# Patient Record
Sex: MALE | Race: WHITE | Employment: OTHER | URBAN - METROPOLITAN AREA
[De-identification: names, ages, dates, MRNs, and addresses within clinical notes are randomized per-mention and may not be internally consistent; named-entity substitution may affect disease eponyms.]

---

## 2017-04-13 ENCOUNTER — APPOINTMENT (EMERGENCY)
Dept: CT IMAGING | Facility: HOSPITAL | Age: 77
DRG: 071 | End: 2017-04-13
Payer: COMMERCIAL

## 2017-04-13 ENCOUNTER — HOSPITAL ENCOUNTER (INPATIENT)
Facility: HOSPITAL | Age: 77
LOS: 4 days | Discharge: NURSING, REHAB OR LONG-TERM CARE | DRG: 071 | End: 2017-04-17
Attending: INTERNAL MEDICINE | Admitting: INTERNAL MEDICINE
Payer: COMMERCIAL

## 2017-04-13 ENCOUNTER — APPOINTMENT (EMERGENCY)
Dept: RADIOLOGY | Facility: HOSPITAL | Age: 77
DRG: 071 | End: 2017-04-13
Payer: COMMERCIAL

## 2017-04-13 DIAGNOSIS — R44.3 HALLUCINATIONS: ICD-10-CM

## 2017-04-13 DIAGNOSIS — R41.82 ALTERED MENTAL STATE: Primary | ICD-10-CM

## 2017-04-13 DIAGNOSIS — S22.089A T12 VERTEBRAL FRACTURE (HCC): ICD-10-CM

## 2017-04-13 DIAGNOSIS — G93.41 ACUTE METABOLIC ENCEPHALOPATHY: ICD-10-CM

## 2017-04-13 DIAGNOSIS — F10.11 HISTORY OF ALCOHOL ABUSE: ICD-10-CM

## 2017-04-13 PROBLEM — M48.50XA VERTEBRAL COMPRESSION FRACTURE (HCC): Status: ACTIVE | Noted: 2017-04-13

## 2017-04-13 PROBLEM — I10 ESSENTIAL HYPERTENSION: Status: ACTIVE | Noted: 2017-04-13

## 2017-04-13 PROBLEM — I25.10 CORONARY ARTERY DISEASE INVOLVING NATIVE CORONARY ARTERY: Status: ACTIVE | Noted: 2017-04-13

## 2017-04-13 PROBLEM — R65.10 SIRS (SYSTEMIC INFLAMMATORY RESPONSE SYNDROME) (HCC): Status: ACTIVE | Noted: 2017-04-13

## 2017-04-13 PROBLEM — M62.82 RHABDOMYOLYSIS: Status: ACTIVE | Noted: 2017-04-13

## 2017-04-13 PROBLEM — F10.10 ALCOHOL ABUSE, CONTINUOUS: Status: ACTIVE | Noted: 2017-04-13

## 2017-04-13 PROBLEM — I16.0 HYPERTENSIVE URGENCY: Status: ACTIVE | Noted: 2017-04-13

## 2017-04-13 LAB
ALBUMIN SERPL BCP-MCNC: 3.9 G/DL (ref 3.5–5)
ALP SERPL-CCNC: 48 U/L (ref 46–116)
ALT SERPL W P-5'-P-CCNC: 32 U/L (ref 12–78)
AMMONIA PLAS-SCNC: 12 UMOL/L (ref 11–35)
AMPHETAMINES SERPL QL SCN: NEGATIVE
ANION GAP SERPL CALCULATED.3IONS-SCNC: 6 MMOL/L (ref 4–13)
APTT PPP: 24 SECONDS (ref 24–36)
AST SERPL W P-5'-P-CCNC: 48 U/L (ref 5–45)
BACTERIA UR QL AUTO: ABNORMAL /HPF
BARBITURATES UR QL: NEGATIVE
BASOPHILS # BLD AUTO: 0.02 THOUSANDS/ΜL (ref 0–0.1)
BASOPHILS NFR BLD AUTO: 0 % (ref 0–1)
BENZODIAZ UR QL: NEGATIVE
BILIRUB DIRECT SERPL-MCNC: 0.49 MG/DL (ref 0–0.2)
BILIRUB SERPL-MCNC: 2.1 MG/DL (ref 0.2–1)
BILIRUB UR QL STRIP: ABNORMAL
BUN SERPL-MCNC: 14 MG/DL (ref 5–25)
CALCIUM SERPL-MCNC: 9.7 MG/DL (ref 8.3–10.1)
CHLORIDE SERPL-SCNC: 103 MMOL/L (ref 100–108)
CK MB SERPL-MCNC: 1.7 % (ref 0–2.5)
CK MB SERPL-MCNC: 10.6 NG/ML (ref 0–5)
CK SERPL-CCNC: 628 U/L (ref 39–308)
CLARITY UR: CLEAR
CO2 SERPL-SCNC: 30 MMOL/L (ref 21–32)
COCAINE UR QL: NEGATIVE
COLOR UR: YELLOW
CREAT SERPL-MCNC: 0.8 MG/DL (ref 0.6–1.3)
EOSINOPHIL # BLD AUTO: 0 THOUSAND/ΜL (ref 0–0.61)
EOSINOPHIL NFR BLD AUTO: 0 % (ref 0–6)
ERYTHROCYTE [DISTWIDTH] IN BLOOD BY AUTOMATED COUNT: 13.1 % (ref 11.6–15.1)
ETHANOL SERPL-MCNC: <3 MG/DL (ref 0–3)
GFR SERPL CREATININE-BSD FRML MDRD: >60 ML/MIN/1.73SQ M
GLUCOSE SERPL-MCNC: 125 MG/DL (ref 65–140)
GLUCOSE UR STRIP-MCNC: NEGATIVE MG/DL
HCT VFR BLD AUTO: 49.8 % (ref 36.5–49.3)
HGB BLD-MCNC: 17.5 G/DL (ref 12–17)
HGB UR QL STRIP.AUTO: ABNORMAL
INR PPP: 1.06 (ref 0.86–1.16)
KETONES UR STRIP-MCNC: ABNORMAL MG/DL
LACTATE SERPL-SCNC: 1.8 MMOL/L (ref 0.5–2)
LEUKOCYTE ESTERASE UR QL STRIP: NEGATIVE
LYMPHOCYTES # BLD AUTO: 0.97 THOUSANDS/ΜL (ref 0.6–4.47)
LYMPHOCYTES NFR BLD AUTO: 5 % (ref 14–44)
MCH RBC QN AUTO: 30.5 PG (ref 26.8–34.3)
MCHC RBC AUTO-ENTMCNC: 35.1 G/DL (ref 31.4–37.4)
MCV RBC AUTO: 87 FL (ref 82–98)
METHADONE UR QL: NEGATIVE
MONOCYTES # BLD AUTO: 1.33 THOUSAND/ΜL (ref 0.17–1.22)
MONOCYTES NFR BLD AUTO: 7 % (ref 4–12)
NEUTROPHILS # BLD AUTO: 15.67 THOUSANDS/ΜL (ref 1.85–7.62)
NEUTS SEG NFR BLD AUTO: 88 % (ref 43–75)
NITRITE UR QL STRIP: NEGATIVE
NON-SQ EPI CELLS URNS QL MICRO: ABNORMAL /HPF
NT-PROBNP SERPL-MCNC: 2376 PG/ML
OPIATES UR QL SCN: NEGATIVE
PCP UR QL: NEGATIVE
PH UR STRIP.AUTO: 6 [PH] (ref 4.5–8)
PLATELET # BLD AUTO: 206 THOUSANDS/UL (ref 149–390)
PMV BLD AUTO: 8.6 FL (ref 8.9–12.7)
POTASSIUM SERPL-SCNC: 4.6 MMOL/L (ref 3.5–5.3)
PROT SERPL-MCNC: 8.1 G/DL (ref 6.4–8.2)
PROT UR STRIP-MCNC: ABNORMAL MG/DL
PROTHROMBIN TIME: 13.7 SECONDS (ref 12–14.3)
RBC # BLD AUTO: 5.74 MILLION/UL (ref 3.88–5.62)
RBC #/AREA URNS AUTO: ABNORMAL /HPF
SODIUM SERPL-SCNC: 139 MMOL/L (ref 136–145)
SP GR UR STRIP.AUTO: >=1.03 (ref 1–1.03)
THC UR QL: NEGATIVE
TROPONIN I SERPL-MCNC: 0.05 NG/ML
UROBILINOGEN UR QL STRIP.AUTO: 0.2 E.U./DL
WBC # BLD AUTO: 17.99 THOUSAND/UL (ref 4.31–10.16)
WBC #/AREA URNS AUTO: ABNORMAL /HPF

## 2017-04-13 PROCEDURE — 72131 CT LUMBAR SPINE W/O DYE: CPT

## 2017-04-13 PROCEDURE — 71010 HB CHEST X-RAY 1 VIEW FRONTAL (PORTABLE): CPT

## 2017-04-13 PROCEDURE — 72125 CT NECK SPINE W/O DYE: CPT

## 2017-04-13 PROCEDURE — 36415 COLL VENOUS BLD VENIPUNCTURE: CPT | Performed by: PHYSICIAN ASSISTANT

## 2017-04-13 PROCEDURE — 85025 COMPLETE CBC W/AUTO DIFF WBC: CPT | Performed by: PHYSICIAN ASSISTANT

## 2017-04-13 PROCEDURE — 96361 HYDRATE IV INFUSION ADD-ON: CPT

## 2017-04-13 PROCEDURE — 99285 EMERGENCY DEPT VISIT HI MDM: CPT

## 2017-04-13 PROCEDURE — G0480 DRUG TEST DEF 1-7 CLASSES: HCPCS | Performed by: PHYSICIAN ASSISTANT

## 2017-04-13 PROCEDURE — 80053 COMPREHEN METABOLIC PANEL: CPT | Performed by: PHYSICIAN ASSISTANT

## 2017-04-13 PROCEDURE — 81001 URINALYSIS AUTO W/SCOPE: CPT | Performed by: PHYSICIAN ASSISTANT

## 2017-04-13 PROCEDURE — 70450 CT HEAD/BRAIN W/O DYE: CPT

## 2017-04-13 PROCEDURE — 83880 ASSAY OF NATRIURETIC PEPTIDE: CPT | Performed by: PHYSICIAN ASSISTANT

## 2017-04-13 PROCEDURE — 85610 PROTHROMBIN TIME: CPT | Performed by: PHYSICIAN ASSISTANT

## 2017-04-13 PROCEDURE — 84484 ASSAY OF TROPONIN QUANT: CPT | Performed by: PHYSICIAN ASSISTANT

## 2017-04-13 PROCEDURE — A9270 NON-COVERED ITEM OR SERVICE: HCPCS | Performed by: INTERNAL MEDICINE

## 2017-04-13 PROCEDURE — 82248 BILIRUBIN DIRECT: CPT | Performed by: PHYSICIAN ASSISTANT

## 2017-04-13 PROCEDURE — 80307 DRUG TEST PRSMV CHEM ANLYZR: CPT | Performed by: PHYSICIAN ASSISTANT

## 2017-04-13 PROCEDURE — 80320 DRUG SCREEN QUANTALCOHOLS: CPT | Performed by: PHYSICIAN ASSISTANT

## 2017-04-13 PROCEDURE — 83605 ASSAY OF LACTIC ACID: CPT | Performed by: PHYSICIAN ASSISTANT

## 2017-04-13 PROCEDURE — 82553 CREATINE MB FRACTION: CPT | Performed by: PHYSICIAN ASSISTANT

## 2017-04-13 PROCEDURE — 96372 THER/PROPH/DIAG INJ SC/IM: CPT

## 2017-04-13 PROCEDURE — 82550 ASSAY OF CK (CPK): CPT | Performed by: PHYSICIAN ASSISTANT

## 2017-04-13 PROCEDURE — 82140 ASSAY OF AMMONIA: CPT | Performed by: INTERNAL MEDICINE

## 2017-04-13 PROCEDURE — 87040 BLOOD CULTURE FOR BACTERIA: CPT | Performed by: PHYSICIAN ASSISTANT

## 2017-04-13 PROCEDURE — 93005 ELECTROCARDIOGRAM TRACING: CPT | Performed by: PHYSICIAN ASSISTANT

## 2017-04-13 PROCEDURE — 85730 THROMBOPLASTIN TIME PARTIAL: CPT | Performed by: PHYSICIAN ASSISTANT

## 2017-04-13 PROCEDURE — 96360 HYDRATION IV INFUSION INIT: CPT

## 2017-04-13 PROCEDURE — 87086 URINE CULTURE/COLONY COUNT: CPT | Performed by: PHYSICIAN ASSISTANT

## 2017-04-13 RX ORDER — THIAMINE MONONITRATE (VIT B1) 100 MG
100 TABLET ORAL DAILY
Status: DISCONTINUED | OUTPATIENT
Start: 2017-04-14 | End: 2017-04-17 | Stop reason: HOSPADM

## 2017-04-13 RX ORDER — CARVEDILOL 12.5 MG/1
25 TABLET ORAL 2 TIMES DAILY WITH MEALS
Status: DISCONTINUED | OUTPATIENT
Start: 2017-04-13 | End: 2017-04-17 | Stop reason: HOSPADM

## 2017-04-13 RX ORDER — ACETAMINOPHEN 325 MG/1
650 TABLET ORAL EVERY 6 HOURS PRN
Status: DISCONTINUED | OUTPATIENT
Start: 2017-04-13 | End: 2017-04-17 | Stop reason: HOSPADM

## 2017-04-13 RX ORDER — CALCITONIN SALMON 200 [IU]/.09ML
1 SPRAY, METERED NASAL DAILY
Status: DISCONTINUED | OUTPATIENT
Start: 2017-04-14 | End: 2017-04-17 | Stop reason: HOSPADM

## 2017-04-13 RX ORDER — LISINOPRIL 10 MG/1
10 TABLET ORAL DAILY
Status: DISCONTINUED | OUTPATIENT
Start: 2017-04-14 | End: 2017-04-17

## 2017-04-13 RX ORDER — ONDANSETRON 2 MG/ML
4 INJECTION INTRAMUSCULAR; INTRAVENOUS EVERY 6 HOURS PRN
Status: DISCONTINUED | OUTPATIENT
Start: 2017-04-13 | End: 2017-04-17 | Stop reason: HOSPADM

## 2017-04-13 RX ORDER — CARVEDILOL 12.5 MG/1
TABLET ORAL
COMMUNITY
Start: 2016-07-19 | End: 2017-04-17 | Stop reason: HOSPADM

## 2017-04-13 RX ORDER — TRAMADOL HYDROCHLORIDE 50 MG/1
50 TABLET ORAL EVERY 6 HOURS PRN
Status: DISCONTINUED | OUTPATIENT
Start: 2017-04-13 | End: 2017-04-17 | Stop reason: HOSPADM

## 2017-04-13 RX ORDER — SIMVASTATIN 40 MG
TABLET ORAL
COMMUNITY
Start: 2011-04-23 | End: 2017-04-17 | Stop reason: HOSPADM

## 2017-04-13 RX ORDER — LISINOPRIL 10 MG/1
TABLET ORAL
COMMUNITY
Start: 2016-09-20 | End: 2017-04-17 | Stop reason: HOSPADM

## 2017-04-13 RX ORDER — THIAMINE HYDROCHLORIDE 100 MG/ML
100 INJECTION, SOLUTION INTRAMUSCULAR; INTRAVENOUS DAILY
Status: DISCONTINUED | OUTPATIENT
Start: 2017-04-13 | End: 2017-04-13

## 2017-04-13 RX ORDER — ASPIRIN 81 MG/1
81 TABLET, CHEWABLE ORAL ONCE
Status: COMPLETED | OUTPATIENT
Start: 2017-04-13 | End: 2017-04-13

## 2017-04-13 RX ORDER — DEXTROSE AND SODIUM CHLORIDE 5; .9 G/100ML; G/100ML
75 INJECTION, SOLUTION INTRAVENOUS CONTINUOUS
Status: DISCONTINUED | OUTPATIENT
Start: 2017-04-13 | End: 2017-04-15

## 2017-04-13 RX ORDER — LORAZEPAM 2 MG/ML
1 INJECTION INTRAMUSCULAR EVERY 4 HOURS PRN
Status: DISCONTINUED | OUTPATIENT
Start: 2017-04-13 | End: 2017-04-17 | Stop reason: HOSPADM

## 2017-04-13 RX ORDER — CHOLECALCIFEROL (VITAMIN D3) 25 MCG
1 CAPSULE ORAL 2 TIMES DAILY
COMMUNITY
Start: 2015-10-02 | End: 2021-11-12 | Stop reason: ALTCHOICE

## 2017-04-13 RX ADMIN — SODIUM CHLORIDE 1000 ML: 0.9 INJECTION, SOLUTION INTRAVENOUS at 13:38

## 2017-04-13 RX ADMIN — ASPIRIN 81 MG CHEWABLE TABLET 81 MG: 81 TABLET CHEWABLE at 17:57

## 2017-04-13 RX ADMIN — DEXTROSE AND SODIUM CHLORIDE 125 ML/HR: 5; .9 INJECTION, SOLUTION INTRAVENOUS at 17:44

## 2017-04-13 RX ADMIN — THIAMINE HYDROCHLORIDE 100 MG: 100 INJECTION, SOLUTION INTRAMUSCULAR; INTRAVENOUS at 13:43

## 2017-04-13 RX ADMIN — CARVEDILOL 25 MG: 12.5 TABLET, FILM COATED ORAL at 17:57

## 2017-04-14 ENCOUNTER — APPOINTMENT (INPATIENT)
Dept: OCCUPATIONAL THERAPY | Facility: HOSPITAL | Age: 77
DRG: 071 | End: 2017-04-14
Payer: COMMERCIAL

## 2017-04-14 ENCOUNTER — APPOINTMENT (INPATIENT)
Dept: PHYSICAL THERAPY | Facility: HOSPITAL | Age: 77
DRG: 071 | End: 2017-04-14
Payer: COMMERCIAL

## 2017-04-14 LAB
ALBUMIN SERPL BCP-MCNC: 3.1 G/DL (ref 3.5–5)
ALP SERPL-CCNC: 41 U/L (ref 46–116)
ALT SERPL W P-5'-P-CCNC: 26 U/L (ref 12–78)
ANION GAP SERPL CALCULATED.3IONS-SCNC: 6 MMOL/L (ref 4–13)
AST SERPL W P-5'-P-CCNC: 29 U/L (ref 5–45)
ATRIAL RATE: 92 BPM
BACTERIA UR CULT: NORMAL
BASOPHILS # BLD AUTO: 0.02 THOUSANDS/ΜL (ref 0–0.1)
BASOPHILS NFR BLD AUTO: 0 % (ref 0–1)
BILIRUB SERPL-MCNC: 1.2 MG/DL (ref 0.2–1)
BUN SERPL-MCNC: 10 MG/DL (ref 5–25)
CALCIUM SERPL-MCNC: 8.5 MG/DL (ref 8.3–10.1)
CHLORIDE SERPL-SCNC: 108 MMOL/L (ref 100–108)
CK MB SERPL-MCNC: 1.4 % (ref 0–2.5)
CK MB SERPL-MCNC: 4.2 NG/ML (ref 0–5)
CK SERPL-CCNC: 290 U/L (ref 39–308)
CO2 SERPL-SCNC: 31 MMOL/L (ref 21–32)
CREAT SERPL-MCNC: 0.74 MG/DL (ref 0.6–1.3)
EOSINOPHIL # BLD AUTO: 0.02 THOUSAND/ΜL (ref 0–0.61)
EOSINOPHIL NFR BLD AUTO: 0 % (ref 0–6)
ERYTHROCYTE [DISTWIDTH] IN BLOOD BY AUTOMATED COUNT: 13.3 % (ref 11.6–15.1)
GFR SERPL CREATININE-BSD FRML MDRD: >60 ML/MIN/1.73SQ M
GLUCOSE SERPL-MCNC: 155 MG/DL (ref 65–140)
HCT VFR BLD AUTO: 44.8 % (ref 36.5–49.3)
HGB BLD-MCNC: 15 G/DL (ref 12–17)
LYMPHOCYTES # BLD AUTO: 1.06 THOUSANDS/ΜL (ref 0.6–4.47)
LYMPHOCYTES NFR BLD AUTO: 9 % (ref 14–44)
MAGNESIUM SERPL-MCNC: 2.2 MG/DL (ref 1.6–2.6)
MCH RBC QN AUTO: 29.9 PG (ref 26.8–34.3)
MCHC RBC AUTO-ENTMCNC: 33.5 G/DL (ref 31.4–37.4)
MCV RBC AUTO: 89 FL (ref 82–98)
MONOCYTES # BLD AUTO: 1.13 THOUSAND/ΜL (ref 0.17–1.22)
MONOCYTES NFR BLD AUTO: 10 % (ref 4–12)
NEUTROPHILS # BLD AUTO: 9.71 THOUSANDS/ΜL (ref 1.85–7.62)
NEUTS SEG NFR BLD AUTO: 81 % (ref 43–75)
P AXIS: 41 DEGREES
PLATELET # BLD AUTO: 167 THOUSANDS/UL (ref 149–390)
PMV BLD AUTO: 8.8 FL (ref 8.9–12.7)
POTASSIUM SERPL-SCNC: 3.8 MMOL/L (ref 3.5–5.3)
PR INTERVAL: 146 MS
PROT SERPL-MCNC: 6.6 G/DL (ref 6.4–8.2)
QRS AXIS: 58 DEGREES
QRSD INTERVAL: 86 MS
QT INTERVAL: 390 MS
QTC INTERVAL: 482 MS
RBC # BLD AUTO: 5.02 MILLION/UL (ref 3.88–5.62)
SODIUM SERPL-SCNC: 145 MMOL/L (ref 136–145)
T WAVE AXIS: 44 DEGREES
TROPONIN I SERPL-MCNC: 0.04 NG/ML
VENTRICULAR RATE: 92 BPM
WBC # BLD AUTO: 11.94 THOUSAND/UL (ref 4.31–10.16)

## 2017-04-14 PROCEDURE — A9270 NON-COVERED ITEM OR SERVICE: HCPCS | Performed by: INTERNAL MEDICINE

## 2017-04-14 PROCEDURE — G8979 MOBILITY GOAL STATUS: HCPCS

## 2017-04-14 PROCEDURE — G8988 SELF CARE GOAL STATUS: HCPCS

## 2017-04-14 PROCEDURE — A9270 NON-COVERED ITEM OR SERVICE: HCPCS | Performed by: NURSE PRACTITIONER

## 2017-04-14 PROCEDURE — 84484 ASSAY OF TROPONIN QUANT: CPT | Performed by: INTERNAL MEDICINE

## 2017-04-14 PROCEDURE — 97163 PT EVAL HIGH COMPLEX 45 MIN: CPT

## 2017-04-14 PROCEDURE — G8987 SELF CARE CURRENT STATUS: HCPCS

## 2017-04-14 PROCEDURE — 97166 OT EVAL MOD COMPLEX 45 MIN: CPT

## 2017-04-14 PROCEDURE — 82553 CREATINE MB FRACTION: CPT | Performed by: INTERNAL MEDICINE

## 2017-04-14 PROCEDURE — 85025 COMPLETE CBC W/AUTO DIFF WBC: CPT | Performed by: INTERNAL MEDICINE

## 2017-04-14 PROCEDURE — 83735 ASSAY OF MAGNESIUM: CPT | Performed by: NURSE PRACTITIONER

## 2017-04-14 PROCEDURE — 82550 ASSAY OF CK (CPK): CPT | Performed by: INTERNAL MEDICINE

## 2017-04-14 PROCEDURE — 80053 COMPREHEN METABOLIC PANEL: CPT | Performed by: INTERNAL MEDICINE

## 2017-04-14 PROCEDURE — G8978 MOBILITY CURRENT STATUS: HCPCS

## 2017-04-14 RX ORDER — BENZONATATE 100 MG/1
100 CAPSULE ORAL 3 TIMES DAILY PRN
Status: DISCONTINUED | OUTPATIENT
Start: 2017-04-14 | End: 2017-04-17 | Stop reason: HOSPADM

## 2017-04-14 RX ORDER — FOLIC ACID 1 MG/1
1 TABLET ORAL DAILY
Status: DISCONTINUED | OUTPATIENT
Start: 2017-04-14 | End: 2017-04-17 | Stop reason: HOSPADM

## 2017-04-14 RX ADMIN — CARVEDILOL 25 MG: 12.5 TABLET, FILM COATED ORAL at 17:10

## 2017-04-14 RX ADMIN — ENOXAPARIN SODIUM 40 MG: 40 INJECTION SUBCUTANEOUS at 09:44

## 2017-04-14 RX ADMIN — HYDROMORPHONE HYDROCHLORIDE 1 MG: 1 INJECTION, SOLUTION INTRAMUSCULAR; INTRAVENOUS; SUBCUTANEOUS at 09:44

## 2017-04-14 RX ADMIN — HYDROMORPHONE HYDROCHLORIDE 1 MG: 1 INJECTION, SOLUTION INTRAMUSCULAR; INTRAVENOUS; SUBCUTANEOUS at 02:56

## 2017-04-14 RX ADMIN — BENZONATATE 100 MG: 100 CAPSULE ORAL at 23:09

## 2017-04-14 RX ADMIN — CARVEDILOL 25 MG: 12.5 TABLET, FILM COATED ORAL at 09:43

## 2017-04-14 RX ADMIN — FOLIC ACID 1 MG: 1 TABLET ORAL at 15:00

## 2017-04-14 RX ADMIN — Medication 100 MG: at 09:43

## 2017-04-14 RX ADMIN — CALCITONIN SALMON 1 SPRAY: 200 SPRAY, METERED NASAL at 09:45

## 2017-04-14 RX ADMIN — LISINOPRIL 10 MG: 10 TABLET ORAL at 09:43

## 2017-04-14 RX ADMIN — DEXTROSE AND SODIUM CHLORIDE 125 ML/HR: 5; .9 INJECTION, SOLUTION INTRAVENOUS at 02:53

## 2017-04-15 LAB
FOLATE SERPL-MCNC: 15.3 NG/ML (ref 3.1–17.5)
TSH SERPL DL<=0.05 MIU/L-ACNC: 1.22 UIU/ML (ref 0.36–3.74)
VIT B12 SERPL-MCNC: 479 PG/ML (ref 100–900)

## 2017-04-15 PROCEDURE — A9270 NON-COVERED ITEM OR SERVICE: HCPCS | Performed by: NURSE PRACTITIONER

## 2017-04-15 PROCEDURE — 82746 ASSAY OF FOLIC ACID SERUM: CPT | Performed by: NURSE PRACTITIONER

## 2017-04-15 PROCEDURE — 97116 GAIT TRAINING THERAPY: CPT

## 2017-04-15 PROCEDURE — A9270 NON-COVERED ITEM OR SERVICE: HCPCS | Performed by: INTERNAL MEDICINE

## 2017-04-15 PROCEDURE — 94760 N-INVAS EAR/PLS OXIMETRY 1: CPT

## 2017-04-15 PROCEDURE — 84443 ASSAY THYROID STIM HORMONE: CPT | Performed by: NURSE PRACTITIONER

## 2017-04-15 PROCEDURE — 82607 VITAMIN B-12: CPT | Performed by: NURSE PRACTITIONER

## 2017-04-15 RX ORDER — AMLODIPINE BESYLATE 5 MG/1
10 TABLET ORAL DAILY
Status: DISCONTINUED | OUTPATIENT
Start: 2017-04-15 | End: 2017-04-17 | Stop reason: HOSPADM

## 2017-04-15 RX ADMIN — LISINOPRIL 10 MG: 10 TABLET ORAL at 09:19

## 2017-04-15 RX ADMIN — TRAMADOL HYDROCHLORIDE 50 MG: 50 TABLET, FILM COATED ORAL at 06:11

## 2017-04-15 RX ADMIN — CARVEDILOL 25 MG: 12.5 TABLET, FILM COATED ORAL at 16:36

## 2017-04-15 RX ADMIN — ENOXAPARIN SODIUM 40 MG: 40 INJECTION SUBCUTANEOUS at 09:19

## 2017-04-15 RX ADMIN — CARVEDILOL 25 MG: 12.5 TABLET, FILM COATED ORAL at 09:18

## 2017-04-15 RX ADMIN — LORAZEPAM 1 MG: 2 INJECTION, SOLUTION INTRAMUSCULAR; INTRAVENOUS at 13:30

## 2017-04-15 RX ADMIN — LORAZEPAM 1 MG: 2 INJECTION, SOLUTION INTRAMUSCULAR; INTRAVENOUS at 21:42

## 2017-04-15 RX ADMIN — CALCITONIN SALMON 1 SPRAY: 200 SPRAY, METERED NASAL at 09:19

## 2017-04-15 RX ADMIN — BENZONATATE 100 MG: 100 CAPSULE ORAL at 21:42

## 2017-04-15 RX ADMIN — DEXTROSE AND SODIUM CHLORIDE 75 ML/HR: 5; .9 INJECTION, SOLUTION INTRAVENOUS at 04:36

## 2017-04-15 RX ADMIN — FOLIC ACID 1 MG: 1 TABLET ORAL at 09:19

## 2017-04-15 RX ADMIN — Medication 100 MG: at 09:20

## 2017-04-15 RX ADMIN — AMLODIPINE BESYLATE 10 MG: 5 TABLET ORAL at 16:36

## 2017-04-16 LAB
ALBUMIN SERPL BCP-MCNC: 2.8 G/DL (ref 3.5–5)
ALP SERPL-CCNC: 40 U/L (ref 46–116)
ALT SERPL W P-5'-P-CCNC: 24 U/L (ref 12–78)
ANION GAP SERPL CALCULATED.3IONS-SCNC: 8 MMOL/L (ref 4–13)
AST SERPL W P-5'-P-CCNC: 22 U/L (ref 5–45)
BILIRUB SERPL-MCNC: 1.4 MG/DL (ref 0.2–1)
BUN SERPL-MCNC: 6 MG/DL (ref 5–25)
CALCIUM SERPL-MCNC: 8.4 MG/DL (ref 8.3–10.1)
CHLORIDE SERPL-SCNC: 105 MMOL/L (ref 100–108)
CO2 SERPL-SCNC: 28 MMOL/L (ref 21–32)
CREAT SERPL-MCNC: 0.58 MG/DL (ref 0.6–1.3)
GFR SERPL CREATININE-BSD FRML MDRD: >60 ML/MIN/1.73SQ M
GLUCOSE SERPL-MCNC: 102 MG/DL (ref 65–140)
POTASSIUM SERPL-SCNC: 3.5 MMOL/L (ref 3.5–5.3)
PROT SERPL-MCNC: 6 G/DL (ref 6.4–8.2)
SODIUM SERPL-SCNC: 141 MMOL/L (ref 136–145)

## 2017-04-16 PROCEDURE — 80053 COMPREHEN METABOLIC PANEL: CPT | Performed by: INTERNAL MEDICINE

## 2017-04-16 PROCEDURE — A9270 NON-COVERED ITEM OR SERVICE: HCPCS | Performed by: INTERNAL MEDICINE

## 2017-04-16 PROCEDURE — A9270 NON-COVERED ITEM OR SERVICE: HCPCS | Performed by: NURSE PRACTITIONER

## 2017-04-16 PROCEDURE — 94760 N-INVAS EAR/PLS OXIMETRY 1: CPT

## 2017-04-16 RX ORDER — CHLORDIAZEPOXIDE HYDROCHLORIDE 5 MG/1
10 CAPSULE, GELATIN COATED ORAL EVERY 8 HOURS SCHEDULED
Status: DISCONTINUED | OUTPATIENT
Start: 2017-04-16 | End: 2017-04-17 | Stop reason: HOSPADM

## 2017-04-16 RX ADMIN — CARVEDILOL 25 MG: 12.5 TABLET, FILM COATED ORAL at 08:48

## 2017-04-16 RX ADMIN — FOLIC ACID 1 MG: 1 TABLET ORAL at 08:48

## 2017-04-16 RX ADMIN — Medication 100 MG: at 08:51

## 2017-04-16 RX ADMIN — LISINOPRIL 10 MG: 10 TABLET ORAL at 08:48

## 2017-04-16 RX ADMIN — CALCITONIN SALMON 1 SPRAY: 200 SPRAY, METERED NASAL at 09:07

## 2017-04-16 RX ADMIN — AMLODIPINE BESYLATE 10 MG: 5 TABLET ORAL at 08:48

## 2017-04-16 RX ADMIN — LORAZEPAM 1 MG: 2 INJECTION, SOLUTION INTRAMUSCULAR; INTRAVENOUS at 08:50

## 2017-04-16 RX ADMIN — CARVEDILOL 25 MG: 12.5 TABLET, FILM COATED ORAL at 17:28

## 2017-04-16 RX ADMIN — CHLORDIAZEPOXIDE HYDROCHLORIDE 10 MG: 5 CAPSULE ORAL at 15:42

## 2017-04-16 RX ADMIN — ENOXAPARIN SODIUM 40 MG: 40 INJECTION SUBCUTANEOUS at 08:48

## 2017-04-16 RX ADMIN — CHLORDIAZEPOXIDE HYDROCHLORIDE 10 MG: 5 CAPSULE ORAL at 22:12

## 2017-04-17 ENCOUNTER — APPOINTMENT (INPATIENT)
Dept: OCCUPATIONAL THERAPY | Facility: HOSPITAL | Age: 77
DRG: 071 | End: 2017-04-17
Payer: COMMERCIAL

## 2017-04-17 ENCOUNTER — APPOINTMENT (INPATIENT)
Dept: PHYSICAL THERAPY | Facility: HOSPITAL | Age: 77
DRG: 071 | End: 2017-04-17
Payer: COMMERCIAL

## 2017-04-17 VITALS
DIASTOLIC BLOOD PRESSURE: 64 MMHG | SYSTOLIC BLOOD PRESSURE: 131 MMHG | BODY MASS INDEX: 25.19 KG/M2 | RESPIRATION RATE: 18 BRPM | HEART RATE: 66 BPM | OXYGEN SATURATION: 94 % | WEIGHT: 166.23 LBS | TEMPERATURE: 97.5 F | HEIGHT: 68 IN

## 2017-04-17 PROCEDURE — A9270 NON-COVERED ITEM OR SERVICE: HCPCS | Performed by: INTERNAL MEDICINE

## 2017-04-17 PROCEDURE — 97532 HB COGNITIVE SKILLS DEVELOPMENT: CPT

## 2017-04-17 PROCEDURE — 93005 ELECTROCARDIOGRAM TRACING: CPT

## 2017-04-17 PROCEDURE — 97116 GAIT TRAINING THERAPY: CPT

## 2017-04-17 PROCEDURE — 97535 SELF CARE MNGMENT TRAINING: CPT

## 2017-04-17 PROCEDURE — 97530 THERAPEUTIC ACTIVITIES: CPT

## 2017-04-17 PROCEDURE — 94760 N-INVAS EAR/PLS OXIMETRY 1: CPT

## 2017-04-17 PROCEDURE — A9270 NON-COVERED ITEM OR SERVICE: HCPCS | Performed by: NURSE PRACTITIONER

## 2017-04-17 RX ORDER — TRAMADOL HYDROCHLORIDE 50 MG/1
50 TABLET ORAL EVERY 6 HOURS PRN
Qty: 30 TABLET | Refills: 0 | Status: SHIPPED | OUTPATIENT
Start: 2017-04-17 | End: 2017-04-27

## 2017-04-17 RX ORDER — LISINOPRIL 10 MG/1
20 TABLET ORAL DAILY
Qty: 60 TABLET | Refills: 0 | Status: SHIPPED | OUTPATIENT
Start: 2017-04-18 | End: 2018-02-27 | Stop reason: HOSPADM

## 2017-04-17 RX ORDER — FOLIC ACID 1 MG/1
1 TABLET ORAL DAILY
Qty: 30 TABLET | Refills: 0 | Status: ON HOLD | OUTPATIENT
Start: 2017-04-17 | End: 2018-02-27 | Stop reason: ALTCHOICE

## 2017-04-17 RX ORDER — CARVEDILOL 25 MG/1
25 TABLET ORAL 2 TIMES DAILY WITH MEALS
Qty: 60 TABLET | Refills: 0 | Status: ON HOLD | OUTPATIENT
Start: 2017-04-17 | End: 2018-02-27 | Stop reason: SDUPTHER

## 2017-04-17 RX ORDER — LANOLIN ALCOHOL/MO/W.PET/CERES
100 CREAM (GRAM) TOPICAL DAILY
Qty: 30 TABLET | Refills: 0 | Status: ON HOLD | OUTPATIENT
Start: 2017-04-17 | End: 2018-02-27 | Stop reason: ALTCHOICE

## 2017-04-17 RX ORDER — AMLODIPINE BESYLATE 10 MG/1
10 TABLET ORAL DAILY
Qty: 30 TABLET | Refills: 0 | Status: SHIPPED | OUTPATIENT
Start: 2017-04-17 | End: 2018-02-26 | Stop reason: SDUPTHER

## 2017-04-17 RX ORDER — LISINOPRIL 10 MG/1
10 TABLET ORAL DAILY
Status: DISCONTINUED | OUTPATIENT
Start: 2017-04-18 | End: 2017-04-17 | Stop reason: HOSPADM

## 2017-04-17 RX ORDER — BENZONATATE 100 MG/1
100 CAPSULE ORAL 3 TIMES DAILY PRN
Qty: 20 CAPSULE | Refills: 0 | Status: SHIPPED | OUTPATIENT
Start: 2017-04-17 | End: 2017-05-17

## 2017-04-17 RX ORDER — CHLORDIAZEPOXIDE HYDROCHLORIDE 10 MG/1
10 CAPSULE, GELATIN COATED ORAL EVERY 8 HOURS SCHEDULED
Qty: 12 CAPSULE | Refills: 0 | Status: ON HOLD | OUTPATIENT
Start: 2017-04-17 | End: 2018-02-27 | Stop reason: ALTCHOICE

## 2017-04-17 RX ORDER — CALCITONIN SALMON 200 [IU]/.09ML
1 SPRAY, METERED NASAL DAILY
Qty: 30 ACT | Refills: 0 | Status: ON HOLD | OUTPATIENT
Start: 2017-04-17 | End: 2018-02-27 | Stop reason: ALTCHOICE

## 2017-04-17 RX ORDER — LORAZEPAM 1 MG/1
1 TABLET ORAL EVERY 6 HOURS PRN
Qty: 30 TABLET | Refills: 0 | Status: ON HOLD | OUTPATIENT
Start: 2017-04-17 | End: 2018-02-27 | Stop reason: ALTCHOICE

## 2017-04-17 RX ADMIN — ENOXAPARIN SODIUM 40 MG: 40 INJECTION SUBCUTANEOUS at 08:19

## 2017-04-17 RX ADMIN — CARVEDILOL 25 MG: 12.5 TABLET, FILM COATED ORAL at 08:19

## 2017-04-17 RX ADMIN — CALCITONIN SALMON 1 SPRAY: 200 SPRAY, METERED NASAL at 08:25

## 2017-04-17 RX ADMIN — CHLORDIAZEPOXIDE HYDROCHLORIDE 10 MG: 5 CAPSULE ORAL at 05:24

## 2017-04-17 RX ADMIN — FOLIC ACID 1 MG: 1 TABLET ORAL at 08:19

## 2017-04-17 RX ADMIN — Medication 100 MG: at 08:20

## 2017-04-17 RX ADMIN — AMLODIPINE BESYLATE 10 MG: 5 TABLET ORAL at 08:19

## 2017-04-17 RX ADMIN — CHLORDIAZEPOXIDE HYDROCHLORIDE 10 MG: 5 CAPSULE ORAL at 14:46

## 2017-04-17 RX ADMIN — LISINOPRIL 10 MG: 10 TABLET ORAL at 08:19

## 2017-04-17 RX ADMIN — CARVEDILOL 25 MG: 12.5 TABLET, FILM COATED ORAL at 17:13

## 2017-04-18 LAB
ATRIAL RATE: 97 BPM
BACTERIA BLD CULT: NORMAL
BACTERIA BLD CULT: NORMAL
P AXIS: -10 DEGREES
PR INTERVAL: 164 MS
QRS AXIS: -32 DEGREES
QRSD INTERVAL: 146 MS
QT INTERVAL: 380 MS
QTC INTERVAL: 482 MS
T WAVE AXIS: 51 DEGREES
VENTRICULAR RATE: 97 BPM

## 2017-04-24 ENCOUNTER — GENERIC CONVERSION - ENCOUNTER (OUTPATIENT)
Dept: OTHER | Facility: OTHER | Age: 77
End: 2017-04-24

## 2017-05-02 ENCOUNTER — ALLSCRIPTS OFFICE VISIT (OUTPATIENT)
Dept: OTHER | Facility: OTHER | Age: 77
End: 2017-05-02

## 2017-05-17 ENCOUNTER — ALLSCRIPTS OFFICE VISIT (OUTPATIENT)
Dept: OTHER | Facility: OTHER | Age: 77
End: 2017-05-17

## 2017-05-19 ENCOUNTER — ALLSCRIPTS OFFICE VISIT (OUTPATIENT)
Dept: OTHER | Facility: OTHER | Age: 77
End: 2017-05-19

## 2017-06-30 LAB
25(OH)D3 SERPL-MCNC: 58.9 NG/ML (ref 30–100)
BASOPHILS # BLD AUTO: 0 %
BASOPHILS # BLD AUTO: 0 X10E3/UL (ref 0–0.2)
DEPRECATED RDW RBC AUTO: 14 % (ref 12.3–15.4)
EOSINOPHIL # BLD AUTO: 0.2 X10E3/UL (ref 0–0.4)
EOSINOPHIL # BLD AUTO: 4 %
HCT VFR BLD AUTO: 43.4 % (ref 37.5–51)
HGB BLD-MCNC: 14.8 G/DL (ref 12.6–17.7)
IMM.GRANULOCYTES (CD4/8) (HISTORICAL): 0 %
IMM.GRANULOCYTES (CD4/8) (HISTORICAL): 0 X10E3/UL (ref 0–0.1)
LYMPHOCYTES # BLD AUTO: 2.1 X10E3/UL (ref 0.7–3.1)
LYMPHOCYTES # BLD AUTO: 33 %
MCH RBC QN AUTO: 30.1 PG (ref 26.6–33)
MCHC RBC AUTO-ENTMCNC: 34.1 G/DL (ref 31.5–35.7)
MCV RBC AUTO: 88 FL (ref 79–97)
MONOCYTES # BLD AUTO: 0.5 X10E3/UL (ref 0.1–0.9)
MONOCYTES (HISTORICAL): 9 %
NEUTROPHILS # BLD AUTO: 3.5 X10E3/UL (ref 1.4–7)
NEUTROPHILS # BLD AUTO: 54 %
PLATELET # BLD AUTO: 204 X10E3/UL (ref 150–379)
RBC (HISTORICAL): 4.92 X10E6/UL (ref 4.14–5.8)
WBC # BLD AUTO: 6.4 X10E3/UL (ref 3.4–10.8)

## 2017-07-01 ENCOUNTER — GENERIC CONVERSION - ENCOUNTER (OUTPATIENT)
Dept: OTHER | Facility: OTHER | Age: 77
End: 2017-07-01

## 2017-07-01 LAB
A/G RATIO (HISTORICAL): 1.5 (ref 1.2–2.2)
ALBUMIN SERPL BCP-MCNC: 4.1 G/DL (ref 3.5–4.8)
ALP SERPL-CCNC: 60 IU/L (ref 39–117)
ALT SERPL W P-5'-P-CCNC: 17 IU/L (ref 0–44)
AST SERPL W P-5'-P-CCNC: 21 IU/L (ref 0–40)
BILIRUB SERPL-MCNC: 0.8 MG/DL (ref 0–1.2)
BUN SERPL-MCNC: 8 MG/DL (ref 8–27)
BUN/CREA RATIO (HISTORICAL): 11 (ref 10–24)
CALCIUM SERPL-MCNC: 9.1 MG/DL (ref 8.6–10.2)
CHLORIDE SERPL-SCNC: 96 MMOL/L (ref 96–106)
CHOLEST SERPL-MCNC: 160 MG/DL (ref 100–199)
CO2 SERPL-SCNC: 25 MMOL/L (ref 18–29)
CREAT SERPL-MCNC: 0.75 MG/DL (ref 0.76–1.27)
EGFR AFRICAN AMERICAN (HISTORICAL): 104 ML/MIN/1.73
EGFR-AMERICAN CALC (HISTORICAL): 90 ML/MIN/1.73
GLUCOSE SERPL-MCNC: 86 MG/DL (ref 65–99)
HDLC SERPL-MCNC: 83 MG/DL
LDLC SERPL CALC-MCNC: 62 MG/DL (ref 0–99)
POTASSIUM SERPL-SCNC: 4.7 MMOL/L (ref 3.5–5.2)
SODIUM SERPL-SCNC: 137 MMOL/L (ref 134–144)
TOT. GLOBULIN, SERUM (HISTORICAL): 2.7 G/DL (ref 1.5–4.5)
TOTAL PROTEIN (HISTORICAL): 6.8 G/DL (ref 6–8.5)
TRIGL SERPL-MCNC: 74 MG/DL (ref 0–149)
TSH SERPL DL<=0.05 MIU/L-ACNC: 1.13 UIU/ML (ref 0.45–4.5)

## 2017-07-03 ENCOUNTER — GENERIC CONVERSION - ENCOUNTER (OUTPATIENT)
Dept: OTHER | Facility: OTHER | Age: 77
End: 2017-07-03

## 2017-07-11 ENCOUNTER — ALLSCRIPTS OFFICE VISIT (OUTPATIENT)
Dept: OTHER | Facility: OTHER | Age: 77
End: 2017-07-11

## 2017-07-11 DIAGNOSIS — M48.54XA COLLAPSED VERTEBRA, NOT ELSEWHERE CLASSIFIED, THORACIC REGION, INITIAL ENCOUNTER FOR FRACTURE (HCC): ICD-10-CM

## 2017-08-02 ENCOUNTER — HOSPITAL ENCOUNTER (OUTPATIENT)
Dept: BONE DENSITY | Facility: IMAGING CENTER | Age: 77
Discharge: HOME | End: 2017-08-02
Payer: COMMERCIAL

## 2017-08-02 DIAGNOSIS — M48.54XA COLLAPSED VERTEBRA, NOT ELSEWHERE CLASSIFIED, THORACIC REGION, INITIAL ENCOUNTER FOR FRACTURE (HCC): ICD-10-CM

## 2017-08-02 PROCEDURE — 77080 DXA BONE DENSITY AXIAL: CPT

## 2017-08-11 ENCOUNTER — GENERIC CONVERSION - ENCOUNTER (OUTPATIENT)
Dept: OTHER | Facility: OTHER | Age: 77
End: 2017-08-11

## 2017-10-18 ENCOUNTER — ALLSCRIPTS OFFICE VISIT (OUTPATIENT)
Dept: OTHER | Facility: OTHER | Age: 77
End: 2017-10-18
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
## 2018-01-10 NOTE — RESULT NOTES
Verified Results  (1) CBC/PLT/DIFF 75NPD6042 11:06AM [facility]     Test Name Result Flag Reference   WBC 6 4 x10E3/uL  3 4-10 8   RBC 4 92 x10E6/uL  4 14-5 80   Hemoglobin 14 8 g/dL  12 6-17 7   Hematocrit 43 4 %  37 5-51 0   MCV 88 fL  79-97   MCH 30 1 pg  26 6-33 0   MCHC 34 1 g/dL  31 5-35 7   RDW 14 0 %  12 3-15 4   Platelets 983 C33X3/IY  150-379   Neutrophils 54 %     Lymphs 33 %     Monocytes 9 %     Eos 4 %     Basos 0 %     Neutrophils (Absolute) 3 5 x10E3/uL  1 4-7 0   Lymphs (Absolute) 2 1 x10E3/uL  0 7-3 1   Monocytes(Absolute) 0 5 x10E3/uL  0 1-0 9   Eos (Absolute) 0 2 x10E3/uL  0 0-0 4   Baso (Absolute) 0 0 x10E3/uL  0 0-0 2   Immature Granulocytes 0 %     Immature Grans (Abs) 0 0 x10E3/uL  0 0-0 1     (1) COMPREHENSIVE METABOLIC PANEL 82YHX2400 55:52CS [facility]     Test Name Result Flag Reference   Glucose, Serum 86 mg/dL  65-99   BUN 8 mg/dL  8-27   Creatinine, Serum 0 75 mg/dL L 0 76-1 27   BUN/Creatinine Ratio 11  10-24   Sodium, Serum 137 mmol/L  134-144   eGFR If NonAfricn Am 90 mL/min/1 73  >59   eGFR If Africn Am 104 mL/min/1 73  >59     (1) LIPID PANEL FASTING W DIRECT LDL REFLEX 80JYY6185 11:06AM [facility]     Test Name Result Flag Reference   Cholesterol, Total 160 mg/dL  100-199   Triglycerides 74 mg/dL  0-149   HDL Cholesterol 83 mg/dL  >39   LDL Cholesterol Calc 62 mg/dL  0-99     (1) VITAMIN D 25-HYDROXY 30Jun2017 11:06AM [facility]     Test Name Result Flag Reference   Vitamin D, 25-Hydroxy 58 9 ng/mL  30 0-100 0   Vitamin D deficiency has been defined by the [address] practice guideline as a  level of serum 25-OH vitamin D less than 20 ng/mL (1,2)  The Endocrine Society went on to further define vitamin D  insufficiency as a level between 21 and 29 ng/mL (2)  1  IOM ([place] of [employer])  2010  Dietary reference     intakes for calcium and D  [facility]: The     [employer]    2  Leona MOJICA, Nalini DOMINGO, Cassandra BOURGEOIS, et al      Evaluation, treatment, and prevention of vitamin D     deficiency: an Endocrine Society clinical practice     guideline  JCEM  2011 Jul; 96(7):1911-46

## 2018-01-12 VITALS
HEIGHT: 68 IN | DIASTOLIC BLOOD PRESSURE: 80 MMHG | BODY MASS INDEX: 24.34 KG/M2 | SYSTOLIC BLOOD PRESSURE: 120 MMHG | TEMPERATURE: 97.7 F | WEIGHT: 160.6 LBS | HEART RATE: 56 BPM

## 2018-01-12 VITALS
SYSTOLIC BLOOD PRESSURE: 130 MMHG | WEIGHT: 159 LBS | HEART RATE: 55 BPM | HEIGHT: 68 IN | BODY MASS INDEX: 24.1 KG/M2 | DIASTOLIC BLOOD PRESSURE: 80 MMHG
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
## 2018-01-12 NOTE — RESULT NOTES
Message   Labs are all good/stable  I will print orders to complete 1 week BEFORE his next appt in April (orders can be mailed to patient)       Verified Results  (1) CBC/PLT/DIFF 12Oct2016 12:28PM M[facility]     Test Name Result Flag Reference   WBC 7 3 x10E3/uL  3 4-10 8   RBC 5 21 x10E6/uL  4 14-5 80   Hemoglobin 15 5 g/dL  12 6-17 7   Hematocrit 47 1 %  37 5-51 0   MCV 90 fL  79-97   MCH 29 8 pg  26 6-33 0   MCHC 32 9 g/dL  31 5-35 7   RDW 13 2 %  12 3-15 4   Platelets 593 B55W3/HZ  150-379   Neutrophils 65 %     Lymphs 24 %     Monocytes 7 %     Eos 3 %     Basos 1 %     Neutrophils (Absolute) 4 7 x10E3/uL  1 4-7 0   Lymphs (Absolute) 1 8 x10E3/uL  0 7-3 1   Monocytes(Absolute) 0 5 x10E3/uL  0 1-0 9   Eos (Absolute) 0 3 x10E3/uL  0 0-0 4   Baso (Absolute) 0 0 x10E3/uL  0 0-0 2   Immature Granulocytes 0 %     Immature Grans (Abs) 0 0 x10E3/uL  0 0-0 1     (1) COMPREHENSIVE METABOLIC PANEL 82FXV4211 40:22IU [facility]     Test Name Result Flag Reference   Glucose, Serum 82 mg/dL  65-99   BUN 11 mg/dL  8-27   Creatinine, Serum 0 80 mg/dL  0 76-1 27   eGFR If NonAfricn Am 87 mL/min/1 73  >59   eGFR If Africn Am 101 mL/min/1 73  >59   BUN/Creatinine Ratio 14  10-22   Sodium, Serum 139 mmol/L  134-144   **Effective October 17, 2016 the reference interval**                   for Sodium, Serum will be changing to:                                                             136 - 144   Potassium, Serum 4 6 mmol/L  3 5-5 2   **Effective October 17, 2016 the reference interval**                   for Potassium, Serum will be changing to:                                          0 -  7 days        3 7 - 5 2                                          8 - 30 days        3 7 - 6 4                                          1 -  6 months      3 8 - 6 0                                   7 months -  1 year        3 8 - 5 3                                              >1 year        3 5 - 5 2   Chloride, Serum 96 mmol/L L    **Effective October 17, 2016 the reference interval**                   for Chloride, Serum will be changing to:                                                              97 - 106   Carbon Dioxide, Total 24 mmol/L  18-29   Calcium, Serum 9 3 mg/dL  8 6-10 2   Protein, Total, Serum 6 9 g/dL  6 0-8 5   Albumin, Serum 4 3 g/dL  3 5-4 8   Globulin, Total 2 6 g/dL  1 5-4 5   A/G Ratio 1 7  1 1-2 5   Bilirubin, Total 0 8 mg/dL  0 0-1 2   Alkaline Phosphatase, S 47 IU/L     AST (SGOT) 20 IU/L  0-40   ALT (SGPT) 12 IU/L  0-44     (1) LIPID PANEL FASTING W DIRECT LDL REFLEX 12Oct2016 12:28PM [facility]     Test Name Result Flag Reference   Cholesterol, Total 188 mg/dL  100-199   Triglycerides 87 mg/dL  0-149   HDL Cholesterol 84 mg/dL  >39   According to ATP-III Guidelines, HDL-C >59 mg/dL is considered a  negative risk factor for CHD  LDL Cholesterol Calc 87 mg/dL  0-99     (1) TSH 12Oct2016 12:28PM [facility]     Test Name Result Flag Reference   TSH 0 995 uIU/mL  0 450-4 500     (1) VITAMIN D 25-HYDROXY 12Oct2016 12:28PM [facility]     Test Name Result Flag Reference   Vitamin D, 25-Hydroxy 40 9 ng/mL  30 0-100 0   Vitamin D deficiency has been defined by the [address] practice guideline as a  level of serum 25-OH vitamin D less than 20 ng/mL (1,2)  The Endocrine Society went on to further define vitamin D  insufficiency as a level between 21 and 29 ng/mL (2)  1  IOM ([place] of [employer])  2010  Dietary reference     intakes for calcium and D  [address] [facility]: The     [employer]  2  Leona MF, Nalini NC, Cassandra BOURGEOIS, et al      Evaluation, treatment, and prevention of vitamin D     deficiency: an Endocrine Society clinical practice     guideline  JCEM  2011 Jul; 96(7):1911-30  Pt aware    Plan  Hyperlipidemia, Hypertension    · (1) CBC/PLT/DIFF; Status:Active;  Requested for:14Apr2017;    · (1) COMPREHENSIVE METABOLIC PANEL; Status:Active; Requested for:14Apr2017;    · (1) LIPID PANEL FASTING W DIRECT LDL REFLEX; Status:Active; Requested  for:14Apr2017;    · (1) TSH; Status:Active; Requested for:14Apr2017;   Vitamin D deficiency    · (1) VITAMIN D 25-HYDROXY; Status:Active;  Requested for:14Apr2017;
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
## 2018-01-13 VITALS
DIASTOLIC BLOOD PRESSURE: 68 MMHG | WEIGHT: 156.25 LBS | HEIGHT: 68 IN | HEART RATE: 53 BPM | SYSTOLIC BLOOD PRESSURE: 142 MMHG | BODY MASS INDEX: 23.68 KG/M2
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
## 2018-01-13 NOTE — RESULT NOTES
Message   Arthritis in shoulder has gotten worse since 2008 so suggest he f/u with  orthopedics if arm pain continues  Verified Results  * XR SHOULDER 2+ VIEW LEFT 08Apr2016 01:32PRICCI [facility] Mock Order Number: [id number]     Test Name Result Flag Reference   XR SHOULDER 2+ VW LEFT (Report)     LEFT SHOULDER     INDICATION: Pain for 4 weeks  No history of trauma  COMPARISON: Shoulder radiographs March 21, 2008     VIEWS: 3; 3 images     FINDINGS:     There is no acute fracture or dislocation  Mild narrowing of the glenohumeral articulation  Mild flattening of the glenoid  Hypertrophic changes at the acromioclavicular joint  No lytic or blastic lesions are seen  Soft tissues are unremarkable  IMPRESSION:   Slight progressive degenerative changes of the shoulder and acromioclavicular joint  No acute osseous abnormality  Workstation performed: [id number]     Signed by:   Joaquim Ortega DO   4/8/16     * XR HUMERUS LEFT 08Apr2016 01:32PM [facility] Mock Order Number: [id number]     Test Name Result Flag Reference   XR HUMERUS LEFT (Report)     LEFT HUMERUS     INDICATION: Chronic pain     COMPARISON: None     VIEWS: 2; 2 images     FINDINGS:     There is no acute fracture or dislocation  Mild degenerative changes of the shoulder joint proper and acromioclavicular joint  No lytic or blastic lesions are seen  Soft tissues are unremarkable  IMPRESSION:   Mild degenerative changes of the shoulder and acromioclavicular joint  No acute osseous abnormality  Workstation performed: [id number]     Signed by:   Joaquim Ortega DO   4/8/16       Plan  Osteoarthritis of left shoulder, unspecified osteoarthritis type, Upper arm joint pain, left    · *1 - [facility] Physician Referral  Consult  Status: Active  Requested  for: 08Apr2016  () Care Summary provided  : Yes    Discussion/Summary   Pt is aware1       1 Amended By: Ashley Mahmood;  Apr 08 2016 4:42 PM EST    Signatures   Electronically signed by :  Stephanie Martin, ; Apr 8 2016  4:42PM EST                       (Author)
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
## 2018-01-14 VITALS
DIASTOLIC BLOOD PRESSURE: 80 MMHG | HEIGHT: 68 IN | TEMPERATURE: 98.2 F | BODY MASS INDEX: 23.7 KG/M2 | WEIGHT: 156.4 LBS | HEART RATE: 56 BPM | SYSTOLIC BLOOD PRESSURE: 146 MMHG

## 2018-01-14 NOTE — RESULT NOTES
Message   Please call pt and let him know all his blood work was normal  I sent a prescription for ointment to put on rash on his legs  His shoulder xray was normal     If no improvement with use of ointment or he develops new areas of rash he should call  Verified Results  (1) CBC/PLT/DIFF 34Wyx9672 12:34PM Daron Mccall     Test Name Result Flag Reference   WBC 10 6 x10E3/uL  3 4-10 8   RBC 5 24 x10E6/uL  4 14-5 80   Hemoglobin 16 0 g/dL  12 6-17 7   Hematocrit 47 1 %  37 5-51 0   MCV 90 fL  79-97   MCH 30 5 pg  26 6-33 0   MCHC 34 0 g/dL  31 5-35 7   RDW 13 2 %  12 3-15 4   Platelets 842 K95N1/IM  150-379   Neutrophils 73 %     Lymphs 17 %     Monocytes 9 %     Eos 1 %     Basos 0 %     Neutrophils (Absolute) 7 8 x10E3/uL H 1 4-7 0   Lymphs (Absolute) 1 8 x10E3/uL  0 7-3 1   Monocytes(Absolute) 0 9 x10E3/uL  0 1-0 9   Eos (Absolute) 0 1 x10E3/uL  0 0-0 4   Baso (Absolute) 0 0 x10E3/uL  0 0-0 2   Immature Granulocytes 0 %     Immature Grans (Abs) 0 0 x10E3/uL  0 0-0 1     (LC) CMP14+eGFR 15Tpc1157 12:34PM Nimco Hidalgo     Test Name Result Flag Reference   Glucose, Serum 82 mg/dL  65-99   BUN 7 mg/dL L 8-27   Creatinine, Serum 0 73 mg/dL L 0 76-1 27   eGFR If NonAfricn Am 91 mL/min/1 73  >59   eGFR If Africn Am 105 mL/min/1 73  >59   BUN/Creatinine Ratio 10  10-22   Sodium, Serum 133 mmol/L L 134-144   Potassium, Serum 4 0 mmol/L  3 5-5 2   Chloride, Serum 92 mmol/L L    Carbon Dioxide, Total 24 mmol/L  18-29   Calcium, Serum 8 9 mg/dL  8 6-10 2   Protein, Total, Serum 6 7 g/dL  6 0-8 5   Albumin, Serum 4 4 g/dL  3 5-4 8   Globulin, Total 2 3 g/dL  1 5-4 5   A/G Ratio 1 9  1 1-2 5   Bilirubin, Total 1 5 mg/dL H 0 0-1 2   Alkaline Phosphatase, S 44 IU/L     AST (SGOT) 40 IU/L  0-40   ALT (SGPT) 20 IU/L  0-44     (1) APTT 14Vdb1671 12:34PM Nimco Hidalgo     Test Name Result Flag Reference   aPTT 27 sec  24-33   This test has not been validated for monitoring unfractionated heparin  therapy  aPTT-based therapeutic ranges for unfractionated heparin  therapy have not been established  For general guidelines on  Heparin monitoring, refer to the [employer]  (1) PT WITH INR 50Nbo3720 12:34PM Eilleen Ormond     Test Name Result Flag Reference   INR 1 0  0 8-1 2   Reference interval is for non-anticoagulated patients  Suggested INR therapeutic range for Vitamin K                 antagonist therapy:                    Standard Dose (moderate intensity                                   therapeutic range):       2 0 - 3 0                    Higher intensity therapeutic range       2 5 - 3 5   Prothrombin Time 10 2 sec  9 1-12 0     (1) VARICELLA ZOSTER IGM 78Eci3493 12:34PM Gwen Nimco     Test Name Result Flag Reference   Varicella-Zoster Ab, IgM <0 91 index  0 00-0 90   Negative          <0 91                                                Borderline  0 91 - 1 09                                                Positive          >1 09     (1) VARICELLA ZOSTER IMMUNITY(IGG) 11Cwy9655 12:34PM Gwen Nimco     Test Name Result Flag Reference   Varicella Zoster IgG 2701 index  Immune >165   Negative          <135                                                Equivocal    135 - 165                                                Positive          >165                 A positive result generally indicates exposure to the                 pathogen or administration of specific immunoglobulins,                 but it is not indication of active infection or stage                 of disease  [facility]) Sedimentation Rate-Westergren 41Ahg0416 12:34PM Eilleen Ormond     Test Name Result Flag Reference   Sedimentation Rate-Westergren 4 mm/hr  0-30     * XR SHOULDER 2+ VIEW RIGHT 31Aug2016 11:40AM Shelly Apple Order Number: [id number]     Test Name Result Flag Reference   XR SHOULDER 2+ VW RIGHT (Report)     RIGHT SHOULDER     INDICATION: Right shoulder pain       COMPARISON: None     VIEWS: 3; 3 images     FINDINGS:     There is no acute fracture or dislocation  No degenerative changes  No lytic or blastic lesions are seen  Soft tissues are unremarkable  IMPRESSION:     No acute osseous abnormality  Workstation performed: [id number]     Signed by:    Jean Carlos Morillo MD   9/1/16       Plan  Rash    · Mupirocin 2 % External Ointment; APPLY THIN FILM  TO AFFECTED AREA 3  TIMES DAILY FOR 7 TO 10 DAYS

## 2018-01-14 NOTE — RESULT NOTES
Verified Results  (1) COMPREHENSIVE METABOLIC PANEL 51OTT0398 85:82XP Ivan Maldonado     Test Name Result Flag Reference   Glucose, Serum 86 mg/dL  65-99   BUN 8 mg/dL  8-27   Creatinine, Serum 0 75 mg/dL L 0 76-1 27   BUN/Creatinine Ratio 11  10-24   Sodium, Serum 137 mmol/L  134-144   Potassium, Serum 4 7 mmol/L  3 5-5 2   Chloride, Serum 96 mmol/L     Carbon Dioxide, Total 25 mmol/L  18-29   Calcium, Serum 9 1 mg/dL  8 6-10 2   Protein, Total, Serum 6 8 g/dL  6 0-8 5   Albumin, Serum 4 1 g/dL  3 5-4 8   Globulin, Total 2 7 g/dL  1 5-4 5   A/G Ratio 1 5  1 2-2 2   Bilirubin, Total 0 8 mg/dL  0 0-1 2   Alkaline Phosphatase, S 60 IU/L     AST (SGOT) 21 IU/L  0-40   ALT (SGPT) 17 IU/L  0-44   eGFR If NonAfricn Am 90 mL/min/1 73  >59   eGFR If Africn Am 104 mL/min/1 73  >59     (1) TSH WITH FT4 REFLEX 30Jun2017 11:06AM Ivan Maldonado     Test Name Result Flag Reference   TSH 1 130 uIU/mL  0 450-4 500
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
## 2018-01-17 NOTE — MISCELLANEOUS
Message   Recorded as Task   Date: 04/24/2017 04:24 PM, Created By: Michael Salmeron   Task Name: Medical Complaint Callback   Assigned To: Ansley Watson   Regarding Patient: Jatinder Rodriguez, Status: Active   Comment:    Geena Claire - 24 Apr 2017 4:24 PM     TASK CREATED  Caller: Elodia FERRERA), Care Coordinator; Medical Complaint; [phone] (Mobile Phone)  Called to let us know she admitted Félix Noonan to [facility] today  States he had been found unconscoius on the floor by a neighbor  Said he apparently fell out of bed d/t excessive alcohol consumption  He has a fx of his spine at T 11-12  Elisa Mates that he is having pain from that  He was also dischg on Coreg 25mg  He has 12 5mg in the house & is taking that  His BP today was 115/60 w/HR of 76  Pt admits to having 2 cocktails & a 6 pack of beer every night & says he is not going to stop drinking  Ирина Zapata would like to know if he can have Aleve for his back pain & can he remain on the Coreg 12 5mg? Sabino Rand - 24 Apr 2017 4:48 PM     TASK REPLIED TO: Previously Assigned To [address]  Yes he can take Aleve as needed for back pain, but should be used sparingly and do not take with alcohol as it could potentially cause GI bleeding  He can use the carvedilol 12 5mg twice daily as was taken before hospital if BPs remain below 140/90  How often will she be checking his BPs? Geena Claire - 24 Apr 2017 5:26 PM     TASK REPLIED TO: Previously Assigned To [address] [facility] aware  She will see him 3  times this week, 2 times next week & once the week after that  Active Problems    1  Arthritis (716 90) (M19 90)   2  ASCVD (arteriosclerotic cardiovascular disease) (429 2,440 9) (I25 10)   3  Benign colon polyp (211 3) (K63 5)   4  Colonoscopy (Fiberoptic) Screening   5  Depression screening (V79 0) (Z13 89)   6  Diverticulosis (562 10) (K57 90)   7  DJD (degenerative joint disease) (715 90) (M19 90)   8   Encounter for special screening examination for nervous system disorder (V80 09)   (Z13 858)   9  Flu vaccine need (V04 81) (Z23)   10  GERD (gastroesophageal reflux disease) (530 81) (K21 9)   11  Hyperlipidemia (272 4) (E78 5)   12  Hypertension (401 9) (I10)   13  Need for vaccination with 13-polyvalent pneumococcal conjugate vaccine (V03 82) (Z23)   14  Other closed fracture of twelfth thoracic vertebra with routine healing, subsequent    encounter (V54 17) (S22 088D)   15  Peripheral arterial disease (443 9) (I73 9)   16  PVD (peripheral vascular disease) (443 9) (I73 9)   17  Right shoulder pain (719 41) (M25 511)   18  Special screening examination for neoplasm of prostate (V76 44) (Z12 5)   19  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)   20  Vitamin D deficiency (268 9) (E55 9)    Current Meds   1  Aspirin 81 MG TABS; TAKE 1 TABLET DAILY; Therapy: 90DGN5974 to Recorded   2  Carvedilol 12 5 MG Oral Tablet; take 1 tablet by mouth twice a day with food; Therapy: 42CLW7265 to (Evaluate:83Qkv2384)  Requested for: 12ZJG8597; Last   Rx:30Jan2017 Ordered   3  Lisinopril 10 MG Oral Tablet; take one tablet once daily; Therapy: 37Rjt5136 to (Evaluate:32Kfi5298)  Requested for: 84Htk1387; Last   Rx:36Hve2859 Ordered   4  Simvastatin 40 MG Oral Tablet; take 1 tablet by mouth once daily; Therapy: 70RGC6747 to (Evaluate:92Zxk4099)  Requested for: 48FWX7889; Last   Rx:43Erg1430 Ordered   5  Vitamin D3 2000 UNIT Oral Tablet; Therapy: 47EOR5104 to Recorded    Allergies    1  No Known Drug Allergies  Denied    2  HydroCHLOROthiazide TABS   3  Lescol CAPS    Signatures   Electronically signed by : ELE Carrillo;  Apr 24 2017  7:02PM EST                       (Author)

## 2018-01-19 ENCOUNTER — ALLSCRIPTS OFFICE VISIT (OUTPATIENT)
Dept: OTHER | Facility: OTHER | Age: 78
End: 2018-01-19
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
## 2018-01-22 VITALS
RESPIRATION RATE: 12 BRPM | DIASTOLIC BLOOD PRESSURE: 82 MMHG | SYSTOLIC BLOOD PRESSURE: 130 MMHG | BODY MASS INDEX: 23.73 KG/M2 | HEART RATE: 60 BPM | HEIGHT: 68 IN | WEIGHT: 156.6 LBS | TEMPERATURE: 97.5 F

## 2018-01-25 DIAGNOSIS — I10 ESSENTIAL HYPERTENSION: Primary | ICD-10-CM

## 2018-01-25 RX ORDER — CARVEDILOL 12.5 MG/1
TABLET ORAL
Qty: 60 TABLET | Refills: 5 | Status: SHIPPED | OUTPATIENT
Start: 2018-01-25 | End: 2018-02-06 | Stop reason: SDUPTHER

## 2018-01-29 LAB
25(OH)D3+25(OH)D2 SERPL-MCNC: 70.7 NG/ML (ref 30–100)
ALBUMIN SERPL-MCNC: 4.7 G/DL (ref 3.5–4.8)
ALBUMIN/GLOB SERPL: 1.7 {RATIO} (ref 1.2–2.2)
ALP SERPL-CCNC: 51 IU/L (ref 39–117)
ALT SERPL-CCNC: 12 IU/L (ref 0–44)
AST SERPL-CCNC: 19 IU/L (ref 0–40)
BASOPHILS # BLD AUTO: 0 X10E3/UL (ref 0–0.2)
BASOPHILS NFR BLD AUTO: 1 %
BILIRUB SERPL-MCNC: 1.3 MG/DL (ref 0–1.2)
BUN SERPL-MCNC: 12 MG/DL (ref 8–27)
BUN/CREAT SERPL: 13 (ref 10–24)
CALCIUM SERPL-MCNC: 10 MG/DL (ref 8.6–10.2)
CHLORIDE SERPL-SCNC: 94 MMOL/L (ref 96–106)
CHOLEST SERPL-MCNC: 188 MG/DL (ref 100–199)
CO2 SERPL-SCNC: 27 MMOL/L (ref 18–29)
CREAT SERPL-MCNC: 0.89 MG/DL (ref 0.76–1.27)
EOSINOPHIL # BLD AUTO: 0.2 X10E3/UL (ref 0–0.4)
EOSINOPHIL NFR BLD AUTO: 3 %
ERYTHROCYTE [DISTWIDTH] IN BLOOD BY AUTOMATED COUNT: 13.1 % (ref 12.3–15.4)
GLOBULIN SER-MCNC: 2.8 G/DL (ref 1.5–4.5)
GLUCOSE SERPL-MCNC: 84 MG/DL (ref 65–99)
HCT VFR BLD AUTO: 49.3 % (ref 37.5–51)
HDLC SERPL-MCNC: 81 MG/DL
HGB BLD-MCNC: 16.7 G/DL (ref 13–17.7)
IMM GRANULOCYTES # BLD: 0 X10E3/UL (ref 0–0.1)
IMM GRANULOCYTES NFR BLD: 0 %
LDLC SERPL CALC-MCNC: 83 MG/DL (ref 0–99)
LYMPHOCYTES # BLD AUTO: 1.8 X10E3/UL (ref 0.7–3.1)
LYMPHOCYTES NFR BLD AUTO: 28 %
MCH RBC QN AUTO: 30.4 PG (ref 26.6–33)
MCHC RBC AUTO-ENTMCNC: 33.9 G/DL (ref 31.5–35.7)
MCV RBC AUTO: 90 FL (ref 79–97)
MONOCYTES # BLD AUTO: 0.5 X10E3/UL (ref 0.1–0.9)
MONOCYTES NFR BLD AUTO: 8 %
NEUTROPHILS # BLD AUTO: 3.9 X10E3/UL (ref 1.4–7)
NEUTROPHILS NFR BLD AUTO: 60 %
PLATELET # BLD AUTO: 193 X10E3/UL (ref 150–379)
POTASSIUM SERPL-SCNC: 4.6 MMOL/L (ref 3.5–5.2)
PROT SERPL-MCNC: 7.5 G/DL (ref 6–8.5)
RBC # BLD AUTO: 5.5 X10E6/UL (ref 4.14–5.8)
SL AMB EGFR AFRICAN AMERICAN: 96 ML/MIN/1.73
SL AMB EGFR NON AFRICAN AMERICAN: 83 ML/MIN/1.73
SODIUM SERPL-SCNC: 137 MMOL/L (ref 134–144)
T4 SERPL-MCNC: 5.2 UG/DL (ref 4.5–12)
TRIGL SERPL-MCNC: 122 MG/DL (ref 0–149)
TSH SERPL DL<=0.005 MIU/L-ACNC: 1.29 UIU/ML (ref 0.45–4.5)
WBC # BLD AUTO: 6.4 X10E3/UL (ref 3.4–10.8)

## 2018-02-02 ENCOUNTER — TELEPHONE (OUTPATIENT)
Dept: FAMILY MEDICINE CLINIC | Facility: HOSPITAL | Age: 78
End: 2018-02-02

## 2018-02-02 DIAGNOSIS — I10 ESSENTIAL HYPERTENSION: Primary | ICD-10-CM

## 2018-02-02 DIAGNOSIS — Z87.81 HISTORY OF VERTEBRAL FRACTURE: ICD-10-CM

## 2018-02-02 DIAGNOSIS — E78.5 HYPERLIPIDEMIA, UNSPECIFIED HYPERLIPIDEMIA TYPE: ICD-10-CM

## 2018-02-02 RX ORDER — SIMVASTATIN 40 MG
40 TABLET ORAL DAILY
Qty: 90 TABLET | Refills: 1 | Status: SHIPPED | OUTPATIENT
Start: 2018-02-02 | End: 2018-02-07 | Stop reason: SDUPTHER

## 2018-02-02 RX ORDER — LISINOPRIL 10 MG/1
10 TABLET ORAL DAILY
Qty: 90 TABLET | Refills: 1 | Status: SHIPPED | OUTPATIENT
Start: 2018-02-02 | End: 2018-02-07 | Stop reason: SDUPTHER

## 2018-02-02 RX ORDER — CALCITONIN SALMON 200 [IU]/.09ML
SPRAY, METERED NASAL
Qty: 3.5 ML | Refills: 5 | Status: ON HOLD | OUTPATIENT
Start: 2018-02-02 | End: 2018-02-27 | Stop reason: ALTCHOICE

## 2018-02-02 RX ORDER — ACETAMINOPHEN 160 MG
TABLET,DISINTEGRATING ORAL
COMMUNITY
Start: 2015-10-02 | End: 2018-02-26 | Stop reason: SDUPTHER

## 2018-02-02 RX ORDER — IBANDRONATE SODIUM 150 MG/1
1 TABLET, FILM COATED ORAL
COMMUNITY
Start: 2017-08-11 | End: 2018-02-26 | Stop reason: ALTCHOICE

## 2018-02-02 RX ORDER — AMLODIPINE BESYLATE 10 MG/1
1 TABLET ORAL DAILY
COMMUNITY
End: 2018-02-02 | Stop reason: SDUPTHER

## 2018-02-02 RX ORDER — SIMVASTATIN 40 MG
1 TABLET ORAL DAILY
COMMUNITY
Start: 2011-04-23 | End: 2018-02-02 | Stop reason: SDUPTHER

## 2018-02-02 RX ORDER — AMLODIPINE BESYLATE 10 MG/1
10 TABLET ORAL DAILY
Qty: 90 TABLET | Refills: 1 | Status: ON HOLD | OUTPATIENT
Start: 2018-02-02 | End: 2018-02-27 | Stop reason: ALTCHOICE

## 2018-02-02 RX ORDER — CALCITONIN SALMON 200 [IU]/.09ML
1 SPRAY, METERED NASAL DAILY
COMMUNITY
Start: 2017-05-02 | End: 2018-02-02 | Stop reason: SDUPTHER

## 2018-02-02 RX ORDER — FOLIC ACID 1 MG/1
TABLET ORAL
COMMUNITY
End: 2018-02-26 | Stop reason: ALTCHOICE

## 2018-02-02 RX ORDER — CARVEDILOL 12.5 MG/1
1 TABLET ORAL
COMMUNITY
Start: 2011-05-10 | End: 2018-03-22 | Stop reason: SDUPTHER

## 2018-02-02 RX ORDER — LISINOPRIL 10 MG/1
1 TABLET ORAL DAILY
COMMUNITY
Start: 2014-04-18 | End: 2018-02-02 | Stop reason: SDUPTHER

## 2018-02-02 RX ORDER — CALCITONIN SALMON 200 [IU]/.09ML
1 SPRAY, METERED NASAL DAILY
Qty: 3.5 ML | Refills: 0 | Status: SHIPPED | OUTPATIENT
Start: 2018-02-02 | End: 2018-02-02 | Stop reason: SDUPTHER

## 2018-02-02 RX ORDER — LANOLIN ALCOHOL/MO/W.PET/CERES
CREAM (GRAM) TOPICAL
COMMUNITY
End: 2018-02-26 | Stop reason: ALTCHOICE

## 2018-02-02 NOTE — TELEPHONE ENCOUNTER
[facility] called to inform us that her calcitonin is usually prescribed as "one stay into alternating nostril daily"  Asking if this can be fixed and resent to the pharmacy

## 2018-02-06 DIAGNOSIS — I10 ESSENTIAL HYPERTENSION: ICD-10-CM

## 2018-02-06 RX ORDER — CARVEDILOL 12.5 MG/1
12.5 TABLET ORAL 2 TIMES DAILY WITH MEALS
Qty: 60 TABLET | Refills: 0 | Status: SHIPPED | OUTPATIENT
Start: 2018-02-06 | End: 2018-02-24 | Stop reason: SDUPTHER

## 2018-02-07 DIAGNOSIS — E78.5 HYPERLIPIDEMIA, UNSPECIFIED HYPERLIPIDEMIA TYPE: ICD-10-CM

## 2018-02-07 DIAGNOSIS — I10 ESSENTIAL HYPERTENSION: ICD-10-CM

## 2018-02-07 RX ORDER — LISINOPRIL 10 MG/1
10 TABLET ORAL DAILY
Qty: 90 TABLET | Refills: 0 | Status: SHIPPED | OUTPATIENT
Start: 2018-02-07 | End: 2018-05-16 | Stop reason: SDUPTHER

## 2018-02-07 RX ORDER — SIMVASTATIN 40 MG
40 TABLET ORAL DAILY
Qty: 90 TABLET | Refills: 0 | Status: SHIPPED | OUTPATIENT
Start: 2018-02-07 | End: 2018-05-16 | Stop reason: SDUPTHER

## 2018-02-08 ENCOUNTER — TELEPHONE (OUTPATIENT)
Dept: FAMILY MEDICINE CLINIC | Facility: HOSPITAL | Age: 78
End: 2018-02-08

## 2018-02-15 ENCOUNTER — TRANSCRIBE ORDERS (OUTPATIENT)
Dept: ADMINISTRATIVE | Facility: HOSPITAL | Age: 78
End: 2018-02-15

## 2018-02-15 ENCOUNTER — HOSPITAL ENCOUNTER (OUTPATIENT)
Dept: RADIOLOGY | Facility: HOSPITAL | Age: 78
Discharge: HOME | End: 2018-02-15
Attending: SURGERY
Payer: COMMERCIAL

## 2018-02-15 ENCOUNTER — OFFICE VISIT (OUTPATIENT)
Dept: SURGERY | Facility: HOSPITAL | Age: 78
End: 2018-02-15
Payer: COMMERCIAL

## 2018-02-15 ENCOUNTER — HOSPITAL ENCOUNTER (OUTPATIENT)
Dept: NON INVASIVE DIAGNOSTICS | Facility: HOSPITAL | Age: 78
Discharge: HOME | End: 2018-02-15
Attending: SURGERY
Payer: COMMERCIAL

## 2018-02-15 ENCOUNTER — APPOINTMENT (OUTPATIENT)
Dept: LAB | Facility: HOSPITAL | Age: 78
End: 2018-02-15
Attending: SURGERY
Payer: COMMERCIAL

## 2018-02-15 VITALS
WEIGHT: 153.4 LBS | HEART RATE: 72 BPM | SYSTOLIC BLOOD PRESSURE: 130 MMHG | RESPIRATION RATE: 16 BRPM | HEIGHT: 68 IN | BODY MASS INDEX: 23.25 KG/M2 | DIASTOLIC BLOOD PRESSURE: 66 MMHG | TEMPERATURE: 98.2 F

## 2018-02-15 DIAGNOSIS — K40.90 INGUINAL HERNIA WITHOUT OBSTRUCTION OR GANGRENE, RECURRENCE NOT SPECIFIED, UNSPECIFIED LATERALITY: ICD-10-CM

## 2018-02-15 DIAGNOSIS — L21.9 SEBORRHEA: ICD-10-CM

## 2018-02-15 DIAGNOSIS — I78.1 TELANGIECTASIA: ICD-10-CM

## 2018-02-15 DIAGNOSIS — L72.3 SEBACEOUS CYST: ICD-10-CM

## 2018-02-15 DIAGNOSIS — K40.90 LEFT INGUINAL HERNIA: ICD-10-CM

## 2018-02-15 DIAGNOSIS — D18.01 HEMANGIOMA OF SKIN: ICD-10-CM

## 2018-02-15 DIAGNOSIS — K40.90 INGUINAL HERNIA WITHOUT OBSTRUCTION OR GANGRENE, RECURRENCE NOT SPECIFIED, UNSPECIFIED LATERALITY: Primary | ICD-10-CM

## 2018-02-15 DIAGNOSIS — D22.9 MULTIPLE PIGMENTED NEVI: ICD-10-CM

## 2018-02-15 DIAGNOSIS — K40.90 UNILATERAL INGUINAL HERNIA WITHOUT OBSTRUCTION OR GANGRENE, RECURRENCE NOT SPECIFIED: Primary | ICD-10-CM

## 2018-02-15 LAB
ALBUMIN SERPL BCP-MCNC: 3.7 G/DL (ref 3.5–5)
ALP SERPL-CCNC: 72 U/L (ref 46–116)
ALT SERPL W P-5'-P-CCNC: 20 U/L (ref 12–78)
ANION GAP SERPL CALCULATED.3IONS-SCNC: 9 MMOL/L (ref 4–13)
AST SERPL W P-5'-P-CCNC: 17 U/L (ref 5–45)
ATRIAL RATE: 89 BPM
BASOPHILS # BLD AUTO: 0.03 THOUSANDS/ΜL (ref 0–0.1)
BASOPHILS NFR BLD AUTO: 0 % (ref 0–1)
BILIRUB SERPL-MCNC: 0.8 MG/DL (ref 0.2–1)
BILIRUB UR QL STRIP: NEGATIVE
BUN SERPL-MCNC: 12 MG/DL (ref 5–25)
CALCIUM SERPL-MCNC: 8.8 MG/DL (ref 8.3–10.1)
CHLORIDE SERPL-SCNC: 98 MMOL/L (ref 100–108)
CLARITY UR: CLEAR
CO2 SERPL-SCNC: 28 MMOL/L (ref 21–32)
COLOR UR: YELLOW
CREAT SERPL-MCNC: 0.8 MG/DL (ref 0.6–1.3)
EOSINOPHIL # BLD AUTO: 0.24 THOUSAND/ΜL (ref 0–0.61)
EOSINOPHIL NFR BLD AUTO: 3 % (ref 0–6)
ERYTHROCYTE [DISTWIDTH] IN BLOOD BY AUTOMATED COUNT: 12.7 % (ref 11.6–15.1)
EST. AVERAGE GLUCOSE BLD GHB EST-MCNC: 105 MG/DL
GFR SERPL CREATININE-BSD FRML MDRD: 87 ML/MIN/1.73SQ M
GLUCOSE SERPL-MCNC: 100 MG/DL (ref 65–140)
GLUCOSE UR STRIP-MCNC: NEGATIVE MG/DL
HBA1C MFR BLD: 5.3 % (ref 4.2–6.3)
HCT VFR BLD AUTO: 46.2 % (ref 36.5–49.3)
HGB BLD-MCNC: 16.1 G/DL (ref 12–17)
HGB UR QL STRIP.AUTO: NEGATIVE
KETONES UR STRIP-MCNC: NEGATIVE MG/DL
LEUKOCYTE ESTERASE UR QL STRIP: NEGATIVE
LYMPHOCYTES # BLD AUTO: 1.65 THOUSANDS/ΜL (ref 0.6–4.47)
LYMPHOCYTES NFR BLD AUTO: 20 % (ref 14–44)
MCH RBC QN AUTO: 30.5 PG (ref 26.8–34.3)
MCHC RBC AUTO-ENTMCNC: 34.8 G/DL (ref 31.4–37.4)
MCV RBC AUTO: 88 FL (ref 82–98)
MONOCYTES # BLD AUTO: 0.75 THOUSAND/ΜL (ref 0.17–1.22)
MONOCYTES NFR BLD AUTO: 9 % (ref 4–12)
NEUTROPHILS # BLD AUTO: 5.61 THOUSANDS/ΜL (ref 1.85–7.62)
NEUTS SEG NFR BLD AUTO: 68 % (ref 43–75)
NITRITE UR QL STRIP: NEGATIVE
P AXIS: 65 DEGREES
PH UR STRIP.AUTO: 5.5 [PH] (ref 4.5–8)
PLATELET # BLD AUTO: 259 THOUSANDS/UL (ref 149–390)
PMV BLD AUTO: 8.2 FL (ref 8.9–12.7)
POTASSIUM SERPL-SCNC: 3.7 MMOL/L (ref 3.5–5.3)
PR INTERVAL: 150 MS
PROT SERPL-MCNC: 7.8 G/DL (ref 6.4–8.2)
PROT UR STRIP-MCNC: NEGATIVE MG/DL
QRS AXIS: 64 DEGREES
QRSD INTERVAL: 94 MS
QT INTERVAL: 354 MS
QTC INTERVAL: 430 MS
RBC # BLD AUTO: 5.28 MILLION/UL (ref 3.88–5.62)
SODIUM SERPL-SCNC: 135 MMOL/L (ref 136–145)
SP GR UR STRIP.AUTO: 1.02 (ref 1–1.03)
T WAVE AXIS: 84 DEGREES
UROBILINOGEN UR QL STRIP.AUTO: 0.2 E.U./DL
VENTRICULAR RATE: 89 BPM
WBC # BLD AUTO: 8.28 THOUSAND/UL (ref 4.31–10.16)

## 2018-02-15 PROCEDURE — 85025 COMPLETE CBC W/AUTO DIFF WBC: CPT

## 2018-02-15 PROCEDURE — 83036 HEMOGLOBIN GLYCOSYLATED A1C: CPT

## 2018-02-15 PROCEDURE — 81003 URINALYSIS AUTO W/O SCOPE: CPT | Performed by: SURGERY

## 2018-02-15 PROCEDURE — 93010 ELECTROCARDIOGRAM REPORT: CPT | Performed by: INTERNAL MEDICINE

## 2018-02-15 PROCEDURE — 80053 COMPREHEN METABOLIC PANEL: CPT

## 2018-02-15 PROCEDURE — 93005 ELECTROCARDIOGRAM TRACING: CPT

## 2018-02-15 PROCEDURE — 36415 COLL VENOUS BLD VENIPUNCTURE: CPT

## 2018-02-15 PROCEDURE — 99204 OFFICE O/P NEW MOD 45 MIN: CPT | Performed by: SURGERY

## 2018-02-15 PROCEDURE — 71046 X-RAY EXAM CHEST 2 VIEWS: CPT

## 2018-02-15 RX ORDER — SODIUM CHLORIDE, SODIUM LACTATE, POTASSIUM CHLORIDE, CALCIUM CHLORIDE 600; 310; 30; 20 MG/100ML; MG/100ML; MG/100ML; MG/100ML
125 INJECTION, SOLUTION INTRAVENOUS CONTINUOUS
Status: CANCELLED | OUTPATIENT
Start: 2018-02-26

## 2018-02-15 NOTE — PROGRESS NOTES
Assessment/Plan: Casey Torres is a 69year old male who presents today for a recurrent inguinal hernia  Physical exam revealed a scar of the right groin from a previous hernia surgery  Does not appear to have had surgery on the left before  He has an irreducible large inguinal hernia  Discussed risks, benefits, and alternatives to left inguinal hernia repair with mesh  Explained the surgery, as well as pre- and post-operative protocol and restrictions  No heavy lifting greater than 15 pounds for weeks 1-2 and no strenuous activity procedure Will need clearance from cardiologist  He will schedule surgery for his earliest convenience  He knows to call if any questions or concerns arise  Skin- Encouraged him to have seborrhea, scatered hemangioma, telangiectases of the chest, seborrhea, pigmented nevi, small sebaceous cysts, 1 of upper mid back and 1 of lower mid back monitored by his PCP or a dermatologist       No problem-specific Assessment & Plan notes found for this encounter  There are no diagnoses linked to this encounter  Subjective:      Patient ID: Casey Torres is a 69 y o  male  Casey Torres is a 69year old male who presents today for a recurrent inguinal hernia  He has had it for 2 months now  He denies pain  He reports he had a left inguinal hernia repaired in the past and that now he has a recurrent left inguinal hernia  The following portions of the patient's history were reviewed and updated as appropriate: allergies, current medications, past family history, past medical history, past social history, past surgical history and problem list     Review of Systems   Constitutional: Negative  HENT: Negative  Eyes: Negative  Respiratory: Negative  Cardiovascular: Negative  Gastrointestinal: Negative  Endocrine: Negative  Genitourinary: Negative  Musculoskeletal: Negative  Skin: Negative  Allergic/Immunologic: Negative  Neurological: Negative  Hematological: Negative  Psychiatric/Behavioral: Negative  All other systems reviewed and are negative  Objective:    Vitals:    02/15/18 1325   BP: 130/66   Pulse: 72   Resp: 16   Temp: 98 2 °F (36 8 °C)        Physical Exam   Constitutional: He is oriented to person, place, and time  He appears well-developed and well-nourished  No distress  Confused  HENT:   Head: Normocephalic and atraumatic  Right Ear: External ear normal    Left Ear: External ear normal    Nose: Nose normal    Mouth/Throat: Oropharynx is clear and moist  No oropharyngeal exudate  Eyes: Conjunctivae and EOM are normal  Right eye exhibits no discharge  Left eye exhibits no discharge  No scleral icterus  Neck: Normal range of motion  Neck supple  No JVD present  No tracheal deviation present  No thyromegaly present  Cardiovascular: Normal rate, regular rhythm, normal heart sounds and intact distal pulses  Exam reveals no gallop and no friction rub  No murmur heard  Pulmonary/Chest: Effort normal and breath sounds normal  No stridor  No respiratory distress  He has no wheezes  He has no rales  He exhibits no tenderness  Abdominal: Soft  Bowel sounds are normal  He exhibits no distension and no mass  There is no tenderness  There is no rebound and no guarding  irreducible large left inguinal hernia  Physical exam revealed a scar of the right groin from a previous hernia surgery  Does not appear to have had surgery on the left before  Musculoskeletal: Normal range of motion  He exhibits no edema, tenderness or deformity  Lymphadenopathy:     He has no cervical adenopathy  Neurological: He is alert and oriented to person, place, and time  No cranial nerve deficit  Coordination normal    Skin: Skin is dry  No rash noted  He is not diaphoretic  No erythema  No pallor     seborrhea, scatered hemangioma, telangiectases of the chest, seborrhea, pigmented nevi, small sebaceous cysts, 1 of upper mid back and 1 of lower mid back    Psychiatric: He has a normal mood and affect  His behavior is normal  Thought content normal    Nursing note and vitals reviewed  By signing my name below, Chanda Mazariegos, attest that this documentation has been prepared under the direction and in the presence of Dr Sanjay Segal MD  Electronically Signed: Carol Galeana, Medical Scribe, 2/15/18  14:02  I, Dr Sanjay Segal, personally performed the services described in this documentation  All medical record entries made by the scribe were at my direction and in my presence  I have reviewed the chart and agree that the record reflects my personal performance and is accurate and complete  Sanjay Segal MD  2/15/18  14:02

## 2018-02-16 ENCOUNTER — TELEPHONE (OUTPATIENT)
Dept: CARDIOLOGY CLINIC | Facility: CLINIC | Age: 78
End: 2018-02-16

## 2018-02-16 NOTE — TELEPHONE ENCOUNTER
Received a fax from [facility], Will complete on 2/20/18 when Dr Dl Rios is in the office

## 2018-02-20 ENCOUNTER — DOCUMENTATION (OUTPATIENT)
Dept: CARDIOLOGY CLINIC | Facility: CLINIC | Age: 78
End: 2018-02-20

## 2018-02-21 NOTE — PROGRESS NOTES
Cardiology Pre Operative Clearance      PRE OPERATIVE CARDIAC RISK ASSESSMENT    02/21/18    Jared Waters  1940  [id number]    Date of Surgery: xx    Type of Surgery: xx    Surgeon:xx    {SLA AMB CARDIAC CONTRAINDICATIONS:[id number]}    Physician Comment:xx    Anticoagulation: ***    Physician Comment:xx

## 2018-02-24 DIAGNOSIS — I10 ESSENTIAL HYPERTENSION: ICD-10-CM

## 2018-02-25 RX ORDER — CARVEDILOL 12.5 MG/1
TABLET ORAL
Qty: 60 TABLET | Refills: 0 | Status: ON HOLD | OUTPATIENT
Start: 2018-02-25 | End: 2018-02-27 | Stop reason: SDUPTHER

## 2018-02-26 NOTE — PRE-PROCEDURE INSTRUCTIONS
Pre-Surgery Instructions:   Medication Instructions    aspirin 81 MG tablet Patient was instructed by Physician and understands   carvedilol (COREG) 12 5 mg tablet Instructed patient per Anesthesia Guidelines   Cholecalciferol (VITAMIN D-3) 1000 units CAPS Patient was instructed by Physician and understands   lisinopril (ZESTRIL) 10 mg tablet Instructed patient per Anesthesia Guidelines   simvastatin (ZOCOR) 40 mg tablet Instructed patient per Anesthesia Guidelines  Before your operation, you play an important role in decreasing your risk for infection by washing with special antiseptic soap  This is an effective way to reduce bacteria on the skin which may help to prevent infections at the surgical site  Please read the following directions in advance  1  In the week before your operation purchase a 4 ounce bottle of antiseptic soap containing chlorhexidine gluconate 4%  Some brand names include: Aplicare, Endure, and Hibiclens  The cost is usually less than $5 00  · For your convenience, the [address] [place] [address] carries the soap  · It may also be available at your doctor's office or pre-admission testing center, and at most retail pharmacies  · If you are allergic or sensitive to soaps containing chlorhexidine gluconate (CHG), please let your doctor know so another antiseptic soap can be suggested  · CHG antiseptic soap is for external use only  2      The day before your operation follow these directions carefully to get ready  · Place clean lines (sheets) on your bed; you should sleep on clean sheets after your evening shower  · Get clean towels and washcloths ready - you need enough for 2 showers  · Set aside clean underwear, pajamas, and clothing to wear after the shower  Reminders:  · DO NOT use any other soap or body rinse on your skin during or after the antiseptic showers    · DO NOT use lotion , powder, deodorant, or perfume/aftershave of any kind on your skin after your antiseptic shower  · DO NOT shave any body parts in the 24 hours/the day before your operation  · DO NOT get the antiseptic soap in your eyes, ears, nose, mouth, or vaginal area  3      You will need to shower the night before AND the morning of your Surgery  Shower 1:  · The evening before your operation, take the fist shower  · First, shampoo your hair with regular shampoo and rinse it completely before you use the anitseptic soap  After washing and rinsing your hair, rinse your body  · Next, use a clean wash cloth to apply the antiseptic soap and wash your body from the neck down to your toes using 1/2 bottle of the antiseptic soap  You will use the other 1/2 bottle for the second shower  · Clean the area where your incision will be; later this area well for about 2 minutes  · If you ar having head or neck surgery, wash areas with the antiseptic soap  · Rinse yourself completely with running water  · Use a clean towel to dry off  · Wear clean underwear and clothing/pajamas  Shower 2:  · The Morning of your operation, take the second shower following the same steps as Shower 1 using the second 1/2 of the bottle of antiseptic soap  · Use clean cloths and towels to was and dry yourself off  · Wear clean underwear and clothing

## 2018-02-27 ENCOUNTER — ANESTHESIA EVENT (OUTPATIENT)
Dept: PERIOP | Facility: HOSPITAL | Age: 78
End: 2018-02-27
Payer: COMMERCIAL

## 2018-02-27 ENCOUNTER — ANESTHESIA (OUTPATIENT)
Dept: PERIOP | Facility: HOSPITAL | Age: 78
End: 2018-02-27
Payer: COMMERCIAL

## 2018-02-27 ENCOUNTER — HOSPITAL ENCOUNTER (OUTPATIENT)
Facility: HOSPITAL | Age: 78
Setting detail: OUTPATIENT SURGERY
Discharge: HOME | End: 2018-02-27
Attending: SURGERY | Admitting: SURGERY
Payer: COMMERCIAL

## 2018-02-27 VITALS
BODY MASS INDEX: 25.83 KG/M2 | RESPIRATION RATE: 20 BRPM | HEART RATE: 56 BPM | WEIGHT: 155 LBS | DIASTOLIC BLOOD PRESSURE: 65 MMHG | OXYGEN SATURATION: 95 % | HEIGHT: 65 IN | TEMPERATURE: 98.1 F | SYSTOLIC BLOOD PRESSURE: 142 MMHG

## 2018-02-27 DIAGNOSIS — K40.90 NON-RECURRENT UNILATERAL INGUINAL HERNIA WITHOUT OBSTRUCTION OR GANGRENE: ICD-10-CM

## 2018-02-27 PROCEDURE — 49505 PRP I/HERN INIT REDUC >5 YR: CPT | Performed by: PHYSICIAN ASSISTANT

## 2018-02-27 PROCEDURE — 49505 PRP I/HERN INIT REDUC >5 YR: CPT | Performed by: SURGERY

## 2018-02-27 PROCEDURE — C1781 MESH (IMPLANTABLE): HCPCS | Performed by: SURGERY

## 2018-02-27 DEVICE — BARD MESH PERFIX PLUG, EXTRA LARGE
Type: IMPLANTABLE DEVICE | Site: INGUINAL | Status: FUNCTIONAL
Brand: BARD MESH PERFIX PLUG

## 2018-02-27 RX ORDER — SODIUM CHLORIDE, SODIUM LACTATE, POTASSIUM CHLORIDE, CALCIUM CHLORIDE 600; 310; 30; 20 MG/100ML; MG/100ML; MG/100ML; MG/100ML
125 INJECTION, SOLUTION INTRAVENOUS CONTINUOUS
Status: DISCONTINUED | OUTPATIENT
Start: 2018-02-27 | End: 2018-02-27 | Stop reason: HOSPADM

## 2018-02-27 RX ORDER — GLYCOPYRROLATE 0.2 MG/ML
INJECTION INTRAMUSCULAR; INTRAVENOUS AS NEEDED
Status: DISCONTINUED | OUTPATIENT
Start: 2018-02-27 | End: 2018-02-27 | Stop reason: SURG

## 2018-02-27 RX ORDER — ONDANSETRON 2 MG/ML
4 INJECTION INTRAMUSCULAR; INTRAVENOUS ONCE AS NEEDED
Status: DISCONTINUED | OUTPATIENT
Start: 2018-02-27 | End: 2018-02-27 | Stop reason: HOSPADM

## 2018-02-27 RX ORDER — MIDAZOLAM HYDROCHLORIDE 1 MG/ML
INJECTION INTRAMUSCULAR; INTRAVENOUS AS NEEDED
Status: DISCONTINUED | OUTPATIENT
Start: 2018-02-27 | End: 2018-02-27 | Stop reason: SURG

## 2018-02-27 RX ORDER — HYDROCODONE BITARTRATE AND ACETAMINOPHEN 5; 325 MG/1; MG/1
1-2 TABLET ORAL EVERY 4 HOURS PRN
Qty: 30 TABLET | Refills: 0 | Status: SHIPPED | OUTPATIENT
Start: 2018-02-27 | End: 2018-03-09

## 2018-02-27 RX ORDER — DEXAMETHASONE SODIUM PHOSPHATE 4 MG/ML
4 INJECTION, SOLUTION INTRA-ARTICULAR; INTRALESIONAL; INTRAMUSCULAR; INTRAVENOUS; SOFT TISSUE ONCE AS NEEDED
Status: DISCONTINUED | OUTPATIENT
Start: 2018-02-27 | End: 2018-02-27 | Stop reason: HOSPADM

## 2018-02-27 RX ORDER — FENTANYL CITRATE/PF 50 MCG/ML
25 SYRINGE (ML) INJECTION
Status: DISCONTINUED | OUTPATIENT
Start: 2018-02-27 | End: 2018-02-27 | Stop reason: HOSPADM

## 2018-02-27 RX ORDER — EPHEDRINE SULFATE 50 MG/ML
INJECTION, SOLUTION INTRAVENOUS AS NEEDED
Status: DISCONTINUED | OUTPATIENT
Start: 2018-02-27 | End: 2018-02-27 | Stop reason: SURG

## 2018-02-27 RX ORDER — HYDROCODONE BITARTRATE AND ACETAMINOPHEN 5; 325 MG/1; MG/1
2 TABLET ORAL EVERY 4 HOURS PRN
Status: DISCONTINUED | OUTPATIENT
Start: 2018-02-27 | End: 2018-02-27 | Stop reason: HOSPADM

## 2018-02-27 RX ORDER — FENTANYL CITRATE 50 UG/ML
INJECTION, SOLUTION INTRAMUSCULAR; INTRAVENOUS AS NEEDED
Status: DISCONTINUED | OUTPATIENT
Start: 2018-02-27 | End: 2018-02-27 | Stop reason: SURG

## 2018-02-27 RX ORDER — KETOROLAC TROMETHAMINE 30 MG/ML
INJECTION, SOLUTION INTRAMUSCULAR; INTRAVENOUS AS NEEDED
Status: DISCONTINUED | OUTPATIENT
Start: 2018-02-27 | End: 2018-02-27 | Stop reason: SURG

## 2018-02-27 RX ORDER — PROPOFOL 10 MG/ML
INJECTION, EMULSION INTRAVENOUS AS NEEDED
Status: DISCONTINUED | OUTPATIENT
Start: 2018-02-27 | End: 2018-02-27 | Stop reason: SURG

## 2018-02-27 RX ORDER — ONDANSETRON 2 MG/ML
4 INJECTION INTRAMUSCULAR; INTRAVENOUS EVERY 6 HOURS PRN
Status: DISCONTINUED | OUTPATIENT
Start: 2018-02-27 | End: 2018-02-27 | Stop reason: HOSPADM

## 2018-02-27 RX ORDER — ONDANSETRON 2 MG/ML
INJECTION INTRAMUSCULAR; INTRAVENOUS AS NEEDED
Status: DISCONTINUED | OUTPATIENT
Start: 2018-02-27 | End: 2018-02-27 | Stop reason: SURG

## 2018-02-27 RX ORDER — ACETAMINOPHEN 325 MG/1
650 TABLET ORAL EVERY 6 HOURS PRN
Status: DISCONTINUED | OUTPATIENT
Start: 2018-02-27 | End: 2018-02-27 | Stop reason: HOSPADM

## 2018-02-27 RX ADMIN — MIDAZOLAM HYDROCHLORIDE 2 MG: 1 INJECTION, SOLUTION INTRAMUSCULAR; INTRAVENOUS at 09:35

## 2018-02-27 RX ADMIN — GLYCOPYRROLATE 0.2 MG: 0.2 INJECTION, SOLUTION INTRAMUSCULAR; INTRAVENOUS at 09:35

## 2018-02-27 RX ADMIN — FENTANYL CITRATE 50 MCG: 50 INJECTION, SOLUTION INTRAMUSCULAR; INTRAVENOUS at 09:35

## 2018-02-27 RX ADMIN — ONDANSETRON 4 MG: 2 INJECTION INTRAMUSCULAR; INTRAVENOUS at 10:20

## 2018-02-27 RX ADMIN — FENTANYL CITRATE 25 MCG: 50 INJECTION, SOLUTION INTRAMUSCULAR; INTRAVENOUS at 10:15

## 2018-02-27 RX ADMIN — KETOROLAC TROMETHAMINE 15 MG: 30 INJECTION, SOLUTION INTRAMUSCULAR at 10:21

## 2018-02-27 RX ADMIN — EPHEDRINE SULFATE 10 MG: 50 INJECTION, SOLUTION INTRAMUSCULAR; INTRAVENOUS; SUBCUTANEOUS at 09:50

## 2018-02-27 RX ADMIN — HYDROCODONE BITARTRATE AND ACETAMINOPHEN 2 TABLET: 5; 325 TABLET ORAL at 12:14

## 2018-02-27 RX ADMIN — SODIUM CHLORIDE, SODIUM LACTATE, POTASSIUM CHLORIDE, AND CALCIUM CHLORIDE 125 ML/HR: .6; .31; .03; .02 INJECTION, SOLUTION INTRAVENOUS at 09:20

## 2018-02-27 RX ADMIN — CEFAZOLIN SODIUM 2000 MG: 2 SOLUTION INTRAVENOUS at 09:38

## 2018-02-27 RX ADMIN — FENTANYL CITRATE 25 MCG: 50 INJECTION, SOLUTION INTRAMUSCULAR; INTRAVENOUS at 09:55

## 2018-02-27 RX ADMIN — SODIUM CHLORIDE, SODIUM LACTATE, POTASSIUM CHLORIDE, AND CALCIUM CHLORIDE: .6; .31; .03; .02 INJECTION, SOLUTION INTRAVENOUS at 10:30

## 2018-02-27 RX ADMIN — PROPOFOL 150 MG: 10 INJECTION, EMULSION INTRAVENOUS at 09:40

## 2018-02-27 NOTE — DISCHARGE INSTRUCTIONS
[facility] Instructions      Dr Sebastian VERAS     1  General: Maira Asa will feel pulling sensations around the wound or funny aches and pains around the incisions  This is normal  Even minor surgery is a change in your body and this is your bodys way of reaction to it  If you have had abdominal surgery, it may help to support the incision with a small pillow or blanket for comfort when moving or coughing  2  Wound care:      Glue - Leave glue alone, it will fall off on its own, no need for an additional dressings    3  Water: You may shower over the wound, unless there are drain tubes left in place  Do not bathe or use a pool or hot tub until cleared by the physician  You may shower right over the staples, glue or Steri-Strips and rinse wound with soapy water but do not scrub incision pat dry when you are done  4  Activity: You may go up and down stairs, walk as much as you are comfortable, but walk at least 3 times each day  If you have had abdominal surgery, do not lift anything heavier than 15 pounds for at least 2 weeks, unless cleared by the doctor  5  Diet: You may resume a regular diet  If you had a same-day surgery or overnight stay surgery, you may wish to eat lightly for a few days: soups, crackers, and sandwiches  You may resume a regular diet when ready  6  Medications: Resume all of your previous medications, unless told otherwise by the doctor  Avoid aspirin or ibuprofen (Advil, Motrin, etc ) products for 2-3 days after the date of surgery  You may, at that time, began to take them again  Tylenol is always fine, unless you are taking any narcotic pain medication containing Tylenol (such as Percocet, Darvocet, Vicodin, or anything containing acetaminophen)  Do not take Tylenol if you're taking these medications  You do not need to take the narcotic pain medications unless you are having significant pain and discomfort      7  Driving: He will need someone to drive you home on the day of surgery  Do not drive or make any important decisions while on narcotic pain medication or 24 hours and after anesthesia or sedation for surgery  Generally, you may drive when your off all narcotic pain medications  8  Upset Stomach: You may take Maalox, Tums, or similar items for an upset stomach  If your narcotic pain medication causes an upset stomach, do not take it on an empty stomach  Try taking it with at least some crackers or toast      9  Constipation: Patients often experienced constipation after surgery  You may take over-the-counter medication for this, such as Metamucil, Senokot, Dulcolax, milk of magnesia, etc  You may take a suppository unless you have had anorectal surgery such as a procedure on your hemorrhoids  If you experience significant nausea or vomiting after abdominal surgery, call the office before trying any of these medications  10  Call the office: If you are experiencing any of the following, fevers above 101 5°, significant nausea or vomiting, if the wound develops drainage and/or is excessive redness around the wound, or if you have significant diarrhea or other worsening symptoms  11  Pain: You may be given a prescription for pain  This will be given to the hospital, the day of surgery  12  Sexual Activity: You may resume sexual activity when you feel ready and comfortable and your incision is sealed and healed without apparent infection risk  13   Follow up with Dr Keila Viveros in 2 weeks      [facility]  Phone: [phone]

## 2018-02-27 NOTE — ANESTHESIA POSTPROCEDURE EVALUATION
Post-Op Assessment Note      CV Status:  Stable    Mental Status:  Alert    Hydration Status:  Stable    PONV Controlled:  None    Airway Patency:  Patent    Post Op Vitals Reviewed: Yes          Staff: CRNA           BP     Temp      Pulse     Resp      SpO2

## 2018-02-27 NOTE — DISCHARGE SUMMARY
Discharge Summary - Wisam Dias 69 y o  male MRN: [id number]    Unit/Bed#: OR [facility] Encounter: [id number]      Pre-Op Diagnosis Codes:     * Unilateral inguinal hernia without obstruction or gangrene, recurrence not specified [K40 90]  Post-Op Diagnosis Codes:     * Unilateral inguinal hernia without obstruction or gangrene, recurrence not specified [K40 90]      Procedures Performed:  Open repair of left inguinal hernia with mesh        See H and P for full details of admission and op note  Patient recovered well and was discharged home  Patient was seen and examined prior to discharge  Provisions for Follow-Up Care:  See after visit summary for information related to follow-up care and any pertinent home health orders  Disposition: Home, in stable condition  Planned Readmission: No    Discharge Medications:  See after visit summary for reconciled discharge medications provided to patient and family  Post op instructions reviewed with patient and/or family  Rx given for discharge  Pt  discharge in improved and good condition      Signature:   Andre Delvalle PA-C  Date: 2/27/2018 Time: 11:04 AM

## 2018-02-27 NOTE — H&P (VIEW-ONLY)
Assessment/Plan: Codey Gregory is a 69year old male who presents today for a recurrent inguinal hernia  Physical exam revealed a scar of the right groin from a previous hernia surgery  Does not appear to have had surgery on the left before  He has an irreducible large inguinal hernia  Discussed risks, benefits, and alternatives to left inguinal hernia repair with mesh  Explained the surgery, as well as pre- and post-operative protocol and restrictions  No heavy lifting greater than 15 pounds for weeks 1-2 and no strenuous activity procedure Will need clearance from cardiologist  He will schedule surgery for his earliest convenience  He knows to call if any questions or concerns arise  Skin- Encouraged him to have seborrhea, scatered hemangioma, telangiectases of the chest, seborrhea, pigmented nevi, small sebaceous cysts, 1 of upper mid back and 1 of lower mid back monitored by his PCP or a dermatologist       No problem-specific Assessment & Plan notes found for this encounter  There are no diagnoses linked to this encounter  Subjective:      Patient ID: Codey Gregory is a 69 y o  male  Codey Gregory is a 69year old male who presents today for a recurrent inguinal hernia  He has had it for 2 months now  He denies pain  He reports he had a left inguinal hernia repaired in the past and that now he has a recurrent left inguinal hernia  The following portions of the patient's history were reviewed and updated as appropriate: allergies, current medications, past family history, past medical history, past social history, past surgical history and problem list     Review of Systems   Constitutional: Negative  HENT: Negative  Eyes: Negative  Respiratory: Negative  Cardiovascular: Negative  Gastrointestinal: Negative  Endocrine: Negative  Genitourinary: Negative  Musculoskeletal: Negative  Skin: Negative  Allergic/Immunologic: Negative  Neurological: Negative  Hematological: Negative  Psychiatric/Behavioral: Negative  All other systems reviewed and are negative  Objective:    Vitals:    02/15/18 1325   BP: 130/66   Pulse: 72   Resp: 16   Temp: 98 2 °F (36 8 °C)        Physical Exam   Constitutional: He is oriented to person, place, and time  He appears well-developed and well-nourished  No distress  Confused  HENT:   Head: Normocephalic and atraumatic  Right Ear: External ear normal    Left Ear: External ear normal    Nose: Nose normal    Mouth/Throat: Oropharynx is clear and moist  No oropharyngeal exudate  Eyes: Conjunctivae and EOM are normal  Right eye exhibits no discharge  Left eye exhibits no discharge  No scleral icterus  Neck: Normal range of motion  Neck supple  No JVD present  No tracheal deviation present  No thyromegaly present  Cardiovascular: Normal rate, regular rhythm, normal heart sounds and intact distal pulses  Exam reveals no gallop and no friction rub  No murmur heard  Pulmonary/Chest: Effort normal and breath sounds normal  No stridor  No respiratory distress  He has no wheezes  He has no rales  He exhibits no tenderness  Abdominal: Soft  Bowel sounds are normal  He exhibits no distension and no mass  There is no tenderness  There is no rebound and no guarding  irreducible large left inguinal hernia  Physical exam revealed a scar of the right groin from a previous hernia surgery  Does not appear to have had surgery on the left before  Musculoskeletal: Normal range of motion  He exhibits no edema, tenderness or deformity  Lymphadenopathy:     He has no cervical adenopathy  Neurological: He is alert and oriented to person, place, and time  No cranial nerve deficit  Coordination normal    Skin: Skin is dry  No rash noted  He is not diaphoretic  No erythema  No pallor     seborrhea, scatered hemangioma, telangiectases of the chest, seborrhea, pigmented nevi, small sebaceous cysts, 1 of upper mid back and 1 of lower mid back    Psychiatric: He has a normal mood and affect  His behavior is normal  Thought content normal    Nursing note and vitals reviewed  By signing my name below, Erlinda Chou, attest that this documentation has been prepared under the direction and in the presence of Dr Jose Smith MD  Electronically Signed: eJrmanmariola Montesinos, Medical Scribe, 2/15/18  14:02  I, Dr Jose Smith, personally performed the services described in this documentation  All medical record entries made by the scribe were at my direction and in my presence  I have reviewed the chart and agree that the record reflects my personal performance and is accurate and complete  Jose Smith MD  2/15/18  14:02

## 2018-02-27 NOTE — OP NOTE
Inguinal Hernia, Open, Procedure Note    Name: Avis Hart   : 1940  MRN: [id number]  Date: 2018    Indications: The patient presented with a history of a left, not reducible hernia  Pre-operative Diagnosis: left not reducible inguinal hernia    Post-operative Diagnosis: left not reducible directinguinal hernia    Procedure: Open repair of left inguinal hernia with mesh    Surgeon: Blanche Patel MD    Assistants: [facility] KIMBER Delvalle, no qualified resident available  PA was medically necessary for the surgical safety of the case, including suturing, retraction, hemostasis  Anesthesia: General LMA anesthesia    Procedure Details   The patient was seen again in the Holding Room  The risks, benefits, complications, treatment options, and expected outcomes were discussed with the patient  The possibilities of reaction to medication, pulmonary aspiration, perforation of viscus, bleeding, postoperative short or long term nerve entrapment causing pain,recurrent infection, the need for additional procedures, and development of a complication requiring transfusion or further operation were discussed with the patient and/or family  There was concurrence with the proposed plan, and informed consent was obtained  The site of surgery was properly noted/marked  The patient was taken to the Operating Room, identified as Avis Hart and the procedure verified as hernia repair  A Time Out was held and the above information confirmed  The patient was prepped and draped in a sterile fashion  A timeout was again performed  Local anesthesia was used in the incision as well as performing an ilioinguinal nerve block  An inguinal incision was made  Dissection carried out to Lm's fascia  Dissection carried out to the external oblique  The external oblique fascia was opened sharply  Medial and lateral flaps were created and retracted  Care was taken to preserve the sensory nerves    The cord structures were brought out of the wound and secured using a Penrose drain  The cord was carefully inspected for the evidence of a hernia sac  If there was a hernia sac, this was dissected off the cord structures, opened, its contents reduced, and suture ligated at its base using a 0 Vicryl pursestring suture under direct vision  The lipoma was teased off the cord structures and also suture ligated at its base using a 0 Vicryl suture, this was excised  The floor of the canal was examined for any defect  If there was a defect in the floor of the canal this was scored and reduced  An appropriate size plug was placed into the defect of the floor and/or ring  This was secured using interrupted 2-0 Prolene sutures  An onlay patch was trimmed to fit the size of the canal   This was secured to the pubic tubercle, shelving edge and conjoint tendon using interrupted 2-0 Prolene sutures  The edges of the mesh were tucked around the cord and tacked down slightly  The cord structures were returned to their anatomic location  The wound was irrigated  The external oblique was closed using a running 2-0 PDS suture, taking care to preserve the sensory nerves if possible  The wound was closed in multiple layers using 3-0 Vicryl sutures and the skin closed using a 4-0 Monocryl subcuticular stitch  The wound was dressed  The patient was anatomically correct at the end of the procedure  The patient tolerated the procedure in good condition  Instrument, sponge, and needle counts were correct prior to closure and at the conclusion of the case  This text is generated with voice recognition software  There may be translation, syntax,  or grammatical errors  If you have any questions, please contact the dictating provider  Findings: left reducible direct inguinal hernia    Estimated Blood Loss: Minimal           Specimens: All specimens were sent for pathology     * No orders in the log * Complications: None; patient tolerated the procedure well             Disposition: PACU            Condition: stable    Signature:   Sandip Rousseau MD  Date: 2/27/2018 Time: 12:08 PM

## 2018-02-27 NOTE — ANESTHESIA PREPROCEDURE EVALUATION
Review of Systems/Medical History  Patient summary reviewed  Chart reviewed      Cardiovascular   Pulmonary       GI/Hepatic            Endo/Other     GYN       Hematology   Musculoskeletal       Neurology   Psychology           Physical Exam    Airway    Mallampati score: II         Dental       Cardiovascular  Rhythm: regular, Rate: normal,     Pulmonary      Other Findings        Anesthesia Plan  ASA Score- 2     Anesthesia Type- general with ASA Monitors  Additional Monitors:   Airway Plan: LMA  Plan Factors-    Induction- intravenous  Postoperative Plan- Plan for postoperative opioid use  Informed Consent- Anesthetic plan and risks discussed with patient

## 2018-02-27 NOTE — INTERIM OP NOTE
REPAIR HERNIA INGUINAL  Postoperative Note  PATIENT NAME: Eusebio Jin  : 1940  MRN: [id number]  [facility] OR ROOM 02    Surgery Date: 2018    Preop Diagnosis:  Unilateral inguinal hernia without obstruction or gangrene, recurrence not specified [K40 90]    Post-Op Diagnosis Codes:     * Unilateral inguinal hernia without obstruction or gangrene, recurrence not specified [K40 90]    Procedure(s) (LRB):  REPAIR HERNIA INGUINAL (Left) open    Surgeon(s) and Role:     * Alyse Tello MD - Primary  Cheryl Delvalle PA-C    Specimens:  * No specimens in log *    Estimated Blood Loss:   Minimal    Anesthesia Type:   General LMA    Findings:   As above     Complications:   None    Hernia Surgery Operative Note    Name: Eusebio Jin    Gender: male    Age: 69 y o  Race:     BMI: Body mass index is 25 79 kg/m²      DIAGNOSIS: LIH    Diabetes Mellitis: No    Coronary Heart Disease: No    Cancer: No    Steroid Use: No    Tobacco use: No   Last used: former smoker, not sure when quit   Type: cigarettes   Frequency (per day): 1 ppd   Duration (years): patient unsure    Alcohol use: Yes Heavy    Location of Hernia: Left indirect inguinal hernia  Length:3 0cm  Width:2 0 cm  Primary: Yes  Recurrent: No   Number of recurrences:    Access: Open    Component Separation: No    Mesh:   Yes -  Type: Synthetic   BARD Mesh Perfix Plug x-large    Operative Time: 39 min             SIGNATURE: David Delvalle PA-C   DATE: 2018   TIME: 10:53 AM

## 2018-03-12 ENCOUNTER — TELEPHONE (OUTPATIENT)
Dept: ADMINISTRATIVE | Facility: HOSPITAL | Age: 78
End: 2018-03-12

## 2018-03-16 ENCOUNTER — OFFICE VISIT (OUTPATIENT)
Dept: SURGERY | Facility: HOSPITAL | Age: 78
End: 2018-03-16

## 2018-03-16 VITALS — WEIGHT: 150.4 LBS | HEIGHT: 65 IN | BODY MASS INDEX: 25.06 KG/M2 | TEMPERATURE: 97.9 F

## 2018-03-16 DIAGNOSIS — Z98.890 POST-OPERATIVE STATE: Primary | ICD-10-CM

## 2018-03-16 PROBLEM — Z87.19 S/P INGUINAL HERNIA REPAIR: Status: ACTIVE | Noted: 2018-03-16

## 2018-03-16 PROCEDURE — 99024 POSTOP FOLLOW-UP VISIT: CPT | Performed by: PHYSICIAN ASSISTANT

## 2018-03-16 NOTE — PATIENT INSTRUCTIONS
Patient is doing well postoperatively  He should continue to follow postoperative instructions as provided  He should continue to follow a 25 lb lifting restriction for the next 2 weeks  Otherwise he should return to normal activities  He does not require further surgical follow-up  He should call the office with any questions or concerns

## 2018-03-16 NOTE — PROGRESS NOTES
Assessment/Plan:   Fei Aburto is a 69 y o male who comes in today for postoperative check after open left inguinal hernia repair done on 2/27/2018 [facility] Dr [facility]  Patient is feeling well and recovering as expected  He can return to normal activities with lifting restriction of 25 lb for the next 2 weeks  Other Postoperative instructions reviewed  All questions answered  He does not require further for follow-up unless symptoms develop  He will contact us with any questions or concerns  HPI:  Fei Aburto is a 69 y o male who comes in today for postoperative check after recent left inguinal hernia repair with mesh done on 02/27/2018 by Dr Arzella Fabry at  [facility]  Currently doing well without problems  He complains of an occasional discomfort at left inguinal area  He has not needed any pain medication for the past week  He is tolerating a full diet and moving his bowels  His appetite is good  He is tolerating activities with a lifting restriction of 15 lbs  His incision site has remained intact without signs of infection  ROS:  General ROS: negative for - chills, fatigue, fever or night sweats, weight loss  Respiratory ROS: no cough, shortness of breath, or wheezing  Cardiovascular ROS: no chest pain or dyspnea on exertion  Genito-Urinary ROS: no dysuria, trouble voiding, or hematuria  Musculoskeletal ROS: negative for - gait disturbance, joint pain or muscle pain  Neurological ROS: no TIA or stroke symptoms    Allergies:  Patient has no known allergies      Current Outpatient Prescriptions:     carvedilol (COREG) 12 5 mg tablet, Take 1 tablet by mouth, Disp: , Rfl:     Cholecalciferol (VITAMIN D-3) 1000 units CAPS, Take 1 capsule by mouth 2 (two) times a day  , Disp: , Rfl:     lisinopril (ZESTRIL) 10 mg tablet, Take 1 tablet (10 mg total) by mouth daily (Patient taking differently: Take 10 mg by mouth daily at bedtime  ), Disp: 90 tablet, Rfl: 0    simvastatin (ZOCOR) 40 mg tablet, Take 1 tablet (40 mg total) by mouth daily, Disp: 90 tablet, Rfl: 0  Past Medical History:   Diagnosis Date    ASCVD (arteriosclerotic cardiovascular disease)     Cardiac disease     Cerebrovascular disease     DJD (degenerative joint disease)     GERD (gastroesophageal reflux disease)     Hyperlipidemia     Hypertension     Inguinal hernia, left 02/27/2018    KIM JOHANSEN MD    PAD (peripheral artery disease) (HCC)     Pro[facility] ([facility] [address] [facility]     TIA (transient ischemic attack) 2008    Slurred speech    Vitamin D deficiency      Past Surgical History:   Procedure Laterality Date    CORONARY ARTERY BYPASS GRAFT  1994    5 vessel    AR REPAIR ING HERNIA,5+Y/O,REDUCIBL Left 2/27/2018    Procedure: REPAIR HERNIA INGUINAL;  Surgeon: Na Oliva MD;  Location: [facility] OR;  Service: General    SKIN GRAFT  48 years ago     Family History   Problem Relation Age of Onset    Heart disease Mother     Heart disease Father       reports that he has quit smoking  His smoking use included Cigarettes  He has never used smokeless tobacco  He reports that he drinks about 33 6 oz of alcohol per week   He reports that he does not use drugs  Physical Exam   PHYSICAL EXAM  General: normal, cooperative, no distress, appears stated age  [de-identified]:  EOM intact, mucous membranes moist  Neck:  Supple, full range of motion, trachea midline  Lungs:  Clear to auscultation, no wheezes no respiratory distress  Heart:  Regular rate and rhythm, S1-S2 normal  Abdominal: soft, nondistended, nontender or active BS  Incision: clean, dry, and intact and healing well  Ext:  No cyanosis, no edema   No tenderness or swelling in calves or thighs    Liset Delvalle PA-C

## 2018-03-22 DIAGNOSIS — I10 ESSENTIAL HYPERTENSION: Primary | ICD-10-CM

## 2018-03-22 RX ORDER — CARVEDILOL 12.5 MG/1
12.5 TABLET ORAL 2 TIMES DAILY WITH MEALS
Qty: 60 TABLET | Refills: 5 | Status: SHIPPED | OUTPATIENT
Start: 2018-03-22 | End: 2018-04-23 | Stop reason: SDUPTHER

## 2018-03-29 DIAGNOSIS — I73.9 INTERMITTENT CLAUDICATION (HCC): Primary | ICD-10-CM

## 2018-04-19 ENCOUNTER — TELEPHONE (OUTPATIENT)
Dept: FAMILY MEDICINE CLINIC | Facility: HOSPITAL | Age: 78
End: 2018-04-19

## 2018-04-19 NOTE — TELEPHONE ENCOUNTER
PATIENT LEFT MESSAGE ON OUR [employer]IC[employer] - HE HAS QUESTIONS ON ONE OF HIS MEDICATIONS - WAS NOT SPECIFIC TO WHICH ONE - PLEASE CALL BACK

## 2018-04-23 DIAGNOSIS — I10 ESSENTIAL HYPERTENSION: ICD-10-CM

## 2018-04-23 RX ORDER — CARVEDILOL 12.5 MG/1
12.5 TABLET ORAL 2 TIMES DAILY WITH MEALS
Qty: 60 TABLET | Refills: 5 | Status: SHIPPED | OUTPATIENT
Start: 2018-04-23 | End: 2018-05-16 | Stop reason: SDUPTHER

## 2018-04-23 RX ORDER — CARVEDILOL 12.5 MG/1
12.5 TABLET ORAL 2 TIMES DAILY WITH MEALS
Qty: 180 TABLET | Refills: 0 | OUTPATIENT
Start: 2018-04-23

## 2018-04-25 ENCOUNTER — TELEPHONE (OUTPATIENT)
Dept: FAMILY MEDICINE CLINIC | Facility: HOSPITAL | Age: 78
End: 2018-04-25

## 2018-04-27 DIAGNOSIS — I73.9 PERIPHERAL VASCULAR DISEASE, UNSPECIFIED (HCC): Primary | ICD-10-CM

## 2018-05-16 DIAGNOSIS — I10 ESSENTIAL HYPERTENSION: ICD-10-CM

## 2018-05-16 DIAGNOSIS — E78.5 HYPERLIPIDEMIA, UNSPECIFIED HYPERLIPIDEMIA TYPE: ICD-10-CM

## 2018-05-16 RX ORDER — LISINOPRIL 10 MG/1
TABLET ORAL
Qty: 90 TABLET | Refills: 0 | Status: SHIPPED | OUTPATIENT
Start: 2018-05-16 | End: 2018-09-19 | Stop reason: SDUPTHER

## 2018-05-16 RX ORDER — SIMVASTATIN 40 MG
TABLET ORAL
Qty: 90 TABLET | Refills: 0 | Status: SHIPPED | OUTPATIENT
Start: 2018-05-16 | End: 2018-09-19 | Stop reason: SDUPTHER

## 2018-05-16 RX ORDER — CARVEDILOL 12.5 MG/1
TABLET ORAL
Qty: 180 TABLET | Refills: 0 | Status: SHIPPED | OUTPATIENT
Start: 2018-05-16 | End: 2018-09-19 | Stop reason: SDUPTHER

## 2018-07-09 ENCOUNTER — OFFICE VISIT (OUTPATIENT)
Dept: VASCULAR SURGERY | Facility: CLINIC | Age: 78
End: 2018-07-09
Payer: COMMERCIAL

## 2018-07-09 VITALS
HEART RATE: 62 BPM | TEMPERATURE: 97.2 F | SYSTOLIC BLOOD PRESSURE: 136 MMHG | DIASTOLIC BLOOD PRESSURE: 68 MMHG | HEIGHT: 68 IN | WEIGHT: 154 LBS | BODY MASS INDEX: 23.34 KG/M2

## 2018-07-09 DIAGNOSIS — I73.9 PERIPHERAL ARTERIAL DISEASE (HCC): Primary | ICD-10-CM

## 2018-07-09 PROCEDURE — 99213 OFFICE O/P EST LOW 20 MIN: CPT | Performed by: NURSE PRACTITIONER
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
## 2018-07-09 NOTE — PATIENT INSTRUCTIONS
Peripheral Artery Disease   WHAT YOU NEED TO KNOW:   What is peripheral artery disease? Peripheral artery disease (PAD) is narrow, weak, or blocked arteries  It may affect any arteries outside of your heart and brain  PAD is usually the result of a buildup of fat and cholesterol, also called plaque, along your artery walls  Inflammation, a blood clot, or abnormal cell growth could also block your arteries  PAD prevents normal blood flow to your legs and arms  You are at risk of an amputation if poor blood flow keeps wounds from healing or causes gangrene (tissue death)  Without treatment, PAD can also cause a heart attack or stroke  What increases my risk for PAD? · Smoking cigarettes     · Diabetes     · High blood pressure     · High cholesterol     · Age older than 40 years    · Heart disease or a family history of heart disease  What are the signs and symptoms of PAD? Mild PAD usually does not cause symptoms  As the disease worsens over time, you may have the following:  · Pain or cramps in your leg or hip while you walk     · A numb, weak, or heavy feeling in your legs     · Dry, scaly, red, or pale skin on your legs     · Thick or brittle nails, or hair loss on your arms and legs     · Foot sores that will not heal     · Burning or aching in your feet and toes while resting (this may be worse when you lie down)  How is PAD diagnosed? · Angiography  is a test that shows pictures of the arteries in your arms and legs  You will be given contrast liquid to help the arteries show up better on the pictures  The pictures will be taken with an MRI or CT scan  Tell the healthcare provider if you have ever had an allergic reaction to contrast liquid  Do not enter the MRI room with anything metal  Metal can cause serious injury  Tell a healthcare provider if you have any metal in or on your body      · Doppler ankle brachial index (ISMAEL)  is a test that compares blood pressure in your ankles to blood pressure in your arms  This tells your healthcare provider how well blood is flowing through the arteries in your legs  How is PAD treated? Treatment can help reduce your risk of a heart attack, stroke, or amputation  You may need more than one of the following:  · Medicines  may be given  Your healthcare provider may give you any of the following:     ¨ Antiplatelet medicine,  such as aspirin, helps prevent blood clots and reduces the risk of a heart attack or stroke  ¨ Statin medicine  helps lower your cholesterol and prevents your PAD from getting worse  · A supervised exercise program  helps you stay active in normal daily activities and may prevent disability  Healthcare providers will help you safely walk or do strength training exercises 3 times a week for 30 to 60 minutes  You will do this for several months, then transition to walking on your own  · Angioplasty  is a procedure to open your artery so blood can flow through normally  A thin tube called a catheter is used to insert a small balloon into your artery  The balloon is inflated to open your blocked artery, and then removed  A tube called a stent may be placed in your artery to hold it open  · Bypass surgery  is used to make a new connection to your artery with a vein from another part of your body, or an artificial graft  The vein or graft is attached to your artery above and below your blockage  This allows blood to flow around the blocked portion of your artery  How can I manage PAD? · Do not smoke  Nicotine and other chemicals in cigarettes and cigars can worsen PAD  They can also increase your risk for a heart attack or stroke  Ask your healthcare provider for information if you currently smoke and need help to quit  E-cigarettes or smokeless tobacco still contain nicotine  Talk to your healthcare provider before you use these products  · Manage other health conditions  Take your medicines as directed   Follow your healthcare provider's instructions if you have high blood pressure or high cholesterol  Perform foot care and check your blood sugar levels as directed if you have diabetes  · Eat heart healthy foods  Eat whole grains, fruits, and vegetables every day  Limit salt and high-fat foods  Ask your healthcare provider for more information on a heart healthy diet  Ask if you need to lose weight  Your healthcare provider can help you create a healthy weight-loss plan  Call 911 for the following:   · You have any of the following signs of a heart attack:      ¨ Squeezing, pressure, or pain in your chest that lasts longer than 5 minutes or returns    ¨ Discomfort or pain in your back, neck, jaw, stomach, or arm     ¨ Trouble breathing    ¨ Nausea or vomiting    ¨ Lightheadedness or a sudden cold sweat, especially with chest pain or trouble breathing    · You have any of the following signs of a stroke:      ¨ Numbness or drooping on one side of your face     ¨ Weakness in an arm or leg    ¨ Confusion or difficulty speaking    ¨ Dizziness, a severe headache, or vision loss  When should I seek immediate care? · You have sores or wounds that will not heal      · You notice black or discolored skin on your arm or leg  · Your skin is cool to the touch  When should I contact my healthcare provider? · You have leg pain when you walk 1/8 mile (200 meters) or less, even with treatment  · Your legs are red, dry, or pale, even with treatment  · You have questions or concerns about your condition or care  CARE AGREEMENT:   You have the right to help plan your care  Learn about your health condition and how it may be treated  Discuss treatment options with your caregivers to decide what care you want to receive  You always have the right to refuse treatment  The above information is an  only  It is not intended as medical advice for individual conditions or treatments   Talk to your doctor, nurse or pharmacist before following any medical regimen to see if it is safe and effective for you  © 2017 [address] [address] [employer] Information is for End User's use only and may not be sold, redistributed or otherwise used for commercial purposes  All illustrations and images included in CareNotes® are the copyrighted property of [employer]  or [employer]

## 2018-07-10 PROBLEM — F10.20 ALCOHOL DEPENDENCY (HCC): Status: ACTIVE | Noted: 2017-05-02

## 2018-07-10 PROBLEM — M48.54XA COMPRESSION FRACTURE OF THORACIC SPINE, NON-TRAUMATIC (HCC): Status: ACTIVE | Noted: 2017-05-02

## 2018-07-10 RX ORDER — ASPIRIN 81 MG/1
81 TABLET ORAL DAILY
Qty: 30 TABLET | Refills: 0 | Status: ON HOLD | OUTPATIENT
Start: 2018-07-10 | End: 2019-09-20 | Stop reason: SDUPTHER

## 2018-07-13 ENCOUNTER — OFFICE VISIT (OUTPATIENT)
Dept: FAMILY MEDICINE CLINIC | Facility: HOSPITAL | Age: 78
End: 2018-07-13
Payer: COMMERCIAL

## 2018-07-13 VITALS
HEIGHT: 68 IN | BODY MASS INDEX: 23.28 KG/M2 | WEIGHT: 153.6 LBS | TEMPERATURE: 97.2 F | DIASTOLIC BLOOD PRESSURE: 82 MMHG | HEART RATE: 48 BPM | SYSTOLIC BLOOD PRESSURE: 130 MMHG

## 2018-07-13 DIAGNOSIS — I25.10 CORONARY ARTERY DISEASE INVOLVING NATIVE CORONARY ARTERY OF NATIVE HEART WITHOUT ANGINA PECTORIS: ICD-10-CM

## 2018-07-13 DIAGNOSIS — I10 ESSENTIAL HYPERTENSION: Primary | ICD-10-CM

## 2018-07-13 DIAGNOSIS — E78.5 HYPERLIPIDEMIA, UNSPECIFIED HYPERLIPIDEMIA TYPE: ICD-10-CM

## 2018-07-13 DIAGNOSIS — E55.9 VITAMIN D DEFICIENCY: ICD-10-CM

## 2018-07-13 DIAGNOSIS — I73.9 PERIPHERAL ARTERIAL DISEASE (HCC): ICD-10-CM

## 2018-07-13 PROBLEM — M62.82 RHABDOMYOLYSIS: Status: RESOLVED | Noted: 2017-04-13 | Resolved: 2018-07-13

## 2018-07-13 PROBLEM — G62.9 PERIPHERAL NEUROPATHY: Status: ACTIVE | Noted: 2017-05-19

## 2018-07-13 PROBLEM — M81.0 OSTEOPOROSIS: Status: ACTIVE | Noted: 2017-08-11

## 2018-07-13 PROBLEM — K40.90 LEFT INGUINAL HERNIA: Status: ACTIVE | Noted: 2018-01-19

## 2018-07-13 PROBLEM — G93.41 ACUTE METABOLIC ENCEPHALOPATHY: Status: RESOLVED | Noted: 2017-04-13 | Resolved: 2018-07-13

## 2018-07-13 PROCEDURE — 3075F SYST BP GE 130 - 139MM HG: CPT | Performed by: NURSE PRACTITIONER

## 2018-07-13 PROCEDURE — 1101F PT FALLS ASSESS-DOCD LE1/YR: CPT | Performed by: NURSE PRACTITIONER

## 2018-07-13 PROCEDURE — 3079F DIAST BP 80-89 MM HG: CPT | Performed by: NURSE PRACTITIONER

## 2018-07-13 PROCEDURE — 1036F TOBACCO NON-USER: CPT | Performed by: NURSE PRACTITIONER

## 2018-07-13 PROCEDURE — 99214 OFFICE O/P EST MOD 30 MIN: CPT | Performed by: NURSE PRACTITIONER

## 2018-07-13 PROCEDURE — 3725F SCREEN DEPRESSION PERFORMED: CPT | Performed by: NURSE PRACTITIONER

## 2018-07-13 NOTE — PROGRESS NOTES
Assessment/Plan:    Essential hypertension  Stable BP control  Continue same lisinopril and return in 6 months for next BP check  Coronary artery disease involving native coronary artery  Pt clinically stable/asymptomatic  F/U with cardiology due in October  Peripheral arterial disease ([address] )  Recent f/u with vascular  Scheduled for arterial doppler this month  Hyperlipidemia  On statin  Order given to update fastings in Sept      Vitamin D deficiency  Order given to update D level w/next fastings  Diagnoses and all orders for this visit:    Essential hypertension  -     CBC and differential; Future  -     Comprehensive metabolic panel; Future    Hyperlipidemia, unspecified hyperlipidemia type  -     Lipid panel; Future  -     TSH, 3rd generation; Future  -     T4, free; Future    Vitamin D deficiency  -     Vitamin D 25 hydroxy; Future    Coronary artery disease involving native coronary artery of native heart without angina pectoris    Peripheral arterial disease (HCC)          Subjective:      Patient ID: Sharan Vasquez is a 69 y o  male here for routine follow up  States "I'm still here"  Feels tired and lazy  Not getting work done that he wants to  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 and then rests  Takes a lot of naps  Saw vascular specialist this month  Was told he can't get tests done because [facility] won't pay for but appt was scheduled for 7/26  The following portions of the patient's history were reviewed and updated as appropriate: allergies, current medications, past medical history, past social history and problem list     Review of Systems   Constitutional: Positive for fever  Negative for unexpected weight change  Respiratory: Negative for cough and shortness of breath  Cardiovascular: Negative for chest pain, palpitations and leg swelling           Objective:      /82 (Patient Position: Sitting, Cuff Size: Standard)   Pulse (!) 48   Temp (!) 97 2 °F (36 2 °C) (Tympanic)   Ht 5' 7 5" (1 715 m)   Wt 69 7 kg (153 lb 9 6 oz)   BMI 23 70 kg/m²        Physical Exam   Constitutional: He is oriented to person, place, and time  He appears well-developed and well-nourished  No distress  Eyes: Conjunctivae are normal  No scleral icterus  Neck: No thyromegaly present  Cardiovascular: Normal rate and regular rhythm  No murmur heard  - carotid bruits    Pulmonary/Chest: Effort normal and breath sounds normal  No respiratory distress  Lymphadenopathy:     He has no cervical adenopathy  Neurological: He is alert and oriented to person, place, and time  Skin: Skin is warm and dry  Psychiatric: He has a normal mood and affect  Vitals reviewed

## 2018-07-26 ENCOUNTER — HOSPITAL ENCOUNTER (OUTPATIENT)
Dept: NON INVASIVE DIAGNOSTICS | Facility: CLINIC | Age: 78
Discharge: HOME | End: 2018-07-26
Payer: COMMERCIAL

## 2018-07-26 DIAGNOSIS — I73.9 PERIPHERAL VASCULAR DISEASE, UNSPECIFIED (HCC): ICD-10-CM

## 2018-07-26 PROCEDURE — 93922 UPR/L XTREMITY ART 2 LEVELS: CPT | Performed by: SURGERY

## 2018-07-26 PROCEDURE — 93925 LOWER EXTREMITY STUDY: CPT

## 2018-07-26 PROCEDURE — 93925 LOWER EXTREMITY STUDY: CPT | Performed by: SURGERY

## 2018-07-26 PROCEDURE — 93923 UPR/LXTR ART STDY 3+ LVLS: CPT

## 2018-07-30 ENCOUNTER — TELEPHONE (OUTPATIENT)
Dept: VASCULAR SURGERY | Facility: CLINIC | Age: 78
End: 2018-07-30

## 2018-07-30 NOTE — TELEPHONE ENCOUNTER
----- Message from Nilesh Mcdowell MA sent at 7/30/2018  5:55 PM EDT -----      ----- Message -----  From: Nilesh Mcdowell MA  Sent: 7/16/2018   3:34 PM  To: Nilesh Mcdowell MA, #    S/w pt  He is already on asa 81mg  ----- Message -----  From: Nilesh Mcdowell MA  Sent: 7/16/2018   3:10 PM  To: Nilesh Mcdowell MA, #    Called pt and lmom to cb  ----- Message -----  From: Nilesh Mcdowell MA  Sent: 7/13/2018   4:03 PM  To: Lidia Metzger, #    Called pt and lmom to call us back  ----- Message -----  From: ELE Pulido  Sent: 7/10/2018   3:14 PM  To: Nilesh Mcdowell MA    Non urgent - Can you please call this patient and see if he is taking ASA 81mg and if not he should start  I saw him in the office yesterday     Thanks

## 2018-09-12 DIAGNOSIS — E55.9 VITAMIN D DEFICIENCY: Primary | ICD-10-CM

## 2018-09-12 DIAGNOSIS — E78.5 HYPERLIPIDEMIA, UNSPECIFIED HYPERLIPIDEMIA TYPE: ICD-10-CM

## 2018-09-12 DIAGNOSIS — I10 ESSENTIAL HYPERTENSION: ICD-10-CM

## 2018-09-15 LAB
25(OH)D3+25(OH)D2 SERPL-MCNC: 69.8 NG/ML (ref 30–100)
ALBUMIN SERPL-MCNC: 4.5 G/DL (ref 3.5–4.8)
ALBUMIN/GLOB SERPL: 1.6 {RATIO} (ref 1.2–2.2)
ALP SERPL-CCNC: 51 IU/L (ref 39–117)
ALT SERPL-CCNC: 16 IU/L (ref 0–44)
AST SERPL-CCNC: 17 IU/L (ref 0–40)
BASOPHILS # BLD AUTO: 0 X10E3/UL (ref 0–0.2)
BASOPHILS NFR BLD AUTO: 0 %
BILIRUB SERPL-MCNC: 0.7 MG/DL (ref 0–1.2)
BUN SERPL-MCNC: 12 MG/DL (ref 8–27)
BUN/CREAT SERPL: 14 (ref 10–24)
CALCIUM SERPL-MCNC: 9.6 MG/DL (ref 8.6–10.2)
CHLORIDE SERPL-SCNC: 98 MMOL/L (ref 96–106)
CHOLEST SERPL-MCNC: 144 MG/DL (ref 100–199)
CHOLEST/HDLC SERPL: 2.3 RATIO (ref 0–5)
CO2 SERPL-SCNC: 28 MMOL/L (ref 20–29)
CREAT SERPL-MCNC: 0.86 MG/DL (ref 0.76–1.27)
EOSINOPHIL # BLD AUTO: 0.2 X10E3/UL (ref 0–0.4)
EOSINOPHIL NFR BLD AUTO: 4 %
ERYTHROCYTE [DISTWIDTH] IN BLOOD BY AUTOMATED COUNT: 13.4 % (ref 12.3–15.4)
GLOBULIN SER-MCNC: 2.8 G/DL (ref 1.5–4.5)
GLUCOSE SERPL-MCNC: 98 MG/DL (ref 65–99)
HCT VFR BLD AUTO: 44.4 % (ref 37.5–51)
HDLC SERPL-MCNC: 64 MG/DL
HGB BLD-MCNC: 15 G/DL (ref 13–17.7)
IMM GRANULOCYTES # BLD: 0 X10E3/UL (ref 0–0.1)
IMM GRANULOCYTES NFR BLD: 0 %
LDLC SERPL CALC-MCNC: 66 MG/DL (ref 0–99)
LYMPHOCYTES # BLD AUTO: 1.7 X10E3/UL (ref 0.7–3.1)
LYMPHOCYTES NFR BLD AUTO: 29 %
MCH RBC QN AUTO: 29.9 PG (ref 26.6–33)
MCHC RBC AUTO-ENTMCNC: 33.8 G/DL (ref 31.5–35.7)
MCV RBC AUTO: 89 FL (ref 79–97)
MONOCYTES # BLD AUTO: 0.4 X10E3/UL (ref 0.1–0.9)
MONOCYTES NFR BLD AUTO: 7 %
NEUTROPHILS # BLD AUTO: 3.7 X10E3/UL (ref 1.4–7)
NEUTROPHILS NFR BLD AUTO: 60 %
PLATELET # BLD AUTO: 227 X10E3/UL (ref 150–379)
POTASSIUM SERPL-SCNC: 4.7 MMOL/L (ref 3.5–5.2)
PROT SERPL-MCNC: 7.3 G/DL (ref 6–8.5)
RBC # BLD AUTO: 5.01 X10E6/UL (ref 4.14–5.8)
SL AMB EGFR AFRICAN AMERICAN: 97 ML/MIN/1.73
SL AMB EGFR NON AFRICAN AMERICAN: 84 ML/MIN/1.73
SL AMB VLDL CHOLESTEROL CALC: 14 MG/DL (ref 5–40)
SODIUM SERPL-SCNC: 139 MMOL/L (ref 134–144)
T4 FREE SERPL-MCNC: 1.23 NG/DL (ref 0.82–1.77)
TRIGL SERPL-MCNC: 69 MG/DL (ref 0–149)
TSH SERPL DL<=0.005 MIU/L-ACNC: 1.24 UIU/ML (ref 0.45–4.5)
WBC # BLD AUTO: 6 X10E3/UL (ref 3.4–10.8)

## 2018-09-19 DIAGNOSIS — E78.5 HYPERLIPIDEMIA, UNSPECIFIED HYPERLIPIDEMIA TYPE: ICD-10-CM

## 2018-09-19 DIAGNOSIS — I10 ESSENTIAL HYPERTENSION: ICD-10-CM

## 2018-09-19 RX ORDER — LISINOPRIL 10 MG/1
TABLET ORAL
Qty: 90 TABLET | Refills: 0 | Status: SHIPPED | OUTPATIENT
Start: 2018-09-19 | End: 2018-10-01 | Stop reason: SDUPTHER

## 2018-09-19 RX ORDER — CARVEDILOL 12.5 MG/1
TABLET ORAL
Qty: 180 TABLET | Refills: 0 | Status: SHIPPED | OUTPATIENT
Start: 2018-09-19 | End: 2018-10-01 | Stop reason: SDUPTHER

## 2018-09-19 RX ORDER — SIMVASTATIN 40 MG
TABLET ORAL
Qty: 90 TABLET | Refills: 0 | Status: SHIPPED | OUTPATIENT
Start: 2018-09-19 | End: 2018-10-01 | Stop reason: SDUPTHER

## 2018-10-01 DIAGNOSIS — E78.5 HYPERLIPIDEMIA, UNSPECIFIED HYPERLIPIDEMIA TYPE: ICD-10-CM

## 2018-10-01 DIAGNOSIS — I10 ESSENTIAL HYPERTENSION: ICD-10-CM

## 2018-10-01 RX ORDER — LISINOPRIL 10 MG/1
TABLET ORAL
Qty: 90 TABLET | Refills: 0 | Status: SHIPPED | OUTPATIENT
Start: 2018-10-01 | End: 2018-12-24 | Stop reason: SDUPTHER

## 2018-10-01 RX ORDER — SIMVASTATIN 40 MG
TABLET ORAL
Qty: 90 TABLET | Refills: 0 | Status: SHIPPED | OUTPATIENT
Start: 2018-10-01 | End: 2018-12-24 | Stop reason: SDUPTHER

## 2018-10-01 RX ORDER — CARVEDILOL 12.5 MG/1
TABLET ORAL
Qty: 180 TABLET | Refills: 0 | Status: SHIPPED | OUTPATIENT
Start: 2018-10-01 | End: 2018-12-24 | Stop reason: SDUPTHER

## 2018-10-24 ENCOUNTER — OFFICE VISIT (OUTPATIENT)
Dept: CARDIOLOGY CLINIC | Facility: CLINIC | Age: 78
End: 2018-10-24
Payer: COMMERCIAL

## 2018-10-24 VITALS
HEART RATE: 60 BPM | BODY MASS INDEX: 23.25 KG/M2 | DIASTOLIC BLOOD PRESSURE: 86 MMHG | WEIGHT: 153.4 LBS | SYSTOLIC BLOOD PRESSURE: 178 MMHG | HEIGHT: 68 IN

## 2018-10-24 DIAGNOSIS — I25.810 CORONARY ARTERY DISEASE INVOLVING AUTOLOGOUS VEIN CORONARY BYPASS GRAFT WITHOUT ANGINA PECTORIS: Primary | ICD-10-CM

## 2018-10-24 DIAGNOSIS — E78.5 HYPERLIPIDEMIA, UNSPECIFIED HYPERLIPIDEMIA TYPE: ICD-10-CM

## 2018-10-24 DIAGNOSIS — I10 ESSENTIAL HYPERTENSION: ICD-10-CM

## 2018-10-24 PROCEDURE — 99214 OFFICE O/P EST MOD 30 MIN: CPT | Performed by: INTERNAL MEDICINE

## 2018-10-24 PROCEDURE — 4040F PNEUMOC VAC/ADMIN/RCVD: CPT | Performed by: INTERNAL MEDICINE
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
## 2018-12-24 DIAGNOSIS — I10 ESSENTIAL HYPERTENSION: ICD-10-CM

## 2018-12-24 DIAGNOSIS — E78.5 HYPERLIPIDEMIA, UNSPECIFIED HYPERLIPIDEMIA TYPE: ICD-10-CM

## 2018-12-25 RX ORDER — SIMVASTATIN 40 MG
TABLET ORAL
Qty: 90 TABLET | Refills: 0 | Status: SHIPPED | OUTPATIENT
Start: 2018-12-25 | End: 2019-01-25 | Stop reason: SDUPTHER

## 2018-12-25 RX ORDER — LISINOPRIL 10 MG/1
TABLET ORAL
Qty: 90 TABLET | Refills: 0 | Status: SHIPPED | OUTPATIENT
Start: 2018-12-25 | End: 2018-12-26 | Stop reason: SDUPTHER

## 2018-12-25 RX ORDER — CARVEDILOL 12.5 MG/1
TABLET ORAL
Qty: 180 TABLET | Refills: 0 | Status: SHIPPED | OUTPATIENT
Start: 2018-12-25 | End: 2019-01-25 | Stop reason: SDUPTHER

## 2018-12-26 DIAGNOSIS — I10 ESSENTIAL HYPERTENSION: ICD-10-CM

## 2018-12-26 RX ORDER — LISINOPRIL 10 MG/1
10 TABLET ORAL
Qty: 90 TABLET | Refills: 0 | Status: SHIPPED | OUTPATIENT
Start: 2018-12-26 | End: 2019-01-25 | Stop reason: SDUPTHER

## 2019-01-15 ENCOUNTER — OFFICE VISIT (OUTPATIENT)
Dept: FAMILY MEDICINE CLINIC | Facility: HOSPITAL | Age: 79
End: 2019-01-15
Payer: COMMERCIAL

## 2019-01-15 VITALS
SYSTOLIC BLOOD PRESSURE: 146 MMHG | HEIGHT: 68 IN | TEMPERATURE: 98 F | OXYGEN SATURATION: 97 % | DIASTOLIC BLOOD PRESSURE: 96 MMHG | BODY MASS INDEX: 23.64 KG/M2 | HEART RATE: 55 BPM | WEIGHT: 156 LBS

## 2019-01-15 DIAGNOSIS — E55.9 VITAMIN D DEFICIENCY: ICD-10-CM

## 2019-01-15 DIAGNOSIS — Z00.00 MEDICARE ANNUAL WELLNESS VISIT, SUBSEQUENT: ICD-10-CM

## 2019-01-15 DIAGNOSIS — E78.5 HYPERLIPIDEMIA, UNSPECIFIED HYPERLIPIDEMIA TYPE: ICD-10-CM

## 2019-01-15 DIAGNOSIS — I10 ESSENTIAL HYPERTENSION: ICD-10-CM

## 2019-01-15 DIAGNOSIS — K21.9 GASTROESOPHAGEAL REFLUX DISEASE, ESOPHAGITIS PRESENCE NOT SPECIFIED: Primary | ICD-10-CM

## 2019-01-15 PROCEDURE — 1170F FXNL STATUS ASSESSED: CPT | Performed by: NURSE PRACTITIONER

## 2019-01-15 PROCEDURE — 1036F TOBACCO NON-USER: CPT | Performed by: NURSE PRACTITIONER

## 2019-01-15 PROCEDURE — 1160F RVW MEDS BY RX/DR IN RCRD: CPT | Performed by: NURSE PRACTITIONER

## 2019-01-15 PROCEDURE — 1125F AMNT PAIN NOTED PAIN PRSNT: CPT | Performed by: NURSE PRACTITIONER

## 2019-01-15 PROCEDURE — 99214 OFFICE O/P EST MOD 30 MIN: CPT | Performed by: NURSE PRACTITIONER

## 2019-01-15 RX ORDER — RANITIDINE 300 MG/1
300 CAPSULE ORAL EVERY EVENING
Qty: 90 CAPSULE | Refills: 1 | Status: SHIPPED | OUTPATIENT
Start: 2019-01-15 | End: 2019-01-25 | Stop reason: SDUPTHER

## 2019-01-15 NOTE — ASSESSMENT & PLAN NOTE
Rx issued to start ranitidine nightly  If no benefit over next few weeks, will need evaluation w/GI
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
## 2019-01-15 NOTE — PROGRESS NOTES
Assessment/Plan:    GERD (gastroesophageal reflux disease)  Rx issued to start ranitidine nightly  If no benefit over next few weeks, will need evaluation w/GI  ASCVD (arteriosclerotic cardiovascular disease)  Management per cardiology  Hyperlipidemia  On daily simvastatin  Order given to update FLP before next appt in 6 months  Vitamin D deficiency  9/2018 normal D level  Repeat in 6 months w/next fastings  Diagnoses and all orders for this visit:    Gastroesophageal reflux disease, esophagitis presence not specified  -     ranitidine (ZANTAC) 300 MG capsule; Take 1 capsule (300 mg total) by mouth every evening    Hyperlipidemia, unspecified hyperlipidemia type  -     Comprehensive metabolic panel; Future  -     TSH, 3rd generation with Free T4 reflex; Future  -     Lipid panel; Future  -     Comprehensive metabolic panel  -     TSH, 3rd generation with Free T4 reflex  -     Lipid panel    Vitamin D deficiency  -     Vitamin D 25 hydroxy; Future  -     Vitamin D 25 hydroxy    Essential hypertension  -     CBC and differential; Future  -     CBC and differential    Medicare annual wellness visit, subsequent  Comments:  AWV updated today, next due in 1 year          Subjective:      Patient ID: Sigifredo Barraza is a 69 y o  male here for follow up and AWV  States he has been well except has had a sour stomach at night for 2-3 weeks  Wakes him up and has to roll over to belch or stand up to pass gas  Takes pepto bismol then can go back to sleep  Doesn't believe it's anything he is eating  Denies concerns/questions otherwise  The following portions of the patient's history were reviewed and updated as appropriate: allergies, current medications, past family history, past medical history, past social history, past surgical history and problem list     Review of Systems   Constitutional: Negative for activity change, appetite change and unexpected weight change     HENT: Negative for sore throat and trouble swallowing  Respiratory: Negative for cough and shortness of breath  Cardiovascular: Negative for chest pain and palpitations  Gastrointestinal: Positive for abdominal pain  Negative for blood in stool, constipation, diarrhea, nausea and vomiting  Objective:      /96 (Patient Position: Sitting, Cuff Size: Standard)   Pulse 55   Temp 98 °F (36 7 °C) (Tympanic)   Ht 5' 7 5" (1 715 m)   Wt 70 8 kg (156 lb)   SpO2 97%   BMI 24 07 kg/m²          Physical Exam   Constitutional: He is oriented to person, place, and time  He appears well-developed and well-nourished  No distress  Eyes: Conjunctivae are normal  No scleral icterus  Neck: Carotid bruit is not present  No thyromegaly present  Cardiovascular: Normal rate and regular rhythm  Pulmonary/Chest: Effort normal and breath sounds normal  No respiratory distress  Lymphadenopathy:     He has no cervical adenopathy  Neurological: He is alert and oriented to person, place, and time  Skin: Skin is warm and dry  Psychiatric: He has a normal mood and affect  His behavior is normal  Judgment and thought content normal    Vitals reviewed

## 2019-01-25 DIAGNOSIS — K21.9 GASTROESOPHAGEAL REFLUX DISEASE, ESOPHAGITIS PRESENCE NOT SPECIFIED: ICD-10-CM

## 2019-01-25 DIAGNOSIS — E78.5 HYPERLIPIDEMIA, UNSPECIFIED HYPERLIPIDEMIA TYPE: ICD-10-CM

## 2019-01-25 DIAGNOSIS — I10 ESSENTIAL HYPERTENSION: ICD-10-CM

## 2019-01-25 RX ORDER — SIMVASTATIN 40 MG
40 TABLET ORAL DAILY
Qty: 90 TABLET | Refills: 3 | Status: SHIPPED | OUTPATIENT
Start: 2019-01-25 | End: 2019-09-20 | Stop reason: HOSPADM

## 2019-01-25 RX ORDER — RANITIDINE 300 MG/1
300 CAPSULE ORAL EVERY EVENING
Qty: 90 CAPSULE | Refills: 0 | Status: CANCELLED | OUTPATIENT
Start: 2019-01-25

## 2019-01-25 RX ORDER — CARVEDILOL 12.5 MG/1
12.5 TABLET ORAL 2 TIMES DAILY
Qty: 180 TABLET | Refills: 3 | Status: SHIPPED | OUTPATIENT
Start: 2019-01-25 | End: 2019-11-16 | Stop reason: SDUPTHER

## 2019-01-25 RX ORDER — LISINOPRIL 10 MG/1
10 TABLET ORAL
Qty: 90 TABLET | Refills: 3 | Status: SHIPPED | OUTPATIENT
Start: 2019-01-25 | End: 2019-10-07 | Stop reason: DRUGHIGH

## 2019-01-25 RX ORDER — RANITIDINE 300 MG/1
300 CAPSULE ORAL EVERY EVENING
Qty: 90 CAPSULE | Refills: 3 | Status: SHIPPED | OUTPATIENT
Start: 2019-01-25 | End: 2019-01-28 | Stop reason: SDUPTHER

## 2019-01-25 NOTE — TELEPHONE ENCOUNTER
From: Gracy Woodruff  Sent: 1/25/2019 10:16 AM EST  Subject: Medication Renewal Request    Aldo STUART   Jaydonhaile would like a refill of the following medications:     simvastatin (ZOCOR) 40 mg tablet Sharda Pineda MD]     carvedilol (COREG) 12 5 mg tablet Sharda Pineda MD]     lisinopril (ZESTRIL) 10 mg tablet Sharda Pineda MD]    [facility]: [id number]        Medication renewals requested in this message routed separately:     ranitidine (ZANTAC) 300 MG capsule ELE Schafer]

## 2019-01-28 DIAGNOSIS — K21.9 GASTROESOPHAGEAL REFLUX DISEASE, ESOPHAGITIS PRESENCE NOT SPECIFIED: ICD-10-CM

## 2019-01-28 RX ORDER — RANITIDINE 300 MG/1
300 CAPSULE ORAL EVERY EVENING
Qty: 90 CAPSULE | Refills: 3 | Status: SHIPPED | OUTPATIENT
Start: 2019-01-28 | End: 2020-02-10

## 2019-01-28 NOTE — TELEPHONE ENCOUNTER
From: Benjamin Krishna  Sent: 1/28/2019 12:05 PM EST  Subject: Medication Renewal Request    Aldo Robles would like a refill of the following medications:     ranitidine (ZANTAC) 300 MG capsule ELE Glaser]    [id number]

## 2019-04-29 ENCOUNTER — TELEPHONE (OUTPATIENT)
Dept: FAMILY MEDICINE CLINIC | Facility: HOSPITAL | Age: 79
End: 2019-04-29

## 2019-04-29 DIAGNOSIS — E78.5 HYPERLIPIDEMIA, UNSPECIFIED HYPERLIPIDEMIA TYPE: ICD-10-CM

## 2019-04-29 DIAGNOSIS — I10 ESSENTIAL HYPERTENSION: ICD-10-CM

## 2019-04-29 DIAGNOSIS — K21.9 GASTROESOPHAGEAL REFLUX DISEASE, ESOPHAGITIS PRESENCE NOT SPECIFIED: ICD-10-CM

## 2019-04-29 RX ORDER — CARVEDILOL 12.5 MG/1
12.5 TABLET ORAL 2 TIMES DAILY
Qty: 180 TABLET | Refills: 3 | Status: CANCELLED | OUTPATIENT
Start: 2019-04-29

## 2019-04-29 RX ORDER — SIMVASTATIN 40 MG
40 TABLET ORAL DAILY
Qty: 90 TABLET | Refills: 3 | Status: CANCELLED | OUTPATIENT
Start: 2019-04-29

## 2019-04-29 RX ORDER — LISINOPRIL 10 MG/1
10 TABLET ORAL
Qty: 90 TABLET | Refills: 3 | Status: CANCELLED | OUTPATIENT
Start: 2019-04-29

## 2019-04-29 RX ORDER — RANITIDINE 300 MG/1
300 CAPSULE ORAL EVERY EVENING
Qty: 90 CAPSULE | Refills: 3 | Status: CANCELLED | OUTPATIENT
Start: 2019-04-29

## 2019-06-15 LAB
25(OH)D3+25(OH)D2 SERPL-MCNC: 56.3 NG/ML (ref 30–100)
ALBUMIN SERPL-MCNC: 4.7 G/DL (ref 3.5–4.8)
ALBUMIN/GLOB SERPL: 1.7 {RATIO} (ref 1.2–2.2)
ALP SERPL-CCNC: 55 IU/L (ref 39–117)
ALT SERPL-CCNC: 11 IU/L (ref 0–44)
AST SERPL-CCNC: 19 IU/L (ref 0–40)
BASOPHILS # BLD AUTO: 0 X10E3/UL (ref 0–0.2)
BASOPHILS NFR BLD AUTO: 0 %
BILIRUB SERPL-MCNC: 1.1 MG/DL (ref 0–1.2)
BUN SERPL-MCNC: 13 MG/DL (ref 8–27)
BUN/CREAT SERPL: 14 (ref 10–24)
CALCIUM SERPL-MCNC: 9.9 MG/DL (ref 8.6–10.2)
CHLORIDE SERPL-SCNC: 98 MMOL/L (ref 96–106)
CHOLEST SERPL-MCNC: 173 MG/DL (ref 100–199)
CHOLEST/HDLC SERPL: 2.4 RATIO (ref 0–5)
CO2 SERPL-SCNC: 27 MMOL/L (ref 20–29)
CREAT SERPL-MCNC: 0.92 MG/DL (ref 0.76–1.27)
EOSINOPHIL # BLD AUTO: 0.3 X10E3/UL (ref 0–0.4)
EOSINOPHIL NFR BLD AUTO: 3 %
ERYTHROCYTE [DISTWIDTH] IN BLOOD BY AUTOMATED COUNT: 13.5 % (ref 12.3–15.4)
GLOBULIN SER-MCNC: 2.8 G/DL (ref 1.5–4.5)
GLUCOSE SERPL-MCNC: 99 MG/DL (ref 65–99)
HCT VFR BLD AUTO: 48.2 % (ref 37.5–51)
HDLC SERPL-MCNC: 72 MG/DL
HGB BLD-MCNC: 16.2 G/DL (ref 13–17.7)
IMM GRANULOCYTES # BLD: 0 X10E3/UL (ref 0–0.1)
IMM GRANULOCYTES NFR BLD: 0 %
LDLC SERPL CALC-MCNC: 84 MG/DL (ref 0–99)
LYMPHOCYTES # BLD AUTO: 1.7 X10E3/UL (ref 0.7–3.1)
LYMPHOCYTES NFR BLD AUTO: 21 %
MCH RBC QN AUTO: 30.1 PG (ref 26.6–33)
MCHC RBC AUTO-ENTMCNC: 33.6 G/DL (ref 31.5–35.7)
MCV RBC AUTO: 90 FL (ref 79–97)
MONOCYTES # BLD AUTO: 0.5 X10E3/UL (ref 0.1–0.9)
MONOCYTES NFR BLD AUTO: 6 %
NEUTROPHILS # BLD AUTO: 5.4 X10E3/UL (ref 1.4–7)
NEUTROPHILS NFR BLD AUTO: 70 %
PLATELET # BLD AUTO: 208 X10E3/UL (ref 150–450)
POTASSIUM SERPL-SCNC: 5.2 MMOL/L (ref 3.5–5.2)
PROT SERPL-MCNC: 7.5 G/DL (ref 6–8.5)
RBC # BLD AUTO: 5.38 X10E6/UL (ref 4.14–5.8)
SL AMB EGFR AFRICAN AMERICAN: 92 ML/MIN/1.73
SL AMB EGFR NON AFRICAN AMERICAN: 80 ML/MIN/1.73
SL AMB VLDL CHOLESTEROL CALC: 17 MG/DL (ref 5–40)
SODIUM SERPL-SCNC: 138 MMOL/L (ref 134–144)
TRIGL SERPL-MCNC: 84 MG/DL (ref 0–149)
TSH SERPL DL<=0.005 MIU/L-ACNC: 1.31 UIU/ML (ref 0.45–4.5)
WBC # BLD AUTO: 7.8 X10E3/UL (ref 3.4–10.8)

## 2019-07-16 ENCOUNTER — HOSPITAL ENCOUNTER (OUTPATIENT)
Dept: CT IMAGING | Facility: HOSPITAL | Age: 79
Discharge: HOME | End: 2019-07-16
Payer: COMMERCIAL

## 2019-07-16 ENCOUNTER — OFFICE VISIT (OUTPATIENT)
Dept: FAMILY MEDICINE CLINIC | Facility: HOSPITAL | Age: 79
End: 2019-07-16
Payer: COMMERCIAL

## 2019-07-16 VITALS
HEART RATE: 58 BPM | WEIGHT: 161.2 LBS | DIASTOLIC BLOOD PRESSURE: 90 MMHG | OXYGEN SATURATION: 97 % | BODY MASS INDEX: 25.3 KG/M2 | HEIGHT: 67 IN | SYSTOLIC BLOOD PRESSURE: 138 MMHG | TEMPERATURE: 97.9 F

## 2019-07-16 DIAGNOSIS — I10 ESSENTIAL HYPERTENSION: Primary | ICD-10-CM

## 2019-07-16 DIAGNOSIS — R10.33 PERIUMBILICAL ABDOMINAL PAIN: ICD-10-CM

## 2019-07-16 DIAGNOSIS — E55.9 VITAMIN D DEFICIENCY: ICD-10-CM

## 2019-07-16 DIAGNOSIS — D22.9 MULTIPLE NEVI: ICD-10-CM

## 2019-07-16 DIAGNOSIS — E78.5 HYPERLIPIDEMIA, UNSPECIFIED HYPERLIPIDEMIA TYPE: ICD-10-CM

## 2019-07-16 PROBLEM — R65.10 SIRS (SYSTEMIC INFLAMMATORY RESPONSE SYNDROME) (HCC): Status: RESOLVED | Noted: 2017-04-13 | Resolved: 2019-07-16

## 2019-07-16 PROBLEM — I16.0 HYPERTENSIVE URGENCY: Status: RESOLVED | Noted: 2017-04-13 | Resolved: 2019-07-16

## 2019-07-16 PROCEDURE — 74177 CT ABD & PELVIS W/CONTRAST: CPT

## 2019-07-16 PROCEDURE — 1036F TOBACCO NON-USER: CPT | Performed by: NURSE PRACTITIONER

## 2019-07-16 PROCEDURE — 99214 OFFICE O/P EST MOD 30 MIN: CPT | Performed by: NURSE PRACTITIONER

## 2019-07-16 PROCEDURE — 1160F RVW MEDS BY RX/DR IN RCRD: CPT | Performed by: NURSE PRACTITIONER

## 2019-07-16 RX ADMIN — IOHEXOL 100 ML: 350 INJECTION, SOLUTION INTRAVENOUS at 12:54

## 2019-07-16 RX ADMIN — IOHEXOL 50 ML: 240 INJECTION, SOLUTION INTRATHECAL; INTRAVASCULAR; INTRAVENOUS; ORAL at 12:54

## 2019-07-16 NOTE — PROGRESS NOTES
Assessment/Plan:    Essential hypertension  Borderline BP today  Will continue same meds and return in 6 months for next BP check  Consider dose titration at that time if BP not improved  Hyperlipidemia  FLP at goal on daily simvastatin  Continue same dose and orders given to recheck FLP in 6 months before next OV  Vitamin D deficiency  D level normal at 56  Continue same 2000iu daily and recheck next in 1 year  Diagnoses and all orders for this visit:    Essential hypertension    Hyperlipidemia, unspecified hyperlipidemia type    Vitamin D deficiency    Periumbilical abdominal pain  Comments:  r/o colon/intestinal cause w/CT scan abd/pelvis (will arrange today while here given transportation difficulties)  Orders:  -     CT abdomen pelvis w contrast; Future    Multiple nevi  Comments:  of back; consult entered for further evaluation w/dermatology  Orders:  -     Ambulatory referral to Dermatology; Future      Overdue to update colonoscopy but has difficulty arranging due to transportation issues  Will likely need consult to GI pending CT results  Subjective:      Patient ID: Randell Rangel is a 67 y o  male here for routine OV  Has been having a "tummy ache" for past month  No change in bowels ("1 good BM a day", last was yesterday) or blood in stool noted  Last colonoscopy 2 years ago and not completed due to poor prep  States it needs to be rescheduled but has difficulty doing so due to transportation issues  Has 2 itchy moles on back for past 2 years  Has not seen dermatology  The following portions of the patient's history were reviewed and updated as appropriate: allergies, current medications, past medical history, past social history, past surgical history and problem list     Review of Systems   Constitutional: Positive for fatigue ("poops out easier")  Negative for activity change, appetite change and unexpected weight change     Respiratory: Negative for cough and shortness of breath  Cardiovascular: Negative for chest pain and palpitations  Gastrointestinal: Positive for abdominal pain ("tummy ache" for past month around belly button to under rib cage)  Negative for blood in stool, constipation, diarrhea, nausea and vomiting  Objective:      /90 (Patient Position: Sitting, Cuff Size: Standard)   Pulse 58   Temp 97 9 °F (36 6 °C) (Tympanic)   Ht 5' 6 5" (1 689 m)   Wt 73 1 kg (161 lb 3 2 oz)   SpO2 97%   BMI 25 63 kg/m²          Physical Exam   Constitutional: He is oriented to person, place, and time  He appears well-developed and well-nourished  No distress  HENT:   Head: Normocephalic and atraumatic  Eyes: Conjunctivae are normal  No scleral icterus  Neck: No JVD present  Carotid bruit is not present  Cardiovascular: Normal rate and regular rhythm  Pulmonary/Chest: Effort normal and breath sounds normal  No respiratory distress  Abdominal: Bowel sounds are normal  He exhibits distension  He exhibits no fluid wave and no mass  There is hepatomegaly (possible)  There is tenderness in the periumbilical area  There is rigidity (firm)  There is no rebound and no guarding  A hernia is present  Hernia confirmed positive in the ventral area  Neurological: He is alert and oriented to person, place, and time  Skin: Skin is warm and dry  Dark nevus left lower back  Flesh colored smooth nevus left lower back   Psychiatric: He has a normal mood and affect  His behavior is normal  Judgment and thought content normal    Vitals reviewed

## 2019-07-16 NOTE — ASSESSMENT & PLAN NOTE
Borderline BP today  Will continue same meds and return in 6 months for next BP check  Consider dose titration at that time if BP not improved

## 2019-07-16 NOTE — ASSESSMENT & PLAN NOTE
FLP at goal on daily simvastatin  Continue same dose and orders given to recheck FLP in 6 months before next OV

## 2019-07-17 ENCOUNTER — TELEPHONE (OUTPATIENT)
Dept: FAMILY MEDICINE CLINIC | Facility: HOSPITAL | Age: 79
End: 2019-07-17

## 2019-07-17 ENCOUNTER — TELEPHONE (OUTPATIENT)
Dept: GASTROENTEROLOGY | Facility: CLINIC | Age: 79
End: 2019-07-17

## 2019-07-17 NOTE — TELEPHONE ENCOUNTER
Pt called and left a message stating "its Arvid Massimo, call me back " Called pt back and he states is aware we are waiting for a call from Dr Isabella Sheldon office regarding transportation  He states needs a colonoscopy but also needs transportation  Believes someone is working on this

## 2019-07-17 NOTE — TELEPHONE ENCOUNTER
Pt lives alone  He needs to see GI, needs a colonoscopy  He needs an office visit first because of abdominal pain before he gets the colonoscopy  He needs to pay [employer] transport for the services and pay [facility] for the office visit  And then for the colonoscopy due to state regulated rules he cant use [employer] transport or any taxi service for the procedure he must have someone with him before during and after the proc  He doesn't have anyone  There are people he can hire but that's also out of pocket  Pt is semi irate with them because he wont be able to afford all of this and says hes just going to die if he has colon cancer  Jeannette herman is trying to work/reason with him but he hasnt scheduled anything  They wanted to make us aware of this

## 2019-07-23 DIAGNOSIS — Z91.89 LACK OF ACCESS TO TRANSPORTATION: Primary | ICD-10-CM

## 2019-07-24 ENCOUNTER — PATIENT OUTREACH (OUTPATIENT)
Dept: FAMILY MEDICINE CLINIC | Facility: HOSPITAL | Age: 79
End: 2019-07-24

## 2019-07-24 DIAGNOSIS — Z71.89 COMPLEX CARE COORDINATION: Primary | ICD-10-CM

## 2019-07-24 NOTE — PROGRESS NOTES
Outpatient Care Management Note:    Voice mail message left for [facility] introducing myself and requesting he call me back to see if I can assist him with his transportation needs  Care manager left my contact information on the message  Voice mail message left, with my contact information, requesting a call back

## 2019-07-26 ENCOUNTER — PATIENT OUTREACH (OUTPATIENT)
Dept: FAMILY MEDICINE CLINIC | Facility: HOSPITAL | Age: 79
End: 2019-07-26

## 2019-07-26 NOTE — PROGRESS NOTES
Outpatient Care Management Note:    Jalen Finley returned care manager's call  We discussed that he needs a colonoscopy completed, and he has no one to go with him  He states that he has children that live in [place], but they do not talk to him, and he does not want CM to contact them  He has one friend locally, but he has cancer and can no longer drive  He has a sister that lives in [place]  He is a   Jalen Finley uses [employer] for his transport needs  They will not transport him due to anesthesia with the procedure  Jalen Finley states that he had a colonoscopy completed a couple years ago  He was not fully cleaned out, so the coloscopy was not fully completed  He does think he had a polyp removed at that time  Jalen Finley states that [employer] transport did take him last time  He does not understand why they won't do it now  CM will do some research and either myself or our , "[employer] will call him back  CM called [facility] transport  They will take Jalen Finley for his colonoscopy if he has an escort  Jalen Finley would not have to pay for the escort as a one time courtesy  Due to where Jalen Finley lives, he would need to be dropped off around 10 am and last trip home would be 2pm      CM called Jalen Finley  He states he does not have anyone that could be an escort  His friend lives at [facility] and could not get to his house to be picked up

## 2019-07-30 ENCOUNTER — PATIENT OUTREACH (OUTPATIENT)
Dept: FAMILY MEDICINE CLINIC | Facility: HOSPITAL | Age: 79
End: 2019-07-30

## 2019-07-30 NOTE — PROGRESS NOTES
Outpatient Care Management Note:    Care manager spoke with Laly Parker and updated her on my concerns related to transportation to and from a colonoscopy  Pina Prado has no one that can escort him to and from his procedure, and he states he can not afford to pay for a caregiver  CM is questioning if these procedures are ever done as an inpatient  Lisa Denny recommended I call Rubi MURRAY to discuss  CM called and spoke with Corina at [facility]  I explained above and questioned if his colonoscopy could be done as an inpatient or as an observation status within the hospital  Corina transferred me to Phoebe Eldridge, surgery scheduler  [facility] and keep Pina Prado overnight due to lack of transportation  It may be possible for him to have his colonoscopy done at [address] [place] [address]  It costs $150  He would do the prep at home  Take [facility] transport into the facility the day of procedure, stay overnight, and then take [facility] transport home the next day  Phoebe Eldridge will make a phone call and see if this would be a possibility  She took Cms contact information and will call me back

## 2019-07-31 ENCOUNTER — PATIENT OUTREACH (OUTPATIENT)
Dept: FAMILY MEDICINE CLINIC | Facility: HOSPITAL | Age: 79
End: 2019-07-31

## 2019-07-31 NOTE — PROGRESS NOTES
Outpatient Care Management Note:    [facility], called CM back  She states that Landy Harper can have his procedure completed at [address] [place] [address] if he is willing to pay $150  Care manager called Landy Harper and explained above  He feels he may be able to pay $150 to stay overnight  He is concerned because he has to pay [facility] transport $13 75 for each trip he takes  CM asked him if he would be willing to have the procedure done if he did not have to have the initial doctor visit  He would consider that option  CM stated that I can not guarantee that this is an option, but I am willing to try  CM called Gifty at [facility]  She will call [facility] and discuss the case  I informed her that [facility] transport would prefer a drop off time of 10 am for his colonoscopy  Danna Pfeiffer will speak with the GI doctor and see if they can avoid having Landy Harper come in for a pre colonoscopy visit  We also discussed that she would need to verify with Carlson's insurance that the colonoscopy cost would be fully covered being done at [address] [place] [address]  Danna Pfeiffer will call me back

## 2019-08-01 ENCOUNTER — PATIENT OUTREACH (OUTPATIENT)
Dept: FAMILY MEDICINE CLINIC | Facility: HOSPITAL | Age: 79
End: 2019-08-01

## 2019-08-01 NOTE — PROGRESS NOTES
Outpatient Care Management Note:    Call received from Reyes Klein at [facility]  They have been working to see if they can get a plan together so Kaleigh Escalera can have his colonoscopy  After multiple discussions, Kaleigh Escalera told Reyes Klein that he has a niece that lives in [facility] who may be willing to pick him up  They also discussed his need to get his prep  Kaleigh Escalera agreed to come into the office for pre colonoscopy instructions and office staff will give him his prep   CM will follow up with Kaleigh Escalera next week and see if his niece will assist

## 2019-08-06 ENCOUNTER — PATIENT OUTREACH (OUTPATIENT)
Dept: FAMILY MEDICINE CLINIC | Facility: HOSPITAL | Age: 79
End: 2019-08-06

## 2019-08-06 NOTE — PROGRESS NOTES
Outpatient Care Management Note:    Gabriel Thurston called care manager back  He states that his niece has agreed to assist him with his colonoscopy transportation  He needs his appointment to be scheduled after 8/22/19  He will go to the office via [employer] transport to get his "prep" and paperwork completed and then his niece will transport to procedure  CM will contact Jeannette MURRAY and have them call Gabriel Thurston directly to schedule  Gabriel Thurston declined completing initial comprehensive assessment stating he just wanted to address the colonoscopy-no other concerns

## 2019-08-06 NOTE — PROGRESS NOTES
Outpatient Care Management Note:    Voice mail message left for [facility], with my contact information, requesting a call back with an update

## 2019-08-13 ENCOUNTER — PATIENT OUTREACH (OUTPATIENT)
Dept: FAMILY MEDICINE CLINIC | Facility: HOSPITAL | Age: 79
End: 2019-08-13

## 2019-08-13 NOTE — PROGRESS NOTES
Outpatient Care Management Note:    Jose Guadalupe Zamora is scheduled for his colonoscopy on 8/28/19 at [facility] with Dr Terence Fuentes at 10:30 am  ([id number] [facility] [address] [facility])    CM will close him to ongoing care management service, since Jose Guadalupe Zamora declined assessment  He denied any other needs

## 2019-08-21 ENCOUNTER — CLINICAL SUPPORT (OUTPATIENT)
Dept: GASTROENTEROLOGY | Facility: CLINIC | Age: 79
End: 2019-08-21

## 2019-08-21 VITALS — WEIGHT: 158 LBS | BODY MASS INDEX: 24.8 KG/M2 | HEIGHT: 67 IN

## 2019-08-21 DIAGNOSIS — Z86.010 HISTORY OF COLON POLYPS: Primary | ICD-10-CM

## 2019-08-21 NOTE — PROGRESS NOTES
Pt seen for colon prep dx poor prep  Meds reviewed with pt  Instructions given for extended 2day Suprep  Screening form signed  Suprep and miralax samples given to pt   Colon bmec 8/28/19 GS

## 2019-09-18 ENCOUNTER — APPOINTMENT (EMERGENCY)
Dept: RADIOLOGY | Facility: HOSPITAL | Age: 79
End: 2019-09-18
Payer: COMMERCIAL

## 2019-09-18 ENCOUNTER — HOSPITAL ENCOUNTER (OUTPATIENT)
Facility: HOSPITAL | Age: 79
Setting detail: OBSERVATION
Discharge: HOME | End: 2019-09-20
Attending: EMERGENCY MEDICINE | Admitting: INTERNAL MEDICINE
Payer: COMMERCIAL

## 2019-09-18 ENCOUNTER — APPOINTMENT (EMERGENCY)
Dept: CT IMAGING | Facility: HOSPITAL | Age: 79
End: 2019-09-18
Payer: COMMERCIAL

## 2019-09-18 DIAGNOSIS — K21.9 GASTROESOPHAGEAL REFLUX DISEASE WITHOUT ESOPHAGITIS: ICD-10-CM

## 2019-09-18 DIAGNOSIS — R41.0 TRANSIENT CONFUSION: ICD-10-CM

## 2019-09-18 DIAGNOSIS — I73.9 PERIPHERAL ARTERIAL DISEASE (HCC): ICD-10-CM

## 2019-09-18 DIAGNOSIS — G45.9 TIA (TRANSIENT ISCHEMIC ATTACK): Primary | ICD-10-CM

## 2019-09-18 PROBLEM — R42 DIZZY: Status: ACTIVE | Noted: 2019-09-18

## 2019-09-18 LAB
ALBUMIN SERPL BCP-MCNC: 3.8 G/DL (ref 3.5–5)
ALP SERPL-CCNC: 48 U/L (ref 46–116)
ALT SERPL W P-5'-P-CCNC: 19 U/L (ref 12–78)
ANION GAP SERPL CALCULATED.3IONS-SCNC: 7 MMOL/L (ref 4–13)
APTT PPP: 28 SECONDS (ref 23–37)
AST SERPL W P-5'-P-CCNC: 19 U/L (ref 5–45)
BILIRUB SERPL-MCNC: 0.8 MG/DL (ref 0.2–1)
BUN SERPL-MCNC: 13 MG/DL (ref 5–25)
CALCIUM SERPL-MCNC: 8.9 MG/DL (ref 8.3–10.1)
CHLORIDE SERPL-SCNC: 101 MMOL/L (ref 100–108)
CLARITY, POC: CLEAR
CO2 SERPL-SCNC: 29 MMOL/L (ref 21–32)
COLOR, POC: YELLOW
CREAT SERPL-MCNC: 0.82 MG/DL (ref 0.6–1.3)
ERYTHROCYTE [DISTWIDTH] IN BLOOD BY AUTOMATED COUNT: 12.7 % (ref 11.6–15.1)
EST. AVERAGE GLUCOSE BLD GHB EST-MCNC: 105 MG/DL
ETHANOL SERPL-MCNC: 64 MG/DL (ref 0–3)
EXT BILIRUBIN, UA: NEGATIVE
EXT BLOOD URINE: ABNORMAL
EXT GLUCOSE, UA: NEGATIVE
EXT KETONES: NEGATIVE
EXT NITRITE, UA: NEGATIVE
EXT PH, UA: 6
EXT PROTEIN, UA: NEGATIVE
EXT SPECIFIC GRAVITY, UA: 1
EXT UROBILINOGEN: 0.2
GFR SERPL CREATININE-BSD FRML MDRD: 85 ML/MIN/1.73SQ M
GLUCOSE SERPL-MCNC: 85 MG/DL (ref 65–140)
GLUCOSE SERPL-MCNC: 86 MG/DL (ref 65–140)
HBA1C MFR BLD: 5.3 % (ref 4.2–6.3)
HCT VFR BLD AUTO: 46.4 % (ref 36.5–49.3)
HGB BLD-MCNC: 15.3 G/DL (ref 12–17)
INR PPP: 1.03 (ref 0.84–1.19)
MCH RBC QN AUTO: 29.6 PG (ref 26.8–34.3)
MCHC RBC AUTO-ENTMCNC: 33 G/DL (ref 31.4–37.4)
MCV RBC AUTO: 90 FL (ref 82–98)
PLATELET # BLD AUTO: 206 THOUSANDS/UL (ref 149–390)
PMV BLD AUTO: 9 FL (ref 8.9–12.7)
POTASSIUM SERPL-SCNC: 4.1 MMOL/L (ref 3.5–5.3)
PROT SERPL-MCNC: 7.1 G/DL (ref 6.4–8.2)
PROTHROMBIN TIME: 13.2 SECONDS (ref 11.6–14.5)
RBC # BLD AUTO: 5.17 MILLION/UL (ref 3.88–5.62)
SODIUM SERPL-SCNC: 137 MMOL/L (ref 136–145)
TROPONIN I SERPL-MCNC: <0.02 NG/ML
TSH SERPL DL<=0.05 MIU/L-ACNC: 1.02 UIU/ML (ref 0.36–3.74)
WBC # BLD AUTO: 10.68 THOUSAND/UL (ref 4.31–10.16)
WBC # BLD EST: NEGATIVE 10*3/UL

## 2019-09-18 PROCEDURE — 99285 EMERGENCY DEPT VISIT HI MDM: CPT

## 2019-09-18 PROCEDURE — 85610 PROTHROMBIN TIME: CPT | Performed by: EMERGENCY MEDICINE

## 2019-09-18 PROCEDURE — 99220 PR INITIAL OBSERVATION CARE/DAY 70 MINUTES: CPT | Performed by: INTERNAL MEDICINE

## 2019-09-18 PROCEDURE — 80053 COMPREHEN METABOLIC PANEL: CPT | Performed by: EMERGENCY MEDICINE

## 2019-09-18 PROCEDURE — 94760 N-INVAS EAR/PLS OXIMETRY 1: CPT

## 2019-09-18 PROCEDURE — 85730 THROMBOPLASTIN TIME PARTIAL: CPT | Performed by: EMERGENCY MEDICINE

## 2019-09-18 PROCEDURE — 96360 HYDRATION IV INFUSION INIT: CPT

## 2019-09-18 PROCEDURE — 70450 CT HEAD/BRAIN W/O DYE: CPT

## 2019-09-18 PROCEDURE — 82948 REAGENT STRIP/BLOOD GLUCOSE: CPT

## 2019-09-18 PROCEDURE — 99285 EMERGENCY DEPT VISIT HI MDM: CPT | Performed by: EMERGENCY MEDICINE

## 2019-09-18 PROCEDURE — 80320 DRUG SCREEN QUANTALCOHOLS: CPT | Performed by: EMERGENCY MEDICINE

## 2019-09-18 PROCEDURE — 83036 HEMOGLOBIN GLYCOSYLATED A1C: CPT | Performed by: INTERNAL MEDICINE

## 2019-09-18 PROCEDURE — 81002 URINALYSIS NONAUTO W/O SCOPE: CPT | Performed by: EMERGENCY MEDICINE

## 2019-09-18 PROCEDURE — 84484 ASSAY OF TROPONIN QUANT: CPT | Performed by: INTERNAL MEDICINE

## 2019-09-18 PROCEDURE — 36415 COLL VENOUS BLD VENIPUNCTURE: CPT | Performed by: EMERGENCY MEDICINE

## 2019-09-18 PROCEDURE — G0480 DRUG TEST DEF 1-7 CLASSES: HCPCS | Performed by: EMERGENCY MEDICINE

## 2019-09-18 PROCEDURE — 93005 ELECTROCARDIOGRAM TRACING: CPT

## 2019-09-18 PROCEDURE — 85027 COMPLETE CBC AUTOMATED: CPT | Performed by: EMERGENCY MEDICINE

## 2019-09-18 PROCEDURE — 84443 ASSAY THYROID STIM HORMONE: CPT | Performed by: INTERNAL MEDICINE

## 2019-09-18 PROCEDURE — 71045 X-RAY EXAM CHEST 1 VIEW: CPT

## 2019-09-18 RX ORDER — LISINOPRIL 10 MG/1
10 TABLET ORAL
Status: DISCONTINUED | OUTPATIENT
Start: 2019-09-18 | End: 2019-09-20

## 2019-09-18 RX ORDER — CARVEDILOL 12.5 MG/1
12.5 TABLET ORAL 2 TIMES DAILY
Status: DISCONTINUED | OUTPATIENT
Start: 2019-09-18 | End: 2019-09-20 | Stop reason: HOSPADM

## 2019-09-18 RX ORDER — FAMOTIDINE 20 MG/1
20 TABLET, FILM COATED ORAL 2 TIMES DAILY
Status: DISCONTINUED | OUTPATIENT
Start: 2019-09-18 | End: 2019-09-20 | Stop reason: HOSPADM

## 2019-09-18 RX ORDER — SODIUM CHLORIDE 9 MG/ML
75 INJECTION, SOLUTION INTRAVENOUS CONTINUOUS
Status: DISCONTINUED | OUTPATIENT
Start: 2019-09-18 | End: 2019-09-20 | Stop reason: HOSPADM

## 2019-09-18 RX ORDER — THIAMINE MONONITRATE (VIT B1) 100 MG
100 TABLET ORAL DAILY
Status: DISCONTINUED | OUTPATIENT
Start: 2019-09-19 | End: 2019-09-20 | Stop reason: HOSPADM

## 2019-09-18 RX ORDER — ASPIRIN 81 MG/1
81 TABLET ORAL DAILY
Status: DISCONTINUED | OUTPATIENT
Start: 2019-09-19 | End: 2019-09-20 | Stop reason: HOSPADM

## 2019-09-18 RX ORDER — ACETAMINOPHEN 325 MG/1
650 TABLET ORAL EVERY 6 HOURS PRN
Status: DISCONTINUED | OUTPATIENT
Start: 2019-09-18 | End: 2019-09-20 | Stop reason: HOSPADM

## 2019-09-18 RX ORDER — ATORVASTATIN CALCIUM 40 MG/1
40 TABLET, FILM COATED ORAL
Status: DISCONTINUED | OUTPATIENT
Start: 2019-09-18 | End: 2019-09-20 | Stop reason: HOSPADM

## 2019-09-18 RX ORDER — CHLORDIAZEPOXIDE HYDROCHLORIDE 5 MG/1
10 CAPSULE, GELATIN COATED ORAL EVERY 8 HOURS SCHEDULED
Status: DISCONTINUED | OUTPATIENT
Start: 2019-09-18 | End: 2019-09-19

## 2019-09-18 RX ORDER — FOLIC ACID 1 MG/1
1 TABLET ORAL DAILY
Status: DISCONTINUED | OUTPATIENT
Start: 2019-09-19 | End: 2019-09-20 | Stop reason: HOSPADM

## 2019-09-18 RX ORDER — HYDRALAZINE HYDROCHLORIDE 20 MG/ML
10 INJECTION INTRAMUSCULAR; INTRAVENOUS EVERY 6 HOURS PRN
Status: DISCONTINUED | OUTPATIENT
Start: 2019-09-18 | End: 2019-09-20 | Stop reason: HOSPADM

## 2019-09-18 RX ORDER — MULTIVITAMIN/IRON/FOLIC ACID 18MG-0.4MG
1 TABLET ORAL DAILY
Status: DISCONTINUED | OUTPATIENT
Start: 2019-09-19 | End: 2019-09-20 | Stop reason: HOSPADM

## 2019-09-18 RX ORDER — ONDANSETRON 2 MG/ML
4 INJECTION INTRAMUSCULAR; INTRAVENOUS EVERY 6 HOURS PRN
Status: DISCONTINUED | OUTPATIENT
Start: 2019-09-18 | End: 2019-09-20 | Stop reason: HOSPADM

## 2019-09-18 RX ADMIN — SODIUM CHLORIDE 75 ML/HR: 0.9 INJECTION, SOLUTION INTRAVENOUS at 18:37

## 2019-09-18 RX ADMIN — ATORVASTATIN CALCIUM 40 MG: 40 TABLET, FILM COATED ORAL at 18:37

## 2019-09-18 RX ADMIN — SODIUM CHLORIDE 1000 ML: 0.9 INJECTION, SOLUTION INTRAVENOUS at 14:56

## 2019-09-18 RX ADMIN — CHLORDIAZEPOXIDE HYDROCHLORIDE 10 MG: 5 CAPSULE ORAL at 21:50

## 2019-09-18 RX ADMIN — FAMOTIDINE 20 MG: 20 TABLET ORAL at 18:37

## 2019-09-18 RX ADMIN — CARVEDILOL 12.5 MG: 12.5 TABLET, FILM COATED ORAL at 18:37

## 2019-09-18 RX ADMIN — LISINOPRIL 10 MG: 10 TABLET ORAL at 21:51
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
## 2019-09-18 NOTE — PLAN OF CARE
Problem: SAFETY ADULT  Goal: Patient will remain free of falls  Description  INTERVENTIONS:  - Assess patient frequently for physical needs  -  Identify cognitive and physical deficits and behaviors that affect risk of falls    -  [place] fall precautions as indicated by assessment   - Educate patient/family on patient safety including physical limitations  - Instruct patient to call for assistance with activity based on assessment  - Modify environment to reduce risk of injury  - Consider OT/PT consult to assist with strengthening/mobility  Outcome: Progressing  Goal: Maintain or return to baseline ADL function  Description  INTERVENTIONS:  -  Assess patient's ability to carry out ADLs; assess patient's baseline for ADL function and identify physical deficits which impact ability to perform ADLs (bathing, care of mouth/teeth, toileting, grooming, dressing, etc )  - Assess/evaluate cause of self-care deficits   - Assess range of motion  - Assess patient's mobility; develop plan if impaired  - Assess patient's need for assistive devices and provide as appropriate  - Encourage maximum independence but intervene and supervise when necessary  - Involve family in performance of ADLs  - Assess for home care needs following discharge   - Consider OT consult to assist with ADL evaluation and planning for discharge  - Provide patient education as appropriate  Outcome: Progressing  Goal: Maintain or return mobility status to optimal level  Description  INTERVENTIONS:  - Assess patient's baseline mobility status (ambulation, transfers, stairs, etc )    - Identify cognitive and physical deficits and behaviors that affect mobility  - Identify mobility aids required to assist with transfers and/or ambulation (gait belt, sit-to-stand, lift, walker, cane, etc )  - [place] fall precautions as indicated by assessment  - Record patient progress and toleration of activity level on Mobility SBAR; progress patient to next Phase/Stage  - Instruct patient to call for assistance with activity based on assessment  - Consider rehabilitation consult to assist with strengthening/weightbearing, etc   Outcome: Progressing
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
## 2019-09-19 ENCOUNTER — APPOINTMENT (OUTPATIENT)
Dept: CT IMAGING | Facility: HOSPITAL | Age: 79
End: 2019-09-19
Payer: COMMERCIAL

## 2019-09-19 ENCOUNTER — APPOINTMENT (OUTPATIENT)
Dept: MRI IMAGING | Facility: HOSPITAL | Age: 79
End: 2019-09-19
Payer: COMMERCIAL

## 2019-09-19 ENCOUNTER — APPOINTMENT (OUTPATIENT)
Dept: NON INVASIVE DIAGNOSTICS | Facility: HOSPITAL | Age: 79
End: 2019-09-19
Payer: COMMERCIAL

## 2019-09-19 LAB
ALBUMIN SERPL BCP-MCNC: 3 G/DL (ref 3.5–5)
ALP SERPL-CCNC: 45 U/L (ref 46–116)
ALT SERPL W P-5'-P-CCNC: 17 U/L (ref 12–78)
ANION GAP SERPL CALCULATED.3IONS-SCNC: 6 MMOL/L (ref 4–13)
AST SERPL W P-5'-P-CCNC: 15 U/L (ref 5–45)
ATRIAL RATE: 66 BPM
BASOPHILS # BLD AUTO: 0.04 THOUSANDS/ΜL (ref 0–0.1)
BASOPHILS NFR BLD AUTO: 1 % (ref 0–1)
BILIRUB SERPL-MCNC: 0.6 MG/DL (ref 0.2–1)
BUN SERPL-MCNC: 11 MG/DL (ref 5–25)
CALCIUM SERPL-MCNC: 8.3 MG/DL (ref 8.3–10.1)
CHLORIDE SERPL-SCNC: 109 MMOL/L (ref 100–108)
CHOLEST SERPL-MCNC: 127 MG/DL (ref 50–200)
CO2 SERPL-SCNC: 28 MMOL/L (ref 21–32)
CREAT SERPL-MCNC: 0.78 MG/DL (ref 0.6–1.3)
EOSINOPHIL # BLD AUTO: 0.26 THOUSAND/ΜL (ref 0–0.61)
EOSINOPHIL NFR BLD AUTO: 4 % (ref 0–6)
ERYTHROCYTE [DISTWIDTH] IN BLOOD BY AUTOMATED COUNT: 12.7 % (ref 11.6–15.1)
GFR SERPL CREATININE-BSD FRML MDRD: 86 ML/MIN/1.73SQ M
GLUCOSE SERPL-MCNC: 81 MG/DL (ref 65–140)
HCT VFR BLD AUTO: 40.9 % (ref 36.5–49.3)
HDLC SERPL-MCNC: 58 MG/DL (ref 40–60)
HGB BLD-MCNC: 13.6 G/DL (ref 12–17)
IMM GRANULOCYTES # BLD AUTO: 0.01 THOUSAND/UL (ref 0–0.2)
IMM GRANULOCYTES NFR BLD AUTO: 0 % (ref 0–2)
LDLC SERPL CALC-MCNC: 61 MG/DL (ref 0–100)
LYMPHOCYTES # BLD AUTO: 1.73 THOUSANDS/ΜL (ref 0.6–4.47)
LYMPHOCYTES NFR BLD AUTO: 29 % (ref 14–44)
MAGNESIUM SERPL-MCNC: 2 MG/DL (ref 1.6–2.6)
MCH RBC QN AUTO: 29.7 PG (ref 26.8–34.3)
MCHC RBC AUTO-ENTMCNC: 33.3 G/DL (ref 31.4–37.4)
MCV RBC AUTO: 89 FL (ref 82–98)
MONOCYTES # BLD AUTO: 0.61 THOUSAND/ΜL (ref 0.17–1.22)
MONOCYTES NFR BLD AUTO: 10 % (ref 4–12)
NEUTROPHILS # BLD AUTO: 3.4 THOUSANDS/ΜL (ref 1.85–7.62)
NEUTS SEG NFR BLD AUTO: 56 % (ref 43–75)
NONHDLC SERPL-MCNC: 69 MG/DL
NRBC BLD AUTO-RTO: 0 /100 WBCS
P AXIS: 52 DEGREES
PHOSPHATE SERPL-MCNC: 3 MG/DL (ref 2.3–4.1)
PLATELET # BLD AUTO: 176 THOUSANDS/UL (ref 149–390)
PMV BLD AUTO: 9.2 FL (ref 8.9–12.7)
POTASSIUM SERPL-SCNC: 4 MMOL/L (ref 3.5–5.3)
PR INTERVAL: 162 MS
PROT SERPL-MCNC: 5.9 G/DL (ref 6.4–8.2)
QRS AXIS: 67 DEGREES
QRSD INTERVAL: 84 MS
QT INTERVAL: 390 MS
QTC INTERVAL: 408 MS
RBC # BLD AUTO: 4.58 MILLION/UL (ref 3.88–5.62)
SODIUM SERPL-SCNC: 143 MMOL/L (ref 136–145)
T WAVE AXIS: 55 DEGREES
TRIGL SERPL-MCNC: 41 MG/DL
VENTRICULAR RATE: 66 BPM
WBC # BLD AUTO: 6.05 THOUSAND/UL (ref 4.31–10.16)

## 2019-09-19 PROCEDURE — G8987 SELF CARE CURRENT STATUS: HCPCS

## 2019-09-19 PROCEDURE — 70551 MRI BRAIN STEM W/O DYE: CPT

## 2019-09-19 PROCEDURE — 94762 N-INVAS EAR/PLS OXIMTRY CONT: CPT

## 2019-09-19 PROCEDURE — 70496 CT ANGIOGRAPHY HEAD: CPT

## 2019-09-19 PROCEDURE — 70498 CT ANGIOGRAPHY NECK: CPT

## 2019-09-19 PROCEDURE — 93306 TTE W/DOPPLER COMPLETE: CPT | Performed by: INTERNAL MEDICINE

## 2019-09-19 PROCEDURE — 93010 ELECTROCARDIOGRAM REPORT: CPT | Performed by: INTERNAL MEDICINE

## 2019-09-19 PROCEDURE — 97161 PT EVAL LOW COMPLEX 20 MIN: CPT

## 2019-09-19 PROCEDURE — G8988 SELF CARE GOAL STATUS: HCPCS

## 2019-09-19 PROCEDURE — 84100 ASSAY OF PHOSPHORUS: CPT | Performed by: INTERNAL MEDICINE

## 2019-09-19 PROCEDURE — G8980 MOBILITY D/C STATUS: HCPCS

## 2019-09-19 PROCEDURE — 80053 COMPREHEN METABOLIC PANEL: CPT | Performed by: INTERNAL MEDICINE

## 2019-09-19 PROCEDURE — G8979 MOBILITY GOAL STATUS: HCPCS

## 2019-09-19 PROCEDURE — 93306 TTE W/DOPPLER COMPLETE: CPT

## 2019-09-19 PROCEDURE — G8978 MOBILITY CURRENT STATUS: HCPCS

## 2019-09-19 PROCEDURE — 83735 ASSAY OF MAGNESIUM: CPT | Performed by: INTERNAL MEDICINE

## 2019-09-19 PROCEDURE — 80061 LIPID PANEL: CPT | Performed by: INTERNAL MEDICINE

## 2019-09-19 PROCEDURE — 97165 OT EVAL LOW COMPLEX 30 MIN: CPT

## 2019-09-19 PROCEDURE — G8989 SELF CARE D/C STATUS: HCPCS

## 2019-09-19 PROCEDURE — 99225 PR SBSQ OBSERVATION CARE/DAY 25 MINUTES: CPT | Performed by: HOSPITALIST

## 2019-09-19 PROCEDURE — 85025 COMPLETE CBC W/AUTO DIFF WBC: CPT | Performed by: INTERNAL MEDICINE

## 2019-09-19 PROCEDURE — 99215 OFFICE O/P EST HI 40 MIN: CPT | Performed by: PSYCHIATRY & NEUROLOGY

## 2019-09-19 RX ORDER — PANTOPRAZOLE SODIUM 40 MG/1
40 TABLET, DELAYED RELEASE ORAL
Status: DISCONTINUED | OUTPATIENT
Start: 2019-09-19 | End: 2019-09-20 | Stop reason: HOSPADM

## 2019-09-19 RX ORDER — CLOPIDOGREL BISULFATE 75 MG/1
75 TABLET ORAL DAILY
Status: DISCONTINUED | OUTPATIENT
Start: 2019-09-19 | End: 2019-09-20 | Stop reason: HOSPADM

## 2019-09-19 RX ORDER — CHLORDIAZEPOXIDE HYDROCHLORIDE 5 MG/1
10 CAPSULE, GELATIN COATED ORAL EVERY 12 HOURS SCHEDULED
Status: DISCONTINUED | OUTPATIENT
Start: 2019-09-19 | End: 2019-09-20 | Stop reason: HOSPADM

## 2019-09-19 RX ADMIN — SODIUM CHLORIDE 75 ML/HR: 0.9 INJECTION, SOLUTION INTRAVENOUS at 23:08

## 2019-09-19 RX ADMIN — FAMOTIDINE 20 MG: 20 TABLET ORAL at 08:03

## 2019-09-19 RX ADMIN — Medication 1 TABLET: at 08:03

## 2019-09-19 RX ADMIN — CHLORDIAZEPOXIDE HYDROCHLORIDE 10 MG: 5 CAPSULE ORAL at 05:47

## 2019-09-19 RX ADMIN — ATORVASTATIN CALCIUM 40 MG: 40 TABLET, FILM COATED ORAL at 17:17

## 2019-09-19 RX ADMIN — ENOXAPARIN SODIUM 40 MG: 40 INJECTION, SOLUTION INTRAVENOUS; SUBCUTANEOUS at 08:03

## 2019-09-19 RX ADMIN — CLOPIDOGREL BISULFATE 75 MG: 75 TABLET ORAL at 12:51

## 2019-09-19 RX ADMIN — ASPIRIN 81 MG: 81 TABLET, COATED ORAL at 08:03

## 2019-09-19 RX ADMIN — THIAMINE HCL TAB 100 MG 100 MG: 100 TAB at 08:03

## 2019-09-19 RX ADMIN — PANTOPRAZOLE SODIUM 40 MG: 40 TABLET, DELAYED RELEASE ORAL at 08:41

## 2019-09-19 RX ADMIN — FAMOTIDINE 20 MG: 20 TABLET ORAL at 17:17

## 2019-09-19 RX ADMIN — LISINOPRIL 10 MG: 10 TABLET ORAL at 23:08

## 2019-09-19 RX ADMIN — CHLORDIAZEPOXIDE HYDROCHLORIDE 10 MG: 5 CAPSULE ORAL at 23:08

## 2019-09-19 RX ADMIN — FOLIC ACID 1 MG: 1 TABLET ORAL at 08:03

## 2019-09-19 RX ADMIN — CARVEDILOL 12.5 MG: 12.5 TABLET, FILM COATED ORAL at 08:03

## 2019-09-19 RX ADMIN — IOHEXOL 100 ML: 350 INJECTION, SOLUTION INTRAVENOUS at 10:57

## 2019-09-19 RX ADMIN — SODIUM CHLORIDE 75 ML/HR: 0.9 INJECTION, SOLUTION INTRAVENOUS at 07:59
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
## 2019-09-19 NOTE — PLAN OF CARE
Problem: SAFETY ADULT  Goal: Patient will remain free of falls  Description  INTERVENTIONS:  - Assess patient frequently for physical needs  -  Identify cognitive and physical deficits and behaviors that affect risk of falls    -  [place] fall precautions as indicated by assessment   - Educate patient/family on patient safety including physical limitations  - Instruct patient to call for assistance with activity based on assessment  - Modify environment to reduce risk of injury  - Consider OT/PT consult to assist with strengthening/mobility  Outcome: Progressing  Goal: Maintain or return to baseline ADL function  Description  INTERVENTIONS:  -  Assess patient's ability to carry out ADLs; assess patient's baseline for ADL function and identify physical deficits which impact ability to perform ADLs (bathing, care of mouth/teeth, toileting, grooming, dressing, etc )  - Assess/evaluate cause of self-care deficits   - Assess range of motion  - Assess patient's mobility; develop plan if impaired  - Assess patient's need for assistive devices and provide as appropriate  - Encourage maximum independence but intervene and supervise when necessary  - Involve family in performance of ADLs  - Assess for home care needs following discharge   - Consider OT consult to assist with ADL evaluation and planning for discharge  - Provide patient education as appropriate  Outcome: Progressing  Goal: Maintain or return mobility status to optimal level  Description  INTERVENTIONS:  - Assess patient's baseline mobility status (ambulation, transfers, stairs, etc )    - Identify cognitive and physical deficits and behaviors that affect mobility  - Identify mobility aids required to assist with transfers and/or ambulation (gait belt, sit-to-stand, lift, walker, cane, etc )  - [place] fall precautions as indicated by assessment  - Record patient progress and toleration of activity level on Mobility SBAR; progress patient to next Phase/Stage  - Instruct patient to call for assistance with activity based on assessment  - Consider rehabilitation consult to assist with strengthening/weightbearing, etc   Outcome: Progressing     Problem: DISCHARGE PLANNING  Goal: Discharge to home or other facility with appropriate resources  Description  INTERVENTIONS:  - Identify barriers to discharge w/patient and caregiver  - Arrange for needed discharge resources and transportation as appropriate  - Identify discharge learning needs (meds, wound care, etc )  - Arrange for interpretive services to assist at discharge as needed  - Refer to Case Management Department for coordinating discharge planning if the patient needs post-hospital services based on physician/advanced practitioner order or complex needs related to functional status, cognitive ability, or social support system  Outcome: Progressing     Problem: Knowledge Deficit  Goal: Patient/family/caregiver demonstrates understanding of disease process, treatment plan, medications, and discharge instructions  Description  Complete learning assessment and assess knowledge base  Interventions:  - Provide teaching at level of understanding  - Provide teaching via preferred learning methods  Outcome: Progressing     Problem: Neurological Deficit  Goal: Neurological status is stable or improving  Description  Interventions:  - Monitor and assess patient's level of consciousness, motor function, sensory function, and level of assistance needed for ADLs  - Monitor and report changes from baseline  Collaborate with interdisciplinary team to initiate plan and implement interventions as ordered  - Provide and maintain a safe environment  - Consider seizure precautions  - Consider fall precautions  - Consider aspiration precautions  - Consider bleeding precautions  Outcome: Progressing     Problem:  Activity Intolerance/Impaired Mobility  Goal: Mobility/activity is maintained at optimum level for patient  Description  Interventions:  - Assess and monitor patient  barriers to mobility and need for assistive/adaptive devices  - Assess patient's emotional response to limitations  - Collaborate with interdisciplinary team and initiate plans and interventions as ordered  - Encourage independent activity per ability   - Maintain proper body alignment  - Perform active/passive rom as tolerated/ordered  - Plan activities to conserve energy   - Turn patient as appropriate  Outcome: Progressing     Problem: Potential for Aspiration  Goal: Non-ventilated patient's risk of aspiration is minimized  Description  Assess and monitor vital signs, respiratory status, and labs (WBC)  Monitor for signs of aspiration (tachypnea, cough, rales, wheezing, cyanosis, fever)  - Assess and monitor patient's ability to swallow  - Place patient up in chair to eat if possible  - HOB up at 90 degrees to eat if unable to get patient up into chair   - Supervise patient during oral intake  - Instruct patient/ family to take small bites  - Instruct patient/ family to take small single sips when taking liquids  - Follow patient-specific strategies generated by speech pathologist   Outcome: Progressing  Goal: Ventilated patient's risk of aspiration is minimized  Description  Assess and monitor vital signs, respiratory status, airway cuff pressure, and labs (WBC)  Monitor for signs of aspiration (tachypnea, cough, rales, wheezing, cyanosis, fever)  - Elevate head of bed 30 degrees if patient has tube feeding   - Monitor tube feeding  Outcome: Progressing     Problem: Nutrition  Goal: Nutrition/Hydration status is improving  Description  Monitor and assess patient's nutrition/hydration status for malnutrition (ex- brittle hair, bruises, dry skin, pale skin and conjunctiva, muscle wasting, smooth red tongue, and disorientation)  Collaborate with interdisciplinary team and initiate plan and interventions as ordered    Monitor patient's weight and dietary intake as ordered or per policy  Utilize nutrition screening tool and intervene per policy  Determine patient's food preferences and provide high-protein, high-caloric foods as appropriate  - Assist patient with eating   - Allow adequate time for meals   - Encourage patient to take dietary supplement as ordered  - Collaborate with clinical nutritionist   - Include patient/family/caregiver in decisions related to nutrition    Outcome: Progressing

## 2019-09-19 NOTE — ASSESSMENT & PLAN NOTE
-[facility] protocol  -thiamine/folate  -decrease Librium to 10 mg p o  B i d

## 2019-09-19 NOTE — PROGRESS NOTES
Progress Note - Ciera Bauer 1940, 67 y o  male MRN: [id number]    Unit/Bed#: [address] [place] [address] Encounter: [id number]    Primary Care Provider: ELE Champagne   Date and time admitted to hospital: 2019  2:04 PM        Hyperlipidemia  Assessment & Plan  -cont statin    GERD (gastroesophageal reflux disease)  Assessment & Plan  -start Protonix    Alcohol dependency ([facility] )  Assessment & Plan  -CIWA protocol  -thiamine/folate  -decrease Librium to 10 mg p o  B i d     Essential hypertension  Assessment & Plan  -cont BB/ACEi    * TIA (transient ischemic attack)  Assessment & Plan  -symptoms resolve  -check MRI of the brain  -consult Neurology  -continue aspirin/statin      VTE Pharmacologic Prophylaxis:   Pharmacologic: Enoxaparin (Lovenox)  Mechanical VTE Prophylaxis in Place: Yes    Patient Centered Rounds: I have performed bedside rounds with nursing staff today  Discussions with Specialists or Other Care Team Provider: neuro    Education and Discussions with Family / Patient: Pt    Time Spent for Care: 20 minutes  More than 50% of total time spent on counseling and coordination of care as described above  Current Length of Stay: 0 day(s)    Current Patient Status: Observation   Certification Statement: The patient will continue to require additional inpatient hospital stay due to TIA    Discharge Plan: later today or tomorrow    Code Status: Level 1 - Full Code      Subjective:   Pt denies focal weakness or dysarthria  Objective:     Vitals:   Temp (24hrs), Av 6 °F (36 4 °C), Min:96 3 °F (35 7 °C), Max:98 °F (36 7 °C)    Temp:  [96 3 °F (35 7 °C)-98 °F (36 7 °C)] 97 9 °F (36 6 °C)  HR:  [61-92] 67  Resp:  [16-20] 18  BP: (113-191)/(58-93) 185/91  SpO2:  [93 %-98 %] 97 %  Body mass index is 26 01 kg/m²  Input and Output Summary (last 24 hours):        Intake/Output Summary (Last 24 hours) at 2019 0834  Last data filed at 2019 0732  Gross per 24 hour   Intake    Output 800 ml   Net -800 ml       Physical Exam:     Physical Exam   Constitutional: He is oriented to person, place, and time  He appears well-developed and well-nourished  HENT:   Head: Normocephalic and atraumatic  Eyes: Pupils are equal, round, and reactive to light  EOM are normal    Neck: Normal range of motion  Neck supple  Cardiovascular: Normal rate, regular rhythm and normal heart sounds  Pulmonary/Chest: Effort normal and breath sounds normal  No respiratory distress  Abdominal: Soft  He exhibits no distension  Neurological: He is alert and oriented to person, place, and time  Skin: Skin is warm and dry  Psychiatric: He has a normal mood and affect  Additional Data:     Labs:    Results from last 7 days   Lab Units 09/19/19  0500   WBC Thousand/uL 6 05   HEMOGLOBIN g/dL 13 6   HEMATOCRIT % 40 9   PLATELETS Thousands/uL 176   NEUTROS PCT % 56   LYMPHS PCT % 29   MONOS PCT % 10   EOS PCT % 4     Results from last 7 days   Lab Units 09/19/19  0500   SODIUM mmol/L 143   POTASSIUM mmol/L 4 0   CHLORIDE mmol/L 109*   CO2 mmol/L 28   BUN mg/dL 11   CREATININE mg/dL 0 78   ANION GAP mmol/L 6   CALCIUM mg/dL 8 3   ALBUMIN g/dL 3 0*   TOTAL BILIRUBIN mg/dL 0 60   ALK PHOS U/L 45*   ALT U/L 17   AST U/L 15   GLUCOSE RANDOM mg/dL 81     Results from last 7 days   Lab Units 09/18/19  1451   INR  1 03     Results from last 7 days   Lab Units 09/18/19  1457   POC GLUCOSE mg/dl 86     Results from last 7 days   Lab Units 09/18/19  1451   HEMOGLOBIN A1C % 5 3               * I Have Reviewed All Lab Data Listed Above  * Additional Pertinent Lab Tests Reviewed:  All [facility] Admission Reviewed      Recent Cultures (last 7 days):           Last 24 Hours Medication List:     Current Facility-Administered Medications:  acetaminophen 650 mg Oral Q6H PRN Sonia Tillman MD    aspirin 81 mg Oral Daily Sonia Tillman MD    atorvastatin 40 mg Oral Daily With Osiel Chandra MD carvedilol 12 5 mg Oral BID Chad Macdonald MD    chlordiazePOXIDE 10 mg Oral Q12H [address] Yaquelin Fong MD    enoxaparin 40 mg Subcutaneous Daily Chad Macdonald MD    famotidine 20 mg Oral BID Chad Macdonald MD    folic acid 1 mg Oral Daily Chad Macdonald MD    hydrALAZINE 10 mg Intravenous Q6H PRN Chad Macdonald MD    lisinopril 10 mg Oral HS Chad Macdonald MD    multivitamin-minerals 1 tablet Oral Daily Chad Macdonald MD    ondansetron 4 mg Intravenous Q6H PRN Chad Macdonald MD    pantoprazole 40 mg Oral Early Morning Yaquelin Fong MD    sodium chloride 75 mL/hr Intravenous Continuous Chad Macdonald MD Last Rate: 75 mL/hr (09/19/19 0759)   thiamine 100 mg Oral Daily Chad Macdonald MD         Today, Patient Was Seen By: Yaquelin Fong MD    ** Please Note: Dictation voice to text software may have been used in the creation of this document   **
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
## 2019-09-19 NOTE — SOCIAL WORK
LOS: 0   PATIENT IS NOT A MEDICARE BUNDLE OR A 30 DAY READMISISON  ADMITTED AS OBSERVATION  Met with patient  Discussed role of care management  Patient lives alone in a double wide mobile home  No steps  He is independent adl's and ambulation, uses BCT, provides his own meals  Admits to drinking 3 beers daily, sometimes more  He denies admission for MH or D&A in past   Past services - [facility]  DME - denies  Patient states that his niece Claude Cavanaugh lives locally  His children, son and daughter and their families are in [place]  SisterVanessa lives in [place]  When asked about help at home he states there is no one  States his neighbor helps if needed and he speaks with his sister daily on the phone  Pharmacy of choice - [facility]  He has a prescription plan and is able to afford his medication  He does not have POA/AD and is not interested in information  He plans on returning home at discharge and does not anticipate any discharge needs  He is not interested in any information on alcohol rehab  CM reviewed discharge planning process including identifying help at home and patient preference for discharge planning  Encouraged him to follow up with doctor appointments at discharge  Will follow

## 2019-09-19 NOTE — ASSESSMENT & PLAN NOTE
-symptoms resolved  -MRI of the brain: No diffusion restriction to suggest acute intracranial ischemia  Chronic microangiopathic changes   Chronic lacunar infarction in left posterior periventricular white matter   Small chronic lacunar infarction in right medial superior cerebellar hemisphere  Abnormal flow void of left distal vertebral artery above the level of the foramen magnum   Correlation with the clinical scenario will determine the need for more accurate characterization of vertebral arteries with CT angiography  -CTA head/neck: Long segment of  near occlusive or occlusive stenosis involving the intradural segment left vertebral artery, consistent with MRI   Approximately 50% short segmental stenosis at the origin of the internal carotid arteries bilaterally  -appreciate Neurology  -continue aspirin/statin  -MRI C-spine--Grade 1 retrolisthesis,  Progressive mild to moderate central canal stenosis, moderate to severe bilateral foraminal stenosis C3-4   Grade 1 retrolisthesis,  Progressive Mild to moderate central canal stenosis, severe bilateral foraminal stenosis  C4-5   Persistent mild central canal stenosis, severe right foraminal stenosis C5-6  -Neurology follow-up noted  Neurology recommended to discharge on dual antiplatelet therapy for 3 weeks and then switched to Plavix alone  Patient had refused vascular surgery consult   -patient is cleared by Neurology for discharge with outpatient follow-up with them  -patient is also recommended to follow up with vascular surgery and Neurosurgery as outpatient  He states that he is not interested at this time but will think about it    He verbalized the understanding of risks
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
## 2019-09-20 ENCOUNTER — TRANSITIONAL CARE MANAGEMENT (OUTPATIENT)
Dept: FAMILY MEDICINE CLINIC | Facility: HOSPITAL | Age: 79
End: 2019-09-20

## 2019-09-20 ENCOUNTER — APPOINTMENT (OUTPATIENT)
Dept: MRI IMAGING | Facility: HOSPITAL | Age: 79
End: 2019-09-20
Payer: COMMERCIAL

## 2019-09-20 VITALS
OXYGEN SATURATION: 96 % | HEART RATE: 50 BPM | WEIGHT: 160.94 LBS | BODY MASS INDEX: 24.39 KG/M2 | HEIGHT: 68 IN | DIASTOLIC BLOOD PRESSURE: 79 MMHG | TEMPERATURE: 97.6 F | SYSTOLIC BLOOD PRESSURE: 178 MMHG | RESPIRATION RATE: 18 BRPM

## 2019-09-20 PROCEDURE — 99225 PR SBSQ OBSERVATION CARE/DAY 25 MINUTES: CPT | Performed by: PSYCHIATRY & NEUROLOGY

## 2019-09-20 PROCEDURE — 72141 MRI NECK SPINE W/O DYE: CPT

## 2019-09-20 PROCEDURE — 99217 PR OBSERVATION CARE DISCHARGE MANAGEMENT: CPT | Performed by: INTERNAL MEDICINE

## 2019-09-20 RX ORDER — LANOLIN ALCOHOL/MO/W.PET/CERES
100 CREAM (GRAM) TOPICAL DAILY
Qty: 30 TABLET | Refills: 0 | Status: SHIPPED | OUTPATIENT
Start: 2019-09-21 | End: 2019-10-21 | Stop reason: SDUPTHER

## 2019-09-20 RX ORDER — LISINOPRIL 10 MG/1
10 TABLET ORAL ONCE
Status: COMPLETED | OUTPATIENT
Start: 2019-09-20 | End: 2019-09-20

## 2019-09-20 RX ORDER — CLOPIDOGREL BISULFATE 75 MG/1
75 TABLET ORAL DAILY
Qty: 30 TABLET | Refills: 0 | Status: SHIPPED | OUTPATIENT
Start: 2019-09-21 | End: 2019-10-21 | Stop reason: SDUPTHER

## 2019-09-20 RX ORDER — ASPIRIN 81 MG/1
81 TABLET ORAL DAILY
Qty: 21 TABLET | Refills: 0 | Status: SHIPPED | OUTPATIENT
Start: 2019-09-20 | End: 2019-11-15 | Stop reason: ALTCHOICE

## 2019-09-20 RX ORDER — ATORVASTATIN CALCIUM 40 MG/1
40 TABLET, FILM COATED ORAL
Qty: 30 TABLET | Refills: 0 | Status: SHIPPED | OUTPATIENT
Start: 2019-09-20 | End: 2019-10-21 | Stop reason: SDUPTHER

## 2019-09-20 RX ORDER — LISINOPRIL 20 MG/1
20 TABLET ORAL
Status: DISCONTINUED | OUTPATIENT
Start: 2019-09-20 | End: 2019-09-20 | Stop reason: HOSPADM

## 2019-09-20 RX ORDER — PANTOPRAZOLE SODIUM 40 MG/1
40 TABLET, DELAYED RELEASE ORAL
Qty: 30 TABLET | Refills: 0 | Status: SHIPPED | OUTPATIENT
Start: 2019-09-21 | End: 2019-10-21 | Stop reason: SDUPTHER

## 2019-09-20 RX ADMIN — PANTOPRAZOLE SODIUM 40 MG: 40 TABLET, DELAYED RELEASE ORAL at 05:25

## 2019-09-20 RX ADMIN — Medication 1 TABLET: at 08:00

## 2019-09-20 RX ADMIN — CLOPIDOGREL BISULFATE 75 MG: 75 TABLET ORAL at 08:00

## 2019-09-20 RX ADMIN — FOLIC ACID 1 MG: 1 TABLET ORAL at 08:00

## 2019-09-20 RX ADMIN — ENOXAPARIN SODIUM 40 MG: 40 INJECTION, SOLUTION INTRAVENOUS; SUBCUTANEOUS at 08:00

## 2019-09-20 RX ADMIN — CARVEDILOL 12.5 MG: 12.5 TABLET, FILM COATED ORAL at 08:00

## 2019-09-20 RX ADMIN — CHLORDIAZEPOXIDE HYDROCHLORIDE 10 MG: 5 CAPSULE ORAL at 08:00

## 2019-09-20 RX ADMIN — THIAMINE HCL TAB 100 MG 100 MG: 100 TAB at 08:00

## 2019-09-20 RX ADMIN — LISINOPRIL 10 MG: 10 TABLET ORAL at 08:12

## 2019-09-20 RX ADMIN — FAMOTIDINE 20 MG: 20 TABLET ORAL at 08:00

## 2019-09-20 RX ADMIN — ASPIRIN 81 MG: 81 TABLET, COATED ORAL at 08:00

## 2019-09-20 NOTE — PLAN OF CARE
Problem: SAFETY ADULT  Goal: Patient will remain free of falls  Description  INTERVENTIONS:  - Assess patient frequently for physical needs  -  Identify cognitive and physical deficits and behaviors that affect risk of falls    -  [place] fall precautions as indicated by assessment   - Educate patient/family on patient safety including physical limitations  - Instruct patient to call for assistance with activity based on assessment  - Modify environment to reduce risk of injury  - Consider OT/PT consult to assist with strengthening/mobility  Outcome: Progressing  Goal: Maintain or return to baseline ADL function  Description  INTERVENTIONS:  -  Assess patient's ability to carry out ADLs; assess patient's baseline for ADL function and identify physical deficits which impact ability to perform ADLs (bathing, care of mouth/teeth, toileting, grooming, dressing, etc )  - Assess/evaluate cause of self-care deficits   - Assess range of motion  - Assess patient's mobility; develop plan if impaired  - Assess patient's need for assistive devices and provide as appropriate  - Encourage maximum independence but intervene and supervise when necessary  - Involve family in performance of ADLs  - Assess for home care needs following discharge   - Consider OT consult to assist with ADL evaluation and planning for discharge  - Provide patient education as appropriate  Outcome: Progressing  Goal: Maintain or return mobility status to optimal level  Description  INTERVENTIONS:  - Assess patient's baseline mobility status (ambulation, transfers, stairs, etc )    - Identify cognitive and physical deficits and behaviors that affect mobility  - Identify mobility aids required to assist with transfers and/or ambulation (gait belt, sit-to-stand, lift, walker, cane, etc )  - [place] fall precautions as indicated by assessment  - Record patient progress and toleration of activity level on Mobility SBAR; progress patient to next Phase/Stage  - Instruct patient to call for assistance with activity based on assessment  - Consider rehabilitation consult to assist with strengthening/weightbearing, etc   Outcome: Progressing     Problem: DISCHARGE PLANNING  Goal: Discharge to home or other facility with appropriate resources  Description  INTERVENTIONS:  - Identify barriers to discharge w/patient and caregiver  - Arrange for needed discharge resources and transportation as appropriate  - Identify discharge learning needs (meds, wound care, etc )  - Arrange for interpretive services to assist at discharge as needed  - Refer to Case Management Department for coordinating discharge planning if the patient needs post-hospital services based on physician/advanced practitioner order or complex needs related to functional status, cognitive ability, or social support system  Outcome: Progressing     Problem: Knowledge Deficit  Goal: Patient/family/caregiver demonstrates understanding of disease process, treatment plan, medications, and discharge instructions  Description  Complete learning assessment and assess knowledge base  Interventions:  - Provide teaching at level of understanding  - Provide teaching via preferred learning methods  Outcome: Progressing     Problem: Neurological Deficit  Goal: Neurological status is stable or improving  Description  Interventions:  - Monitor and assess patient's level of consciousness, motor function, sensory function, and level of assistance needed for ADLs  - Monitor and report changes from baseline  Collaborate with interdisciplinary team to initiate plan and implement interventions as ordered  - Provide and maintain a safe environment  - Consider seizure precautions  - Consider fall precautions  - Consider aspiration precautions  - Consider bleeding precautions  Outcome: Progressing     Problem:  Activity Intolerance/Impaired Mobility  Goal: Mobility/activity is maintained at optimum level for patient  Description  Interventions:  - Assess and monitor patient  barriers to mobility and need for assistive/adaptive devices  - Assess patient's emotional response to limitations  - Collaborate with interdisciplinary team and initiate plans and interventions as ordered  - Encourage independent activity per ability   - Maintain proper body alignment  - Perform active/passive rom as tolerated/ordered  - Plan activities to conserve energy   - Turn patient as appropriate  Outcome: Progressing     Problem: Potential for Aspiration  Goal: Non-ventilated patient's risk of aspiration is minimized  Description  Assess and monitor vital signs, respiratory status, and labs (WBC)  Monitor for signs of aspiration (tachypnea, cough, rales, wheezing, cyanosis, fever)  - Assess and monitor patient's ability to swallow  - Place patient up in chair to eat if possible  - HOB up at 90 degrees to eat if unable to get patient up into chair   - Supervise patient during oral intake  - Instruct patient/ family to take small bites  - Instruct patient/ family to take small single sips when taking liquids  - Follow patient-specific strategies generated by speech pathologist   Outcome: Progressing  Goal: Ventilated patient's risk of aspiration is minimized  Description  Assess and monitor vital signs, respiratory status, airway cuff pressure, and labs (WBC)  Monitor for signs of aspiration (tachypnea, cough, rales, wheezing, cyanosis, fever)  - Elevate head of bed 30 degrees if patient has tube feeding   - Monitor tube feeding  Outcome: Progressing     Problem: Nutrition  Goal: Nutrition/Hydration status is improving  Description  Monitor and assess patient's nutrition/hydration status for malnutrition (ex- brittle hair, bruises, dry skin, pale skin and conjunctiva, muscle wasting, smooth red tongue, and disorientation)  Collaborate with interdisciplinary team and initiate plan and interventions as ordered    Monitor patient's weight and dietary intake as ordered or per policy  Utilize nutrition screening tool and intervene per policy  Determine patient's food preferences and provide high-protein, high-caloric foods as appropriate  - Assist patient with eating   - Allow adequate time for meals   - Encourage patient to take dietary supplement as ordered  - Collaborate with clinical nutritionist   - Include patient/family/caregiver in decisions related to nutrition    Outcome: Progressing

## 2019-09-20 NOTE — UTILIZATION REVIEW
Continued Stay Review    Date: 9/20/2019                          Current Patient Class: OBSERVATION  Current Level of Care: med surg    HPI:79 y o  male initially admitted on 9/18/2019 to ED from home via EMS found to be confused at the grocery store  He went to lunch and had a couple of beers   Had difficulty w/ speech /slurred  Went to bystanders and told them he wasn't feeling well and amb  Was called  Symptoms improved in ED     Assessment/Plan: CIWA scores=0 last 24 hours; CTA head & neck pending/ 9/18-9/20 GCS=15   Neurology documents discussion w patient- recommends compliant to meducation & limit alcohol use    Pertinent Labs/Diagnostic Results:   Results from last 7 days   Lab Units 09/19/19  0500 09/18/19  1451   WBC Thousand/uL 6 05 10 68*   HEMOGLOBIN g/dL 13 6 15 3   HEMATOCRIT % 40 9 46 4   PLATELETS Thousands/uL 176 206   NEUTROS ABS Thousands/µL 3 40  --          Results from last 7 days   Lab Units 09/19/19  0500 09/18/19  1451   SODIUM mmol/L 143 137   POTASSIUM mmol/L 4 0 4 1   CHLORIDE mmol/L 109* 101   CO2 mmol/L 28 29   ANION GAP mmol/L 6 7   BUN mg/dL 11 13   CREATININE mg/dL 0 78 0 82   EGFR ml/min/1 73sq m 86 85   CALCIUM mg/dL 8 3 8 9   MAGNESIUM mg/dL 2 0  --    PHOSPHORUS mg/dL 3 0  --      Results from last 7 days   Lab Units 09/19/19  0500 09/18/19  1451   AST U/L 15 19   ALT U/L 17 19   ALK PHOS U/L 45* 48   TOTAL PROTEIN g/dL 5 9* 7 1   ALBUMIN g/dL 3 0* 3 8   TOTAL BILIRUBIN mg/dL 0 60 0 80     Results from last 7 days   Lab Units 09/18/19  1457   POC GLUCOSE mg/dl 86     Results from last 7 days   Lab Units 09/19/19  0500 09/18/19  1451   GLUCOSE RANDOM mg/dL 81 85         Results from last 7 days   Lab Units 09/18/19  1451   HEMOGLOBIN A1C % 5 3   EAG mg/dl 105       Results from last 7 days   Lab Units 09/18/19  1859   TROPONIN I ng/mL <0 02         Results from last 7 days   Lab Units 09/18/19  1451   PROTIME seconds 13 2   INR  1 03   PTT seconds 28     Results from last 7 days Lab Units 09/18/19  1451   TSH 3RD GENERATON uIU/mL 1 019       Results from last 7 days   Lab Units 09/18/19  1504   CLARITY UA  clear   COLOR UA  yellow   SPEC GRAV US  1 005   PH UA  6   GLUCOSE UA  negative   KETONES UA  negative   BLOOD UA  trace non-hemo   PROTEIN UA  negative   NITRITE UA  negative   BILIRUBIN, UA  negative   UROBILINOGEN UA  0 2   LEUKOCYTES UA  negative             Results from last 7 days   Lab Units 09/18/19  1451   ETHANOL LVL mg/dL 64*       Vital Signs:   0800  97 6 °F (36 4 °C)  50Abnormal   18  178/79Abnormal   96 %  None (Room air)  Sitting         Medications:   Scheduled Meds:   Current Facility-Administered Medications:  acetaminophen 650 mg Oral Q6H PRN   aspirin 81 mg Oral Daily   atorvastatin 40 mg Oral Daily With Dinner   carvedilol 12 5 mg Oral BID   chlordiazePOXIDE 10 mg Oral Q12H [address]   clopidogrel 75 mg Oral Daily   enoxaparin 40 mg Subcutaneous Daily   famotidine 20 mg Oral BID   folic acid 1 mg Oral Daily   hydrALAZINE 10 mg Intravenous Q6H PRN   lisinopril 20 mg Oral HS   multivitamin-minerals 1 tablet Oral Daily   ondansetron 4 mg Intravenous Q6H PRN   pantoprazole 40 mg Oral Early Morning   sodium chloride 75 mL/hr Intravenous Continuous   thiamine 100 mg Oral Daily       Discharge Plan: TBD  Network Utilization Review Department  Phone: [phone]; Fax [phone]  [email or web address]  ATTENTION: Please call with any questions or concerns to [phone]  and carefully listen to the prompts so that you are directed to the right person  Send all requests for admission clinical reviews, approved or denied determinations and any other requests to fax [phone]   All voicemails are confidential
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
## 2019-09-20 NOTE — DISCHARGE SUMMARY
Discharge- Luis Sherri 1940, 67 y o  male MRN: [id number]    Unit/Bed#: [address] [place] [address] [id number] Encounter: [id number]    Primary Care Provider: ELE Butts   Date and time admitted to hospital: 9/18/2019  2:04 PM        Hyperlipidemia  Assessment & Plan  -cont statin    GERD (gastroesophageal reflux disease)  Assessment & Plan  -continue Protonix    Alcohol dependency (HCC)  Assessment & Plan  -CIWA protocol  -thiamine/folate  -decrease Librium to 10 mg p o  B i d     Essential hypertension  Assessment & Plan  -cont BB/ACEi    * TIA (transient ischemic attack)  Assessment & Plan  -symptoms resolved  -MRI of the brain: No diffusion restriction to suggest acute intracranial ischemia  Chronic microangiopathic changes   Chronic lacunar infarction in left posterior periventricular white matter   Small chronic lacunar infarction in right medial superior cerebellar hemisphere  Abnormal flow void of left distal vertebral artery above the level of the foramen magnum   Correlation with the clinical scenario will determine the need for more accurate characterization of vertebral arteries with CT angiography  -CTA head/neck: Long segment of  near occlusive or occlusive stenosis involving the intradural segment left vertebral artery, consistent with MRI   Approximately 50% short segmental stenosis at the origin of the internal carotid arteries bilaterally  -appreciate Neurology  -continue aspirin/statin  -MRI C-spine--Grade 1 retrolisthesis,  Progressive mild to moderate central canal stenosis, moderate to severe bilateral foraminal stenosis C3-4   Grade 1 retrolisthesis,  Progressive Mild to moderate central canal stenosis, severe bilateral foraminal stenosis  C4-5   Persistent mild central canal stenosis, severe right foraminal stenosis C5-6  -Neurology follow-up noted  Neurology recommended to discharge on dual antiplatelet therapy for 3 weeks and then switched to Plavix alone    Patient had refused vascular surgery consult   -patient is cleared by Neurology for discharge with outpatient follow-up with them  -patient is also recommended to follow up with vascular surgery and Neurosurgery as outpatient  He states that he is not interested at this time but will think about it  He verbalized the understanding of risks      Hospital Course:     Rodrigo Degroot is a 67 y o  male patient who originally presented to the hospital on   Admission Orders (From admission, onward)     Ordered        09/18/19 1624  Place in Observation  Once                  due to transient confusion and slurred speech  Patient was diagnosed with TIA  Please refer to above regarding MRI brain, MRI C-spine and CT head and neck results  Patient was recommended vascular surgery consult but he refused  Patient will be discharged on aspirin and Plavix for 3 weeks and then to transition to at Plavix alone  Patient is cleared by Neurology for discharge  Discussed with patient regarding importance of following up with vascular surgery and neurosurgeon and he states that he is not interested at this time but will think about it  Patient was seen by  and refused any home care services  Follow-up with PCP in 1 week  Follow-up with Neurology, vascular surgery and Neurosurgery as outpatient  Return to ER with any worsening dizziness, confusion, slurred speech or any other alarming symptoms  Please see above list of diagnoses and related plan for additional information  Condition at Discharge:  good      Discharge instructions/Information to patient and family:   See after visit summary for information provided to patient and family  Provisions for Follow-Up Care:  See after visit summary for information related to follow-up care and any pertinent home health orders  Disposition:     Home       Discharge Statement:  I spent 40 minutes discharging the patient  This time was spent on the day of discharge   I had direct contact with the patient on the day of discharge  Greater than 50% of the total time was spent examining patient, answering all patient questions, arranging and discussing plan of care with patient as well as directly providing post-discharge instructions  Additional time then spent on discharge activities  Discharge Medications:  See after visit summary for reconciled discharge medications provided to patient and family        ** Please Note: This note has been constructed using a voice recognition system **

## 2019-09-20 NOTE — SOCIAL WORK
Patient medically cleared for discharge  Attempted to call his niece Sirisha Delgado for transport home  Her voice mail is full - unable to leave a message  Arranged [employer] ride for 1500   Patient signed waiver

## 2019-09-20 NOTE — DISCHARGE INSTRUCTIONS
Follow-up with PCP in 1 week  Follow-up with Neurology, vascular surgery and Neurosurgery as outpatient  Return to ER with any worsening dizziness, confusion, slurred speech or any other alarming symptoms

## 2019-09-23 ENCOUNTER — TELEPHONE (OUTPATIENT)
Dept: NEUROLOGY | Facility: CLINIC | Age: 79
End: 2019-09-23

## 2019-09-23 NOTE — TELEPHONE ENCOUNTER
----- Message from Colonel Elio PA-C sent at 9/19/2019 12:53 PM EDT -----  Regarding: HFU  Diagnosis/Reason for follow-up: TIA  Subspecialty for follow-up: Vascular  Existing neurologist: N/a  Recommended timing for HFU: 4-6 weeks  Tests/Labs/Imaging ordered: N/a  AP/Attending: AP  Additional notes: DAPT x 3 weeks and then plavix monotherapy    Thanks!

## 2019-09-24 ENCOUNTER — TELEPHONE (OUTPATIENT)
Dept: NEUROLOGY | Facility: CLINIC | Age: 79
End: 2019-09-24

## 2019-09-24 NOTE — TELEPHONE ENCOUNTER
Patient returned my call  Since discharge, he has not experienced any new or worsening stroke/TIA symptoms  Denies any residual symptoms following hospitalization  States he has returned to baseline  Patient ambulates independently, preforms his own ADLs, also manages his own medications, appointments, and affairs  Patient following up with his PCP 10/1  he has a stroke/TIA hospital follow up appointment scheduled 11/15 with [facility]  I reviewed medications with him  Patient states he has not received his medications from [facility]  Report not having having lipitor, plavix, thiamine, or protonix  States he does have his home medications and is taking all as prescribed with no missed doses  I reviewed stroke symptoms and risk factor management with him  he verbalizes understanding of information  Patient does not monitor BP at home  he is a non smoker  He does not follow a specific diet and "eats whatever he wants"  States he understands his risks  Patient questioning when he is getting his medications  States he "has not heard from them"  States not to leave detailed voicemail on answering machine as it is not working that well  [facility] in [facility] with no answer  Left message for call back  [facility], wanted to make you aware of patient not talking medications  Will also send to covering provider Areli  Will update when successfully contact pharmacy

## 2019-09-24 NOTE — TELEPHONE ENCOUNTER
Received voicemail to call pharmacy back at [phone] and select option 0  Called, spoke with pharmacist Samantha Scott states medications do not automatically send out to patient's when ordered  Patient would have had to call them in to be delivered home  States that if they had called him and he did not respond  They would not have known to send them  Verified medication changes per AVS  Samantha Scott states they do have the medication changes and will send them to his home tomorrow  Called patient to update him  He did not answer  Left message on voicemail box for call back

## 2019-09-24 NOTE — TELEPHONE ENCOUNTER
Reviewed patient's chart, looks like prescriptions were sent to home [facility] on 09/20 from hospital physician    Let me know if there is an issue once home start contacted

## 2019-09-24 NOTE — TELEPHONE ENCOUNTER
The purpose of this phone call is to assess patient's general wellbeing or for any assistance needed with follow-up care  Called patient with no answer  Left message on voicemail box for call back

## 2019-09-25 NOTE — TELEPHONE ENCOUNTER
Called patient, updated him  He verbalizes understanding  Reviewed medications with him per his request  Again verbalizes understanding  He does not have any further questions or concerns at this time

## 2019-10-07 ENCOUNTER — OFFICE VISIT (OUTPATIENT)
Dept: FAMILY MEDICINE CLINIC | Facility: HOSPITAL | Age: 79
End: 2019-10-07
Payer: COMMERCIAL

## 2019-10-07 VITALS
OXYGEN SATURATION: 97 % | WEIGHT: 162 LBS | TEMPERATURE: 97.4 F | DIASTOLIC BLOOD PRESSURE: 10 MMHG | HEIGHT: 67 IN | SYSTOLIC BLOOD PRESSURE: 170 MMHG | HEART RATE: 56 BPM | BODY MASS INDEX: 25.43 KG/M2

## 2019-10-07 DIAGNOSIS — E78.5 HYPERLIPIDEMIA, UNSPECIFIED HYPERLIPIDEMIA TYPE: ICD-10-CM

## 2019-10-07 DIAGNOSIS — G45.9 TIA (TRANSIENT ISCHEMIC ATTACK): Primary | ICD-10-CM

## 2019-10-07 DIAGNOSIS — M48.02 FORAMINAL STENOSIS OF CERVICAL REGION: ICD-10-CM

## 2019-10-07 DIAGNOSIS — Z23 ENCOUNTER FOR IMMUNIZATION: ICD-10-CM

## 2019-10-07 DIAGNOSIS — I10 ESSENTIAL HYPERTENSION: ICD-10-CM

## 2019-10-07 PROBLEM — K57.90 DIVERTICULOSIS OF INTESTINE: Status: ACTIVE | Noted: 2019-10-07

## 2019-10-07 PROCEDURE — 99215 OFFICE O/P EST HI 40 MIN: CPT | Performed by: NURSE PRACTITIONER

## 2019-10-07 PROCEDURE — G0008 ADMIN INFLUENZA VIRUS VAC: HCPCS | Performed by: NURSE PRACTITIONER

## 2019-10-07 PROCEDURE — 90662 IIV NO PRSV INCREASED AG IM: CPT | Performed by: NURSE PRACTITIONER

## 2019-10-07 RX ORDER — LISINOPRIL AND HYDROCHLOROTHIAZIDE 25; 20 MG/1; MG/1
1 TABLET ORAL DAILY
Qty: 90 TABLET | Refills: 1 | Status: SHIPPED | OUTPATIENT
Start: 2019-10-07 | End: 2020-01-21

## 2019-10-07 NOTE — ASSESSMENT & PLAN NOTE
BP elevated in office and upon hospital discharge  Recommend change in lisinopril to lisinopril/HCTZ 20/25mg daily  Until new rx arrives in mail, increase lisinopril to 20mg daily  Return in January as scheduled for routine OV

## 2019-10-07 NOTE — ASSESSMENT & PLAN NOTE
All hospital records reviewed in detail and discussed w/patient  He is currently stable/asymptomatic returned to his baseline  Advised he f/u w/neurology as scheduled in November  He is agreeable to seeing vascular surgery in [facility] - we arranged appt for 10/16 and patient aware  Advised he dc aspirin on 10/10 after 21 day course as advised by hospital   Continue daily plavix as rx'd

## 2019-10-07 NOTE — PROGRESS NOTES
Assessment/Plan:    Hyperlipidemia  On daily statin  Orders given to update FLP before next appt in January  TIA (transient ischemic attack)  All hospital records reviewed in detail and discussed w/patient  He is currently stable/asymptomatic returned to his baseline  Advised he f/u w/neurology as scheduled in November  He is agreeable to seeing vascular surgery in [facility] - we arranged appt for 10/16 and patient aware  Advised he dc aspirin on 10/10 after 21 day course as advised by hospital   Continue daily plavix as rx'd  Foraminal stenosis of cervical region  Per c-spine MRI  Pt agreeable to see neurosurgery as advised by internist   We called to arrange appt but office will notify patient to schedule after records reviewed by surgeon  Pt aware  Essential hypertension  BP elevated in office and upon hospital discharge  Recommend change in lisinopril to lisinopril/HCTZ 20/25mg daily  Until new rx arrives in mail, increase lisinopril to 20mg daily  Return in January as scheduled for routine OV  Diagnoses and all orders for this visit:    TIA (transient ischemic attack)    Essential hypertension  -     Comprehensive metabolic panel; Future  -     CBC and differential; Future  -     lisinopril-hydrochlorothiazide (PRINZIDE,ZESTORETIC) 20-25 MG per tablet; Take 1 tablet by mouth daily  -     Comprehensive metabolic panel  -     CBC and differential    Encounter for immunization  -     influenza vaccine, [id number], high-dose, PF 0 5 mL (FLUZONE HIGH-DOSE)    Hyperlipidemia, unspecified hyperlipidemia type  -     Comprehensive metabolic panel; Future  -     Lipid panel; Future  -     Comprehensive metabolic panel  -     Lipid panel    Foraminal stenosis of cervical region      Flu shot given today  Subjective:      Patient ID: Jeronimo Christensen is a 67 y o  male  Here for hospital follow up  Was admitted to hospital w/new onset confusion   He is unsure what was diagnosed other than high blood pressure  Had hit his head a few months prior  He made hospital aware of this  Is feeling good since being home  Not checking BP at home  Energy could be better  TCM Call (since 9/6/2019)     Hospital care reviewed  Records reviewed    Patient was hospitialized at  [facility]    Date of Admission  09/18/19    Date of discharge  09/20/19    Diagnosis  Dizzy--lost    Disposition  Home    Current Symptoms  None      TCM Call (since 9/6/2019)     Post hospital issues  None    Scheduled for follow up? Yes    Did you obtain your prescribed medications  Yes    Do you need help managing your prescriptions or medications  No    Is transportation to your appointment needed  No    I have advised the patient to call PCP with any new or worsening symptoms  Cindy Griggs MA          The following portions of the patient's history were reviewed and updated as appropriate: allergies, current medications, past medical history, past social history, past surgical history and problem list     Review of Systems   Constitutional: Positive for fatigue  Negative for activity change and appetite change  Respiratory: Negative for cough and shortness of breath  Cardiovascular: Negative for chest pain and palpitations  Neurological: Negative for dizziness, facial asymmetry, speech difficulty, weakness and light-headedness  Objective:      BP (!) 170/10   Pulse 56   Temp (!) 97 4 °F (36 3 °C)   Ht 5' 7" (1 702 m)   Wt 73 5 kg (162 lb)   SpO2 97%   BMI 25 37 kg/m²          Physical Exam   Constitutional: He is oriented to person, place, and time  He appears well-developed and well-nourished  HENT:   Head: Normocephalic  Eyes: Pupils are equal, round, and reactive to light  EOM are normal  No scleral icterus  Neck: Normal carotid pulses and no JVD present  Carotid bruit is not present  Cardiovascular: Regular rhythm, normal heart sounds and intact distal pulses  Bradycardia present  Exam reveals no gallop and no friction rub  No murmur heard  Pulses:       Radial pulses are 2+ on the right side, and 2+ on the left side  Pulmonary/Chest: Effort normal and breath sounds normal  No stridor  No respiratory distress  He has no wheezes  He has no rales  He exhibits no tenderness  Neurological: He is alert and oriented to person, place, and time  No cranial nerve deficit  Skin: Skin is warm and dry  Psychiatric: He has a normal mood and affect  His behavior is normal  Judgment and thought content normal    Vitals reviewed

## 2019-10-07 NOTE — ASSESSMENT & PLAN NOTE
Per c-spine MRI  Pt agreeable to see neurosurgery as advised by internist   We called to arrange appt but office will notify patient to schedule after records reviewed by surgeon  Pt aware

## 2019-10-16 ENCOUNTER — OFFICE VISIT (OUTPATIENT)
Dept: VASCULAR SURGERY | Facility: CLINIC | Age: 79
End: 2019-10-16
Payer: COMMERCIAL

## 2019-10-16 VITALS
HEART RATE: 58 BPM | BODY MASS INDEX: 25.27 KG/M2 | RESPIRATION RATE: 18 BRPM | DIASTOLIC BLOOD PRESSURE: 74 MMHG | SYSTOLIC BLOOD PRESSURE: 140 MMHG | TEMPERATURE: 98.4 F | WEIGHT: 161 LBS | HEIGHT: 67 IN

## 2019-10-16 DIAGNOSIS — I65.23 CAROTID STENOSIS, ASYMPTOMATIC, BILATERAL: Primary | ICD-10-CM

## 2019-10-16 PROCEDURE — 99213 OFFICE O/P EST LOW 20 MIN: CPT | Performed by: SURGERY

## 2019-10-16 NOTE — PATIENT INSTRUCTIONS
Approximately 1 month status post altered consciousness while he was out shopping was brought to the hospital for workup and evaluation  Upon further questioning it does not appear that he has had a carotid artery TIA as there was no focal upper or lower extremity weakness no amaurosis fugax no problems with speech or swallowing  He did have some loss of awareness but did not lose consciousness  Plan:  Evaluation of his CTA at that time showed less than 50% stenosis bilaterally with no thrombotic plaque    I will follow up with a carotid ultrasound in 6 months

## 2019-10-16 NOTE — PROGRESS NOTES
Assessment/Plan:    Carotid stenosis, asymptomatic, bilateral  Approximately 1 month status post altered consciousness while he was out shopping was brought to the hospital for workup and evaluation  Upon further questioning it does not appear that he has had a carotid artery TIA as there was no focal upper or lower extremity weakness no amaurosis fugax no problems with speech or swallowing  He did have some loss of awareness but did not lose consciousness  Plan:  Evaluation of his CTA at that time showed less than 50% stenosis bilaterally with no thrombotic plaque  I will follow up with a carotid ultrasound in 6 months       Diagnoses and all orders for this visit:    Carotid stenosis, asymptomatic, bilateral  -     VAS carotid complete study; Future        Subjective:      Patient ID: Jonna Ortega is a 67 y o  male  HPI history of altered consciousness about a month ago workup with no evidence of significant carotid stenosis bilaterally  No upper lower extremity weakness no amaurosis fugax no problems with speech or swallowing    The following portions of the patient's history were reviewed and updated as appropriate: allergies, current medications, past family history, past medical history, past social history, past surgical history and problem list     Review of Systems  loss of consciousness, no GI  symptoms no constitutional symptoms no skin rash all other systems negative    Objective:      /74 (BP Location: Right arm, Patient Position: Sitting, Cuff Size: Adult)   Pulse 58   Temp 98 4 °F (36 9 °C) (Tympanic)   Resp 18   Ht 5' 7" (1 702 m)   Wt 73 kg (161 lb)   BMI 25 22 kg/m²          Physical Exam          Oriented x3 no evidence of clinical depression  Eyes:  Sclera non-icteric    Skin: normal without evidence of inflammation    Neck is supple carotid pulses equal bilaterally no bruits heard    Chest lungs clear, heart regular rhythm      Abdomen soft nontender no evidence of pulsatile masses  Pulses are palpable bilateral Femoral     Neurological exam intact cranial nerves 2-12 grossly intact no gross motor sensory deficits detected  Imaging viewed and reviewed with Patient    I have reviewed and made appropriate changes to the review of systems input by the medical assistant  Vitals:    10/16/19 1020   BP: 140/74   BP Location: Right arm   Patient Position: Sitting   Cuff Size: Adult   Pulse: 58   Resp: 18   Temp: 98 4 °F (36 9 °C)   TempSrc: Tympanic   Weight: 73 kg (161 lb)   Height: 5' 7" (1 702 m)       Patient Active Problem List   Diagnosis    Alcohol abuse, continuous    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    Essential hypertension    Vertebral compression fracture (HCC)    S/P inguinal hernia repair    Alcohol dependency ([facility] [address] )    ASCVD (arteriosclerotic cardiovascular disease)    Compression fracture of thoracic spine, non-traumatic (HCC)    DJD (degenerative joint disease)    GERD (gastroesophageal reflux disease)    Hyperlipidemia    Peripheral arterial disease (HCC)    Benign colon polyp    Left inguinal hernia    Osteoporosis    Peripheral neuropathy    Vitamin D deficiency    TIA (transient ischemic attack)    Dizzy    Diverticulosis of intestine    Foraminal stenosis of cervical region    Carotid stenosis, asymptomatic, bilateral       Past Surgical History:   Procedure Laterality Date    COLONOSCOPY      Complete;  Last Assessed: 1/23/2015    COLONOSCOPY      Resolved: Approx [id number]    CORONARY ARTERY BYPASS GRAFT  1994    5 vessel with LIMA to the LAD, VG to the diagonal, marginal and sequential to PDA and PLV    HERNIA REPAIR      UT REPAIR ING HERNIA,5+Y/O,REDUCIBL Left 2/27/2018    Procedure: REPAIR HERNIA INGUINAL;  Surgeon: Sarah Espinoza MD;  Location:  MAIN OR;  Service: General    PROSTATE SURGERY      SKIN GRAFT  50 years ago       Family History   Problem Relation Age of Onset    Heart disease Mother         Premature Coronary    Heart disease Father         Premature Coronary       Social History     Socioeconomic History    Marital status:      Spouse name: Not on file    Number of children: Not on file    Years of education: Not on file    Highest education level: Not on file   Occupational History    Occupation: Retired   Social Needs    Financial resource strain: Not on file    Food insecurity:     Worry: Not on file     Inability: Not on file   Secure-NOK needs:     Medical: Not on file     Non-medical: Not on file   Tobacco Use    Smoking status: Former Smoker     Types: Cigarettes    Smokeless tobacco: Never Used   Substance and Sexual Activity    Alcohol use:  Yes     Alcohol/week: 3 0 standard drinks     Types: 3 Cans of beer per week     Frequency: 4 or more times a week     Drinks per session: 3 or 4     Comment: 2 pints in 2 weeks    Drug use: No    Sexual activity: Not on file   Lifestyle    Physical activity:     Days per week: Not on file     Minutes per session: Not on file    Stress: Not on file   Relationships    Social connections:     Talks on phone: Not on file     Gets together: Not on file     Attends Mosque service: Not on file     Active member of club or organization: Not on file     Attends meetings of clubs or organizations: Not on file     Relationship status: Not on file    Intimate partner violence:     Fear of current or ex partner: Not on file     Emotionally abused: Not on file     Physically abused: Not on file     Forced sexual activity: Not on file   Other Topics Concern    Not on file   Social History Narrative    Not on file       No Known Allergies      Current Outpatient Medications:     atorvastatin (LIPITOR) 40 mg tablet, Take 1 tablet (40 mg total) by mouth daily with dinner, Disp: 30 tablet, Rfl: 0    carvedilol (COREG) 12 5 mg tablet, Take 1 tablet (12 5 mg total) by mouth 2 (two) times a day, Disp: 180 tablet, Rfl: 3    Cholecalciferol (VITAMIN D-3) 1000 units CAPS, Take 1 capsule by mouth 2 (two) times a day  , Disp: , Rfl:     clopidogrel (PLAVIX) 75 mg tablet, Take 1 tablet (75 mg total) by mouth daily, Disp: 30 tablet, Rfl: 0    lisinopril-hydrochlorothiazide (PRINZIDE,ZESTORETIC) 20-25 MG per tablet, Take 1 tablet by mouth daily, Disp: 90 tablet, Rfl: 1    pantoprazole (PROTONIX) 40 mg tablet, Take 1 tablet (40 mg total) by mouth daily in the early morning, Disp: 30 tablet, Rfl: 0    ranitidine (ZANTAC) 300 MG capsule, Take 1 capsule (300 mg total) by mouth every evening, Disp: 90 capsule, Rfl: 3    thiamine 100 MG tablet, Take 1 tablet (100 mg total) by mouth daily, Disp: 30 tablet, Rfl: 0    aspirin (ECOTRIN LOW STRENGTH) 81 mg EC tablet, Take 1 tablet (81 mg total) by mouth daily for 21 days, Disp: 21 tablet, Rfl: 0    Na Sulfate-K Sulfate-Mg Sulf (SUPREP BOWEL PREP KIT) 17 5-3 13-1 6 GM/177ML SOLN, Use as directed (Patient not taking: Reported on 10/7/2019), Disp: 2 Bottle, Rfl: 0

## 2019-10-16 NOTE — LETTER
October 16, 2019     [address]  [address] [facility]  [address] [address]    Patient: Wilfrid Leija   YOB: 1940   Date of Visit: 10/16/2019       Dear Dr Vadim Wolfe: Thank you for referring Greer Trevino to me for evaluation  Below are my notes for this consultation  If you have questions, please do not hesitate to call me  I look forward to following your patient along with you           Sincerely,        Birgit Dalton MD        CC: No Recipients

## 2019-10-21 DIAGNOSIS — K21.9 GASTROESOPHAGEAL REFLUX DISEASE WITHOUT ESOPHAGITIS: ICD-10-CM

## 2019-10-21 DIAGNOSIS — G45.9 TIA (TRANSIENT ISCHEMIC ATTACK): ICD-10-CM

## 2019-10-21 RX ORDER — CLOPIDOGREL BISULFATE 75 MG/1
75 TABLET ORAL DAILY
Qty: 90 TABLET | Refills: 1 | Status: SHIPPED | OUTPATIENT
Start: 2019-10-21 | End: 2020-02-10

## 2019-10-21 RX ORDER — PANTOPRAZOLE SODIUM 40 MG/1
40 TABLET, DELAYED RELEASE ORAL
Qty: 90 TABLET | Refills: 1 | Status: SHIPPED | OUTPATIENT
Start: 2019-10-21 | End: 2020-02-10

## 2019-10-21 RX ORDER — THIAMINE MONONITRATE (VIT B1) 100 MG
TABLET ORAL
Qty: 90 TABLET | Refills: 1 | Status: SHIPPED | OUTPATIENT
Start: 2019-10-21 | End: 2020-02-10

## 2019-10-21 RX ORDER — ATORVASTATIN CALCIUM 40 MG/1
40 TABLET, FILM COATED ORAL
Qty: 90 TABLET | Refills: 1 | Status: SHIPPED | OUTPATIENT
Start: 2019-10-21 | End: 2020-02-10

## 2019-10-24 ENCOUNTER — TELEPHONE (OUTPATIENT)
Dept: CARDIOLOGY CLINIC | Facility: CLINIC | Age: 79
End: 2019-10-24

## 2019-10-24 NOTE — TELEPHONE ENCOUNTER
Returned call to patient re: changing his appt on 10/30    He  said he can only come in on T, [place] or Fridays - I let him know we can schedule an app[place] [facility] [place] those days -

## 2019-10-24 NOTE — TELEPHONE ENCOUNTER
I called and Dr Doug Wright office is closed for day  Please call back in morning and try to reschedule pt for Tuesday, Thursday or Friday in [facility]  The bus doesn't run on wed so he has to reschedule that for the other days-T, Th or Friday  #[phone]

## 2019-10-24 NOTE — TELEPHONE ENCOUNTER
Pt phoned our office, would like this msg relayed to Haven Epley and to ask what should he do now? He uses [employer] and they will not go to [place]    PCB

## 2019-10-25 NOTE — TELEPHONE ENCOUNTER
I called the office and he can see one of the other cardiologist on T, Th or Friday  He is going to call and try to schedule with one of them

## 2019-10-30 ENCOUNTER — OFFICE VISIT (OUTPATIENT)
Dept: CARDIOLOGY CLINIC | Facility: CLINIC | Age: 79
End: 2019-10-30
Payer: COMMERCIAL

## 2019-10-30 VITALS
HEIGHT: 68 IN | SYSTOLIC BLOOD PRESSURE: 142 MMHG | BODY MASS INDEX: 24.1 KG/M2 | WEIGHT: 159 LBS | HEART RATE: 60 BPM | DIASTOLIC BLOOD PRESSURE: 80 MMHG

## 2019-10-30 DIAGNOSIS — E78.5 HYPERLIPIDEMIA, UNSPECIFIED HYPERLIPIDEMIA TYPE: ICD-10-CM

## 2019-10-30 DIAGNOSIS — I25.810 CORONARY ARTERY DISEASE INVOLVING AUTOLOGOUS VEIN CORONARY BYPASS GRAFT WITHOUT ANGINA PECTORIS: Primary | ICD-10-CM

## 2019-10-30 DIAGNOSIS — I10 ESSENTIAL HYPERTENSION: ICD-10-CM

## 2019-10-30 PROCEDURE — 99214 OFFICE O/P EST MOD 30 MIN: CPT | Performed by: INTERNAL MEDICINE
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
## 2019-11-15 ENCOUNTER — OFFICE VISIT (OUTPATIENT)
Dept: NEUROLOGY | Facility: CLINIC | Age: 79
End: 2019-11-15
Payer: COMMERCIAL

## 2019-11-15 VITALS
HEIGHT: 68 IN | SYSTOLIC BLOOD PRESSURE: 122 MMHG | HEART RATE: 64 BPM | WEIGHT: 156.8 LBS | BODY MASS INDEX: 23.76 KG/M2 | DIASTOLIC BLOOD PRESSURE: 72 MMHG

## 2019-11-15 DIAGNOSIS — M48.02 FORAMINAL STENOSIS OF CERVICAL REGION: ICD-10-CM

## 2019-11-15 DIAGNOSIS — Z86.73 HISTORY OF CVA (CEREBROVASCULAR ACCIDENT): Primary | ICD-10-CM

## 2019-11-15 DIAGNOSIS — I65.02 STENOSIS OF LEFT VERTEBRAL ARTERY: ICD-10-CM

## 2019-11-15 DIAGNOSIS — I65.23 CAROTID STENOSIS, ASYMPTOMATIC, BILATERAL: ICD-10-CM

## 2019-11-15 PROCEDURE — 99214 OFFICE O/P EST MOD 30 MIN: CPT | Performed by: PHYSICIAN ASSISTANT
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
## 2019-11-16 DIAGNOSIS — I10 ESSENTIAL HYPERTENSION: ICD-10-CM

## 2019-11-18 RX ORDER — CARVEDILOL 12.5 MG/1
12.5 TABLET ORAL 2 TIMES DAILY
Qty: 180 TABLET | Refills: 1 | Status: SHIPPED | OUTPATIENT
Start: 2019-11-18 | End: 2020-08-07

## 2019-11-19 ENCOUNTER — TELEPHONE (OUTPATIENT)
Dept: NEUROLOGY | Facility: CLINIC | Age: 79
End: 2019-11-19

## 2019-11-19 DIAGNOSIS — I65.02 STENOSIS OF LEFT VERTEBRAL ARTERY: Primary | ICD-10-CM

## 2019-11-19 NOTE — ASSESSMENT & PLAN NOTE
67year-old with alcohol dependence and multiple vascular risk factors who presented with a transient episode of confusion and slurred speech  Ethanol level of 64  MRI brain negative for acute infarction, suspect most likely TIA  There was evidence of chronic lacunar infarcts on the MRI  CTA head and neck with long segment of  near occlusive or occlusive stenosis involving the intradural segment left vertebral artery, as well as approximately 50% short segmental stenosis at the origin of the internal carotid arteries bilaterally  He was placed on DAPT with ASA and Plavix x 3 weeks, then changed to Plavix as monotherapy (previously on ASA)  He has not had any new symptoms to indicate recurrent TIA/CVA  He feels he is at his baseline currently  He stopped ASA and only on Plavix for AP therapy  Also taking Lipitor      Plan:  -continue Plavix 75mg daily and Lipitor 40mg daily for secondary stroke prevention  -continue to follow with PCP for management of cardiovascular risk factors including BP, lipids, blood sugar  -follow up in vascular neurology attending in 4 months  -signs and symptoms of stroke reviewed with patient today in detail

## 2019-11-19 NOTE — ASSESSMENT & PLAN NOTE
MRI he was completed in the hospital which demonstrated multilevel foraminal stenosis  He denies any neck pain or radicular symptoms  For now, he declined referral to pain management or ortho/ neurosurgery for this issue  Fall precautions discussed

## 2019-11-19 NOTE — ASSESSMENT & PLAN NOTE
Patient advised to see vascular neurosurgery  Unfortunately, patient says that he can only attend visits and [place], does not have transportation to [place]  The vascular neurosurgeons do not come to the [place] office  This will be an issue moving forward with our practice as well, as our vascular neurologist do not come to this office  I will of my  see if any transportation can be arranged [place] in order to see the appropriate specialists

## 2019-11-19 NOTE — TELEPHONE ENCOUNTER
I saw this patient Friday [facility] for a stroke hospital follow up  He needs to see vascular neurosurgery (Dr Codie Palomo or Dr Dom Zazueta), but they do not travel to [facility]  Patient states he would not have any transportation to [place]  This will also be an issue in the future for our practice, as he should see Dr Joellen Connelly or Dr Laura Fernando for stroke follow up, and neither of them travel to [facility]  Any way you could look into transportation options for him? Thanks!

## 2019-11-26 NOTE — TELEPHONE ENCOUNTER
MSW phoned [address] [place] [address] at [phone] to confirm the days/times that they travel to the [facility]  MSW spoke with Amarjit Botello who stated that they can transport to [facility] on Tuesdays, Thursdays, and Fridays but that appt times need to be scheduled no earlier than 10AM and that patients need to be ready to return by [place] did confirm that patient is registered and does utilize their service within the county  MSW attempted to reach patient to review above  No answer at [phone]  MSW left message requesting callback  Awaiting same  Occupational Therapy Daily Treatment     Visit Count: 11  Plan of Care Dates: Initial: 11/2/2017 Through: 3/22/2018    Insurance Information: THERAPY BENEFITS:  PAYOR: Juan ALEJANDRA  VISIT LIMIT: based on Medical necessity  AUTHORIZATION NEEDED: yes   [facility]  NOTES: sent request for more visits 12/7/17 K. C. Next Referring Provider Visit: 01-16-18  Referred by: Hal Castro NP  Medical Diagnosis (from order):    Treatment Diagnosis: Shoulder Symptoms with Pain and Impaired Range of Motion  Insurance: 1. [place]UAN/[place]  2. Date of onset/injury: March 2016,  Date of Surgery: 09-20-17; Surgery performed: Right arthroscopic rotator cuff repair with 2 anchors, 1 mediolateral.  Arthroscopic subacromial decompression.; Physician Guidelines: yes    Diagnosis Precautions: AAROM until 11-15-17 Right Upper Extremity, Based on evaluation, occupational or physical therapists may be utilized, unless otherwise indicated here. Â   Chart reviewed: Relevant co-morbidities, allergies, tests and medications: DM2     SUBJECTIVE   Patient reports she has soreness in the middle of her shoulder blade. She reports she thinks it's her mattress. Current Pain: 0/10. Â   Functional Change:  11/21/2017 patient able to wash her hair with her right arm    OBJECTIVE       Range of Motion (degrees)   Norm Right  Right Right    Shoulder                    Date  11/28/2017   PROM 12/5/2017   PROM 12/7/17  AROM    Flexion 170-180 140  95    Abduction 170-180 165      Internal Rotation   65      External  Rotation 80-90 60 65 40 70   standard testing positions unless otherwise noted; Key: ranges are reported in active range of motion unless noted as AA=active assistive or P=passive range of motion, * denotes pain   Comments: Only those motions that were assessed are noted.     Outcome Measures:   Disabilities of the Arm, Shoulder and Hand (DASH): 50% (scored 0-100; a higher score indicates greater disability)     Treatment   Manual Therapy:   Right glenohumeral joint mobilization: inferior, posterior, anterior glides, grades 1-3 to decrease tightness. Patient prone for anterior glide. Therapeutic Exercise:   UBE forward 5 minutes with bilateral upper extremities, while discussing home exercise program.  PROM right shoulder flexion, scaption, external rotation x 6 reps each, holding 20 seconds. Education on strengthening for serratus anterior muscle, patient in supine with right upper extremity in shoulder flexion to 90Â°, full elbow extension. Patient to protract the shoulder slightly and slowly return to starting position. Patient completed 10 reps with good follow through and no compensation. Patient to complete 10-30 reps, 1-3 sets of 10-30, 1-2x/day for home exercise program.  May progress to a 1 pound weight as tolerated. Patient instructed to avoid compensation with upper trapezius to prevent further injury. Handout provided. Updated home exercise program with patient, see below    Current Home Program (not performed this date except as noted above): 11/2/2017 Closed chain shoulder flexion and scaption  11/2/2017 Codman's (as needed)  11/7/2017 closed chain external rotation (as needed)  11/9/2017 pulleys for shoulder flexion and scaption (as needed)  11/14/2017 shoulder isometrics: shoulder flexion,  abduction (as needed)  11/16/2017 shoulder isometrics: internal rotation, external rotation (as needed)  11/21/2017  passive external rotation with pulleys  11/30/2017 , side lying external rotation, supine shoulder flexion with weight  12/5/2017 side lying scaption  12/7/17 Wall slides with lift offs at the top 1 inch off wall, wall push up plus  12/14/2017 serratus anterior    ASSESSMENT   Patient demonstrates another increase in PROM for external rotation, currently at 70 degrees. Patient demonstrates good follow through with serratus anterior strengthening in supine.   Patient reports she has advanced to a 1# weight with some of her previous exercises. Pain after treatment: 0/10  Result of above outlined education: Verbalizes understanding and Demonstrates understanding    Goals:  Patient will demonstrate independence with home exercise program throughout the duration of therapy. -met  Patient will report 2/10 pain during activity to allow increased independence with activities of daily living by 12-15-17.-met  Patient will demonstrate right active external rotation to 60Â° to increase independence with grooming by 12-30-17. Patient will demonstrate right active internal rotation to T12 of spine to increase independence with donning jacket by 12-30-17. Patient will demonstrate right active shoulder flexion and scaption to 90Â° to increase independence with activities of daily living by 12-30-17. Strength goals to be developed when strength testing is appropriate. PLAN   Frequency/Duration: 2 times per week for 10 weeks with tapering as the patient progresses.     MMT NEXT SESSION    THERAPY DAILY BILLING   Primary Insurance:  [employer]  Secondary Insurance:     Evaluation Procedures:  No evaluation codes were used on this date of service    Timed Procedures:  Manual Therapy, 15 minutes  Therapeutic Exercise, 25 minutes     Untimed Procedures:  No untimed codes were used on this date of service    Total Treatment Time: 40 minutes

## 2019-11-27 NOTE — TELEPHONE ENCOUNTER
MSW attempted to reach patient to review above  No answer at [phone]  MSW left message requesting callback  Awaiting same

## 2019-12-02 NOTE — TELEPHONE ENCOUNTER
MSW attempted to reach patient again at [phone] to explain that [facility] does offer out of [facility] transport to [facility] on limited days/hours  No answer  MSW left message requesting callback  Awaiting same  Since there have been 3 unsuccessful attempts to reach patient, MSW will mail "Unable to Reach" letter

## 2019-12-03 NOTE — TELEPHONE ENCOUNTER
"Unable to Reach" letter placed in the mail this date  MSW will be available to patient should he call back

## 2019-12-03 NOTE — TELEPHONE ENCOUNTER
Received call from Pollie Meigs at Neurosurgery  Patient would not schedule appointment with her because he had two appointments scheduled with us in March already  Called patient and advised that Dr Laura Fernando is requesting patient to be seen by Neurosurgery  Advised that neurosurgery would be calling him back to schedule  Patient states that he doesn't have money to be going to all these appointments, but "do what you have to do"

## 2019-12-03 NOTE — TELEPHONE ENCOUNTER
Pt called back and states that he missed a call from our office  Attempted to call Steph BROWN but not avail  Advised pt of the [facility] does offer out of [facility] transport to [facility] on limited days/hours  Pt is requesting to speak w/ Steph  He can be reached at [phone]   ok to leave detailed message

## 2019-12-06 ENCOUNTER — TELEPHONE (OUTPATIENT)
Dept: FAMILY MEDICINE CLINIC | Facility: HOSPITAL | Age: 79
End: 2019-12-06

## 2019-12-06 NOTE — TELEPHONE ENCOUNTER
Spoke w/ pt  Received wrong med in the mail  Advised he needs to contact mail order to straighten out  Pt understood  Will contact us or have mail order contact us if any problems

## 2019-12-20 ENCOUNTER — OFFICE VISIT (OUTPATIENT)
Dept: NEUROSURGERY | Facility: CLINIC | Age: 79
End: 2019-12-20
Payer: COMMERCIAL

## 2019-12-20 VITALS
TEMPERATURE: 98.9 F | DIASTOLIC BLOOD PRESSURE: 80 MMHG | HEIGHT: 68 IN | HEART RATE: 50 BPM | WEIGHT: 161 LBS | BODY MASS INDEX: 24.4 KG/M2 | SYSTOLIC BLOOD PRESSURE: 128 MMHG

## 2019-12-20 DIAGNOSIS — I65.02 STENOSIS OF LEFT VERTEBRAL ARTERY: ICD-10-CM

## 2019-12-20 PROCEDURE — 99204 OFFICE O/P NEW MOD 45 MIN: CPT | Performed by: RADIOLOGY

## 2019-12-20 NOTE — ASSESSMENT & PLAN NOTE
Complete occlusion of left vertebral artery, discovered during stroke workup in September 2019  Patient initially presented with dizziness, confusion, and slurred speech while grocery shopping on 9/18/19  Placed on DAPT with ASA/Plavix for 3 weeks, now on Plavix 75mg monotherapy  Follows with neurology    Imaging:  CTA head and neck w/wo, 9/19/19: imaging was personally reviewed with attending and shows complete occlusion of left vertebral artery, mild stenosis of right vertebral artery, and partial occlusion of bilateral ICA at origin    Plan:  Continue plavix and lipitor  Continue to follow with neurology  No neuroendovascular intervention is recommended at this time  Return if dizziness persists, particularly orthostatic hypotension, for consideration of right stent placement
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
## 2020-01-03 LAB
ALBUMIN SERPL-MCNC: 4.5 G/DL (ref 3.5–4.8)
ALBUMIN/GLOB SERPL: 1.7 {RATIO} (ref 1.2–2.2)
ALP SERPL-CCNC: 54 IU/L (ref 39–117)
ALT SERPL-CCNC: 12 IU/L (ref 0–44)
AST SERPL-CCNC: 18 IU/L (ref 0–40)
BASOPHILS # BLD AUTO: 0 X10E3/UL (ref 0–0.2)
BASOPHILS NFR BLD AUTO: 0 %
BILIRUB SERPL-MCNC: 1.2 MG/DL (ref 0–1.2)
BUN SERPL-MCNC: 16 MG/DL (ref 8–27)
BUN/CREAT SERPL: 18 (ref 10–24)
CALCIUM SERPL-MCNC: 9.8 MG/DL (ref 8.6–10.2)
CHLORIDE SERPL-SCNC: 94 MMOL/L (ref 96–106)
CHOLEST SERPL-MCNC: 162 MG/DL (ref 100–199)
CHOLEST/HDLC SERPL: 2.4 RATIO (ref 0–5)
CO2 SERPL-SCNC: 26 MMOL/L (ref 20–29)
CREAT SERPL-MCNC: 0.9 MG/DL (ref 0.76–1.27)
EOSINOPHIL # BLD AUTO: 0.3 X10E3/UL (ref 0–0.4)
EOSINOPHIL NFR BLD AUTO: 4 %
ERYTHROCYTE [DISTWIDTH] IN BLOOD BY AUTOMATED COUNT: 14.2 % (ref 12.3–15.4)
GLOBULIN SER-MCNC: 2.6 G/DL (ref 1.5–4.5)
GLUCOSE SERPL-MCNC: 102 MG/DL (ref 65–99)
HCT VFR BLD AUTO: 44.6 % (ref 37.5–51)
HDLC SERPL-MCNC: 67 MG/DL
HGB BLD-MCNC: 15.2 G/DL (ref 13–17.7)
IMM GRANULOCYTES # BLD: 0 X10E3/UL (ref 0–0.1)
IMM GRANULOCYTES NFR BLD: 0 %
LDLC SERPL CALC-MCNC: 78 MG/DL (ref 0–99)
LYMPHOCYTES # BLD AUTO: 1.6 X10E3/UL (ref 0.7–3.1)
LYMPHOCYTES NFR BLD AUTO: 24 %
MCH RBC QN AUTO: 29.8 PG (ref 26.6–33)
MCHC RBC AUTO-ENTMCNC: 34.1 G/DL (ref 31.5–35.7)
MCV RBC AUTO: 88 FL (ref 79–97)
MONOCYTES # BLD AUTO: 0.4 X10E3/UL (ref 0.1–0.9)
MONOCYTES NFR BLD AUTO: 6 %
NEUTROPHILS # BLD AUTO: 4.4 X10E3/UL (ref 1.4–7)
NEUTROPHILS NFR BLD AUTO: 66 %
PLATELET # BLD AUTO: 199 X10E3/UL (ref 150–450)
POTASSIUM SERPL-SCNC: 4.6 MMOL/L (ref 3.5–5.2)
PROT SERPL-MCNC: 7.1 G/DL (ref 6–8.5)
RBC # BLD AUTO: 5.1 X10E6/UL (ref 4.14–5.8)
SL AMB EGFR AFRICAN AMERICAN: 94 ML/MIN/1.73
SL AMB EGFR NON AFRICAN AMERICAN: 82 ML/MIN/1.73
SL AMB VLDL CHOLESTEROL CALC: 17 MG/DL (ref 5–40)
SODIUM SERPL-SCNC: 135 MMOL/L (ref 134–144)
TRIGL SERPL-MCNC: 83 MG/DL (ref 0–149)
WBC # BLD AUTO: 6.7 X10E3/UL (ref 3.4–10.8)

## 2020-01-17 DIAGNOSIS — E78.5 HYPERLIPIDEMIA, UNSPECIFIED HYPERLIPIDEMIA TYPE: ICD-10-CM

## 2020-01-17 NOTE — TELEPHONE ENCOUNTER
Atorvastatin is now on his med list  Please confirm with pharmacy which statin he is currently taking

## 2020-01-18 NOTE — TELEPHONE ENCOUNTER
Patient no longer takes simvastatin  He does take atorvastatin and does not need a refill of this right now  No other needs at this time

## 2020-01-20 RX ORDER — SIMVASTATIN 40 MG
40 TABLET ORAL DAILY
Qty: 90 TABLET | Refills: 0 | OUTPATIENT
Start: 2020-01-20

## 2020-01-21 ENCOUNTER — OFFICE VISIT (OUTPATIENT)
Dept: FAMILY MEDICINE CLINIC | Facility: HOSPITAL | Age: 80
End: 2020-01-21
Payer: COMMERCIAL

## 2020-01-21 VITALS
WEIGHT: 159.4 LBS | SYSTOLIC BLOOD PRESSURE: 142 MMHG | OXYGEN SATURATION: 96 % | BODY MASS INDEX: 25.02 KG/M2 | TEMPERATURE: 98.8 F | HEIGHT: 67 IN | DIASTOLIC BLOOD PRESSURE: 80 MMHG | HEART RATE: 66 BPM

## 2020-01-21 DIAGNOSIS — G45.9 TIA (TRANSIENT ISCHEMIC ATTACK): ICD-10-CM

## 2020-01-21 DIAGNOSIS — F32.A DEPRESSION, UNSPECIFIED DEPRESSION TYPE: ICD-10-CM

## 2020-01-21 DIAGNOSIS — M25.561 ARTHRALGIA OF BOTH KNEES: ICD-10-CM

## 2020-01-21 DIAGNOSIS — I10 ESSENTIAL HYPERTENSION: Primary | ICD-10-CM

## 2020-01-21 DIAGNOSIS — M25.562 ARTHRALGIA OF BOTH KNEES: ICD-10-CM

## 2020-01-21 DIAGNOSIS — Z00.00 MEDICARE ANNUAL WELLNESS VISIT, SUBSEQUENT: ICD-10-CM

## 2020-01-21 PROCEDURE — 99214 OFFICE O/P EST MOD 30 MIN: CPT | Performed by: NURSE PRACTITIONER

## 2020-01-21 PROCEDURE — 1170F FXNL STATUS ASSESSED: CPT | Performed by: NURSE PRACTITIONER

## 2020-01-21 PROCEDURE — 1160F RVW MEDS BY RX/DR IN RCRD: CPT | Performed by: NURSE PRACTITIONER

## 2020-01-21 PROCEDURE — 1036F TOBACCO NON-USER: CPT | Performed by: NURSE PRACTITIONER

## 2020-01-21 PROCEDURE — 1125F AMNT PAIN NOTED PAIN PRSNT: CPT | Performed by: NURSE PRACTITIONER

## 2020-01-21 PROCEDURE — 3725F SCREEN DEPRESSION PERFORMED: CPT | Performed by: NURSE PRACTITIONER

## 2020-01-21 RX ORDER — LISINOPRIL 20 MG/1
20 TABLET ORAL DAILY
COMMUNITY
End: 2020-01-21

## 2020-01-21 RX ORDER — ESCITALOPRAM OXALATE 10 MG/1
10 TABLET ORAL DAILY
Qty: 90 TABLET | Refills: 0 | Status: SHIPPED | OUTPATIENT
Start: 2020-01-21 | End: 2020-02-10

## 2020-01-21 RX ORDER — LISINOPRIL AND HYDROCHLOROTHIAZIDE 25; 20 MG/1; MG/1
1 TABLET ORAL DAILY
COMMUNITY
End: 2020-02-10

## 2020-01-21 NOTE — PATIENT INSTRUCTIONS
Hypertension   AMBULATORY CARE:   Hypertension  is high blood pressure (BP)  Your BP is the force of your blood moving against the walls of your arteries  Normal BP is less than 120/80  Prehypertension is between 120/80 and 139/89  Hypertension is 140/90 or higher  Hypertension causes your BP to get so high that your heart has to work much harder than normal  This can damage your heart  You can control hypertension with a healthy lifestyle or medicines  A controlled blood pressure helps protect your organs, such as your heart, lungs, brain, and kidneys  Common symptoms include the following:   · Headache     · Blurred vision     · Chest pain     · Dizziness or weakness     · Trouble breathing    · Nosebleeds  Call 911 for any of the following:   · You have discomfort in your chest that feels like squeezing, pressure, fullness, or pain  · You become confused or have difficulty speaking  · You suddenly feel lightheaded or have trouble breathing  · You have pain or discomfort in your back, neck, jaw, stomach, or arm  Seek care immediately if:   · You have a severe headache or vision loss  · You have weakness in an arm or leg  Contact your healthcare provider if:   · You feel faint, dizzy, confused, or drowsy  · You have been taking your BP medicine and your BP is still higher than your healthcare provider says it should be  · You have questions or concerns about your condition or care  Treatment for hypertension  may include medicine to lower your BP and lower your cholesterol level  A low cholesterol level helps prevent heart disease and makes it easier to control your blood pressure  You may also need to make lifestyle changes  Take your medicine exactly as directed  Manage hypertension:  Talk with your healthcare provider about these and other ways to manage hypertension:  · Check your BP at home  Sit and rest for 5 minutes before you take your BP   Extend your arm and support it on a flat surface  Your arm should be at the same level as your heart  Follow the directions that came with your BP monitor  If possible, take at least 2 BP readings each time  Take your BP at least twice a day at the same times each day, such as morning and evening  Keep a record of your BP readings and bring it to your follow-up visits  Ask your healthcare provider what your BP should be  · Limit sodium (salt) as directed  Too much sodium can affect your fluid balance  Check labels to find low-sodium or no-salt-added foods  Some low-sodium foods use potassium salts for flavor  Too much potassium can also cause health problems  Your healthcare provider will tell you how much sodium and potassium are safe for you to have in a day  He or she may recommend that you limit sodium to 2,300 mg a day  · Follow the meal plan recommended by your healthcare provider  A dietitian or your provider can give you more information on low-sodium plans or the DASH (Dietary Approaches to Stop Hypertension) eating plan  The DASH plan is low in sodium, unhealthy fats, and total fat  It is high in potassium, calcium, and fiber  · Exercise to maintain a healthy weight  Exercise at least 30 minutes per day, on most days of the week  This will help decrease your blood pressure  Ask your healthcare provider about the best exercise plan for you  · Decrease stress  This may help lower your BP  Learn ways to relax, such as deep breathing or listening to music  · Limit alcohol  Women should limit alcohol to 1 drink a day  Men should limit alcohol to 2 drinks a day  A drink of alcohol is 12 ounces of beer, 5 ounces of wine, or 1½ ounces of liquor  · Do not smoke  Nicotine and other chemicals in cigarettes and cigars can increase your BP and also cause lung damage  Ask your healthcare provider for information if you currently smoke and need help to quit  E-cigarettes or smokeless tobacco still contain nicotine  Talk to your healthcare provider before you use these products  · Manage any other health conditions you have  Health conditions such as diabetes can increase your risk for hypertension  Follow your healthcare provider's instructions and take all your medicines as directed  Follow up with your healthcare provider as directed: You will need to return to have your BP checked and to have other lab tests done  Write down your questions so you remember to ask them during your visits  © 2017 [employer] Information is for End User's use only and may not be sold, redistributed or otherwise used for commercial purposes  All illustrations and images included in CareNotes® are the copyrighted property of [employer]  or Liu Olson  The above information is an  only  It is not intended as medical advice for individual conditions or treatments  Talk to your doctor, nurse or pharmacist before following any medical regimen to see if it is safe and effective for you  Depression   AMBULATORY CARE:   Depression  is a medical condition that causes feelings of sadness or hopelessness that do not go away  Depression may cause you to lose interest in things you used to enjoy  These feelings may interfere with your daily life  Common symptoms include the following:   · Appetite changes, or weight gain or loss    · Trouble going to sleep or staying asleep, or sleeping too much    · Fatigue or lack of energy    · Feeling restless, irritable, or withdrawn    · Feeling worthless, hopeless, discouraged, or guilty    · Trouble concentrating, remembering things, doing daily tasks, or making decisions    · Thoughts about hurting or killing yourself  Call 911 for any of the following:   · You think about harming yourself or someone else  Contact your healthcare provider if:   · Your symptoms do not improve  · You cannot make it to your next appointment       · You have new symptoms  · You have questions or concerns about your condition or care  Treatment for depression  may include medicine to improve or balance your mood  Therapy may also be used to treat your depression  A therapist will help you learn to cope with your thoughts and feelings  Therapy can be done alone or in a group  It may also be done with family members or a significant other  Self-care:   · Get regular physical activity  Try to exercise for 30 minutes, 3 to 5 days a week  Work with your healthcare provider to develop an exercise plan that you enjoy  Physical activity may improve your symptoms  · Get enough sleep  Create a routine to help you relax before bed  You can listen to music, read, or do yoga  Try to go to bed and wake up at the same time every day  Sleep is important for emotional health  · Eat a variety of healthy foods from all of the food groups  A healthy meal plan is low in fat, salt, and added sugar  Ask your healthcare provider for more information about a meal plan that is right for you  · Do not drink alcohol or use drugs  Alcohol and drugs can make your symptoms worse  Follow up with your healthcare provider as directed: Your healthcare provider will monitor your progress at follow-up visits  He or she will also monitor your medicine if you take antidepressants  Your healthcare provider will ask if the medicine is helping  Tell him or her about any side effects or problems you may have with your medicine  The type or amount of medicine may need to be changed  Write down your questions so you remember to ask them during your visits  © 2017 [employer] [employer] Information is for End User's use only and may not be sold, redistributed or otherwise used for commercial purposes  All illustrations and images included in CareNotes® are the copyrighted property of [employer]  or Liu Olson  The above information is an  only   It is not intended as medical advice for individual conditions or treatments  Talk to your doctor, nurse or pharmacist before following any medical regimen to see if it is safe and effective for you
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
## 2020-02-10 DIAGNOSIS — I10 ESSENTIAL HYPERTENSION: Primary | ICD-10-CM

## 2020-02-10 DIAGNOSIS — F32.A DEPRESSION, UNSPECIFIED DEPRESSION TYPE: ICD-10-CM

## 2020-02-10 DIAGNOSIS — K21.9 GASTROESOPHAGEAL REFLUX DISEASE, ESOPHAGITIS PRESENCE NOT SPECIFIED: ICD-10-CM

## 2020-02-10 DIAGNOSIS — K21.9 GASTROESOPHAGEAL REFLUX DISEASE WITHOUT ESOPHAGITIS: ICD-10-CM

## 2020-02-10 DIAGNOSIS — G45.9 TIA (TRANSIENT ISCHEMIC ATTACK): ICD-10-CM

## 2020-02-10 RX ORDER — CLOPIDOGREL BISULFATE 75 MG/1
TABLET ORAL
Qty: 90 TABLET | Refills: 0 | Status: SHIPPED | OUTPATIENT
Start: 2020-02-10 | End: 2020-08-07

## 2020-02-10 RX ORDER — ATORVASTATIN CALCIUM 40 MG/1
TABLET, FILM COATED ORAL
Qty: 90 TABLET | Refills: 0 | Status: SHIPPED | OUTPATIENT
Start: 2020-02-10 | End: 2020-05-11

## 2020-02-10 RX ORDER — ESCITALOPRAM OXALATE 10 MG/1
TABLET ORAL
Qty: 90 TABLET | Refills: 0 | Status: SHIPPED | OUTPATIENT
Start: 2020-02-10 | End: 2020-05-11 | Stop reason: SDUPTHER

## 2020-02-10 RX ORDER — RANITIDINE 300 MG/1
CAPSULE ORAL
Qty: 90 CAPSULE | Refills: 0 | Status: SHIPPED | OUTPATIENT
Start: 2020-02-10 | End: 2020-05-19 | Stop reason: HOSPADM

## 2020-02-10 RX ORDER — LISINOPRIL AND HYDROCHLOROTHIAZIDE 25; 20 MG/1; MG/1
TABLET ORAL
Qty: 90 TABLET | Refills: 0 | Status: SHIPPED | OUTPATIENT
Start: 2020-02-10 | End: 2020-05-11 | Stop reason: SDUPTHER

## 2020-02-10 RX ORDER — PANTOPRAZOLE SODIUM 40 MG/1
TABLET, DELAYED RELEASE ORAL
Qty: 90 TABLET | Refills: 0 | Status: SHIPPED | OUTPATIENT
Start: 2020-02-10 | End: 2020-08-07

## 2020-02-10 RX ORDER — THIAMINE MONONITRATE (VIT B1) 100 MG
TABLET ORAL
Qty: 90 TABLET | Refills: 0 | Status: SHIPPED | OUTPATIENT
Start: 2020-02-10 | End: 2020-08-07

## 2020-02-19 ENCOUNTER — TELEPHONE (OUTPATIENT)
Dept: NEUROLOGY | Facility: CLINIC | Age: 80
End: 2020-02-19

## 2020-02-19 NOTE — TELEPHONE ENCOUNTER
LMOM to reschedule apt with [place] Rg 3/20/20 as [place] Rg will be out of the office that day    If patient calls back please assist in R/S

## 2020-03-03 ENCOUNTER — OFFICE VISIT (OUTPATIENT)
Dept: NEUROLOGY | Facility: CLINIC | Age: 80
End: 2020-03-03
Payer: COMMERCIAL

## 2020-03-03 VITALS
HEIGHT: 67 IN | BODY MASS INDEX: 24.64 KG/M2 | SYSTOLIC BLOOD PRESSURE: 110 MMHG | DIASTOLIC BLOOD PRESSURE: 60 MMHG | HEART RATE: 50 BPM | WEIGHT: 157 LBS

## 2020-03-03 DIAGNOSIS — I10 ESSENTIAL HYPERTENSION: ICD-10-CM

## 2020-03-03 DIAGNOSIS — E78.5 HYPERLIPIDEMIA, UNSPECIFIED HYPERLIPIDEMIA TYPE: ICD-10-CM

## 2020-03-03 DIAGNOSIS — G45.9 TIA (TRANSIENT ISCHEMIC ATTACK): Primary | ICD-10-CM

## 2020-03-03 DIAGNOSIS — I65.02 STENOSIS OF LEFT VERTEBRAL ARTERY: ICD-10-CM

## 2020-03-03 PROCEDURE — 3074F SYST BP LT 130 MM HG: CPT | Performed by: PSYCHIATRY & NEUROLOGY

## 2020-03-03 PROCEDURE — 3078F DIAST BP <80 MM HG: CPT | Performed by: PSYCHIATRY & NEUROLOGY

## 2020-03-03 PROCEDURE — 99214 OFFICE O/P EST MOD 30 MIN: CPT | Performed by: PSYCHIATRY & NEUROLOGY

## 2020-03-03 PROCEDURE — 4040F PNEUMOC VAC/ADMIN/RCVD: CPT | Performed by: PSYCHIATRY & NEUROLOGY

## 2020-03-03 PROCEDURE — 3008F BODY MASS INDEX DOCD: CPT | Performed by: PSYCHIATRY & NEUROLOGY

## 2020-03-03 PROCEDURE — 1160F RVW MEDS BY RX/DR IN RCRD: CPT | Performed by: PSYCHIATRY & NEUROLOGY

## 2020-03-03 PROCEDURE — 1036F TOBACCO NON-USER: CPT | Performed by: PSYCHIATRY & NEUROLOGY
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
## 2020-03-18 ENCOUNTER — TELEPHONE (OUTPATIENT)
Dept: NEUROLOGY | Facility: CLINIC | Age: 80
End: 2020-03-18

## 2020-03-26 ENCOUNTER — TELEPHONE (OUTPATIENT)
Dept: NEUROLOGY | Facility: CLINIC | Age: 80
End: 2020-03-26

## 2020-03-26 NOTE — TELEPHONE ENCOUNTER
Spoke to pt to offer virtual video or telephone visit, pt states he will call in to reschedule when he is ready

## 2020-05-11 DIAGNOSIS — F32.A DEPRESSION, UNSPECIFIED DEPRESSION TYPE: ICD-10-CM

## 2020-05-11 DIAGNOSIS — G45.9 TIA (TRANSIENT ISCHEMIC ATTACK): ICD-10-CM

## 2020-05-11 DIAGNOSIS — K21.9 GASTROESOPHAGEAL REFLUX DISEASE WITHOUT ESOPHAGITIS: ICD-10-CM

## 2020-05-11 DIAGNOSIS — I10 ESSENTIAL HYPERTENSION: ICD-10-CM

## 2020-05-11 RX ORDER — ESCITALOPRAM OXALATE 10 MG/1
10 TABLET ORAL DAILY
Qty: 90 TABLET | Refills: 1 | Status: SHIPPED | OUTPATIENT
Start: 2020-05-11 | End: 2020-09-29

## 2020-05-11 RX ORDER — ESCITALOPRAM OXALATE 10 MG/1
TABLET ORAL
Qty: 90 TABLET | Refills: 0 | OUTPATIENT
Start: 2020-05-11

## 2020-05-11 RX ORDER — LISINOPRIL AND HYDROCHLOROTHIAZIDE 25; 20 MG/1; MG/1
TABLET ORAL
Qty: 90 TABLET | Refills: 0 | OUTPATIENT
Start: 2020-05-11

## 2020-05-11 RX ORDER — THIAMINE MONONITRATE (VIT B1) 100 MG
TABLET ORAL
Qty: 90 TABLET | Refills: 0 | OUTPATIENT
Start: 2020-05-11

## 2020-05-11 RX ORDER — CARVEDILOL 12.5 MG/1
TABLET ORAL
Qty: 180 TABLET | Refills: 0 | OUTPATIENT
Start: 2020-05-11

## 2020-05-11 RX ORDER — CLOPIDOGREL BISULFATE 75 MG/1
TABLET ORAL
Qty: 90 TABLET | Refills: 0 | OUTPATIENT
Start: 2020-05-11

## 2020-05-11 RX ORDER — LISINOPRIL AND HYDROCHLOROTHIAZIDE 25; 20 MG/1; MG/1
1 TABLET ORAL DAILY
Qty: 90 TABLET | Refills: 1 | Status: SHIPPED | OUTPATIENT
Start: 2020-05-11 | End: 2020-06-02

## 2020-05-11 RX ORDER — ATORVASTATIN CALCIUM 40 MG/1
TABLET, FILM COATED ORAL
Qty: 30 TABLET | Refills: 0 | Status: SHIPPED | OUTPATIENT
Start: 2020-05-11 | End: 2020-08-07

## 2020-05-11 RX ORDER — PANTOPRAZOLE SODIUM 40 MG/1
TABLET, DELAYED RELEASE ORAL
Qty: 90 TABLET | Refills: 0 | OUTPATIENT
Start: 2020-05-11

## 2020-05-12 ENCOUNTER — OFFICE VISIT (OUTPATIENT)
Dept: FAMILY MEDICINE CLINIC | Facility: HOSPITAL | Age: 80
End: 2020-05-12
Payer: COMMERCIAL

## 2020-05-12 VITALS
OXYGEN SATURATION: 100 % | BODY MASS INDEX: 24.64 KG/M2 | WEIGHT: 157 LBS | SYSTOLIC BLOOD PRESSURE: 152 MMHG | HEART RATE: 49 BPM | DIASTOLIC BLOOD PRESSURE: 100 MMHG | HEIGHT: 67 IN | TEMPERATURE: 97.2 F

## 2020-05-12 DIAGNOSIS — K21.9 GASTROESOPHAGEAL REFLUX DISEASE WITHOUT ESOPHAGITIS: ICD-10-CM

## 2020-05-12 DIAGNOSIS — G45.9 TIA (TRANSIENT ISCHEMIC ATTACK): ICD-10-CM

## 2020-05-12 DIAGNOSIS — F32.A DEPRESSION, UNSPECIFIED DEPRESSION TYPE: ICD-10-CM

## 2020-05-12 DIAGNOSIS — F10.29 ALCOHOL DEPENDENCE WITH UNSPECIFIED ALCOHOL-INDUCED DISORDER (HCC): ICD-10-CM

## 2020-05-12 DIAGNOSIS — I10 ESSENTIAL HYPERTENSION: Primary | ICD-10-CM

## 2020-05-12 DIAGNOSIS — E55.9 VITAMIN D DEFICIENCY: ICD-10-CM

## 2020-05-12 DIAGNOSIS — E78.5 HYPERLIPIDEMIA, UNSPECIFIED HYPERLIPIDEMIA TYPE: ICD-10-CM

## 2020-05-12 DIAGNOSIS — H53.8 BLURRY VISION, RIGHT EYE: ICD-10-CM

## 2020-05-12 PROCEDURE — 99214 OFFICE O/P EST MOD 30 MIN: CPT | Performed by: NURSE PRACTITIONER

## 2020-05-12 PROCEDURE — 1036F TOBACCO NON-USER: CPT | Performed by: NURSE PRACTITIONER

## 2020-05-12 PROCEDURE — 3008F BODY MASS INDEX DOCD: CPT | Performed by: NURSE PRACTITIONER

## 2020-05-12 PROCEDURE — 4040F PNEUMOC VAC/ADMIN/RCVD: CPT | Performed by: NURSE PRACTITIONER

## 2020-05-12 PROCEDURE — 3077F SYST BP >= 140 MM HG: CPT | Performed by: NURSE PRACTITIONER

## 2020-05-12 PROCEDURE — 3080F DIAST BP >= 90 MM HG: CPT | Performed by: NURSE PRACTITIONER

## 2020-05-12 PROCEDURE — 1160F RVW MEDS BY RX/DR IN RCRD: CPT | Performed by: NURSE PRACTITIONER

## 2020-05-12 RX ORDER — AMLODIPINE BESYLATE 2.5 MG/1
TABLET ORAL
Qty: 90 TABLET | Refills: 1 | Status: SHIPPED | OUTPATIENT
Start: 2020-05-12 | End: 2020-08-10

## 2020-05-15 ENCOUNTER — HOSPITAL ENCOUNTER (INPATIENT)
Facility: HOSPITAL | Age: 80
LOS: 4 days | Discharge: HOME HEALTH | DRG: 178 | End: 2020-05-19
Attending: EMERGENCY MEDICINE | Admitting: HOSPITALIST
Payer: COMMERCIAL

## 2020-05-15 ENCOUNTER — APPOINTMENT (INPATIENT)
Dept: MRI IMAGING | Facility: HOSPITAL | Age: 80
DRG: 178 | End: 2020-05-15
Payer: COMMERCIAL

## 2020-05-15 ENCOUNTER — APPOINTMENT (EMERGENCY)
Dept: CT IMAGING | Facility: HOSPITAL | Age: 80
DRG: 178 | End: 2020-05-15
Payer: COMMERCIAL

## 2020-05-15 ENCOUNTER — APPOINTMENT (INPATIENT)
Dept: NEUROLOGY | Facility: HOSPITAL | Age: 80
DRG: 178 | End: 2020-05-15
Attending: EMERGENCY MEDICINE
Payer: COMMERCIAL

## 2020-05-15 ENCOUNTER — TELEPHONE (OUTPATIENT)
Dept: FAMILY MEDICINE CLINIC | Facility: HOSPITAL | Age: 80
End: 2020-05-15

## 2020-05-15 DIAGNOSIS — G93.40 ACUTE ENCEPHALOPATHY: ICD-10-CM

## 2020-05-15 DIAGNOSIS — K21.9 GASTROESOPHAGEAL REFLUX DISEASE, ESOPHAGITIS PRESENCE NOT SPECIFIED: Primary | ICD-10-CM

## 2020-05-15 DIAGNOSIS — J18.9 MULTIFOCAL PNEUMONIA: ICD-10-CM

## 2020-05-15 DIAGNOSIS — F10.10 ALCOHOL ABUSE: ICD-10-CM

## 2020-05-15 DIAGNOSIS — E87.1 HYPONATREMIA: ICD-10-CM

## 2020-05-15 PROBLEM — J69.0 ASPIRATION PNEUMONIA (HCC): Status: ACTIVE | Noted: 2020-05-15

## 2020-05-15 LAB
ALBUMIN SERPL BCP-MCNC: 3.4 G/DL (ref 3.5–5)
ALP SERPL-CCNC: 46 U/L (ref 46–116)
ALT SERPL W P-5'-P-CCNC: 20 U/L (ref 12–78)
AMMONIA PLAS-SCNC: 15 UMOL/L (ref 11–35)
ANION GAP SERPL CALCULATED.3IONS-SCNC: 7 MMOL/L (ref 4–13)
APAP SERPL-MCNC: <2 UG/ML (ref 10–20)
APTT PPP: 24 SECONDS (ref 23–37)
AST SERPL W P-5'-P-CCNC: 34 U/L (ref 5–45)
BILIRUB SERPL-MCNC: 1.7 MG/DL (ref 0.2–1)
BUN SERPL-MCNC: 17 MG/DL (ref 5–25)
BUN SERPL-MCNC: 8 MG/DL (ref 5–25)
BUN SERPL-MCNC: 9 MG/DL (ref 5–25)
CALCIUM SERPL-MCNC: 8.3 MG/DL (ref 8.3–10.1)
CALCIUM SERPL-MCNC: 8.5 MG/DL (ref 8.3–10.1)
CALCIUM SERPL-MCNC: 9.1 MG/DL (ref 8.3–10.1)
CHLORIDE SERPL-SCNC: 88 MMOL/L (ref 100–108)
CHLORIDE SERPL-SCNC: 92 MMOL/L (ref 100–108)
CHLORIDE SERPL-SCNC: 92 MMOL/L (ref 100–108)
CO2 SERPL-SCNC: 28 MMOL/L (ref 21–32)
CO2 SERPL-SCNC: 28 MMOL/L (ref 21–32)
CO2 SERPL-SCNC: 30 MMOL/L (ref 21–32)
CREAT SERPL-MCNC: 0.64 MG/DL (ref 0.6–1.3)
CREAT SERPL-MCNC: 0.66 MG/DL (ref 0.6–1.3)
CREAT SERPL-MCNC: 0.87 MG/DL (ref 0.6–1.3)
ERYTHROCYTE [DISTWIDTH] IN BLOOD BY AUTOMATED COUNT: 12.3 % (ref 11.6–15.1)
ETHANOL SERPL-MCNC: <3 MG/DL (ref 0–3)
GFR SERPL CREATININE-BSD FRML MDRD: 83 ML/MIN/1.73SQ M
GFR SERPL CREATININE-BSD FRML MDRD: 93 ML/MIN/1.73SQ M
GFR SERPL CREATININE-BSD FRML MDRD: 94 ML/MIN/1.73SQ M
GLUCOSE SERPL-MCNC: 107 MG/DL (ref 65–140)
GLUCOSE SERPL-MCNC: 110 MG/DL (ref 65–140)
GLUCOSE SERPL-MCNC: 156 MG/DL (ref 65–140)
HCT VFR BLD AUTO: 46.4 % (ref 36.5–49.3)
HGB BLD-MCNC: 16.3 G/DL (ref 12–17)
INR PPP: 1.07 (ref 0.84–1.19)
LACTATE SERPL-SCNC: 1.5 MMOL/L (ref 0.5–2)
LACTATE SERPL-SCNC: 2.1 MMOL/L (ref 0.5–2)
MAGNESIUM SERPL-MCNC: 2.1 MG/DL (ref 1.6–2.6)
MCH RBC QN AUTO: 29.6 PG (ref 26.8–34.3)
MCHC RBC AUTO-ENTMCNC: 35.1 G/DL (ref 31.4–37.4)
MCV RBC AUTO: 84 FL (ref 82–98)
PLATELET # BLD AUTO: 186 THOUSANDS/UL (ref 149–390)
PLATELET # BLD AUTO: 194 THOUSANDS/UL (ref 149–390)
PMV BLD AUTO: 8.3 FL (ref 8.9–12.7)
PMV BLD AUTO: 8.7 FL (ref 8.9–12.7)
POTASSIUM SERPL-SCNC: 3.7 MMOL/L (ref 3.5–5.3)
POTASSIUM SERPL-SCNC: 3.7 MMOL/L (ref 3.5–5.3)
POTASSIUM SERPL-SCNC: 4.5 MMOL/L (ref 3.5–5.3)
PROT SERPL-MCNC: 6.5 G/DL (ref 6.4–8.2)
PROTHROMBIN TIME: 13.6 SECONDS (ref 11.6–14.5)
RBC # BLD AUTO: 5.51 MILLION/UL (ref 3.88–5.62)
SALICYLATES SERPL-MCNC: <3 MG/DL (ref 3–20)
SARS-COV-2 RNA RESP QL NAA+PROBE: NEGATIVE
SODIUM SERPL-SCNC: 125 MMOL/L (ref 136–145)
SODIUM SERPL-SCNC: 127 MMOL/L (ref 136–145)
SODIUM SERPL-SCNC: 127 MMOL/L (ref 136–145)
TROPONIN I SERPL-MCNC: 0.05 NG/ML
TROPONIN I SERPL-MCNC: 0.07 NG/ML
WBC # BLD AUTO: 16.48 THOUSAND/UL (ref 4.31–10.16)

## 2020-05-15 PROCEDURE — 80329 ANALGESICS NON-OPIOID 1 OR 2: CPT | Performed by: EMERGENCY MEDICINE

## 2020-05-15 PROCEDURE — 83735 ASSAY OF MAGNESIUM: CPT | Performed by: PHYSICIAN ASSISTANT

## 2020-05-15 PROCEDURE — 80320 DRUG SCREEN QUANTALCOHOLS: CPT | Performed by: EMERGENCY MEDICINE

## 2020-05-15 PROCEDURE — 87040 BLOOD CULTURE FOR BACTERIA: CPT | Performed by: EMERGENCY MEDICINE

## 2020-05-15 PROCEDURE — 80053 COMPREHEN METABOLIC PANEL: CPT | Performed by: PHYSICIAN ASSISTANT

## 2020-05-15 PROCEDURE — 99285 EMERGENCY DEPT VISIT HI MDM: CPT

## 2020-05-15 PROCEDURE — 99285 EMERGENCY DEPT VISIT HI MDM: CPT | Performed by: EMERGENCY MEDICINE

## 2020-05-15 PROCEDURE — 36415 COLL VENOUS BLD VENIPUNCTURE: CPT | Performed by: EMERGENCY MEDICINE

## 2020-05-15 PROCEDURE — 84484 ASSAY OF TROPONIN QUANT: CPT | Performed by: PHYSICIAN ASSISTANT

## 2020-05-15 PROCEDURE — 95816 EEG AWAKE AND DROWSY: CPT | Performed by: PSYCHIATRY & NEUROLOGY

## 2020-05-15 PROCEDURE — 82140 ASSAY OF AMMONIA: CPT | Performed by: PHYSICIAN ASSISTANT

## 2020-05-15 PROCEDURE — 96375 TX/PRO/DX INJ NEW DRUG ADDON: CPT

## 2020-05-15 PROCEDURE — 95816 EEG AWAKE AND DROWSY: CPT

## 2020-05-15 PROCEDURE — 70498 CT ANGIOGRAPHY NECK: CPT

## 2020-05-15 PROCEDURE — 96374 THER/PROPH/DIAG INJ IV PUSH: CPT

## 2020-05-15 PROCEDURE — 85049 AUTOMATED PLATELET COUNT: CPT | Performed by: HOSPITALIST

## 2020-05-15 PROCEDURE — 93005 ELECTROCARDIOGRAM TRACING: CPT

## 2020-05-15 PROCEDURE — 80048 BASIC METABOLIC PNL TOTAL CA: CPT | Performed by: HOSPITALIST

## 2020-05-15 PROCEDURE — G0480 DRUG TEST DEF 1-7 CLASSES: HCPCS | Performed by: EMERGENCY MEDICINE

## 2020-05-15 PROCEDURE — 94664 DEMO&/EVAL PT USE INHALER: CPT

## 2020-05-15 PROCEDURE — 83605 ASSAY OF LACTIC ACID: CPT | Performed by: EMERGENCY MEDICINE

## 2020-05-15 PROCEDURE — 84145 PROCALCITONIN (PCT): CPT | Performed by: HOSPITALIST

## 2020-05-15 PROCEDURE — 87635 SARS-COV-2 COVID-19 AMP PRB: CPT | Performed by: EMERGENCY MEDICINE

## 2020-05-15 PROCEDURE — 70496 CT ANGIOGRAPHY HEAD: CPT

## 2020-05-15 PROCEDURE — 84484 ASSAY OF TROPONIN QUANT: CPT | Performed by: EMERGENCY MEDICINE

## 2020-05-15 PROCEDURE — 70551 MRI BRAIN STEM W/O DYE: CPT

## 2020-05-15 PROCEDURE — 85027 COMPLETE CBC AUTOMATED: CPT | Performed by: EMERGENCY MEDICINE

## 2020-05-15 PROCEDURE — 80048 BASIC METABOLIC PNL TOTAL CA: CPT | Performed by: EMERGENCY MEDICINE

## 2020-05-15 PROCEDURE — 85610 PROTHROMBIN TIME: CPT | Performed by: EMERGENCY MEDICINE

## 2020-05-15 PROCEDURE — 99223 1ST HOSP IP/OBS HIGH 75: CPT | Performed by: HOSPITALIST

## 2020-05-15 PROCEDURE — 85730 THROMBOPLASTIN TIME PARTIAL: CPT | Performed by: EMERGENCY MEDICINE

## 2020-05-15 RX ORDER — POTASSIUM CHLORIDE 14.9 MG/ML
20 INJECTION INTRAVENOUS ONCE
Status: DISCONTINUED | OUTPATIENT
Start: 2020-05-16 | End: 2020-05-15

## 2020-05-15 RX ORDER — POTASSIUM CHLORIDE 20MEQ/15ML
20 LIQUID (ML) ORAL ONCE
Status: COMPLETED | OUTPATIENT
Start: 2020-05-15 | End: 2020-05-15

## 2020-05-15 RX ORDER — CLOPIDOGREL BISULFATE 75 MG/1
75 TABLET ORAL DAILY
Status: DISCONTINUED | OUTPATIENT
Start: 2020-05-15 | End: 2020-05-19 | Stop reason: HOSPADM

## 2020-05-15 RX ORDER — THIAMINE MONONITRATE (VIT B1) 100 MG
100 TABLET ORAL DAILY
Status: DISCONTINUED | OUTPATIENT
Start: 2020-05-15 | End: 2020-05-15 | Stop reason: SDUPTHER

## 2020-05-15 RX ORDER — ATORVASTATIN CALCIUM 40 MG/1
40 TABLET, FILM COATED ORAL
Status: DISCONTINUED | OUTPATIENT
Start: 2020-05-15 | End: 2020-05-19 | Stop reason: HOSPADM

## 2020-05-15 RX ORDER — LISINOPRIL 20 MG/1
40 TABLET ORAL DAILY
Status: DISCONTINUED | OUTPATIENT
Start: 2020-05-15 | End: 2020-05-19 | Stop reason: HOSPADM

## 2020-05-15 RX ORDER — FAMOTIDINE 40 MG/1
40 TABLET, FILM COATED ORAL DAILY
Qty: 90 TABLET | Refills: 1 | Status: SHIPPED | OUTPATIENT
Start: 2020-05-15 | End: 2020-08-10

## 2020-05-15 RX ORDER — MELATONIN
1000 DAILY
Status: DISCONTINUED | OUTPATIENT
Start: 2020-05-15 | End: 2020-05-19 | Stop reason: HOSPADM

## 2020-05-15 RX ORDER — AMLODIPINE BESYLATE 2.5 MG/1
2.5 TABLET ORAL DAILY
Status: DISCONTINUED | OUTPATIENT
Start: 2020-05-15 | End: 2020-05-16

## 2020-05-15 RX ORDER — ESCITALOPRAM OXALATE 10 MG/1
10 TABLET ORAL DAILY
Status: DISCONTINUED | OUTPATIENT
Start: 2020-05-15 | End: 2020-05-19 | Stop reason: HOSPADM

## 2020-05-15 RX ORDER — HYDRALAZINE HYDROCHLORIDE 20 MG/ML
10 INJECTION INTRAMUSCULAR; INTRAVENOUS EVERY 6 HOURS PRN
Status: DISCONTINUED | OUTPATIENT
Start: 2020-05-15 | End: 2020-05-19 | Stop reason: HOSPADM

## 2020-05-15 RX ORDER — SODIUM CHLORIDE, SODIUM GLUCONATE, SODIUM ACETATE, POTASSIUM CHLORIDE, MAGNESIUM CHLORIDE, SODIUM PHOSPHATE, DIBASIC, AND POTASSIUM PHOSPHATE .53; .5; .37; .037; .03; .012; .00082 G/100ML; G/100ML; G/100ML; G/100ML; G/100ML; G/100ML; G/100ML
125 INJECTION, SOLUTION INTRAVENOUS CONTINUOUS
Status: DISCONTINUED | OUTPATIENT
Start: 2020-05-15 | End: 2020-05-16

## 2020-05-15 RX ORDER — CEFTRIAXONE 1 G/50ML
1000 INJECTION, SOLUTION INTRAVENOUS ONCE
Status: COMPLETED | OUTPATIENT
Start: 2020-05-15 | End: 2020-05-15

## 2020-05-15 RX ORDER — CEFTRIAXONE 1 G/50ML
1000 INJECTION, SOLUTION INTRAVENOUS EVERY 24 HOURS
Status: DISCONTINUED | OUTPATIENT
Start: 2020-05-16 | End: 2020-05-17

## 2020-05-15 RX ORDER — POTASSIUM CHLORIDE 14.9 MG/ML
20 INJECTION INTRAVENOUS ONCE
Status: COMPLETED | OUTPATIENT
Start: 2020-05-15 | End: 2020-05-16

## 2020-05-15 RX ORDER — CARVEDILOL 12.5 MG/1
12.5 TABLET ORAL 2 TIMES DAILY
Status: DISCONTINUED | OUTPATIENT
Start: 2020-05-15 | End: 2020-05-16

## 2020-05-15 RX ORDER — PANTOPRAZOLE SODIUM 40 MG/1
40 TABLET, DELAYED RELEASE ORAL
Status: DISCONTINUED | OUTPATIENT
Start: 2020-05-16 | End: 2020-05-19 | Stop reason: HOSPADM

## 2020-05-15 RX ORDER — FAMOTIDINE 20 MG/1
40 TABLET, FILM COATED ORAL
Status: DISCONTINUED | OUTPATIENT
Start: 2020-05-15 | End: 2020-05-19 | Stop reason: HOSPADM

## 2020-05-15 RX ORDER — LORAZEPAM 2 MG/ML
1 INJECTION INTRAMUSCULAR EVERY 4 HOURS PRN
Status: DISCONTINUED | OUTPATIENT
Start: 2020-05-15 | End: 2020-05-19 | Stop reason: HOSPADM

## 2020-05-15 RX ADMIN — AMLODIPINE BESYLATE 2.5 MG: 2.5 TABLET ORAL at 18:25

## 2020-05-15 RX ADMIN — SODIUM CHLORIDE 1000 ML: 0.9 INJECTION, SOLUTION INTRAVENOUS at 08:10

## 2020-05-15 RX ADMIN — POTASSIUM CHLORIDE 20 MEQ: 14.9 INJECTION, SOLUTION INTRAVENOUS at 22:18

## 2020-05-15 RX ADMIN — ENOXAPARIN SODIUM 40 MG: 40 INJECTION, SOLUTION INTRAVENOUS; SUBCUTANEOUS at 18:25

## 2020-05-15 RX ADMIN — LORAZEPAM 1 MG: 2 INJECTION INTRAMUSCULAR; INTRAVENOUS at 09:49

## 2020-05-15 RX ADMIN — CARVEDILOL 12.5 MG: 12.5 TABLET, FILM COATED ORAL at 18:26

## 2020-05-15 RX ADMIN — LISINOPRIL 40 MG: 20 TABLET ORAL at 18:26

## 2020-05-15 RX ADMIN — IOHEXOL 100 ML: 350 INJECTION, SOLUTION INTRAVENOUS at 07:38

## 2020-05-15 RX ADMIN — FAMOTIDINE 40 MG: 20 TABLET ORAL at 21:45

## 2020-05-15 RX ADMIN — MELATONIN 1000 UNITS: at 18:26

## 2020-05-15 RX ADMIN — METRONIDAZOLE 500 MG: 500 INJECTION, SOLUTION INTRAVENOUS at 11:25

## 2020-05-15 RX ADMIN — FOLIC ACID 1 MG: 5 INJECTION, SOLUTION INTRAMUSCULAR; INTRAVENOUS; SUBCUTANEOUS at 08:22

## 2020-05-15 RX ADMIN — CEFTRIAXONE 1000 MG: 1 INJECTION, SOLUTION INTRAVENOUS at 08:10

## 2020-05-15 RX ADMIN — CLOPIDOGREL BISULFATE 75 MG: 75 TABLET ORAL at 18:26

## 2020-05-15 RX ADMIN — METRONIDAZOLE 500 MG: 500 INJECTION, SOLUTION INTRAVENOUS at 19:49

## 2020-05-15 RX ADMIN — AZITHROMYCIN 500 MG: 500 INJECTION, POWDER, LYOPHILIZED, FOR SOLUTION INTRAVENOUS at 10:17

## 2020-05-15 RX ADMIN — THIAMINE HYDROCHLORIDE 100 MG: 100 INJECTION, SOLUTION INTRAMUSCULAR; INTRAVENOUS at 09:38

## 2020-05-15 RX ADMIN — ATORVASTATIN CALCIUM 40 MG: 40 TABLET, FILM COATED ORAL at 18:26

## 2020-05-15 RX ADMIN — ESCITALOPRAM OXALATE 10 MG: 10 TABLET ORAL at 18:26

## 2020-05-15 RX ADMIN — SODIUM CHLORIDE, SODIUM GLUCONATE, SODIUM ACETATE, POTASSIUM CHLORIDE, MAGNESIUM CHLORIDE, SODIUM PHOSPHATE, DIBASIC, AND POTASSIUM PHOSPHATE 125 ML/HR: .53; .5; .37; .037; .03; .012; .00082 INJECTION, SOLUTION INTRAVENOUS at 09:36

## 2020-05-15 RX ADMIN — POTASSIUM CHLORIDE 20 MEQ: 20 SOLUTION ORAL at 23:03

## 2020-05-16 LAB
ANION GAP SERPL CALCULATED.3IONS-SCNC: 2 MMOL/L (ref 4–13)
ANION GAP SERPL CALCULATED.3IONS-SCNC: 4 MMOL/L (ref 4–13)
ANION GAP SERPL CALCULATED.3IONS-SCNC: 7 MMOL/L (ref 4–13)
ATRIAL RATE: 69 BPM
BASOPHILS # BLD AUTO: 0.04 THOUSANDS/ΜL (ref 0–0.1)
BASOPHILS NFR BLD AUTO: 0 % (ref 0–1)
BUN SERPL-MCNC: 6 MG/DL (ref 5–25)
BUN SERPL-MCNC: 7 MG/DL (ref 5–25)
BUN SERPL-MCNC: 9 MG/DL (ref 5–25)
CALCIUM SERPL-MCNC: 7.9 MG/DL (ref 8.3–10.1)
CALCIUM SERPL-MCNC: 8.2 MG/DL (ref 8.3–10.1)
CALCIUM SERPL-MCNC: 8.2 MG/DL (ref 8.3–10.1)
CHLORIDE SERPL-SCNC: 96 MMOL/L (ref 100–108)
CHLORIDE SERPL-SCNC: 97 MMOL/L (ref 100–108)
CHLORIDE SERPL-SCNC: 97 MMOL/L (ref 100–108)
CO2 SERPL-SCNC: 27 MMOL/L (ref 21–32)
CO2 SERPL-SCNC: 28 MMOL/L (ref 21–32)
CO2 SERPL-SCNC: 30 MMOL/L (ref 21–32)
CREAT SERPL-MCNC: 0.71 MG/DL (ref 0.6–1.3)
CREAT SERPL-MCNC: 0.72 MG/DL (ref 0.6–1.3)
CREAT SERPL-MCNC: 0.73 MG/DL (ref 0.6–1.3)
EOSINOPHIL # BLD AUTO: 0.01 THOUSAND/ΜL (ref 0–0.61)
EOSINOPHIL NFR BLD AUTO: 0 % (ref 0–6)
ERYTHROCYTE [DISTWIDTH] IN BLOOD BY AUTOMATED COUNT: 12.6 % (ref 11.6–15.1)
GFR SERPL CREATININE-BSD FRML MDRD: 89 ML/MIN/1.73SQ M
GFR SERPL CREATININE-BSD FRML MDRD: 89 ML/MIN/1.73SQ M
GFR SERPL CREATININE-BSD FRML MDRD: 90 ML/MIN/1.73SQ M
GLUCOSE SERPL-MCNC: 104 MG/DL (ref 65–140)
GLUCOSE SERPL-MCNC: 87 MG/DL (ref 65–140)
GLUCOSE SERPL-MCNC: 97 MG/DL (ref 65–140)
HCT VFR BLD AUTO: 37.8 % (ref 36.5–49.3)
HGB BLD-MCNC: 13.1 G/DL (ref 12–17)
IMM GRANULOCYTES # BLD AUTO: 0.05 THOUSAND/UL (ref 0–0.2)
IMM GRANULOCYTES NFR BLD AUTO: 1 % (ref 0–2)
LYMPHOCYTES # BLD AUTO: 1.18 THOUSANDS/ΜL (ref 0.6–4.47)
LYMPHOCYTES NFR BLD AUTO: 11 % (ref 14–44)
MCH RBC QN AUTO: 29.6 PG (ref 26.8–34.3)
MCHC RBC AUTO-ENTMCNC: 34.7 G/DL (ref 31.4–37.4)
MCV RBC AUTO: 85 FL (ref 82–98)
MONOCYTES # BLD AUTO: 0.93 THOUSAND/ΜL (ref 0.17–1.22)
MONOCYTES NFR BLD AUTO: 9 % (ref 4–12)
NEUTROPHILS # BLD AUTO: 8.1 THOUSANDS/ΜL (ref 1.85–7.62)
NEUTS SEG NFR BLD AUTO: 79 % (ref 43–75)
NRBC BLD AUTO-RTO: 0 /100 WBCS
OSMOLALITY UR: 404 MMOL/KG
P AXIS: 55 DEGREES
PLATELET # BLD AUTO: 160 THOUSANDS/UL (ref 149–390)
PMV BLD AUTO: 8.7 FL (ref 8.9–12.7)
POTASSIUM SERPL-SCNC: 3.4 MMOL/L (ref 3.5–5.3)
POTASSIUM SERPL-SCNC: 3.8 MMOL/L (ref 3.5–5.3)
POTASSIUM SERPL-SCNC: 4 MMOL/L (ref 3.5–5.3)
PR INTERVAL: 174 MS
PROCALCITONIN SERPL-MCNC: <0.05 NG/ML
PROCALCITONIN SERPL-MCNC: <0.05 NG/ML
QRS AXIS: 64 DEGREES
QRSD INTERVAL: 100 MS
QT INTERVAL: 418 MS
QTC INTERVAL: 447 MS
RBC # BLD AUTO: 4.43 MILLION/UL (ref 3.88–5.62)
SODIUM 24H UR-SCNC: 20 MOL/L
SODIUM SERPL-SCNC: 128 MMOL/L (ref 136–145)
SODIUM SERPL-SCNC: 129 MMOL/L (ref 136–145)
SODIUM SERPL-SCNC: 131 MMOL/L (ref 136–145)
T WAVE AXIS: 51 DEGREES
VENTRICULAR RATE: 69 BPM
WBC # BLD AUTO: 10.31 THOUSAND/UL (ref 4.31–10.16)

## 2020-05-16 PROCEDURE — 84300 ASSAY OF URINE SODIUM: CPT | Performed by: NURSE PRACTITIONER

## 2020-05-16 PROCEDURE — 84145 PROCALCITONIN (PCT): CPT | Performed by: HOSPITALIST

## 2020-05-16 PROCEDURE — 99232 SBSQ HOSP IP/OBS MODERATE 35: CPT | Performed by: INTERNAL MEDICINE

## 2020-05-16 PROCEDURE — 83935 ASSAY OF URINE OSMOLALITY: CPT | Performed by: NURSE PRACTITIONER

## 2020-05-16 PROCEDURE — 80048 BASIC METABOLIC PNL TOTAL CA: CPT | Performed by: HOSPITALIST

## 2020-05-16 PROCEDURE — 93010 ELECTROCARDIOGRAM REPORT: CPT | Performed by: INTERNAL MEDICINE

## 2020-05-16 PROCEDURE — 92610 EVALUATE SWALLOWING FUNCTION: CPT

## 2020-05-16 PROCEDURE — 99222 1ST HOSP IP/OBS MODERATE 55: CPT | Performed by: INTERNAL MEDICINE

## 2020-05-16 PROCEDURE — 85025 COMPLETE CBC W/AUTO DIFF WBC: CPT | Performed by: HOSPITALIST

## 2020-05-16 PROCEDURE — 99222 1ST HOSP IP/OBS MODERATE 55: CPT | Performed by: PSYCHIATRY & NEUROLOGY

## 2020-05-16 RX ORDER — SODIUM CHLORIDE 9 MG/ML
70 INJECTION, SOLUTION INTRAVENOUS CONTINUOUS
Status: DISCONTINUED | OUTPATIENT
Start: 2020-05-16 | End: 2020-05-17

## 2020-05-16 RX ORDER — AMLODIPINE BESYLATE 2.5 MG/1
2.5 TABLET ORAL DAILY
Status: DISCONTINUED | OUTPATIENT
Start: 2020-05-17 | End: 2020-05-19 | Stop reason: HOSPADM

## 2020-05-16 RX ORDER — CARVEDILOL 12.5 MG/1
12.5 TABLET ORAL 2 TIMES DAILY
Status: DISCONTINUED | OUTPATIENT
Start: 2020-05-16 | End: 2020-05-19 | Stop reason: HOSPADM

## 2020-05-16 RX ADMIN — CEFTRIAXONE 1000 MG: 1 INJECTION, SOLUTION INTRAVENOUS at 09:05

## 2020-05-16 RX ADMIN — AZITHROMYCIN 500 MG: 500 INJECTION, POWDER, LYOPHILIZED, FOR SOLUTION INTRAVENOUS at 09:05

## 2020-05-16 RX ADMIN — AMLODIPINE BESYLATE 2.5 MG: 2.5 TABLET ORAL at 09:04

## 2020-05-16 RX ADMIN — ESCITALOPRAM OXALATE 10 MG: 10 TABLET ORAL at 09:04

## 2020-05-16 RX ADMIN — METRONIDAZOLE 500 MG: 500 INJECTION, SOLUTION INTRAVENOUS at 00:29

## 2020-05-16 RX ADMIN — SODIUM CHLORIDE 70 ML/HR: 0.9 INJECTION, SOLUTION INTRAVENOUS at 15:50

## 2020-05-16 RX ADMIN — SODIUM CHLORIDE, SODIUM GLUCONATE, SODIUM ACETATE, POTASSIUM CHLORIDE, MAGNESIUM CHLORIDE, SODIUM PHOSPHATE, DIBASIC, AND POTASSIUM PHOSPHATE 125 ML/HR: .53; .5; .37; .037; .03; .012; .00082 INJECTION, SOLUTION INTRAVENOUS at 00:40

## 2020-05-16 RX ADMIN — METRONIDAZOLE 500 MG: 500 INJECTION, SOLUTION INTRAVENOUS at 16:29

## 2020-05-16 RX ADMIN — METRONIDAZOLE 500 MG: 500 INJECTION, SOLUTION INTRAVENOUS at 09:05

## 2020-05-16 RX ADMIN — CLOPIDOGREL BISULFATE 75 MG: 75 TABLET ORAL at 09:04

## 2020-05-16 RX ADMIN — FOLIC ACID: 5 INJECTION, SOLUTION INTRAMUSCULAR; INTRAVENOUS; SUBCUTANEOUS at 12:24

## 2020-05-16 RX ADMIN — PANTOPRAZOLE SODIUM 40 MG: 40 TABLET, DELAYED RELEASE ORAL at 05:55

## 2020-05-16 RX ADMIN — CARVEDILOL 12.5 MG: 12.5 TABLET, FILM COATED ORAL at 09:04

## 2020-05-16 RX ADMIN — MELATONIN 1000 UNITS: at 09:04

## 2020-05-16 RX ADMIN — ATORVASTATIN CALCIUM 40 MG: 40 TABLET, FILM COATED ORAL at 16:29

## 2020-05-16 RX ADMIN — CARVEDILOL 12.5 MG: 12.5 TABLET, FILM COATED ORAL at 17:37

## 2020-05-16 RX ADMIN — FAMOTIDINE 40 MG: 20 TABLET ORAL at 22:45

## 2020-05-16 RX ADMIN — LISINOPRIL 40 MG: 20 TABLET ORAL at 09:04

## 2020-05-17 LAB
ANION GAP SERPL CALCULATED.3IONS-SCNC: 6 MMOL/L (ref 4–13)
BASOPHILS # BLD AUTO: 0.03 THOUSANDS/ΜL (ref 0–0.1)
BASOPHILS NFR BLD AUTO: 0 % (ref 0–1)
BUN SERPL-MCNC: 8 MG/DL (ref 5–25)
CALCIUM SERPL-MCNC: 7.6 MG/DL (ref 8.3–10.1)
CHLORIDE SERPL-SCNC: 101 MMOL/L (ref 100–108)
CO2 SERPL-SCNC: 27 MMOL/L (ref 21–32)
CREAT SERPL-MCNC: 0.72 MG/DL (ref 0.6–1.3)
EOSINOPHIL # BLD AUTO: 0.15 THOUSAND/ΜL (ref 0–0.61)
EOSINOPHIL NFR BLD AUTO: 2 % (ref 0–6)
ERYTHROCYTE [DISTWIDTH] IN BLOOD BY AUTOMATED COUNT: 12.8 % (ref 11.6–15.1)
GFR SERPL CREATININE-BSD FRML MDRD: 89 ML/MIN/1.73SQ M
GLUCOSE SERPL-MCNC: 98 MG/DL (ref 65–140)
HCT VFR BLD AUTO: 37.7 % (ref 36.5–49.3)
HGB BLD-MCNC: 12.9 G/DL (ref 12–17)
IMM GRANULOCYTES # BLD AUTO: 0.03 THOUSAND/UL (ref 0–0.2)
IMM GRANULOCYTES NFR BLD AUTO: 0 % (ref 0–2)
LYMPHOCYTES # BLD AUTO: 1.28 THOUSANDS/ΜL (ref 0.6–4.47)
LYMPHOCYTES NFR BLD AUTO: 16 % (ref 14–44)
MAGNESIUM SERPL-MCNC: 2 MG/DL (ref 1.6–2.6)
MCH RBC QN AUTO: 29.7 PG (ref 26.8–34.3)
MCHC RBC AUTO-ENTMCNC: 34.2 G/DL (ref 31.4–37.4)
MCV RBC AUTO: 87 FL (ref 82–98)
MONOCYTES # BLD AUTO: 0.94 THOUSAND/ΜL (ref 0.17–1.22)
MONOCYTES NFR BLD AUTO: 12 % (ref 4–12)
NEUTROPHILS # BLD AUTO: 5.65 THOUSANDS/ΜL (ref 1.85–7.62)
NEUTS SEG NFR BLD AUTO: 70 % (ref 43–75)
NRBC BLD AUTO-RTO: 0 /100 WBCS
PLATELET # BLD AUTO: 163 THOUSANDS/UL (ref 149–390)
PMV BLD AUTO: 8.7 FL (ref 8.9–12.7)
POTASSIUM SERPL-SCNC: 3.3 MMOL/L (ref 3.5–5.3)
RBC # BLD AUTO: 4.35 MILLION/UL (ref 3.88–5.62)
SODIUM SERPL-SCNC: 134 MMOL/L (ref 136–145)
WBC # BLD AUTO: 8.08 THOUSAND/UL (ref 4.31–10.16)

## 2020-05-17 PROCEDURE — 83735 ASSAY OF MAGNESIUM: CPT | Performed by: INTERNAL MEDICINE

## 2020-05-17 PROCEDURE — 99232 SBSQ HOSP IP/OBS MODERATE 35: CPT | Performed by: FAMILY MEDICINE

## 2020-05-17 PROCEDURE — 80048 BASIC METABOLIC PNL TOTAL CA: CPT | Performed by: INTERNAL MEDICINE

## 2020-05-17 PROCEDURE — 99232 SBSQ HOSP IP/OBS MODERATE 35: CPT | Performed by: INTERNAL MEDICINE

## 2020-05-17 PROCEDURE — 85025 COMPLETE CBC W/AUTO DIFF WBC: CPT | Performed by: INTERNAL MEDICINE

## 2020-05-17 RX ORDER — POTASSIUM CHLORIDE 20 MEQ/1
40 TABLET, EXTENDED RELEASE ORAL ONCE
Status: COMPLETED | OUTPATIENT
Start: 2020-05-17 | End: 2020-05-17

## 2020-05-17 RX ADMIN — METRONIDAZOLE 500 MG: 500 INJECTION, SOLUTION INTRAVENOUS at 09:05

## 2020-05-17 RX ADMIN — LISINOPRIL 40 MG: 20 TABLET ORAL at 09:07

## 2020-05-17 RX ADMIN — SODIUM CHLORIDE 70 ML/HR: 0.9 INJECTION, SOLUTION INTRAVENOUS at 06:16

## 2020-05-17 RX ADMIN — FOLIC ACID: 5 INJECTION, SOLUTION INTRAMUSCULAR; INTRAVENOUS; SUBCUTANEOUS at 09:07

## 2020-05-17 RX ADMIN — CARVEDILOL 12.5 MG: 12.5 TABLET, FILM COATED ORAL at 17:17

## 2020-05-17 RX ADMIN — POTASSIUM CHLORIDE 40 MEQ: 1500 TABLET, EXTENDED RELEASE ORAL at 13:56

## 2020-05-17 RX ADMIN — AZITHROMYCIN 500 MG: 500 INJECTION, POWDER, LYOPHILIZED, FOR SOLUTION INTRAVENOUS at 09:07

## 2020-05-17 RX ADMIN — ESCITALOPRAM OXALATE 10 MG: 10 TABLET ORAL at 09:07

## 2020-05-17 RX ADMIN — PANTOPRAZOLE SODIUM 40 MG: 40 TABLET, DELAYED RELEASE ORAL at 05:42

## 2020-05-17 RX ADMIN — ATORVASTATIN CALCIUM 40 MG: 40 TABLET, FILM COATED ORAL at 17:17

## 2020-05-17 RX ADMIN — MELATONIN 1000 UNITS: at 09:08

## 2020-05-17 RX ADMIN — CEFTRIAXONE 1000 MG: 1 INJECTION, SOLUTION INTRAVENOUS at 09:06

## 2020-05-17 RX ADMIN — AMLODIPINE BESYLATE 2.5 MG: 2.5 TABLET ORAL at 09:07

## 2020-05-17 RX ADMIN — CARVEDILOL 12.5 MG: 12.5 TABLET, FILM COATED ORAL at 09:07

## 2020-05-17 RX ADMIN — ENOXAPARIN SODIUM 40 MG: 40 INJECTION, SOLUTION INTRAVENOUS; SUBCUTANEOUS at 09:07

## 2020-05-17 RX ADMIN — POTASSIUM CHLORIDE 40 MEQ: 1500 TABLET, EXTENDED RELEASE ORAL at 06:30

## 2020-05-17 RX ADMIN — CLOPIDOGREL BISULFATE 75 MG: 75 TABLET ORAL at 09:08

## 2020-05-17 RX ADMIN — METRONIDAZOLE 500 MG: 500 INJECTION, SOLUTION INTRAVENOUS at 00:25

## 2020-05-17 RX ADMIN — FAMOTIDINE 40 MG: 20 TABLET ORAL at 21:55

## 2020-05-18 LAB
ANION GAP SERPL CALCULATED.3IONS-SCNC: 6 MMOL/L (ref 4–13)
BASOPHILS # BLD AUTO: 0.05 THOUSANDS/ΜL (ref 0–0.1)
BASOPHILS NFR BLD AUTO: 0 % (ref 0–1)
BUN SERPL-MCNC: 6 MG/DL (ref 5–25)
CALCIUM SERPL-MCNC: 8 MG/DL (ref 8.3–10.1)
CHLORIDE SERPL-SCNC: 101 MMOL/L (ref 100–108)
CO2 SERPL-SCNC: 27 MMOL/L (ref 21–32)
CREAT SERPL-MCNC: 0.72 MG/DL (ref 0.6–1.3)
EOSINOPHIL # BLD AUTO: 0.26 THOUSAND/ΜL (ref 0–0.61)
EOSINOPHIL NFR BLD AUTO: 2 % (ref 0–6)
ERYTHROCYTE [DISTWIDTH] IN BLOOD BY AUTOMATED COUNT: 12.9 % (ref 11.6–15.1)
GFR SERPL CREATININE-BSD FRML MDRD: 89 ML/MIN/1.73SQ M
GLUCOSE SERPL-MCNC: 94 MG/DL (ref 65–140)
HCT VFR BLD AUTO: 40.3 % (ref 36.5–49.3)
HGB BLD-MCNC: 13.9 G/DL (ref 12–17)
IMM GRANULOCYTES # BLD AUTO: 0.04 THOUSAND/UL (ref 0–0.2)
IMM GRANULOCYTES NFR BLD AUTO: 0 % (ref 0–2)
LYMPHOCYTES # BLD AUTO: 1.02 THOUSANDS/ΜL (ref 0.6–4.47)
LYMPHOCYTES NFR BLD AUTO: 9 % (ref 14–44)
MAGNESIUM SERPL-MCNC: 1.9 MG/DL (ref 1.6–2.6)
MCH RBC QN AUTO: 30.2 PG (ref 26.8–34.3)
MCHC RBC AUTO-ENTMCNC: 34.5 G/DL (ref 31.4–37.4)
MCV RBC AUTO: 87 FL (ref 82–98)
MONOCYTES # BLD AUTO: 1.06 THOUSAND/ΜL (ref 0.17–1.22)
MONOCYTES NFR BLD AUTO: 9 % (ref 4–12)
NEUTROPHILS # BLD AUTO: 9.13 THOUSANDS/ΜL (ref 1.85–7.62)
NEUTS SEG NFR BLD AUTO: 80 % (ref 43–75)
NRBC BLD AUTO-RTO: 0 /100 WBCS
PLATELET # BLD AUTO: 166 THOUSANDS/UL (ref 149–390)
PMV BLD AUTO: 8.6 FL (ref 8.9–12.7)
POTASSIUM SERPL-SCNC: 4.1 MMOL/L (ref 3.5–5.3)
PROCALCITONIN SERPL-MCNC: <0.05 NG/ML
RBC # BLD AUTO: 4.61 MILLION/UL (ref 3.88–5.62)
SODIUM SERPL-SCNC: 134 MMOL/L (ref 136–145)
WBC # BLD AUTO: 11.56 THOUSAND/UL (ref 4.31–10.16)

## 2020-05-18 PROCEDURE — 99232 SBSQ HOSP IP/OBS MODERATE 35: CPT | Performed by: INTERNAL MEDICINE

## 2020-05-18 PROCEDURE — 85025 COMPLETE CBC W/AUTO DIFF WBC: CPT | Performed by: FAMILY MEDICINE

## 2020-05-18 PROCEDURE — 97163 PT EVAL HIGH COMPLEX 45 MIN: CPT

## 2020-05-18 PROCEDURE — 83735 ASSAY OF MAGNESIUM: CPT | Performed by: FAMILY MEDICINE

## 2020-05-18 PROCEDURE — 80048 BASIC METABOLIC PNL TOTAL CA: CPT | Performed by: NURSE PRACTITIONER

## 2020-05-18 PROCEDURE — 84145 PROCALCITONIN (PCT): CPT | Performed by: FAMILY MEDICINE

## 2020-05-18 PROCEDURE — 97166 OT EVAL MOD COMPLEX 45 MIN: CPT

## 2020-05-18 RX ADMIN — PANTOPRAZOLE SODIUM 40 MG: 40 TABLET, DELAYED RELEASE ORAL at 05:13

## 2020-05-18 RX ADMIN — CARVEDILOL 12.5 MG: 12.5 TABLET, FILM COATED ORAL at 17:53

## 2020-05-18 RX ADMIN — HYDRALAZINE HYDROCHLORIDE 10 MG: 20 INJECTION INTRAMUSCULAR; INTRAVENOUS at 15:25

## 2020-05-18 RX ADMIN — AMLODIPINE BESYLATE 2.5 MG: 2.5 TABLET ORAL at 09:12

## 2020-05-18 RX ADMIN — FOLIC ACID: 5 INJECTION, SOLUTION INTRAMUSCULAR; INTRAVENOUS; SUBCUTANEOUS at 09:36

## 2020-05-18 RX ADMIN — ENOXAPARIN SODIUM 40 MG: 40 INJECTION, SOLUTION INTRAVENOUS; SUBCUTANEOUS at 09:19

## 2020-05-18 RX ADMIN — ESCITALOPRAM OXALATE 10 MG: 10 TABLET ORAL at 09:13

## 2020-05-18 RX ADMIN — MELATONIN 1000 UNITS: at 09:13

## 2020-05-18 RX ADMIN — CARVEDILOL 12.5 MG: 12.5 TABLET, FILM COATED ORAL at 09:13

## 2020-05-18 RX ADMIN — CLOPIDOGREL BISULFATE 75 MG: 75 TABLET ORAL at 09:13

## 2020-05-18 RX ADMIN — FAMOTIDINE 40 MG: 20 TABLET ORAL at 22:35

## 2020-05-18 RX ADMIN — ATORVASTATIN CALCIUM 40 MG: 40 TABLET, FILM COATED ORAL at 16:11

## 2020-05-18 RX ADMIN — LISINOPRIL 40 MG: 20 TABLET ORAL at 09:14

## 2020-05-19 ENCOUNTER — TRANSITIONAL CARE MANAGEMENT (OUTPATIENT)
Dept: FAMILY MEDICINE CLINIC | Facility: HOSPITAL | Age: 80
End: 2020-05-19

## 2020-05-19 VITALS
BODY MASS INDEX: 25.16 KG/M2 | SYSTOLIC BLOOD PRESSURE: 152 MMHG | WEIGHT: 160.27 LBS | RESPIRATION RATE: 17 BRPM | DIASTOLIC BLOOD PRESSURE: 74 MMHG | TEMPERATURE: 97.6 F | HEIGHT: 67 IN | HEART RATE: 63 BPM | OXYGEN SATURATION: 97 %

## 2020-05-19 LAB
ANION GAP SERPL CALCULATED.3IONS-SCNC: 4 MMOL/L (ref 4–13)
BUN SERPL-MCNC: 6 MG/DL (ref 5–25)
CALCIUM SERPL-MCNC: 8.4 MG/DL (ref 8.3–10.1)
CHLORIDE SERPL-SCNC: 101 MMOL/L (ref 100–108)
CO2 SERPL-SCNC: 30 MMOL/L (ref 21–32)
CREAT SERPL-MCNC: 0.68 MG/DL (ref 0.6–1.3)
ERYTHROCYTE [DISTWIDTH] IN BLOOD BY AUTOMATED COUNT: 13 % (ref 11.6–15.1)
GFR SERPL CREATININE-BSD FRML MDRD: 91 ML/MIN/1.73SQ M
GLUCOSE SERPL-MCNC: 100 MG/DL (ref 65–140)
HCT VFR BLD AUTO: 40.6 % (ref 36.5–49.3)
HGB BLD-MCNC: 13.9 G/DL (ref 12–17)
MCH RBC QN AUTO: 30 PG (ref 26.8–34.3)
MCHC RBC AUTO-ENTMCNC: 34.2 G/DL (ref 31.4–37.4)
MCV RBC AUTO: 88 FL (ref 82–98)
PLATELET # BLD AUTO: 168 THOUSANDS/UL (ref 149–390)
PMV BLD AUTO: 8.6 FL (ref 8.9–12.7)
POTASSIUM SERPL-SCNC: 4 MMOL/L (ref 3.5–5.3)
RBC # BLD AUTO: 4.64 MILLION/UL (ref 3.88–5.62)
SODIUM SERPL-SCNC: 135 MMOL/L (ref 136–145)
WBC # BLD AUTO: 9.39 THOUSAND/UL (ref 4.31–10.16)

## 2020-05-19 PROCEDURE — 85027 COMPLETE CBC AUTOMATED: CPT | Performed by: INTERNAL MEDICINE

## 2020-05-19 PROCEDURE — 80048 BASIC METABOLIC PNL TOTAL CA: CPT | Performed by: INTERNAL MEDICINE

## 2020-05-19 PROCEDURE — 99239 HOSP IP/OBS DSCHRG MGMT >30: CPT | Performed by: INTERNAL MEDICINE

## 2020-05-19 RX ADMIN — CLOPIDOGREL BISULFATE 75 MG: 75 TABLET ORAL at 08:50

## 2020-05-19 RX ADMIN — ENOXAPARIN SODIUM 40 MG: 40 INJECTION, SOLUTION INTRAVENOUS; SUBCUTANEOUS at 09:05

## 2020-05-19 RX ADMIN — AMLODIPINE BESYLATE 2.5 MG: 2.5 TABLET ORAL at 08:50

## 2020-05-19 RX ADMIN — FOLIC ACID: 5 INJECTION, SOLUTION INTRAMUSCULAR; INTRAVENOUS; SUBCUTANEOUS at 09:02

## 2020-05-19 RX ADMIN — ESCITALOPRAM OXALATE 10 MG: 10 TABLET ORAL at 08:50

## 2020-05-19 RX ADMIN — LISINOPRIL 40 MG: 20 TABLET ORAL at 08:51

## 2020-05-19 RX ADMIN — MELATONIN 1000 UNITS: at 08:51

## 2020-05-19 RX ADMIN — CARVEDILOL 12.5 MG: 12.5 TABLET, FILM COATED ORAL at 08:51

## 2020-05-19 RX ADMIN — PANTOPRAZOLE SODIUM 40 MG: 40 TABLET, DELAYED RELEASE ORAL at 06:01

## 2020-05-20 ENCOUNTER — TELEPHONE (OUTPATIENT)
Dept: NEUROLOGY | Facility: CLINIC | Age: 80
End: 2020-05-20

## 2020-05-20 ENCOUNTER — TELEPHONE (OUTPATIENT)
Dept: NEPHROLOGY | Facility: CLINIC | Age: 80
End: 2020-05-20

## 2020-05-20 LAB
BACTERIA BLD CULT: NORMAL
BACTERIA BLD CULT: NORMAL

## 2020-05-21 ENCOUNTER — TELEPHONE (OUTPATIENT)
Dept: NEPHROLOGY | Facility: CLINIC | Age: 80
End: 2020-05-21

## 2020-06-01 ENCOUNTER — TELEPHONE (OUTPATIENT)
Dept: NEPHROLOGY | Facility: CLINIC | Age: 80
End: 2020-06-01

## 2020-06-02 ENCOUNTER — TELEMEDICINE (OUTPATIENT)
Dept: NEPHROLOGY | Facility: HOSPITAL | Age: 80
End: 2020-06-02
Payer: COMMERCIAL

## 2020-06-02 DIAGNOSIS — I10 ESSENTIAL HYPERTENSION: ICD-10-CM

## 2020-06-02 DIAGNOSIS — E87.1 HYPONATREMIA: Primary | ICD-10-CM

## 2020-06-02 DIAGNOSIS — E55.9 VITAMIN D DEFICIENCY: ICD-10-CM

## 2020-06-02 DIAGNOSIS — E78.5 HYPERLIPIDEMIA, UNSPECIFIED HYPERLIPIDEMIA TYPE: ICD-10-CM

## 2020-06-02 DIAGNOSIS — K21.9 GASTROESOPHAGEAL REFLUX DISEASE WITHOUT ESOPHAGITIS: ICD-10-CM

## 2020-06-02 DIAGNOSIS — M81.0 OSTEOPOROSIS: ICD-10-CM

## 2020-06-02 DIAGNOSIS — G45.9 TIA (TRANSIENT ISCHEMIC ATTACK): ICD-10-CM

## 2020-06-02 DIAGNOSIS — F10.10 ALCOHOL ABUSE, CONTINUOUS: ICD-10-CM

## 2020-06-02 PROCEDURE — 1160F RVW MEDS BY RX/DR IN RCRD: CPT | Performed by: NURSE PRACTITIONER

## 2020-06-02 PROCEDURE — 99214 OFFICE O/P EST MOD 30 MIN: CPT | Performed by: NURSE PRACTITIONER

## 2020-06-02 RX ORDER — LISINOPRIL 20 MG/1
20 TABLET ORAL DAILY
Qty: 30 TABLET | Refills: 3 | Status: SHIPPED | OUTPATIENT
Start: 2020-06-02 | End: 2020-06-09

## 2020-06-02 RX ORDER — LISINOPRIL 20 MG/1
20 TABLET ORAL DAILY
COMMUNITY
End: 2020-06-09

## 2020-06-09 ENCOUNTER — OFFICE VISIT (OUTPATIENT)
Dept: FAMILY MEDICINE CLINIC | Facility: HOSPITAL | Age: 80
End: 2020-06-09
Payer: COMMERCIAL

## 2020-06-09 VITALS
WEIGHT: 151.8 LBS | BODY MASS INDEX: 24.4 KG/M2 | SYSTOLIC BLOOD PRESSURE: 122 MMHG | TEMPERATURE: 97.3 F | OXYGEN SATURATION: 99 % | HEIGHT: 66 IN | HEART RATE: 56 BPM | DIASTOLIC BLOOD PRESSURE: 80 MMHG

## 2020-06-09 DIAGNOSIS — I10 ESSENTIAL HYPERTENSION: ICD-10-CM

## 2020-06-09 DIAGNOSIS — E87.1 HYPONATREMIA: ICD-10-CM

## 2020-06-09 DIAGNOSIS — E78.5 HYPERLIPIDEMIA, UNSPECIFIED HYPERLIPIDEMIA TYPE: ICD-10-CM

## 2020-06-09 DIAGNOSIS — Z09 HOSPITAL DISCHARGE FOLLOW-UP: Primary | ICD-10-CM

## 2020-06-09 DIAGNOSIS — F10.29 ALCOHOL DEPENDENCE WITH UNSPECIFIED ALCOHOL-INDUCED DISORDER (HCC): ICD-10-CM

## 2020-06-09 DIAGNOSIS — Z00.00 MEDICARE ANNUAL WELLNESS VISIT, SUBSEQUENT: ICD-10-CM

## 2020-06-09 DIAGNOSIS — G93.40 ACUTE ENCEPHALOPATHY: ICD-10-CM

## 2020-06-09 DIAGNOSIS — G45.9 TIA (TRANSIENT ISCHEMIC ATTACK): ICD-10-CM

## 2020-06-09 PROCEDURE — 3079F DIAST BP 80-89 MM HG: CPT | Performed by: NURSE PRACTITIONER

## 2020-06-09 PROCEDURE — 1160F RVW MEDS BY RX/DR IN RCRD: CPT | Performed by: NURSE PRACTITIONER

## 2020-06-09 PROCEDURE — 3074F SYST BP LT 130 MM HG: CPT | Performed by: NURSE PRACTITIONER

## 2020-06-09 PROCEDURE — 1111F DSCHRG MED/CURRENT MED MERGE: CPT | Performed by: NURSE PRACTITIONER

## 2020-06-09 PROCEDURE — 99214 OFFICE O/P EST MOD 30 MIN: CPT | Performed by: NURSE PRACTITIONER

## 2020-06-09 PROCEDURE — G0439 PPPS, SUBSEQ VISIT: HCPCS | Performed by: NURSE PRACTITIONER

## 2020-06-09 PROCEDURE — 4040F PNEUMOC VAC/ADMIN/RCVD: CPT | Performed by: NURSE PRACTITIONER

## 2020-06-09 PROCEDURE — 1036F TOBACCO NON-USER: CPT | Performed by: NURSE PRACTITIONER

## 2020-06-09 PROCEDURE — 1125F AMNT PAIN NOTED PAIN PRSNT: CPT | Performed by: NURSE PRACTITIONER

## 2020-06-09 PROCEDURE — 1170F FXNL STATUS ASSESSED: CPT | Performed by: NURSE PRACTITIONER

## 2020-06-09 PROCEDURE — 3008F BODY MASS INDEX DOCD: CPT | Performed by: NURSE PRACTITIONER

## 2020-06-09 RX ORDER — LISINOPRIL 20 MG/1
20 TABLET ORAL DAILY
Start: 2020-06-09 | End: 2020-07-06 | Stop reason: SDUPTHER

## 2020-06-09 RX ORDER — LISINOPRIL AND HYDROCHLOROTHIAZIDE 25; 20 MG/1; MG/1
1 TABLET ORAL DAILY
COMMUNITY
End: 2020-06-09 | Stop reason: ALTCHOICE

## 2020-07-06 DIAGNOSIS — I10 ESSENTIAL HYPERTENSION: ICD-10-CM

## 2020-07-06 RX ORDER — LISINOPRIL 20 MG/1
20 TABLET ORAL DAILY
Refills: 0
Start: 2020-07-06 | End: 2021-02-23 | Stop reason: ALTCHOICE

## 2020-07-15 ENCOUNTER — HOSPITAL ENCOUNTER (OUTPATIENT)
Dept: NON INVASIVE DIAGNOSTICS | Age: 80
Discharge: HOME | End: 2020-07-15
Payer: COMMERCIAL

## 2020-07-15 DIAGNOSIS — I65.23 CAROTID STENOSIS, ASYMPTOMATIC, BILATERAL: ICD-10-CM

## 2020-07-15 PROCEDURE — 93880 EXTRACRANIAL BILAT STUDY: CPT | Performed by: SURGERY

## 2020-07-15 PROCEDURE — 93880 EXTRACRANIAL BILAT STUDY: CPT

## 2020-08-07 DIAGNOSIS — I10 ESSENTIAL HYPERTENSION: ICD-10-CM

## 2020-08-07 DIAGNOSIS — K21.9 GASTROESOPHAGEAL REFLUX DISEASE WITHOUT ESOPHAGITIS: ICD-10-CM

## 2020-08-07 DIAGNOSIS — G45.9 TIA (TRANSIENT ISCHEMIC ATTACK): ICD-10-CM

## 2020-08-07 RX ORDER — ATORVASTATIN CALCIUM 40 MG/1
TABLET, FILM COATED ORAL
Qty: 90 TABLET | Refills: 0 | Status: SHIPPED | OUTPATIENT
Start: 2020-08-07 | End: 2020-08-10

## 2020-08-07 RX ORDER — PANTOPRAZOLE SODIUM 40 MG/1
TABLET, DELAYED RELEASE ORAL
Qty: 90 TABLET | Refills: 0 | Status: SHIPPED | OUTPATIENT
Start: 2020-08-07 | End: 2020-08-10

## 2020-08-07 RX ORDER — CLOPIDOGREL BISULFATE 75 MG/1
TABLET ORAL
Qty: 90 TABLET | Refills: 0 | Status: SHIPPED | OUTPATIENT
Start: 2020-08-07 | End: 2020-08-10

## 2020-08-07 RX ORDER — THIAMINE MONONITRATE (VIT B1) 100 MG
TABLET ORAL
Qty: 90 TABLET | Refills: 0 | Status: SHIPPED | OUTPATIENT
Start: 2020-08-07 | End: 2020-08-10

## 2020-08-07 RX ORDER — CARVEDILOL 12.5 MG/1
TABLET ORAL
Qty: 180 TABLET | Refills: 0 | Status: SHIPPED | OUTPATIENT
Start: 2020-08-07 | End: 2020-08-10

## 2020-08-10 DIAGNOSIS — K21.9 GASTROESOPHAGEAL REFLUX DISEASE, ESOPHAGITIS PRESENCE NOT SPECIFIED: ICD-10-CM

## 2020-08-10 DIAGNOSIS — I10 ESSENTIAL HYPERTENSION: ICD-10-CM

## 2020-08-10 DIAGNOSIS — G45.9 TIA (TRANSIENT ISCHEMIC ATTACK): ICD-10-CM

## 2020-08-10 DIAGNOSIS — K21.9 GASTROESOPHAGEAL REFLUX DISEASE WITHOUT ESOPHAGITIS: ICD-10-CM

## 2020-08-10 RX ORDER — FAMOTIDINE 40 MG/1
TABLET, FILM COATED ORAL
Qty: 90 TABLET | Refills: 0 | Status: SHIPPED | OUTPATIENT
Start: 2020-08-10 | End: 2020-08-25

## 2020-08-10 RX ORDER — AMLODIPINE BESYLATE 2.5 MG/1
TABLET ORAL
Qty: 90 TABLET | Refills: 0 | Status: SHIPPED | OUTPATIENT
Start: 2020-08-10 | End: 2021-03-09

## 2020-08-10 RX ORDER — CLOPIDOGREL BISULFATE 75 MG/1
TABLET ORAL
Qty: 90 TABLET | Refills: 0 | Status: SHIPPED | OUTPATIENT
Start: 2020-08-10 | End: 2021-03-09

## 2020-08-10 RX ORDER — THIAMINE MONONITRATE (VIT B1) 100 MG
TABLET ORAL
Qty: 90 TABLET | Refills: 0 | Status: SHIPPED | OUTPATIENT
Start: 2020-08-10 | End: 2021-11-18 | Stop reason: SDUPTHER

## 2020-08-10 RX ORDER — PANTOPRAZOLE SODIUM 40 MG/1
TABLET, DELAYED RELEASE ORAL
Qty: 90 TABLET | Refills: 0 | Status: SHIPPED | OUTPATIENT
Start: 2020-08-10 | End: 2021-03-09

## 2020-08-10 RX ORDER — CARVEDILOL 12.5 MG/1
TABLET ORAL
Qty: 180 TABLET | Refills: 0 | Status: SHIPPED | OUTPATIENT
Start: 2020-08-10 | End: 2021-03-09

## 2020-08-10 RX ORDER — LISINOPRIL AND HYDROCHLOROTHIAZIDE 25; 20 MG/1; MG/1
TABLET ORAL
Qty: 90 TABLET | Refills: 0 | Status: SHIPPED | OUTPATIENT
Start: 2020-08-10 | End: 2020-08-25

## 2020-08-10 RX ORDER — ATORVASTATIN CALCIUM 40 MG/1
TABLET, FILM COATED ORAL
Qty: 90 TABLET | Refills: 0 | Status: SHIPPED | OUTPATIENT
Start: 2020-08-10 | End: 2020-09-29

## 2020-08-11 ENCOUNTER — TELEPHONE (OUTPATIENT)
Dept: NEPHROLOGY | Facility: CLINIC | Age: 80
End: 2020-08-11

## 2020-08-14 LAB
25(OH)D3+25(OH)D2 SERPL-MCNC: 32.7 NG/ML (ref 30–100)
ALBUMIN SERPL-MCNC: 4.3 G/DL (ref 3.7–4.7)
ALBUMIN/GLOB SERPL: 1.9 {RATIO} (ref 1.2–2.2)
ALP SERPL-CCNC: 67 IU/L (ref 39–117)
ALT SERPL-CCNC: 23 IU/L (ref 0–44)
AST SERPL-CCNC: 23 IU/L (ref 0–40)
BASOPHILS # BLD AUTO: 0 X10E3/UL (ref 0–0.2)
BASOPHILS NFR BLD AUTO: 1 %
BILIRUB SERPL-MCNC: 1 MG/DL (ref 0–1.2)
BUN SERPL-MCNC: 12 MG/DL (ref 8–27)
BUN/CREAT SERPL: 16 (ref 10–24)
CALCIUM SERPL-MCNC: 9.2 MG/DL (ref 8.6–10.2)
CHLORIDE SERPL-SCNC: 98 MMOL/L (ref 96–106)
CHOLEST SERPL-MCNC: 142 MG/DL (ref 100–199)
CHOLEST/HDLC SERPL: 1.8 RATIO (ref 0–5)
CO2 SERPL-SCNC: 27 MMOL/L (ref 20–29)
CREAT SERPL-MCNC: 0.76 MG/DL (ref 0.76–1.27)
EOSINOPHIL # BLD AUTO: 0.4 X10E3/UL (ref 0–0.4)
EOSINOPHIL NFR BLD AUTO: 6 %
ERYTHROCYTE [DISTWIDTH] IN BLOOD BY AUTOMATED COUNT: 12.9 % (ref 11.6–15.4)
GLOBULIN SER-MCNC: 2.3 G/DL (ref 1.5–4.5)
GLUCOSE SERPL-MCNC: 90 MG/DL (ref 65–99)
HCT VFR BLD AUTO: 43.1 % (ref 37.5–51)
HDLC SERPL-MCNC: 78 MG/DL
HGB BLD-MCNC: 14.3 G/DL (ref 13–17.7)
IMM GRANULOCYTES # BLD: 0 X10E3/UL (ref 0–0.1)
IMM GRANULOCYTES NFR BLD: 0 %
LDLC SERPL CALC-MCNC: 52 MG/DL (ref 0–99)
LYMPHOCYTES # BLD AUTO: 1.5 X10E3/UL (ref 0.7–3.1)
LYMPHOCYTES NFR BLD AUTO: 25 %
MCH RBC QN AUTO: 29 PG (ref 26.6–33)
MCHC RBC AUTO-ENTMCNC: 33.2 G/DL (ref 31.5–35.7)
MCV RBC AUTO: 87 FL (ref 79–97)
MONOCYTES # BLD AUTO: 0.6 X10E3/UL (ref 0.1–0.9)
MONOCYTES NFR BLD AUTO: 10 %
NEUTROPHILS # BLD AUTO: 3.3 X10E3/UL (ref 1.4–7)
NEUTROPHILS NFR BLD AUTO: 58 %
PLATELET # BLD AUTO: 194 X10E3/UL (ref 150–450)
POTASSIUM SERPL-SCNC: 4.3 MMOL/L (ref 3.5–5.2)
PROT SERPL-MCNC: 6.6 G/DL (ref 6–8.5)
RBC # BLD AUTO: 4.93 X10E6/UL (ref 4.14–5.8)
SL AMB EGFR AFRICAN AMERICAN: 100 ML/MIN/1.73
SL AMB EGFR NON AFRICAN AMERICAN: 87 ML/MIN/1.73
SL AMB VLDL CHOLESTEROL CALC: 12 MG/DL (ref 5–40)
SODIUM SERPL-SCNC: 138 MMOL/L (ref 134–144)
TRIGL SERPL-MCNC: 58 MG/DL (ref 0–149)
TSH SERPL DL<=0.005 MIU/L-ACNC: 1.11 UIU/ML (ref 0.45–4.5)
WBC # BLD AUTO: 5.7 X10E3/UL (ref 3.4–10.8)

## 2020-08-25 ENCOUNTER — OFFICE VISIT (OUTPATIENT)
Dept: FAMILY MEDICINE CLINIC | Facility: HOSPITAL | Age: 80
End: 2020-08-25
Payer: COMMERCIAL

## 2020-08-25 VITALS
HEIGHT: 66 IN | BODY MASS INDEX: 25.52 KG/M2 | HEART RATE: 52 BPM | TEMPERATURE: 98.4 F | DIASTOLIC BLOOD PRESSURE: 76 MMHG | WEIGHT: 158.8 LBS | SYSTOLIC BLOOD PRESSURE: 112 MMHG | OXYGEN SATURATION: 99 %

## 2020-08-25 DIAGNOSIS — Z23 ENCOUNTER FOR IMMUNIZATION: ICD-10-CM

## 2020-08-25 DIAGNOSIS — E78.5 HYPERLIPIDEMIA, UNSPECIFIED HYPERLIPIDEMIA TYPE: ICD-10-CM

## 2020-08-25 DIAGNOSIS — I10 ESSENTIAL HYPERTENSION: Primary | ICD-10-CM

## 2020-08-25 PROBLEM — I16.0 HYPERTENSIVE URGENCY: Status: RESOLVED | Noted: 2017-04-13 | Resolved: 2020-08-25

## 2020-08-25 PROCEDURE — 90662 IIV NO PRSV INCREASED AG IM: CPT

## 2020-08-25 PROCEDURE — 4040F PNEUMOC VAC/ADMIN/RCVD: CPT | Performed by: NURSE PRACTITIONER

## 2020-08-25 PROCEDURE — 1160F RVW MEDS BY RX/DR IN RCRD: CPT | Performed by: NURSE PRACTITIONER

## 2020-08-25 PROCEDURE — 3008F BODY MASS INDEX DOCD: CPT | Performed by: NURSE PRACTITIONER

## 2020-08-25 PROCEDURE — 3074F SYST BP LT 130 MM HG: CPT | Performed by: NURSE PRACTITIONER

## 2020-08-25 PROCEDURE — 3078F DIAST BP <80 MM HG: CPT | Performed by: NURSE PRACTITIONER

## 2020-08-25 PROCEDURE — G0008 ADMIN INFLUENZA VIRUS VAC: HCPCS

## 2020-08-25 PROCEDURE — 99214 OFFICE O/P EST MOD 30 MIN: CPT | Performed by: NURSE PRACTITIONER

## 2020-08-25 PROCEDURE — 1036F TOBACCO NON-USER: CPT | Performed by: NURSE PRACTITIONER

## 2020-08-25 NOTE — PROGRESS NOTES
Assessment/Plan:    Essential hypertension  BP stable on meds as rx'd  Continue same doses and return in 6 months for next BP check  Hyperlipidemia  FLP at goal on daily statin  Continue same dose and recheck in 6 months as ordered before next OV  Diagnoses and all orders for this visit:    Essential hypertension    Hyperlipidemia, unspecified hyperlipidemia type  -     Comprehensive metabolic panel; Future  -     Lipid panel; Future  -     Comprehensive metabolic panel  -     Lipid panel    Encounter for immunization  -     influenza vaccine, high-dose, PF 0 7 mL (FLUZONE HIGH-DOSE)      Flu shot given  Subjective:      Patient ID: Lisbet Barclay is a 79 y o  male  States he has been OK  Denies concerns or questions  Hasn't seen other specialists since last appt  Compliant w/meds as listed on med list        The following portions of the patient's history were reviewed and updated as appropriate: allergies, current medications, past medical history, past social history, past surgical history and problem list     Review of Systems   Constitutional: Negative  Respiratory: Negative  Cardiovascular: Negative  Gastrointestinal: Negative  Musculoskeletal: Positive for neck pain and neck stiffness  Neurological: Positive for headaches (at neck)  Objective:      /76 (Patient Position: Sitting, Cuff Size: Standard)   Pulse (!) 52   Temp 98 4 °F (36 9 °C) (Tympanic)   Ht 5' 6" (1 676 m)   Wt 72 kg (158 lb 12 8 oz)   SpO2 99%   BMI 25 63 kg/m²          Physical Exam  Vitals signs reviewed  Constitutional:       General: He is not in acute distress  Appearance: Normal appearance  HENT:      Head: Normocephalic and atraumatic  Eyes:      General: No scleral icterus  Neck:      Thyroid: No thyromegaly  Vascular: No carotid bruit  Cardiovascular:      Rate and Rhythm: Normal rate and regular rhythm  Heart sounds: Murmur (2/6 JOI) present  Pulmonary:      Effort: Pulmonary effort is normal  No respiratory distress  Breath sounds: Normal breath sounds  Musculoskeletal:      Right lower leg: No edema  Left lower leg: No edema  Lymphadenopathy:      Cervical: No cervical adenopathy  Skin:     General: Skin is warm and dry  Neurological:      General: No focal deficit present  Mental Status: He is alert and oriented to person, place, and time  Cranial Nerves: No cranial nerve deficit  Psychiatric:         Mood and Affect: Mood normal          Behavior: Behavior normal          Thought Content:  Thought content normal          Judgment: Judgment normal

## 2020-08-31 ENCOUNTER — TELEPHONE (OUTPATIENT)
Dept: NEPHROLOGY | Facility: CLINIC | Age: 80
End: 2020-08-31

## 2020-09-04 ENCOUNTER — HOSPITAL ENCOUNTER (INPATIENT)
Facility: HOSPITAL | Age: 80
LOS: 6 days | Discharge: NURSING, REHAB OR LONG-TERM CARE | DRG: 132 | End: 2020-09-10
Attending: SURGERY | Admitting: SURGERY
Payer: COMMERCIAL

## 2020-09-04 ENCOUNTER — APPOINTMENT (INPATIENT)
Dept: RADIOLOGY | Facility: HOSPITAL | Age: 80
DRG: 132 | End: 2020-09-04
Payer: COMMERCIAL

## 2020-09-04 ENCOUNTER — APPOINTMENT (EMERGENCY)
Dept: RADIOLOGY | Facility: HOSPITAL | Age: 80
DRG: 132 | End: 2020-09-04
Payer: COMMERCIAL

## 2020-09-04 ENCOUNTER — ANESTHESIA (INPATIENT)
Dept: PERIOP | Facility: HOSPITAL | Age: 80
DRG: 132 | End: 2020-09-04
Payer: COMMERCIAL

## 2020-09-04 ENCOUNTER — ANESTHESIA EVENT (INPATIENT)
Dept: PERIOP | Facility: HOSPITAL | Age: 80
DRG: 132 | End: 2020-09-04
Payer: COMMERCIAL

## 2020-09-04 DIAGNOSIS — S02.92XA CLOSED EXTENSIVE FACIAL FRACTURES (HCC): ICD-10-CM

## 2020-09-04 DIAGNOSIS — S02.92XB OPEN EXTENSIVE FACIAL FRACTURES, INITIAL ENCOUNTER (HCC): Primary | ICD-10-CM

## 2020-09-04 LAB
ABO GROUP BLD: NORMAL
ABO GROUP BLD: NORMAL
ANION GAP SERPL CALCULATED.3IONS-SCNC: 6 MMOL/L (ref 4–13)
APTT PPP: 26 SECONDS (ref 23–37)
BASE EXCESS BLDA CALC-SCNC: 0 MMOL/L (ref -2–3)
BASOPHILS # BLD AUTO: 0.04 THOUSANDS/ΜL (ref 0–0.1)
BASOPHILS NFR BLD AUTO: 1 % (ref 0–1)
BLD GP AB SCN SERPL QL: NEGATIVE
BUN SERPL-MCNC: 12 MG/DL (ref 5–25)
CA-I BLD-SCNC: 1.17 MMOL/L (ref 1.12–1.32)
CALCIUM SERPL-MCNC: 8.5 MG/DL (ref 8.3–10.1)
CHLORIDE SERPL-SCNC: 104 MMOL/L (ref 100–108)
CO2 SERPL-SCNC: 26 MMOL/L (ref 21–32)
CREAT SERPL-MCNC: 0.75 MG/DL (ref 0.6–1.3)
EOSINOPHIL # BLD AUTO: 0.27 THOUSAND/ΜL (ref 0–0.61)
EOSINOPHIL NFR BLD AUTO: 4 % (ref 0–6)
ERYTHROCYTE [DISTWIDTH] IN BLOOD BY AUTOMATED COUNT: 13.2 % (ref 11.6–15.1)
GFR SERPL CREATININE-BSD FRML MDRD: 87 ML/MIN/1.73SQ M
GLUCOSE SERPL-MCNC: 101 MG/DL (ref 65–140)
GLUCOSE SERPL-MCNC: 101 MG/DL (ref 65–140)
HCO3 BLDA-SCNC: 25.4 MMOL/L (ref 24–30)
HCT VFR BLD AUTO: 40.7 % (ref 36.5–49.3)
HCT VFR BLD AUTO: 41.4 % (ref 36.5–49.3)
HCT VFR BLD CALC: 41 % (ref 36.5–49.3)
HGB BLD-MCNC: 13.4 G/DL (ref 12–17)
HGB BLD-MCNC: 13.7 G/DL (ref 12–17)
HGB BLDA-MCNC: 13.9 G/DL (ref 12–17)
IMM GRANULOCYTES # BLD AUTO: 0.02 THOUSAND/UL (ref 0–0.2)
IMM GRANULOCYTES NFR BLD AUTO: 0 % (ref 0–2)
INR PPP: 1.08 (ref 0.84–1.19)
LYMPHOCYTES # BLD AUTO: 1.58 THOUSANDS/ΜL (ref 0.6–4.47)
LYMPHOCYTES NFR BLD AUTO: 25 % (ref 14–44)
MCH RBC QN AUTO: 29.8 PG (ref 26.8–34.3)
MCHC RBC AUTO-ENTMCNC: 33.1 G/DL (ref 31.4–37.4)
MCV RBC AUTO: 90 FL (ref 82–98)
MONOCYTES # BLD AUTO: 0.74 THOUSAND/ΜL (ref 0.17–1.22)
MONOCYTES NFR BLD AUTO: 12 % (ref 4–12)
NEUTROPHILS # BLD AUTO: 3.77 THOUSANDS/ΜL (ref 1.85–7.62)
NEUTS SEG NFR BLD AUTO: 58 % (ref 43–75)
NRBC BLD AUTO-RTO: 0 /100 WBCS
PCO2 BLD: 27 MMOL/L (ref 21–32)
PCO2 BLD: 44.8 MM HG (ref 42–50)
PH BLD: 7.36 [PH] (ref 7.3–7.4)
PLATELET # BLD AUTO: 195 THOUSANDS/UL (ref 149–390)
PMV BLD AUTO: 9.1 FL (ref 8.9–12.7)
PO2 BLD: 38 MM HG (ref 35–45)
POTASSIUM BLD-SCNC: 4.1 MMOL/L (ref 3.5–5.3)
POTASSIUM SERPL-SCNC: 3.9 MMOL/L (ref 3.5–5.3)
PROTHROMBIN TIME: 14 SECONDS (ref 11.6–14.5)
RBC # BLD AUTO: 4.6 MILLION/UL (ref 3.88–5.62)
RH BLD: POSITIVE
RH BLD: POSITIVE
SAO2 % BLD FROM PO2: 69 % (ref 60–85)
SODIUM BLD-SCNC: 137 MMOL/L (ref 136–145)
SODIUM SERPL-SCNC: 136 MMOL/L (ref 136–145)
SPECIMEN EXPIRATION DATE: NORMAL
SPECIMEN SOURCE: NORMAL
WBC # BLD AUTO: 6.42 THOUSAND/UL (ref 4.31–10.16)

## 2020-09-04 PROCEDURE — 82803 BLOOD GASES ANY COMBINATION: CPT

## 2020-09-04 PROCEDURE — 99222 1ST HOSP IP/OBS MODERATE 55: CPT | Performed by: SURGERY

## 2020-09-04 PROCEDURE — 0CDWXZ1 EXTRACTION OF UPPER TOOTH, MULTIPLE, EXTERNAL APPROACH: ICD-10-PCS | Performed by: ORAL & MAXILLOFACIAL SURGERY

## 2020-09-04 PROCEDURE — C1713 ANCHOR/SCREW BN/BN,TIS/BN: HCPCS | Performed by: ORAL & MAXILLOFACIAL SURGERY

## 2020-09-04 PROCEDURE — 70486 CT MAXILLOFACIAL W/O DYE: CPT

## 2020-09-04 PROCEDURE — 85018 HEMOGLOBIN: CPT | Performed by: SURGERY

## 2020-09-04 PROCEDURE — 86850 RBC ANTIBODY SCREEN: CPT | Performed by: SURGERY

## 2020-09-04 PROCEDURE — 0NSB04Z REPOSITION NASAL BONE WITH INTERNAL FIXATION DEVICE, OPEN APPROACH: ICD-10-PCS | Performed by: ORAL & MAXILLOFACIAL SURGERY

## 2020-09-04 PROCEDURE — 70450 CT HEAD/BRAIN W/O DYE: CPT

## 2020-09-04 PROCEDURE — 08QQXZZ REPAIR RIGHT LOWER EYELID, EXTERNAL APPROACH: ICD-10-PCS | Performed by: ORAL & MAXILLOFACIAL SURGERY

## 2020-09-04 PROCEDURE — 90715 TDAP VACCINE 7 YRS/> IM: CPT | Performed by: SURGERY

## 2020-09-04 PROCEDURE — 86901 BLOOD TYPING SEROLOGIC RH(D): CPT | Performed by: SURGERY

## 2020-09-04 PROCEDURE — 82947 ASSAY GLUCOSE BLOOD QUANT: CPT

## 2020-09-04 PROCEDURE — 36415 COLL VENOUS BLD VENIPUNCTURE: CPT | Performed by: EMERGENCY MEDICINE

## 2020-09-04 PROCEDURE — 82330 ASSAY OF CALCIUM: CPT

## 2020-09-04 PROCEDURE — 86900 BLOOD TYPING SEROLOGIC ABO: CPT | Performed by: SURGERY

## 2020-09-04 PROCEDURE — 80048 BASIC METABOLIC PNL TOTAL CA: CPT | Performed by: SURGERY

## 2020-09-04 PROCEDURE — 0CQ1XZZ REPAIR LOWER LIP, EXTERNAL APPROACH: ICD-10-PCS | Performed by: ORAL & MAXILLOFACIAL SURGERY

## 2020-09-04 PROCEDURE — 90471 IMMUNIZATION ADMIN: CPT

## 2020-09-04 PROCEDURE — NC001 PR NO CHARGE: Performed by: EMERGENCY MEDICINE

## 2020-09-04 PROCEDURE — 84295 ASSAY OF SERUM SODIUM: CPT

## 2020-09-04 PROCEDURE — 85610 PROTHROMBIN TIME: CPT | Performed by: SURGERY

## 2020-09-04 PROCEDURE — 71260 CT THORAX DX C+: CPT

## 2020-09-04 PROCEDURE — 0NSR04Z REPOSITION MAXILLA WITH INTERNAL FIXATION DEVICE, OPEN APPROACH: ICD-10-PCS | Performed by: ORAL & MAXILLOFACIAL SURGERY

## 2020-09-04 PROCEDURE — 0CDXXZ1 EXTRACTION OF LOWER TOOTH, MULTIPLE, EXTERNAL APPROACH: ICD-10-PCS | Performed by: ORAL & MAXILLOFACIAL SURGERY

## 2020-09-04 PROCEDURE — 99285 EMERGENCY DEPT VISIT HI MDM: CPT

## 2020-09-04 PROCEDURE — 84132 ASSAY OF SERUM POTASSIUM: CPT

## 2020-09-04 PROCEDURE — 85025 COMPLETE CBC W/AUTO DIFF WBC: CPT | Performed by: SURGERY

## 2020-09-04 PROCEDURE — 85730 THROMBOPLASTIN TIME PARTIAL: CPT | Performed by: SURGERY

## 2020-09-04 PROCEDURE — 74177 CT ABD & PELVIS W/CONTRAST: CPT

## 2020-09-04 PROCEDURE — 09QKXZZ REPAIR NASAL MUCOSA AND SOFT TISSUE, EXTERNAL APPROACH: ICD-10-PCS | Performed by: ORAL & MAXILLOFACIAL SURGERY

## 2020-09-04 PROCEDURE — 85014 HEMATOCRIT: CPT | Performed by: SURGERY

## 2020-09-04 PROCEDURE — 72125 CT NECK SPINE W/O DYE: CPT

## 2020-09-04 PROCEDURE — 85014 HEMATOCRIT: CPT

## 2020-09-04 DEVICE — TI MATRIXMIDFACE DOUBLE Y-PL 6 HOLES/0.5MM THICK
Type: IMPLANTABLE DEVICE | Status: FUNCTIONAL
Brand: MATRIXMIDFACE

## 2020-09-04 DEVICE — TI MATRIXMIDFACE SCREW SELF-DRILLING 5MM
Type: IMPLANTABLE DEVICE | Status: FUNCTIONAL
Brand: MATRIXMIDFACE

## 2020-09-04 DEVICE — TI MATRIXMIDFACE OBLIQUE L-PL 2X3 HOLES-RIGHT/0.5MM THICK
Type: IMPLANTABLE DEVICE | Status: FUNCTIONAL
Brand: MATRIXMIDFACE

## 2020-09-04 DEVICE — TI MATRIXMIDFACE SCREW SELF-DRILLING 6MM
Type: IMPLANTABLE DEVICE | Status: FUNCTIONAL
Brand: MATRIXMIDFACE

## 2020-09-04 DEVICE — TI MATRIXMIDFACE OBLIQUE L-PL 2X3 HOLES-LEFT/0.5MM THICK
Type: IMPLANTABLE DEVICE | Status: FUNCTIONAL
Brand: MATRIXMIDFACE

## 2020-09-04 RX ORDER — CHLORHEXIDINE GLUCONATE 0.12 MG/ML
15 RINSE ORAL EVERY 12 HOURS SCHEDULED
Status: DISCONTINUED | OUTPATIENT
Start: 2020-09-04 | End: 2020-09-05

## 2020-09-04 RX ORDER — BALANCED SALT SOLUTION 6.4; .75; .48; .3; 3.9; 1.7 MG/ML; MG/ML; MG/ML; MG/ML; MG/ML; MG/ML
SOLUTION OPHTHALMIC AS NEEDED
Status: DISCONTINUED | OUTPATIENT
Start: 2020-09-04 | End: 2020-09-05 | Stop reason: HOSPADM

## 2020-09-04 RX ORDER — KETAMINE HYDROCHLORIDE 50 MG/ML
INJECTION, SOLUTION, CONCENTRATE INTRAMUSCULAR; INTRAVENOUS AS NEEDED
Status: DISCONTINUED | OUTPATIENT
Start: 2020-09-04 | End: 2020-09-05

## 2020-09-04 RX ORDER — MIDAZOLAM HYDROCHLORIDE 2 MG/2ML
INJECTION, SOLUTION INTRAMUSCULAR; INTRAVENOUS AS NEEDED
Status: DISCONTINUED | OUTPATIENT
Start: 2020-09-04 | End: 2020-09-05

## 2020-09-04 RX ORDER — PROPOFOL 10 MG/ML
INJECTION, EMULSION INTRAVENOUS AS NEEDED
Status: DISCONTINUED | OUTPATIENT
Start: 2020-09-04 | End: 2020-09-05

## 2020-09-04 RX ORDER — LIDOCAINE HYDROCHLORIDE AND EPINEPHRINE 10; 10 MG/ML; UG/ML
INJECTION, SOLUTION INFILTRATION; PERINEURAL AS NEEDED
Status: DISCONTINUED | OUTPATIENT
Start: 2020-09-04 | End: 2020-09-05 | Stop reason: HOSPADM

## 2020-09-04 RX ORDER — FENTANYL CITRATE 50 UG/ML
INJECTION, SOLUTION INTRAMUSCULAR; INTRAVENOUS AS NEEDED
Status: DISCONTINUED | OUTPATIENT
Start: 2020-09-04 | End: 2020-09-05

## 2020-09-04 RX ORDER — EPHEDRINE SULFATE 50 MG/ML
INJECTION INTRAVENOUS AS NEEDED
Status: DISCONTINUED | OUTPATIENT
Start: 2020-09-04 | End: 2020-09-05

## 2020-09-04 RX ORDER — CEFAZOLIN SODIUM 1 G/3ML
INJECTION, POWDER, FOR SOLUTION INTRAMUSCULAR; INTRAVENOUS AS NEEDED
Status: DISCONTINUED | OUTPATIENT
Start: 2020-09-04 | End: 2020-09-05

## 2020-09-04 RX ORDER — DEXAMETHASONE SODIUM PHOSPHATE 10 MG/ML
INJECTION, SOLUTION INTRAMUSCULAR; INTRAVENOUS AS NEEDED
Status: DISCONTINUED | OUTPATIENT
Start: 2020-09-04 | End: 2020-09-05

## 2020-09-04 RX ORDER — SODIUM CHLORIDE 9 MG/ML
INJECTION, SOLUTION INTRAVENOUS CONTINUOUS PRN
Status: DISCONTINUED | OUTPATIENT
Start: 2020-09-04 | End: 2020-09-05

## 2020-09-04 RX ORDER — ROCURONIUM BROMIDE 10 MG/ML
INJECTION, SOLUTION INTRAVENOUS AS NEEDED
Status: DISCONTINUED | OUTPATIENT
Start: 2020-09-04 | End: 2020-09-05

## 2020-09-04 RX ORDER — SODIUM CHLORIDE, SODIUM LACTATE, POTASSIUM CHLORIDE, CALCIUM CHLORIDE 600; 310; 30; 20 MG/100ML; MG/100ML; MG/100ML; MG/100ML
100 INJECTION, SOLUTION INTRAVENOUS CONTINUOUS
Status: DISCONTINUED | OUTPATIENT
Start: 2020-09-04 | End: 2020-09-05

## 2020-09-04 RX ADMIN — PHENYLEPHRINE HYDROCHLORIDE 20 MCG/MIN: 10 INJECTION INTRAVENOUS at 22:08

## 2020-09-04 RX ADMIN — MIDAZOLAM 0.5 MG: 1 INJECTION INTRAMUSCULAR; INTRAVENOUS at 21:29

## 2020-09-04 RX ADMIN — ROCURONIUM BROMIDE 50 MG: 10 INJECTION, SOLUTION INTRAVENOUS at 21:36

## 2020-09-04 RX ADMIN — CEFAZOLIN 1000 MG: 1 INJECTION, POWDER, FOR SOLUTION INTRAVENOUS at 21:50

## 2020-09-04 RX ADMIN — TETANUS TOXOID, REDUCED DIPHTHERIA TOXOID AND ACELLULAR PERTUSSIS VACCINE, ADSORBED 0.5 ML: 5; 2.5; 8; 8; 2.5 SUSPENSION INTRAMUSCULAR at 20:22

## 2020-09-04 RX ADMIN — MIDAZOLAM 0.5 MG: 1 INJECTION INTRAMUSCULAR; INTRAVENOUS at 21:33

## 2020-09-04 RX ADMIN — KETAMINE HYDROCHLORIDE 30 MG: 50 INJECTION, SOLUTION INTRAMUSCULAR; INTRAVENOUS at 21:29

## 2020-09-04 RX ADMIN — PROPOFOL 100 MG: 10 INJECTION, EMULSION INTRAVENOUS at 21:36

## 2020-09-04 RX ADMIN — IOHEXOL 100 ML: 350 INJECTION, SOLUTION INTRAVENOUS at 18:20

## 2020-09-04 RX ADMIN — PHENYLEPHRINE HYDROCHLORIDE 100 MCG: 10 INJECTION INTRAVENOUS at 21:47

## 2020-09-04 RX ADMIN — DEXAMETHASONE SODIUM PHOSPHATE 10 MG: 10 INJECTION, SOLUTION INTRAMUSCULAR; INTRAVENOUS at 21:54

## 2020-09-04 RX ADMIN — EPHEDRINE SULFATE 10 MG: 50 INJECTION, SOLUTION INTRAVENOUS at 22:15

## 2020-09-04 RX ADMIN — PHENYLEPHRINE HYDROCHLORIDE 100 MCG: 10 INJECTION INTRAVENOUS at 22:12

## 2020-09-04 RX ADMIN — DEXMEDETOMIDINE 0.7 MCG/KG/HR: 100 INJECTION, SOLUTION, CONCENTRATE INTRAVENOUS at 21:29

## 2020-09-04 RX ADMIN — SODIUM CHLORIDE, SODIUM LACTATE, POTASSIUM CHLORIDE, AND CALCIUM CHLORIDE 100 ML/HR: .6; .31; .03; .02 INJECTION, SOLUTION INTRAVENOUS at 19:59

## 2020-09-04 RX ADMIN — FENTANYL CITRATE 100 MCG: 50 INJECTION, SOLUTION INTRAMUSCULAR; INTRAVENOUS at 21:36

## 2020-09-04 RX ADMIN — PHENYLEPHRINE HYDROCHLORIDE 200 MCG: 10 INJECTION INTRAVENOUS at 21:50

## 2020-09-04 RX ADMIN — EPHEDRINE SULFATE 5 MG: 50 INJECTION, SOLUTION INTRAVENOUS at 21:45

## 2020-09-04 RX ADMIN — SODIUM CHLORIDE: 0.9 INJECTION, SOLUTION INTRAVENOUS at 21:48

## 2020-09-04 NOTE — H&P
H&P Exam - Trauma   Joo Floyd 79 y o  male MRN: [id number]  Unit/Bed#: ED 11 Encounter: [id number]    Assessment/Plan   Trauma Alert: Level B  Model of Arrival: Ambulance  Trauma Team: [facility]  Consultants: Oral Maxillofacial: Satish Michel  Time Called 6:30pm    Trauma Active Problems: Multiple Facial Fractures, Right Eyelid avulsion, Right nare avulsion    Trauma Plan: Oral Maxillofacial Surgery Consult    Chief Complaint: Facial Fractures    History of Present Illness   HPI:  Joo Floyd is a 79 y o  male who presents with a fall from standing onto his  Patient reports that he lost his balance and fell forward  Does not report recent illness or LOC during the episode but is having trouble explaining the incident  Mechanism: Fall from standing    Review of Systems   Constitutional: Negative  HENT: Positive for facial swelling, nosebleeds and sinus pain  Eyes: Positive for pain  Respiratory: Negative  Cardiovascular: Negative  Gastrointestinal: Negative  Endocrine: Negative  Genitourinary: Negative  Musculoskeletal: Positive for arthralgias  Pain in bilateral shoulders   Skin: Negative  Neurological: Negative  Psychiatric/Behavioral: Negative  12-point, complete review of systems was reviewed and negative except as stated above  Historical Information   History is unobtainable from the patient due to lack of knowledge of conditions  Efforts to obtain history included the following sources: family member, girlfriend was called but did not answer  Patient reports he had a previous heart surgery and takes aspirin but could not elaborate about his other medications  No past medical history on file  No past surgical history on file    Social History   Social History     Substance and Sexual Activity   Alcohol Use Not on file     Social History     Substance and Sexual Activity   Drug Use Not on file     Social History     Tobacco Use Smoking Status Not on file     No existing history information found  No existing history information found  There is no immunization history on file for this patient  Last Tetanus: Unknown  Family History: Non-contributory  Unable to obtain/limited by lack of patient knowledge of family history  Meds/Allergies   current meds:   No current facility-administered medications for this encounter  Allergies not on file      PHYSICAL EXAM    Objective   Vitals:   First set: Temperature: 98 1 °F (36 7 °C) (09/04/20 1750)  Pulse: 76 (09/04/20 1750)  Respirations: 18 (09/04/20 1750)  Blood Pressure: (!) 197/101 (09/04/20 1750)    Primary Survey:   (A) Airway: Airway intact but multiple displaced teeth  (B) Breathing: Bilateral equal breath sounds  (C) Circulation: Pulses:   normal  (D) Disabliity:  GCS Total: 15  (E) Expose:  Completed    Secondary Survey: (Click on Physical Exam tab above)  Physical Exam  Constitutional:       Appearance: Normal appearance  HENT:      Head: Normocephalic and atraumatic  Right Ear: Tympanic membrane normal       Left Ear: Tympanic membrane normal       Nose:      Comments: Avulsion of right nare, clotted blood filling both nares       Mouth/Throat:      Comments: Multiple displaced teeth, Blood in mouth    Neck:      Musculoskeletal: Normal range of motion  Cardiovascular:      Rate and Rhythm: Normal rate and regular rhythm  Pulses: Normal pulses  Pulmonary:      Effort: Pulmonary effort is normal    Abdominal:      General: Abdomen is flat  There is no distension  Palpations: Abdomen is soft  Tenderness: There is no abdominal tenderness  Musculoskeletal: Normal range of motion  Skin:     General: Skin is warm and dry  Neurological:      General: No focal deficit present  Mental Status: He is alert and oriented to person, place, and time     Psychiatric:         Mood and Affect: Mood normal          Behavior: Behavior normal  Invasive Devices     Peripheral Intravenous Line            Peripheral IV 09/04/20 Right Antecubital less than 1 day    Peripheral IV 09/04/20 Right Wrist less than 1 day                Lab Results:   BMP/CMP:   Lab Results   Component Value Date    SODIUM 136 09/04/2020    K 3 9 09/04/2020     09/04/2020    CO2 26 09/04/2020    CO2 27 09/04/2020    BUN 12 09/04/2020    CREATININE 0 75 09/04/2020    GLUCOSE 101 09/04/2020    CALCIUM 8 5 09/04/2020    EGFR 87 09/04/2020   , CBC:   Lab Results   Component Value Date    HGB 13 9 09/04/2020    HCT 41 09/04/2020   , Coagulation:   Lab Results   Component Value Date    INR 1 08 09/04/2020    and Lipase: No results found for: LIPASE  Imaging/EKG Studies: CT Scan Face: Multiple facial fractures  Other Studies: None      Code Status: No Order  Advance Directive and Living Will:      Power of :    POLST:

## 2020-09-04 NOTE — ED PROVIDER NOTES
Emergency Department Airway Evaluation and Management Form    History  Obtained from:  EMS and patient      Chief Complaint:  Trauma Alert    HPI: Pt is a 79 y o  male presents s/p fall  Per EMS, the patient tripped at the end of his driveway, was on the ground for about 15 minutes when then found by neighbors  Had some slight decreased mental status, able tell and he was on blood thinners but did not know what they were  Patient had times where he seemed to be less alert and less responsive  No seizure activity  I have reviewed and agree with the history as documented  Allergies  Allergies: see nurses notes    Physical Exam    Vitals:    09/04/20 1755   BP: (!) 196/97   Pulse: 72   Resp: 18   Temp:    SpO2: 94%     Supplemental Oxygen:  None    GCS:  14    Neuro: Alert and oriented person, place, time  Psych: not combative, not anxious, cooperative for exam  Neck: In collar, No JVD, No midline tenderness  Respiratory:  Clear  Mouth:  Fresh blood around the mouth,  Pharynx:  Evidence of several avulsed teeth, no active hemorrhage or bleeding    No visible teeth in the pharynx        ED Medications given  See nursing notes    Intubation    No intubation required    Final Diagnosis:  Acute fall, chronic anticoagulation, acute dental trauma    ED Provider  Electronically Signed by       Mann Ellison DO  09/04/20 [id number]

## 2020-09-04 NOTE — CONSULTS
Acceptance Note - Surgical Critical Care   Ofelia Porras 79 y o  male MRN: [id number]  Unit/Bed#: ED 11 Encounter: [id number]      -------------------------------------------------------------------------------------------------------------  Chief Complaint: facial fractures    History of Present Illness     Ofelia Porras is a 79 y o  male with PMHx of CAD s/p CABG, who presents as a level B trauma alert following a fall from standing  Endorses head strike but denies LOC  States he lost his balance and fell forward, striking his face  Workup in ED demonstrated numerous facial fractures  He subsequently went to OR with [facility] for repair of his facial fractures  He is admitted to the SICU for close airway monitoring  History obtained from chart review and the patient   -------------------------------------------------------------------------------------------------------------  Assessment and Plan:    Neuro:    Diagnosis: h/o TIA  o Plan:   - Asymptomatic   - Resume aspirin/plavix once cleared by OMFS  - Outpatient neurology follow up as scheduled  · Delirium PPx  ?  Plan: regulate sleep-wake cycle, daily CAM-ICU, delirium precautions      CV:    Diagnosis: h/o CAD s/p CABG  o Plan:   - On aspirin/plavix at home  - Holding antiplatelet agents until cleared by OMFS   Diagnosis: h/o HTN  o Plan:   - Hypertensive upon admission (197/101), currently normotensive  - Resume home antihypertensives when no longer NPO (see below)  - Home antihypertensive medications: lisinopril 20 mg daily, amlodipine 2 5 mg daily, coreg 2 5 mg daily   Diagnosis: h/o HLD  o Plan:   - Resume statin when no longer NPO  - Home medication: lipitor 40 mg daily   Diagnosis: h/o B/L carotid artery stenosis, L vertebral artery stenosis  o Plan:   - Asymptomatic  - Resume statin when no longer NPO      Pulm:   No active issues   o Plan:   - Maintain SpO2 >92%      GI:    Diagnosis: GERD  o Plan:   - Protonix 40 mg IV daily  - Can transition to PO once no longer NPO   Stress Ulcer PPx: protonix 40 mg IV daily      :    No active issues   o Plan:   - Monitor renal function and UOP      F/E/N:    F: LR @ 100 mL/hr   E: monitor electrolytes and replete PRN   N: NPO      Heme/Onc:    No active issues    DVT PPx: SQH, SCDs      Endo:    No active issues       ID:    No active issues   o Plan:   - Monitor fever curve and WBC count  - TDaP updated in trauma bay      MSK/Skin:    Diagnosis: multiple traumatic facial fractures including LeFort II fracture  o Plan:   - OMFS following  - Ophthalmology following  - Now s/p ORIF of facial fractures, tooth extraction, laceration repair by OMFS  - Unasyn 3g IV Q6hr per OMFS recs, then transition to Augmentin 875-125 mg PO BID when able  - Decadron 8 mg IV Q8hr x3 doses per OMFS recommendations  - Sinus precautions: no nose blowing, no heavy lifting, avoid pressure to the area, HOB elevated, decongestants as needed  - Close airway monitoring  - Continuous pulse oximetry   Diagnosis: eyelid laceration  o Plan:   - Ophthalmology following  - Ofloxacin OD QID for 7 days per ophthalmology recommendations      Psych:    Diagnosis: EtOH abuse  o Plan:   - [facility] protocol  - IV thiamine, folic acid  - Monitor for signs/symptoms of withdrawal  - Will need EtOH counseling when appropriate   Diagnosis: depression  o Plan:   - On home lexapro 10 mg daily  - Resume home SSRI once no longer NPO      Disposition: Transfer to Critical Care   Code Status: Level 1 - Full Code  --------------------------------------------------------------------------------------------------------------  Review of Systems  A 12-point, complete review of systems was reviewed and negative except as stated above     Physical Exam  Gen:  NAD  HENT: S/p ORIF, incisions C/D/I, B/L periorbital welling and ecchymosis   Pupils non-reactive (s/p dilation in ED by ophthalmology)   R-sided scar from previous facial surgery, R nare partially avulsed  CV:  RRR  Lung:  nl effort on 3L O2 via NC, no resp distress  Abd:  soft, NT/ND  : deferred  Ext:  no edema  Skin:  no rashes  Neuro: A&Ox3, M/S grossly intact  Lines: PIV x2  --------------------------------------------------------------------------------------------------------------  Vitals:   Vitals:    09/04/20 1825 09/04/20 1840 09/04/20 1855 09/04/20 1925   BP: (!) 203/100 (!) 186/91 (!) 177/85 152/78   Pulse: 71 55 56 (!) 53   Resp: 16 18 18 18   Temp:       TempSrc:       SpO2: 96% 97% 97% 92%   Weight:         Temp  Min: 98 1 °F (36 7 °C)  Max: 98 1 °F (36 7 °C)  IBW: -88 kg     There is no height or weight on file to calculate BMI  Laboratory and Diagnostics:  Results from last 7 days   Lab Units 09/04/20 1757   I STAT HEMOGLOBIN g/dl 13 9   HEMATOCRIT, ISTAT % 41     Results from last 7 days   Lab Units 09/04/20 1801 09/04/20 1757   SODIUM mmol/L 136  --    POTASSIUM mmol/L 3 9  --    CHLORIDE mmol/L 104  --    CO2 mmol/L 26  --    CO2, I-STAT mmol/L  --  27   ANION GAP mmol/L 6  --    BUN mg/dL 12  --    CREATININE mg/dL 0 75  --    CALCIUM mg/dL 8 5  --    GLUCOSE RANDOM mg/dL 101  --           Results from last 7 days   Lab Units 09/04/20  1801   INR  1 08   PTT seconds 26              ABG:    VBG:          Micro:        EKG: I have personally reviewed pertinent reports  Imaging: I have personally reviewed pertinent reports  and I have personally reviewed pertinent films in PACS    Historical Information   No past medical history on file  No past surgical history on file  Social History   Social History     Substance and Sexual Activity   Alcohol Use Not on file     Social History     Substance and Sexual Activity   Drug Use Not on file     Social History     Tobacco Use   Smoking Status Not on file       Family History:   No family history on file    I have reviewed this patient's family history and commented on sigificant items within the HPI      Medications:  Current Facility-Administered Medications   Medication Dose Route Frequency    lactated ringers infusion  100 mL/hr Intravenous Continuous     Home medications:  None     Allergies: Allergies not on file  ------------------------------------------------------------------------------------------------------------  Advance Directive and Living Will:      Power of :    POLST:    ------------------------------------------------------------------------------------------------------------  Anticipated Length of Stay is > 2 Eduardtaz Martines MD        Portions of the record may have been created with voice recognition software  Occasional wrong word or "sound a like" substitutions may have occurred due to the inherent limitations of voice recognition software    Read the chart carefully and recognize, using context, where substitutions have occurred

## 2020-09-05 ENCOUNTER — APPOINTMENT (INPATIENT)
Dept: RADIOLOGY | Facility: HOSPITAL | Age: 80
DRG: 132 | End: 2020-09-05
Payer: COMMERCIAL

## 2020-09-05 VITALS — HEART RATE: 63 BPM

## 2020-09-05 PROBLEM — S02.92XA CLOSED EXTENSIVE FACIAL FRACTURES (HCC): Status: ACTIVE | Noted: 2020-09-05

## 2020-09-05 LAB
ANION GAP SERPL CALCULATED.3IONS-SCNC: 7 MMOL/L (ref 4–13)
BASE EX.OXY STD BLDV CALC-SCNC: 63.5 % (ref 60–80)
BASE EXCESS BLDV CALC-SCNC: -3.1 MMOL/L
BUN SERPL-MCNC: 16 MG/DL (ref 5–25)
CALCIUM SERPL-MCNC: 8.2 MG/DL (ref 8.3–10.1)
CHLORIDE SERPL-SCNC: 104 MMOL/L (ref 100–108)
CO2 SERPL-SCNC: 26 MMOL/L (ref 21–32)
CREAT SERPL-MCNC: 0.71 MG/DL (ref 0.6–1.3)
ERYTHROCYTE [DISTWIDTH] IN BLOOD BY AUTOMATED COUNT: 13.2 % (ref 11.6–15.1)
GFR SERPL CREATININE-BSD FRML MDRD: 89 ML/MIN/1.73SQ M
GLUCOSE SERPL-MCNC: 164 MG/DL (ref 65–140)
HCO3 BLDV-SCNC: 23.3 MMOL/L (ref 24–30)
HCT VFR BLD AUTO: 38 % (ref 36.5–49.3)
HGB BLD-MCNC: 12.7 G/DL (ref 12–17)
MAGNESIUM SERPL-MCNC: 2 MG/DL (ref 1.6–2.6)
MCH RBC QN AUTO: 29.8 PG (ref 26.8–34.3)
MCHC RBC AUTO-ENTMCNC: 33.4 G/DL (ref 31.4–37.4)
MCV RBC AUTO: 89 FL (ref 82–98)
O2 CT BLDV-SCNC: 11.9 ML/DL
PCO2 BLDV: 46.6 MM HG (ref 42–50)
PH BLDV: 7.32 [PH] (ref 7.3–7.4)
PHOSPHATE SERPL-MCNC: 3.4 MG/DL (ref 2.3–4.1)
PLATELET # BLD AUTO: 154 THOUSANDS/UL (ref 149–390)
PMV BLD AUTO: 8.9 FL (ref 8.9–12.7)
PO2 BLDV: 37.1 MM HG (ref 35–45)
POTASSIUM SERPL-SCNC: 4.3 MMOL/L (ref 3.5–5.3)
RBC # BLD AUTO: 4.26 MILLION/UL (ref 3.88–5.62)
SODIUM SERPL-SCNC: 137 MMOL/L (ref 136–145)
WBC # BLD AUTO: 15.87 THOUSAND/UL (ref 4.31–10.16)

## 2020-09-05 PROCEDURE — 70450 CT HEAD/BRAIN W/O DYE: CPT

## 2020-09-05 PROCEDURE — 80048 BASIC METABOLIC PNL TOTAL CA: CPT | Performed by: ORAL & MAXILLOFACIAL SURGERY

## 2020-09-05 PROCEDURE — 99232 SBSQ HOSP IP/OBS MODERATE 35: CPT | Performed by: SURGERY

## 2020-09-05 PROCEDURE — 84100 ASSAY OF PHOSPHORUS: CPT | Performed by: SURGERY

## 2020-09-05 PROCEDURE — 82805 BLOOD GASES W/O2 SATURATION: CPT | Performed by: EMERGENCY MEDICINE

## 2020-09-05 PROCEDURE — G1004 CDSM NDSC: HCPCS

## 2020-09-05 PROCEDURE — 83735 ASSAY OF MAGNESIUM: CPT | Performed by: SURGERY

## 2020-09-05 PROCEDURE — 85027 COMPLETE CBC AUTOMATED: CPT | Performed by: ORAL & MAXILLOFACIAL SURGERY

## 2020-09-05 PROCEDURE — 99232 SBSQ HOSP IP/OBS MODERATE 35: CPT | Performed by: EMERGENCY MEDICINE

## 2020-09-05 PROCEDURE — 70498 CT ANGIOGRAPHY NECK: CPT

## 2020-09-05 PROCEDURE — 73030 X-RAY EXAM OF SHOULDER: CPT

## 2020-09-05 RX ORDER — PANTOPRAZOLE SODIUM 40 MG/1
40 TABLET, DELAYED RELEASE ORAL
Status: DISCONTINUED | OUTPATIENT
Start: 2020-09-05 | End: 2020-09-10 | Stop reason: HOSPADM

## 2020-09-05 RX ORDER — CLOPIDOGREL BISULFATE 75 MG/1
75 TABLET ORAL DAILY
Status: DISCONTINUED | OUTPATIENT
Start: 2020-09-06 | End: 2020-09-10 | Stop reason: HOSPADM

## 2020-09-05 RX ORDER — MELATONIN
1000 DAILY
Status: DISCONTINUED | OUTPATIENT
Start: 2020-09-05 | End: 2020-09-10 | Stop reason: HOSPADM

## 2020-09-05 RX ORDER — LORAZEPAM 2 MG/ML
2 INJECTION INTRAMUSCULAR ONCE
Status: COMPLETED | OUTPATIENT
Start: 2020-09-05 | End: 2020-09-05

## 2020-09-05 RX ORDER — HYDRALAZINE HYDROCHLORIDE 20 MG/ML
5 INJECTION INTRAMUSCULAR; INTRAVENOUS ONCE
Status: DISCONTINUED | OUTPATIENT
Start: 2020-09-05 | End: 2020-09-05 | Stop reason: HOSPADM

## 2020-09-05 RX ORDER — AMOXICILLIN AND CLAVULANATE POTASSIUM 875; 125 MG/1; MG/1
1 TABLET, FILM COATED ORAL EVERY 12 HOURS SCHEDULED
Status: DISCONTINUED | OUTPATIENT
Start: 2020-09-05 | End: 2020-09-05

## 2020-09-05 RX ORDER — FENTANYL CITRATE 50 UG/ML
50 INJECTION, SOLUTION INTRAMUSCULAR; INTRAVENOUS EVERY 2 HOUR PRN
Status: DISCONTINUED | OUTPATIENT
Start: 2020-09-05 | End: 2020-09-05

## 2020-09-05 RX ORDER — HEPARIN SODIUM 5000 [USP'U]/ML
5000 INJECTION, SOLUTION INTRAVENOUS; SUBCUTANEOUS EVERY 8 HOURS SCHEDULED
Status: DISCONTINUED | OUTPATIENT
Start: 2020-09-05 | End: 2020-09-10 | Stop reason: HOSPADM

## 2020-09-05 RX ORDER — AMLODIPINE BESYLATE 2.5 MG/1
2.5 TABLET ORAL DAILY
Status: DISCONTINUED | OUTPATIENT
Start: 2020-09-05 | End: 2020-09-10 | Stop reason: HOSPADM

## 2020-09-05 RX ORDER — THIAMINE MONONITRATE (VIT B1) 100 MG
100 TABLET ORAL DAILY
Status: DISCONTINUED | OUTPATIENT
Start: 2020-09-05 | End: 2020-09-10 | Stop reason: HOSPADM

## 2020-09-05 RX ORDER — ASPIRIN 81 MG/1
81 TABLET ORAL DAILY
Status: DISCONTINUED | OUTPATIENT
Start: 2020-09-06 | End: 2020-09-10 | Stop reason: HOSPADM

## 2020-09-05 RX ORDER — ATORVASTATIN CALCIUM 40 MG/1
40 TABLET, FILM COATED ORAL
Status: DISCONTINUED | OUTPATIENT
Start: 2020-09-05 | End: 2020-09-10 | Stop reason: HOSPADM

## 2020-09-05 RX ORDER — DEXAMETHASONE SODIUM PHOSPHATE 4 MG/ML
8 INJECTION, SOLUTION INTRA-ARTICULAR; INTRALESIONAL; INTRAMUSCULAR; INTRAVENOUS; SOFT TISSUE EVERY 8 HOURS SCHEDULED
Status: DISCONTINUED | OUTPATIENT
Start: 2020-09-05 | End: 2020-09-05

## 2020-09-05 RX ORDER — MAGNESIUM HYDROXIDE 1200 MG/15ML
LIQUID ORAL AS NEEDED
Status: DISCONTINUED | OUTPATIENT
Start: 2020-09-05 | End: 2020-09-05 | Stop reason: HOSPADM

## 2020-09-05 RX ORDER — ESCITALOPRAM OXALATE 20 MG/1
20 TABLET ORAL DAILY
Status: DISCONTINUED | OUTPATIENT
Start: 2020-09-05 | End: 2020-09-10 | Stop reason: HOSPADM

## 2020-09-05 RX ORDER — ONDANSETRON 2 MG/ML
4 INJECTION INTRAMUSCULAR; INTRAVENOUS ONCE AS NEEDED
Status: DISCONTINUED | OUTPATIENT
Start: 2020-09-05 | End: 2020-09-05 | Stop reason: HOSPADM

## 2020-09-05 RX ORDER — HYDROMORPHONE HCL/PF 1 MG/ML
0.2 SYRINGE (ML) INJECTION
Status: DISCONTINUED | OUTPATIENT
Start: 2020-09-05 | End: 2020-09-05 | Stop reason: HOSPADM

## 2020-09-05 RX ORDER — DEXTROSE AND SODIUM CHLORIDE 5; .9 G/100ML; G/100ML
75 INJECTION, SOLUTION INTRAVENOUS CONTINUOUS
Status: DISCONTINUED | OUTPATIENT
Start: 2020-09-05 | End: 2020-09-06

## 2020-09-05 RX ORDER — CEFAZOLIN SODIUM 1 G/50ML
1000 SOLUTION INTRAVENOUS EVERY 8 HOURS
Status: DISCONTINUED | OUTPATIENT
Start: 2020-09-05 | End: 2020-09-10 | Stop reason: HOSPADM

## 2020-09-05 RX ORDER — FOLIC ACID 1 MG/1
1 TABLET ORAL DAILY
Status: DISCONTINUED | OUTPATIENT
Start: 2020-09-05 | End: 2020-09-10 | Stop reason: HOSPADM

## 2020-09-05 RX ORDER — PANTOPRAZOLE SODIUM 40 MG/1
40 INJECTION, POWDER, FOR SOLUTION INTRAVENOUS
Status: DISCONTINUED | OUTPATIENT
Start: 2020-09-05 | End: 2020-09-05

## 2020-09-05 RX ORDER — ACETAMINOPHEN 325 MG/1
650 TABLET ORAL EVERY 8 HOURS PRN
Status: DISCONTINUED | OUTPATIENT
Start: 2020-09-05 | End: 2020-09-08

## 2020-09-05 RX ORDER — ONDANSETRON 2 MG/ML
4 INJECTION INTRAMUSCULAR; INTRAVENOUS EVERY 4 HOURS PRN
Status: DISCONTINUED | OUTPATIENT
Start: 2020-09-05 | End: 2020-09-10 | Stop reason: HOSPADM

## 2020-09-05 RX ORDER — ONDANSETRON 2 MG/ML
INJECTION INTRAMUSCULAR; INTRAVENOUS AS NEEDED
Status: DISCONTINUED | OUTPATIENT
Start: 2020-09-05 | End: 2020-09-05

## 2020-09-05 RX ORDER — OFLOXACIN 3 MG/ML
1 SOLUTION/ DROPS OPHTHALMIC 4 TIMES DAILY
Status: DISCONTINUED | OUTPATIENT
Start: 2020-09-05 | End: 2020-09-10 | Stop reason: HOSPADM

## 2020-09-05 RX ORDER — OXYCODONE HYDROCHLORIDE 5 MG/1
5 TABLET ORAL EVERY 4 HOURS PRN
Status: DISCONTINUED | OUTPATIENT
Start: 2020-09-05 | End: 2020-09-10 | Stop reason: HOSPADM

## 2020-09-05 RX ORDER — BACITRACIN 500 [USP'U]/G
OINTMENT OPHTHALMIC 3 TIMES DAILY
Status: DISCONTINUED | OUTPATIENT
Start: 2020-09-05 | End: 2020-09-10 | Stop reason: HOSPADM

## 2020-09-05 RX ORDER — OXYCODONE HYDROCHLORIDE 5 MG/1
5 TABLET ORAL EVERY 4 HOURS PRN
Status: DISCONTINUED | OUTPATIENT
Start: 2020-09-05 | End: 2020-09-05

## 2020-09-05 RX ORDER — CHLORHEXIDINE GLUCONATE 0.12 MG/ML
30 RINSE ORAL
Status: DISCONTINUED | OUTPATIENT
Start: 2020-09-05 | End: 2020-09-10 | Stop reason: HOSPADM

## 2020-09-05 RX ORDER — CARVEDILOL 12.5 MG/1
12.5 TABLET ORAL 2 TIMES DAILY WITH MEALS
Status: DISCONTINUED | OUTPATIENT
Start: 2020-09-05 | End: 2020-09-10 | Stop reason: HOSPADM

## 2020-09-05 RX ORDER — GINSENG 100 MG
CAPSULE ORAL AS NEEDED
Status: DISCONTINUED | OUTPATIENT
Start: 2020-09-05 | End: 2020-09-05 | Stop reason: HOSPADM

## 2020-09-05 RX ORDER — DEXAMETHASONE SODIUM PHOSPHATE 4 MG/ML
8 INJECTION, SOLUTION INTRA-ARTICULAR; INTRALESIONAL; INTRAMUSCULAR; INTRAVENOUS; SOFT TISSUE EVERY 8 HOURS
Status: COMPLETED | OUTPATIENT
Start: 2020-09-05 | End: 2020-09-05

## 2020-09-05 RX ORDER — FENTANYL CITRATE/PF 50 MCG/ML
25 SYRINGE (ML) INJECTION
Status: DISCONTINUED | OUTPATIENT
Start: 2020-09-05 | End: 2020-09-05 | Stop reason: HOSPADM

## 2020-09-05 RX ORDER — CEFAZOLIN SODIUM 1 G/50ML
1000 SOLUTION INTRAVENOUS EVERY 8 HOURS
Status: DISCONTINUED | OUTPATIENT
Start: 2020-09-05 | End: 2020-09-05 | Stop reason: SDUPTHER

## 2020-09-05 RX ORDER — FENTANYL CITRATE 50 UG/ML
25 INJECTION, SOLUTION INTRAMUSCULAR; INTRAVENOUS EVERY 2 HOUR PRN
Status: DISCONTINUED | OUTPATIENT
Start: 2020-09-05 | End: 2020-09-05

## 2020-09-05 RX ADMIN — DEXAMETHASONE SODIUM PHOSPHATE 8 MG: 4 INJECTION, SOLUTION INTRAMUSCULAR; INTRAVENOUS at 20:46

## 2020-09-05 RX ADMIN — OFLOXACIN 1 DROP: 3 SOLUTION/ DROPS OPHTHALMIC at 10:03

## 2020-09-05 RX ADMIN — HEPARIN SODIUM 5000 UNITS: 5000 INJECTION INTRAVENOUS; SUBCUTANEOUS at 20:53

## 2020-09-05 RX ADMIN — THIAMINE HCL TAB 100 MG 100 MG: 100 TAB at 10:02

## 2020-09-05 RX ADMIN — OFLOXACIN 1 DROP: 3 SOLUTION/ DROPS OPHTHALMIC at 20:53

## 2020-09-05 RX ADMIN — FOLIC ACID 1 MG: 1 TABLET ORAL at 10:02

## 2020-09-05 RX ADMIN — DEXAMETHASONE SODIUM PHOSPHATE 8 MG: 4 INJECTION, SOLUTION INTRAMUSCULAR; INTRAVENOUS at 12:57

## 2020-09-05 RX ADMIN — ATORVASTATIN CALCIUM 40 MG: 40 TABLET, FILM COATED ORAL at 17:23

## 2020-09-05 RX ADMIN — OFLOXACIN 1 DROP: 3 SOLUTION/ DROPS OPHTHALMIC at 17:25

## 2020-09-05 RX ADMIN — ONDANSETRON 4 MG: 2 INJECTION INTRAMUSCULAR; INTRAVENOUS at 00:40

## 2020-09-05 RX ADMIN — DEXTROSE AND SODIUM CHLORIDE 75 ML/HR: 5; .9 INJECTION, SOLUTION INTRAVENOUS at 22:05

## 2020-09-05 RX ADMIN — ESCITALOPRAM OXALATE 20 MG: 20 TABLET, FILM COATED ORAL at 10:30

## 2020-09-05 RX ADMIN — IOHEXOL 85 ML: 350 INJECTION, SOLUTION INTRAVENOUS at 12:02

## 2020-09-05 RX ADMIN — CHLORHEXIDINE GLUCONATE 0.12% ORAL RINSE 30 ML: 1.2 LIQUID ORAL at 17:24

## 2020-09-05 RX ADMIN — AMPICILLIN SODIUM AND SULBACTAM SODIUM 3 G: 2; 1 INJECTION, POWDER, FOR SOLUTION INTRAMUSCULAR; INTRAVENOUS at 03:28

## 2020-09-05 RX ADMIN — BACITRACIN: 500 OINTMENT OPHTHALMIC at 20:53

## 2020-09-05 RX ADMIN — PANTOPRAZOLE SODIUM 40 MG: 40 TABLET, DELAYED RELEASE ORAL at 10:03

## 2020-09-05 RX ADMIN — BACITRACIN: 500 OINTMENT OPHTHALMIC at 18:22

## 2020-09-05 RX ADMIN — LORAZEPAM 2 MG: 2 INJECTION INTRAMUSCULAR; INTRAVENOUS at 22:06

## 2020-09-05 RX ADMIN — CEFAZOLIN SODIUM 1000 MG: 1 SOLUTION INTRAVENOUS at 20:45

## 2020-09-05 RX ADMIN — OFLOXACIN 1 DROP: 3 SOLUTION/ DROPS OPHTHALMIC at 03:28

## 2020-09-05 RX ADMIN — CHLORHEXIDINE GLUCONATE 15 ML: 1.2 RINSE ORAL at 10:01

## 2020-09-05 RX ADMIN — HEPARIN SODIUM 5000 UNITS: 5000 INJECTION INTRAVENOUS; SUBCUTANEOUS at 13:03

## 2020-09-05 RX ADMIN — LORAZEPAM 2 MG: 2 INJECTION INTRAMUSCULAR; INTRAVENOUS at 23:15

## 2020-09-05 RX ADMIN — SODIUM CHLORIDE, SODIUM LACTATE, POTASSIUM CHLORIDE, AND CALCIUM CHLORIDE 100 ML/HR: .6; .31; .03; .02 INJECTION, SOLUTION INTRAVENOUS at 01:55

## 2020-09-05 RX ADMIN — CARVEDILOL 12.5 MG: 12.5 TABLET, FILM COATED ORAL at 10:02

## 2020-09-05 RX ADMIN — AMLODIPINE BESYLATE 2.5 MG: 2.5 TABLET ORAL at 10:30

## 2020-09-05 RX ADMIN — AMOXICILLIN AND CLAVULANATE POTASSIUM 1 TABLET: 875; 125 TABLET, FILM COATED ORAL at 10:30

## 2020-09-05 RX ADMIN — CARVEDILOL 12.5 MG: 12.5 TABLET, FILM COATED ORAL at 17:23

## 2020-09-05 RX ADMIN — SUGAMMADEX 200 MG: 100 INJECTION, SOLUTION INTRAVENOUS at 01:09

## 2020-09-05 RX ADMIN — Medication 1000 UNITS: at 10:02

## 2020-09-05 RX ADMIN — DEXAMETHASONE SODIUM PHOSPHATE 8 MG: 4 INJECTION, SOLUTION INTRAMUSCULAR; INTRAVENOUS at 04:36

## 2020-09-05 RX ADMIN — OFLOXACIN 1 DROP: 3 SOLUTION/ DROPS OPHTHALMIC at 12:57

## 2020-09-05 RX ADMIN — HEPARIN SODIUM 5000 UNITS: 5000 INJECTION INTRAVENOUS; SUBCUTANEOUS at 06:00

## 2020-09-05 NOTE — PLAN OF CARE
Problem: Prexisting or High Potential for Compromised Skin Integrity  Goal: Skin integrity is maintained or improved  Description: INTERVENTIONS:  - Identify patients at risk for skin breakdown  - Assess and monitor skin integrity  - Assess and monitor nutrition and hydration status  - Monitor labs   - Assess for incontinence   - Turn and reposition patient  - Assist with mobility/ambulation  - Relieve pressure over bony prominences  - Avoid friction and shearing  - Provide appropriate hygiene as needed including keeping skin clean and dry  - Evaluate need for skin moisturizer/barrier cream  - Collaborate with interdisciplinary team   - Patient/family teaching  - Consider wound care consult   Outcome: Not Progressing     Problem: Potential for Falls  Goal: Patient will remain free of falls  Description: INTERVENTIONS:  - Assess patient frequently for physical needs  -  Identify cognitive and physical deficits and behaviors that affect risk of falls    -  [place] fall precautions as indicated by assessment   - Educate patient/family on patient safety including physical limitations  - Instruct patient to call for assistance with activity based on assessment  - Modify environment to reduce risk of injury  - Consider OT/PT consult to assist with strengthening/mobility  Outcome: Not Progressing     Problem: PAIN - ADULT  Goal: Verbalizes/displays adequate comfort level or baseline comfort level  Description: Interventions:  - Encourage patient to monitor pain and request assistance  - Assess pain using appropriate pain scale  - Administer analgesics based on type and severity of pain and evaluate response  - Implement non-pharmacological measures as appropriate and evaluate response  - Consider cultural and social influences on pain and pain management  - Notify physician/advanced practitioner if interventions unsuccessful or patient reports new pain  Outcome: Not Progressing     Problem: INFECTION - ADULT  Goal: Absence or prevention of progression during hospitalization  Description: INTERVENTIONS:  - Assess and monitor for signs and symptoms of infection  - Monitor lab/diagnostic results  - Monitor all insertion sites, i e  indwelling lines, tubes, and drains  - Monitor endotracheal if appropriate and nasal secretions for changes in amount and color  - [place] appropriate cooling/warming therapies per order  - Administer medications as ordered  - Instruct and encourage patient and family to use good hand hygiene technique  - Identify and instruct in appropriate isolation precautions for identified infection/condition  Outcome: Not Progressing  Goal: Absence of fever/infection during neutropenic period  Description: INTERVENTIONS:  - Monitor WBC    Outcome: Not Progressing     Problem: SAFETY ADULT  Goal: Patient will remain free of falls  Description: INTERVENTIONS:  - Assess patient frequently for physical needs  -  Identify cognitive and physical deficits and behaviors that affect risk of falls    -  [place] fall precautions as indicated by assessment   - Educate patient/family on patient safety including physical limitations  - Instruct patient to call for assistance with activity based on assessment  - Modify environment to reduce risk of injury  - Consider OT/PT consult to assist with strengthening/mobility  Outcome: Not Progressing  Goal: Maintain or return to baseline ADL function  Description: INTERVENTIONS:  -  Assess patient's ability to carry out ADLs; assess patient's baseline for ADL function and identify physical deficits which impact ability to perform ADLs (bathing, care of mouth/teeth, toileting, grooming, dressing, etc )  - Assess/evaluate cause of self-care deficits   - Assess range of motion  - Assess patient's mobility; develop plan if impaired  - Assess patient's need for assistive devices and provide as appropriate  - Encourage maximum independence but intervene and supervise when necessary  - Involve family in performance of ADLs  - Assess for home care needs following discharge   - Consider OT consult to assist with ADL evaluation and planning for discharge  - Provide patient education as appropriate  Outcome: Not Progressing  Goal: Maintain or return mobility status to optimal level  Description: INTERVENTIONS:  - Assess patient's baseline mobility status (ambulation, transfers, stairs, etc )    - Identify cognitive and physical deficits and behaviors that affect mobility  - Identify mobility aids required to assist with transfers and/or ambulation (gait belt, sit-to-stand, lift, walker, cane, etc )  - [place] fall precautions as indicated by assessment  - Record patient progress and toleration of activity level on Mobility SBAR; progress patient to next Phase/Stage  - Instruct patient to call for assistance with activity based on assessment  - Consider rehabilitation consult to assist with strengthening/weightbearing, etc   Outcome: Not Progressing     Problem: DISCHARGE PLANNING  Goal: Discharge to home or other facility with appropriate resources  Description: INTERVENTIONS:  - Identify barriers to discharge w/patient and caregiver  - Arrange for needed discharge resources and transportation as appropriate  - Identify discharge learning needs (meds, wound care, etc )  - Arrange for interpretive services to assist at discharge as needed  - Refer to Case Management Department for coordinating discharge planning if the patient needs post-hospital services based on physician/advanced practitioner order or complex needs related to functional status, cognitive ability, or social support system  Outcome: Not Progressing     Problem: Knowledge Deficit  Goal: Patient/family/caregiver demonstrates understanding of disease process, treatment plan, medications, and discharge instructions  Description: Complete learning assessment and assess knowledge base    Interventions:  - Provide teaching at level of understanding  - Provide teaching via preferred learning methods  Outcome: Not Progressing     Problem: Nutrition/Hydration-ADULT  Goal: Nutrient/Hydration intake appropriate for improving, restoring or maintaining nutritional needs  Description: Monitor and assess patient's nutrition/hydration status for malnutrition  Collaborate with interdisciplinary team and initiate plan and interventions as ordered  Monitor patient's weight and dietary intake as ordered or per policy  Utilize nutrition screening tool and intervene as necessary  Determine patient's food preferences and provide high-protein, high-caloric foods as appropriate       INTERVENTIONS:  - Monitor oral intake, urinary output, labs, and treatment plans  - Assess nutrition and hydration status and recommend course of action  - Evaluate amount of meals eaten  - Assist patient with eating if necessary   - Allow adequate time for meals  - Recommend/ encourage appropriate diets, oral nutritional supplements, and vitamin/mineral supplements  - Order, calculate, and assess calorie counts as needed  - Recommend, monitor, and adjust tube feedings and TPN/PPN based on assessed needs  - Assess need for intravenous fluids  - Provide specific nutrition/hydration education as appropriate  - Include patient/family/caregiver in decisions related to nutrition  Outcome: Not Progressing

## 2020-09-05 NOTE — NURSING NOTE
Patient transferred to bed 822 this afternoon and walks with front wheel walker assistance from hallway to bed in room  Pt transferred with belongings including clothes, shoes and life alert  Pt contact Zabrina Dailey updated as to pt transfer

## 2020-09-05 NOTE — ANESTHESIA PREPROCEDURE EVALUATION
Procedure:  OPEN REDUCTION W/ INTERNAL FIXATION (ORIF) MAXILLARY FRACTURES LEFORTE (Bilateral Mouth)    Relevant Problems   ANESTHESIA (within normal limits)      CARDIO (within normal limits)      ENDO (within normal limits)      GI/HEPATIC (within normal limits)      /RENAL (within normal limits)      GYN (within normal limits)      HEMATOLOGY (within normal limits)      MUSCULOSKELETAL  Multiple facial fractures; rib fractures noted Xray       NEURO/PSYCH (within normal limits)      PULMONARY (within normal limits)   Patient reports history of MI s/p CABG   Takes ASA/ Plavix- last taken yesterday      Results for Soledad Ryan (MRN [id number]) as of 9/4/2020 20:26   Ref   Range 9/4/2020 17:57   Glucose, i-STAT Latest Ref Range: 65 - 140 mg/dl 101   ph, [place] ISTAT Latest Ref Range: 7 300 - 7 400  7 362   pCO2, [place] i-STAT Latest Ref Range: 42 0 - 50 0 mm HG 44 8   pO2, [place] i-STAT Latest Ref Range: 35 0 - 45 0 mm HG 38 0   HCO3, [place] i-STAT Latest Ref Range: 24 0 - 30 0 mmol/L 25 4   Base Exc Latest Ref Range: -2 - 3 mmol/L 0   O2 Sat, Istat Latest Ref Range: 60 - 85 % 69   SODIUM, I-STAT Latest Ref Range: 136 - 145 mmol/l 137   POTASSIUM,I-STAT Latest Ref Range: 3 5 - 5 3 mmol/L 4 1   CO2, i-STAT Latest Ref Range: 21 - 32 mmol/L 27   CALCIUM, IONIZED ISTAT Latest Ref Range: 1 12 - 1 32 mmol/L 1 17   Hemoglobin, Istat Latest Ref Range: 12 0 - 17 0 g/dl 13 9   SPECIMEN TYPE Unknown VENOUS     9/4/20 CT facial bones   Extensive facial fractures in a pattern consistent with LeFort type II fractures      There is a right paramedian mandibular fracture with dislocation of the incisor into the anterior soft tissues      Extensive facial soft tissue injury with multiple radiopaque foreign bodies most prominent in the right preseptal soft tissues anterior to the globe      Extensive sinus opacification with hemorrhage opacifying the frontal sinuses, ethmoid air cells and maxillary sinuses      Multiple cavities within the maxillary and mandibular dentition  Physical Exam    Airway    Mallampati score: II  TM Distance: >3 FB  Neck ROM: full     Dental       Cardiovascular      Pulmonary      Other Findings  Multiple lacerations/ abrasions on face  Significant soft tissues swelling, orbital ecchymoses; Displaced mandible with multiple missing/ chipped dislodged teeth       Anesthesia Plan  ASA Score- 3 Emergent    Anesthesia Type- general with ASA Monitors  Additional Monitors:   Airway Plan: ETT  Comment: Plan for GETA with maintenance of spontaneous ventilation on induction  Videolaryngoscopy with fiberoptic scope available as backup  Plan Factors-    Chart reviewed  Patient summary reviewed  Induction- intravenous  Postoperative Plan- Plan for postoperative opioid use  Planned trial extubation    Informed Consent- Anesthetic plan and risks discussed with patient  I personally reviewed this patient with the CRNA  Discussed and agreed on the Anesthesia Plan with the CRNA  Zora Gorman

## 2020-09-05 NOTE — PLAN OF CARE
Standard skin and safety protocols initiated and effective, Labs/ VS/ I&O's/ comfort and airway frequently monitored per orders/ protocols  Patient given ample time to as questions r/t his care  Oriented/ updated to his room and all care to be delivered to him

## 2020-09-05 NOTE — PROGRESS NOTES
Progress Note Edel Clifford 1940, 79 y o  male MRN: [id number]    Unit/Bed#: [address] 822-01 Encounter: [id number]    Primary Care Provider: ELE Dias   Date and time admitted to hospital: 9/4/2020  5:45 PM        * Closed extensive facial fractures ([facility] [address] )  Assessment & Plan  - s/p ORIF Lef[place] II and mandible with multiple facial laceration repairs and tooth extractions POD1  - follow-up CTA neck to rule out vascular injury   - 3 doses IV decadron and Augmentin course  - sinus precautions including non-chew diet x 3 weeks   - analgesia: tolerating low dose oxycodone with minimal requirements  Monitor and titrate analgesia regimen as needed   - delirium precautions - CIWA protocol for ETOH use, no ativan required to date  - repeat head CT stable (obtained for confusion today) - cont delirium precautions, reorient as able   - PT/OT evals  - optho consult completed:  Ofloxacin q i d  OD for 7 days and outpatient follow-up       Code Status: Level 1 - Full Code  POA:    POLST:      Reason for ICU admission:   Airway watch with multiple facial fractures, mandible fractures     Active problems:   Principal Problem:    Closed extensive facial fractures ([facility] [address] )  Resolved Problems:    * No resolved hospital problems  *      Consultants:   Rachel NAJERA  PT/OT     History of Present Illness:   Per Dr Ira Polanco on admission "  Toro Modi is a 79 y o  male who presents with a fall from standing onto his  Patient reports that he lost his balance and fell forward  Does not report recent illness or LOC during the episode but is having trouble explaining the incident "    Summary of clinical course:   S/p ORIF and facial laceration repairs POD 1  H&P suspicious for confusion present on admission but repeat head CT completed today and unremarkable  Continue CIWA protocol and delirium precautions  Consider geriatrics consultation  Advance diet to pureed non-chew diet and monitor tolerance   Analgesia - titrate as needed  Follow-up PT/OT evals  Mobilization Plan:   PT/OT evals    Nutrition Plan:   Pureed non-chew diet     Invasive Devices Review  Invasive Devices     Peripheral Intravenous Line            Peripheral IV 09/04/20 Left Antecubital less than 1 day    Peripheral IV 09/05/20 Distal;Left;Ventral (anterior) Forearm less than 1 day                  VTE Pharmacologic Prophylaxis: SQ heparin   VTE Mechanical Prophylaxis: sequential compression device      Home medications that are not reordered and reason why:   Lisinopril - monitor renal and BP's       Spoke with Kayla MONCADA  regarding transfer  Please call [phone] with any questions or concerns  Portions of the record may have been created with voice recognition software  Occasional wrong word or "sound a like" substitutions may have occurred due to the inherent limitations of voice recognition software  Read the chart carefully and recognize, using context, where substitutions have occurred

## 2020-09-05 NOTE — PROGRESS NOTES
Patient now s/p ORIF of bilateral maxillary LeFort fractures via intra-oral approach, extraction of multiple teeth, closure of complex left eyelid laceration and nose laceration in the OR  Chromic-gut sutures used for intra-oral incisions, laceration and extraction sites  Right eyelid closed with fast-gut sutures and Wade suture placed on right lower eyelid and tapped to forehead  Nose laceration closed with fast-gut sutures      Recommendations  - unasyn q6h IV, transition to Augmentin when possible  - analgesia per primary team  - decadron 8mg IV q8h x 3 doses  - sinus precautions (no nose blowing, sneeze with mouth open)  - puree, non-chew diet only for at least 3 weeks  - HOB elevated  - appreciate ophtho recs  - OMFS to follow

## 2020-09-05 NOTE — CONSULTS
Oral and Maxillofacial Surgery Consult    Pt seen 09/04/20 9:02 PM    Assessment  79 y o  male who presents to ED s/p facial trauma sustaining multiple facial fractures including mildly displaced b/l LeFort 2 fractures and avulsed teeth  On exam, patient presents with a mobile maxilla, laceration of left eyelid and complains of blurry/double vision  Plan:  Add on for OR today for ORIF of facial fractures, extraction of necessary teeth and laceration repair under GA  - Analgesia as per primary team  - unasyn 3g IV q6h then transition to augmentin 875-125mg BID PO when able  - Decadron 8mg IV q8h x3 doses  - NPO/IVF for OR  - DVT ppx: SCD, OOB; please hold pharmacologic AC for the OR, can start post-op  - HOB 30 degrees  - Yankaur suction at bedside  - Ice to face  - Sinus precautions: no nose blowing, no heavy lifting, avoid pressure to the area, head of bed elevated, decongestants as needed     D/w OMFS attg on call Dr Fabian Michel          Inpatient consult to Oral and Maxillofacial Surgery     Performed by  Jerome Palumbo     Authorized by Karthik Gonzalez MD               HPI: 79 y o  male who presents to [address] [place] [address] s/p mechanical fall today  Patient states he was in his driveway, tripped and fell hitting his face on the ground  He believes he was on the ground for ~15 minutes before his neighbors found him and called EMS  He complains of blurry vision, double vision, facial swelling and pain  Also complains of mild difficulty swallowing  Denies SOB, CP, fevers or chills  PMH:   No past medical history on file  Allergies:   Not on File    Meds:     Current Facility-Administered Medications:     [MAR Hold] chlorhexidine (PERIDEX) 0 12 % oral rinse 15 mL, 15 mL, Swish & Spit, Q12H [address], Pranav Flores MD    lactated ringers infusion, 100 mL/hr, Intravenous, Continuous, Karthik Gonzalez MD, Last Rate: 100 mL/hr at 09/04/20 1959, 100 mL/hr at 09/04/20 1959    PSH:   No past surgical history on file  No family history on file  Review of Systems   Constitutional: Negative for chills and fever  HENT: Positive for dental problem, facial swelling and trouble swallowing  Eyes: Positive for visual disturbance (blurry vision)  Respiratory: Negative for shortness of breath  Cardiovascular: Negative for chest pain  All other systems reviewed and are negative  Temp:  [98 1 °F (36 7 °C)] 98 1 °F (36 7 °C)  HR:  [53-76] 65  Resp:  [16-18] 18  BP: (152-203)/() 162/82  SpO2:  [92 %-97 %] 94 %       Intake/Output Summary (Last 24 hours) at 9/4/2020 2102  Last data filed at 9/4/2020 2030  Gross per 24 hour   Intake 0 ml   Output    Net 0 ml        Physical Exam:  Gen: AAOx3  NAD  CVS: RRR  Normal S1 S2  Resp: CTA B/L, unlabored on RA  Neuro: bilateral CN V2 hypoesthesia, bilateral V3 grossly intact,  bilateral CN VII grossly intact  HEENT:   Head: moderate bilateral facial swelling/ecchymosis, bony step-off palpated at nasofrontal suture, extensive facial abrasions, avulsive laceration of left eyelid involving lower eyelid margin, right cheek and nose scars/skin avulsion noted from different remote trauma   Eye: EOM intact on the right, mild restriction of superior gaze on left eye only  PERRL  Bilateral subconjunctival hemorrhage  Bilateral periorbital ecchymosis/edema  No exophthalmos, enophthalmos, chemosis in right eye  Nose: no nasal dorsum deviation  no septal hematoma  Intraoral: SITA ~30mm  Maxilla is mobile to palpation, Dentition grossly carious  Occlusion unstable  no segmental mobility of mandible  Gingival laceration in area of teeth #27  Ecchymosis in vestibule around #27 with mild bleeding, FOM soft, non-elevated, non-tender       Lab Results:   CBC:   Lab Results   Component Value Date    HGB 13 4 09/04/2020    HCT 40 7 09/04/2020   , CMP:   Lab Results   Component Value Date    SODIUM 136 09/04/2020    K 3 9 09/04/2020     09/04/2020    CO2 26 09/04/2020    CO2 27 09/04/2020    BUN 12 09/04/2020    CREATININE 0 75 09/04/2020    GLUCOSE 101 09/04/2020    CALCIUM 8 5 09/04/2020    EGFR 87 09/04/2020   , Coagulation:   Lab Results   Component Value Date    INR 1 08 09/04/2020     Imaging: I have personally reviewed pertinent films in PACS Multiple facial fractures noted bilaterally including b/l LF2 fractures, avulsed tooth #27 in lower lip, b/l nondisplaced nasal bone fractures  EKG, Pathology, and Other Studies: I have personally reviewed pertinent reports

## 2020-09-05 NOTE — OP NOTE
OPERATIVE REPORT  PATIENT NAME: Shaista Baez    :  1940  MRN: [id number]  Pt Location:  OR ROOM 07    SURGERY DATE: 2020    Surgeon(s) and Role:     * Irvin Michel, SHAHID - Primary       Madhav Lemus, DMD- Assisting    Preop Diagnosis:  Open extensive facial [facility], [facility] ([facility] ) [S02 92XB]    Post-Op Diagnosis Codes:     * Open extensive facial fractures, initial encounter ([facility] ) [S02 92XB]    Procedure(s) (LRB):  OPEN REDUCTION W/ INTERNAL FIXATION (ORIF) MAXILLARY FRACTURES LEFORTE, closure nasal  laceration and right lower eyelid laceration (Bilateral)  EXTRACTION TEETH MULTIPLE 25, 26, 31,27, 10, 13, 14, 9 (N/A)    Specimen(s):  * No specimens in log *    Estimated Blood Loss:   Minimal    Drains:  * No LDAs found *    Anesthesia Type:   General    Operative Indications:  Open extensive facial fractures, initial encounter ([facility] ) [S02 92XB]  Facial lacerations      Operative Findings:  Mobility of maxilla with Lefort 2 fracture  Non restorable teeth 9, 10, 13, 14, 25, 26, 27, 31  Complex right lower eyelid laceration with avulsed tissue: 4cm  Nasal laceration: 1cm   Lower lip mucosa laceration: 2cm    Complications:   None    Procedure and Technique:  The patient was greeted at the bedside in the holding area  The surgical consent was reviewed and all questions answered  The patient was brought in the operating room and placed on the operating room table, in supine position   A time out was performed  Anesthesia placed appropriate monitors and intubated the patient endotracheally via mouth, without difficulty  The patient was draped and prepped in the usual fashion for oral surgery procedure  A moist throat pack was placed in the oropharynx  1% lidocaine 1:100,000 epinephrine was injected locally and for nerve block to cover the planned surgical area  The oral cavity was inspected  The maxilla was grossly mobile  Teeth #9, 10, 13, 14, 25, 26, 27, 31 were found to be fractured, carious and non restorable, requiring extraction  A 2cm lower lip laceration as also noted  Teeth #9, 10, 13, 14, 25, 26, 27, 31 were removed in the following fashion: a sulcular surgical incision was made with blade, a full thickness mucoperiosteal flap was reflected  A dental elevator was used to luxate the teeth, this required some bone removal  The teeth in question were extracted atraumatically with forceps  Copious normal saline irrigation of the flap and sockets was performed  The surgical sites were closed with 3-0 chromic gut sutures  Positive hemostasis was achieved  The lower lip laceration was irrigated copiously and closed with 3-0 chromic gut  Next, attention was directed to the maxilla  A bilateral vestibular incision was made with Bovie electrocautery, at the depth of the buccal vestibule, towards the dentate segment, from molar region to incisal region, bilaterally  The incision was carried down to periosteum and bone  Buccal flaps were elevated in subperiosteal fashion exposing the LeFort fracture on both sides  The maxilla was reduced and fixated with 4 Synthes plates, 2 on each side:  1 at the zygomatic buttress and 1 at the piriform buttress  Good stability was achieved  The surgical wounds were irrigated copiously and closed with 3-0 chromic in a running locking fashion  Next, attention was directed to the face the right lower eyelid laceration was debrided and cleansed with balanced normal solution  A corneal shield with ophthalmic ointment was placed to protect the globe  The laceration was found to be complex involving avulsed tissue and through and through laceration of the tarsus  On the medial aspect the laceration damaged the lacrimal canaliculus  The proximal and of the lacrimal chronic was was found but the distal and was not found  The avulsed tissue was on the medial aspect    The flap of remaining eyelid was advanced for a few mm from the right side to obtain primary closure to the medial side  The lower eyelid was reconstructed in layers:  Fast-absorbing 5 0 gut for conjunctiva, 4 0 Monocryl to reconstruct the tarsus  The medial tarsus was found and attached to the stump of the medial canthal ligament  At the lateral aspect the lacerated tarsus in the flap was attached to the tarsus segment that has left at the lateral lower eyelid  Then, the buccal auris muscle and skin were approximated with 5 0 gut  I removed the corneal shield and I placed a Frost suture of 4-0 nylon through the lower tarsus and secured it to the forehead with Steri-Strips to prevent the traction of the lower eyelid  At this point the surgery was considered completed  The throat pack was removed and the oral cavity was suctioned free  Sterile drapes were removed and the face cleansed with normal saline and dried  Patient was emerged from anesthesia, extubated without issues and transported to the post anesthesia care unit       I was present for the entire procedure    Patient Disposition:  PACU     SIGNATURE: Irvin Michel DMD  DATE: September 5, 2020  TIME: 1:56 AM

## 2020-09-05 NOTE — ANESTHESIA POSTPROCEDURE EVALUATION
Post-Op Assessment Note    CV Status:  Stable  Pain Score: 0    Pain management: adequate     Mental Status:  Alert   PONV Controlled:  None   Airway Patency:  Patent      Post Op Vitals Reviewed: Yes      Staff: Anesthesiologist, CRNA         No complications documented      BP   156/79   Temp   98 1   Pulse  64   Resp   14   SpO2   99

## 2020-09-05 NOTE — ASSESSMENT & PLAN NOTE
- s/p ORIF Leforte II and mandible with multiple facial laceration repairs and tooth extractions POD1  - follow-up CTA neck to rule out vascular injury   - 3 doses IV decadron and Augmentin course  - sinus precautions including non-chew diet x 3 weeks   - analgesia: tolerating low dose oxycodone with minimal requirements   Monitor and titrate analgesia regimen as needed   - delirium precautions - CIWA protocol for ETOH use, no ativan required to date  - repeat head CT stable (obtained for confusion today) - cont delirium precautions, reorient as able   - PT/OT evals  - optho consult completed:  Ofloxacin q i d  OD for 7 days and outpatient follow-up

## 2020-09-05 NOTE — CONSULTS
This 52-year-old gentleman is status post fall with trauma to the right orbital area and right lower lid  He has a history of prior right facial trauma with reconstruction of the right cheek area  He complains of pain around the right eye  CT shows multiple orbital and facial fractures however the globe appears to be intact  Visual acuity is count fingers both eyes  Pupils are sluggish/miotic both eyes with no afferent defect noted  Extraocular movements seem to be restricted in up upgaze both eyes and downgaze OD but the exam is limited  There is a full-thickness avulsion type lid laceration involving the temporal lower lid of the right eye extending down to the orbital rim  A large amount of gravel was removed from the area and the inferior fornix  The patient experienced discomfort during this procedure  This was discussed with ocular plastics  They will debride and remove foreign bodies as needed once the patient is under general anesthesia  The corneas are clear both eyes  There is ecchymosis and edema of both eyes  The anterior chambers are formed  The intra-ocular pressures are 12 and 18  The eyes were dilated with Mydriacyl and Griffin-Synephrine at 9:15 p m       Moderately dense nuclear sclerotic cataracts are noted in both eyes  The right eye has a bright red reflex but there is no fundus detail  The left vitreous cavity is clear  The optic nerve is pink and flat with physiologic cupping  The macula vessels and periphery are unremarkable  Impression:  Lid laceration right lower lid, right orbital fractures  Globe intact  Cleared for ocular plastics exploration and repair as needed  Plan:  Ofloxacin q i d  OD for 7 days  I will see the patient again here in the hospital or in my office after discharge

## 2020-09-05 NOTE — PROGRESS NOTES
OMS Progress Note  80 y/o male POD#1 s/p repair of right lower eyelid avulsion injury, ORIF of B/L Lefort fracture, removal of multiple teeth  Ophtalmology consult and recommendation is appreciated  He is stable, ambulated this am, is transferred to regular floor  His vision is grossly intact, ROM of the eyes as before the surgery, no diplopia  His repaired right lower eyelid has slight retraction, Wade suture was loose and I re-tightened it gently  I performed eye case  The soft tissue repair seems to survive, no sign if necrosis  He has some epiphora, as expected  No extropion or intropion noted  His oral cavity is hemostatic  A: good recovery s/p surgery    Plan:  Continue IV abx wile in hopsital  Pain management   HOB 30 degrees  Follow Ophthalmology recommendations   Bacitracin occular ointment TID  Continue Frost suture to R eyelid  Daily eye care by OMS  Puree diet  OMS will F/U

## 2020-09-06 PROBLEM — F10.10 ALCOHOL ABUSE: Status: ACTIVE | Noted: 2020-09-06

## 2020-09-06 LAB
ANION GAP SERPL CALCULATED.3IONS-SCNC: 2 MMOL/L (ref 4–13)
BUN SERPL-MCNC: 12 MG/DL (ref 5–25)
CALCIUM SERPL-MCNC: 8.2 MG/DL (ref 8.3–10.1)
CHLORIDE SERPL-SCNC: 107 MMOL/L (ref 100–108)
CO2 SERPL-SCNC: 31 MMOL/L (ref 21–32)
CREAT SERPL-MCNC: 0.66 MG/DL (ref 0.6–1.3)
ERYTHROCYTE [DISTWIDTH] IN BLOOD BY AUTOMATED COUNT: 13.3 % (ref 11.6–15.1)
GFR SERPL CREATININE-BSD FRML MDRD: 92 ML/MIN/1.73SQ M
GLUCOSE SERPL-MCNC: 153 MG/DL (ref 65–140)
HCT VFR BLD AUTO: 31.2 % (ref 36.5–49.3)
HCT VFR BLD AUTO: 32.3 % (ref 36.5–49.3)
HGB BLD-MCNC: 10.4 G/DL (ref 12–17)
HGB BLD-MCNC: 10.6 G/DL (ref 12–17)
MAGNESIUM SERPL-MCNC: 2.4 MG/DL (ref 1.6–2.6)
MCH RBC QN AUTO: 29.6 PG (ref 26.8–34.3)
MCHC RBC AUTO-ENTMCNC: 32.8 G/DL (ref 31.4–37.4)
MCV RBC AUTO: 90 FL (ref 82–98)
PLATELET # BLD AUTO: 144 THOUSANDS/UL (ref 149–390)
PMV BLD AUTO: 9.1 FL (ref 8.9–12.7)
POTASSIUM SERPL-SCNC: 3.9 MMOL/L (ref 3.5–5.3)
RBC # BLD AUTO: 3.58 MILLION/UL (ref 3.88–5.62)
SODIUM SERPL-SCNC: 140 MMOL/L (ref 136–145)
WBC # BLD AUTO: 17.56 THOUSAND/UL (ref 4.31–10.16)

## 2020-09-06 PROCEDURE — 97167 OT EVAL HIGH COMPLEX 60 MIN: CPT

## 2020-09-06 PROCEDURE — 80048 BASIC METABOLIC PNL TOTAL CA: CPT | Performed by: NURSE PRACTITIONER

## 2020-09-06 PROCEDURE — 99232 SBSQ HOSP IP/OBS MODERATE 35: CPT | Performed by: SURGERY

## 2020-09-06 PROCEDURE — 85014 HEMATOCRIT: CPT | Performed by: PHYSICIAN ASSISTANT

## 2020-09-06 PROCEDURE — 85018 HEMOGLOBIN: CPT | Performed by: PHYSICIAN ASSISTANT

## 2020-09-06 PROCEDURE — 85027 COMPLETE CBC AUTOMATED: CPT | Performed by: NURSE PRACTITIONER

## 2020-09-06 PROCEDURE — 83735 ASSAY OF MAGNESIUM: CPT | Performed by: NURSE PRACTITIONER

## 2020-09-06 PROCEDURE — 97163 PT EVAL HIGH COMPLEX 45 MIN: CPT

## 2020-09-06 RX ORDER — SODIUM CHLORIDE, SODIUM GLUCONATE, SODIUM ACETATE, POTASSIUM CHLORIDE, MAGNESIUM CHLORIDE, SODIUM PHOSPHATE, DIBASIC, AND POTASSIUM PHOSPHATE .53; .5; .37; .037; .03; .012; .00082 G/100ML; G/100ML; G/100ML; G/100ML; G/100ML; G/100ML; G/100ML
75 INJECTION, SOLUTION INTRAVENOUS CONTINUOUS
Status: DISCONTINUED | OUTPATIENT
Start: 2020-09-06 | End: 2020-09-09

## 2020-09-06 RX ADMIN — FOLIC ACID 1 MG: 1 TABLET ORAL at 09:11

## 2020-09-06 RX ADMIN — HEPARIN SODIUM 5000 UNITS: 5000 INJECTION INTRAVENOUS; SUBCUTANEOUS at 13:10

## 2020-09-06 RX ADMIN — BACITRACIN: 500 OINTMENT OPHTHALMIC at 17:22

## 2020-09-06 RX ADMIN — BACITRACIN 1 APPLICATION: 500 OINTMENT OPHTHALMIC at 21:28

## 2020-09-06 RX ADMIN — SODIUM CHLORIDE, SODIUM GLUCONATE, SODIUM ACETATE, POTASSIUM CHLORIDE AND MAGNESIUM CHLORIDE 75 ML/HR: 526; 502; 368; 37; 30 INJECTION, SOLUTION INTRAVENOUS at 16:08

## 2020-09-06 RX ADMIN — ATORVASTATIN CALCIUM 40 MG: 40 TABLET, FILM COATED ORAL at 17:22

## 2020-09-06 RX ADMIN — HEPARIN SODIUM 5000 UNITS: 5000 INJECTION INTRAVENOUS; SUBCUTANEOUS at 05:14

## 2020-09-06 RX ADMIN — OFLOXACIN 1 DROP: 3 SOLUTION/ DROPS OPHTHALMIC at 13:09

## 2020-09-06 RX ADMIN — CEFAZOLIN SODIUM 1000 MG: 1 SOLUTION INTRAVENOUS at 21:25

## 2020-09-06 RX ADMIN — CEFAZOLIN SODIUM 1000 MG: 1 SOLUTION INTRAVENOUS at 05:06

## 2020-09-06 RX ADMIN — ESCITALOPRAM OXALATE 20 MG: 20 TABLET, FILM COATED ORAL at 09:11

## 2020-09-06 RX ADMIN — CARVEDILOL 12.5 MG: 12.5 TABLET, FILM COATED ORAL at 09:11

## 2020-09-06 RX ADMIN — CEFAZOLIN SODIUM 1000 MG: 1 SOLUTION INTRAVENOUS at 13:06

## 2020-09-06 RX ADMIN — OFLOXACIN 1 DROP: 3 SOLUTION/ DROPS OPHTHALMIC at 09:17

## 2020-09-06 RX ADMIN — CARVEDILOL 12.5 MG: 12.5 TABLET, FILM COATED ORAL at 17:22

## 2020-09-06 RX ADMIN — ASPIRIN 81 MG: 81 TABLET, COATED ORAL at 09:11

## 2020-09-06 RX ADMIN — OFLOXACIN 1 DROP: 3 SOLUTION/ DROPS OPHTHALMIC at 21:28

## 2020-09-06 RX ADMIN — THIAMINE HCL TAB 100 MG 100 MG: 100 TAB at 09:11

## 2020-09-06 RX ADMIN — HEPARIN SODIUM 5000 UNITS: 5000 INJECTION INTRAVENOUS; SUBCUTANEOUS at 21:25

## 2020-09-06 RX ADMIN — SODIUM CHLORIDE, SODIUM GLUCONATE, SODIUM ACETATE, POTASSIUM CHLORIDE AND MAGNESIUM CHLORIDE 75 ML/HR: 526; 502; 368; 37; 30 INJECTION, SOLUTION INTRAVENOUS at 01:17

## 2020-09-06 RX ADMIN — AMLODIPINE BESYLATE 2.5 MG: 2.5 TABLET ORAL at 09:11

## 2020-09-06 RX ADMIN — CLOPIDOGREL BISULFATE 75 MG: 75 TABLET ORAL at 09:11

## 2020-09-06 RX ADMIN — Medication 1000 UNITS: at 09:11

## 2020-09-06 RX ADMIN — BACITRACIN: 500 OINTMENT OPHTHALMIC at 09:18

## 2020-09-06 RX ADMIN — OFLOXACIN 1 DROP: 3 SOLUTION/ DROPS OPHTHALMIC at 17:22

## 2020-09-06 NOTE — UTILIZATION REVIEW
Initial Clinical Review    Admission: Date/Time/Statement:   Admission Orders (From admission, onward)     Ordered        09/04/20 1938  Inpatient Admission  Once                   Orders Placed This Encounter   Procedures    Inpatient Admission     Standing Status:   Standing     Number of Occurrences:   1     Order Specific Question:   Admitting Physician     Answer:   Stevie David     Order Specific Question:   Level of Care     Answer:   Critical Care [15]     Order Specific Question:   Estimated length of stay     Answer:   More than 2 Midnights     Order Specific Question:   Certification     Answer:   I certify that inpatient services are medically necessary for this patient for a duration of greater than two midnights  See H&P and MD Progress Notes for additional information about the patient's course of treatment  ED Arrival Information     Expected Arrival Acuity Means of Arrival Escorted By Service Admission Type    - 9/4/2020 17:45 Immediate Ambulance [address] Complaint    Fall        Chief Complaint   Patient presents with    Trauma     Pt  fell in driveway onto face + LOC, + thinners     Assessment/Plan:  78 y/o male presents to ED s/p fall from standing  Pt reports he lost his balance and fell forward  Denies LOC  On exam GCS 15, avulsion of R nare, clotted blood filling both nares  Multiple displaced teeth, blood in mouth  Imaging revealed multiple facial fractures  Admit inpatient to M/S unit with multiple facial fxs, R eyelid avulsion, R nare avulsion  Consult OMFS & ophthalmology  OMFS consult 9/4 -- multiple facial fractures including mildly displaced b/l LeFort 2 fractures and avulsed teeth  On exam, patient presents with a mobile maxilla, laceration of left eyelid and complains of blurry/double vision    Plan:  Add on for OR today for ORIF of facial fractures, extraction of necessary teeth and laceration repair under GA   Analgesia as needed  Unasyn 3g IV q6h then transition to augmentin 875-125mg BID PO when able  Decadron 8mg IV q8h x3 doses  NPO/IVF for OR  DVT ppx: SCD, OOB; hold pharmacologic AC for the OR, can start post-op  HOB 30 degrees  Yankaur suction at bedside  Ice to face  Sinus precautions: no nose blowing, no heavy lifting, avoid pressure to the area, head of bed elevated, decongestants as needed  Ophthalmology consult 9/4 -- Lid laceration right lower lid, right orbital fractures  Globe intact  Cleared for ocular plastics exploration and repair as needed  Ofloxacin qid OD x 7 days  OPERATIVE REPORT  SURGERY DATE: 9/4/2020  Procedure(s) (LRB):  OPEN REDUCTION W/ INTERNAL FIXATION (ORIF) MAXILLARY FRACTURES LEFORTE, closure nasal  laceration and right lower eyelid laceration (Bilateral)  EXTRACTION TEETH MULTIPLE 25, 26, 31,27, 10, 13, 14, 9 (N/A)  Anesthesia Type:   General   Operative Findings:  Mobility of maxilla with Lefort 2 fracture  Non restorable teeth 9, 10, 13, 14, 25, 26, 27, 31  Complex right lower eyelid laceration with avulsed tissue: 4cm  Nasal laceration: 1cm   Lower lip mucosa laceration: 2cm    Admitted to SICU post op for close airway monitoring  Dysphagia diet, thin liquid       ED Triage Vitals   Temperature Pulse Respirations Blood Pressure SpO2   09/04/20 1750 09/04/20 1750 09/04/20 1750 09/04/20 1750 09/04/20 1750   98 1 °F (36 7 °C) 76 18 (!) 197/101 92 %      Temp Source Heart Rate Source Patient Position - Orthostatic VS BP Location FiO2 (%)   09/04/20 1750 09/04/20 1750 09/04/20 1750 09/05/20 0220 --   Rectal Monitor Lying Left arm       Pain Score       09/04/20 1840       7          Wt Readings from Last 1 Encounters:   09/05/20 74 kg (163 lb 2 3 oz)     Additional Vital Signs:   Date/Time   Temp   Pulse   Resp   BP   MAP (mmHg)   SpO2   Calculated FIO2 (%) - Nasal Cannula   O2 Flow Rate (L/min)   Nasal Cannula O2 Flow Rate (L/min)   O2 Device    09/05/20 2300      89                          09/05/20 21:55:46   98 9 °F (37 2 °C)   83   24Abnormal     142/71   95   98 %                09/05/20 1600                              None (Room air)    09/05/20 15:57:05   97 6 °F (36 4 °C)   73   18   140/63   89   95 %                09/05/20 12:20:12   97 1 °F (36 2 °C)Abnormal     83   20   160/77   105   96 %            None (Room air)    09/05/20 1200      83      160/77                      09/05/20 0416   98 °F (36 7 °C)   64   18   119/63   87   98 %            None (Room air)    09/05/20 0220   97 8 °F (36 6 °C)   60   12         97 %   32      3 L/min   Nasal cannula    Comment rows:    OBSERV: admit to ICU from OR at 09/05/20 0220    09/05/20 0200   98 1 °F (36 7 °C)   68   16   149/85      98 %      3 L/min      Nasal cannula    09/04/20 1825      71   16   203/100Abnormal        96 %                09/04/20 1810      72   16   194/95Abnormal        95 %                09/04/20 1755      72   18   196/97Abnormal        94 %                09/04/20 17:50:08   98 1 °F (36 7 °C)   76   18   197/101Abnormal        92 %                      Pertinent Labs/Diagnostic Test Results:   CT facial bones 9/4 -- Extensive facial fractures in a pattern consistent with LeFort type II fractures  There is a right paramedian mandibular fracture with dislocation of the incisor into the anterior soft tissues  Extensive facial soft tissue injury with multiple radiopaque foreign bodies most prominent in the right preseptal soft tissues anterior to the globe  Extensive sinus opacification with hemorrhage opacifying the frontal sinuses, ethmoid air cells and maxillary sinuses  Multiple cavities within the maxillary and mandibular dentition     CT c/a/p 9/4 -- No acute traumatic injury of the chest, abdomen or pelvis       CT c-spine 9/4 -- No acute fracture or malalignment    CT head 9/4 --- Mild microangiopathic change within the brain parenchyma with no acute intracranial abnormality      Multiple facial fractures as described above   See separate CT facial bones   There is resulting hemorrhage opacifying the frontal sinuses, ethmoid air cells and maxillary sinuses  Multiple small loose bodies are seen within the preseptal soft tissues on the right  CT head 9/5 -- No acute intracranial abnormality     Partial maxillary sinus fracture fixation involving the anterior walls with microplates   The remaining facial fractures are unchanged   Persistent hemorrhage layering within the maxillary sinuses  Significant improvement in the facial contusions and soft tissue swelling with removal of the previously seen foreign bodies         Results from last 7 days   Lab Units 09/05/20 0430 09/04/20 2001 09/04/20  1801   WBC Thousand/uL 15 87*  --  6 42   HEMOGLOBIN g/dL 12 7 13 4 13 7   HEMATOCRIT % 38 0 40 7 41 4   PLATELETS Thousands/uL 154  --  195   NEUTROS ABS Thousands/µL  --   --  3 77     Results from last 7 days   Lab Units 09/05/20 0430 09/04/20 1801 09/04/20  1757   SODIUM mmol/L 137 136  --    POTASSIUM mmol/L 4 3 3 9  --    CHLORIDE mmol/L 104 104  --    CO2 mmol/L 26 26  --    CO2, I-STAT mmol/L  --   --  27   ANION GAP mmol/L 7 6  --    BUN mg/dL 16 12  --    CREATININE mg/dL 0 71 0 75  --    EGFR ml/min/1 73sq m 89 87  --    CALCIUM mg/dL 8 2* 8 5  --    CALCIUM, IONIZED, ISTAT mmol/L  --   --  1 17   MAGNESIUM mg/dL 2 0  --   --    PHOSPHORUS mg/dL 3 4  --   --      Results from last 7 days   Lab Units 09/05/20 0430 09/04/20  1801   GLUCOSE RANDOM mg/dL 164* 101     Results from last 7 days   Lab Units 09/05/20  1131   PH MAICOL  7 316   PCO2 MAICOL mm Hg 46 6   PO2 MAICOL mm Hg 37 1   HCO3 MAICOL mmol/L 23 3*   BASE EXC MAICOL mmol/L -3 1   O2 CONTENT MAICOL ml/dL 11 9   O2 HGB, VENOUS % 63 5     Results from last 7 days   Lab Units 09/04/20  1757   PH, MAICOL I-STAT  7 362   PCO2, MAICOL ISTAT mm HG 44 8   PO2, MAICOL ISTAT mm HG 38 0   HCO3, MAICOL ISTAT mmol/L 25 4   I STAT BASE EXC mmol/L 0   I STAT O2 SAT % 69     Results from last 7 days   Lab Units 09/04/20  1801   PROTIME seconds 14 0   INR  1 08   PTT seconds 26     ED Treatment:   Medication Administration from 09/04/2020 1736 to 09/04/2020 2041       Date/Time Order Dose Route Action     09/04/2020 1959 lactated ringers infusion 100 mL/hr Intravenous New Bag     09/04/2020 2022 tetanus-diphtheria-acellular pertussis (BOOSTRIX) IM injection 0 5 mL 0 5 mL Intramuscular Given     History reviewed  No pertinent past medical history  Present on Admission:  **None**      Admitting Diagnosis: [facility] [S02 92XB]  Unspecified multiple injuries, initial encounter [T07  XXXA]  Age/Sex: 79 y o  male  Admission Orders:  Scheduled Medications:  amLODIPine, 2 5 mg, Oral, Daily  aspirin, 81 mg, Oral, Daily  atorvastatin, 40 mg, Oral, Daily With Dinner  bacitracin, , Right Eye, TID  carvedilol, 12 5 mg, Oral, BID With Meals  cefazolin, 1,000 mg, Intravenous, Q8H  chlorhexidine, 30 mL, Swish & Spit, TID after meals  cholecalciferol, 1,000 Units, Oral, Daily  clopidogrel, 75 mg, Oral, Daily  escitalopram, 20 mg, Oral, Daily  folic acid, 1 mg, Oral, Daily  heparin (porcine), 5,000 Units, Subcutaneous, Q8H MAXI  ofloxacin, 1 drop, Right Eye, 4x Daily  pantoprazole, 40 mg, Oral, Early Morning  thiamine, 100 mg, Oral, Daily    Continuous IV Infusions:  multi-electrolyte, 75 mL/hr, Intravenous, Continuous      PRN Meds:  acetaminophen, 650 mg, Oral, Q8H PRN  ondansetron, 4 mg, Intravenous, Q4H PRN  oxyCODONE, 5 mg, Oral, Q4H PRN        IP CONSULT TO ORAL AND MAXILLOFACIAL SURGERY  IP CONSULT TO CASE MANAGEMENT  IP CONSULT TO CASE MANAGEMENT  IP CONSULT TO OPHTHALMOLOGY    Network Utilization Review Department  [email or web address]  ATTENTION: Please call with any questions or concerns to [phone] and carefully listen to the prompts so that you are directed to the right person  All voicemails are confidential   Merly Grand all requests for admission clinical reviews, approved or denied determinations and any other requests to dedicated fax number below belonging to the campus where the patient is receiving treatment   List of dedicated fax numbers for the Facilities:  [address] DENIALS (Administrative/Medical Necessity) [phone]   1000  [facility] (Maternity/NICU/Pediatrics) [phone]   Nida Leavitt [phone]   Monique Barnes [phone]   Campos Form [phone]   Ingrid Starks [phone]   [facility] [place] [facility] [place] [address] [phone]   [facility]  [phone]   [address] [facility]  [address] [facility] [address] [facility] [phone]

## 2020-09-06 NOTE — PROGRESS NOTES
80M now POD2 s/p ORIF of bilateral maxillary [place] fractures via intra-oral approach, extraction of multiple teeth, closure of complex left eyelid laceration and nose laceration  He is stable this morning but confused  Pt on CIWA protocol, required Ativan last night per nursing staff  Has ambulated and tolerates PO intake  Patient denies any vision changes or diplopia  No signs of necrosis of right lower eyelid, no signs of ectropion  Oral incision hemostatic  Right eyelid cleaned today at bedside, ointment applied to right eyelid and Frost suture tightened         Recommendations  - continue abx  - continue CIWA protocol  - analgesia per primary team  - sinus precautions (no nose blowing, sneeze with mouth open)  - puree, non-chew, diet only for at least 3 weeks  - HOB elevated  - appreciate ophtho recs  - Bacitracin occular ointment TID  - daily eye care by OMFS  - OMFS to follow

## 2020-09-06 NOTE — PROGRESS NOTES
Pt with increased confusion as night has progressed, attempting to get OOB multiple times, difficult to redirect and reorient and combative at times  Trauma resident Dr Andre Briscoe made aware of situation  Soft waist restraint applied for pt safety

## 2020-09-06 NOTE — OCCUPATIONAL THERAPY NOTE
Occupational Therapy Evaluation     Patient Name: Jean Marie Vinson  XZXUA'T Date: 9/6/2020  Problem List  Principal Problem:    Closed extensive facial fractures [facility])  Active Problems:    Alcohol abuse    Past Medical History  History reviewed  No pertinent past medical history  Past Surgical History  History reviewed  No pertinent surgical history  09/06/20 1357   Note Type   Note type Eval only   Restrictions/Precautions   Other Precautions Cognitive; Bed Alarm; Restraints;Multiple lines; Fall Risk  (sinus precautions)   Pain Assessment   Pain Assessment Tool 0-10   Pain Score No Pain   Home Living   Type of Home House   Additional Comments Pt is a limited historian and further clarification of home setup needed  Prior Function   Level of [place] Independent with ADLs and functional mobility   Lives With Significant other   ADL Assistance Independent   IADLs Independent   Falls in the last 6 months 1 to 4  (at least one, leading to this admission)   Vocational Retired   Lifestyle   Autonomy Pt reports being I with ADLS, IADLS and mobility without device PTA  Reciprocal Relationships Pt reports that he lives with his significant other, unclear what level of support she is able to provide  Service to Others Retired   [address] to report, will continue to assess   ADL   Where Assessed Edge of bed   Eating Assistance 5  Supervision/Setup   Grooming Assistance 5  Supervision/Setup   UB Bathing Assistance 3  Moderate Assistance   LB Bathing Assistance 2  Maximal [address] 3  Moderate Assistance   LB Dressing Assistance 2  Maximal [address]  2  Maximal Assistance   Bed Mobility   Supine to Sit 3  Moderate assistance   Additional items Assist x 1; Increased time required;Verbal cues;LE management   Sit to Supine 3  Moderate assistance   Additional items Assist x 1; Increased time required;Verbal cues;LE management   Additional Comments After OT session pt in bed with alarm and posey on and all needs within reach  Transfers   Sit to Stand 3  Moderate assistance   Additional items Assist x 2; Increased time required;Verbal cues   Stand to Sit 3  Moderate assistance   Additional items Assist x 2; Increased time required;Verbal cues   Functional Mobility   Functional Mobility 3  Moderate assistance   Additional Comments Pt demonstrated short mobility within room  Additional items Rolling walker   Balance   Static Sitting Fair -   Dynamic Sitting Poor +   Static Standing Poor +   Dynamic Standing Poor   Ambulatory Poor   Activity Tolerance   Activity Tolerance Patient limited by fatigue   Medical Staff Made Aware PT Valentino Dub   Nurse Made Aware RN confirmed okay to see pt   RUE Assessment   RUE Assessment WFL   LUE Assessment   LUE Assessment WFL   Vision - Complex Assessment   Additional Comments Pt reports no blurry or double vision  Able to track stimulus inconsistently throughout all fields  Cognition   Overall Cognitive Status Impaired   Arousal/Participation Lethargic;Arousable; Cooperative   Attention Attends with cues to redirect   Orientation Level Oriented to person;Disoriented to time;Disoriented to situation;Oriented to place   Memory Decreased recall of precautions;Decreased recall of recent events   Following Commands Follows one step commands with increased time or repetition   Comments Pt presents lethargic and requires increased time for processing throughout  Overall, pt is pleasant and cooperative  Assessment   Limitation Decreased ADL status; Decreased cognition;Decreased endurance;Decreased self-care trans;Decreased high-level ADLs   Prognosis Fair   Assessment Pt is a 79 y o  male admitted to B on 9/4/2020 w/ closed extensive facial fractures s/p "OPEN REDUCTION W/ INTERNAL FIXATION (ORIF) MAXILLARY FRACTURES LEFORTE, closure nasal  laceration and right lower eyelid laceration (Bilateral), EXTRACTION TEETH MULTIPLE" on 2020  Comorbidities include a h/o alcohol abuse  Pt with active OT orders and up and OOB as tolerated orders  Pt is a limited historian and further clarification of home setup needed  Pt reports being I w/  ADLS and IADLS, & required no use of DME PTA  Currently pt is Mod A x2 for functional transfers, mod A x1 for functional mobility, mod A for UB ADLS and max A for LB ADSL  Pt is limited at this time 2*: pain, endurance, activity tolerance, functional mobility, forward functional reach, functional standing tolerance, decreased I w/ ADLS/IADLS and cognitive impairments  The following Occupational Performance Areas to address include: grooming, bathing/shower, toilet hygiene, dressing, functional mobility and clothing management  Based on the aforementioned OT evaluation, functional performance deficits, and assessments, pt has been identified as a high complexity evaluation  From OT standpoint, anticipate d/c STR  Pt to continue to benefit from acute immediate OT services to address the following goals 3-5x/week to  w/in 7-10 days: Jalen Goodson Goals   Patient Goals To return home    LTG Time Frame 7-10   Long Term Goal #1 See goals below   Plan   Treatment Interventions ADL retraining;Functional transfer training;UE strengthening/ROM; Endurance training;Cognitive reorientation;Patient/family training;Equipment evaluation/education; Compensatory technique education;Continued evaluation; Energy conservation; Activityengagement   Goal Expiration Date 20   OT Frequency 3-5x/wk   Recommendation   OT Discharge Recommendation Post-Acute Rehabilitation Services   OT - OK to Discharge Yes  (When medically appropriate )   Modified Dunn Scale   Modified Dunn Scale 4     GOALS    1) Pt will increase activity tolerance to G for 30 min txment sessions    2) Pt will complete UB/LB dressing/self care w/ mod I using adaptive device and DME as needed    3) Pt will complete bathing w/ Mod I w/ use of AE and DME as needed    4) Pt will complete toileting w/ mod I w/ G hygiene/thoroughness using DME as needed    5) Pt will improve functional transfers to Mod I on/off all surfaces using DME as needed w/ G balance/safety     6) Pt will improve functional mobility during ADL/IADL/leisure tasks to Mod I using DME as needed w/ G balance/safety     7) Pt will participate in simulated IADL management task with DME as needed to increase independence to  w/ G safety and endurance    8) Pt will demonstrate G carryover of pt/caregiver education and training as appropriate  9) Pt will demonstrate 100% carryover of energy conservation techniques t/o functional I/ADL/leisure tasks w/o cues s/p skilled education    10) Pt will independently identify and utilize 2-3 coping strategies to increase positive affect and promote overall well-being      11) Pt will engage in ongoing cognitive assessment w/ G participation to assist w/ safe d/c planning/recommendations    KEVEN Curry, OTR/L

## 2020-09-06 NOTE — PLAN OF CARE
Problem: Prexisting or High Potential for Compromised Skin Integrity  Goal: Skin integrity is maintained or improved  Description: INTERVENTIONS:  - Identify patients at risk for skin breakdown  - Assess and monitor skin integrity  - Assess and monitor nutrition and hydration status  - Monitor labs   - Assess for incontinence   - Turn and reposition patient  - Assist with mobility/ambulation  - Relieve pressure over bony prominences  - Avoid friction and shearing  - Provide appropriate hygiene as needed including keeping skin clean and dry  - Evaluate need for skin moisturizer/barrier cream  - Collaborate with interdisciplinary team   - Patient/family teaching  - Consider wound care consult   Outcome: Not Progressing     Problem: Potential for Falls  Goal: Patient will remain free of falls  Description: INTERVENTIONS:  - Assess patient frequently for physical needs  -  Identify cognitive and physical deficits and behaviors that affect risk of falls    -  [place] fall precautions as indicated by assessment   - Educate patient/family on patient safety including physical limitations  - Instruct patient to call for assistance with activity based on assessment  - Modify environment to reduce risk of injury  - Consider OT/PT consult to assist with strengthening/mobility  Outcome: Not Progressing     Problem: PAIN - ADULT  Goal: Verbalizes/displays adequate comfort level or baseline comfort level  Description: Interventions:  - Encourage patient to monitor pain and request assistance  - Assess pain using appropriate pain scale  - Administer analgesics based on type and severity of pain and evaluate response  - Implement non-pharmacological measures as appropriate and evaluate response  - Consider cultural and social influences on pain and pain management  - Notify physician/advanced practitioner if interventions unsuccessful or patient reports new pain  Outcome: Not Progressing     Problem: INFECTION - ADULT  Goal: Absence or prevention of progression during hospitalization  Description: INTERVENTIONS:  - Assess and monitor for signs and symptoms of infection  - Monitor lab/diagnostic results  - Monitor all insertion sites, i e  indwelling lines, tubes, and drains  - Monitor endotracheal if appropriate and nasal secretions for changes in amount and color  - [place] appropriate cooling/warming therapies per order  - Administer medications as ordered  - Instruct and encourage patient and family to use good hand hygiene technique  - Identify and instruct in appropriate isolation precautions for identified infection/condition  Outcome: Not Progressing  Goal: Absence of fever/infection during neutropenic period  Description: INTERVENTIONS:  - Monitor WBC    Outcome: Not Progressing     Problem: SAFETY ADULT  Goal: Patient will remain free of falls  Description: INTERVENTIONS:  - Assess patient frequently for physical needs  -  Identify cognitive and physical deficits and behaviors that affect risk of falls    -  [place] fall precautions as indicated by assessment   - Educate patient/family on patient safety including physical limitations  - Instruct patient to call for assistance with activity based on assessment  - Modify environment to reduce risk of injury  - Consider OT/PT consult to assist with strengthening/mobility  Outcome: Not Progressing  Goal: Maintain or return to baseline ADL function  Description: INTERVENTIONS:  -  Assess patient's ability to carry out ADLs; assess patient's baseline for ADL function and identify physical deficits which impact ability to perform ADLs (bathing, care of mouth/teeth, toileting, grooming, dressing, etc )  - Assess/evaluate cause of self-care deficits   - Assess range of motion  - Assess patient's mobility; develop plan if impaired  - Assess patient's need for assistive devices and provide as appropriate  - Encourage maximum independence but intervene and supervise when necessary  - Involve family in performance of ADLs  - Assess for home care needs following discharge   - Consider OT consult to assist with ADL evaluation and planning for discharge  - Provide patient education as appropriate  Outcome: Not Progressing  Goal: Maintain or return mobility status to optimal level  Description: INTERVENTIONS:  - Assess patient's baseline mobility status (ambulation, transfers, stairs, etc )    - Identify cognitive and physical deficits and behaviors that affect mobility  - Identify mobility aids required to assist with transfers and/or ambulation (gait belt, sit-to-stand, lift, walker, cane, etc )  - [place] fall precautions as indicated by assessment  - Record patient progress and toleration of activity level on Mobility SBAR; progress patient to next Phase/Stage  - Instruct patient to call for assistance with activity based on assessment  - Consider rehabilitation consult to assist with strengthening/weightbearing, etc   Outcome: Not Progressing     Problem: DISCHARGE PLANNING  Goal: Discharge to home or other facility with appropriate resources  Description: INTERVENTIONS:  - Identify barriers to discharge w/patient and caregiver  - Arrange for needed discharge resources and transportation as appropriate  - Identify discharge learning needs (meds, wound care, etc )  - Arrange for interpretive services to assist at discharge as needed  - Refer to Case Management Department for coordinating discharge planning if the patient needs post-hospital services based on physician/advanced practitioner order or complex needs related to functional status, cognitive ability, or social support system  Outcome: Not Progressing     Problem: Knowledge Deficit  Goal: Patient/family/caregiver demonstrates understanding of disease process, treatment plan, medications, and discharge instructions  Description: Complete learning assessment and assess knowledge base    Interventions:  - Provide teaching at level of understanding  - Provide teaching via preferred learning methods  Outcome: Not Progressing     Problem: Nutrition/Hydration-ADULT  Goal: Nutrient/Hydration intake appropriate for improving, restoring or maintaining nutritional needs  Description: Monitor and assess patient's nutrition/hydration status for malnutrition  Collaborate with interdisciplinary team and initiate plan and interventions as ordered  Monitor patient's weight and dietary intake as ordered or per policy  Utilize nutrition screening tool and intervene as necessary  Determine patient's food preferences and provide high-protein, high-caloric foods as appropriate       INTERVENTIONS:  - Monitor oral intake, urinary output, labs, and treatment plans  - Assess nutrition and hydration status and recommend course of action  - Evaluate amount of meals eaten  - Assist patient with eating if necessary   - Allow adequate time for meals  - Recommend/ encourage appropriate diets, oral nutritional supplements, and vitamin/mineral supplements  - Order, calculate, and assess calorie counts as needed  - Recommend, monitor, and adjust tube feedings and TPN/PPN based on assessed needs  - Assess need for intravenous fluids  - Provide specific nutrition/hydration education as appropriate  - Include patient/family/caregiver in decisions related to nutrition  9/6/2020 0256 by Ramo Adrian, RN  Outcome: Not Progressing  9/5/2020 1634 by Ramo Adrian, RN  Outcome: Not Progressing Tamiko Stern)

## 2020-09-06 NOTE — ASSESSMENT & PLAN NOTE
- WA protocol  - continue reorientation  - has not required any dosages of Ativan exam  - consider Serax if worsens

## 2020-09-06 NOTE — PHYSICAL THERAPY NOTE
PHYSICAL THERAPY Evaluation NOTE      Patient Name: Miguel Angel Bradley  QJOGP'[id number] Date: 9/6/2020     AGE:   79 y o   Mrn:   [id number]  ADMIT DX:  [facility]S02 [id number]]  Unspecified multiple injuries, initial encounter [T07  XXXA]    History reviewed  No pertinent past medical history  Length Of Stay: 2  PHYSICAL THERAPY EVALUATION :    09/06/20 1358   Note Type   Note type Eval/Treat   Pain Assessment   Pain Assessment Tool 0-10   Pain Score No Pain   Home Living   Type of Home House   Additional Comments Pt is a limited historian and further clarification of home setup needed  Prior Function   Level of [place] Independent with ADLs and functional mobility   Lives With Significant other   ADL Assistance Independent   IADLs Independent   Falls in the last 6 months 1 to 4  (at least one, leading to this admission)   Vocational Retired   Restrictions/Precautions   Other Precautions Cognitive; Bed Alarm; Restraints;Multiple lines; Fall Risk  (sinus precautions)   General   Additional Pertinent History Pt  is a 80 yo F M who presents s/p fall   Poor historian however reports he was going to get mail and tripped, hitting his face   Family/Caregiver Present No   Cognition   Overall Cognitive Status Impaired   Attention Attends with cues to redirect   Orientation Level Oriented to person;Disoriented to time;Disoriented to situation;Oriented to place   Memory Decreased recall of precautions;Decreased recall of recent events   Following Commands Follows one step commands with increased time or repetition   Comments Pt  was identified with full name and birthdate via ID bracelet   RLE Assessment   RLE Assessment   (grossly 4-/5)   LLE Assessment   LLE Assessment   (grossly 4-/5)   Coordination   Movements are Fluid and Coordinated 0   Coordination and Movement Description gait ataxia and retropulsion in standing   Sensation WFL   Light Touch   RLE Light Touch Grossly intact   LLE Light Touch Grossly intact   Bed Mobility   Supine to Sit 3  Moderate assistance   Additional items Assist x 1; Increased time required;Verbal cues;LE management   Sit to Supine 3  Moderate assistance   Additional items Assist x 1; Increased time required;Verbal cues;LE management   Additional Comments Pt  returned supine in bed with alarm engaged and posey belt on    Transfers   Sit to Stand 3  Moderate assistance   Additional items Assist x 2; Increased time required;Verbal cues   Stand to Sit 3  Moderate assistance   Additional items Assist x 2; Increased time required;Verbal cues   Ambulation/Elevation   Gait pattern Improper Weight shift;Narrow LUC; Forward Flexion;Decreased foot clearance;Shuffling; Inconsistent elizabeth; Redundant gait at times; Short stride; Ataxia   Gait Assistance 3  Moderate assist   Additional items Assist x 1;Verbal cues  (for sequencing and AD managment)   Assistive Device Rolling walker   Distance 10'x1 advance/retreat from EOB   Balance   Static Sitting Fair -   Dynamic Sitting Poor +   Static Standing Poor   Dynamic Standing Poor   Ambulatory Poor   Endurance Deficit   Endurance Deficit Yes   Endurance Deficit Description postural degradation in gait with fatigue noted   Activity Tolerance   Activity Tolerance Patient limited by fatigue;Patient limited by pain   Medical Staff Made Aware Spoke to GHASSAN Casanova   Nurse Made Aware Spoke to RN   Assessment   Prognosis Good   Problem List Decreased strength;Decreased range of motion;Decreased endurance; Impaired balance;Decreased mobility; Decreased coordination;Decreased safety awareness;Pain   Assessment Pt  is a 80 yo F M who presents s/p fall  Poor historian however reports he was going to get mail and tripped, hitting his face  order placed for PT eval and tx, w/ activity order of up as tolerated   pt presents w/ comorbidities of closed extensive facial Fxs, ETOH abuse, fall, CAD, CABG and personal factors of living in 2 story house, unable to perform dynamic tasks in community, limited home support, positive fall history, impulsivity, unable to perform physical activity, inability to perform IADLs, inability to perform ADLs and inability to live alone  pt presents w/ pain, weakness, decreased ROM, decreased endurance, impaired cognition, impaired balance, gait deviations, impaired coordination, decreased safety awareness, orthopedic restrictions, fall risk and impaired skin integrity  these impairments are evident in findings from physical examination (weakness, decreased ROM and impaired coordination), mobility assessment (need for ModAx2 assist w/ all phases of mobility when usually mobilizing independently, tolerance to only 10 feet of ambulation and need for cueing for mobility technique), and Barthel Index: 20/100  pt needed input for task focus and mobility technique  pt is at risk for falls due to physical and safety awareness deficits  pt's clinical presentation is unstable/unpredictable (evident in need for assist w/ all phases of mobility when usually mobilizing independently, tolerance to only 10 feet of ambulation, need for input for mobility technique, need for input for task focus and mobility technique and need for input for mobility technique/safety)  pt needs inpatient PT tx to improve mobility deficits  discharge recommendation is for inpatient rehab to reduce fall risk and maximize level of functional independence  Goals   Patient Goals to get home   STG Expiration Date 09/16/20   Short Term Goal #1 pt will:  Increase bilateral LE strength 1/2 grade to facilitate independent mobility, Perform all bed mobility tasks w/ supervision to decrease fall risk factors, Perform all transfers w/ supervision to improve independence, Ambulate 200 ft  with roller walker w/ supervision w/o LOB, Navigate 12 stairs w/ supervision with unilateral handrail to facilitate return to previous living environment, Increase all balance 1/2 grade to decrease risk for falls and Improve Barthel Index score to 45 or greater to facilitate independence   PT Treatment Day 0   Plan   Treatment/Interventions Functional transfer training;LE strengthening/ROM; Therapeutic exercise; Endurance training;Patient/family training;Equipment eval/education; Bed mobility;Gait training;Elevations; Spoke to nursing;Spoke to case management; Family   PT Frequency   (3-6x/wk)   Recommendation   PT Discharge Recommendation Post-Acute Rehabilitation Services   Equipment Recommended Walker   PT - OK to Discharge Yes   Additional Comments to rehab when medically appropriate   Barthel Index   Feeding 5   Bathing 0   Grooming Score 0   Dressing Score 5   Bladder Score 0   Bowels Score 0   Toilet Use Score 5   Transfers (Bed/Chair) Score 5   Mobility (Level Surface) Score 0   Stairs Score 0   Barthel Index Score 20     Skilled PT recommended while in hospital and upon DC to progress pt toward treatment goals       Cindy Mackay, PT, DPT 9/6/2020

## 2020-09-06 NOTE — PROGRESS NOTES
Pt continues very agitated, attempting to get oob, pulled out IV  Questionable withdraw behavior? Resident Dr Quiana Padron made aware  CIWA score done and 9  Also mentioned to her that pt was not taking much PO today and possibly the need for IVF's  New orders given

## 2020-09-06 NOTE — PLAN OF CARE
Problem: Prexisting or High Potential for Compromised Skin Integrity  Goal: Skin integrity is maintained or improved  Description: INTERVENTIONS:  - Identify patients at risk for skin breakdown  - Assess and monitor skin integrity  - Assess and monitor nutrition and hydration status  - Monitor labs   - Assess for incontinence   - Turn and reposition patient  - Assist with mobility/ambulation  - Relieve pressure over bony prominences  - Avoid friction and shearing  - Provide appropriate hygiene as needed including keeping skin clean and dry  - Evaluate need for skin moisturizer/barrier cream  - Collaborate with interdisciplinary team   - Patient/family teaching  - Consider wound care consult   Outcome: Not Progressing     Problem: Potential for Falls  Goal: Patient will remain free of falls  Description: INTERVENTIONS:  - Assess patient frequently for physical needs  -  Identify cognitive and physical deficits and behaviors that affect risk of falls    -  [place] fall precautions as indicated by assessment   - Educate patient/family on patient safety including physical limitations  - Instruct patient to call for assistance with activity based on assessment  - Modify environment to reduce risk of injury  - Consider OT/PT consult to assist with strengthening/mobility  Outcome: Not Progressing     Problem: PAIN - ADULT  Goal: Verbalizes/displays adequate comfort level or baseline comfort level  Description: Interventions:  - Encourage patient to monitor pain and request assistance  - Assess pain using appropriate pain scale  - Administer analgesics based on type and severity of pain and evaluate response  - Implement non-pharmacological measures as appropriate and evaluate response  - Consider cultural and social influences on pain and pain management  - Notify physician/advanced practitioner if interventions unsuccessful or patient reports new pain  Outcome: Not Progressing     Problem: INFECTION - ADULT  Goal: Absence or prevention of progression during hospitalization  Description: INTERVENTIONS:  - Assess and monitor for signs and symptoms of infection  - Monitor lab/diagnostic results  - Monitor all insertion sites, i e  indwelling lines, tubes, and drains  - Monitor endotracheal if appropriate and nasal secretions for changes in amount and color  - [place] appropriate cooling/warming therapies per order  - Administer medications as ordered  - Instruct and encourage patient and family to use good hand hygiene technique  - Identify and instruct in appropriate isolation precautions for identified infection/condition  Outcome: Not Progressing  Goal: Absence of fever/infection during neutropenic period  Description: INTERVENTIONS:  - Monitor WBC    Outcome: Not Progressing     Problem: SAFETY ADULT  Goal: Patient will remain free of falls  Description: INTERVENTIONS:  - Assess patient frequently for physical needs  -  Identify cognitive and physical deficits and behaviors that affect risk of falls    -  [place] fall precautions as indicated by assessment   - Educate patient/family on patient safety including physical limitations  - Instruct patient to call for assistance with activity based on assessment  - Modify environment to reduce risk of injury  - Consider OT/PT consult to assist with strengthening/mobility  Outcome: Not Progressing  Goal: Maintain or return to baseline ADL function  Description: INTERVENTIONS:  -  Assess patient's ability to carry out ADLs; assess patient's baseline for ADL function and identify physical deficits which impact ability to perform ADLs (bathing, care of mouth/teeth, toileting, grooming, dressing, etc )  - Assess/evaluate cause of self-care deficits   - Assess range of motion  - Assess patient's mobility; develop plan if impaired  - Assess patient's need for assistive devices and provide as appropriate  - Encourage maximum independence but intervene and supervise when necessary  - Involve family in performance of ADLs  - Assess for home care needs following discharge   - Consider OT consult to assist with ADL evaluation and planning for discharge  - Provide patient education as appropriate  Outcome: Not Progressing  Goal: Maintain or return mobility status to optimal level  Description: INTERVENTIONS:  - Assess patient's baseline mobility status (ambulation, transfers, stairs, etc )    - Identify cognitive and physical deficits and behaviors that affect mobility  - Identify mobility aids required to assist with transfers and/or ambulation (gait belt, sit-to-stand, lift, walker, cane, etc )  - [place] fall precautions as indicated by assessment  - Record patient progress and toleration of activity level on Mobility SBAR; progress patient to next Phase/Stage  - Instruct patient to call for assistance with activity based on assessment  - Consider rehabilitation consult to assist with strengthening/weightbearing, etc   Outcome: Not Progressing     Problem: DISCHARGE PLANNING  Goal: Discharge to home or other facility with appropriate resources  Description: INTERVENTIONS:  - Identify barriers to discharge w/patient and caregiver  - Arrange for needed discharge resources and transportation as appropriate  - Identify discharge learning needs (meds, wound care, etc )  - Arrange for interpretive services to assist at discharge as needed  - Refer to Case Management Department for coordinating discharge planning if the patient needs post-hospital services based on physician/advanced practitioner order or complex needs related to functional status, cognitive ability, or social support system  Outcome: Not Progressing     Problem: Knowledge Deficit  Goal: Patient/family/caregiver demonstrates understanding of disease process, treatment plan, medications, and discharge instructions  Description: Complete learning assessment and assess knowledge base    Interventions:  - Provide teaching at level of understanding  - Provide teaching via preferred learning methods  Outcome: Not Progressing     Problem: Nutrition/Hydration-ADULT  Goal: Nutrient/Hydration intake appropriate for improving, restoring or maintaining nutritional needs  Description: Monitor and assess patient's nutrition/hydration status for malnutrition  Collaborate with interdisciplinary team and initiate plan and interventions as ordered  Monitor patient's weight and dietary intake as ordered or per policy  Utilize nutrition screening tool and intervene as necessary  Determine patient's food preferences and provide high-protein, high-caloric foods as appropriate       INTERVENTIONS:  - Monitor oral intake, urinary output, labs, and treatment plans  - Assess nutrition and hydration status and recommend course of action  - Evaluate amount of meals eaten  - Assist patient with eating if necessary   - Allow adequate time for meals  - Recommend/ encourage appropriate diets, oral nutritional supplements, and vitamin/mineral supplements  - Order, calculate, and assess calorie counts as needed  - Recommend, monitor, and adjust tube feedings and TPN/PPN based on assessed needs  - Assess need for intravenous fluids  - Provide specific nutrition/hydration education as appropriate  - Include patient/family/caregiver in decisions related to nutrition  Outcome: Not Progressing

## 2020-09-06 NOTE — PROGRESS NOTES
Progress Note Michel Pond 1940, 79 y o  male MRN: [id number]    Unit/Bed#: [facility] 815-01 Encounter: [id number]    Primary Care Provider: ELE Centeno   Date and time admitted to hospital: 9/4/2020  5:45 PM        Alcohol abuse  Assessment & Plan  - [place] protocol  - continue reorientation  - has not required any dosages of Ativan exam  - consider Serax if worsens    * Closed extensive facial fractures (HCC)  Assessment & Plan  - s/p ORIF Leforte II and mandible with multiple facial laceration repairs and tooth extractions POD1  - follow-up CTA neck to rule out vascular injury   - Continue IV antibiotics while in hospital (Ancef) and transition to Augmentin upon discharge total 7 days  - 3 doses IV decadron  - sinus precautions including non-chew diet x 3 weeks   - analgesia: tolerating low dose oxycodone with minimal requirements  Monitor and titrate analgesia regimen as needed   - delirium precautions - [facility] protocol for ETOH use, no ativan required to date  - repeat head CT stable (obtained for confusion today) - cont delirium precautions, reorient as able   - PT/OT evals  - optho consult completed:  Ofloxacin q i d  OD for 7 days and outpatient follow-up     DVT Prophylaxis: SCDs and SQH  PT and OT: eval and treat    Home medications reviewed prior to his discharge on to the ICU  Disposition:  Continue close observation  Continue CIWA protocol  Patient has extensive facial surgery  Currently in a [place]  Will likely need rehab  PT and OT consultation  SUBJECTIVE:  Chief Complaint: "No complaints of pain "    Subjective:  I am tired today        OBJECTIVE:     Meds/Allergies     Current Facility-Administered Medications:     acetaminophen (TYLENOL) tablet 650 mg, 650 mg, Oral, Q8H PRN, ELE Saavedra    amLODIPine (NORVASC) tablet 2 5 mg, 2 5 mg, Oral, Daily, ELE Saavedra, 2 5 mg at 09/06/20 0911    aspirin (ECOTRIN LOW STRENGTH) EC tablet 81 mg, 81 mg, Oral, Daily, ELE Zavala, 81 mg at 09/06/20 0911    atorvastatin (LIPITOR) tablet 40 mg, 40 mg, Oral, Daily With Dinner, ELE Saavedra, 40 mg at 09/05/20 1723    bacitracin ophthalmic ointment, , Right Eye, TID, Irvin Radulescu, DMD    carvedilol (COREG) tablet 12 5 mg, 12 5 mg, Oral, BID With Meals, ELE Zavala, 12 5 mg at 09/06/20 0911    ceFAZolin (ANCEF) IVPB (premix) 1,000 mg 50 mL, 1,000 mg, Intravenous, Q8H, Irvin Michel DMD, Stopped at 09/06/20 0536    chlorhexidine (PERIDEX) 0 12 % oral rinse 30 mL, 30 mL, Swish & Spit, TID after meals, Irvinsal Michel, DMD, 30 mL at 09/05/20 1724    cholecalciferol (VITAMIN D3) tablet 1,000 Units, 1,000 Units, Oral, Daily, ELE Saavedra, 1,000 Units at 09/06/20 0911    clopidogrel (PLAVIX) tablet 75 mg, 75 mg, Oral, Daily, ELE Saavedra, 75 mg at 09/06/20 0911    escitalopram (LEXAPRO) tablet 20 mg, 20 mg, Oral, Daily, ELE Saavedra, 20 mg at 68/60/04 5307    folic acid (FOLVITE) tablet 1 mg, 1 mg, Oral, Daily, ELE Saavedra, 1 mg at 09/06/20 0911    heparin (porcine) subcutaneous injection 5,000 Units, 5,000 Units, Subcutaneous, Q8H [address], ELE Saavedra, 5,000 Units at 09/06/20 0514    multi-electrolyte (PLASMALYTE-A/ISOLYTE-S PH 7 4) IV solution, 75 mL/hr, Intravenous, Continuous, Gayle Ledesma DO, Last Rate: 75 mL/hr at 09/06/20 0536, 75 mL/hr at 09/06/20 0536    ofloxacin (OCUFLOX) 0 3 % ophthalmic solution 1 drop, 1 drop, Right Eye, 4x Daily, ELE Saavedra, 1 drop at 09/06/20 0917    ondansetron (ZOFRAN) injection 4 mg, 4 mg, Intravenous, Q4H PRN, ELE Saavedra    oxyCODONE (ROXICODONE) IR tablet 5 mg, 5 mg, Oral, Q4H PRN, ELE Saavedra    pantoprazole (PROTONIX) EC tablet 40 mg, 40 mg, Oral, Early Morning, ELE Saavedra, 40 mg at 09/05/20 1003    thiamine (VITAMIN B1) tablet 100 mg, 100 mg, Oral, Daily, ELE Saavedra, 100 mg at 09/06/20 4616     Vitals:   Vitals: 09/06/20 0718   BP: 132/68   Pulse: 78   Resp: 20   Temp: 98 2 °F (36 8 °C)   SpO2: 98%       Intake/Output:  I/O       09/04 0701 - 09/05 0700 09/05 0701 - 09/06 0700 09/06 0701 - 09/07 0700    P  O  0 480     I V  (mL/kg) 1106 7 (15) 940 8 (12 7)     IV Piggyback 100 50     Total Intake(mL/kg) 1206 7 (16 3) 1470 8 (19 9)     Urine (mL/kg/hr) 975 1462 (0 8) 475 (1 3)    Stool  0     Total Output 975 1462 475    Net +231 7 +8 8 -475           Unmeasured Urine Occurrence 1 x 2 x     Unmeasured Stool Occurrence  1 x            Nutrition/GI Proph/Bowel Reg: Continue current diet    Physical Exam:   GENERAL APPEARANCE: NAD  NEURO: GCS 14/15  HEENT:  Extensive facial trauma that is stable at this time; EOMs are intact with pupils that are equal round reactive  CV: RRR  LUNGS: CTA b/l  GI: Non-tender, non-distended  : No nolasco  MSK: Moving all extremities  SKIN: warm, dry, intact    Invasive Devices     Peripheral Intravenous Line            Peripheral IV 09/04/20 Left Antecubital 1 day                 Lab Results:   Results: I have personally reviewed pertinent reports   , BMP/CMP:   Lab Results   Component Value Date    SODIUM 140 09/06/2020    K 3 9 09/06/2020     09/06/2020    CO2 31 09/06/2020    BUN 12 09/06/2020    CREATININE 0 66 09/06/2020    CALCIUM 8 2 (L) 09/06/2020    EGFR 92 09/06/2020    and CBC:   Lab Results   Component Value Date    WBC 17 56 (H) 09/06/2020    HGB 10 6 (L) 09/06/2020    HCT 32 3 (L) 09/06/2020    MCV 90 09/06/2020     (L) 09/06/2020    MCH 29 6 09/06/2020    MCHC 32 8 09/06/2020    RDW 13 3 09/06/2020    MPV 9 1 09/06/2020     Imaging/EKG Studies: Results: I have personally reviewed pertinent reports      Other Studies: no other studies  VTE Prophylaxis: SCDs and SQH

## 2020-09-06 NOTE — PLAN OF CARE
Problem: OCCUPATIONAL THERAPY ADULT  Goal: Performs self-care activities at highest level of function for planned discharge setting  See evaluation for individualized goals  Description: Treatment Interventions: ADL retraining, Functional transfer training, UE strengthening/ROM, Endurance training, Cognitive reorientation, Patient/family training, Equipment evaluation/education, Compensatory technique education, Continued evaluation, Energy conservation, Activityengagement          See flowsheet documentation for full assessment, interventions and recommendations  Note: Limitation: Decreased ADL status, Decreased cognition, Decreased endurance, Decreased self-care trans, Decreased high-level ADLs  Prognosis: Fair  Assessment: Pt is a 79 y o  male admitted to [facility] [place] on 9/4/2020 w/ closed extensive facial fractures s/p "OPEN REDUCTION W/ INTERNAL FIXATION (ORIF) MAXILLARY FRACTURES LEFORTE, closure nasal  laceration and right lower eyelid laceration (Bilateral), EXTRACTION TEETH MULTIPLE" on 9/4/2020  Comorbidities include a h/o alcohol abuse  Pt with active OT orders and up and OOB as tolerated orders  Pt is a limited historian and further clarification of home setup needed  Pt reports being I w/  ADLS and IADLS, & required no use of DME PTA  Currently pt is Mod A x2 for functional transfers, mod A x1 for functional mobility, mod A for UB ADLS and max A for LB ADSL  Pt is limited at this time 2*: pain, endurance, activity tolerance, functional mobility, forward functional reach, functional standing tolerance, decreased I w/ ADLS/IADLS and cognitive impairments  The following Occupational Performance Areas to address include: grooming, bathing/shower, toilet hygiene, dressing, functional mobility and clothing management  Based on the aforementioned OT evaluation, functional performance deficits, and assessments, pt has been identified as a high complexity evaluation  From OT standpoint, anticipate d/c STR   Pt to continue to benefit from acute immediate OT services to address the following goals 3-5x/week to  w/in 7-10 days:        OT Discharge Recommendation: Post-Acute Rehabilitation Services  OT - OK to Discharge: Yes(When medically appropriate )

## 2020-09-06 NOTE — PLAN OF CARE
Problem: PHYSICAL THERAPY ADULT  Goal: Performs mobility at highest level of function for planned discharge setting  See evaluation for individualized goals  Description: Treatment/Interventions: Functional transfer training, LE strengthening/ROM, Therapeutic exercise, Endurance training, Patient/family training, Equipment eval/education, Bed mobility, Gait training, Elevations, Spoke to nursing, Spoke to case management, Family  Equipment Recommended: Myah Mortensen       See flowsheet documentation for full assessment, interventions and recommendations  Outcome: Progressing  Note: Prognosis: Good  Problem List: Decreased strength, Decreased range of motion, Decreased endurance, Impaired balance, Decreased mobility, Decreased coordination, Decreased safety awareness, Pain  Assessment: Pt  is a 78 yo F M who presents s/p fall  Poor historian however reports he was going to get mail and tripped, hitting his face  order placed for PT eval and tx, w/ activity order of up as tolerated  pt presents w/ comorbidities of closed extensive facial Fxs, ETOH abuse, fall, CAD, CABG and personal factors of living in 2 story house, unable to perform dynamic tasks in community, limited home support, positive fall history, impulsivity, unable to perform physical activity, inability to perform IADLs, inability to perform ADLs and inability to live alone  pt presents w/ pain, weakness, decreased ROM, decreased endurance, impaired cognition, impaired balance, gait deviations, impaired coordination, decreased safety awareness, orthopedic restrictions, fall risk and impaired skin integrity  these impairments are evident in findings from physical examination (weakness, decreased ROM and impaired coordination), mobility assessment (need for ModAx2 assist w/ all phases of mobility when usually mobilizing independently, tolerance to only 10 feet of ambulation and need for cueing for mobility technique), and Barthel Index: 20/100   pt needed input for task focus and mobility technique  pt is at risk for falls due to physical and safety awareness deficits  pt's clinical presentation is unstable/unpredictable (evident in need for assist w/ all phases of mobility when usually mobilizing independently, tolerance to only 10 feet of ambulation, need for input for mobility technique, need for input for task focus and mobility technique and need for input for mobility technique/safety)  pt needs inpatient PT tx to improve mobility deficits  discharge recommendation is for inpatient rehab to reduce fall risk and maximize level of functional independence  PT Discharge Recommendation: Post-Acute Rehabilitation Services     PT - OK to Discharge: Yes    See flowsheet documentation for full assessment

## 2020-09-06 NOTE — ASSESSMENT & PLAN NOTE
- s/p ORIF Leforte II and mandible with multiple facial laceration repairs and tooth extractions POD1  - follow-up CTA neck to rule out vascular injury   - Continue IV antibiotics while in hospital (Ancef) and transition to Augmentin upon discharge total 7 days  - 3 doses IV decadron  - sinus precautions including non-chew diet x 3 weeks   - analgesia: tolerating low dose oxycodone with minimal requirements   Monitor and titrate analgesia regimen as needed   - delirium precautions - CIWA protocol for ETOH use, no ativan required to date  - repeat head CT stable (obtained for confusion today) - cont delirium precautions, reorient as able   - PT/OT evals  - optho consult completed:  Ofloxacin q i d  OD for 7 days and outpatient follow-up

## 2020-09-07 LAB
ANION GAP SERPL CALCULATED.3IONS-SCNC: 3 MMOL/L (ref 4–13)
BASOPHILS # BLD AUTO: 0.01 THOUSANDS/ΜL (ref 0–0.1)
BASOPHILS NFR BLD AUTO: 0 % (ref 0–1)
BUN SERPL-MCNC: 13 MG/DL (ref 5–25)
CALCIUM SERPL-MCNC: 8.1 MG/DL (ref 8.3–10.1)
CHLORIDE SERPL-SCNC: 108 MMOL/L (ref 100–108)
CO2 SERPL-SCNC: 31 MMOL/L (ref 21–32)
CREAT SERPL-MCNC: 0.59 MG/DL (ref 0.6–1.3)
EOSINOPHIL # BLD AUTO: 0 THOUSAND/ΜL (ref 0–0.61)
EOSINOPHIL NFR BLD AUTO: 0 % (ref 0–6)
ERYTHROCYTE [DISTWIDTH] IN BLOOD BY AUTOMATED COUNT: 13.5 % (ref 11.6–15.1)
GFR SERPL CREATININE-BSD FRML MDRD: 96 ML/MIN/1.73SQ M
GLUCOSE SERPL-MCNC: 100 MG/DL (ref 65–140)
HCT VFR BLD AUTO: 31.1 % (ref 36.5–49.3)
HGB BLD-MCNC: 10.3 G/DL (ref 12–17)
IMM GRANULOCYTES # BLD AUTO: 0.08 THOUSAND/UL (ref 0–0.2)
IMM GRANULOCYTES NFR BLD AUTO: 1 % (ref 0–2)
LYMPHOCYTES # BLD AUTO: 1.53 THOUSANDS/ΜL (ref 0.6–4.47)
LYMPHOCYTES NFR BLD AUTO: 11 % (ref 14–44)
MCH RBC QN AUTO: 29.9 PG (ref 26.8–34.3)
MCHC RBC AUTO-ENTMCNC: 33.1 G/DL (ref 31.4–37.4)
MCV RBC AUTO: 90 FL (ref 82–98)
MONOCYTES # BLD AUTO: 1.06 THOUSAND/ΜL (ref 0.17–1.22)
MONOCYTES NFR BLD AUTO: 8 % (ref 4–12)
NEUTROPHILS # BLD AUTO: 10.85 THOUSANDS/ΜL (ref 1.85–7.62)
NEUTS SEG NFR BLD AUTO: 80 % (ref 43–75)
NRBC BLD AUTO-RTO: 0 /100 WBCS
PLATELET # BLD AUTO: 156 THOUSANDS/UL (ref 149–390)
PMV BLD AUTO: 9.2 FL (ref 8.9–12.7)
POTASSIUM SERPL-SCNC: 3.8 MMOL/L (ref 3.5–5.3)
RBC # BLD AUTO: 3.44 MILLION/UL (ref 3.88–5.62)
SODIUM SERPL-SCNC: 142 MMOL/L (ref 136–145)
WBC # BLD AUTO: 13.53 THOUSAND/UL (ref 4.31–10.16)

## 2020-09-07 PROCEDURE — 80048 BASIC METABOLIC PNL TOTAL CA: CPT | Performed by: PHYSICIAN ASSISTANT

## 2020-09-07 PROCEDURE — 85025 COMPLETE CBC W/AUTO DIFF WBC: CPT | Performed by: PHYSICIAN ASSISTANT

## 2020-09-07 PROCEDURE — 99231 SBSQ HOSP IP/OBS SF/LOW 25: CPT | Performed by: SURGERY

## 2020-09-07 RX ADMIN — HEPARIN SODIUM 5000 UNITS: 5000 INJECTION INTRAVENOUS; SUBCUTANEOUS at 05:07

## 2020-09-07 RX ADMIN — CEFAZOLIN SODIUM 1000 MG: 1 SOLUTION INTRAVENOUS at 15:09

## 2020-09-07 RX ADMIN — BACITRACIN: 500 OINTMENT OPHTHALMIC at 18:20

## 2020-09-07 RX ADMIN — BACITRACIN: 500 OINTMENT OPHTHALMIC at 09:55

## 2020-09-07 RX ADMIN — PANTOPRAZOLE SODIUM 40 MG: 40 TABLET, DELAYED RELEASE ORAL at 05:07

## 2020-09-07 RX ADMIN — OXYCODONE HYDROCHLORIDE 5 MG: 5 TABLET ORAL at 00:15

## 2020-09-07 RX ADMIN — OFLOXACIN 1 DROP: 3 SOLUTION/ DROPS OPHTHALMIC at 22:54

## 2020-09-07 RX ADMIN — CLOPIDOGREL BISULFATE 75 MG: 75 TABLET ORAL at 09:56

## 2020-09-07 RX ADMIN — HEPARIN SODIUM 5000 UNITS: 5000 INJECTION INTRAVENOUS; SUBCUTANEOUS at 15:10

## 2020-09-07 RX ADMIN — CARVEDILOL 12.5 MG: 12.5 TABLET, FILM COATED ORAL at 09:57

## 2020-09-07 RX ADMIN — HEPARIN SODIUM 5000 UNITS: 5000 INJECTION INTRAVENOUS; SUBCUTANEOUS at 22:55

## 2020-09-07 RX ADMIN — AMLODIPINE BESYLATE 2.5 MG: 2.5 TABLET ORAL at 09:56

## 2020-09-07 RX ADMIN — BACITRACIN: 500 OINTMENT OPHTHALMIC at 22:54

## 2020-09-07 RX ADMIN — THIAMINE HCL TAB 100 MG 100 MG: 100 TAB at 09:57

## 2020-09-07 RX ADMIN — ASPIRIN 81 MG: 81 TABLET, COATED ORAL at 09:56

## 2020-09-07 RX ADMIN — SODIUM CHLORIDE, SODIUM GLUCONATE, SODIUM ACETATE, POTASSIUM CHLORIDE AND MAGNESIUM CHLORIDE 75 ML/HR: 526; 502; 368; 37; 30 INJECTION, SOLUTION INTRAVENOUS at 10:00

## 2020-09-07 RX ADMIN — Medication 1000 UNITS: at 09:56

## 2020-09-07 RX ADMIN — CHLORHEXIDINE GLUCONATE 0.12% ORAL RINSE 30 ML: 1.2 LIQUID ORAL at 18:21

## 2020-09-07 RX ADMIN — ATORVASTATIN CALCIUM 40 MG: 40 TABLET, FILM COATED ORAL at 18:21

## 2020-09-07 RX ADMIN — CHLORHEXIDINE GLUCONATE 0.12% ORAL RINSE 30 ML: 1.2 LIQUID ORAL at 09:00

## 2020-09-07 RX ADMIN — CHLORHEXIDINE GLUCONATE 0.12% ORAL RINSE 30 ML: 1.2 LIQUID ORAL at 13:10

## 2020-09-07 RX ADMIN — OFLOXACIN 1 DROP: 3 SOLUTION/ DROPS OPHTHALMIC at 15:11

## 2020-09-07 RX ADMIN — ESCITALOPRAM OXALATE 20 MG: 20 TABLET, FILM COATED ORAL at 09:56

## 2020-09-07 RX ADMIN — OFLOXACIN 1 DROP: 3 SOLUTION/ DROPS OPHTHALMIC at 09:55

## 2020-09-07 RX ADMIN — OFLOXACIN 1 DROP: 3 SOLUTION/ DROPS OPHTHALMIC at 18:21

## 2020-09-07 RX ADMIN — SODIUM CHLORIDE, SODIUM GLUCONATE, SODIUM ACETATE, POTASSIUM CHLORIDE AND MAGNESIUM CHLORIDE 75 ML/HR: 526; 502; 368; 37; 30 INJECTION, SOLUTION INTRAVENOUS at 22:53

## 2020-09-07 RX ADMIN — CARVEDILOL 12.5 MG: 12.5 TABLET, FILM COATED ORAL at 18:21

## 2020-09-07 RX ADMIN — FOLIC ACID 1 MG: 1 TABLET ORAL at 09:00

## 2020-09-07 RX ADMIN — CEFAZOLIN SODIUM 1000 MG: 1 SOLUTION INTRAVENOUS at 22:54

## 2020-09-07 RX ADMIN — CEFAZOLIN SODIUM 1000 MG: 1 SOLUTION INTRAVENOUS at 05:07

## 2020-09-07 NOTE — PROGRESS NOTES
Progress Note Deya Alba 1940, 79 y o  male MRN: [id number]    Unit/Bed#: [facility] 815-01 Encounter: [id number]    Primary Care Provider: ELE Huerta   Date and time admitted to hospital: 9/4/2020  5:45 PM        Alcohol abuse  Assessment & Plan  - CIWA protocol  - continue reorientation  - has not required any dosages of Ativan exam  - consider Serax if worsens    * Closed extensive facial fractures (HCC)  Assessment & Plan  - s/p ORIF Leforte II and mandible with multiple facial laceration repairs and tooth extractions POD1  - follow-up CTA neck to rule out vascular injury   - Continue IV antibiotics while in hospital (Ancef) and transition to Augmentin upon discharge total 7 days  - 3 doses IV decadron  - sinus precautions including non-chew diet x 3 weeks   - analgesia: tolerating low dose oxycodone with minimal requirements   Monitor and titrate analgesia regimen as needed   - delirium precautions - CIWA protocol for ETOH use, no ativan required to date  - repeat head CT stable (obtained for confusion) - cont delirium precautions, reorient as able   - PT/OT evals  - optho consult completed:  Ofloxacin q i d  OD for 7 days and outpatient follow-up           Disposition: continue inpatient status      SUBJECTIVE:  Chief Complaint: none    Subjective: none      OBJECTIVE:     Meds/Allergies     Current Facility-Administered Medications:     acetaminophen (TYLENOL) tablet 650 mg, 650 mg, Oral, Q8H PRN, ELE Saavedra    amLODIPine (NORVASC) tablet 2 5 mg, 2 5 mg, Oral, Daily, ELE Saavedra, 2 5 mg at 09/07/20 0956    aspirin (ECOTRIN LOW STRENGTH) EC tablet 81 mg, 81 mg, Oral, Daily, ELE Saavedra, 81 mg at 09/07/20 0956    atorvastatin (LIPITOR) tablet 40 mg, 40 mg, Oral, Daily With Dinner, ELE Saavedra, 40 mg at 09/06/20 1722    bacitracin ophthalmic ointment, , Right Eye, TID, Irvin Radulescu, DMD    carvedilol (COREG) tablet 12 5 mg, 12 5 mg, Oral, BID With Meals, [facility], CRNP, 12 5 mg at 09/07/20 0957    ceFAZolin (ANCEF) IVPB (premix) 1,000 mg 50 mL, 1,000 mg, Intravenous, Q8H, Irvin Michel DMD, Last Rate: 100 mL/hr at 09/07/20 1509, 1,000 mg at 09/07/20 1509    chlorhexidine (PERIDEX) 0 12 % oral rinse 30 mL, 30 mL, Swish & Spit, TID after meals, Irvin Michel DMD, 30 mL at 09/07/20 1310    cholecalciferol (VITAMIN D3) tablet 1,000 Units, 1,000 Units, Oral, Daily, Delmy LemusELE, 1,000 Units at 09/07/20 0956    clopidogrel (PLAVIX) tablet 75 mg, 75 mg, Oral, Daily, ELE Saavedra, 75 mg at 09/07/20 0956    escitalopram (LEXAPRO) tablet 20 mg, 20 mg, Oral, Daily, ELE Saavedra, 20 mg at 24/31/31 8889    folic acid (FOLVITE) tablet 1 mg, 1 mg, Oral, Daily, ELE Saavedra, 1 mg at 09/07/20 0900    heparin (porcine) subcutaneous injection 5,000 Units, 5,000 Units, Subcutaneous, Q8H [facility], ELE Saavedra, 5,000 Units at 09/07/20 1510    multi-electrolyte (PLASMALYTE-A/ISOLYTE-S PH 7 4) IV solution, 75 mL/hr, Intravenous, Continuous, Gayle Ledesma, DO, Last Rate: 75 mL/hr at 09/07/20 1000, 75 mL/hr at 09/07/20 1000    ofloxacin (OCUFLOX) 0 3 % ophthalmic solution 1 drop, 1 drop, Right Eye, 4x Daily, ELE Saavedra, 1 drop at 09/07/20 1511    ondansetron (ZOFRAN) injection 4 mg, 4 mg, Intravenous, Q4H PRN, ELE Saavedra    oxyCODONE (ROXICODONE) IR tablet 5 mg, 5 mg, Oral, Q4H PRN, ELE Saavedra, 5 mg at 09/07/20 0015    pantoprazole (PROTONIX) EC tablet 40 mg, 40 mg, Oral, Early Morning, ELE Saavedra, 40 mg at 09/07/20 0507    thiamine (VITAMIN B1) tablet 100 mg, 100 mg, Oral, Daily, ELE Carpenter, 100 mg at 09/07/20 0957     Vitals:   Vitals:    09/07/20 1457   BP: 120/65   Pulse:    Resp: 18   Temp: 98 6 °F (37 °C)   SpO2:        Intake/Output:  I/O       09/05 0701 - 09/06 0700 09/06 0701 - 09/07 0700 09/07 0701 - 09/08 0700    P  O  480 120 0    I V  (mL/kg) 940 8 (12 7) IV Piggyback 50      Total Intake(mL/kg) 1470 8 (19 9) 120 (1 6) 0 (0)    Urine (mL/kg/hr) 1462 (0 8) 1275 (0 7) 300 (0 4)    Stool 0 0     Total Output 1462 1275 300    Net +8 8 -1155 -300           Unmeasured Urine Occurrence 2 x 2 x 1 x    Unmeasured Stool Occurrence 1 x 0 x            Nutrition/GI Proph/Bowel Reg: continue current diet    Physical Exam:   GENERAL APPEARANCE: NAD  NEURO: GCS 14/15  HEENT:  Extensive facial trauma that is stable at this time; EOMs are intact with pupils that are equal round reactive  CV: RRR  LUNGS: CTA b/l  GI: Non-tender, non-distended  : No nolasco  MSK: Moving all extremities  SKIN: warm, dry, intact    Invasive Devices     Peripheral Intravenous Line            Peripheral IV 09/04/20 Left Antecubital 2 days                 Lab Results: Results: I have personally reviewed pertinent reports  Imaging/EKG Studies: Results: I have personally reviewed pertinent reports      Other Studies: none  VTE Prophylaxis: Sequential compression device (Venodyne)  and Heparin

## 2020-09-07 NOTE — ASSESSMENT & PLAN NOTE
- s/p ORIF Leforte II and mandible with multiple facial laceration repairs and tooth extractions POD1  - follow-up CTA neck to rule out vascular injury   - Continue IV antibiotics while in hospital (Ancef) and transition to Augmentin upon discharge total 7 days  - 3 doses IV decadron  - sinus precautions including non-chew diet x 3 weeks   - analgesia: tolerating low dose oxycodone with minimal requirements   Monitor and titrate analgesia regimen as needed   - delirium precautions - CIWA protocol for ETOH use, no ativan required to date  - repeat head CT stable (obtained for confusion) - cont delirium precautions, reorient as able   - PT/OT evals  - optho consult completed:  Ofloxacin q i d  OD for 7 days and outpatient follow-up

## 2020-09-08 PROCEDURE — 99232 SBSQ HOSP IP/OBS MODERATE 35: CPT | Performed by: SURGERY

## 2020-09-08 PROCEDURE — 97530 THERAPEUTIC ACTIVITIES: CPT

## 2020-09-08 RX ORDER — ACETAMINOPHEN 325 MG/1
975 TABLET ORAL EVERY 8 HOURS SCHEDULED
Status: DISCONTINUED | OUTPATIENT
Start: 2020-09-08 | End: 2020-09-10 | Stop reason: HOSPADM

## 2020-09-08 RX ADMIN — CHLORHEXIDINE GLUCONATE 0.12% ORAL RINSE 30 ML: 1.2 LIQUID ORAL at 11:57

## 2020-09-08 RX ADMIN — CEFAZOLIN SODIUM 1000 MG: 1 SOLUTION INTRAVENOUS at 04:08

## 2020-09-08 RX ADMIN — FOLIC ACID 1 MG: 1 TABLET ORAL at 08:42

## 2020-09-08 RX ADMIN — OFLOXACIN 1 DROP: 3 SOLUTION/ DROPS OPHTHALMIC at 17:39

## 2020-09-08 RX ADMIN — SODIUM CHLORIDE, SODIUM GLUCONATE, SODIUM ACETATE, POTASSIUM CHLORIDE AND MAGNESIUM CHLORIDE 75 ML/HR: 526; 502; 368; 37; 30 INJECTION, SOLUTION INTRAVENOUS at 10:40

## 2020-09-08 RX ADMIN — ATORVASTATIN CALCIUM 40 MG: 40 TABLET, FILM COATED ORAL at 16:21

## 2020-09-08 RX ADMIN — BACITRACIN: 500 OINTMENT OPHTHALMIC at 16:21

## 2020-09-08 RX ADMIN — OFLOXACIN 1 DROP: 3 SOLUTION/ DROPS OPHTHALMIC at 08:42

## 2020-09-08 RX ADMIN — OFLOXACIN 1 DROP: 3 SOLUTION/ DROPS OPHTHALMIC at 11:57

## 2020-09-08 RX ADMIN — BACITRACIN: 500 OINTMENT OPHTHALMIC at 08:42

## 2020-09-08 RX ADMIN — THIAMINE HCL TAB 100 MG 100 MG: 100 TAB at 08:42

## 2020-09-08 RX ADMIN — ACETAMINOPHEN 975 MG: 325 TABLET, FILM COATED ORAL at 14:22

## 2020-09-08 RX ADMIN — PANTOPRAZOLE SODIUM 40 MG: 40 TABLET, DELAYED RELEASE ORAL at 05:54

## 2020-09-08 RX ADMIN — HEPARIN SODIUM 5000 UNITS: 5000 INJECTION INTRAVENOUS; SUBCUTANEOUS at 14:22

## 2020-09-08 RX ADMIN — CEFAZOLIN SODIUM 1000 MG: 1 SOLUTION INTRAVENOUS at 12:06

## 2020-09-08 RX ADMIN — HEPARIN SODIUM 5000 UNITS: 5000 INJECTION INTRAVENOUS; SUBCUTANEOUS at 21:08

## 2020-09-08 RX ADMIN — CHLORHEXIDINE GLUCONATE 0.12% ORAL RINSE 30 ML: 1.2 LIQUID ORAL at 17:39

## 2020-09-08 RX ADMIN — ACETAMINOPHEN 975 MG: 325 TABLET, FILM COATED ORAL at 08:45

## 2020-09-08 RX ADMIN — CEFAZOLIN SODIUM 1000 MG: 1 SOLUTION INTRAVENOUS at 21:08

## 2020-09-08 RX ADMIN — Medication 1000 UNITS: at 08:42

## 2020-09-08 RX ADMIN — CLOPIDOGREL BISULFATE 75 MG: 75 TABLET ORAL at 08:42

## 2020-09-08 RX ADMIN — CARVEDILOL 12.5 MG: 12.5 TABLET, FILM COATED ORAL at 16:21

## 2020-09-08 RX ADMIN — CHLORHEXIDINE GLUCONATE 0.12% ORAL RINSE 30 ML: 1.2 LIQUID ORAL at 08:42

## 2020-09-08 RX ADMIN — ESCITALOPRAM OXALATE 20 MG: 20 TABLET, FILM COATED ORAL at 08:42

## 2020-09-08 RX ADMIN — OFLOXACIN 1 DROP: 3 SOLUTION/ DROPS OPHTHALMIC at 21:08

## 2020-09-08 RX ADMIN — AMLODIPINE BESYLATE 2.5 MG: 2.5 TABLET ORAL at 08:42

## 2020-09-08 RX ADMIN — BACITRACIN: 500 OINTMENT OPHTHALMIC at 21:07

## 2020-09-08 RX ADMIN — HEPARIN SODIUM 5000 UNITS: 5000 INJECTION INTRAVENOUS; SUBCUTANEOUS at 05:54

## 2020-09-08 RX ADMIN — ASPIRIN 81 MG: 81 TABLET, COATED ORAL at 08:42

## 2020-09-08 RX ADMIN — CARVEDILOL 12.5 MG: 12.5 TABLET, FILM COATED ORAL at 08:42

## 2020-09-08 RX ADMIN — ACETAMINOPHEN 975 MG: 325 TABLET, FILM COATED ORAL at 21:07

## 2020-09-08 NOTE — UTILIZATION REVIEW
Initial Clinical Review    Admission: Date/Time/Statement:   Admission Orders (From admission, onward)     Ordered        09/04/20 1938  Inpatient Admission  Once                   Orders Placed This Encounter   Procedures    Inpatient Admission     Standing Status:   Standing     Number of Occurrences:   1     Order Specific Question:   Admitting Physician     Answer:   Author Lewis     Order Specific Question:   Level of Care     Answer:   Critical Care [15]     Order Specific Question:   Estimated length of stay     Answer:   More than 2 Midnights     Order Specific Question:   Certification     Answer:   I certify that inpatient services are medically necessary for this patient for a duration of greater than two midnights  See H&P and MD Progress Notes for additional information about the patient's course of treatment  ED Arrival Information     Expected Arrival Acuity Means of Arrival Escorted By Service Admission Type    - 9/4/2020 17:45 Immediate Ambulance [address] Complaint    Fall        Chief Complaint   Patient presents with    Trauma     Pt  fell in driveway onto face + LOC, + thinners     Assessment/Plan:  78 y/o male presents to ED s/p fall from standing  Pt reports he lost his balance and fell forward  Denies LOC  On exam GCS 15, avulsion of R nare, clotted blood filling both nares  Multiple displaced teeth, blood in mouth  Imaging revealed multiple facial fractures  Admit inpatient to M/S unit with multiple facial fxs, R eyelid avulsion, R nare avulsion  Consult OMFS & ophthalmology  OMFS consult 9/4 -- multiple facial fractures including mildly displaced b/l LeFort 2 fractures and avulsed teeth  On exam, patient presents with a mobile maxilla, laceration of left eyelid and complains of blurry/double vision    Plan:  Add on for OR today for ORIF of facial fractures, extraction of necessary teeth and laceration repair under GA   Analgesia as needed  Unasyn 3g IV q6h then transition to augmentin 875-125mg BID PO when able  Decadron 8mg IV q8h x3 doses  NPO/IVF for OR  DVT ppx: SCD, OOB; hold pharmacologic AC for the OR, can start post-op  HOB 30 degrees  Yankaur suction at bedside  Ice to face  Sinus precautions: no nose blowing, no heavy lifting, avoid pressure to the area, head of bed elevated, decongestants as needed  Ophthalmology consult 9/4 -- Lid laceration right lower lid, right orbital fractures  Globe intact  Cleared for ocular plastics exploration and repair as needed  Ofloxacin qid OD x 7 days  OPERATIVE REPORT  SURGERY DATE: 9/4/2020  Procedure(s) (LRB):  OPEN REDUCTION W/ INTERNAL FIXATION (ORIF) MAXILLARY FRACTURES LEFORTE, closure nasal  laceration and right lower eyelid laceration (Bilateral)  EXTRACTION TEETH MULTIPLE 25, 26, 31,27, 10, 13, 14, 9 (N/A)  Anesthesia Type:   General   Operative Findings:  Mobility of maxilla with Lefort 2 fracture  Non restorable teeth 9, 10, 13, 14, 25, 26, 27, 31  Complex right lower eyelid laceration with avulsed tissue: 4cm  Nasal laceration: 1cm   Lower lip mucosa laceration: 2cm    Admitted to SICU post op for close airway monitoring  Dysphagia diet, thin liquid       ED Triage Vitals   Temperature Pulse Respirations Blood Pressure SpO2   09/04/20 1750 09/04/20 1750 09/04/20 1750 09/04/20 1750 09/04/20 1750   98 1 °F (36 7 °C) 76 18 (!) 197/101 92 %      Temp Source Heart Rate Source Patient Position - Orthostatic VS BP Location FiO2 (%)   09/04/20 1750 09/04/20 1750 09/04/20 1750 09/05/20 0220 --   Rectal Monitor Lying Left arm       Pain Score       09/04/20 1840       7          Wt Readings from Last 1 Encounters:   09/08/20 69 6 kg (153 lb 6 4 oz)     Additional Vital Signs:   Date/Time   Temp   Pulse   Resp   BP   MAP (mmHg)   SpO2   Calculated FIO2 (%) - Nasal Cannula   O2 Flow Rate (L/min)   Nasal Cannula O2 Flow Rate (L/min)   O2 Device    09/05/20 2300      89                          09/05/20 21:55:46   98 9 °F (37 2 °C)   83   24Abnormal     142/71   95   98 %                09/05/20 1600                              None (Room air)    09/05/20 15:57:05   97 6 °F (36 4 °C)   73   18   140/63   89   95 %                09/05/20 12:20:12   97 1 °F (36 2 °C)Abnormal     83   20   160/77   105   96 %            None (Room air)    09/05/20 1200      83      160/77                      09/05/20 0416   98 °F (36 7 °C)   64   18   119/63   87   98 %            None (Room air)    09/05/20 0220   97 8 °F (36 6 °C)   60   12         97 %   32      3 L/min   Nasal cannula    Comment rows:    OBSERV: admit to ICU from OR at 09/05/20 0220    09/05/20 0200   98 1 °F (36 7 °C)   68   16   149/85      98 %      3 L/min      Nasal cannula    09/04/20 1825      71   16   203/100Abnormal        96 %                09/04/20 1810      72   16   194/95Abnormal        95 %                09/04/20 1755      72   18   196/97Abnormal        94 %                09/04/20 17:50:08   98 1 °F (36 7 °C)   76   18   197/101Abnormal        92 %                      Pertinent Labs/Diagnostic Test Results:   CT facial bones 9/4 -- Extensive facial fractures in a pattern consistent with LeFort type II fractures  There is a right paramedian mandibular fracture with dislocation of the incisor into the anterior soft tissues  Extensive facial soft tissue injury with multiple radiopaque foreign bodies most prominent in the right preseptal soft tissues anterior to the globe  Extensive sinus opacification with hemorrhage opacifying the frontal sinuses, ethmoid air cells and maxillary sinuses  Multiple cavities within the maxillary and mandibular dentition     CT c/a/p 9/4 -- No acute traumatic injury of the chest, abdomen or pelvis       CT c-spine 9/4 -- No acute fracture or malalignment    CT head 9/4 --- Mild microangiopathic change within the brain parenchyma with no acute intracranial abnormality      Multiple facial fractures as described above   See separate CT facial bones   There is resulting hemorrhage opacifying the frontal sinuses, ethmoid air cells and maxillary sinuses  Multiple small loose bodies are seen within the preseptal soft tissues on the right  CT head 9/5 -- No acute intracranial abnormality     Partial maxillary sinus fracture fixation involving the anterior walls with microplates   The remaining facial fractures are unchanged   Persistent hemorrhage layering within the maxillary sinuses  Significant improvement in the facial contusions and soft tissue swelling with removal of the previously seen foreign bodies         Results from last 7 days   Lab Units 09/05/20 0430 09/04/20 2001 09/04/20  1801   WBC Thousand/uL 15 87*  --  6 42   HEMOGLOBIN g/dL 12 7 13 4 13 7   HEMATOCRIT % 38 0 40 7 41 4   PLATELETS Thousands/uL 154  --  195   NEUTROS ABS Thousands/µL  --   --  3 77     Results from last 7 days   Lab Units 09/05/20 0430 09/04/20 1801 09/04/20  1757   SODIUM mmol/L 137 136  --    POTASSIUM mmol/L 4 3 3 9  --    CHLORIDE mmol/L 104 104  --    CO2 mmol/L 26 26  --    CO2, I-STAT mmol/L  --   --  27   ANION GAP mmol/L 7 6  --    BUN mg/dL 16 12  --    CREATININE mg/dL 0 71 0 75  --    EGFR ml/min/1 73sq m 89 87  --    CALCIUM mg/dL 8 2* 8 5  --    CALCIUM, IONIZED, ISTAT mmol/L  --   --  1 17   MAGNESIUM mg/dL 2 0  --   --    PHOSPHORUS mg/dL 3 4  --   --      Results from last 7 days   Lab Units 09/05/20 0430 09/04/20  1801   GLUCOSE RANDOM mg/dL 164* 101     Results from last 7 days   Lab Units 09/05/20  1131   PH MAICOL  7 316   PCO2 MAICOL mm Hg 46 6   PO2 MAICOL mm Hg 37 1   HCO3 MAICOL mmol/L 23 3*   BASE EXC MAICOL mmol/L -3 1   O2 CONTENT MAICOL ml/dL 11 9   O2 HGB, VENOUS % 63 5     Results from last 7 days   Lab Units 09/04/20  1757   PH, MAICOL I-STAT  7 362   PCO2, MAICOL ISTAT mm HG 44 8   PO2, MAICOL ISTAT mm HG 38 0   HCO3, MAICOL ISTAT mmol/L 25 4   I STAT BASE EXC mmol/L 0   I STAT O2 SAT % 69     Results from last 7 days   Lab Units 09/04/20  1801   PROTIME seconds 14 0   INR  1 08   PTT seconds 26     ED Treatment:   Medication Administration from 09/04/2020 1736 to 09/04/2020 2041       Date/Time Order Dose Route Action     09/04/2020 1959 lactated ringers infusion 100 mL/hr Intravenous New Bag     09/04/2020 2022 tetanus-diphtheria-acellular pertussis (BOOSTRIX) IM injection 0 5 mL 0 5 mL Intramuscular Given     History reviewed  No pertinent past medical history  Present on Admission:  **None**      Admitting Diagnosis: [facility] [S02 [id number]]  Unspecified multiple injuries, initial encounter [T07  XXXA]  Age/Sex: 79 y o  male  Admission Orders:  Scheduled Medications:  amLODIPine, 2 5 mg, Oral, Daily  aspirin, 81 mg, Oral, Daily  atorvastatin, 40 mg, Oral, Daily With Dinner  bacitracin, , Right Eye, TID  carvedilol, 12 5 mg, Oral, BID With Meals  cefazolin, 1,000 mg, Intravenous, Q8H  chlorhexidine, 30 mL, Swish & Spit, TID after meals  cholecalciferol, 1,000 Units, Oral, Daily  clopidogrel, 75 mg, Oral, Daily  escitalopram, 20 mg, Oral, Daily  folic acid, 1 mg, Oral, Daily  heparin (porcine), 5,000 Units, Subcutaneous, Q8H MAXI  ofloxacin, 1 drop, Right Eye, 4x Daily  pantoprazole, 40 mg, Oral, Early Morning  thiamine, 100 mg, Oral, Daily    Continuous IV Infusions:  multi-electrolyte, 75 mL/hr, Intravenous, Continuous      PRN Meds:  ondansetron, 4 mg, Intravenous, Q4H PRN  oxyCODONE, 5 mg, Oral, Q4H PRN        IP CONSULT TO ORAL AND MAXILLOFACIAL SURGERY  IP CONSULT TO CASE MANAGEMENT  IP CONSULT TO CASE MANAGEMENT  IP CONSULT TO OPHTHALMOLOGY    Network Utilization Review Department  [email or web address]  ATTENTION: Please call with any questions or concerns to [phone] and carefully listen to the prompts so that you are directed to the right person   All voicemails are confidential   Maddy Campos all requests for admission clinical reviews, approved or denied determinations and any other requests to dedicated fax number below belonging to the campus where the patient is receiving treatment   List of dedicated fax numbers for the Facilities:  [address] DENIALS (Administrative/Medical Necessity) [phone]   [address]  (Maternity/NICU/Pediatrics) [phone]   Chepe Leonard [phone]   Elsi Ji [phone]   Jose Rafael Li [phone]   [facility] [phone]   [address] [place] [address] [phone]   [facility]  [phone]   [address] [facility]  [address] [facility] [address] [facility] [phone]

## 2020-09-08 NOTE — PROGRESS NOTES
My eye hurts    Patient is status post repair of right lower lid laceration  Wound is intact  Visual acuity is count fingers at 3 ft right eye, 2200 left eye  Pupils are miotic both eyes with no afferent defect  Dull red reflex noted right eye  The cornea and conjunctiva are clear in the right eye  Impression:  Status post repair lid laceration right lower lid, doing well  Plan:  The patient should be seen as an outpatient sometime in the next few weeks  Continue ophthalmic medications as written

## 2020-09-08 NOTE — PLAN OF CARE
Problem: OCCUPATIONAL THERAPY ADULT  Goal: Performs self-care activities at highest level of function for planned discharge setting  See evaluation for individualized goals  Description: Treatment Interventions: ADL retraining, Functional transfer training, UE strengthening/ROM, Endurance training, Cognitive reorientation, Patient/family training, Equipment evaluation/education, Compensatory technique education, Continued evaluation, Energy conservation, Activityengagement          See flowsheet documentation for full assessment, interventions and recommendations  Note: Limitation: Decreased ADL status, Decreased cognition, Decreased endurance, Decreased self-care trans, Decreased high-level ADLs  Prognosis: Fair  Assessment: Patient participated in Skilled OT session this date with interventions consisting of self care tasks, functional transfesr, functional mobility, sinus precaution re-education/review -see assistance levels above   Patient agreeable to OT treatment session, upon arrival patient was found supine in bed  In comparison to previous session, patient with improvements in functional mobility/transfers   Patient requiring verbal cues for correct technique, frequent rest periods and ocassional safety reminders  Patient continues to be functioning below baseline level, occupational performance remains limited secondary to factors listed above and increased risk for falls and injury  From OT standpoint, recommendation at time of d/c would be Short Term Rehab     Patient to benefit from continued Occupational Therapy treatment while in the hospital to address deficits as defined above and maximize level of functional independence with ADLs and functional mobility     OT Discharge Recommendation: Post-Acute Rehabilitation Services  OT - OK to Discharge: (yes to sTR)

## 2020-09-08 NOTE — PROGRESS NOTES
80M now POD4 s/p ORIF of bilateral maxillary LeFort fractures via intra-oral approach, extraction of multiple teeth, closure of right eyelid laceration and nose laceration  Patient has ambulated and tolerates PO intake  Appears more oriented today, CIWA protocol d/ronn  Patient reports some blurry vision, seen by ophthalmology today  Right eyelid and nose wounds are healing well  Oral incision hemostatic  Right eyelid cleaned and ointment applied at bedside  Steri-strips changed and Frost suture tightened         Recommendations  - continue abx  - analgesia per primary team  - sinus precautions (no nose blowing, sneeze with mouth open)  - puree, non-chew, diet only for at least 3 weeks  - HOB elevated  - appreciate ophtho recs  - Bacitracin occular ointment TID  - daily eye care by OMFS  - OMFS to follow    Seen with and d/w OMFS attending Dr Esperanza Reinoso

## 2020-09-08 NOTE — PLAN OF CARE
Problem: SAFETY,RESTRAINT: NV/NON-SELF DESTRUCTIVE BEHAVIOR  Goal: Remains free of harm/injury (restraint for non violent/non self-detsructive behavior)  Description: INTERVENTIONS:  - Instruct patient/family regarding restraint use   - Assess and monitor physiologic and psychological status   - Provide interventions and comfort measures to meet assessed patient needs   - Identify and implement measures to help patient regain control  - Assess readiness for release of restraint   Outcome: Completed  Goal: Returns to optimal restraint-free functioning  Description: INTERVENTIONS:  - Assess the patient's behavior and symptoms that indicate continued need for restraint  - Identify and implement measures to help patient regain control  - Assess readiness for release of restraint   Outcome: Completed

## 2020-09-08 NOTE — ASSESSMENT & PLAN NOTE
- s/p ORIF Leforte II and mandible with multiple facial laceration repairs and tooth extractions POD1  - follow-up CTA neck to rule out vascular injury   - Continue IV antibiotics while in hospital (Ancef) and transition to Augmentin upon discharge total 7 days  - 3 doses IV decadron  - sinus precautions including non-chew diet x 3 weeks   - analgesia: tolerating low dose oxycodone with minimal requirements   Monitor and titrate analgesia regimen as needed   - delirium precautions - CIWA protocol for ETOH use, no ativan required to date, 72 hours out an no tremors, will D/C CIWA protocol for now  - repeat head CT stable (obtained for confusion) - cont delirium precautions, reorient as able   - PT/OT evals  - optho consult completed:  Ofloxacin q i d  OD for 7 days and outpatient follow-up   Vision intact this morning, no complaints of pain only nasal stuffiness

## 2020-09-08 NOTE — OCCUPATIONAL THERAPY NOTE
OccupationalTherapy Progress Note     Patient Name: Joo Floyd  [id number]'[id number] Date: 9/8/2020  Problem List  Principal Problem:    Closed extensive facial fractures [facility])  Active Problems:    Alcohol abuse          09/08/20 1450   Restrictions/Precautions   Weight Bearing Precautions Per Order No   Other Precautions Cognitive; Chair Alarm; Bed Alarm; Fall Risk;Telemetry  (+sinus precautions)   Lifestyle   Autonomy Pt reports being I with ADLS, IADLS and mobility without device PTA  Reciprocal Relationships Pt reports that he lives with his significant other, unclear what level of support she is able to provide (signifant other walks with RW and does not drive per pt report)   Service to Others Retired   [address] to report, will continue to assess   Pain Assessment   Pain Assessment Tool Pain Assessment not indicated - pt denies pain   Pain Score No Pain   ADL   LB Dressing Assistance 4  Minimal Assistance   LB Dressing Deficit Don/doff R sock; Don/doff L sock   LB Dressing Comments able to cross left LE and thread over toes, pull up over heel, right sock required assistance to thread over toes, able to pull up over heels to complete task     Bed Mobility   Supine to Sit 5  Supervision   Additional items Assist x 1, with HOB elevated   Additional Comments pt left in chair with all needs met and alarm activated, RN aware   Transfers   Sit to Stand 4  Minimal assistance   Additional items Assist x 1-verbal cues for safe hand placement/new learning with RW   Stand to Sit 4  Minimal assistance   Additional items Assist x 1   Stand pivot 4  Minimal assistance   Additional items Assist x 1;Verbal cues  (+RW)   Functional Mobility   Functional Mobility 4  Minimal assistance   Additional Comments min a x 1 with RW short distance functional mobility to hallway>chair, no SOB/MART, vitals SP02 96% post activity   Additional items Rolling walker   Cognition   Overall Cognitive Status Impaired Arousal/Participation Alert; Cooperative   Attention Attends with cues to redirect   Orientation Level Oriented to person;Oriented to place;Oriented to time  (grossly to date stating "august, self corrected with increased time)   Memory Decreased recall of precautions;Decreased recall of recent events;Decreased short term memory   Following Commands Follows one step commands with increased time or repetition   Comments Pt alert and oriented x 4 , cooperative with therapy, able to state 1/6 sinus precautions, re-educated and reviewed, pt able to recall 1/6 at end of session (approx~15 minutes) impaired recall/STM  Activity Tolerance   Activity Tolerance Patient limited by fatigue   Medical Staff Made Aware RN cleared pt for therapy   Assessment   Assessment Patient participated in Skilled OT session this date with interventions consisting of self care tasks, functional transfesr, functional mobility, sinus precaution re-education/review -see assistance levels above   Patient agreeable to OT treatment session, upon arrival patient was found supine in bed  In comparison to previous session, patient with improvements in functional mobility/transfers   Patient requiring verbal cues for correct technique, frequent rest periods and ocassional safety reminders  Patient continues to be functioning below baseline level, occupational performance remains limited secondary to factors listed above and increased risk for falls and injury  From OT standpoint, recommendation at time of d/c would be Short Term Rehab  Patient to benefit from continued Occupational Therapy treatment while in the hospital to address deficits as defined above and maximize level of functional independence with ADLs and functional mobility   Plan   Treatment Interventions ADL retraining;Functional transfer training; Endurance training;Cognitive reorientation;Patient/family training;Equipment evaluation/education; Compensatory technique education; Energy conservation; Activityengagement   Goal Expiration Date 09/16/20   OT Treatment Day 1   OT Frequency 3-5x/wk   Recommendation   OT Discharge Recommendation Post-Acute Rehabilitation Services   OT - OK to Discharge   (yes to sTR)     Nicolle Pichardo MOT, OTR/L

## 2020-09-08 NOTE — PROGRESS NOTES
Progress Note Mortimer Hanlon 1940, 79 y o  male MRN: [id number]    Unit/Bed#: [facility] 815-01 Encounter: [id number]    Primary Care Provider: ELE Nieves   Date and time admitted to hospital: 9/4/2020  5:45 PM        Alcohol abuse  Assessment & Plan  - CIWA protocol  - continue reorientation  - has not required any dosages of Ativan exam  - consider Serax if worsens   - no trremors or sign of withdrawal will D/C Ciwa protocol for now    * Closed extensive facial fractures (HCC)  Assessment & Plan  - s/p ORIF Leforte II and mandible with multiple facial laceration repairs and tooth extractions POD1  - follow-up CTA neck to rule out vascular injury   - Continue IV antibiotics while in hospital (Ancef) and transition to Augmentin upon discharge total 7 days  - 3 doses IV decadron  - sinus precautions including non-chew diet x 3 weeks   - analgesia: tolerating low dose oxycodone with minimal requirements   Monitor and titrate analgesia regimen as needed   - delirium precautions - CIWA protocol for ETOH use, no ativan required to date, 72 hours out an no tremors, will D/C CIWA protocol for now  - repeat head CT stable (obtained for confusion) - cont delirium precautions, reorient as able   - PT/OT evals  - optho consult completed:  Ofloxacin q i d  OD for 7 days and outpatient follow-up   Vision intact this morning, no complaints of pain only nasal stuffiness          Disposition: home      SUBJECTIVE:  Chief Complaint: nasal stuffiness    Subjective: I am doing okay, my nose is stuffy      OBJECTIVE:     Meds/Allergies     Current Facility-Administered Medications:     acetaminophen (TYLENOL) tablet 975 mg, 975 mg, Oral, Q8H Patricia GERMAN CRNP    amLODIPine (NORVASC) tablet 2 5 mg, 2 5 mg, Oral, Daily, ELE Saavedra, 2 5 mg at 09/07/20 0956    aspirin (ECOTRIN LOW STRENGTH) EC tablet 81 mg, 81 mg, Oral, Daily, ELE Saavedra, 81 mg at 09/07/20 0956    atorvastatin (LIPITOR) tablet 40 mg, 40 mg, Oral, Daily With Dinner, ELE Saavedra, 40 mg at 09/07/20 1821    bacitracin ophthalmic ointment, , Right Eye, TID, Irvinsal Michel, DMD    carvedilol (COREG) tablet 12 5 mg, 12 5 mg, Oral, BID With Meals, Bob Crowley, ALISHANP, 12 5 mg at 09/07/20 1821    ceFAZolin (ANCEF) IVPB (premix) 1,000 mg 50 mL, 1,000 mg, Intravenous, Q8H, Irvinsal Michel DMD, Last Rate: 100 mL/hr at 09/08/20 0408, 1,000 mg at 09/08/20 0408    chlorhexidine (PERIDEX) 0 12 % oral rinse 30 mL, 30 mL, Swish & Spit, TID after meals, Irvinsal Michel DMD, 30 mL at 09/07/20 1821    cholecalciferol (VITAMIN D3) tablet 1,000 Units, 1,000 Units, Oral, Daily, ELE Saavedra, 1,000 Units at 09/07/20 0956    clopidogrel (PLAVIX) tablet 75 mg, 75 mg, Oral, Daily, ELE Saavedra, 75 mg at 09/07/20 0956    escitalopram (LEXAPRO) tablet 20 mg, 20 mg, Oral, Daily, ELE Saavedra, 20 mg at 66/93/89 9395    folic acid (FOLVITE) tablet 1 mg, 1 mg, Oral, Daily, ELE Saavedra, 1 mg at 09/07/20 0900    heparin (porcine) subcutaneous injection 5,000 Units, 5,000 Units, Subcutaneous, Q8H [address], ELE Saavedra, 5,000 Units at 09/08/20 0554    multi-electrolyte (PLASMALYTE-A/ISOLYTE-S PH 7 4) IV solution, 75 mL/hr, Intravenous, Continuous, Gayle Ledesma DO, Last Rate: 75 mL/hr at 09/07/20 2253, 75 mL/hr at 09/07/20 2253    ofloxacin (OCUFLOX) 0 3 % ophthalmic solution 1 drop, 1 drop, Right Eye, 4x Daily, ELE Saavedra, 1 drop at 09/07/20 6684    ondansetron (ZOFRAN) injection 4 mg, 4 mg, Intravenous, Q4H PRN, ELE Saavedra    oxyCODONE (ROXICODONE) IR tablet 5 mg, 5 mg, Oral, Q4H PRN, ELE Saavedra, 5 mg at 09/07/20 0015    pantoprazole (PROTONIX) EC tablet 40 mg, 40 mg, Oral, Early Morning, ELE Saavedra, 40 mg at 09/08/20 0554    thiamine (VITAMIN B1) tablet 100 mg, 100 mg, Oral, Daily, ELE Angeles, 100 mg at 09/07/20 0957     Vitals:   Vitals:    09/08/20 0896 BP: 161/70   Pulse: 59   Resp: 18   Temp: 98 3 °F (36 8 °C)   SpO2: 94%       Intake/Output:  I/O       09/06 0701 - 09/07 0700 09/07 0701 - 09/08 0700 09/08 0701 - 09/09 0700    P  O  120 0     I V  (mL/kg)  1000 (14 4)     IV Piggyback       Total Intake(mL/kg) 120 (1 6) 1000 (14 4)     Urine (mL/kg/hr) 1275 (0 7) 750 (0 4) 599 (8 4)    Stool 0      Total Output 1275 750 599    Net -1155 +250 -599           Unmeasured Urine Occurrence 2 x 3 x     Unmeasured Stool Occurrence 0 x             Nutrition/GI Proph/Bowel Reg: soft non-chew diet    Physical Exam:   GENERAL APPEARANCE: plesant  NEURO: GCs - 15, intact, no tremors  HEENT: EOm's intact  CV: RRR< no complaint of chest pain  LUNGS: CTA bilaterally, no shortness of breath  GI: tolerating a diet  : voiding, incontient this morning  MSK: moving all four extremities  SKIN: warm and dry, bruising over face    Invasive Devices     Peripheral Intravenous Line            Peripheral IV 09/04/20 Left Antecubital 3 days                 Lab Results: none  Imaging/EKG Studies: none  Other Studies: none  VTE Prophylaxis: Sequential compression device (Venodyne)  and Heparin

## 2020-09-08 NOTE — ASSESSMENT & PLAN NOTE
- [facility] protocol  - continue reorientation  - has not required any dosages of Ativan exam  - consider Serax if worsens   - no trremors or sign of withdrawal will D/C [facility] protocol for now

## 2020-09-09 LAB — SARS-COV-2 RNA RESP QL NAA+PROBE: NEGATIVE

## 2020-09-09 PROCEDURE — 97116 GAIT TRAINING THERAPY: CPT

## 2020-09-09 PROCEDURE — U0003 INFECTIOUS AGENT DETECTION BY NUCLEIC ACID (DNA OR RNA); SEVERE ACUTE RESPIRATORY SYNDROME CORONAVIRUS 2 (SARS-COV-2) (CORONAVIRUS DISEASE [COVID-19]), AMPLIFIED PROBE TECHNIQUE, MAKING USE OF HIGH THROUGHPUT TECHNOLOGIES AS DESCRIBED BY CMS-2020-01-R: HCPCS | Performed by: NURSE PRACTITIONER

## 2020-09-09 PROCEDURE — 99232 SBSQ HOSP IP/OBS MODERATE 35: CPT | Performed by: SURGERY

## 2020-09-09 RX ADMIN — SODIUM CHLORIDE, SODIUM GLUCONATE, SODIUM ACETATE, POTASSIUM CHLORIDE AND MAGNESIUM CHLORIDE 75 ML/HR: 526; 502; 368; 37; 30 INJECTION, SOLUTION INTRAVENOUS at 01:53

## 2020-09-09 RX ADMIN — ATORVASTATIN CALCIUM 40 MG: 40 TABLET, FILM COATED ORAL at 15:37

## 2020-09-09 RX ADMIN — ACETAMINOPHEN 975 MG: 325 TABLET, FILM COATED ORAL at 21:02

## 2020-09-09 RX ADMIN — BACITRACIN: 500 OINTMENT OPHTHALMIC at 08:31

## 2020-09-09 RX ADMIN — HEPARIN SODIUM 5000 UNITS: 5000 INJECTION INTRAVENOUS; SUBCUTANEOUS at 15:37

## 2020-09-09 RX ADMIN — CEFAZOLIN SODIUM 1000 MG: 1 SOLUTION INTRAVENOUS at 21:00

## 2020-09-09 RX ADMIN — OFLOXACIN 1 DROP: 3 SOLUTION/ DROPS OPHTHALMIC at 08:31

## 2020-09-09 RX ADMIN — BACITRACIN: 500 OINTMENT OPHTHALMIC at 15:37

## 2020-09-09 RX ADMIN — CHLORHEXIDINE GLUCONATE 0.12% ORAL RINSE 30 ML: 1.2 LIQUID ORAL at 08:31

## 2020-09-09 RX ADMIN — Medication 1000 UNITS: at 08:31

## 2020-09-09 RX ADMIN — ACETAMINOPHEN 975 MG: 325 TABLET, FILM COATED ORAL at 05:20

## 2020-09-09 RX ADMIN — PANTOPRAZOLE SODIUM 40 MG: 40 TABLET, DELAYED RELEASE ORAL at 05:20

## 2020-09-09 RX ADMIN — CEFAZOLIN SODIUM 1000 MG: 1 SOLUTION INTRAVENOUS at 05:20

## 2020-09-09 RX ADMIN — ASPIRIN 81 MG: 81 TABLET, COATED ORAL at 08:31

## 2020-09-09 RX ADMIN — CHLORHEXIDINE GLUCONATE 0.12% ORAL RINSE 30 ML: 1.2 LIQUID ORAL at 11:44

## 2020-09-09 RX ADMIN — FOLIC ACID 1 MG: 1 TABLET ORAL at 08:31

## 2020-09-09 RX ADMIN — OFLOXACIN 1 DROP: 3 SOLUTION/ DROPS OPHTHALMIC at 21:02

## 2020-09-09 RX ADMIN — CHLORHEXIDINE GLUCONATE 0.12% ORAL RINSE 30 ML: 1.2 LIQUID ORAL at 17:20

## 2020-09-09 RX ADMIN — ESCITALOPRAM OXALATE 20 MG: 20 TABLET, FILM COATED ORAL at 08:31

## 2020-09-09 RX ADMIN — HEPARIN SODIUM 5000 UNITS: 5000 INJECTION INTRAVENOUS; SUBCUTANEOUS at 05:20

## 2020-09-09 RX ADMIN — OFLOXACIN 1 DROP: 3 SOLUTION/ DROPS OPHTHALMIC at 11:44

## 2020-09-09 RX ADMIN — OFLOXACIN 1 DROP: 3 SOLUTION/ DROPS OPHTHALMIC at 17:20

## 2020-09-09 RX ADMIN — CARVEDILOL 12.5 MG: 12.5 TABLET, FILM COATED ORAL at 08:30

## 2020-09-09 RX ADMIN — CARVEDILOL 12.5 MG: 12.5 TABLET, FILM COATED ORAL at 15:37

## 2020-09-09 RX ADMIN — AMLODIPINE BESYLATE 2.5 MG: 2.5 TABLET ORAL at 08:31

## 2020-09-09 RX ADMIN — CLOPIDOGREL BISULFATE 75 MG: 75 TABLET ORAL at 08:31

## 2020-09-09 RX ADMIN — BACITRACIN 1 APPLICATION: 500 OINTMENT OPHTHALMIC at 21:00

## 2020-09-09 RX ADMIN — CEFAZOLIN SODIUM 1000 MG: 1 SOLUTION INTRAVENOUS at 12:03

## 2020-09-09 RX ADMIN — ACETAMINOPHEN 975 MG: 325 TABLET, FILM COATED ORAL at 15:37

## 2020-09-09 RX ADMIN — HEPARIN SODIUM 5000 UNITS: 5000 INJECTION INTRAVENOUS; SUBCUTANEOUS at 21:02

## 2020-09-09 RX ADMIN — THIAMINE HCL TAB 100 MG 100 MG: 100 TAB at 08:31

## 2020-09-09 NOTE — PLAN OF CARE
Problem: Prexisting or High Potential for Compromised Skin Integrity  Goal: Skin integrity is maintained or improved  Description: INTERVENTIONS:  - Identify patients at risk for skin breakdown  - Assess and monitor skin integrity  - Assess and monitor nutrition and hydration status  - Monitor labs   - Assess for incontinence   - Turn and reposition patient  - Assist with mobility/ambulation  - Relieve pressure over bony prominences  - Avoid friction and shearing  - Provide appropriate hygiene as needed including keeping skin clean and dry  - Evaluate need for skin moisturizer/barrier cream  - Collaborate with interdisciplinary team   - Patient/family teaching  - Consider wound care consult   Outcome: Progressing     Problem: Potential for Falls  Goal: Patient will remain free of falls  Description: INTERVENTIONS:  - Assess patient frequently for physical needs  -  Identify cognitive and physical deficits and behaviors that affect risk of falls    -  [place] fall precautions as indicated by assessment   - Educate patient/family on patient safety including physical limitations  - Instruct patient to call for assistance with activity based on assessment  - Modify environment to reduce risk of injury  - Consider OT/PT consult to assist with strengthening/mobility  Outcome: Progressing     Problem: PAIN - ADULT  Goal: Verbalizes/displays adequate comfort level or baseline comfort level  Description: Interventions:  - Encourage patient to monitor pain and request assistance  - Assess pain using appropriate pain scale  - Administer analgesics based on type and severity of pain and evaluate response  - Implement non-pharmacological measures as appropriate and evaluate response  - Consider cultural and social influences on pain and pain management  - Notify physician/advanced practitioner if interventions unsuccessful or patient reports new pain  Outcome: Progressing     Problem: INFECTION - ADULT  Goal: Absence or prevention of progression during hospitalization  Description: INTERVENTIONS:  - Assess and monitor for signs and symptoms of infection  - Monitor lab/diagnostic results  - Monitor all insertion sites, i e  indwelling lines, tubes, and drains  - Monitor endotracheal if appropriate and nasal secretions for changes in amount and color  - [place] appropriate cooling/warming therapies per order  - Administer medications as ordered  - Instruct and encourage patient and family to use good hand hygiene technique  - Identify and instruct in appropriate isolation precautions for identified infection/condition  Outcome: Progressing  Goal: Absence of fever/infection during neutropenic period  Description: INTERVENTIONS:  - Monitor WBC    Outcome: Progressing     Problem: SAFETY ADULT  Goal: Patient will remain free of falls  Description: INTERVENTIONS:  - Assess patient frequently for physical needs  -  Identify cognitive and physical deficits and behaviors that affect risk of falls    -  [place] fall precautions as indicated by assessment   - Educate patient/family on patient safety including physical limitations  - Instruct patient to call for assistance with activity based on assessment  - Modify environment to reduce risk of injury  - Consider OT/PT consult to assist with strengthening/mobility  Outcome: Progressing  Goal: Maintain or return to baseline ADL function  Description: INTERVENTIONS:  -  Assess patient's ability to carry out ADLs; assess patient's baseline for ADL function and identify physical deficits which impact ability to perform ADLs (bathing, care of mouth/teeth, toileting, grooming, dressing, etc )  - Assess/evaluate cause of self-care deficits   - Assess range of motion  - Assess patient's mobility; develop plan if impaired  - Assess patient's need for assistive devices and provide as appropriate  - Encourage maximum independence but intervene and supervise when necessary  - Involve family in performance of ADLs  - Assess for home care needs following discharge   - Consider OT consult to assist with ADL evaluation and planning for discharge  - Provide patient education as appropriate  Outcome: Progressing  Goal: Maintain or return mobility status to optimal level  Description: INTERVENTIONS:  - Assess patient's baseline mobility status (ambulation, transfers, stairs, etc )    - Identify cognitive and physical deficits and behaviors that affect mobility  - Identify mobility aids required to assist with transfers and/or ambulation (gait belt, sit-to-stand, lift, walker, cane, etc )  - [place] fall precautions as indicated by assessment  - Record patient progress and toleration of activity level on Mobility SBAR; progress patient to next Phase/Stage  - Instruct patient to call for assistance with activity based on assessment  - Consider rehabilitation consult to assist with strengthening/weightbearing, etc   Outcome: Progressing     Problem: DISCHARGE PLANNING  Goal: Discharge to home or other facility with appropriate resources  Description: INTERVENTIONS:  - Identify barriers to discharge w/patient and caregiver  - Arrange for needed discharge resources and transportation as appropriate  - Identify discharge learning needs (meds, wound care, etc )  - Arrange for interpretive services to assist at discharge as needed  - Refer to Case Management Department for coordinating discharge planning if the patient needs post-hospital services based on physician/advanced practitioner order or complex needs related to functional status, cognitive ability, or social support system  Outcome: Progressing     Problem: Knowledge Deficit  Goal: Patient/family/caregiver demonstrates understanding of disease process, treatment plan, medications, and discharge instructions  Description: Complete learning assessment and assess knowledge base    Interventions:  - Provide teaching at level of understanding  - Provide teaching via preferred learning methods  Outcome: Progressing     Problem: Nutrition/Hydration-ADULT  Goal: Nutrient/Hydration intake appropriate for improving, restoring or maintaining nutritional needs  Description: Monitor and assess patient's nutrition/hydration status for malnutrition  Collaborate with interdisciplinary team and initiate plan and interventions as ordered  Monitor patient's weight and dietary intake as ordered or per policy  Utilize nutrition screening tool and intervene as necessary  Determine patient's food preferences and provide high-protein, high-caloric foods as appropriate       INTERVENTIONS:  - Monitor oral intake, urinary output, labs, and treatment plans  - Assess nutrition and hydration status and recommend course of action  - Evaluate amount of meals eaten  - Assist patient with eating if necessary   - Allow adequate time for meals  - Recommend/ encourage appropriate diets, oral nutritional supplements, and vitamin/mineral supplements  - Order, calculate, and assess calorie counts as needed  - Recommend, monitor, and adjust tube feedings and TPN/PPN based on assessed needs  - Assess need for intravenous fluids  - Provide specific nutrition/hydration education as appropriate  - Include patient/family/caregiver in decisions related to nutrition  Outcome: Progressing

## 2020-09-09 NOTE — ASSESSMENT & PLAN NOTE
- [facility] protocol  - continue reorientation  - has not required any dosages of Ativan exam  - consider Serax if worsens   - no trremors or sign of withdrawal will D/C [facility] protocol for now

## 2020-09-09 NOTE — PLAN OF CARE
Problem: Prexisting or High Potential for Compromised Skin Integrity  Goal: Skin integrity is maintained or improved  Description: INTERVENTIONS:  - Identify patients at risk for skin breakdown  - Assess and monitor skin integrity  - Assess and monitor nutrition and hydration status  - Monitor labs   - Assess for incontinence   - Turn and reposition patient  - Assist with mobility/ambulation  - Relieve pressure over bony prominences  - Avoid friction and shearing  - Provide appropriate hygiene as needed including keeping skin clean and dry  - Evaluate need for skin moisturizer/barrier cream  - Collaborate with interdisciplinary team   - Patient/family teaching  - Consider wound care consult   Outcome: Progressing     Problem: Potential for Falls  Goal: Patient will remain free of falls  Description: INTERVENTIONS:  - Assess patient frequently for physical needs  -  Identify cognitive and physical deficits and behaviors that affect risk of falls    -  [place] fall precautions as indicated by assessment   - Educate patient/family on patient safety including physical limitations  - Instruct patient to call for assistance with activity based on assessment  - Modify environment to reduce risk of injury  - Consider OT/PT consult to assist with strengthening/mobility  Outcome: Progressing     Problem: PAIN - ADULT  Goal: Verbalizes/displays adequate comfort level or baseline comfort level  Description: Interventions:  - Encourage patient to monitor pain and request assistance  - Assess pain using appropriate pain scale  - Administer analgesics based on type and severity of pain and evaluate response  - Implement non-pharmacological measures as appropriate and evaluate response  - Consider cultural and social influences on pain and pain management  - Notify physician/advanced practitioner if interventions unsuccessful or patient reports new pain  Outcome: Progressing     Problem: INFECTION - ADULT  Goal: Absence or prevention of progression during hospitalization  Description: INTERVENTIONS:  - Assess and monitor for signs and symptoms of infection  - Monitor lab/diagnostic results  - Monitor all insertion sites, i e  indwelling lines, tubes, and drains  - Monitor endotracheal if appropriate and nasal secretions for changes in amount and color  - [place] appropriate cooling/warming therapies per order  - Administer medications as ordered  - Instruct and encourage patient and family to use good hand hygiene technique  - Identify and instruct in appropriate isolation precautions for identified infection/condition  Outcome: Progressing  Goal: Absence of fever/infection during neutropenic period  Description: INTERVENTIONS:  - Monitor WBC    Outcome: Progressing     Problem: SAFETY ADULT  Goal: Patient will remain free of falls  Description: INTERVENTIONS:  - Assess patient frequently for physical needs  -  Identify cognitive and physical deficits and behaviors that affect risk of falls    -  [place] fall precautions as indicated by assessment   - Educate patient/family on patient safety including physical limitations  - Instruct patient to call for assistance with activity based on assessment  - Modify environment to reduce risk of injury  - Consider OT/PT consult to assist with strengthening/mobility  Outcome: Progressing  Goal: Maintain or return to baseline ADL function  Description: INTERVENTIONS:  -  Assess patient's ability to carry out ADLs; assess patient's baseline for ADL function and identify physical deficits which impact ability to perform ADLs (bathing, care of mouth/teeth, toileting, grooming, dressing, etc )  - Assess/evaluate cause of self-care deficits   - Assess range of motion  - Assess patient's mobility; develop plan if impaired  - Assess patient's need for assistive devices and provide as appropriate  - Encourage maximum independence but intervene and supervise when necessary  - Involve family in performance of ADLs  - Assess for home care needs following discharge   - Consider OT consult to assist with ADL evaluation and planning for discharge  - Provide patient education as appropriate  Outcome: Progressing  Goal: Maintain or return mobility status to optimal level  Description: INTERVENTIONS:  - Assess patient's baseline mobility status (ambulation, transfers, stairs, etc )    - Identify cognitive and physical deficits and behaviors that affect mobility  - Identify mobility aids required to assist with transfers and/or ambulation (gait belt, sit-to-stand, lift, walker, cane, etc )  - [place] fall precautions as indicated by assessment  - Record patient progress and toleration of activity level on Mobility SBAR; progress patient to next Phase/Stage  - Instruct patient to call for assistance with activity based on assessment  - Consider rehabilitation consult to assist with strengthening/weightbearing, etc   Outcome: Progressing     Problem: DISCHARGE PLANNING  Goal: Discharge to home or other facility with appropriate resources  Description: INTERVENTIONS:  - Identify barriers to discharge w/patient and caregiver  - Arrange for needed discharge resources and transportation as appropriate  - Identify discharge learning needs (meds, wound care, etc )  - Arrange for interpretive services to assist at discharge as needed  - Refer to Case Management Department for coordinating discharge planning if the patient needs post-hospital services based on physician/advanced practitioner order or complex needs related to functional status, cognitive ability, or social support system  Outcome: Progressing     Problem: Knowledge Deficit  Goal: Patient/family/caregiver demonstrates understanding of disease process, treatment plan, medications, and discharge instructions  Description: Complete learning assessment and assess knowledge base    Interventions:  - Provide teaching at level of understanding  - Provide teaching via preferred learning methods  Outcome: Progressing     Problem: Nutrition/Hydration-ADULT  Goal: Nutrient/Hydration intake appropriate for improving, restoring or maintaining nutritional needs  Description: Monitor and assess patient's nutrition/hydration status for malnutrition  Collaborate with interdisciplinary team and initiate plan and interventions as ordered  Monitor patient's weight and dietary intake as ordered or per policy  Utilize nutrition screening tool and intervene as necessary  Determine patient's food preferences and provide high-protein, high-caloric foods as appropriate       INTERVENTIONS:  - Monitor oral intake, urinary output, labs, and treatment plans  - Assess nutrition and hydration status and recommend course of action  - Evaluate amount of meals eaten  - Assist patient with eating if necessary   - Allow adequate time for meals  - Recommend/ encourage appropriate diets, oral nutritional supplements, and vitamin/mineral supplements  - Order, calculate, and assess calorie counts as needed  - Recommend, monitor, and adjust tube feedings and TPN/PPN based on assessed needs  - Assess need for intravenous fluids  - Provide specific nutrition/hydration education as appropriate  - Include patient/family/caregiver in decisions related to nutrition  Outcome: Progressing

## 2020-09-09 NOTE — PHYSICAL THERAPY NOTE
Physical Therapy Treatment Note    Patient's Name: Mack Mullen  : 20 1441   PT Last Visit   PT Visit Date 20   Pain Assessment   Pain Assessment Tool Pain Assessment not indicated - pt denies pain   Pain Score No Pain   Restrictions/Precautions   Weight Bearing Precautions Per Order No   Other Precautions Cognitive; Bed Alarm; Chair Alarm; Fall Risk  (sinus precautions)   General   Chart Reviewed Yes   Cognition   Overall Cognitive Status Impaired   Arousal/Participation Responsive; Alert   Orientation Level Oriented X4   Following Commands Follows one step commands with increased time or repetition   Comments Pt pleasant and cooperative; mumbles occassionally and difficult to understand at times   Bed Mobility   Supine to Sit 5  Supervision   Additional items HOB elevated; Bedrails   Sit to Supine 5  Supervision   Additional items HOB elevated; Bedrails   Transfers   Sit to Stand 4  Minimal assistance   Additional items Assist x 1;Verbal cues; Impulsive; Increased time required   Stand to Sit 4  Minimal assistance   Additional items Assist x 1;Verbal cues   Additional Comments sit to stand x5 from EOB with RW; initially Avery progressing to supervision with VCs for proper hand placement   Ambulation/Elevation   Gait pattern Narrow LUC; Improper Weight shift; Foward flexed; Short stride   Gait Assistance 4  Minimal assist   Additional items Assist x 1   Assistive Device Rolling walker   Distance 50ft    Balance   Static Sitting Fair   Dynamic Sitting Fair   Static Standing Fair -   Dynamic Standing Poor +   Ambulatory Poor +   Activity Tolerance   Activity Tolerance Patient tolerated treatment well   Nurse Made Aware RN updated; bed alarm on at end of session   Exercises   Marching Standing;20 reps;Bilateral;AROM   Balance training  Progressed from BUE to single UE support on RW during standing marches with minAx1   Assessment   Prognosis Good   Problem List Decreased strength;Decreased endurance; Impaired balance;Decreased mobility; Decreased cognition;Decreased safety awareness; Impaired vision   Assessment Pt able to ambulate increased distances today requiring minAx1 with RW  With sit to stand transfers, pt progressed from Avery to to supervision with RW  Pt demonstrated good carryover of VCs for hand placement during transfers  Performed standing marches at [facility] during which pt progressed from BUE support to single UE support and Avery from therapist  Pt cooperative and motivated throughout session  Pt would benefit from continued acute skilled PT to improve LE strength, balance, and endurance in order to improve all aspects of functional mobility  Pt ended session in bed with all needs within reach and bed alarm on  Pt denies any concerns at this time  Goals   Patient Goals to go home   STG Expiration Date 09/16/20   PT Treatment Day 1   Plan   Treatment/Interventions Functional transfer training;Elevations;LE strengthening/ROM; Therapeutic exercise; Endurance training;Cognitive reorientation;Patient/family training;Equipment eval/education; Bed mobility;Gait training   Progress Progressing toward goals   PT Frequency Other (Comment)  (3-6x/wk)   Recommendation   PT Discharge Recommendation Post-Acute Rehabilitation Services   PT - OK to Discharge Yes  (to rehab)       Vida Chaudhary, PT, DPT

## 2020-09-09 NOTE — SOCIAL WORK
Pt accepted to [facility]note 3] [facility] for SNF  Pt prefers [facility]  CM submitted for insurance auth

## 2020-09-09 NOTE — PROGRESS NOTES
Progress Note Lorri Sam 1940, 79 y o  male MRN: [id number]    Unit/Bed#: [facility] 815-01 Encounter: [id number]    Primary Care Provider: ELE Roberts   Date and time admitted to hospital: 9/4/2020  5:45 PM        Alcohol abuse  Assessment & Plan  - CIWA protocol  - continue reorientation  - has not required any dosages of Ativan exam  - consider Serax if worsens   - no trremors or sign of withdrawal will D/C Ciwa protocol for now    * Closed extensive facial fractures (HCC)  Assessment & Plan  - s/p ORIF Leforte II and mandible with multiple facial laceration repairs and tooth extractions POD1  - follow-up CTA neck to rule out vascular injury   - Continue IV antibiotics while in hospital (Ancef) and transition to Augmentin upon discharge total 7 days  - 3 doses IV decadron  - sinus precautions including non-chew diet x 3 weeks   - analgesia: tolerating low dose oxycodone with minimal requirements   Monitor and titrate analgesia regimen as needed   - delirium precautions - CIWA protocol for ETOH use, no ativan required to date, 72 hours out an no tremors, will D/C CIWA protocol for now  - repeat head CT stable (obtained for confusion) - cont delirium precautions, reorient as able   - PT/OT evals  - optho consult completed:  Ofloxacin q i d  OD for 7 days and outpatient follow-up   Vision intact this morning, no complaints of pain only nasal stuffiness          Disposition: rehab, patient now agreeing to go      SUBJECTIVE:  Chief Complaint: weak    Subjective: I could get some help at rehab for a week or two      OBJECTIVE:     Meds/Allergies     Current Facility-Administered Medications:     acetaminophen (TYLENOL) tablet 975 mg, 975 mg, Oral, Q8H [address], ELE Garcia, 975 mg at 09/09/20 0520    amLODIPine (NORVASC) tablet 2 5 mg, 2 5 mg, Oral, Daily, ELE Saavedra, 2 5 mg at 09/09/20 0831    aspirin (ECOTRIN LOW STRENGTH) EC tablet 81 mg, 81 mg, Oral, Daily, ELE Saavedra, 81 mg at 09/09/20 0831    atorvastatin (LIPITOR) tablet 40 mg, 40 mg, Oral, Daily With Dinner, ELE Saavedra, 40 mg at 09/08/20 1621    bacitracin ophthalmic ointment, , Right Eye, TID, Irvin Radulescu, DMD    carvedilol (COREG) tablet 12 5 mg, 12 5 mg, Oral, BID With Meals, ELE Saavedra, 12 5 mg at 09/09/20 0830    ceFAZolin (ANCEF) IVPB (premix) 1,000 mg 50 mL, 1,000 mg, Intravenous, Q8H, Irvin Michel DMD, Last Rate: 100 mL/hr at 09/09/20 0520, 1,000 mg at 09/09/20 0520    chlorhexidine (PERIDEX) 0 12 % oral rinse 30 mL, 30 mL, Swish & Spit, TID after meals, Irvin Michel, DMD, 30 mL at 09/09/20 1144    cholecalciferol (VITAMIN D3) tablet 1,000 Units, 1,000 Units, Oral, Daily, ELE Saavedra, 1,000 Units at 09/09/20 0831    clopidogrel (PLAVIX) tablet 75 mg, 75 mg, Oral, Daily, ELE Saavedra, 75 mg at 09/09/20 0831    escitalopram (LEXAPRO) tablet 20 mg, 20 mg, Oral, Daily, ELE Saavedra, 20 mg at 07/49/83 3910    folic acid (FOLVITE) tablet 1 mg, 1 mg, Oral, Daily, ELE Saavedra, 1 mg at 09/09/20 0831    heparin (porcine) subcutaneous injection 5,000 Units, 5,000 Units, Subcutaneous, Q8H [address], ELE Saavedra, 5,000 Units at 09/09/20 0520    multi-electrolyte (PLASMALYTE-A/ISOLYTE-S PH 7 4) IV solution, 75 mL/hr, Intravenous, Continuous, Gayle Ledesma DO, Last Rate: 75 mL/hr at 09/09/20 0153, 75 mL/hr at 09/09/20 0153    ofloxacin (OCUFLOX) 0 3 % ophthalmic solution 1 drop, 1 drop, Right Eye, 4x Daily, ELE Saavedra, 1 drop at 09/09/20 1144    ondansetron (ZOFRAN) injection 4 mg, 4 mg, Intravenous, Q4H PRN, ELE Saavedra    oxyCODONE (ROXICODONE) IR tablet 5 mg, 5 mg, Oral, Q4H PRN, ELE Saavedra, 5 mg at 09/07/20 0015    pantoprazole (PROTONIX) EC tablet 40 mg, 40 mg, Oral, Early Morning, ELE Saavedra, 40 mg at 09/09/20 0520    thiamine (VITAMIN B1) tablet 100 mg, 100 mg, Oral, Daily, ELE Saavedra, 100 mg at 09/09/20 0831     Vitals:   Vitals:    09/09/20 0759   BP: 169/84   Pulse:    Resp: 20   Temp: 99 6 °F (37 6 °C)   SpO2:        Intake/Output:  I/O       09/07 0701 - 09/08 0700 09/08 0701 - 09/09 0700 09/09 0701 - 09/10 0700    P  O  0 420 180    I V  (mL/kg) 1000 (14 4) 2022 5 (27 8)     Total Intake(mL/kg) 1000 (14 4) 2442 5 (33 6) 180 (2 5)    Urine (mL/kg/hr) 750 (0 4) 2199 (1 3) 850 (2 3)    Stool  0     Total Output 750 2199 850    Net +250 +243 5 -670           Unmeasured Urine Occurrence 3 x 1 x     Unmeasured Stool Occurrence  1 x            Nutrition/GI Proph/Bowel Reg: regular    Physical Exam:   GENERAL APPEARANCE: comfortable, a little fatigued  NEURO: intact, GCS - 15  HEENT: EOM's intact  CV: RRR< no complaint of chest pain  LUNGS: CTA bilaterally, no shortness of breath  GI: tolerating a diet  : voiding  MSK: moving all four extremities  SKIN: face is healing    Invasive Devices     Peripheral Intravenous Line            Peripheral IV 09/08/20 Left;Ventral (anterior) Forearm less than 1 day                 Lab Results: none  Imaging/EKG Studies: none  Other Studies: none  VTE Prophylaxis: Sequential compression device (Venodyne)  and Heparin

## 2020-09-09 NOTE — DISCHARGE INSTRUCTIONS
Abuse of Alcohol   AMBULATORY CARE:   Alcohol abuse   · is unhealthy drinking behavior  You may drink too much at one time once a week, or continue to drink too much daily  You continue to drink even though it causes problems  The problems can be alcohol related legal problems or problems with work or family  · If you drink too much at one time, you are binge drinking  Binge drinking is when you have a large amount of alcohol in a short time  Your blood alcohol concentrations (HARRIET) goes above 0 08 g/dLlevel during binge drinking  For men, this usually happens with more than 4 drinks in 2 hours  For women, it is more than 3 drinks in 2 hours  A drink is 12 ounces of beer, 4 ounces of wine, or 1½ ounces of liquor  Common signs and symptoms of alcohol abuse include:  Each person that abuses alcohol may have different symptoms  The following are common signs and symptoms of alcohol abuse:  · Loss of interest in activities, work, and school    · Decreased interest in family and friends    · Depression    · Constant thoughts about drinking    · Not able to control the amount you drink    · Restlessness, or erratic and violent behavior  Call 911 for any of the following:   · You have sudden chest pain or trouble breathing  · You have a seizure or have shaking or trembling  · You feel like you could harm yourself or others  · You were in an accident because of alcohol  Seek care immediately if:   · You have hallucinations (you see or hear things that are not real)  · You cannot stop vomiting or you vomit blood  Contact your healthcare provider if:   · You need help to stop drinking alcohol  · You have questions or concerns about your condition or care    Long-term effects of alcohol abuse:   · Blackouts    · Memory loss    · Dementia    · Liver disease    · Thiamine (vitamin B1) deficiency  Treatment for alcohol abuse  may include the following:  · Detoxification (detox) and withdrawal  is a program that helps you to safely get alcohol out of your body  Detox can also help get rid of the physical need to drink  Healthcare providers monitor the physical symptoms of withdrawal  They may give you medicines to help decrease nausea, dehydration, and seizures  Healthcare providers will also monitor your blood pressure, heart and breathing rates, and your temperature  Symptoms of anxiety, depression, and suicidal thoughts are also monitored and managed during detox  Healthcare providers may give you medicines for these symptoms and therapy sessions will be available to you  Detox is usually done at a detox center or in a hospital  Healthcare providers do not recommend that you try to detox at home or by yourself  Withdrawal symptoms may become life threatening  The center can help you find 12 step programs or an individual therapist to help with emotional support after detox  · Inpatient and outpatient treatment  focus on your personal needs to help you stop drinking  Treatment helps you understand the reasons you abuse alcohol  Counselors and therapists provide you with support and help you find ways to cope instead of drinking  You may need inpatient treatment to provide a controlled environment  You may need outpatient treatment after your inpatient treatment is complete  · Alcohol aversion therapy  takes away the desire to drink by causing a negative reaction when you drink  Healthcare providers may give you medicines that cause nausea and vomiting when you drink alcohol  They may instead give you a medicine that decreases your urge to drink alcohol  These medicines are used to help you stop drinking or reduce the amount you drink  They can also help you avoid relapse  Risks of alcohol abuse:  Alcohol abuse increases your risk for gastrointestinal cancers, brain damage and problems with your immune system  It also increases your risk for heart, kidney, and lung damage   The risk of stroke increases with alcohol abuse  If you are pregnant and drink alcohol, you and your baby are at risk for serious health problems  Avoid alcohol:  You should stop drinking entirely  Alcohol can damage your brain, heart, and liver  It also increases your risk for injury, high blood pressure, and certain types of cancer  Alcohol is dangerous when you combine it with certain medicines  Do not drive if you drink alcohol:  Make sure someone who has not been drinking can help you get home  Get support:  Most people need support to stop drinking alcohol  Mental health providers, support groups, rehabilitation centers, and your healthcare provider can provide support  For more information:   · Alcoholics Anonymous  Web Address: http://[employer]/  · Substance Abuse and [address] [place]  Web Address: https://[employer]/  Follow up with your healthcare provider as directed:  Write down your questions so you remember to ask them during your visits  © 2017 [address] Information is for End User's use only and may not be sold, redistributed or otherwise used for commercial purposes  All illustrations and images included in CareNotes® are the copyrighted property of [employer]  or Liu Olson  The above information is an  only  It is not intended as medical advice for individual conditions or treatments  Talk to your doctor, nurse or pharmacist before following any medical regimen to see if it is safe and effective for you      Recommendations  - continue abx  - analgesia per primary team  - sinus precautions (no nose blowing, sneeze with mouth open)  - puree, non-chew, diet only for at least 3 weeks  - HOB elevated  - appreciate ophtho recs  - Bacitracin occular ointment TID  - daily eye care by OMFS  - OMFS to follow

## 2020-09-09 NOTE — CASE MANAGEMENT
Benjamin Henry obtained  [id number]  Level 1 therapy  Next review is 9/14/20  F/U 61 12 61    Pt will d/c tomorrow to [facility]

## 2020-09-09 NOTE — PROGRESS NOTES
[facility] attending note  78 y/o male is POD#5 s/p repair of complex right lower eyelid laceration, repair of nose laceration, ORIF of Lefort fracture of maxilla, removal of mu;tiple teeth  The surgical wounds are healing well  He has, nevertheless, epiphora to right eye 2/2 lacerated tear duct  Today, I removed the frost stitch from the right lower eyelid  Mr Valentine Garcia will be d/c tomorrow to LT facility      Recommendations:  Keflex 500 mg PO tid for an additional 2 days (7 days in total of antibiotic therapy)  Continue bacitracin antibiotic ointment to lower eyelid skin X 7 days  Continue eye medication as per ophtalmology  Soft diet x 3 weeks  He should f/u with [facility] in 7-10 days after d/c from hospital  Plans should be made for Mr Valentine Garcia to see and occuloplastic surgeon for evaluation of epiphora  Note: all facial skin and intraoral sutures are dissolvable, they do not require removal

## 2020-09-09 NOTE — PLAN OF CARE
Problem: PHYSICAL THERAPY ADULT  Goal: Performs mobility at highest level of function for planned discharge setting  See evaluation for individualized goals  Description: Treatment/Interventions: Functional transfer training, Elevations, LE strengthening/ROM, Therapeutic exercise, Endurance training, Cognitive reorientation, Patient/family training, Equipment eval/education, Bed mobility, Gait training  Equipment Recommended: Sylvie Thrasher       See flowsheet documentation for full assessment, interventions and recommendations  Outcome: Progressing  Note: Prognosis: Good  Problem List: Decreased strength, Decreased endurance, Impaired balance, Decreased mobility, Decreased cognition, Decreased safety awareness, Impaired vision  Assessment: Pt able to ambulate increased distances today requiring minAx1 with RW  With sit to stand transfers, pt progressed from Avery to to supervision with RW  Pt demonstrated good carryover of VCs for hand placement during transfers  Performed standing [facility] during which pt progressed from BUE support to single UE support and Avery from therapist  Pt cooperative and motivated throughout session  Pt would benefit from continued acute skilled PT to improve LE strength, balance, and endurance in order to improve all aspects of functional mobility  Pt ended session in bed with all needs within reach and bed alarm on  Pt denies any concerns at this time  PT Discharge Recommendation: Post-Acute Rehabilitation Services     PT - OK to Discharge: Yes(to rehab)    See flowsheet documentation for full assessment

## 2020-09-09 NOTE — SOCIAL WORK
CM met with pt to discuss the role of CM  Pt lives with his gf in a 1 story home which has ramp access  Pt doesn't drive  Pt considers himself independent PTA  Pt's bathroom is a walk-in shower and regular toilet  Pt owns a walker  Pt has no living will  Pt's pharmacy is mail order but if needed asap he uses [facility] in [place]  Pt reports no hx of mental health, substance abuse, or IP rehab  Pt's used [facility] in the past but is unsure of agency  Pt is recommended for IP rehab  Pt is interested in exploring rehab but unclear if he will actually go  Pt requested blanket referrals placed and then CM will follow up  CM reviewed d/c planning process including the following: identifying help at home, patient preference for d/c planning needs, Discharge Lounge, [facility] Meds to Bed program, availability of treatment team to discuss questions or concerns patient and/or family may have regarding understanding medications and recognizing signs and symptoms once discharged  CM also encouraged patient to follow up with all recommended appointments after discharge  Patient advised of importance for patient and family to participate in managing patients medical well being

## 2020-09-10 ENCOUNTER — APPOINTMENT (INPATIENT)
Dept: RADIOLOGY | Facility: HOSPITAL | Age: 80
DRG: 132 | End: 2020-09-10
Payer: COMMERCIAL

## 2020-09-10 VITALS
DIASTOLIC BLOOD PRESSURE: 79 MMHG | RESPIRATION RATE: 18 BRPM | TEMPERATURE: 98.1 F | WEIGHT: 155.3 LBS | OXYGEN SATURATION: 95 % | BODY MASS INDEX: 23 KG/M2 | HEIGHT: 69 IN | SYSTOLIC BLOOD PRESSURE: 154 MMHG | HEART RATE: 64 BPM

## 2020-09-10 PROBLEM — H04.201 RIGHT EPIPHORA: Status: ACTIVE | Noted: 2020-09-10

## 2020-09-10 PROCEDURE — 73110 X-RAY EXAM OF WRIST: CPT

## 2020-09-10 PROCEDURE — NC001 PR NO CHARGE: Performed by: PHYSICIAN ASSISTANT

## 2020-09-10 PROCEDURE — 97116 GAIT TRAINING THERAPY: CPT

## 2020-09-10 PROCEDURE — 99238 HOSP IP/OBS DSCHRG MGMT 30/<: CPT | Performed by: PHYSICIAN ASSISTANT

## 2020-09-10 PROCEDURE — 73130 X-RAY EXAM OF HAND: CPT

## 2020-09-10 RX ORDER — ACETAMINOPHEN 325 MG/1
975 TABLET ORAL EVERY 8 HOURS SCHEDULED
Qty: 30 TABLET | Refills: 0 | Status: SHIPPED | OUTPATIENT
Start: 2020-09-10 | End: 2021-11-12 | Stop reason: ALTCHOICE

## 2020-09-10 RX ORDER — ASPIRIN 81 MG/1
81 TABLET ORAL DAILY
Qty: 10 TABLET | Refills: 0 | Status: SHIPPED | OUTPATIENT
Start: 2020-09-10

## 2020-09-10 RX ORDER — MELATONIN
1000 DAILY
Qty: 10 TABLET | Refills: 0 | Status: SHIPPED | OUTPATIENT
Start: 2020-09-10 | End: 2020-09-29

## 2020-09-10 RX ORDER — AMOXICILLIN AND CLAVULANATE POTASSIUM 875; 125 MG/1; MG/1
1 TABLET, FILM COATED ORAL EVERY 12 HOURS SCHEDULED
Qty: 10 TABLET | Refills: 0 | Status: SHIPPED | OUTPATIENT
Start: 2020-09-10 | End: 2020-09-15

## 2020-09-10 RX ORDER — CHLORHEXIDINE GLUCONATE 0.12 MG/ML
30 RINSE ORAL
Qty: 120 ML | Refills: 0 | Status: SHIPPED | OUTPATIENT
Start: 2020-09-10 | End: 2021-11-12 | Stop reason: ALTCHOICE

## 2020-09-10 RX ORDER — ESCITALOPRAM OXALATE 20 MG/1
20 TABLET ORAL DAILY
Qty: 10 TABLET | Refills: 0 | Status: SHIPPED | OUTPATIENT
Start: 2020-09-10 | End: 2020-12-30 | Stop reason: SDUPTHER

## 2020-09-10 RX ORDER — OFLOXACIN 3 MG/ML
1 SOLUTION/ DROPS OPHTHALMIC 4 TIMES DAILY
Qty: 5 ML | Refills: 0 | Status: SHIPPED | OUTPATIENT
Start: 2020-09-10 | End: 2020-09-13

## 2020-09-10 RX ORDER — CARVEDILOL 12.5 MG/1
12.5 TABLET ORAL 2 TIMES DAILY WITH MEALS
Qty: 10 TABLET | Refills: 0 | Status: SHIPPED | OUTPATIENT
Start: 2020-09-10 | End: 2020-09-29

## 2020-09-10 RX ORDER — PANTOPRAZOLE SODIUM 40 MG/1
40 TABLET, DELAYED RELEASE ORAL
Qty: 10 TABLET | Refills: 0 | Status: SHIPPED | OUTPATIENT
Start: 2020-09-10 | End: 2020-09-29

## 2020-09-10 RX ORDER — ATORVASTATIN CALCIUM 40 MG/1
40 TABLET, FILM COATED ORAL
Qty: 10 TABLET | Refills: 0 | Status: SHIPPED | OUTPATIENT
Start: 2020-09-10 | End: 2021-03-09

## 2020-09-10 RX ORDER — OXYCODONE HYDROCHLORIDE 5 MG/1
5 TABLET ORAL EVERY 4 HOURS PRN
Qty: 10 TABLET | Refills: 0 | Status: SHIPPED | OUTPATIENT
Start: 2020-09-10 | End: 2020-09-20

## 2020-09-10 RX ORDER — BACITRACIN 500 [USP'U]/G
OINTMENT OPHTHALMIC 3 TIMES DAILY
Qty: 3.5 G | Refills: 0 | Status: SHIPPED | OUTPATIENT
Start: 2020-09-10 | End: 2021-11-12 | Stop reason: ALTCHOICE

## 2020-09-10 RX ORDER — CEPHALEXIN 500 MG/1
500 CAPSULE ORAL EVERY 8 HOURS SCHEDULED
Qty: 6 CAPSULE | Refills: 0 | Status: SHIPPED | OUTPATIENT
Start: 2020-09-10 | End: 2020-09-12

## 2020-09-10 RX ORDER — AMLODIPINE BESYLATE 2.5 MG/1
2.5 TABLET ORAL DAILY
Qty: 10 TABLET | Refills: 0 | Status: SHIPPED | OUTPATIENT
Start: 2020-09-10 | End: 2020-09-29

## 2020-09-10 RX ORDER — CLOPIDOGREL BISULFATE 75 MG/1
75 TABLET ORAL DAILY
Qty: 10 TABLET | Refills: 0 | Status: SHIPPED | OUTPATIENT
Start: 2020-09-10 | End: 2020-09-29

## 2020-09-10 RX ADMIN — CHLORHEXIDINE GLUCONATE 0.12% ORAL RINSE 30 ML: 1.2 LIQUID ORAL at 09:21

## 2020-09-10 RX ADMIN — ESCITALOPRAM OXALATE 20 MG: 20 TABLET, FILM COATED ORAL at 09:20

## 2020-09-10 RX ADMIN — Medication 1000 UNITS: at 09:20

## 2020-09-10 RX ADMIN — BACITRACIN: 500 OINTMENT OPHTHALMIC at 09:25

## 2020-09-10 RX ADMIN — FOLIC ACID 1 MG: 1 TABLET ORAL at 09:20

## 2020-09-10 RX ADMIN — CLOPIDOGREL BISULFATE 75 MG: 75 TABLET ORAL at 09:20

## 2020-09-10 RX ADMIN — AMLODIPINE BESYLATE 2.5 MG: 2.5 TABLET ORAL at 09:21

## 2020-09-10 RX ADMIN — OFLOXACIN 1 DROP: 3 SOLUTION/ DROPS OPHTHALMIC at 12:33

## 2020-09-10 RX ADMIN — ASPIRIN 81 MG: 81 TABLET, COATED ORAL at 09:20

## 2020-09-10 RX ADMIN — OFLOXACIN 1 DROP: 3 SOLUTION/ DROPS OPHTHALMIC at 09:25

## 2020-09-10 RX ADMIN — HEPARIN SODIUM 5000 UNITS: 5000 INJECTION INTRAVENOUS; SUBCUTANEOUS at 05:03

## 2020-09-10 RX ADMIN — ACETAMINOPHEN 975 MG: 325 TABLET, FILM COATED ORAL at 05:03

## 2020-09-10 RX ADMIN — CEFAZOLIN SODIUM 1000 MG: 1 SOLUTION INTRAVENOUS at 05:03

## 2020-09-10 RX ADMIN — THIAMINE HCL TAB 100 MG 100 MG: 100 TAB at 09:21

## 2020-09-10 RX ADMIN — PANTOPRAZOLE SODIUM 40 MG: 40 TABLET, DELAYED RELEASE ORAL at 05:03

## 2020-09-10 RX ADMIN — CARVEDILOL 12.5 MG: 12.5 TABLET, FILM COATED ORAL at 09:20

## 2020-09-10 RX ADMIN — CHLORHEXIDINE GLUCONATE 0.12% ORAL RINSE 30 ML: 1.2 LIQUID ORAL at 12:34

## 2020-09-10 NOTE — DISCHARGE SUMMARY
Discharge- Nasrin Maldonado 1940, 79 y o  male MRN: [id number]    Unit/Bed#: [facility] 815-01 Encounter: [id number]    Primary Care Provider: ELE Huerta   Date and time admitted to hospital: 9/4/2020  5:45 PM        Right epiphora  Assessment & Plan  - secondary to trauma  - evaluated by Ophtho- cleared for discharge with outpatient f/u with Dr Kenrick Garces recommended in 2 weeks  Will require f/u with oculoplastics as well per OMFS  - continue  Ofloxacin q i d  OD for 7 days and outpatient follow-up     Alcohol abuse  Assessment & Plan  - CIWA protocol discontinued  - no s/s of alcohol withdrawal     * Closed extensive facial fractures (HCC)  Assessment & Plan  - s/p ORIF Leforte II and mandible with multiple facial laceration repairs and tooth extractions on 9/4  - follow-up CTA neck negative for vascular injury  - Continue IV antibiotics while in hospital (Ancef)  Keflex to complete 2 more days, per OMFS  - sinus precautions including non-chew diet x 3 weeks   - analgesia: tolerating low dose oxycodone with minimal requirements  Monitor and titrate analgesia regimen as needed   - PT/OT evals apppreciated, recommending inpatient rehab  D/C to SNF today  Resolved Problems  Date Reviewed: 9/10/2020    None          Admission Date:   Admission Orders (From admission, onward)     Ordered        09/04/20 1938  Inpatient Admission  Once                     Admitting Diagnosis: Open extensive facial fractures, initial encounter ([facility] [address] ) [S02 [id number]]  Unspecified multiple injuries, initial encounter [T07  XXXA]    HPI: As documented by Dr Diandra Baird who evaluated the patient on admission, 'Nasrin Maldonado is a 79 y o  male who presents with a fall from standing onto his  Patient reports that he lost his balance and fell forward  Does not report recent illness or LOC during the episode but is having trouble explaining the incident    Mechanism: Fall from standing"    Procedures Performed: No orders of the defined types were placed in this encounter  Summary of Hospital Course: Patient was placed on the trauma service following fall when he sustained multiple facial fractures  He was seen by OMFS and taken to the OR on 9/4 for ORIF of these fractures, dental extraction and facial laceration repair  He did well post operatively  He had adequate pain control, he completed a course of decadron for facial swelling, has been maintained on IV ancef which will be transitioned to PO keflex at discharge to complete a 7 day course, and is on a soft diet  He has been cleared for discharge by OMFS and optho and will f/u with both as an outpatient  He may require evaluation by Oculoplastics if epiphora continues from the R eye  He had some mild confusion on 9/5 and a repeat CT head was obtained and negative  CTA neck was negative as well for injury  This resolved and he had no focal deficits neurologically  This was likely delirium related  He had no s/s of alcohol withdrawal and CIWA protocol was discontinued as well  He is stable for discharge today to [facility]  He will f/u with consultants as stated  No trauma f/u needed  Today he has no complaints  Tylenol is working for mild discomfort  R eye blurriness is improving  He is tolerating a diet and ready to go to rehab  Exam:   GEN: NAD  HEENT: + R facial edema and laceration, well healing  Facial abrasions C/D/I>   NEURO: GCS 15, non-focal  CV: RRR, no MGR  PULM: CTA bilaterally  GI: soft,non-tender,non-distended  : voiding  MSK: moving all equally bilaterally  SKIN: pink, warm, dry    Significant Findings, Care, Treatment and Services Provided:   Xr Shoulder 2+ Vw Left    Result Date: 9/5/2020  Impression: Mild degenerative osteoarthritis  Workstation performed: [id number]     Xr Shoulder 2+ Vw Right    Result Date: 9/5/2020  Impression: Degenerative change of the glenohumeral joint  Dislocation of the acromioclavicular joint    The acromion is inferiorly dislocated in relation to the distal clavicle  This may represent sequela of remote injury  Workstation performed: [id number]     Ct Head Without Contrast    Result Date: 9/5/2020  Impression: No acute intracranial abnormality  Partial maxillary sinus fracture fixation involving the anterior walls with microplates  The remaining facial fractures are unchanged  Persistent hemorrhage layering within the maxillary sinuses  Significant improvement in the facial contusions and soft tissue swelling with removal of the previously seen foreign bodies  Workstation performed: [id number]     Trauma - Ct Head Wo Contrast    Result Date: 9/4/2020  Impression: Mild microangiopathic change within the brain parenchyma with no acute intracranial abnormality  Multiple facial fractures as described above  See separate CT facial bones  There is resulting hemorrhage opacifying the frontal sinuses, ethmoid air cells and maxillary sinuses  Multiple small loose bodies are seen within the preseptal soft tissues on the right  Workstation performed: [id number]     Trauma - Ct Facial Bones Wo Contrast    Result Date: 9/4/2020  Impression: Extensive facial fractures in a pattern consistent with LeFort type II fractures  There is a right paramedian mandibular fracture with dislocation of the incisor into the anterior soft tissues  Extensive facial soft tissue injury with multiple radiopaque foreign bodies most prominent in the right preseptal soft tissues anterior to the globe  Extensive sinus opacification with hemorrhage opacifying the frontal sinuses, ethmoid air cells and maxillary sinuses  Multiple cavities within the maxillary and mandibular dentition  I personally discussed this study with Dr Fartun Patel on 9/4/2020 at 6:32 PM  Workstation performed: [id number]     Cta Neck W Wo Contrast    Result Date: 9/5/2020  Impression: No evidence of acute arterial injury status post trauma with facial fractures   Atherosclerotic disease of the cervical and intracranial vasculature  There is approximately 65-70% stenosis of the proximal left ICA  There is mild to moderate narrowing of the paraclinoid segments of both intracranial internal carotid arteries  Moderate stenosis of both vertebral artery origins  Severe stenosis of the distal left vertebral artery at the skull base  Stable facial fractures involving the maxillary sinuses, nasal septum, nasal bones and pterygoid plates  Fractures do not appear to extend to the skull base  Workstation performed: [id number]     Trauma - Ct Spine Cervical Wo Contrast    Result Date: 9/4/2020  Impression: No acute fracture or malalignment  I personally discussed this study with Dr Fartun Patel on 9/4/2020 at 6:33 PM  Advanced cervical degenerative change with no acute osseous abnormality  Given the degree of canal stenosis, clinical correlation for myelopathy recommended  Workstation performed: [id number]     Xr Chest 1 View    Result Date: 9/9/2020  Impression: Mild central vascular prominence which may be positional  Upright imaging would be helpful when feasible  Otherwise, no acute cardiopulmonary disease  Workstation performed: [id number]     Trauma - Ct Chest Abdomen Pelvis W Contrast    Result Date: 9/4/2020  Impression: No acute traumatic injury of the chest, abdomen or pelvis  I personally discussed this study with Dr Fartun Patel on 9/4/2020 at 6:33 PM  Workstation performed: [id number]     Xr Trauma Multiple    Result Date: 9/5/2020  Impression: Mild central vascular prominence which may be positional  Upright imaging would be helpful when feasible  Otherwise, no acute cardiopulmonary disease  Workstation performed: [id number]       Complications: none    Condition at Discharge: good         Discharge instructions/Information to patient and family:   See after visit summary for information provided to patient and family        Provisions for Follow-Up Care:  See after visit summary for information related to follow-up care and any pertinent home health orders  PCP: ELE Gray    Disposition: [facility] SNF    Planned Readmission: No    Discharge Statement   I spent 30 minutes discharging the patient  This time was spent on the day of discharge  I had direct contact with the patient on the day of discharge  Additional documentation is required if more than 30 minutes were spent on discharge  Discharge Medications:  See after visit summary for reconciled discharge medications provided to patient and family

## 2020-09-10 NOTE — PROGRESS NOTES
Xrays of the R wrist and hand reviewed and show only degenerative changes  No need for further imaging or orthopedics evaluation  Cleared for discharge to SNF

## 2020-09-10 NOTE — ASSESSMENT & PLAN NOTE
- secondary to trauma  - evaluated by Ophtho- cleared for discharge with outpatient f/u with Dr Kenrick Garces recommended in 2 weeks  Will require f/u with oculoplastics as well per OMFS     - continue  Ofloxacin q i d  OD for 7 days and outpatient follow-up

## 2020-09-10 NOTE — ASSESSMENT & PLAN NOTE
- s/p ORIF Leforte II and mandible with multiple facial laceration repairs and tooth extractions on 9/4  - follow-up CTA neck negative for vascular injury  - Continue IV antibiotics while in hospital (Ancef)  Keflex to complete 2 more days, per OMFS  - sinus precautions including non-chew diet x 3 weeks   - analgesia: tolerating low dose oxycodone with minimal requirements  Monitor and titrate analgesia regimen as needed   - PT/OT evals apppreciated, recommending inpatient rehab  D/C to SNF today

## 2020-09-10 NOTE — INCIDENTAL FINDINGS
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  There is approximately 65-70% stenosis of the proximal left ICA  There is mild to moderate narrowing of the paraclinoid segments of both intracranial internal carotid arteries      Moderate stenosis of both vertebral artery origins  Severe stenosis of the distal left vertebral artery at the skull base  Moderate cervical degenerative change with loss of disc height, endplate and uncinate hypertrophic change and facet degenerative change  Multilevel mild to moderate canal stenosis, most pronounced at C3-4 and C4-5  At C3-4 there   is severe canal stenosis secondary to bony degenerative change and a broad-based central disc protrusion  Similar findings at C4-5  Multilevel foraminal narrowing  Simple renal cysts are noted on the right     Follow up required: family doctor   Follow up should be done within 2 week(s)

## 2020-09-10 NOTE — PLAN OF CARE
Problem: PHYSICAL THERAPY ADULT  Goal: Performs mobility at highest level of function for planned discharge setting  See evaluation for individualized goals  Description: Treatment/Interventions: Functional transfer training, LE strengthening/ROM, Therapeutic exercise, Elevations, Endurance training, Cognitive reorientation, Patient/family training, Equipment eval/education, Bed mobility, Gait training  Equipment Recommended: Ana Patten       See flowsheet documentation for full assessment, interventions and recommendations  Outcome: Progressing  Note: Prognosis: Good  Problem List: Decreased strength, Impaired balance, Decreased mobility, Decreased cognition, Decreased safety awareness, Impaired vision  Assessment: Pt able to ambulate increased distances today with minAx1 and RW without any SOB or fatigue  During sit to stand transfers, pt required supervision with VCs for safe hand placement on RW  Pt occasionally impulsive and forgetful, requiring VCs to redirect  At this time, pt would benefit from continued acute skilled PT to continue improving LE strength, balance, and endurance to return to prior level of function and allow for safe d/c  Pt ended session in bedside recliner with chair alarm on and all needs in reach  RN updated  Pt denies any further concerns at this time  Barriers to Discharge: None     PT Discharge Recommendation: Post-Acute Rehabilitation Services     PT - OK to Discharge: Yes(to rehab)    See flowsheet documentation for full assessment

## 2020-09-10 NOTE — PHYSICAL THERAPY NOTE
Physical Therapy Treatment Note    Patient's Name: Sanket Potter  : 1940       09/10/20 1107   PT Last Visit   PT Visit Date 09/10/20   Pain Assessment   Pain Assessment Tool Pain Assessment not indicated - pt denies pain   Pain Score No Pain   Restrictions/Precautions   Weight Bearing Precautions Per Order No   Other Precautions Chair Alarm; Fall Risk;Cognitive  (sinus precautions)   General   Chart Reviewed Yes   Cognition   Arousal/Participation Alert; Responsive; Cooperative   Attention Attends with cues to redirect   Orientation Level Oriented X4   Following Commands Follows multistep commands with increased time or repetition   Comments Pt pleasant and cooperative t/o session   Bed Mobility   Additional Comments Pt OOB upon arrival; not assessed today   Transfers   Sit to Stand 5  Supervision   Additional items Impulsive;Verbal cues   Stand to Sit 5  Supervision   Additional items Impulsive;Verbal cues   Additional Comments S with transfers with RW requiring VCs for proper hand placement   Ambulation/Elevation   Gait pattern Decreased foot clearance; Short stride;Narrow LUC   Gait Assistance 4  Minimal assist   Additional items Assist x 1   Assistive Device Rolling walker   Distance 180ft    Balance   Static Sitting Fair   Dynamic Sitting Fair   Static Standing Fair -   Dynamic Standing Poor +   Ambulatory Poor +   Activity Tolerance   Activity Tolerance Patient tolerated treatment well   Nurse Made Aware RN updated; chair alarm on   Assessment   Prognosis Good   Problem List Decreased strength; Impaired balance;Decreased mobility; Decreased cognition;Decreased safety awareness; Impaired vision   Assessment Pt able to ambulate increased distances today with minAx1 and RW without any SOB or fatigue  During sit to stand transfers, pt required supervision with VCs for safe hand placement on RW  Pt occasionally impulsive and forgetful, requiring VCs to redirect   At this time, pt would benefit from continued acute skilled PT to continue improving LE strength, balance, and endurance to return to prior level of function and allow for safe d/c  Pt ended session in bedside recliner with chair alarm on and all needs in reach  RN updated  Pt denies any further concerns at this time  Barriers to Discharge None   Goals   Patient Goals to get stronger   STG Expiration Date 09/16/20   PT Treatment Day 2   Plan   Treatment/Interventions Functional transfer training;LE strengthening/ROM; Therapeutic exercise;Elevations; Endurance training;Cognitive reorientation;Patient/family training;Equipment eval/education; Bed mobility;Gait training   Progress Progressing toward goals   PT Frequency Other (Comment)  (3-6x/wk)   Recommendation   PT Discharge Recommendation Post-Acute Rehabilitation Services   PT - OK to Discharge Yes  (to rehab)       Sebastian Rivera, PT, DPT

## 2020-09-11 NOTE — UTILIZATION REVIEW
Notification of Discharge  This is a Notification of Discharge from our facility [address]  Please be advised that this patient has been discharge from our facility  Below you will find the admission and discharge date and time including the patients disposition  PRESENTATION DATE: 9/4/2020  5:45 PM  OBS ADMISSION DATE:   IP ADMISSION DATE: 9/4/20 1934   DISCHARGE DATE: 9/10/2020  1:02 PM  DISPOSITION: Non [facility] SN[facility]/TCU/SNU Non [facility] SNF/TCU/SNU   Admission Orders listed below:  Admission Orders (From admission, onward)     Ordered        09/04/20 1938  Inpatient Admission  Once                   Please contact the UR Department if additional information is required to close this patient's authorization/case  [address] Utilization Review Department  Main: [phone] x carefully listen to the prompts  All voicemails are confidential   [email or web address]  Send all requests for admission clinical reviews, approved or denied determinations and any other requests to dedicated fax number below belonging to the campus where the patient is receiving treatment   List of dedicated fax numbers:  [address] DENIALS (Administrative/Medical Necessity) [phone]   [address] (Maternity/NICU/Pediatrics) [phone]   Adele Espinoza [phone]   Daryel Moles [phone]   Lior Montez [phone]   145 [facility] 1525 [facility] [phone]   [facility]  [phone] [address] [facility]  2401 [facility] [place] [address] [facility] [phone]

## 2020-09-16 ENCOUNTER — TELEPHONE (OUTPATIENT)
Dept: FAMILY MEDICINE CLINIC | Facility: HOSPITAL | Age: 80
End: 2020-09-16

## 2020-09-16 ENCOUNTER — TRANSITIONAL CARE MANAGEMENT (OUTPATIENT)
Dept: FAMILY MEDICINE CLINIC | Facility: HOSPITAL | Age: 80
End: 2020-09-16

## 2020-09-18 ENCOUNTER — TELEPHONE (OUTPATIENT)
Dept: FAMILY MEDICINE CLINIC | Facility: HOSPITAL | Age: 80
End: 2020-09-18

## 2020-09-18 DIAGNOSIS — I73.9 PERIPHERAL VASCULAR DISEASE, UNSPECIFIED (HCC): Primary | ICD-10-CM

## 2020-09-22 ENCOUNTER — TELEPHONE (OUTPATIENT)
Dept: ADMINISTRATIVE | Facility: HOSPITAL | Age: 80
End: 2020-09-22

## 2020-09-29 ENCOUNTER — TELEPHONE (OUTPATIENT)
Dept: FAMILY MEDICINE CLINIC | Facility: HOSPITAL | Age: 80
End: 2020-09-29

## 2020-09-29 ENCOUNTER — TELEMEDICINE (OUTPATIENT)
Dept: FAMILY MEDICINE CLINIC | Facility: HOSPITAL | Age: 80
End: 2020-09-29
Payer: COMMERCIAL

## 2020-09-29 VITALS — HEIGHT: 67 IN | BODY MASS INDEX: 24.01 KG/M2 | WEIGHT: 153 LBS

## 2020-09-29 DIAGNOSIS — I10 ESSENTIAL HYPERTENSION: ICD-10-CM

## 2020-09-29 DIAGNOSIS — Z76.89 ENCOUNTER FOR SUPPORT AND COORDINATION OF TRANSITION OF CARE: Primary | ICD-10-CM

## 2020-09-29 DIAGNOSIS — H04.201 RIGHT EPIPHORA: ICD-10-CM

## 2020-09-29 DIAGNOSIS — S02.92XS: ICD-10-CM

## 2020-09-29 PROCEDURE — 99495 TRANSJ CARE MGMT MOD F2F 14D: CPT | Performed by: NURSE PRACTITIONER

## 2020-09-29 NOTE — PROGRESS NOTES
Assessment/Plan:        Problem List Items Addressed This Visit        Cardiovascular and Mediastinum    Essential hypertension     BP stable per VNA reports  Continue same meds and return as scheduled in February for next BP check  Musculoskeletal and Integument    Closed extensive facial fractures ([address] )     needs f/u w/facial surgeons - will arrange for pt if not already scheduled            Other    Right epiphora      Other Visit Diagnoses     Encounter for support and coordination of transition of care    -  [address] records reviewed/discussed & TCM updated             Reason for visit is hospital/nursing home follow up  Encounter provider ELE Christy       Provider located at [address] [place] [address] MD  [address] [place] [address]      Recent Visits  No visits were found meeting these conditions  Showing recent visits within past 7 days and meeting all other requirements     Today's Visits  Date Type Provider Dept   09/29/20 Telephone ELE Christy Pg, Md   09/29/20 [address]  [facility], ELE Pineda Md   Showing today's visits and meeting all other requirements     Future Appointments  No visits were found meeting these conditions  Showing future appointments within next 150 days and meeting all other requirements        After connecting through "[employer]", the patient was identified by name and date of birth  Gracyleia Woodruff was informed that this is a telemedicine visit and that the visit is being conducted through [employer] and patient was informed that this is not a secure, HIPAA-complaint platform  He agrees to proceed     My office door was closed  No one else was in the room  He acknowledged consent and understanding of privacy and security of the video platform  The patient has agreed to participate and understands they can discontinue the visit at any time      Patient is aware this is a billable service  Subjective:     Patient ID: Olesya Mei is a 79 y o  male  States he is feeling OK since hospital admission and nursing home discharge  He had fallen in his driveway requiring trauma admission, dental and facial surgery  Has had VNA and speech therapy at home  States he is eating and drinking OK  Drinks 3-4 beers/day and stops about 6pm   Spouse says he is restless at night but was better last night  Not regularly checking BP unless nurse checks it  States he is consistent w/meds daily as rx'd  Review of Systems   Constitutional: Negative for appetite change  Respiratory: Negative for cough and shortness of breath  Cardiovascular: Negative for chest pain, palpitations and leg swelling  Gastrointestinal: Negative for constipation and diarrhea  Genitourinary: Negative for decreased urine volume  Psychiatric/Behavioral: Positive for hallucinations (when in hospital, have since improved) and sleep disturbance (awake by 3am to urinate then restless)  Objective:    Vitals:    09/29/20 0848   Weight: 69 4 kg (153 lb)   Height: 5' 7" (1 702 m)       Physical Exam  Vitals signs reviewed  Constitutional:       General: He is not in acute distress  Pulmonary:      Effort: Pulmonary effort is normal  No respiratory distress  Neurological:      General: No focal deficit present  Mental Status: He is alert and oriented to person, place, and time  Psychiatric:         Attention and Perception: Attention normal          Mood and Affect: Mood normal          Speech: Speech is slurred (slightly)  Behavior: Behavior normal          Thought Content: Thought content normal          Cognition and Memory: Cognition normal          Judgment: Judgment normal              Transitional Care Management Review:  Olesya Mei is a 79 y o  male here for TCM follow up       During the TCM phone call patient stated:    TCM Call (since 8/29/2020)     Date and time call was made  9/18/2020  4:45 PM    Hospital care reviewed  Records reviewed    Patient was hospitialized at  [facility]    Date of Admission  09/05/20    Date of discharge  09/10/20    Diagnosis  Closed extensive facial fractures    Disposition  Rehabilitation center (Comment)  [facility]    Were the patients medications reviewed and updated  Yes    Current Symptoms  None      TCM Call (since 8/29/2020)     Post hospital issues  None    Should patient be enrolled in anticoag monitoring? No    Scheduled for follow up? Yes    Did you obtain your prescribed medications  Yes    Do you need help managing your prescriptions or medications  No    Is transportation to your appointment needed  No    I have advised the patient to call PCP with any new or worsening symptoms  Anni Patricio MA    Counseling  Family (Comment)  Pastora Luna (Friend)           I spent 15 minutes with the patient during this visit      ELE Sidhu

## 2020-10-08 DIAGNOSIS — E87.1 HYPONATREMIA: Primary | ICD-10-CM

## 2020-10-16 ENCOUNTER — OFFICE VISIT (OUTPATIENT)
Dept: NEPHROLOGY | Facility: HOSPITAL | Age: 80
End: 2020-10-16
Payer: COMMERCIAL

## 2020-10-16 VITALS
RESPIRATION RATE: 16 BRPM | HEIGHT: 67 IN | BODY MASS INDEX: 24.48 KG/M2 | HEART RATE: 51 BPM | WEIGHT: 156 LBS | SYSTOLIC BLOOD PRESSURE: 148 MMHG | DIASTOLIC BLOOD PRESSURE: 82 MMHG | TEMPERATURE: 98.2 F

## 2020-10-16 DIAGNOSIS — I10 ESSENTIAL HYPERTENSION: ICD-10-CM

## 2020-10-16 DIAGNOSIS — E87.1 HYPONATREMIA: Primary | ICD-10-CM

## 2020-10-16 PROCEDURE — 3077F SYST BP >= 140 MM HG: CPT | Performed by: INTERNAL MEDICINE

## 2020-10-16 PROCEDURE — 1160F RVW MEDS BY RX/DR IN RCRD: CPT | Performed by: INTERNAL MEDICINE

## 2020-10-16 PROCEDURE — 3079F DIAST BP 80-89 MM HG: CPT | Performed by: INTERNAL MEDICINE

## 2020-10-16 PROCEDURE — 1036F TOBACCO NON-USER: CPT | Performed by: INTERNAL MEDICINE

## 2020-10-16 PROCEDURE — 99214 OFFICE O/P EST MOD 30 MIN: CPT | Performed by: INTERNAL MEDICINE

## 2020-12-10 DIAGNOSIS — F32.A DEPRESSION, UNSPECIFIED DEPRESSION TYPE: ICD-10-CM

## 2020-12-11 RX ORDER — ESCITALOPRAM OXALATE 10 MG/1
TABLET ORAL
Qty: 90 TABLET | Refills: 0 | OUTPATIENT
Start: 2020-12-11

## 2020-12-24 LAB
BUN SERPL-MCNC: 10 MG/DL (ref 8–27)
BUN/CREAT SERPL: 13 (ref 10–24)
CALCIUM SERPL-MCNC: 9.2 MG/DL (ref 8.6–10.2)
CHLORIDE SERPL-SCNC: 102 MMOL/L (ref 96–106)
CO2 SERPL-SCNC: 26 MMOL/L (ref 20–29)
CREAT SERPL-MCNC: 0.78 MG/DL (ref 0.76–1.27)
GLUCOSE SERPL-MCNC: 97 MG/DL (ref 65–99)
OSMOLALITY SERPL: 286 MOSMOL/KG (ref 280–301)
POTASSIUM SERPL-SCNC: 4.6 MMOL/L (ref 3.5–5.2)
SL AMB EGFR AFRICAN AMERICAN: 99 ML/MIN/1.73
SL AMB EGFR NON AFRICAN AMERICAN: 86 ML/MIN/1.73
SODIUM 24H UR-SCNC: 144 MMOL/L
SODIUM SERPL-SCNC: 139 MMOL/L (ref 134–144)
URATE SERPL-MCNC: 4 MG/DL (ref 3.8–8.4)

## 2020-12-28 ENCOUNTER — TELEPHONE (OUTPATIENT)
Dept: NEPHROLOGY | Facility: CLINIC | Age: 80
End: 2020-12-28

## 2020-12-30 DIAGNOSIS — S02.92XA CLOSED EXTENSIVE FACIAL FRACTURES (HCC): ICD-10-CM

## 2020-12-31 RX ORDER — ESCITALOPRAM OXALATE 20 MG/1
20 TABLET ORAL DAILY
Qty: 90 TABLET | Refills: 0 | Status: SHIPPED | OUTPATIENT
Start: 2020-12-31 | End: 2021-11-22 | Stop reason: SDUPTHER

## 2021-02-10 ENCOUNTER — OFFICE VISIT (OUTPATIENT)
Dept: CARDIOLOGY CLINIC | Facility: CLINIC | Age: 81
End: 2021-02-10
Payer: COMMERCIAL

## 2021-02-10 VITALS
BODY MASS INDEX: 24.33 KG/M2 | DIASTOLIC BLOOD PRESSURE: 68 MMHG | WEIGHT: 155 LBS | HEIGHT: 67 IN | SYSTOLIC BLOOD PRESSURE: 130 MMHG | HEART RATE: 51 BPM

## 2021-02-10 DIAGNOSIS — I25.810 CORONARY ARTERY DISEASE INVOLVING AUTOLOGOUS VEIN CORONARY BYPASS GRAFT WITHOUT ANGINA PECTORIS: ICD-10-CM

## 2021-02-10 DIAGNOSIS — E78.5 HYPERLIPIDEMIA, UNSPECIFIED HYPERLIPIDEMIA TYPE: ICD-10-CM

## 2021-02-10 DIAGNOSIS — I25.10 ASCVD (ARTERIOSCLEROTIC CARDIOVASCULAR DISEASE): ICD-10-CM

## 2021-02-10 DIAGNOSIS — I65.02 STENOSIS OF LEFT VERTEBRAL ARTERY: ICD-10-CM

## 2021-02-10 DIAGNOSIS — I10 ESSENTIAL HYPERTENSION: Primary | ICD-10-CM

## 2021-02-10 DIAGNOSIS — I65.23 CAROTID STENOSIS, ASYMPTOMATIC, BILATERAL: ICD-10-CM

## 2021-02-10 DIAGNOSIS — Z86.73 HISTORY OF CVA (CEREBROVASCULAR ACCIDENT): ICD-10-CM

## 2021-02-10 PROCEDURE — 3078F DIAST BP <80 MM HG: CPT | Performed by: INTERNAL MEDICINE

## 2021-02-10 PROCEDURE — 99213 OFFICE O/P EST LOW 20 MIN: CPT | Performed by: INTERNAL MEDICINE

## 2021-02-10 PROCEDURE — 3075F SYST BP GE 130 - 139MM HG: CPT | Performed by: INTERNAL MEDICINE
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
## 2021-02-12 ENCOUNTER — TELEPHONE (OUTPATIENT)
Dept: FAMILY MEDICINE CLINIC | Facility: HOSPITAL | Age: 81
End: 2021-02-12

## 2021-02-12 DIAGNOSIS — Z23 ENCOUNTER FOR IMMUNIZATION: ICD-10-CM

## 2021-02-12 NOTE — TELEPHONE ENCOUNTER
----- Message from Leigha Payan sent at 2/12/2021  2:20 PM EST -----  Regarding: FW: Patient concern  Please try to reach [facility] in follow up to this message  He will need an appt if he is continuing with symptoms as cardiology noted    ----- Message -----  From: Meño Hand MA  Sent: 2/10/2021  11:14 AM EST  To: ELE Payan  Subject: Patient concern                                  Good Morning Dr Mike Landaverde asked us to reach out to your office regarding [facility]  He was in for a visit today and seemed very confused  Dr Mike Crews was concerned that we were unable to verify Carlson's medications, he seemed to have difficulty understanding us, and at the end of the visit he didn't remember that he had just seen the doctor  We did reach out to his emergency contact Frank Frost however she lives in [place] and was not able to verify his medications for us or check in on him  Dr Mike Crews was asking if your office would be able to see [facility] in person as soon as possible to evaluate his situation?   Thank you very much,  [address]

## 2021-02-12 NOTE — TELEPHONE ENCOUNTER
Call placed to patient, attempted to schedule appt for next week with Andreia Newton  He became very agitated and stated he already has an appt on 2/23 with Andreia Aman and he is not changing it to come in any earlier

## 2021-02-16 ENCOUNTER — TELEPHONE (OUTPATIENT)
Dept: FAMILY MEDICINE CLINIC | Facility: HOSPITAL | Age: 81
End: 2021-02-16
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
## 2021-02-19 ENCOUNTER — TELEPHONE (OUTPATIENT)
Dept: FAMILY MEDICINE CLINIC | Facility: HOSPITAL | Age: 81
End: 2021-02-19
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
## 2021-02-23 ENCOUNTER — OFFICE VISIT (OUTPATIENT)
Dept: FAMILY MEDICINE CLINIC | Facility: HOSPITAL | Age: 81
End: 2021-02-23
Payer: COMMERCIAL

## 2021-02-23 ENCOUNTER — PATIENT OUTREACH (OUTPATIENT)
Dept: FAMILY MEDICINE CLINIC | Facility: HOSPITAL | Age: 81
End: 2021-02-23

## 2021-02-23 VITALS
BODY MASS INDEX: 25.18 KG/M2 | HEIGHT: 67 IN | OXYGEN SATURATION: 98 % | TEMPERATURE: 98.4 F | HEART RATE: 58 BPM | SYSTOLIC BLOOD PRESSURE: 162 MMHG | DIASTOLIC BLOOD PRESSURE: 80 MMHG | WEIGHT: 160.4 LBS

## 2021-02-23 DIAGNOSIS — Z71.89 COMPLEX CARE COORDINATION: Primary | ICD-10-CM

## 2021-02-23 DIAGNOSIS — L60.2 OVERGROWN TOENAILS: ICD-10-CM

## 2021-02-23 DIAGNOSIS — I10 ESSENTIAL HYPERTENSION: Primary | ICD-10-CM

## 2021-02-23 DIAGNOSIS — G45.9 TIA (TRANSIENT ISCHEMIC ATTACK): ICD-10-CM

## 2021-02-23 DIAGNOSIS — E78.5 HYPERLIPIDEMIA, UNSPECIFIED HYPERLIPIDEMIA TYPE: ICD-10-CM

## 2021-02-23 DIAGNOSIS — F33.9 DEPRESSION, RECURRENT (HCC): ICD-10-CM

## 2021-02-23 DIAGNOSIS — I73.9 PERIPHERAL ARTERIAL DISEASE (HCC): ICD-10-CM

## 2021-02-23 DIAGNOSIS — F10.29 ALCOHOL DEPENDENCE WITH UNSPECIFIED ALCOHOL-INDUCED DISORDER (HCC): ICD-10-CM

## 2021-02-23 DIAGNOSIS — R53.81 PHYSICAL DECONDITIONING: ICD-10-CM

## 2021-02-23 PROBLEM — S02.92XA CLOSED EXTENSIVE FACIAL FRACTURES (HCC): Status: RESOLVED | Noted: 2020-09-05 | Resolved: 2021-02-23

## 2021-02-23 PROBLEM — M48.54XA COMPRESSION FRACTURE OF THORACIC SPINE, NON-TRAUMATIC (HCC): Status: RESOLVED | Noted: 2017-05-02 | Resolved: 2021-02-23

## 2021-02-23 PROBLEM — R42 DIZZY: Status: RESOLVED | Noted: 2019-09-18 | Resolved: 2021-02-23

## 2021-02-23 PROBLEM — M48.50XA VERTEBRAL COMPRESSION FRACTURE (HCC): Status: RESOLVED | Noted: 2017-04-13 | Resolved: 2021-02-23

## 2021-02-23 PROCEDURE — 1036F TOBACCO NON-USER: CPT | Performed by: NURSE PRACTITIONER

## 2021-02-23 PROCEDURE — 1160F RVW MEDS BY RX/DR IN RCRD: CPT | Performed by: NURSE PRACTITIONER

## 2021-02-23 PROCEDURE — 3725F SCREEN DEPRESSION PERFORMED: CPT | Performed by: NURSE PRACTITIONER

## 2021-02-23 PROCEDURE — 99215 OFFICE O/P EST HI 40 MIN: CPT | Performed by: NURSE PRACTITIONER

## 2021-02-23 RX ORDER — LISINOPRIL AND HYDROCHLOROTHIAZIDE 12.5; 1 MG/1; MG/1
1 TABLET ORAL DAILY
Qty: 90 TABLET | Refills: 1 | Status: SHIPPED | OUTPATIENT
Start: 2021-02-23 | End: 2021-04-15

## 2021-02-23 NOTE — ASSESSMENT & PLAN NOTE
PHQ score elevated at 9 given increase in situational stress  Continue same lexapro dose as rx'd  Return in 3 months for next med check
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
## 2021-02-23 NOTE — ASSESSMENT & PLAN NOTE
Clinically stable but w/residual sx's (RLE weakness, slurred speech)  Pt agreeable to assistance from /social work (Zari Kirkland spoke w/patient while in office today)  Would benefit from in home PT services

## 2021-02-23 NOTE — ASSESSMENT & PLAN NOTE
BP elevated today (he attributes to situational stress)  Rx issued to start daily lisinopril/HCTZ (was previously on lisinopril w/good tolerance/benefit)  Return in 3 months for next BP check

## 2021-02-24 ENCOUNTER — PATIENT OUTREACH (OUTPATIENT)
Dept: FAMILY MEDICINE CLINIC | Facility: HOSPITAL | Age: 81
End: 2021-02-24

## 2021-02-24 NOTE — PROGRESS NOTES
Referral for social work outreach  SW spoke with RN SUDHA Hutchins about this pt, reviewed chart and social determinants of health  SW called pt  Reached voicemail and left message introducing self, and requested call back  Will continue to attempt to reach pt

## 2021-02-25 ENCOUNTER — PATIENT OUTREACH (OUTPATIENT)
Dept: FAMILY MEDICINE CLINIC | Facility: HOSPITAL | Age: 81
End: 2021-02-25

## 2021-03-01 ENCOUNTER — TELEPHONE (OUTPATIENT)
Dept: FAMILY MEDICINE CLINIC | Facility: HOSPITAL | Age: 81
End: 2021-03-01

## 2021-03-01 ENCOUNTER — PATIENT OUTREACH (OUTPATIENT)
Dept: FAMILY MEDICINE CLINIC | Facility: HOSPITAL | Age: 81
End: 2021-03-01
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
## 2021-03-02 ENCOUNTER — PATIENT OUTREACH (OUTPATIENT)
Dept: FAMILY MEDICINE CLINIC | Facility: HOSPITAL | Age: 81
End: 2021-03-02

## 2021-03-02 NOTE — PROGRESS NOTES
SW reached out to pt's PCP to discuss case in regards to pt's concerns with significant other  A referral was then place for this SW to contact pt's significant other  SW received a voicemail from pt's sister Vanessa  Vanessa said she spoke with pt yesterday about pt's concerns for his significant other, how he feels sad for her but still does want her out of the home, and wants her to be better  Vanessa said this SW does not need to call her back, she just wanted to give this SW an update on what is going on      SW called and spoke with pt  KEVIN informed pt that SW reached out to his significant other, in attempts to speak with her to further assist in resolving the situation he is in (wanting her out of the house)  Pt said he just wants his computer back  He said if he does not get It he will contact the police  KEVIN reiterated that SW is trying to assist both of them and hopes to hear back from his significant other  SW will contact pt with updates

## 2021-03-03 ENCOUNTER — PATIENT OUTREACH (OUTPATIENT)
Dept: FAMILY MEDICINE CLINIC | Facility: HOSPITAL | Age: 81
End: 2021-03-03
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
## 2021-03-04 ENCOUNTER — PATIENT OUTREACH (OUTPATIENT)
Dept: FAMILY MEDICINE CLINIC | Facility: HOSPITAL | Age: 81
End: 2021-03-04
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
## 2021-03-05 ENCOUNTER — PATIENT OUTREACH (OUTPATIENT)
Dept: FAMILY MEDICINE CLINIC | Facility: HOSPITAL | Age: 81
End: 2021-03-05
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
## 2021-03-09 ENCOUNTER — PATIENT OUTREACH (OUTPATIENT)
Dept: FAMILY MEDICINE CLINIC | Facility: HOSPITAL | Age: 81
End: 2021-03-09

## 2021-03-09 DIAGNOSIS — I10 ESSENTIAL HYPERTENSION: ICD-10-CM

## 2021-03-09 DIAGNOSIS — G45.9 TIA (TRANSIENT ISCHEMIC ATTACK): ICD-10-CM

## 2021-03-09 DIAGNOSIS — K21.9 GASTROESOPHAGEAL REFLUX DISEASE WITHOUT ESOPHAGITIS: ICD-10-CM

## 2021-03-09 DIAGNOSIS — S02.92XA CLOSED EXTENSIVE FACIAL FRACTURES (HCC): ICD-10-CM

## 2021-03-09 RX ORDER — CLOPIDOGREL BISULFATE 75 MG/1
TABLET ORAL
Qty: 90 TABLET | Refills: 1 | Status: SHIPPED | OUTPATIENT
Start: 2021-03-09 | End: 2021-06-11

## 2021-03-09 RX ORDER — AMLODIPINE BESYLATE 2.5 MG/1
TABLET ORAL
Qty: 90 TABLET | Refills: 0 | Status: SHIPPED | OUTPATIENT
Start: 2021-03-09 | End: 2021-06-09

## 2021-03-09 RX ORDER — PANTOPRAZOLE SODIUM 40 MG/1
TABLET, DELAYED RELEASE ORAL
Qty: 90 TABLET | Refills: 0 | Status: SHIPPED | OUTPATIENT
Start: 2021-03-09 | End: 2021-06-09

## 2021-03-09 RX ORDER — ATORVASTATIN CALCIUM 40 MG/1
TABLET, FILM COATED ORAL
Qty: 90 TABLET | Refills: 1 | Status: SHIPPED | OUTPATIENT
Start: 2021-03-09 | End: 2021-06-11

## 2021-03-09 RX ORDER — CARVEDILOL 12.5 MG/1
TABLET ORAL
Qty: 180 TABLET | Refills: 1 | Status: SHIPPED | OUTPATIENT
Start: 2021-03-09 | End: 2021-06-11

## 2021-03-09 NOTE — PROGRESS NOTES
KEVIN received voicemail from pt's sister Vanessa, explaining she has concerns for pt due to the difficulties between pt and his girlfriend  SW called and spoke with Vanessa at length  Vanessa explained that she is frustrated with pt not having the support that she thinks he needs, in regards to difficulties with his girlfriend  Vanessa has been in touch with pt's girlfriend and described her as being mentally unwell  She is concerned about how this is affecting pt  KEVIN informed Vanessa that KEVIN has been working with multiple parties in trying to help their situation and help to resolve matters  KEVIN provided some counseling for Vanessa as well, as she has many concerns for her brother, specifically with his drinking  Vanessa stated she has a counselor she is speaking with  SW supported Vanessa in her efforts in helping herself, pointing out that there is only so much she can do about this, especially being that she lives so far away  KEVIN informed Vanessa that SW will touch base with Vanessa later this week, and will reach out to pt  Vanessa was appreciative of the help and is aware she can call with any further questions or concerns  Color consistent with ethnicity/race, warm, dry intact, resilient.

## 2021-03-10 DIAGNOSIS — I10 ESSENTIAL HYPERTENSION: ICD-10-CM

## 2021-03-10 DIAGNOSIS — K21.9 GASTROESOPHAGEAL REFLUX DISEASE WITHOUT ESOPHAGITIS: ICD-10-CM

## 2021-03-10 DIAGNOSIS — G45.9 TIA (TRANSIENT ISCHEMIC ATTACK): ICD-10-CM

## 2021-03-10 RX ORDER — PANTOPRAZOLE SODIUM 40 MG/1
TABLET, DELAYED RELEASE ORAL
Qty: 90 TABLET | Refills: 0 | OUTPATIENT
Start: 2021-03-10

## 2021-03-12 ENCOUNTER — PATIENT OUTREACH (OUTPATIENT)
Dept: FAMILY MEDICINE CLINIC | Facility: HOSPITAL | Age: 81
End: 2021-03-12

## 2021-03-12 RX ORDER — AMLODIPINE BESYLATE 2.5 MG/1
TABLET ORAL
Qty: 90 TABLET | Refills: 0 | OUTPATIENT
Start: 2021-03-12

## 2021-03-12 RX ORDER — PANTOPRAZOLE SODIUM 40 MG/1
TABLET, DELAYED RELEASE ORAL
Qty: 90 TABLET | Refills: 0 | OUTPATIENT
Start: 2021-03-12

## 2021-03-12 RX ORDER — CLOPIDOGREL BISULFATE 75 MG/1
TABLET ORAL
Qty: 90 TABLET | Refills: 0 | OUTPATIENT
Start: 2021-03-12
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
## 2021-03-18 ENCOUNTER — PATIENT OUTREACH (OUTPATIENT)
Dept: FAMILY MEDICINE CLINIC | Facility: HOSPITAL | Age: 81
End: 2021-03-18

## 2021-03-18 NOTE — PROGRESS NOTES
LATE NOTE:    KEVIN received inbasket message from RN SUDHA Gomez explaining pt's Leia Bautista informed her pt's girlfriend was admitted to [facility]  Jacqui Claros "Angela" [address] and was wondering if SW can contact pt to discuss plan following pt's discharge  KEVIN sent inbasket to Corinne Doyle explaining pt's girlfriend has CM at [facility] that will work with girlfirend on discharge plan, and KEVIN will keep Corinne Doyle updated  KEVIN relayed this to Clarisse, adding that SW is following pt and pt's girlfriend and the discharge plan will be addressed  Per last discussion with pt, pt was made aware that he may reach out to this SW with any questions or concerns  SW will continue to communicate with pt's NP Corinne Doyle

## 2021-03-19 ENCOUNTER — PATIENT OUTREACH (OUTPATIENT)
Dept: FAMILY MEDICINE CLINIC | Facility: HOSPITAL | Age: 81
End: 2021-03-19

## 2021-03-19 NOTE — PROGRESS NOTES
Pt is on surveillance episode, so SW will follow up with pt within the month to inquire as to whether he has any further goals or needs assistance  Pt care plan goal of desire to have his girlfriend move of his home has been accomplished, as pt was admitted to [facility]   Pt is aware he can contact this SW if needed

## 2021-03-22 ENCOUNTER — PATIENT OUTREACH (OUTPATIENT)
Dept: FAMILY MEDICINE CLINIC | Facility: HOSPITAL | Age: 81
End: 2021-03-22

## 2021-03-22 NOTE — PROGRESS NOTES
KEVIN marked note as "sensitive" as this note contains information about pt's significant other, and this SW is also working with pt's significant other  KEVIN reached out to pt to see how he is doing, as his significant other is not in the home anymore, and KEVIN wanted to see if pt had any further needs  Pt was difficult to understand; he may have been drinking, his speech was garbled  SW was made aware that pt's significant other will be discharged home this week and she wants to return to this pt's home where she was living  KEVIN asked pt how he is doing, as his significant other is no longer living with him  Pt replied, "I'm here she's there  In the hospital"  KEVIN asked pt if he had spoken to his significant other  He said he heard from some lady that said pt will be returning home and they're sending a visiting nurse  SW asked pt how he felt about this, as he originally did not want pt living there  Pt then asked SW, "Are you trying to confuse me 'александр?"  KEVIN explained to pt that that is not the case, but KEVIN is inquiring as to whether or not pt will need assistance, if his significant other is returning back to his home  Pt said, "I don't want nothing" and said that his significant other can live elsewhere if she wants to  SW offered to be of assistance if he needs it and offered to follow up with him within the next week  KEVIN told pt he may call KEVIN, and asked if pt has this KEVIN phone number  Pt said he does not, and said, "I don't need any help"  KEVIN will keep pt under surveillance episode  [facility]

## 2021-03-29 ENCOUNTER — TELEPHONE (OUTPATIENT)
Dept: NEPHROLOGY | Facility: CLINIC | Age: 81
End: 2021-03-29

## 2021-03-29 DIAGNOSIS — I10 ESSENTIAL HYPERTENSION: Primary | ICD-10-CM

## 2021-03-29 DIAGNOSIS — E87.1 HYPONATREMIA: ICD-10-CM

## 2021-03-29 NOTE — TELEPHONE ENCOUNTER
I spoke to the patient, he needed to change his appointment to an earlier time  He stated he is aware he needs to have lab work done and I faxed the orders to [facility] in [place]

## 2021-04-05 ENCOUNTER — PATIENT OUTREACH (OUTPATIENT)
Dept: FAMILY MEDICINE CLINIC | Facility: HOSPITAL | Age: 81
End: 2021-04-05
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
## 2021-04-06 ENCOUNTER — TELEPHONE (OUTPATIENT)
Dept: NEPHROLOGY | Facility: CLINIC | Age: 81
End: 2021-04-06

## 2021-04-10 LAB
ALBUMIN SERPL-MCNC: 4.3 G/DL (ref 3.7–4.7)
ALBUMIN/GLOB SERPL: 1.7 {RATIO} (ref 1.2–2.2)
ALP SERPL-CCNC: 62 IU/L (ref 39–117)
ALT SERPL-CCNC: 13 IU/L (ref 0–44)
AST SERPL-CCNC: 20 IU/L (ref 0–40)
BASOPHILS # BLD AUTO: 0 X10E3/UL (ref 0–0.2)
BASOPHILS NFR BLD AUTO: 1 %
BILIRUB SERPL-MCNC: 0.9 MG/DL (ref 0–1.2)
BUN SERPL-MCNC: 18 MG/DL (ref 8–27)
BUN SERPL-MCNC: 19 MG/DL (ref 8–27)
BUN/CREAT SERPL: 24 (ref 10–24)
BUN/CREAT SERPL: 25 (ref 10–24)
CALCIUM SERPL-MCNC: 9.1 MG/DL (ref 8.6–10.2)
CALCIUM SERPL-MCNC: 9.3 MG/DL (ref 8.6–10.2)
CHLORIDE SERPL-SCNC: 94 MMOL/L (ref 96–106)
CHLORIDE SERPL-SCNC: 97 MMOL/L (ref 96–106)
CHOLEST SERPL-MCNC: 137 MG/DL (ref 100–199)
CHOLEST/HDLC SERPL: 1.9 RATIO (ref 0–5)
CO2 SERPL-SCNC: 28 MMOL/L (ref 20–29)
CO2 SERPL-SCNC: 28 MMOL/L (ref 20–29)
CREAT SERPL-MCNC: 0.75 MG/DL (ref 0.76–1.27)
CREAT SERPL-MCNC: 0.76 MG/DL (ref 0.76–1.27)
EOSINOPHIL # BLD AUTO: 0.3 X10E3/UL (ref 0–0.4)
EOSINOPHIL NFR BLD AUTO: 7 %
ERYTHROCYTE [DISTWIDTH] IN BLOOD BY AUTOMATED COUNT: 13 % (ref 11.6–15.4)
GLOBULIN SER-MCNC: 2.6 G/DL (ref 1.5–4.5)
GLUCOSE SERPL-MCNC: 103 MG/DL (ref 65–99)
GLUCOSE SERPL-MCNC: 104 MG/DL (ref 65–99)
HCT VFR BLD AUTO: 41.2 % (ref 37.5–51)
HDLC SERPL-MCNC: 71 MG/DL
HGB BLD-MCNC: 13.7 G/DL (ref 13–17.7)
IMM GRANULOCYTES # BLD: 0 X10E3/UL (ref 0–0.1)
IMM GRANULOCYTES NFR BLD: 0 %
LDLC SERPL CALC-MCNC: 55 MG/DL (ref 0–99)
LYMPHOCYTES # BLD AUTO: 1 X10E3/UL (ref 0.7–3.1)
LYMPHOCYTES NFR BLD AUTO: 21 %
MAGNESIUM SERPL-MCNC: 2.2 MG/DL (ref 1.6–2.3)
MCH RBC QN AUTO: 29.2 PG (ref 26.6–33)
MCHC RBC AUTO-ENTMCNC: 33.3 G/DL (ref 31.5–35.7)
MCV RBC AUTO: 88 FL (ref 79–97)
MONOCYTES # BLD AUTO: 0.5 X10E3/UL (ref 0.1–0.9)
MONOCYTES NFR BLD AUTO: 11 %
NEUTROPHILS # BLD AUTO: 2.9 X10E3/UL (ref 1.4–7)
NEUTROPHILS NFR BLD AUTO: 60 %
PHOSPHATE SERPL-MCNC: 3.4 MG/DL (ref 2.8–4.1)
PLATELET # BLD AUTO: 240 X10E3/UL (ref 150–450)
POTASSIUM SERPL-SCNC: 4.8 MMOL/L (ref 3.5–5.2)
POTASSIUM SERPL-SCNC: 4.9 MMOL/L (ref 3.5–5.2)
PROT SERPL-MCNC: 6.9 G/DL (ref 6–8.5)
RBC # BLD AUTO: 4.69 X10E6/UL (ref 4.14–5.8)
SL AMB EGFR AFRICAN AMERICAN: 100 ML/MIN/1.73
SL AMB EGFR AFRICAN AMERICAN: 101 ML/MIN/1.73
SL AMB EGFR NON AFRICAN AMERICAN: 87 ML/MIN/1.73
SL AMB EGFR NON AFRICAN AMERICAN: 87 ML/MIN/1.73
SL AMB VLDL CHOLESTEROL CALC: 11 MG/DL (ref 5–40)
SODIUM SERPL-SCNC: 134 MMOL/L (ref 134–144)
SODIUM SERPL-SCNC: 135 MMOL/L (ref 134–144)
TRIGL SERPL-MCNC: 50 MG/DL (ref 0–149)
TSH SERPL DL<=0.005 MIU/L-ACNC: 1.27 UIU/ML (ref 0.45–4.5)
WBC # BLD AUTO: 4.8 X10E3/UL (ref 3.4–10.8)

## 2021-04-15 ENCOUNTER — OFFICE VISIT (OUTPATIENT)
Dept: NEPHROLOGY | Facility: HOSPITAL | Age: 81
End: 2021-04-15
Payer: COMMERCIAL

## 2021-04-15 VITALS
HEIGHT: 67 IN | SYSTOLIC BLOOD PRESSURE: 148 MMHG | WEIGHT: 154 LBS | DIASTOLIC BLOOD PRESSURE: 80 MMHG | HEART RATE: 56 BPM | RESPIRATION RATE: 16 BRPM | BODY MASS INDEX: 24.17 KG/M2

## 2021-04-15 DIAGNOSIS — I10 ESSENTIAL HYPERTENSION: Primary | ICD-10-CM

## 2021-04-15 DIAGNOSIS — E87.1 HYPONATREMIA: ICD-10-CM

## 2021-04-15 DIAGNOSIS — F10.10 ALCOHOL ABUSE: ICD-10-CM

## 2021-04-15 PROCEDURE — 3077F SYST BP >= 140 MM HG: CPT | Performed by: INTERNAL MEDICINE

## 2021-04-15 PROCEDURE — 99214 OFFICE O/P EST MOD 30 MIN: CPT | Performed by: INTERNAL MEDICINE

## 2021-04-15 PROCEDURE — 1036F TOBACCO NON-USER: CPT | Performed by: INTERNAL MEDICINE

## 2021-04-15 PROCEDURE — 1160F RVW MEDS BY RX/DR IN RCRD: CPT | Performed by: INTERNAL MEDICINE

## 2021-04-15 PROCEDURE — 3079F DIAST BP 80-89 MM HG: CPT | Performed by: INTERNAL MEDICINE

## 2021-04-15 RX ORDER — LISINOPRIL 10 MG/1
10 TABLET ORAL DAILY
Qty: 90 TABLET | Refills: 3 | Status: SHIPPED | OUTPATIENT
Start: 2021-04-15 | End: 2021-11-17 | Stop reason: SDUPTHER

## 2021-04-15 RX ORDER — FUROSEMIDE 20 MG/1
20 TABLET ORAL DAILY
Qty: 90 TABLET | Refills: 3 | Status: SHIPPED | OUTPATIENT
Start: 2021-04-15 | End: 2021-11-12 | Stop reason: ALTCHOICE

## 2021-04-15 RX ORDER — COVID-19 ANTIGEN TEST
220 KIT MISCELLANEOUS DAILY PRN
COMMUNITY
End: 2021-04-15

## 2021-04-15 NOTE — PATIENT INSTRUCTIONS
Hyponatremia   WHAT YOU NEED TO KNOW:   Hyponatremia occurs when the amount of sodium (salt) in your blood is lower than normal  Sodium is an electrolyte (mineral) that helps your muscles, heart, and digestive system work properly  It helps control blood pressure and fluid balance  WHILE YOU ARE HERE:   Informed consent  is a legal document that explains the tests, treatments, or procedures that you may need  Informed consent means you understand what will be done and can make decisions about what you want  You give your permission when you sign the consent form  You can have someone sign this form for you if you are not able to sign it  You have the right to understand your medical care in words you know  Before you sign the consent form, understand the risks and benefits of what will be done  Make sure all your questions are answered  An IV  is a small tube placed in your vein that is used to give you medicine or liquids  Telemetry  is continuous monitoring of your heart rhythm  Sticky pads placed on your skin connect to an EKG machine that records your heart rhythm  Nutrition:  A dietitian may talk to you about foods you should eat to increase the amount of sodium in your body  He may also plan a diet for you if you have other diseases, such as kidney or liver disease  Liquids: Follow your healthcare provider's advice if you must limit the amount of liquid you drink  Healthcare providers may limit the amount of plain water you drink if you are retaining water  Plain water can lower the sodium in your blood and make your hyponatremia worse  You may be given liquids that have water, sugar, and salt, such as juices, milk, or sports drinks  Medicines:   · Anticonvulsant medicine  is given to control seizures  · Antinausea medicine  helps calm your stomach and prevents vomiting  · Diuretics  help get rid of extra fluid in your body   You may urinate more often when taking diuretics  · Sodium-retaining medicines  help get rid of extra fluid in your body while sodium stays inside your body  Tests:   · Blood tests  will be done to check the level of sodium in your blood  They may also be done to find the cause of your hyponatremia  · Urine sodium  is a test that checks the level of sodium in your urine  A sample of your urine is collected and is sent to a lab for tests  · A chest x-ray  is used to check your heart and lung function  Healthcare providers may use the x-ray to look for the cause of your hyponatremia  Treatment:  Dialysis may be needed if medicines cannot decrease extra water in your body and your kidneys are not working well  RISKS:   Treatment for hyponatremia may cause unpleasant side effects  Medicines may cause nausea, vomiting, low blood pressure, trouble breathing, fever, or a skin rash  Without treatment, hyponatremia may cause serious problems, such as brain swelling, heart problems, or a coma  These problems can be life-threatening  CARE AGREEMENT:   You have the right to help plan your care  Learn about your health condition and how it may be treated  Discuss treatment options with your healthcare providers to decide what care you want to receive  You always have the right to refuse treatment  © Copyright [address] Information is for End User's use only and may not be sold, redistributed or otherwise used for commercial purposes  All illustrations and images included in CareNotes® are the copyrighted property of A D [employer] M [employer]  or Jason Barajas  The above information is an  only  It is not intended as medical advice for individual conditions or treatments  Talk to your doctor, nurse or pharmacist before following any medical regimen to see if it is safe and effective for you
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
## 2021-04-15 NOTE — Clinical Note
Korey Muhammad,    I saw Neena Cartagena today  I stopped his HCTZ, sodium was ok but trended down and he has high beer/fluid intake so susceptible to getting a low sodium  I start low dose lasix which hopefully will help with keeping his sodium stable and with bp, volume and have repeat bmp in 1 month  I think you are seeing him in May, we can always uptitrate ACEi or lasix if needed  Thanks so much!

## 2021-04-22 ENCOUNTER — PATIENT OUTREACH (OUTPATIENT)
Dept: FAMILY MEDICINE CLINIC | Facility: HOSPITAL | Age: 81
End: 2021-04-22

## 2021-04-22 NOTE — PROGRESS NOTES
SW called pt, asked him how he was doing  Pt had no concerns  SW asked pt if he heard from his significant other, but he has not  Reviewed pt chart and recent encounters  With no SW goals or tasks in place any longer, SW will close SW surveillance episode and remove self from care team      Will remain available if needed

## 2021-05-16 LAB
BUN SERPL-MCNC: 21 MG/DL (ref 8–27)
BUN/CREAT SERPL: 24 (ref 10–24)
CALCIUM SERPL-MCNC: 9.4 MG/DL (ref 8.6–10.2)
CHLORIDE SERPL-SCNC: 93 MMOL/L (ref 96–106)
CO2 SERPL-SCNC: 27 MMOL/L (ref 20–29)
CREAT SERPL-MCNC: 0.86 MG/DL (ref 0.76–1.27)
GLUCOSE SERPL-MCNC: 98 MG/DL (ref 65–99)
OSMOLALITY SERPL: 274 MOSMOL/KG (ref 280–301)
OSMOLALITY UR: 214 MOSMOL/KG
POTASSIUM SERPL-SCNC: 4.9 MMOL/L (ref 3.5–5.2)
SL AMB EGFR AFRICAN AMERICAN: 95 ML/MIN/1.73
SL AMB EGFR NON AFRICAN AMERICAN: 82 ML/MIN/1.73
SODIUM SERPL-SCNC: 134 MMOL/L (ref 134–144)
URATE SERPL-MCNC: 6.2 MG/DL (ref 3.8–8.4)

## 2021-05-18 ENCOUNTER — TELEPHONE (OUTPATIENT)
Dept: NEPHROLOGY | Facility: HOSPITAL | Age: 81
End: 2021-05-18

## 2021-05-18 NOTE — TELEPHONE ENCOUNTER
----- Message from Karissa Nassar DO sent at 5/17/2021  3:56 PM EDT -----  Sodium is stable on current regimen would continue    No changes from a renal standpoint

## 2021-05-25 ENCOUNTER — OFFICE VISIT (OUTPATIENT)
Dept: FAMILY MEDICINE CLINIC | Facility: HOSPITAL | Age: 81
End: 2021-05-25
Payer: COMMERCIAL

## 2021-05-25 ENCOUNTER — PATIENT OUTREACH (OUTPATIENT)
Dept: FAMILY MEDICINE CLINIC | Facility: HOSPITAL | Age: 81
End: 2021-05-25

## 2021-05-25 VITALS
OXYGEN SATURATION: 98 % | DIASTOLIC BLOOD PRESSURE: 60 MMHG | HEIGHT: 67 IN | TEMPERATURE: 97.5 F | SYSTOLIC BLOOD PRESSURE: 112 MMHG | BODY MASS INDEX: 24.11 KG/M2 | WEIGHT: 153.6 LBS | HEART RATE: 51 BPM

## 2021-05-25 DIAGNOSIS — I10 ESSENTIAL HYPERTENSION: Primary | ICD-10-CM

## 2021-05-25 DIAGNOSIS — F10.29 ALCOHOL DEPENDENCE WITH UNSPECIFIED ALCOHOL-INDUCED DISORDER (HCC): ICD-10-CM

## 2021-05-25 DIAGNOSIS — I25.10 ASCVD (ARTERIOSCLEROTIC CARDIOVASCULAR DISEASE): ICD-10-CM

## 2021-05-25 DIAGNOSIS — E87.1 HYPONATREMIA: ICD-10-CM

## 2021-05-25 DIAGNOSIS — E78.5 HYPERLIPIDEMIA, UNSPECIFIED HYPERLIPIDEMIA TYPE: ICD-10-CM

## 2021-05-25 PROBLEM — J69.0 ASPIRATION PNEUMONIA (HCC): Status: RESOLVED | Noted: 2020-05-15 | Resolved: 2021-05-25

## 2021-05-25 PROCEDURE — 99214 OFFICE O/P EST MOD 30 MIN: CPT | Performed by: NURSE PRACTITIONER

## 2021-05-25 PROCEDURE — 3078F DIAST BP <80 MM HG: CPT | Performed by: NURSE PRACTITIONER

## 2021-05-25 PROCEDURE — 1036F TOBACCO NON-USER: CPT | Performed by: NURSE PRACTITIONER

## 2021-05-25 PROCEDURE — 3074F SYST BP LT 130 MM HG: CPT | Performed by: NURSE PRACTITIONER

## 2021-05-25 PROCEDURE — 1160F RVW MEDS BY RX/DR IN RCRD: CPT | Performed by: NURSE PRACTITIONER

## 2021-05-25 PROCEDURE — 3725F SCREEN DEPRESSION PERFORMED: CPT | Performed by: NURSE PRACTITIONER

## 2021-05-25 NOTE — ASSESSMENT & PLAN NOTE
Adequate BP control w/meds as rx'd  Advise he continue same doses and return in 6 months for next BP check

## 2021-05-25 NOTE — PROGRESS NOTES
SW received voicemail from pt stating his significant other, Lorna Martínez, is home and he does not want her in the home  SW reviewed pt chart, as he stated he saw his PCP today  It is noted that pt drinks several beers daily to help cope with the stress of his significant other  SW called pt, reached voicemail  I left message explaining I received his voicemail and understand his concerns but as discussed many times, there is nothing that can be done to force Lorna Martínez out of his home unless he decides to makes her leave the home  I told pt he may call back if he wishes

## 2021-05-25 NOTE — PROGRESS NOTES
Assessment/Plan:    Essential hypertension  Adequate BP control w/meds as rx'd  Advise he continue same doses and return in 6 months for next BP check  ASCVD (arteriosclerotic cardiovascular disease)  Cardiology f/u w/Dr Terra MORAN  Alcohol dependency ([facility] [address] )  Discussed/recommend he minimize intake and find alternative means for stress management    Hyperlipidemia  FLP at goal on daily statin  Continue same dose and recheck FLP in 6 months before next OV  Hyponatremia  Recent recheck of BMP per nephrology (improved/normal) s/p dc of HCTZ  Continue same meds as rx'd and maintain f/u as planned  Diagnoses and all orders for this visit:    Essential hypertension  -     Comprehensive metabolic panel; Future  -     CBC and differential; Future  -     Comprehensive metabolic panel  -     CBC and differential    ASCVD (arteriosclerotic cardiovascular disease)    Alcohol dependence with unspecified alcohol-induced disorder (HCC)    Hyperlipidemia, unspecified hyperlipidemia type  -     Comprehensive metabolic panel; Future  -     Lipid panel; Future  -     CBC and differential; Future  -     Comprehensive metabolic panel  -     Lipid panel  -     CBC and differential    Hyponatremia  -     Comprehensive metabolic panel; Future  -     Comprehensive metabolic panel      Update AWV at next appt    Subjective:      Patient ID: Sarai Lao is a 79 y o  male  States he is doing OK  Has ongoing stress w/Lidya who lives with him  States "I can't get rid of her"  States "I'm a slave"  States she has been OK past 2 days  He drinks 4-5 beers a day depending on how mad she makes him  Denies concerns or questions  Saw Dr Valentina Chicas recently and meds were adjusted  States he is taking all as prescribed  Saw cardiology last in February         The following portions of the patient's history were reviewed and updated as appropriate: allergies, current medications, past family history, past medical history, past social history, past surgical history and problem list     Review of Systems   Constitutional: Negative  Respiratory: Negative  Cardiovascular: Negative  Psychiatric/Behavioral: Positive for dysphoric mood (due to situational stress)  The patient is nervous/anxious (due to situational stress)  Objective:      /60 (Patient Position: Sitting, Cuff Size: Standard)   Pulse (!) 51   Temp 97 5 °F (36 4 °C) (Tympanic)   Ht 5' 7" (1 702 m)   Wt 69 7 kg (153 lb 9 6 oz)   SpO2 98%   BMI 24 06 kg/m²          Physical Exam  Vitals signs reviewed  Constitutional:       General: He is not in acute distress  Appearance: Normal appearance  HENT:      Head: Normocephalic and atraumatic  Eyes:      General: No scleral icterus  Neck:      Thyroid: No thyromegaly  Vascular: No carotid bruit  Cardiovascular:      Rate and Rhythm: Normal rate and regular rhythm  Heart sounds: Murmur (2/6 JOI) present  Pulmonary:      Effort: Pulmonary effort is normal  No respiratory distress  Breath sounds: Normal breath sounds  Musculoskeletal:      Right lower leg: Edema (+1 non pitting) present  Left lower leg: Edema (+1 non pitting) present  Lymphadenopathy:      Cervical: No cervical adenopathy  Skin:     General: Skin is warm and dry  Neurological:      General: No focal deficit present  Mental Status: He is alert and oriented to person, place, and time  Psychiatric:         Attention and Perception: Attention normal          Mood and Affect: Mood is anxious  Speech: Speech is slurred (as per his usual)  Behavior: Behavior normal          Thought Content:  Thought content normal          Judgment: Judgment normal            PHQ-9 Depression Screening    PHQ-9:   Frequency of the following problems over the past two weeks:      Little interest or pleasure in doing things: 3 - nearly every day  Feeling down, depressed, or hopeless: 3 - nearly every day  Trouble falling or staying asleep, or sleeping too much: 0 - not at all  Feeling tired or having little energy: 2 - more than half the days  Poor appetite or overeatin - not at all  Feeling bad about yourself - or that you are a failure or have let yourself or your family down: 3 - nearly every day  Trouble concentrating on things, such as reading the newspaper or watching television: 2 - more than half the days  Moving or speaking so slowly that other people could have noticed   Or the opposite - being so fidgety or restless that you have been moving around a lot more than usual: 0 - not at all  Thoughts that you would be better off dead, or of hurting yourself in some way: 0 - not at all  PHQ-2 Score: 6  PHQ-9 Score: 13

## 2021-05-25 NOTE — ASSESSMENT & PLAN NOTE
Recent recheck of BMP per nephrology (improved/normal) s/p dc of HCTZ  Continue same meds as rx'd and maintain f/u as planned

## 2021-06-09 DIAGNOSIS — K21.9 GASTROESOPHAGEAL REFLUX DISEASE WITHOUT ESOPHAGITIS: ICD-10-CM

## 2021-06-09 DIAGNOSIS — G45.9 TIA (TRANSIENT ISCHEMIC ATTACK): ICD-10-CM

## 2021-06-09 DIAGNOSIS — S02.92XA CLOSED EXTENSIVE FACIAL FRACTURES (HCC): ICD-10-CM

## 2021-06-09 DIAGNOSIS — I10 ESSENTIAL HYPERTENSION: ICD-10-CM

## 2021-06-10 RX ORDER — AMLODIPINE BESYLATE 2.5 MG/1
TABLET ORAL
Qty: 90 TABLET | Refills: 0 | Status: SHIPPED | OUTPATIENT
Start: 2021-06-10 | End: 2021-11-17 | Stop reason: SDUPTHER

## 2021-06-10 RX ORDER — PANTOPRAZOLE SODIUM 40 MG/1
40 TABLET, DELAYED RELEASE ORAL DAILY
Qty: 90 TABLET | Refills: 0 | Status: SHIPPED | OUTPATIENT
Start: 2021-06-10 | End: 2021-11-22 | Stop reason: SDUPTHER

## 2021-06-10 RX ORDER — PANTOPRAZOLE SODIUM 40 MG/1
40 TABLET, DELAYED RELEASE ORAL DAILY
Qty: 90 TABLET | Refills: 0 | Status: SHIPPED | OUTPATIENT
Start: 2021-06-10 | End: 2021-11-12 | Stop reason: SDUPTHER

## 2021-06-11 RX ORDER — ATORVASTATIN CALCIUM 40 MG/1
TABLET, FILM COATED ORAL
Qty: 90 TABLET | Refills: 1 | Status: SHIPPED | OUTPATIENT
Start: 2021-06-11 | End: 2021-12-13 | Stop reason: SDUPTHER

## 2021-06-11 RX ORDER — CLOPIDOGREL BISULFATE 75 MG/1
TABLET ORAL
Qty: 90 TABLET | Refills: 1 | Status: SHIPPED | OUTPATIENT
Start: 2021-06-11 | End: 2021-11-22 | Stop reason: SDUPTHER

## 2021-06-11 RX ORDER — CARVEDILOL 12.5 MG/1
TABLET ORAL
Qty: 180 TABLET | Refills: 1 | Status: SHIPPED | OUTPATIENT
Start: 2021-06-11 | End: 2022-01-03 | Stop reason: SDUPTHER

## 2021-09-10 ENCOUNTER — CLINICAL SUPPORT (OUTPATIENT)
Dept: FAMILY MEDICINE CLINIC | Facility: HOSPITAL | Age: 81
End: 2021-09-10
Payer: COMMERCIAL

## 2021-09-10 DIAGNOSIS — Z23 ENCOUNTER FOR IMMUNIZATION: Primary | ICD-10-CM

## 2021-09-10 PROCEDURE — 90662 IIV NO PRSV INCREASED AG IM: CPT

## 2021-09-10 PROCEDURE — G0008 ADMIN INFLUENZA VIRUS VAC: HCPCS

## 2021-09-24 ENCOUNTER — TELEPHONE (OUTPATIENT)
Dept: NEPHROLOGY | Facility: HOSPITAL | Age: 81
End: 2021-09-24

## 2021-09-28 ENCOUNTER — TELEPHONE (OUTPATIENT)
Dept: NEPHROLOGY | Facility: HOSPITAL | Age: 81
End: 2021-09-28

## 2021-11-03 ENCOUNTER — TRANSITIONAL CARE MANAGEMENT (OUTPATIENT)
Dept: FAMILY MEDICINE CLINIC | Facility: HOSPITAL | Age: 81
End: 2021-11-03

## 2021-11-12 ENCOUNTER — OFFICE VISIT (OUTPATIENT)
Dept: FAMILY MEDICINE CLINIC | Facility: HOSPITAL | Age: 81
End: 2021-11-12
Payer: COMMERCIAL

## 2021-11-12 VITALS
BODY MASS INDEX: 23.17 KG/M2 | TEMPERATURE: 97.6 F | HEART RATE: 71 BPM | OXYGEN SATURATION: 98 % | DIASTOLIC BLOOD PRESSURE: 72 MMHG | HEIGHT: 67 IN | SYSTOLIC BLOOD PRESSURE: 130 MMHG | WEIGHT: 147.6 LBS

## 2021-11-12 DIAGNOSIS — F33.9 DEPRESSION, RECURRENT (HCC): ICD-10-CM

## 2021-11-12 DIAGNOSIS — A41.9 SEPTIC SHOCK (HCC): ICD-10-CM

## 2021-11-12 DIAGNOSIS — R65.21 SEPTIC SHOCK (HCC): ICD-10-CM

## 2021-11-12 DIAGNOSIS — E78.5 HYPERLIPIDEMIA, UNSPECIFIED HYPERLIPIDEMIA TYPE: ICD-10-CM

## 2021-11-12 DIAGNOSIS — I10 ESSENTIAL HYPERTENSION: ICD-10-CM

## 2021-11-12 DIAGNOSIS — G93.41 METABOLIC ENCEPHALOPATHY: ICD-10-CM

## 2021-11-12 DIAGNOSIS — Z76.89 ENCOUNTER FOR SUPPORT AND COORDINATION OF TRANSITION OF CARE: Primary | ICD-10-CM

## 2021-11-12 DIAGNOSIS — R56.9 SEIZURE (HCC): ICD-10-CM

## 2021-11-12 DIAGNOSIS — J18.9 PNEUMONIA DUE TO INFECTIOUS ORGANISM, UNSPECIFIED LATERALITY, UNSPECIFIED PART OF LUNG: ICD-10-CM

## 2021-11-12 PROCEDURE — 99495 TRANSJ CARE MGMT MOD F2F 14D: CPT | Performed by: NURSE PRACTITIONER

## 2021-11-12 RX ORDER — LANOLIN ALCOHOL/MO/W.PET/CERES
400 CREAM (GRAM) TOPICAL DAILY
COMMUNITY

## 2021-11-12 RX ORDER — LEVETIRACETAM 100 MG/ML
7.5 SOLUTION ORAL 2 TIMES DAILY
COMMUNITY
End: 2021-11-22 | Stop reason: SDUPTHER

## 2021-11-16 ENCOUNTER — TELEPHONE (OUTPATIENT)
Dept: FAMILY MEDICINE CLINIC | Facility: HOSPITAL | Age: 81
End: 2021-11-16

## 2021-11-17 DIAGNOSIS — I10 ESSENTIAL HYPERTENSION: ICD-10-CM

## 2021-11-18 DIAGNOSIS — G45.9 TIA (TRANSIENT ISCHEMIC ATTACK): ICD-10-CM

## 2021-11-18 RX ORDER — LISINOPRIL 10 MG/1
10 TABLET ORAL DAILY
Qty: 90 TABLET | Refills: 1 | Status: SHIPPED | OUTPATIENT
Start: 2021-11-18 | End: 2022-02-13 | Stop reason: SDUPTHER

## 2021-11-18 RX ORDER — AMLODIPINE BESYLATE 5 MG/1
5 TABLET ORAL DAILY
Qty: 90 TABLET | Refills: 1 | Status: SHIPPED | OUTPATIENT
Start: 2021-11-18 | End: 2022-02-13 | Stop reason: SDUPTHER

## 2021-11-19 LAB
ALBUMIN SERPL-MCNC: 4.2 G/DL (ref 3.7–4.7)
ALBUMIN/GLOB SERPL: 1.7 {RATIO} (ref 1.2–2.2)
ALP SERPL-CCNC: 105 IU/L (ref 44–121)
ALT SERPL-CCNC: 14 IU/L (ref 0–44)
AST SERPL-CCNC: 21 IU/L (ref 0–40)
BASOPHILS # BLD AUTO: 0 X10E3/UL (ref 0–0.2)
BASOPHILS NFR BLD AUTO: 1 %
BILIRUB SERPL-MCNC: 0.7 MG/DL (ref 0–1.2)
BUN SERPL-MCNC: 14 MG/DL (ref 8–27)
BUN/CREAT SERPL: 19 (ref 10–24)
CALCIUM SERPL-MCNC: 9.4 MG/DL (ref 8.6–10.2)
CHLORIDE SERPL-SCNC: 101 MMOL/L (ref 96–106)
CHOLEST SERPL-MCNC: 142 MG/DL (ref 100–199)
CHOLEST/HDLC SERPL: 2.3 RATIO (ref 0–5)
CO2 SERPL-SCNC: 25 MMOL/L (ref 20–29)
CREAT SERPL-MCNC: 0.73 MG/DL (ref 0.76–1.27)
EOSINOPHIL # BLD AUTO: 0.3 X10E3/UL (ref 0–0.4)
EOSINOPHIL NFR BLD AUTO: 5 %
ERYTHROCYTE [DISTWIDTH] IN BLOOD BY AUTOMATED COUNT: 14.4 % (ref 11.6–15.4)
GLOBULIN SER-MCNC: 2.5 G/DL (ref 1.5–4.5)
GLUCOSE SERPL-MCNC: 103 MG/DL (ref 65–99)
HCT VFR BLD AUTO: 42.8 % (ref 37.5–51)
HDLC SERPL-MCNC: 62 MG/DL
HGB BLD-MCNC: 13.3 G/DL (ref 13–17.7)
IMM GRANULOCYTES # BLD: 0 X10E3/UL (ref 0–0.1)
IMM GRANULOCYTES NFR BLD: 0 %
LDLC SERPL CALC-MCNC: 68 MG/DL (ref 0–99)
LYMPHOCYTES # BLD AUTO: 1.3 X10E3/UL (ref 0.7–3.1)
LYMPHOCYTES NFR BLD AUTO: 24 %
MCH RBC QN AUTO: 25.4 PG (ref 26.6–33)
MCHC RBC AUTO-ENTMCNC: 31.1 G/DL (ref 31.5–35.7)
MCV RBC AUTO: 82 FL (ref 79–97)
MONOCYTES # BLD AUTO: 0.4 X10E3/UL (ref 0.1–0.9)
MONOCYTES NFR BLD AUTO: 7 %
NEUTROPHILS # BLD AUTO: 3.5 X10E3/UL (ref 1.4–7)
NEUTROPHILS NFR BLD AUTO: 63 %
PLATELET # BLD AUTO: 247 X10E3/UL (ref 150–450)
POTASSIUM SERPL-SCNC: 4.9 MMOL/L (ref 3.5–5.2)
PROT SERPL-MCNC: 6.7 G/DL (ref 6–8.5)
RBC # BLD AUTO: 5.23 X10E6/UL (ref 4.14–5.8)
SL AMB EGFR AFRICAN AMERICAN: 101 ML/MIN/1.73
SL AMB EGFR NON AFRICAN AMERICAN: 88 ML/MIN/1.73
SL AMB VLDL CHOLESTEROL CALC: 12 MG/DL (ref 5–40)
SODIUM SERPL-SCNC: 138 MMOL/L (ref 134–144)
TRIGL SERPL-MCNC: 53 MG/DL (ref 0–149)
WBC # BLD AUTO: 5.5 X10E3/UL (ref 3.4–10.8)

## 2021-11-19 RX ORDER — THIAMINE MONONITRATE (VIT B1) 100 MG
100 TABLET ORAL DAILY
Qty: 90 TABLET | Refills: 1 | Status: SHIPPED | OUTPATIENT
Start: 2021-11-19 | End: 2021-11-22 | Stop reason: SDUPTHER

## 2021-11-22 DIAGNOSIS — K21.9 GASTROESOPHAGEAL REFLUX DISEASE WITHOUT ESOPHAGITIS: ICD-10-CM

## 2021-11-22 DIAGNOSIS — G40.909 SEIZURE DISORDER (HCC): Primary | ICD-10-CM

## 2021-11-22 DIAGNOSIS — G45.9 TIA (TRANSIENT ISCHEMIC ATTACK): ICD-10-CM

## 2021-11-22 DIAGNOSIS — S02.92XA CLOSED EXTENSIVE FACIAL FRACTURES (HCC): ICD-10-CM

## 2021-11-22 RX ORDER — CLOPIDOGREL BISULFATE 75 MG/1
75 TABLET ORAL DAILY
Qty: 90 TABLET | Refills: 1 | Status: SHIPPED | OUTPATIENT
Start: 2021-11-22 | End: 2022-05-16

## 2021-11-22 RX ORDER — THIAMINE MONONITRATE (VIT B1) 100 MG
100 TABLET ORAL DAILY
Qty: 90 TABLET | Refills: 1 | Status: SHIPPED | OUTPATIENT
Start: 2021-11-22 | End: 2022-02-13 | Stop reason: SDUPTHER

## 2021-11-22 RX ORDER — ESCITALOPRAM OXALATE 20 MG/1
20 TABLET ORAL DAILY
Qty: 90 TABLET | Refills: 1 | Status: SHIPPED | OUTPATIENT
Start: 2021-11-22 | End: 2022-05-16

## 2021-11-22 RX ORDER — PANTOPRAZOLE SODIUM 40 MG/1
40 TABLET, DELAYED RELEASE ORAL DAILY
Qty: 90 TABLET | Refills: 1 | Status: SHIPPED | OUTPATIENT
Start: 2021-11-22 | End: 2022-05-16

## 2021-11-22 RX ORDER — LEVETIRACETAM 100 MG/ML
7.5 SOLUTION ORAL 2 TIMES DAILY
Qty: 500 ML | Refills: 0 | Status: SHIPPED | OUTPATIENT
Start: 2021-11-22 | End: 2021-12-13 | Stop reason: SDUPTHER

## 2021-11-29 ENCOUNTER — TELEPHONE (OUTPATIENT)
Dept: FAMILY MEDICINE CLINIC | Facility: HOSPITAL | Age: 81
End: 2021-11-29

## 2021-12-01 ENCOUNTER — TELEPHONE (OUTPATIENT)
Dept: NEUROLOGY | Facility: CLINIC | Age: 81
End: 2021-12-01

## 2021-12-13 ENCOUNTER — OFFICE VISIT (OUTPATIENT)
Dept: NEUROLOGY | Facility: CLINIC | Age: 81
End: 2021-12-13
Payer: COMMERCIAL

## 2021-12-13 VITALS
WEIGHT: 147 LBS | SYSTOLIC BLOOD PRESSURE: 90 MMHG | HEIGHT: 67 IN | DIASTOLIC BLOOD PRESSURE: 50 MMHG | BODY MASS INDEX: 23.07 KG/M2

## 2021-12-13 DIAGNOSIS — G40.909 SEIZURE DISORDER (HCC): ICD-10-CM

## 2021-12-13 DIAGNOSIS — Z86.73 HISTORY OF CVA (CEREBROVASCULAR ACCIDENT): ICD-10-CM

## 2021-12-13 DIAGNOSIS — R56.9 SEIZURE (HCC): Primary | ICD-10-CM

## 2021-12-13 DIAGNOSIS — S02.92XA CLOSED EXTENSIVE FACIAL FRACTURES (HCC): ICD-10-CM

## 2021-12-13 DIAGNOSIS — I25.810 CORONARY ARTERY DISEASE INVOLVING AUTOLOGOUS VEIN CORONARY BYPASS GRAFT WITHOUT ANGINA PECTORIS: ICD-10-CM

## 2021-12-13 DIAGNOSIS — F10.29 ALCOHOL DEPENDENCE WITH UNSPECIFIED ALCOHOL-INDUCED DISORDER (HCC): ICD-10-CM

## 2021-12-13 PROCEDURE — 99215 OFFICE O/P EST HI 40 MIN: CPT | Performed by: PHYSICIAN ASSISTANT

## 2021-12-13 RX ORDER — LEVETIRACETAM 100 MG/ML
7.5 SOLUTION ORAL 2 TIMES DAILY
Qty: 500 ML | Refills: 5 | Status: SHIPPED | OUTPATIENT
Start: 2021-12-13 | End: 2022-06-14

## 2021-12-13 RX ORDER — ATORVASTATIN CALCIUM 40 MG/1
40 TABLET, FILM COATED ORAL DAILY
Qty: 90 TABLET | Refills: 2 | Status: SHIPPED | OUTPATIENT
Start: 2021-12-13

## 2021-12-17 ENCOUNTER — OFFICE VISIT (OUTPATIENT)
Dept: FAMILY MEDICINE CLINIC | Facility: HOSPITAL | Age: 81
End: 2021-12-17
Payer: COMMERCIAL

## 2021-12-17 VITALS
WEIGHT: 158.2 LBS | HEIGHT: 67 IN | TEMPERATURE: 98.4 F | SYSTOLIC BLOOD PRESSURE: 138 MMHG | HEART RATE: 53 BPM | BODY MASS INDEX: 24.83 KG/M2 | DIASTOLIC BLOOD PRESSURE: 66 MMHG | OXYGEN SATURATION: 99 %

## 2021-12-17 DIAGNOSIS — F10.29 ALCOHOL DEPENDENCE WITH UNSPECIFIED ALCOHOL-INDUCED DISORDER (HCC): ICD-10-CM

## 2021-12-17 DIAGNOSIS — I25.10 ASCVD (ARTERIOSCLEROTIC CARDIOVASCULAR DISEASE): ICD-10-CM

## 2021-12-17 DIAGNOSIS — R56.9 SEIZURE (HCC): ICD-10-CM

## 2021-12-17 DIAGNOSIS — I10 ESSENTIAL HYPERTENSION: Primary | ICD-10-CM

## 2021-12-17 DIAGNOSIS — Z86.73 HISTORY OF CVA (CEREBROVASCULAR ACCIDENT): ICD-10-CM

## 2021-12-17 DIAGNOSIS — E78.5 HYPERLIPIDEMIA, UNSPECIFIED HYPERLIPIDEMIA TYPE: ICD-10-CM

## 2021-12-17 DIAGNOSIS — Z00.00 ENCOUNTER FOR SUBSEQUENT ANNUAL WELLNESS VISIT IN MEDICARE PATIENT: ICD-10-CM

## 2021-12-17 DIAGNOSIS — R73.9 HYPERGLYCEMIA: ICD-10-CM

## 2021-12-17 LAB — SL AMB POCT HEMOGLOBIN AIC: 5.3 (ref ?–6.5)

## 2021-12-17 PROCEDURE — G0439 PPPS, SUBSEQ VISIT: HCPCS | Performed by: NURSE PRACTITIONER

## 2021-12-17 PROCEDURE — 1160F RVW MEDS BY RX/DR IN RCRD: CPT | Performed by: NURSE PRACTITIONER

## 2021-12-17 PROCEDURE — 1125F AMNT PAIN NOTED PAIN PRSNT: CPT | Performed by: NURSE PRACTITIONER

## 2021-12-17 PROCEDURE — 3288F FALL RISK ASSESSMENT DOCD: CPT | Performed by: NURSE PRACTITIONER

## 2021-12-17 PROCEDURE — 1170F FXNL STATUS ASSESSED: CPT | Performed by: NURSE PRACTITIONER

## 2021-12-17 PROCEDURE — 3725F SCREEN DEPRESSION PERFORMED: CPT | Performed by: NURSE PRACTITIONER

## 2021-12-17 PROCEDURE — 99214 OFFICE O/P EST MOD 30 MIN: CPT | Performed by: NURSE PRACTITIONER

## 2021-12-17 PROCEDURE — 83036 HEMOGLOBIN GLYCOSYLATED A1C: CPT | Performed by: NURSE PRACTITIONER

## 2021-12-17 PROCEDURE — 1036F TOBACCO NON-USER: CPT | Performed by: NURSE PRACTITIONER

## 2022-01-03 DIAGNOSIS — I10 ESSENTIAL HYPERTENSION: ICD-10-CM

## 2022-01-03 RX ORDER — CARVEDILOL 12.5 MG/1
12.5 TABLET ORAL 2 TIMES DAILY
Qty: 180 TABLET | Refills: 0 | Status: SHIPPED | OUTPATIENT
Start: 2022-01-03 | End: 2022-03-28

## 2022-02-02 ENCOUNTER — OFFICE VISIT (OUTPATIENT)
Dept: CARDIOLOGY CLINIC | Facility: CLINIC | Age: 82
End: 2022-02-02
Payer: COMMERCIAL

## 2022-02-02 VITALS
HEIGHT: 67 IN | WEIGHT: 165.2 LBS | DIASTOLIC BLOOD PRESSURE: 54 MMHG | BODY MASS INDEX: 25.93 KG/M2 | SYSTOLIC BLOOD PRESSURE: 118 MMHG | HEART RATE: 57 BPM

## 2022-02-02 DIAGNOSIS — E78.5 HYPERLIPIDEMIA, UNSPECIFIED HYPERLIPIDEMIA TYPE: ICD-10-CM

## 2022-02-02 DIAGNOSIS — I65.23 CAROTID STENOSIS, ASYMPTOMATIC, BILATERAL: ICD-10-CM

## 2022-02-02 DIAGNOSIS — I25.810 CORONARY ARTERY DISEASE INVOLVING AUTOLOGOUS VEIN CORONARY BYPASS GRAFT WITHOUT ANGINA PECTORIS: Primary | ICD-10-CM

## 2022-02-02 DIAGNOSIS — I10 ESSENTIAL HYPERTENSION: ICD-10-CM

## 2022-02-02 DIAGNOSIS — Z01.810 PREOP CARDIOVASCULAR EXAM: ICD-10-CM

## 2022-02-02 DIAGNOSIS — I65.02 STENOSIS OF LEFT VERTEBRAL ARTERY: ICD-10-CM

## 2022-02-02 PROCEDURE — 93000 ELECTROCARDIOGRAM COMPLETE: CPT | Performed by: INTERNAL MEDICINE

## 2022-02-02 PROCEDURE — 3074F SYST BP LT 130 MM HG: CPT | Performed by: INTERNAL MEDICINE

## 2022-02-02 PROCEDURE — 1036F TOBACCO NON-USER: CPT | Performed by: INTERNAL MEDICINE

## 2022-02-02 PROCEDURE — 1160F RVW MEDS BY RX/DR IN RCRD: CPT | Performed by: INTERNAL MEDICINE

## 2022-02-02 PROCEDURE — 3078F DIAST BP <80 MM HG: CPT | Performed by: INTERNAL MEDICINE

## 2022-02-02 PROCEDURE — 99214 OFFICE O/P EST MOD 30 MIN: CPT | Performed by: INTERNAL MEDICINE
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
## 2022-02-04 ENCOUNTER — TELEPHONE (OUTPATIENT)
Dept: FAMILY MEDICINE CLINIC | Facility: HOSPITAL | Age: 82
End: 2022-02-04

## 2022-02-13 DIAGNOSIS — I10 ESSENTIAL HYPERTENSION: ICD-10-CM

## 2022-02-13 DIAGNOSIS — G45.9 TIA (TRANSIENT ISCHEMIC ATTACK): ICD-10-CM

## 2022-02-13 RX ORDER — LISINOPRIL 10 MG/1
10 TABLET ORAL DAILY
Qty: 90 TABLET | Refills: 0 | Status: SHIPPED | OUTPATIENT
Start: 2022-02-13 | End: 2022-04-18 | Stop reason: SINTOL

## 2022-02-13 RX ORDER — THIAMINE MONONITRATE (VIT B1) 100 MG
100 TABLET ORAL DAILY
Qty: 90 TABLET | Refills: 0 | Status: SHIPPED | OUTPATIENT
Start: 2022-02-13 | End: 2022-05-18 | Stop reason: SDUPTHER

## 2022-02-13 RX ORDER — AMLODIPINE BESYLATE 5 MG/1
5 TABLET ORAL DAILY
Qty: 90 TABLET | Refills: 0 | Status: SHIPPED | OUTPATIENT
Start: 2022-02-13 | End: 2022-06-30 | Stop reason: ALTCHOICE

## 2022-02-22 ENCOUNTER — HOSPITAL ENCOUNTER (OUTPATIENT)
Dept: CT IMAGING | Facility: HOSPITAL | Age: 82
Discharge: HOME | End: 2022-02-22
Payer: COMMERCIAL

## 2022-02-22 DIAGNOSIS — H53.9 TRANSIENT VISION DISTURBANCE: ICD-10-CM

## 2022-02-22 PROCEDURE — 70450 CT HEAD/BRAIN W/O DYE: CPT

## 2022-02-24 ENCOUNTER — TELEPHONE (OUTPATIENT)
Dept: NEUROLOGY | Facility: CLINIC | Age: 82
End: 2022-02-24

## 2022-02-24 NOTE — TELEPHONE ENCOUNTER
rec[facility]hdi [facility] [address] requesting call back to review CT Head results  He said he recd via my chart and was calling to verify no abnormalities  requesting  [phone]  [facility] as caregiver in demographics  I confirmed no abnormalities: "Patient's scan was reviewed  Scan without acute intracranial or orbital etiology"    All questions/concerns addressed at this time

## 2022-03-18 ENCOUNTER — RA CDI HCC (OUTPATIENT)
Dept: OTHER | Facility: HOSPITAL | Age: 82
End: 2022-03-18

## 2022-03-18 NOTE — PROGRESS NOTES
[facility]  coding opportunities          Chart Reviewed number of suggestions sent to Provider: 1  [phone]     Patients Insurance     Medicare Insurance: [employer]

## 2022-03-22 DIAGNOSIS — J32.9 CHRONIC SINUSITIS, UNSPECIFIED LOCATION: ICD-10-CM

## 2022-03-22 RX ORDER — FLUTICASONE PROPIONATE 50 MCG
SPRAY, SUSPENSION (ML) NASAL
Qty: 16 ML | Refills: 0 | Status: SHIPPED | OUTPATIENT
Start: 2022-03-22

## 2022-03-25 ENCOUNTER — OFFICE VISIT (OUTPATIENT)
Dept: FAMILY MEDICINE CLINIC | Facility: HOSPITAL | Age: 82
End: 2022-03-25
Payer: COMMERCIAL

## 2022-03-25 VITALS
SYSTOLIC BLOOD PRESSURE: 144 MMHG | HEIGHT: 67 IN | HEART RATE: 65 BPM | BODY MASS INDEX: 27.21 KG/M2 | OXYGEN SATURATION: 99 % | WEIGHT: 173.4 LBS | DIASTOLIC BLOOD PRESSURE: 72 MMHG | TEMPERATURE: 98.1 F

## 2022-03-25 DIAGNOSIS — F10.29 ALCOHOL DEPENDENCE WITH UNSPECIFIED ALCOHOL-INDUCED DISORDER (HCC): ICD-10-CM

## 2022-03-25 DIAGNOSIS — I25.10 ASCVD (ARTERIOSCLEROTIC CARDIOVASCULAR DISEASE): ICD-10-CM

## 2022-03-25 DIAGNOSIS — H25.9 SENILE CATARACT OF RIGHT EYE, UNSPECIFIED AGE-RELATED CATARACT TYPE: ICD-10-CM

## 2022-03-25 DIAGNOSIS — F33.9 DEPRESSION, RECURRENT (HCC): ICD-10-CM

## 2022-03-25 DIAGNOSIS — I73.9 PERIPHERAL ARTERIAL DISEASE (HCC): ICD-10-CM

## 2022-03-25 DIAGNOSIS — R56.9 SEIZURE (HCC): ICD-10-CM

## 2022-03-25 DIAGNOSIS — I10 ESSENTIAL HYPERTENSION: Primary | ICD-10-CM

## 2022-03-25 DIAGNOSIS — E78.5 HYPERLIPIDEMIA, UNSPECIFIED HYPERLIPIDEMIA TYPE: ICD-10-CM

## 2022-03-25 DIAGNOSIS — E55.9 VITAMIN D DEFICIENCY: ICD-10-CM

## 2022-03-25 PROCEDURE — 3077F SYST BP >= 140 MM HG: CPT | Performed by: NURSE PRACTITIONER

## 2022-03-25 PROCEDURE — 1160F RVW MEDS BY RX/DR IN RCRD: CPT | Performed by: NURSE PRACTITIONER

## 2022-03-25 PROCEDURE — 99214 OFFICE O/P EST MOD 30 MIN: CPT | Performed by: NURSE PRACTITIONER

## 2022-03-25 PROCEDURE — 1036F TOBACCO NON-USER: CPT | Performed by: NURSE PRACTITIONER

## 2022-03-25 PROCEDURE — 3725F SCREEN DEPRESSION PERFORMED: CPT | Performed by: NURSE PRACTITIONER

## 2022-03-25 PROCEDURE — 3078F DIAST BP <80 MM HG: CPT | Performed by: NURSE PRACTITIONER

## 2022-03-25 NOTE — ASSESSMENT & PLAN NOTE
Mood stable on daily lexapro  Continue same dose and return in 3 months for next med check  Call sooner with any mood concerns

## 2022-03-25 NOTE — PATIENT INSTRUCTIONS
Weight Management   AMBULATORY CARE:   Why it is important to manage your weight:  Being overweight increases your risk of health conditions such as heart disease, high blood pressure, type 2 diabetes, and certain types of cancer  It can also increase your risk for osteoarthritis, sleep apnea, and other respiratory problems  Aim for a slow, steady weight loss  Even a small amount of weight loss can lower your risk of health problems  Risks of being overweight:  Extra weight can cause many health problems, including the following:  · Diabetes (high blood sugar level)    · High blood pressure or high cholesterol    · Heart disease    · Stroke    · Gallbladder or liver disease    · Cancer of the colon, breast, prostate, liver, or kidney    · Sleep apnea    · Arthritis or gout    Screening  is done to check for health conditions before you have signs or symptoms  If you are 28to 79years old, your blood sugar level may be checked every 3 years for signs of prediabetes or diabetes  Your healthcare provider will check your blood pressure at each visit  High blood pressure can lead to a stroke or other problems  Your provider may check for signs of heart disease, cancer, or other health problems  How to lose weight safely:  A safe and healthy way to lose weight is to eat fewer calories and get regular exercise  · You can lose up about 1 pound a week by decreasing the number of calories you eat by 500 calories each day  You can decrease calories by eating smaller portion sizes or by cutting out high-calorie foods  Read labels to find out how many calories are in the foods you eat  · You can also burn calories with exercise such as walking, swimming, or biking  You will be more likely to keep weight off if you make these changes part of your lifestyle  Exercise at least 30 minutes per day on most days of the week   You can also fit in more physical activity by taking the stairs instead of the elevator or parking farther away from stores  Ask your healthcare provider about the best exercise plan for you  Healthy meal plan for weight management:  A healthy meal plan includes a variety of foods, contains fewer calories, and helps you stay healthy  A healthy meal plan includes the following:     · Eat whole-grain foods more often  A healthy meal plan should contain fiber  Fiber is the part of grains, fruits, and vegetables that is not broken down by your body  Whole-grain foods are healthy and provide extra fiber in your diet  Some examples of whole-grain foods are whole-wheat breads and pastas, oatmeal, brown rice, and bulgur  · Eat a variety of vegetables every day  Include dark, leafy greens such as spinach, kale, anabel greens, and mustard greens  Eat yellow and orange vegetables such as carrots, sweet potatoes, and winter squash  · Eat a variety of fruits every day  Choose fresh or canned fruit (canned in its own juice or light syrup) instead of juice  Fruit juice has very little or no fiber  · Eat low-fat dairy foods  Drink fat-free (skim) milk or 1% milk  Eat fat-free yogurt and low-fat cottage cheese  Try low-fat cheeses such as mozzarella and other reduced-fat cheeses  · Choose meat and other protein foods that are low in fat  Choose beans or other legumes such as split peas or lentils  Choose fish, skinless poultry (chicken or turkey), or lean cuts of red meat (beef or pork)  Before you cook meat or poultry, cut off any visible fat  · Use less fat and oil  Try baking foods instead of frying them  Add less fat, such as margarine, sour cream, regular salad dressing and mayonnaise to foods  Eat fewer high-fat foods  Some examples of high-fat foods include french fries, doughnuts, ice cream, and cakes  · Eat fewer sweets  Limit foods and drinks that are high in sugar  This includes candy, cookies, regular soda, and sweetened drinks  Ways to decrease calories:   · Eat smaller portions  ? Use a small plate with smaller servings  ? Do not eat second helpings  ? When you eat at a restaurant, ask for a box and place half of your meal in the box before you eat  ? Share an entrée with someone else  · Replace high-calorie snacks with healthy, low-calorie snacks  ? Choose fresh fruit, vegetables, fat-free rice cakes, or air-popped popcorn instead of potato chips, nuts, or chocolate  ? Choose water or calorie-free drinks instead of soda or sweetened drinks  · Do not shop for groceries when you are hungry  You may be more likely to make unhealthy food choices  Take a grocery list of healthy foods and shop after you have eaten  · Eat regular meals  Do not skip meals  Skipping meals can lead to overeating later in the day  This can make it harder for you to lose weight  Eat a healthy snack in place of a meal if you do not have time to eat a regular meal  Talk with a dietitian to help you create a meal plan and schedule that is right for you  Other things to consider as you try to lose weight:   · Be aware of situations that may give you the urge to overeat, such as eating while watching television  Find ways to avoid these situations  For example, read a book, go for a walk, or do crafts  · Meet with a weight loss support group or friends who are also trying to lose weight  This may help you stay motivated to continue working on your weight loss goals  © Copyright [employer] 2022 Information is for End User's use only and may not be sold, redistributed or otherwise used for commercial purposes  All illustrations and images included in CareNotes® are the copyrighted property of A D A M , Inc  or [facility] Kavon Medina   The above information is an  only  It is not intended as medical advice for individual conditions or treatments  Talk to your doctor, nurse or pharmacist before following any medical regimen to see if it is safe and effective for you

## 2022-03-25 NOTE — ASSESSMENT & PLAN NOTE
BP well controlled on current meds as rx'd     Continue same doses and return in 3 months for next BP check

## 2022-03-25 NOTE — PROGRESS NOTES
Assessment/Plan:    ASCVD (arteriosclerotic cardiovascular disease)  Recent f/u with cardiology 2/2022  Currently stable  Essential hypertension  BP well controlled on current meds as rx'd  Continue same doses and return in 3 months for next BP check    Peripheral arterial disease ([facility] )  No evidence of LE disease at this time  Alcohol dependency ([place] [address] )  Has abstained from ETOH use  Advise he continue to refrain given age and other co-morbidities    Depression, recurrent ([place] [address] )  Mood stable on daily lexapro  Continue same dose and return in 3 months for next med check  Call sooner with any mood concerns  Hyperlipidemia  Compliant w/mod intensity statin daily  Update FLP as ordered before appt next month    Seizure ([place] [address] )  Clinically stable on keppra as rx'd by neurology  Maintain f/u as planned    BMI 27 0-27 9,adult  Encouraged healthy diet and activity as tolerated       Diagnoses and all orders for this visit:    Essential hypertension  -     CBC and differential; Future  -     Comprehensive metabolic panel; Future  -     CBC and differential  -     Comprehensive metabolic panel    Senile cataract of right eye, unspecified age-related cataract type  Comments:  to have cataract surgery in May    ASCVD (arteriosclerotic cardiovascular disease)    Depression, recurrent ([place] [address] )  -     CBC and differential; Future  -     CBC and differential    Hyperlipidemia, unspecified hyperlipidemia type  -     CBC and differential; Future  -     Comprehensive metabolic panel; Future  -     Lipid panel; Future  -     TSH, 3rd generation with Free T4 reflex; Future  -     CBC and differential  -     Comprehensive metabolic panel  -     Lipid panel  -     TSH, 3rd generation with Free T4 reflex    Seizure (HCC)    Vitamin D deficiency  -     Comprehensive metabolic panel; Future  -     Vitamin D 25 hydroxy;  Future  -     Comprehensive metabolic panel  -     Vitamin D 25 hydroxy    Alcohol dependence with unspecified alcohol-induced disorder ([facility] )    Peripheral arterial disease (HCC)    BMI 27 0-27 9,adult          Subjective:      Patient ID: Liberty Reich is a [de-identified] y o  male  Here with caretaker for routine follow up  Complains of right knee pain and cracking for about 2-3 months  Denies injury or fall  Using walker to walk  Waiting to get right cataract surgery in May at [facility]  Will have done at hospital due to other health conditions  Complains of posterior neck pain since fall last year  Saw neurology last month and had CT eval for vision issues  Relief of neck pain with motrin as needed  The following portions of the patient's history were reviewed and updated as appropriate: allergies, current medications, past family history, past medical history, past social history, past surgical history and problem list     Review of Systems   Constitutional: Negative  HENT: Positive for congestion (occasional sinus congestion, uses vicks vapor rub at night time with relief)  Eyes: Positive for visual disturbance (vision issues with right eye, has planned cataract surgery in May  )  Respiratory: Negative  Cardiovascular: Negative  Genitourinary: Negative  Musculoskeletal: Positive for arthralgias (right knee pain and cracking for 3 months) and gait problem (pain in right knee with walking, uses walker)  Skin: Negative  Psychiatric/Behavioral: Negative  Objective:      /72 (Patient Position: Sitting, Cuff Size: Standard)   Pulse 65   Temp 98 1 °F (36 7 °C) (Tympanic)   Ht 5' 7" (1 702 m)   Wt 78 7 kg (173 lb 6 4 oz)   SpO2 99%   BMI 27 16 kg/m²          Physical Exam  Vitals reviewed  Constitutional:       General: He is not in acute distress  Appearance: Normal appearance  He is not ill-appearing or diaphoretic  HENT:      Head: Normocephalic and atraumatic        Right Ear: External ear normal       Left Ear: External ear normal    Neck:      Thyroid: No thyromegaly  Cardiovascular:      Rate and Rhythm: Normal rate and regular rhythm  Pulses: Normal pulses  Heart sounds: Murmur heard  Pulmonary:      Effort: Pulmonary effort is normal  No respiratory distress  Breath sounds: Normal breath sounds  No wheezing  Chest:      Chest wall: No tenderness  Abdominal:      General: Bowel sounds are normal       Palpations: Abdomen is soft  Tenderness: There is no abdominal tenderness  Musculoskeletal:      Cervical back: Normal range of motion and neck supple  Tenderness (to posterior neck) present  Right lower leg: No edema  Left lower leg: No edema  Skin:     General: Skin is warm and dry  Capillary Refill: Capillary refill takes less than 2 seconds  Neurological:      General: No focal deficit present  Mental Status: He is alert and oriented to person, place, and time  Mental status is at baseline  Psychiatric:         Mood and Affect: Mood normal          Behavior: Behavior normal          Thought Content: Thought content normal          Judgment: Judgment normal        BMI Counseling: Body mass index is 27 16 kg/m²  The BMI is above normal  Nutrition recommendations include 3-5 servings of fruits/vegetables daily, moderation in carbohydrate intake and reducing intake of cholesterol

## 2022-03-28 DIAGNOSIS — I10 ESSENTIAL HYPERTENSION: ICD-10-CM

## 2022-03-28 RX ORDER — CARVEDILOL 12.5 MG/1
TABLET ORAL
Qty: 180 TABLET | Refills: 0 | Status: SHIPPED | OUTPATIENT
Start: 2022-03-28 | End: 2022-07-03

## 2022-04-08 NOTE — ASSESSMENT & PLAN NOTE
BP stable per [facility] reports  Continue same meds and return as scheduled in February for next BP check 
needs f/u w/facial surgeons - will arrange for pt if not already scheduled
English

## 2022-05-16 DIAGNOSIS — G45.9 TIA (TRANSIENT ISCHEMIC ATTACK): ICD-10-CM

## 2022-05-16 DIAGNOSIS — S02.92XA CLOSED EXTENSIVE FACIAL FRACTURES (HCC): ICD-10-CM

## 2022-05-16 DIAGNOSIS — K21.9 GASTROESOPHAGEAL REFLUX DISEASE WITHOUT ESOPHAGITIS: ICD-10-CM

## 2022-05-16 RX ORDER — PANTOPRAZOLE SODIUM 40 MG/1
TABLET, DELAYED RELEASE ORAL
Qty: 90 TABLET | Refills: 1 | Status: SHIPPED | OUTPATIENT
Start: 2022-05-16

## 2022-05-16 RX ORDER — CLOPIDOGREL BISULFATE 75 MG/1
TABLET ORAL
Qty: 90 TABLET | Refills: 1 | Status: SHIPPED | OUTPATIENT
Start: 2022-05-16

## 2022-05-16 RX ORDER — ESCITALOPRAM OXALATE 20 MG/1
TABLET ORAL
Qty: 90 TABLET | Refills: 1 | Status: SHIPPED | OUTPATIENT
Start: 2022-05-16

## 2022-06-13 DIAGNOSIS — G40.909 SEIZURE DISORDER (HCC): ICD-10-CM

## 2022-06-14 ENCOUNTER — LAB (OUTPATIENT)
Dept: LAB | Facility: HOSPITAL | Age: 82
End: 2022-06-14
Payer: COMMERCIAL

## 2022-06-14 DIAGNOSIS — Z00.00 ROUTINE GENERAL MEDICAL EXAMINATION AT A HEALTH CARE FACILITY: ICD-10-CM

## 2022-06-14 LAB
25(OH)D3 SERPL-MCNC: 28.8 NG/ML (ref 30–100)
ALBUMIN SERPL BCP-MCNC: 3.6 G/DL (ref 3.5–5)
ALP SERPL-CCNC: 71 U/L (ref 46–116)
ALT SERPL W P-5'-P-CCNC: 20 U/L (ref 12–78)
ANION GAP SERPL CALCULATED.3IONS-SCNC: 4 MMOL/L (ref 4–13)
AST SERPL W P-5'-P-CCNC: 19 U/L (ref 5–45)
BASOPHILS # BLD AUTO: 0.06 THOUSANDS/ΜL (ref 0–0.1)
BASOPHILS NFR BLD AUTO: 1 % (ref 0–1)
BILIRUB SERPL-MCNC: 1.21 MG/DL (ref 0.2–1)
BUN SERPL-MCNC: 17 MG/DL (ref 5–25)
CALCIUM SERPL-MCNC: 8.6 MG/DL (ref 8.3–10.1)
CHLORIDE SERPL-SCNC: 108 MMOL/L (ref 100–108)
CHOLEST SERPL-MCNC: 87 MG/DL
CO2 SERPL-SCNC: 27 MMOL/L (ref 21–32)
CREAT SERPL-MCNC: 0.9 MG/DL (ref 0.6–1.3)
EOSINOPHIL # BLD AUTO: 0.32 THOUSAND/ΜL (ref 0–0.61)
EOSINOPHIL NFR BLD AUTO: 5 % (ref 0–6)
ERYTHROCYTE [DISTWIDTH] IN BLOOD BY AUTOMATED COUNT: 17 % (ref 11.6–15.1)
GFR SERPL CREATININE-BSD FRML MDRD: 80 ML/MIN/1.73SQ M
GLUCOSE P FAST SERPL-MCNC: 88 MG/DL (ref 65–99)
HCT VFR BLD AUTO: 32.5 % (ref 36.5–49.3)
HDLC SERPL-MCNC: 50 MG/DL
HGB BLD-MCNC: 9.5 G/DL (ref 12–17)
IMM GRANULOCYTES # BLD AUTO: 0.03 THOUSAND/UL (ref 0–0.2)
IMM GRANULOCYTES NFR BLD AUTO: 1 % (ref 0–2)
LDLC SERPL CALC-MCNC: 30 MG/DL (ref 0–100)
LYMPHOCYTES # BLD AUTO: 1.19 THOUSANDS/ΜL (ref 0.6–4.47)
LYMPHOCYTES NFR BLD AUTO: 20 % (ref 14–44)
MCH RBC QN AUTO: 22.1 PG (ref 26.8–34.3)
MCHC RBC AUTO-ENTMCNC: 29.2 G/DL (ref 31.4–37.4)
MCV RBC AUTO: 76 FL (ref 82–98)
MONOCYTES # BLD AUTO: 0.61 THOUSAND/ΜL (ref 0.17–1.22)
MONOCYTES NFR BLD AUTO: 10 % (ref 4–12)
NEUTROPHILS # BLD AUTO: 3.75 THOUSANDS/ΜL (ref 1.85–7.62)
NEUTS SEG NFR BLD AUTO: 63 % (ref 43–75)
NONHDLC SERPL-MCNC: 37 MG/DL
NRBC BLD AUTO-RTO: 0 /100 WBCS
PLATELET # BLD AUTO: 268 THOUSANDS/UL (ref 149–390)
PMV BLD AUTO: 9 FL (ref 8.9–12.7)
POTASSIUM SERPL-SCNC: 4.3 MMOL/L (ref 3.5–5.3)
PROT SERPL-MCNC: 7.6 G/DL (ref 6.4–8.2)
RBC # BLD AUTO: 4.29 MILLION/UL (ref 3.88–5.62)
SODIUM SERPL-SCNC: 139 MMOL/L (ref 136–145)
TRIGL SERPL-MCNC: 35 MG/DL
TSH SERPL DL<=0.05 MIU/L-ACNC: 2.28 UIU/ML (ref 0.45–4.5)
WBC # BLD AUTO: 5.96 THOUSAND/UL (ref 4.31–10.16)

## 2022-06-14 PROCEDURE — 85025 COMPLETE CBC W/AUTO DIFF WBC: CPT

## 2022-06-14 PROCEDURE — 80061 LIPID PANEL: CPT

## 2022-06-14 PROCEDURE — 82306 VITAMIN D 25 HYDROXY: CPT

## 2022-06-14 PROCEDURE — 36415 COLL VENOUS BLD VENIPUNCTURE: CPT

## 2022-06-14 PROCEDURE — 80053 COMPREHEN METABOLIC PANEL: CPT

## 2022-06-14 PROCEDURE — 84443 ASSAY THYROID STIM HORMONE: CPT

## 2022-06-14 RX ORDER — LEVETIRACETAM 100 MG/ML
7.5 SOLUTION ORAL 2 TIMES DAILY
Qty: 473 ML | Refills: 5 | Status: SHIPPED | OUTPATIENT
Start: 2022-06-14

## 2022-06-30 ENCOUNTER — OFFICE VISIT (OUTPATIENT)
Dept: FAMILY MEDICINE CLINIC | Facility: HOSPITAL | Age: 82
End: 2022-06-30
Payer: COMMERCIAL

## 2022-06-30 VITALS
HEIGHT: 67 IN | SYSTOLIC BLOOD PRESSURE: 155 MMHG | OXYGEN SATURATION: 98 % | TEMPERATURE: 97.6 F | HEART RATE: 58 BPM | DIASTOLIC BLOOD PRESSURE: 72 MMHG | WEIGHT: 173.8 LBS | BODY MASS INDEX: 27.28 KG/M2

## 2022-06-30 DIAGNOSIS — L60.3 DYSTROPHIC NAIL: ICD-10-CM

## 2022-06-30 DIAGNOSIS — D50.9 IRON DEFICIENCY ANEMIA, UNSPECIFIED IRON DEFICIENCY ANEMIA TYPE: ICD-10-CM

## 2022-06-30 DIAGNOSIS — I10 ESSENTIAL HYPERTENSION: Primary | ICD-10-CM

## 2022-06-30 DIAGNOSIS — E55.9 VITAMIN D DEFICIENCY: ICD-10-CM

## 2022-06-30 DIAGNOSIS — Z12.11 SCREEN FOR COLON CANCER: ICD-10-CM

## 2022-06-30 DIAGNOSIS — E78.5 HYPERLIPIDEMIA, UNSPECIFIED HYPERLIPIDEMIA TYPE: ICD-10-CM

## 2022-06-30 PROBLEM — E87.1 HYPONATREMIA: Status: RESOLVED | Noted: 2020-05-15 | Resolved: 2022-06-30

## 2022-06-30 PROCEDURE — 99214 OFFICE O/P EST MOD 30 MIN: CPT | Performed by: NURSE PRACTITIONER

## 2022-06-30 NOTE — PROGRESS NOTES
Assessment/Plan:    Essential hypertension  Stopped almodipine (due to pedal edema) and olmesartan about 2 weeks ago (due to dry cough)  BP elevated initially and rechecked at end of apt and was 155/72  Home BP readings 130s-140/70s-80s  Based on home readings, BP well controlled off of medication  Continue to check at home, call if persists >140/90  Return in 3 months for next BP check  Hyperlipidemia  FLP at goal on daily statin  Continue same dose and recheck FLP in 6 months    Vitamin D deficiency  Vit D level low at 28, not currently on any supplementation  Instructed to begin daily 4000 units Vit D  Recheck levels in 3 months before next OV  Diagnoses and all orders for this visit:    Essential hypertension    Vitamin D deficiency  -     Vitamin D 25 hydroxy; Future  -     Vitamin D 25 hydroxy    Iron deficiency anemia, unspecified iron deficiency anemia type  Comments:  Begin taking daily OTC iron/stool softener pill  Complete FIT kit  Will recheck blood work in 3 months before next OV  Orders:  -     CBC and differential; Future  -     Ferritin; Future  -     Iron; Future  -     CBC and differential  -     Ferritin  -     Iron    Hyperlipidemia, unspecified hyperlipidemia type    Dystrophic nail  Comments:  Podiatry consult entered per request for nail care  Orders:  -     Ambulatory Referral to Podiatry; Future    Screen for colon cancer  -     Occult Blood, Fecal, IA; Future  -     Occult Blood, Fecal, IA          Subjective:      Patient ID: Fareed Jenkins is a [de-identified] y o  male  Here with caretaker Lizbeth Godinez) for 3 month f/u  Has been off of BP meds for about 2 weeks, stopped due to dry cough and feet swelling  Home BP readings have been 130s-140s/80s since stopping meds  Cough has gradually improved, notes a dry cough occasionally  B/l feet swelling has not improved since stopping amlodipine  Tries to elevate when sitting at home  Does not take any multivitamin or vit   D supplement  Recent blood work showed anemia--no recent blood in stool/bleeding gums/nose bleeds  Denies any bruising  No blood in urine  Due for next colonoscopy in 2024  Sharyn Pérez requests referral to podiatry to take him for nail care  The following portions of the patient's history were reviewed and updated as appropriate: allergies, current medications, past family history, past medical history, past social history, past surgical history and problem list     Review of Systems   Constitutional: Negative  HENT: Negative  Respiratory: Positive for cough (occasional dry cough)  Negative for chest tightness, shortness of breath and wheezing  Cardiovascular: Positive for leg swelling (b/l feet swelling)  Negative for chest pain and palpitations  Gastrointestinal: Negative for abdominal pain, anal bleeding, blood in stool, diarrhea, nausea and vomiting  Genitourinary: Negative  Negative for hematuria  Musculoskeletal: Negative  Skin: Negative  Neurological: Negative  Negative for dizziness, syncope, weakness, light-headedness and headaches  Hematological: Negative for adenopathy  Does not bruise/bleed easily  Objective:      /72   Pulse 58   Temp 97 6 °F (36 4 °C)   Ht 5' 7" (1 702 m)   Wt 78 8 kg (173 lb 12 8 oz)   SpO2 98%   BMI 27 22 kg/m²          Physical Exam  Vitals reviewed  Constitutional:       General: He is not in acute distress  Appearance: Normal appearance  He is not ill-appearing or diaphoretic  HENT:      Head: Normocephalic and atraumatic  Right Ear: External ear normal       Left Ear: External ear normal    Eyes:      General: No scleral icterus  Conjunctiva/sclera: Conjunctivae normal    Neck:      Thyroid: No thyromegaly  Vascular: No carotid bruit  Cardiovascular:      Rate and Rhythm: Normal rate and regular rhythm  Pulses: Normal pulses  Heart sounds: Murmur (2/6 systolic murmur noted) heard     Pulmonary: Effort: Pulmonary effort is normal  No respiratory distress  Breath sounds: Normal breath sounds  No wheezing  Chest:      Chest wall: No tenderness  Abdominal:      General: Bowel sounds are normal       Palpations: Abdomen is soft  Tenderness: There is no abdominal tenderness  Musculoskeletal:         General: Normal range of motion  Cervical back: Normal range of motion and neck supple  Right lower leg: Edema (+1 pitting pedal edema) present  Left lower leg: Edema (+1 pitting pedal edema) present  Skin:     General: Skin is warm and dry  Capillary Refill: Capillary refill takes less than 2 seconds  Neurological:      General: No focal deficit present  Mental Status: He is alert and oriented to person, place, and time  Mental status is at baseline  Psychiatric:         Mood and Affect: Mood normal          Behavior: Behavior normal          Thought Content:  Thought content normal          Judgment: Judgment normal

## 2022-06-30 NOTE — ASSESSMENT & PLAN NOTE
Stopped almodipine (due to pedal edema) and olmesartan about 2 weeks ago (due to dry cough)  BP elevated initially and rechecked at end of apt and was 155/72  Home BP readings 130s-140/70s-80s  Based on home readings, BP well controlled off of medication  Continue to check at home, call if persists >140/90  Return in 3 months for next BP check

## 2022-06-30 NOTE — ASSESSMENT & PLAN NOTE
Vit D level low at 28, not currently on any supplementation  Instructed to begin daily 4000 units Vit D  Recheck levels in 3 months before next OV

## 2022-07-03 DIAGNOSIS — I10 ESSENTIAL HYPERTENSION: ICD-10-CM

## 2022-07-03 RX ORDER — CARVEDILOL 12.5 MG/1
TABLET ORAL
Qty: 180 TABLET | Refills: 0 | Status: SHIPPED | OUTPATIENT
Start: 2022-07-03 | End: 2022-10-01

## 2022-07-15 LAB — HEMOCCULT STL QL IA: NEGATIVE

## 2022-08-08 ENCOUNTER — TELEPHONE (OUTPATIENT)
Dept: VASCULAR SURGERY | Facility: CLINIC | Age: 82
End: 2022-08-08

## 2022-08-08 DIAGNOSIS — I73.9 PERIPHERAL VASCULAR DISEASE, UNSPECIFIED (HCC): Primary | ICD-10-CM

## 2022-08-08 DIAGNOSIS — I65.23 CAROTID STENOSIS, ASYMPTOMATIC, BILATERAL: ICD-10-CM

## 2022-08-08 NOTE — TELEPHONE ENCOUNTER
Spoke with patient  Scheduled appointment(s) listed below  Provided patient with call back number [phone] option 3, should they need to reschedule  Patient's appointment(s) are scheduled for CV and BOBO 9/1/22  OV 9/20/22      Dopplers  [] Abdominal Aorta Iliac (AOIL)  [x] Carotid (CV)   [] Celiac and/or Mesenteric  [] Endovascular Aortic Repair (EVAR)   [] Hemodialysis Access (HD)   [x] Lower Limb Arterial (BOBO)  [] Lower Limb Venous Duplex with Reflux (LEVDR)  [] Renal Artery  [] Upper Limb Arterial (UEA)    [] Upper Limb Venous (UEV)    Advanced Imaging   [] CTA abdomen    [] CTA abdomen & pelvis    [] CT abdomen with/ without contrast  [] CT abdomen with contrast  [] CT abdomen without contrast    [] CT abdomen & pelvis with/ without contrast  [] CT abdomen & pelvis with contrast  [] CT abdomen & pelvis without contrast    Office Visit   [] New patient, patient last seen over 3 years ago  [] Risk factor modification (RFM)   [x] Follow up

## 2022-08-12 ENCOUNTER — OFFICE VISIT (OUTPATIENT)
Dept: FAMILY MEDICINE CLINIC | Facility: HOSPITAL | Age: 82
End: 2022-08-12
Payer: COMMERCIAL

## 2022-08-12 VITALS
HEART RATE: 69 BPM | SYSTOLIC BLOOD PRESSURE: 110 MMHG | HEIGHT: 67 IN | DIASTOLIC BLOOD PRESSURE: 72 MMHG | OXYGEN SATURATION: 95 % | WEIGHT: 162 LBS | TEMPERATURE: 97.7 F | BODY MASS INDEX: 25.43 KG/M2 | RESPIRATION RATE: 16 BRPM

## 2022-08-12 DIAGNOSIS — M25.561 CHRONIC PAIN OF RIGHT KNEE: ICD-10-CM

## 2022-08-12 DIAGNOSIS — G89.29 CHRONIC PAIN OF RIGHT KNEE: ICD-10-CM

## 2022-08-12 DIAGNOSIS — G89.29 CHRONIC NECK PAIN: ICD-10-CM

## 2022-08-12 DIAGNOSIS — M54.2 CHRONIC NECK PAIN: ICD-10-CM

## 2022-08-12 DIAGNOSIS — R05.3 CHRONIC COUGH: ICD-10-CM

## 2022-08-12 DIAGNOSIS — R41.0 CONFUSION: Primary | ICD-10-CM

## 2022-08-12 PROBLEM — F10.10 ALCOHOL ABUSE: Status: RESOLVED | Noted: 2020-09-06 | Resolved: 2022-08-12

## 2022-08-12 PROBLEM — M25.569 KNEE PAIN: Status: ACTIVE | Noted: 2022-08-12

## 2022-08-12 PROBLEM — F10.20 ALCOHOL DEPENDENCY (HCC): Status: RESOLVED | Noted: 2017-05-02 | Resolved: 2022-08-12

## 2022-08-12 PROBLEM — F10.10 ALCOHOL ABUSE, CONTINUOUS: Status: RESOLVED | Noted: 2017-04-13 | Resolved: 2022-08-12

## 2022-08-12 LAB
SL AMB  POCT GLUCOSE, UA: ABNORMAL
SL AMB LEUKOCYTE ESTERASE,UA: ABNORMAL
SL AMB POCT BILIRUBIN,UA: ABNORMAL
SL AMB POCT BLOOD,UA: ABNORMAL
SL AMB POCT CLARITY,UA: ABNORMAL
SL AMB POCT COLOR,UA: YELLOW
SL AMB POCT KETONES,UA: ABNORMAL
SL AMB POCT NITRITE,UA: ABNORMAL
SL AMB POCT PH,UA: 5.5
SL AMB POCT SPECIFIC GRAVITY,UA: 1.03
SL AMB POCT URINE PROTEIN: ABNORMAL
SL AMB POCT UROBILINOGEN: ABNORMAL

## 2022-08-12 PROCEDURE — 99214 OFFICE O/P EST MOD 30 MIN: CPT | Performed by: STUDENT IN AN ORGANIZED HEALTH CARE EDUCATION/TRAINING PROGRAM

## 2022-08-12 PROCEDURE — 81003 URINALYSIS AUTO W/O SCOPE: CPT | Performed by: STUDENT IN AN ORGANIZED HEALTH CARE EDUCATION/TRAINING PROGRAM

## 2022-08-12 RX ORDER — LORATADINE 10 MG/1
10 TABLET ORAL DAILY
Qty: 90 TABLET | Refills: 1 | Status: SHIPPED | OUTPATIENT
Start: 2022-08-12 | End: 2022-10-13 | Stop reason: ALTCHOICE

## 2022-08-12 NOTE — PROGRESS NOTES
[facility],     Assessment/Plan:      Diagnosis ICD-10-CM Associated Orders   1  Confusion  R41 0 POCT urine dip auto non-scope   2  Chronic neck pain  M54 2     G89 29    3  BMI 25 0-25 9,adult  Z68 25    4  Chronic pain of right knee  M25 561     G89 29    5  Chronic cough  R05 3      • R/O UTI  Leuk/Nits negative  No abx needed  • R Knee XR ordered  F/U in 2 months  • Patient may call or return to office with any questions or concerns  ______________________________________________________________________  Subjective:     Patient ID: Gracy Woodruff is a 81 y o  male  HPI  Gracy Woodruff  Chief Complaint   Patient presents with   • Follow-up     As per message in chart --start with urine dip  I ordered and did  C/o right knee pain and a cough  R knee pain worsening past few months 9/10  Uses aleve & lido gel  Could try voltaren gel  Likes to walk lifelong, now needing walker  Feels knee will buckle, and had to be caught by [employer]  No recent trauma  No prior knee surg  The following portions of the patient's history were reviewed and updated as appropriate: allergies, current medications, past medical history, and problem list     Review of Systems   Constitutional: Negative for chills and fever  HENT: Negative for congestion and sore throat  Respiratory: Negative for cough and shortness of breath  Cardiovascular: Negative for chest pain and palpitations  Objective:      Vitals:    08/12/22 1538   BP: 110/72   Pulse: 69   Resp: 16   Temp: 97 7 °F (36 5 °C)   SpO2: 95%      Physical Exam  Vitals reviewed  Constitutional:       General: He is not in acute distress  Appearance: He is well-developed  He is not ill-appearing  HENT:      Head: Normocephalic and atraumatic  Eyes:      General: No scleral icterus  Right eye: No discharge  Left eye: No discharge     Cardiovascular:      Rate and Rhythm: Normal rate and regular rhythm  Pulses: Normal pulses  Heart sounds: Normal heart sounds  No murmur heard  Pulmonary:      Effort: Pulmonary effort is normal  No respiratory distress  Breath sounds: Normal breath sounds  No stridor  No wheezing  Abdominal:      General: Bowel sounds are normal       Tenderness: There is no abdominal tenderness  There is no right CVA tenderness or left CVA tenderness  Musculoskeletal:      Cervical back: Normal range of motion  Comments: R knee crepitus, mild tenderness   Skin:     General: Skin is warm and dry  Neurological:      Mental Status: He is alert and oriented to person, place, and time  Psychiatric:         Mood and Affect: Mood normal          Behavior: Behavior normal          Thought Content: Thought content normal          Judgment: Judgment normal            Portions of the record may have been created with voice recognition software  Occasional wrong word or "sound alike" substitutions may have occurred due to the inherent limitations of voice recognition software  Please review the chart carefully and recognize, using context, where substitutions/typographical errors may have occurred

## 2022-08-17 ENCOUNTER — HOSPITAL ENCOUNTER (OUTPATIENT)
Dept: RADIOLOGY | Facility: HOSPITAL | Age: 82
Discharge: HOME | End: 2022-08-17
Attending: STUDENT IN AN ORGANIZED HEALTH CARE EDUCATION/TRAINING PROGRAM
Payer: COMMERCIAL

## 2022-08-17 DIAGNOSIS — M25.561 CHRONIC PAIN OF RIGHT KNEE: ICD-10-CM

## 2022-08-17 DIAGNOSIS — G89.29 CHRONIC PAIN OF RIGHT KNEE: ICD-10-CM

## 2022-08-17 PROCEDURE — 73562 X-RAY EXAM OF KNEE 3: CPT

## 2022-08-18 DIAGNOSIS — G45.9 TIA (TRANSIENT ISCHEMIC ATTACK): ICD-10-CM

## 2022-08-18 RX ORDER — THIAMINE MONONITRATE (VIT B1) 100 MG
100 TABLET ORAL DAILY
Qty: 90 TABLET | Refills: 0 | Status: SHIPPED | OUTPATIENT
Start: 2022-08-18 | End: 2022-10-13 | Stop reason: ALTCHOICE

## 2022-08-24 ENCOUNTER — HOSPITAL ENCOUNTER (INPATIENT)
Facility: HOSPITAL | Age: 82
LOS: 1 days | Discharge: HOME | DRG: 078 | End: 2022-08-26
Attending: EMERGENCY MEDICINE | Admitting: STUDENT IN AN ORGANIZED HEALTH CARE EDUCATION/TRAINING PROGRAM
Payer: COMMERCIAL

## 2022-08-24 ENCOUNTER — APPOINTMENT (EMERGENCY)
Dept: CT IMAGING | Facility: HOSPITAL | Age: 82
DRG: 078 | End: 2022-08-24
Payer: COMMERCIAL

## 2022-08-24 ENCOUNTER — APPOINTMENT (OUTPATIENT)
Dept: RADIOLOGY | Facility: HOSPITAL | Age: 82
DRG: 078 | End: 2022-08-24
Payer: COMMERCIAL

## 2022-08-24 ENCOUNTER — OFFICE VISIT (OUTPATIENT)
Dept: PODIATRY | Facility: CLINIC | Age: 82
End: 2022-08-24
Payer: COMMERCIAL

## 2022-08-24 VITALS — BODY MASS INDEX: 25.36 KG/M2 | WEIGHT: 161.6 LBS | HEIGHT: 67 IN

## 2022-08-24 DIAGNOSIS — I25.810 CORONARY ARTERY DISEASE INVOLVING AUTOLOGOUS VEIN CORONARY BYPASS GRAFT WITHOUT ANGINA PECTORIS: ICD-10-CM
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
## 2022-08-24 DIAGNOSIS — G93.40 ACUTE ENCEPHALOPATHY: ICD-10-CM

## 2022-08-24 DIAGNOSIS — L60.3 DYSTROPHIC NAIL: ICD-10-CM

## 2022-08-24 DIAGNOSIS — B35.1 ONYCHOMYCOSIS: ICD-10-CM

## 2022-08-24 DIAGNOSIS — R41.82 ALTERED MENTAL STATUS: Primary | ICD-10-CM

## 2022-08-24 DIAGNOSIS — I73.9 PERIPHERAL ARTERIAL DISEASE (HCC): Primary | ICD-10-CM

## 2022-08-24 PROBLEM — Z87.898 HISTORY OF SEIZURE: Status: ACTIVE | Noted: 2021-11-12

## 2022-08-24 LAB
2HR DELTA HS TROPONIN: 0 NG/L
4HR DELTA HS TROPONIN: 1 NG/L
ALBUMIN SERPL BCP-MCNC: 3.5 G/DL (ref 3.5–5)
ALP SERPL-CCNC: 71 U/L (ref 46–116)
ALT SERPL W P-5'-P-CCNC: 36 U/L (ref 12–78)
ANION GAP SERPL CALCULATED.3IONS-SCNC: 6 MMOL/L (ref 4–13)
APAP SERPL-MCNC: <2 UG/ML (ref 10–20)
APTT PPP: 26 SECONDS (ref 23–37)
AST SERPL W P-5'-P-CCNC: 19 U/L (ref 5–45)
ATRIAL RATE: 55 BPM
BASOPHILS # BLD AUTO: 0.02 THOUSANDS/ΜL (ref 0–0.1)
BASOPHILS NFR BLD AUTO: 0 % (ref 0–1)
BILIRUB SERPL-MCNC: 1 MG/DL (ref 0.2–1)
BILIRUB UR QL STRIP: NEGATIVE
BUN SERPL-MCNC: 15 MG/DL (ref 5–25)
CALCIUM SERPL-MCNC: 8.7 MG/DL (ref 8.3–10.1)
CARDIAC TROPONIN I PNL SERPL HS: 5 NG/L
CARDIAC TROPONIN I PNL SERPL HS: 5 NG/L
CARDIAC TROPONIN I PNL SERPL HS: 6 NG/L
CHLORIDE SERPL-SCNC: 104 MMOL/L (ref 96–108)
CLARITY UR: CLEAR
CO2 SERPL-SCNC: 30 MMOL/L (ref 21–32)
COLOR UR: YELLOW
CREAT SERPL-MCNC: 0.95 MG/DL (ref 0.6–1.3)
EOSINOPHIL # BLD AUTO: 0.08 THOUSAND/ΜL (ref 0–0.61)
EOSINOPHIL NFR BLD AUTO: 1 % (ref 0–6)
ERYTHROCYTE [DISTWIDTH] IN BLOOD BY AUTOMATED COUNT: 22.1 % (ref 11.6–15.1)
ETHANOL SERPL-MCNC: <3 MG/DL (ref 0–3)
GFR SERPL CREATININE-BSD FRML MDRD: 74 ML/MIN/1.73SQ M
GLUCOSE SERPL-MCNC: 100 MG/DL (ref 65–140)
GLUCOSE SERPL-MCNC: 92 MG/DL (ref 65–140)
GLUCOSE UR STRIP-MCNC: NEGATIVE MG/DL
HCT VFR BLD AUTO: 42.2 % (ref 36.5–49.3)
HGB BLD-MCNC: 13.4 G/DL (ref 12–17)
HGB UR QL STRIP.AUTO: NEGATIVE
IMM GRANULOCYTES # BLD AUTO: 0.01 THOUSAND/UL (ref 0–0.2)
IMM GRANULOCYTES NFR BLD AUTO: 0 % (ref 0–2)
INR PPP: 1.06 (ref 0.84–1.19)
KETONES UR STRIP-MCNC: NEGATIVE MG/DL
LEUKOCYTE ESTERASE UR QL STRIP: NEGATIVE
LYMPHOCYTES # BLD AUTO: 1.08 THOUSANDS/ΜL (ref 0.6–4.47)
LYMPHOCYTES NFR BLD AUTO: 16 % (ref 14–44)
MCH RBC QN AUTO: 25.1 PG (ref 26.8–34.3)
MCHC RBC AUTO-ENTMCNC: 31.8 G/DL (ref 31.4–37.4)
MCV RBC AUTO: 79 FL (ref 82–98)
MONOCYTES # BLD AUTO: 0.54 THOUSAND/ΜL (ref 0.17–1.22)
MONOCYTES NFR BLD AUTO: 8 % (ref 4–12)
NEUTROPHILS # BLD AUTO: 5.07 THOUSANDS/ΜL (ref 1.85–7.62)
NEUTS SEG NFR BLD AUTO: 75 % (ref 43–75)
NITRITE UR QL STRIP: NEGATIVE
NRBC BLD AUTO-RTO: 0 /100 WBCS
P AXIS: 6 DEGREES
PH UR STRIP.AUTO: 7 [PH]
PLATELET # BLD AUTO: 183 THOUSANDS/UL (ref 149–390)
PMV BLD AUTO: 8.8 FL (ref 8.9–12.7)
POTASSIUM SERPL-SCNC: 4.8 MMOL/L (ref 3.5–5.3)
PR INTERVAL: 166 MS
PROT SERPL-MCNC: 6.9 G/DL (ref 6.4–8.4)
PROT UR STRIP-MCNC: NEGATIVE MG/DL
PROTHROMBIN TIME: 14.6 SECONDS (ref 11.6–14.5)
QRS AXIS: 75 DEGREES
QRSD INTERVAL: 96 MS
QT INTERVAL: 446 MS
QTC INTERVAL: 426 MS
RBC # BLD AUTO: 5.34 MILLION/UL (ref 3.88–5.62)
SALICYLATES SERPL-MCNC: <3 MG/DL (ref 3–20)
SODIUM SERPL-SCNC: 140 MMOL/L (ref 135–147)
SP GR UR STRIP.AUTO: 1.01 (ref 1–1.03)
T WAVE AXIS: 79 DEGREES
TSH SERPL DL<=0.05 MIU/L-ACNC: 0.98 UIU/ML (ref 0.45–4.5)
UROBILINOGEN UR QL STRIP.AUTO: 1 E.U./DL
VENTRICULAR RATE: 55 BPM
WBC # BLD AUTO: 6.8 THOUSAND/UL (ref 4.31–10.16)

## 2022-08-24 PROCEDURE — 93005 ELECTROCARDIOGRAM TRACING: CPT

## 2022-08-24 PROCEDURE — 85025 COMPLETE CBC W/AUTO DIFF WBC: CPT | Performed by: EMERGENCY MEDICINE

## 2022-08-24 PROCEDURE — 84484 ASSAY OF TROPONIN QUANT: CPT | Performed by: PHYSICIAN ASSISTANT

## 2022-08-24 PROCEDURE — 85730 THROMBOPLASTIN TIME PARTIAL: CPT | Performed by: EMERGENCY MEDICINE

## 2022-08-24 PROCEDURE — 11721 DEBRIDE NAIL 6 OR MORE: CPT | Performed by: PODIATRIST

## 2022-08-24 PROCEDURE — 81003 URINALYSIS AUTO W/O SCOPE: CPT | Performed by: EMERGENCY MEDICINE

## 2022-08-24 PROCEDURE — G1004 CDSM NDSC: HCPCS

## 2022-08-24 PROCEDURE — 99202 OFFICE O/P NEW SF 15 MIN: CPT | Performed by: PODIATRIST

## 2022-08-24 PROCEDURE — 82948 REAGENT STRIP/BLOOD GLUCOSE: CPT

## 2022-08-24 PROCEDURE — 99285 EMERGENCY DEPT VISIT HI MDM: CPT

## 2022-08-24 PROCEDURE — 93010 ELECTROCARDIOGRAM REPORT: CPT | Performed by: INTERNAL MEDICINE

## 2022-08-24 PROCEDURE — 36415 COLL VENOUS BLD VENIPUNCTURE: CPT | Performed by: EMERGENCY MEDICINE

## 2022-08-24 PROCEDURE — 85610 PROTHROMBIN TIME: CPT | Performed by: EMERGENCY MEDICINE

## 2022-08-24 PROCEDURE — 70496 CT ANGIOGRAPHY HEAD: CPT

## 2022-08-24 PROCEDURE — 82077 ASSAY SPEC XCP UR&BREATH IA: CPT | Performed by: PHYSICIAN ASSISTANT

## 2022-08-24 PROCEDURE — 99285 EMERGENCY DEPT VISIT HI MDM: CPT | Performed by: EMERGENCY MEDICINE

## 2022-08-24 PROCEDURE — 80177 DRUG SCRN QUAN LEVETIRACETAM: CPT | Performed by: EMERGENCY MEDICINE

## 2022-08-24 PROCEDURE — 84443 ASSAY THYROID STIM HORMONE: CPT | Performed by: STUDENT IN AN ORGANIZED HEALTH CARE EDUCATION/TRAINING PROGRAM

## 2022-08-24 PROCEDURE — 84484 ASSAY OF TROPONIN QUANT: CPT | Performed by: EMERGENCY MEDICINE

## 2022-08-24 PROCEDURE — 80053 COMPREHEN METABOLIC PANEL: CPT | Performed by: EMERGENCY MEDICINE

## 2022-08-24 PROCEDURE — 80179 DRUG ASSAY SALICYLATE: CPT | Performed by: PHYSICIAN ASSISTANT

## 2022-08-24 PROCEDURE — 71045 X-RAY EXAM CHEST 1 VIEW: CPT

## 2022-08-24 PROCEDURE — 70498 CT ANGIOGRAPHY NECK: CPT

## 2022-08-24 PROCEDURE — 99220 PR INITIAL OBSERVATION CARE/DAY 70 MINUTES: CPT | Performed by: PHYSICIAN ASSISTANT

## 2022-08-24 PROCEDURE — 80143 DRUG ASSAY ACETAMINOPHEN: CPT | Performed by: PHYSICIAN ASSISTANT

## 2022-08-24 RX ORDER — LANOLIN ALCOHOL/MO/W.PET/CERES
100 CREAM (GRAM) TOPICAL DAILY
Status: DISCONTINUED | OUTPATIENT
Start: 2022-08-25 | End: 2022-08-26 | Stop reason: HOSPADM

## 2022-08-24 RX ORDER — ATORVASTATIN CALCIUM 40 MG/1
40 TABLET, FILM COATED ORAL
Status: DISCONTINUED | OUTPATIENT
Start: 2022-08-24 | End: 2022-08-26 | Stop reason: HOSPADM

## 2022-08-24 RX ORDER — FOLIC ACID 1 MG/1
1 TABLET ORAL DAILY
Status: DISCONTINUED | OUTPATIENT
Start: 2022-08-25 | End: 2022-08-26 | Stop reason: HOSPADM

## 2022-08-24 RX ORDER — LANOLIN ALCOHOL/MO/W.PET/CERES
100 CREAM (GRAM) TOPICAL DAILY
COMMUNITY
Start: 2022-08-18 | End: 2022-08-29

## 2022-08-24 RX ORDER — CLOPIDOGREL BISULFATE 75 MG/1
75 TABLET ORAL DAILY
Status: DISCONTINUED | OUTPATIENT
Start: 2022-08-25 | End: 2022-08-26 | Stop reason: HOSPADM

## 2022-08-24 RX ORDER — ESCITALOPRAM OXALATE 20 MG/1
20 TABLET ORAL DAILY
Status: DISCONTINUED | OUTPATIENT
Start: 2022-08-25 | End: 2022-08-26 | Stop reason: HOSPADM

## 2022-08-24 RX ORDER — LEVETIRACETAM 100 MG/ML
750 SOLUTION ORAL 2 TIMES DAILY
Status: DISCONTINUED | OUTPATIENT
Start: 2022-08-24 | End: 2022-08-26 | Stop reason: HOSPADM

## 2022-08-24 RX ORDER — ASPIRIN 81 MG/1
81 TABLET ORAL DAILY
Status: DISCONTINUED | OUTPATIENT
Start: 2022-08-25 | End: 2022-08-26 | Stop reason: HOSPADM

## 2022-08-24 RX ORDER — PANTOPRAZOLE SODIUM 40 MG/1
40 TABLET, DELAYED RELEASE ORAL DAILY
Status: DISCONTINUED | OUTPATIENT
Start: 2022-08-25 | End: 2022-08-26 | Stop reason: HOSPADM

## 2022-08-24 RX ORDER — LORATADINE 10 MG/1
10 TABLET ORAL DAILY
Status: DISCONTINUED | OUTPATIENT
Start: 2022-08-25 | End: 2022-08-26 | Stop reason: HOSPADM

## 2022-08-24 RX ORDER — ENOXAPARIN SODIUM 100 MG/ML
40 INJECTION SUBCUTANEOUS DAILY
Status: DISCONTINUED | OUTPATIENT
Start: 2022-08-25 | End: 2022-08-26 | Stop reason: HOSPADM

## 2022-08-24 RX ADMIN — IOHEXOL 65 ML: 350 INJECTION, SOLUTION INTRAVENOUS at 15:02

## 2022-08-24 RX ADMIN — ATORVASTATIN CALCIUM 40 MG: 40 TABLET, FILM COATED ORAL at 18:27

## 2022-08-24 RX ADMIN — LEVETIRACETAM 750 MG: 100 SOLUTION ORAL at 18:27

## 2022-08-24 NOTE — PLAN OF CARE
Problem: Potential for Falls  Goal: Patient will remain free of falls  Description: INTERVENTIONS:  - Educate patient/family on patient safety including physical limitations  - Instruct patient to call for assistance with activity   - Consult OT/PT to assist with strengthening/mobility   - Keep Call bell within reach  - Keep bed low and locked with side rails adjusted as appropriate  - Keep care items and personal belongings within reach  - Initiate and maintain comfort rounds  - Make Fall Risk Sign visible to staff  - Offer Toileting every 2 Hours, in advance of need  - Initiate/Maintain bed alarm  - Obtain necessary fall risk management equipment: socks  - Apply yellow socks and bracelet for high fall risk patients  - Consider moving patient to room near nurses station  Outcome: Progressing     Problem: MOBILITY - ADULT  Goal: Maintain or return to baseline ADL function  Description: INTERVENTIONS:  - Educate patient/family on patient safety including physical limitations  - Instruct patient to call for assistance with activity   - Consult OT/PT to assist with strengthening/mobility   - Keep Call bell within reach  - Keep bed low and locked with side rails adjusted as appropriate  - Keep care items and personal belongings within reach  - Initiate and maintain comfort rounds  - Make Fall Risk Sign visible to staff  - Offer Toileting every 2 Hours, in advance of need  - Initiate/Maintain bed alarm  - Obtain necessary fall risk management equipment: socks  - Apply yellow socks and bracelet for high fall risk patients  - Consider moving patient to room near nurses station  Outcome: Progressing  Goal: Maintains/Returns to pre admission functional level  Description: INTERVENTIONS:  - Perform BMAT or MOVE assessment daily    - Set and communicate daily mobility goal to care team and patient/family/caregiver     - Collaborate with rehabilitation services on mobility goals if consulted  - Perform Range of Motion 3 times a day   - Reposition patient every 2 hours    - Dangle patient 3 times a day  - Stand patient 3 times a day  - Ambulate patient 3 times a day  - Out of bed to chair 3 times a day   - Out of bed for meals 3 times a day  - Out of bed for toileting  - Record patient progress and toleration of activity level   Outcome: Progressing     Problem: PAIN - ADULT  Goal: Verbalizes/displays adequate comfort level or baseline comfort level  Description: Interventions:  - Encourage patient to monitor pain and request assistance  - Assess pain using appropriate pain scale  - Administer analgesics based on type and severity of pain and evaluate response  - Implement non-pharmacological measures as appropriate and evaluate response  - Consider cultural and social influences on pain and pain management  - Notify physician/advanced practitioner if interventions unsuccessful or patient reports new pain  Outcome: Progressing     Problem: INFECTION - ADULT  Goal: Absence or prevention of progression during hospitalization  Description: INTERVENTIONS:  - Assess and monitor for signs and symptoms of infection  - Monitor lab/diagnostic results  - Monitor all insertion sites, i e  indwelling lines, tubes, and drains  - Monitor endotracheal if appropriate and nasal secretions for changes in amount and color  - [place] appropriate cooling/warming therapies per order  - Administer medications as ordered  - Instruct and encourage patient and family to use good hand hygiene technique  - Identify and instruct in appropriate isolation precautions for identified infection/condition  Outcome: Progressing     Problem: SAFETY ADULT  Goal: Patient will remain free of falls  Description: INTERVENTIONS:  - Educate patient/family on patient safety including physical limitations  - Instruct patient to call for assistance with activity   - Consult OT/PT to assist with strengthening/mobility   - Keep Call bell within reach  - Keep bed low and locked with side rails adjusted as appropriate  - Keep care items and personal belongings within reach  - Initiate and maintain comfort rounds  - Make Fall Risk Sign visible to staff  - Offer Toileting every 2 Hours, in advance of need  - Initiate/Maintain bed alarm  - Obtain necessary fall risk management equipment: socks  - Apply yellow socks and bracelet for high fall risk patients  - Consider moving patient to room near nurses station  Outcome: Progressing  Goal: Maintain or return to baseline ADL function  Description: INTERVENTIONS:  - Educate patient/family on patient safety including physical limitations  - Instruct patient to call for assistance with activity   - Consult OT/PT to assist with strengthening/mobility   - Keep Call bell within reach  - Keep bed low and locked with side rails adjusted as appropriate  - Keep care items and personal belongings within reach  - Initiate and maintain comfort rounds  - Make Fall Risk Sign visible to staff  - Offer Toileting every 2 Hours, in advance of need  - Initiate/Maintain bed alarm  - Obtain necessary fall risk management equipment: socks  - Apply yellow socks and bracelet for high fall risk patients  - Consider moving patient to room near nurses station  Outcome: Progressing  Goal: Maintains/Returns to pre admission functional level  Description: INTERVENTIONS:  - Perform BMAT or MOVE assessment daily    - Set and communicate daily mobility goal to care team and patient/family/caregiver  - Collaborate with rehabilitation services on mobility goals if consulted  - Perform Range of Motion 3 times a day  - Reposition patient every 2 hours    - Dangle patient 3 times a day  - Stand patient 3 times a day  - Ambulate patient 3 times a day  - Out of bed to chair 3 times a day   - Out of bed for meals 3 times a day  - Out of bed for toileting  - Record patient progress and toleration of activity level   Outcome: Progressing     Problem: DISCHARGE PLANNING  Goal: Discharge to home or other facility with appropriate resources  Description: INTERVENTIONS:  - Identify barriers to discharge w/patient and caregiver  - Arrange for needed discharge resources and transportation as appropriate  - Identify discharge learning needs (meds, wound care, etc )  - Arrange for interpretive services to assist at discharge as needed  - Refer to Case Management Department for coordinating discharge planning if the patient needs post-hospital services based on physician/advanced practitioner order or complex needs related to functional status, cognitive ability, or social support system  Outcome: Progressing     Problem: Knowledge Deficit  Goal: Patient/family/caregiver demonstrates understanding of disease process, treatment plan, medications, and discharge instructions  Description: Complete learning assessment and assess knowledge base  Interventions:  - Provide teaching at level of understanding  - Provide teaching via preferred learning methods  Outcome: Progressing     Problem: Neurological Deficit  Goal: Neurological status is stable or improving  Description: Interventions:  - Monitor and assess patient's level of consciousness, motor function, sensory function, and level of assistance needed for ADLs  - Monitor and report changes from baseline  Collaborate with interdisciplinary team to initiate plan and implement interventions as ordered  - Provide and maintain a safe environment  - Consider seizure precautions  - Consider fall precautions  - Consider aspiration precautions  - Consider bleeding precautions    Outcome: Progressing

## 2022-08-24 NOTE — ASSESSMENT & PLAN NOTE
· Continue ASA/Plavix, statin  · Follow-up MRI brain  · Patient reportedly does not have deficits from prior CVA
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
## 2022-08-24 NOTE — ASSESSMENT & PLAN NOTE
· Patient presented to the emergency department due to episode of confusion, reported repetitive speech noted by patient's caretaker earlier today  · Nonfocal neuro exam on admission  Speech is clear  Does appear somewhat confused, is able to elaborate on certain details earlier today however does not know the year or the month  · Elevated blood pressures on arrival may be contributing  · CTA head/neck:  No acute intracranial abnormality  Chronic infarcts  No large vessel intracranial occlusion  Moderate stenosis of the ICA segments  Bilateral intracranial vertebral artery stenosis similar to prior  Moderate left extracranial ICA stenosis, improved  Cervical vertebral arteries are patent    · Labs unremarkable on admission  · Check coma panel  · UA pending  · TSH pending  · ED attending discussed with Neurology - recommended stroke pathway but presentation appears more likely metabolic or possibly seizure  · MRI brain  · Echocardiogram  · Telemetry  · Hemoglobin A1c, lipid panel  · Patient is already on ASA/Plavix, statin - continue  · Also prior seizure history, obtain EEG  · Continue neuro checks per protocol  · PT/OT consult  · Case management consult

## 2022-08-24 NOTE — ASSESSMENT & PLAN NOTE
· Elevated blood pressures on presentation  · -180s  · Hold carvedilol to allow for permissive hypertension until CVA is ruled out however elevated blood pressures may be contributing to mental status change  · Hold Coreg
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
## 2022-08-24 NOTE — ASSESSMENT & PLAN NOTE
· Continue Lexapro  · Patient's sister reports that his wife had passed away around this time of year in 2011 and she noticed he seemed "off" about a week ago and expressed concern depression may be contributing to his presentation  · Further chart review reveals that patient's caregiver Joyce Brochure had sent a message to the PCP on 08/15/2022 concerned regarding patient's moods  · If metabolic/neurologic cause of mental status change ruled out, consider Psychiatry consult

## 2022-08-25 ENCOUNTER — APPOINTMENT (OUTPATIENT)
Dept: MRI IMAGING | Facility: HOSPITAL | Age: 82
DRG: 078 | End: 2022-08-25
Payer: COMMERCIAL

## 2022-08-25 ENCOUNTER — APPOINTMENT (OUTPATIENT)
Dept: NON INVASIVE DIAGNOSTICS | Facility: HOSPITAL | Age: 82
DRG: 078 | End: 2022-08-25
Payer: COMMERCIAL

## 2022-08-25 ENCOUNTER — HOSPITAL ENCOUNTER (OUTPATIENT)
Dept: NEUROLOGY | Facility: HOSPITAL | Age: 82
DRG: 078 | End: 2022-08-25
Payer: COMMERCIAL

## 2022-08-25 LAB
ALBUMIN SERPL BCP-MCNC: 3.6 G/DL (ref 3.5–5)
ALP SERPL-CCNC: 77 U/L (ref 46–116)
ALT SERPL W P-5'-P-CCNC: 34 U/L (ref 12–78)
ANION GAP SERPL CALCULATED.3IONS-SCNC: 6 MMOL/L (ref 4–13)
AORTIC ROOT: 3.1 CM
APICAL FOUR CHAMBER EJECTION FRACTION: 60 %
ASCENDING AORTA: 3.1 CM
AST SERPL W P-5'-P-CCNC: 18 U/L (ref 5–45)
BASOPHILS # BLD AUTO: 0.02 THOUSANDS/ΜL (ref 0–0.1)
BASOPHILS NFR BLD AUTO: 0 % (ref 0–1)
BILIRUB SERPL-MCNC: 1.2 MG/DL (ref 0.2–1)
BUN SERPL-MCNC: 13 MG/DL (ref 5–25)
CALCIUM SERPL-MCNC: 9 MG/DL (ref 8.3–10.1)
CHLORIDE SERPL-SCNC: 104 MMOL/L (ref 96–108)
CHOLEST SERPL-MCNC: 115 MG/DL
CO2 SERPL-SCNC: 30 MMOL/L (ref 21–32)
CREAT SERPL-MCNC: 0.81 MG/DL (ref 0.6–1.3)
DOP CALC LVOT AREA: 2.83 CM2
DOP CALC LVOT DIAMETER: 1.9 CM
E WAVE DECELERATION TIME: 261 MS
EOSINOPHIL # BLD AUTO: 0.12 THOUSAND/ΜL (ref 0–0.61)
EOSINOPHIL NFR BLD AUTO: 2 % (ref 0–6)
ERYTHROCYTE [DISTWIDTH] IN BLOOD BY AUTOMATED COUNT: 22 % (ref 11.6–15.1)
EST. AVERAGE GLUCOSE BLD GHB EST-MCNC: 111 MG/DL
FRACTIONAL SHORTENING: 27 % (ref 28–44)
GFR SERPL CREATININE-BSD FRML MDRD: 83 ML/MIN/1.73SQ M
GLUCOSE P FAST SERPL-MCNC: 85 MG/DL (ref 65–99)
GLUCOSE SERPL-MCNC: 85 MG/DL (ref 65–140)
HBA1C MFR BLD: 5.5 %
HCT VFR BLD AUTO: 46.5 % (ref 36.5–49.3)
HDLC SERPL-MCNC: 62 MG/DL
HGB BLD-MCNC: 14.1 G/DL (ref 12–17)
IMM GRANULOCYTES # BLD AUTO: 0.01 THOUSAND/UL (ref 0–0.2)
IMM GRANULOCYTES NFR BLD AUTO: 0 % (ref 0–2)
INTERVENTRICULAR SEPTUM IN DIASTOLE (PARASTERNAL SHORT AXIS VIEW): 1.3 CM
INTERVENTRICULAR SEPTUM: 1.3 CM (ref 0.6–1.1)
LA/AORTA RATIO 2D: 1.1
LAAS-AP2: 12.2 CM2
LAAS-AP4: 18.2 CM2
LDLC SERPL CALC-MCNC: 43 MG/DL (ref 0–100)
LEFT ATRIUM SIZE: 3.4 CM
LEFT INTERNAL DIMENSION IN SYSTOLE: 3.2 CM (ref 2.1–4)
LEFT VENTRICULAR INTERNAL DIMENSION IN DIASTOLE: 4.4 CM (ref 3.5–6)
LEFT VENTRICULAR POSTERIOR WALL IN END DIASTOLE: 1.2 CM
LEFT VENTRICULAR STROKE VOLUME: 50 ML
LVSV (TEICH): 50 ML
LYMPHOCYTES # BLD AUTO: 1.65 THOUSANDS/ΜL (ref 0.6–4.47)
LYMPHOCYTES NFR BLD AUTO: 24 % (ref 14–44)
MAGNESIUM SERPL-MCNC: 2.2 MG/DL (ref 1.6–2.6)
MCH RBC QN AUTO: 24 PG (ref 26.8–34.3)
MCHC RBC AUTO-ENTMCNC: 30.3 G/DL (ref 31.4–37.4)
MCV RBC AUTO: 79 FL (ref 82–98)
MONOCYTES # BLD AUTO: 0.67 THOUSAND/ΜL (ref 0.17–1.22)
MONOCYTES NFR BLD AUTO: 10 % (ref 4–12)
MV E'TISSUE VEL-LAT: 9 CM/S
MV E'TISSUE VEL-SEP: 5 CM/S
MV PEAK A VEL: 0.68 M/S
MV PEAK E VEL: 40 CM/S
MV STENOSIS PRESSURE HALF TIME: 76 MS
MV VALVE AREA P 1/2 METHOD: 2.89 CM2
NEUTROPHILS # BLD AUTO: 4.47 THOUSANDS/ΜL (ref 1.85–7.62)
NEUTS SEG NFR BLD AUTO: 64 % (ref 43–75)
NRBC BLD AUTO-RTO: 0 /100 WBCS
PLATELET # BLD AUTO: 174 THOUSANDS/UL (ref 149–390)
PMV BLD AUTO: 8.7 FL (ref 8.9–12.7)
POTASSIUM SERPL-SCNC: 4 MMOL/L (ref 3.5–5.3)
PROT SERPL-MCNC: 7.3 G/DL (ref 6.4–8.4)
RBC # BLD AUTO: 5.88 MILLION/UL (ref 3.88–5.62)
RIGHT VENTRICLE ID DIMENSION: 4.3 CM
SL CV LV EF: 55
SL CV PED ECHO LEFT VENTRICLE DIASTOLIC VOLUME (MOD BIPLANE) 2D: 90 ML
SL CV PED ECHO LEFT VENTRICLE SYSTOLIC VOLUME (MOD BIPLANE) 2D: 39 ML
SODIUM SERPL-SCNC: 140 MMOL/L (ref 135–147)
TR MAX PG: 28 MMHG
TR PEAK VELOCITY: 2.7 M/S
TRICUSPID VALVE PEAK REGURGITATION VELOCITY: 2.66 M/S
TRIGL SERPL-MCNC: 52 MG/DL
WBC # BLD AUTO: 6.94 THOUSAND/UL (ref 4.31–10.16)

## 2022-08-25 PROCEDURE — 80053 COMPREHEN METABOLIC PANEL: CPT | Performed by: PHYSICIAN ASSISTANT

## 2022-08-25 PROCEDURE — 93321 DOPPLER ECHO F-UP/LMTD STD: CPT | Performed by: INTERNAL MEDICINE

## 2022-08-25 PROCEDURE — 99226 PR SBSQ OBSERVATION CARE/DAY 35 MINUTES: CPT | Performed by: PHYSICIAN ASSISTANT

## 2022-08-25 PROCEDURE — 70551 MRI BRAIN STEM W/O DYE: CPT

## 2022-08-25 PROCEDURE — 80061 LIPID PANEL: CPT | Performed by: PHYSICIAN ASSISTANT

## 2022-08-25 PROCEDURE — 93325 DOPPLER ECHO COLOR FLOW MAPG: CPT | Performed by: INTERNAL MEDICINE

## 2022-08-25 PROCEDURE — 93308 TTE F-UP OR LMTD: CPT | Performed by: INTERNAL MEDICINE

## 2022-08-25 PROCEDURE — 83036 HEMOGLOBIN GLYCOSYLATED A1C: CPT | Performed by: PHYSICIAN ASSISTANT

## 2022-08-25 PROCEDURE — 97167 OT EVAL HIGH COMPLEX 60 MIN: CPT

## 2022-08-25 PROCEDURE — 99214 OFFICE O/P EST MOD 30 MIN: CPT | Performed by: PSYCHIATRY & NEUROLOGY

## 2022-08-25 PROCEDURE — 83735 ASSAY OF MAGNESIUM: CPT | Performed by: PHYSICIAN ASSISTANT

## 2022-08-25 PROCEDURE — 93306 TTE W/DOPPLER COMPLETE: CPT

## 2022-08-25 PROCEDURE — G1004 CDSM NDSC: HCPCS

## 2022-08-25 PROCEDURE — 85025 COMPLETE CBC W/AUTO DIFF WBC: CPT | Performed by: PHYSICIAN ASSISTANT

## 2022-08-25 PROCEDURE — 97162 PT EVAL MOD COMPLEX 30 MIN: CPT

## 2022-08-25 RX ORDER — CARVEDILOL 12.5 MG/1
12.5 TABLET ORAL 2 TIMES DAILY WITH MEALS
Status: DISCONTINUED | OUTPATIENT
Start: 2022-08-25 | End: 2022-08-26 | Stop reason: HOSPADM

## 2022-08-25 RX ADMIN — ESCITALOPRAM OXALATE 20 MG: 20 TABLET ORAL at 10:20

## 2022-08-25 RX ADMIN — Medication 100 MG: at 10:19

## 2022-08-25 RX ADMIN — PANTOPRAZOLE SODIUM 40 MG: 40 TABLET, DELAYED RELEASE ORAL at 10:20

## 2022-08-25 RX ADMIN — ENOXAPARIN SODIUM 40 MG: 100 INJECTION SUBCUTANEOUS at 10:31

## 2022-08-25 RX ADMIN — LEVETIRACETAM 750 MG: 100 SOLUTION ORAL at 17:22

## 2022-08-25 RX ADMIN — CLOPIDOGREL BISULFATE 75 MG: 75 TABLET ORAL at 10:26

## 2022-08-25 RX ADMIN — ASPIRIN 81 MG: 81 TABLET, COATED ORAL at 10:25

## 2022-08-25 RX ADMIN — LEVETIRACETAM 750 MG: 100 SOLUTION ORAL at 10:34

## 2022-08-25 RX ADMIN — LORATADINE 10 MG: 10 TABLET ORAL at 10:19

## 2022-08-25 RX ADMIN — ATORVASTATIN CALCIUM 40 MG: 40 TABLET, FILM COATED ORAL at 17:22

## 2022-08-25 RX ADMIN — FOLIC ACID 1 MG: 1 TABLET ORAL at 10:20

## 2022-08-25 RX ADMIN — CARVEDILOL 12.5 MG: 12.5 TABLET, FILM COATED ORAL at 17:22

## 2022-08-25 NOTE — ASSESSMENT & PLAN NOTE
· Continue ASA/Plavix, statin  · MRI brain negative for acute infarct  · Patient reportedly does not have deficits from prior CVA

## 2022-08-25 NOTE — SPEECH THERAPY NOTE
Speech Language/Pathology Screen    Order received, chart reviewed  NIH 0  Pt passed nursing dysphagia screen and has been tolerating regular texture diet, thin liquids, and PO meds  Spoke w/ RN, RN denies concerns for dysphagia, aspiration, and/or speech/language deficits  Per RN,  pt denies dysphagia, odynophagia, globus sensation, s/s aspiration, and/or speech/language deficits at this time  Formal ST evaluation not indicated at this time  Please re-consult if medically necessary  MRI brain 8/25/22: White matter changes suggestive of chronic microangiopathy  No acute intracranial pathology  Chronic infarcts are noted  No evidence of recent infarct    Chronic right maxillary sinus opacification
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
## 2022-08-25 NOTE — ASSESSMENT & PLAN NOTE
· Patient presented to the emergency department due to episode of confusion, reported repetitive speech noted by patient's caretaker earlier today  · Nonfocal neuro exam on admission  Speech is clear  Does appear somewhat confused, is able to elaborate on certain details earlier today however does not know the year or the month  · Elevated blood pressures on arrival may be contributing  · CTA head/neck:  No acute intracranial abnormality  Chronic infarcts  No large vessel intracranial occlusion  Moderate stenosis of the ICA segments  Bilateral intracranial vertebral artery stenosis similar to prior  Moderate left extracranial ICA stenosis, improved  Cervical vertebral arteries are patent    · Labs unremarkable on admission  · Check coma panel  · UA pending  · TSH pending  · ED attending discussed with Neurology - recommended stroke pathway but presentation appears more likely metabolic or possibly seizure  · MRI brain - negative for stroke  · Echocardiogram  Telemetry- reveals frequent PACs, NSR  · Hemoglobin A1c pending  · lipid panel within normal limits  · Patient is already on ASA/Plavix, statin - continue  · Also prior seizure history, obtain EEG  · Continue neuro checks per protocol  · PT/OT consult  · Case management consult
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
## 2022-08-25 NOTE — PLAN OF CARE
Problem: Potential for Falls  Goal: Patient will remain free of falls  Description: INTERVENTIONS:  - Educate patient/family on patient safety including physical limitations  - Instruct patient to call for assistance with activity   - Consult OT/PT to assist with strengthening/mobility   - Keep Call bell within reach  - Keep bed low and locked with side rails adjusted as appropriate  - Keep care items and personal belongings within reach  - Initiate and maintain comfort rounds  - Make Fall Risk Sign visible to staff  - Offer Toileting every 2 Hours, in advance of need  - Initiate/Maintain bed alarm  - Obtain necessary fall risk management equipment: socks  - Apply yellow socks and bracelet for high fall risk patients  - Consider moving patient to room near nurses station  Outcome: Progressing

## 2022-08-25 NOTE — ASSESSMENT & PLAN NOTE
· Elevated blood pressures on presentation  · -180s  · Held carvedilol to allow for permissive hypertension until CVA is ruled out however elevated blood pressures may be contributing to mental status change  · Coreg initially held, resume 8/25

## 2022-08-25 NOTE — PLAN OF CARE
Problem: Potential for Falls  Goal: Patient will remain free of falls  Description: INTERVENTIONS:  - Educate patient/family on patient safety including physical limitations  - Instruct patient to call for assistance with activity   - Consult OT/PT to assist with strengthening/mobility   - Keep Call bell within reach  - Keep bed low and locked with side rails adjusted as appropriate  - Keep care items and personal belongings within reach  - Initiate and maintain comfort rounds  - Make Fall Risk Sign visible to staff  - Offer Toileting every 2 Hours, in advance of need  - Initiate/Maintain bed alarm  - Obtain necessary fall risk management equipment: socks  - Apply yellow socks and bracelet for high fall risk patients  - Consider moving patient to room near nurses station  Outcome: Progressing     Problem: MOBILITY - ADULT  Goal: Maintain or return to baseline ADL function  Description: INTERVENTIONS:  - Educate patient/family on patient safety including physical limitations  - Instruct patient to call for assistance with activity   - Consult OT/PT to assist with strengthening/mobility   - Keep Call bell within reach  - Keep bed low and locked with side rails adjusted as appropriate  - Keep care items and personal belongings within reach  - Initiate and maintain comfort rounds  - Make Fall Risk Sign visible to staff  - Offer Toileting every 2 Hours, in advance of need  - Initiate/Maintain bed alarm  - Obtain necessary fall risk management equipment: socks  - Apply yellow socks and bracelet for high fall risk patients  - Consider moving patient to room near nurses station  Outcome: Progressing  Goal: Maintains/Returns to pre admission functional level  Description: INTERVENTIONS:  - Perform BMAT or MOVE assessment daily    - Set and communicate daily mobility goal to care team and patient/family/caregiver     - Collaborate with rehabilitation services on mobility goals if consulted  - Perform Range of Motion x times a day   - Reposition patient every x hours    - Dangle patient x times a day  - Stand patient x times a day  - Ambulate patient x times a day  - Out of bed to chair x times a day   - Out of bed for meals x times a day  - Out of bed for toileting  - Record patient progress and toleration of activity level   Outcome: Progressing     Problem: Potential for Falls  Goal: Patient will remain free of falls  Description: INTERVENTIONS:  - Educate patient/family on patient safety including physical limitations  - Instruct patient to call for assistance with activity   - Consult OT/PT to assist with strengthening/mobility   - Keep Call bell within reach  - Keep bed low and locked with side rails adjusted as appropriate  - Keep care items and personal belongings within reach  - Initiate and maintain comfort rounds  - Make Fall Risk Sign visible to staff  - Offer Toileting every 2 Hours, in advance of need  - Initiate/Maintain bed alarm  - Obtain necessary fall risk management equipment: socks  - Apply yellow socks and bracelet for high fall risk patients  - Consider moving patient to room near nurses station  Outcome: Progressing     Problem: MOBILITY - ADULT  Goal: Maintain or return to baseline ADL function  Description: INTERVENTIONS:  - Educate patient/family on patient safety including physical limitations  - Instruct patient to call for assistance with activity   - Consult OT/PT to assist with strengthening/mobility   - Keep Call bell within reach  - Keep bed low and locked with side rails adjusted as appropriate  - Keep care items and personal belongings within reach  - Initiate and maintain comfort rounds  - Make Fall Risk Sign visible to staff  - Offer Toileting every 2 Hours, in advance of need  - Initiate/Maintain bed alarm  - Obtain necessary fall risk management equipment: socks  - Apply yellow socks and bracelet for high fall risk patients  - Consider moving patient to room near nurses station  Outcome: Progressing  Goal: Maintains/Returns to pre admission functional level  Description: INTERVENTIONS:  - Perform BMAT or MOVE assessment daily    - Set and communicate daily mobility goal to care team and patient/family/caregiver  - Collaborate with rehabilitation services on mobility goals if consulted  - Perform Range of Motion x times a day  - Reposition patient every x hours    - Dangle patient x times a day  - Stand patient x times a day  - Ambulate patient x times a day  - Out of bed to chair x times a day   - Out of bed for meals x times a day  - Out of bed for toileting  - Record patient progress and toleration of activity level   Outcome: Progressing     Problem: PAIN - ADULT  Goal: Verbalizes/displays adequate comfort level or baseline comfort level  Description: Interventions:  - Encourage patient to monitor pain and request assistance  - Assess pain using appropriate pain scale  - Administer analgesics based on type and severity of pain and evaluate response  - Implement non-pharmacological measures as appropriate and evaluate response  - Consider cultural and social influences on pain and pain management  - Notify physician/advanced practitioner if interventions unsuccessful or patient reports new pain  Outcome: Progressing     Problem: INFECTION - ADULT  Goal: Absence or prevention of progression during hospitalization  Description: INTERVENTIONS:  - Assess and monitor for signs and symptoms of infection  - Monitor lab/diagnostic results  - Monitor all insertion sites, i e  indwelling lines, tubes, and drains  - Monitor endotracheal if appropriate and nasal secretions for changes in amount and color  - [place] appropriate cooling/warming therapies per order  - Administer medications as ordered  - Instruct and encourage patient and family to use good hand hygiene technique  - Identify and instruct in appropriate isolation precautions for identified infection/condition  Outcome: Progressing Problem: SAFETY ADULT  Goal: Patient will remain free of falls  Description: INTERVENTIONS:  - Educate patient/family on patient safety including physical limitations  - Instruct patient to call for assistance with activity   - Consult OT/PT to assist with strengthening/mobility   - Keep Call bell within reach  - Keep bed low and locked with side rails adjusted as appropriate  - Keep care items and personal belongings within reach  - Initiate and maintain comfort rounds  - Make Fall Risk Sign visible to staff  - Offer Toileting every 2 Hours, in advance of need  - Initiate/Maintain bed alarm  - Obtain necessary fall risk management equipment: socks  - Apply yellow socks and bracelet for high fall risk patients  - Consider moving patient to room near nurses station  Outcome: Progressing  Goal: Maintain or return to baseline ADL function  Description: INTERVENTIONS:  - Educate patient/family on patient safety including physical limitations  - Instruct patient to call for assistance with activity   - Consult OT/PT to assist with strengthening/mobility   - Keep Call bell within reach  - Keep bed low and locked with side rails adjusted as appropriate  - Keep care items and personal belongings within reach  - Initiate and maintain comfort rounds  - Make Fall Risk Sign visible to staff  - Offer Toileting every 2 Hours, in advance of need  - Initiate/Maintain bed alarm  - Obtain necessary fall risk management equipment: socks  - Apply yellow socks and bracelet for high fall risk patients  - Consider moving patient to room near nurses station  Outcome: Progressing  Goal: Maintains/Returns to pre admission functional level  Description: INTERVENTIONS:  - Perform BMAT or MOVE assessment daily    - Set and communicate daily mobility goal to care team and patient/family/caregiver  - Collaborate with rehabilitation services on mobility goals if consulted  - Perform Range of Motion x times a day    - Reposition patient every x hours   - Dangle patient x times a day  - Stand patient x times a day  - Ambulate patient x times a day  - Out of bed to chair x times a day   - Out of bed for meals xxxx times a day  - Out of bed for toileting  - Record patient progress and toleration of activity level   Outcome: Progressing     Problem: DISCHARGE PLANNING  Goal: Discharge to home or other facility with appropriate resources  Description: INTERVENTIONS:  - Identify barriers to discharge w/patient and caregiver  - Arrange for needed discharge resources and transportation as appropriate  - Identify discharge learning needs (meds, wound care, etc )  - Arrange for interpretive services to assist at discharge as needed  - Refer to Case Management Department for coordinating discharge planning if the patient needs post-hospital services based on physician/advanced practitioner order or complex needs related to functional status, cognitive ability, or social support system  Outcome: Progressing     Problem: Knowledge Deficit  Goal: Patient/family/caregiver demonstrates understanding of disease process, treatment plan, medications, and discharge instructions  Description: Complete learning assessment and assess knowledge base  Interventions:  - Provide teaching at level of understanding  - Provide teaching via preferred learning methods  Outcome: Progressing     Problem: Neurological Deficit  Goal: Neurological status is stable or improving  Description: Interventions:  - Monitor and assess patient's level of consciousness, motor function, sensory function, and level of assistance needed for ADLs  - Monitor and report changes from baseline  Collaborate with interdisciplinary team to initiate plan and implement interventions as ordered  - Provide and maintain a safe environment  - Consider seizure precautions  - Consider fall precautions  - Consider aspiration precautions  - Consider bleeding precautions  Outcome: Progressing     Problem:  Activity Intolerance/Impaired Mobility  Goal: Mobility/activity is maintained at optimum level for patient  Description: Interventions:  - Assess and monitor patient  barriers to mobility and need for assistive/adaptive devices  - Assess patient's emotional response to limitations  - Collaborate with interdisciplinary team and initiate plans and interventions as ordered  - Encourage independent activity per ability   - Maintain proper body alignment  - Perform active/passive rom as tolerated/ordered  - Plan activities to conserve energy   - Turn patient as appropriate  Outcome: Progressing     Problem: Communication Impairment  Goal: Ability to express needs and understand communication  Description: Assess patient's communication skills and ability to understand information  Patient will demonstrate use of effective communication techniques, alternative methods of communication and understanding even if not able to speak  - Encourage communication and provide alternate methods of communication as needed  - Collaborate with case management/ for discharge needs  - Include patient/family/caregiver in decisions related to communication  Outcome: Progressing     Problem: Potential for Aspiration  Goal: Non-ventilated patient's risk of aspiration is minimized  Description: Assess and monitor vital signs, respiratory status, and labs (WBC)  Monitor for signs of aspiration (tachypnea, cough, rales, wheezing, cyanosis, fever)  - Assess and monitor patient's ability to swallow  - Place patient up in chair to eat if possible  - HOB up at 90 degrees to eat if unable to get patient up into chair   - Supervise patient during oral intake  - Instruct patient/ family to take small bites  - Instruct patient/ family to take small single sips when taking liquids    - Follow patient-specific strategies generated by speech pathologist   Outcome: Progressing     Problem: Nutrition  Goal: Nutrition/Hydration status is improving  Description: Monitor and assess patient's nutrition/hydration status for malnutrition (ex- brittle hair, bruises, dry skin, pale skin and conjunctiva, muscle wasting, smooth red tongue, and disorientation)  Collaborate with interdisciplinary team and initiate plan and interventions as ordered  Monitor patient's weight and dietary intake as ordered or per policy  Utilize nutrition screening tool and intervene per policy  Determine patient's food preferences and provide high-protein, high-caloric foods as appropriate  - Assist patient with eating   - Allow adequate time for meals   - Encourage patient to take dietary supplement as ordered  - Collaborate with clinical nutritionist   - Include patient/family/caregiver in decisions related to nutrition    Outcome: Progressing
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
## 2022-08-25 NOTE — ASSESSMENT & PLAN NOTE
· Continue Lexapro  · Patient's sister reports that his wife had passed away around this time of year in 2011 and she noticed he seemed "off" about a week ago and expressed concern depression may be contributing to his presentation  · Further chart review reveals that patient's caregiver Paresh Carnes had sent a message to the PCP on 08/15/2022 concerned regarding patient's moods  · If metabolic/neurologic cause of mental status change ruled out, consider Psychiatry consult
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
## 2022-08-25 NOTE — ASSESSMENT & PLAN NOTE
· Patient presented to the emergency department due to episode of confusion, reported repetitive speech noted by patient's caretaker earlier today  · Nonfocal neuro exam on admission  Speech is clear  Does appear somewhat confused, is able to elaborate on certain details earlier today however does not know the year or the month  · Elevated blood pressures on arrival may be contributing  · CTA head/neck:  No acute intracranial abnormality  Chronic infarcts  No large vessel intracranial occlusion  Moderate stenosis of the ICA segments  Bilateral intracranial vertebral artery stenosis similar to prior  Moderate left extracranial ICA stenosis, improved  Cervical vertebral arteries are patent    · Labs unremarkable on admission  · Check coma panel  · UA pending  · TSH pending  · ED attending discussed with Neurology - recommended stroke pathway but presentation appears more likely metabolic or possibly seizure  · MRI brain - negative for stroke  · Echocardiogram  · Telemetry  · Hemoglobin A1c pending  · lipid panel within normal limits  · Patient is already on ASA/Plavix, statin - continue  · Also prior seizure history, obtain EEG  · Continue neuro checks per protocol  · PT/OT consult  · Case management consult

## 2022-08-25 NOTE — ED NOTES
Patient belongs: footwear, sweatpants and hat  EMS removed patient shirt prior to arrival  Belongings double bagged and labeled sent to security for contact precautions  Patient aware and verbalized understanding        Brian Flores RN  08/24/22 6114
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
## 2022-08-26 ENCOUNTER — APPOINTMENT (EMERGENCY)
Dept: CT IMAGING | Facility: HOSPITAL | Age: 82
DRG: 070 | End: 2022-08-26
Payer: COMMERCIAL

## 2022-08-26 ENCOUNTER — HOSPITAL ENCOUNTER (INPATIENT)
Facility: HOSPITAL | Age: 82
LOS: 2 days | Discharge: HOME HEALTH | DRG: 070 | End: 2022-08-29
Attending: EMERGENCY MEDICINE | Admitting: HOSPITALIST
Payer: COMMERCIAL

## 2022-08-26 ENCOUNTER — TELEPHONE (OUTPATIENT)
Dept: FAMILY MEDICINE CLINIC | Facility: HOSPITAL | Age: 82
End: 2022-08-26

## 2022-08-26 VITALS
BODY MASS INDEX: 25.27 KG/M2 | SYSTOLIC BLOOD PRESSURE: 190 MMHG | WEIGHT: 161 LBS | TEMPERATURE: 98 F | HEART RATE: 60 BPM | DIASTOLIC BLOOD PRESSURE: 90 MMHG | HEIGHT: 67 IN | OXYGEN SATURATION: 96 % | RESPIRATION RATE: 18 BRPM

## 2022-08-26 DIAGNOSIS — R41.82 ALTERED MENTAL STATUS: ICD-10-CM

## 2022-08-26 DIAGNOSIS — R55 NEAR SYNCOPE: Primary | ICD-10-CM

## 2022-08-26 DIAGNOSIS — F33.9 DEPRESSION, RECURRENT (HCC): ICD-10-CM

## 2022-08-26 DIAGNOSIS — I10 ESSENTIAL HYPERTENSION: ICD-10-CM

## 2022-08-26 DIAGNOSIS — G93.41 ACUTE METABOLIC ENCEPHALOPATHY: ICD-10-CM

## 2022-08-26 PROBLEM — I67.4 HYPERTENSIVE ENCEPHALOPATHY: Status: ACTIVE | Noted: 2020-05-15

## 2022-08-26 LAB
ALBUMIN SERPL BCP-MCNC: 4.1 G/DL (ref 3.5–5)
ALP SERPL-CCNC: 89 U/L (ref 46–116)
ALT SERPL W P-5'-P-CCNC: 36 U/L (ref 12–78)
ANION GAP SERPL CALCULATED.3IONS-SCNC: 7 MMOL/L (ref 4–13)
APAP SERPL-MCNC: <2 UG/ML (ref 10–20)
AST SERPL W P-5'-P-CCNC: 22 U/L (ref 5–45)
BASOPHILS # BLD AUTO: 0.03 THOUSANDS/ΜL (ref 0–0.1)
BASOPHILS NFR BLD AUTO: 0 % (ref 0–1)
BILIRUB SERPL-MCNC: 1.4 MG/DL (ref 0.2–1)
BUN SERPL-MCNC: 16 MG/DL (ref 5–25)
CALCIUM SERPL-MCNC: 9.3 MG/DL (ref 8.3–10.1)
CHLORIDE SERPL-SCNC: 102 MMOL/L (ref 96–108)
CO2 SERPL-SCNC: 28 MMOL/L (ref 21–32)
CREAT SERPL-MCNC: 1.09 MG/DL (ref 0.6–1.3)
EOSINOPHIL # BLD AUTO: 0.04 THOUSAND/ΜL (ref 0–0.61)
EOSINOPHIL NFR BLD AUTO: 0 % (ref 0–6)
ERYTHROCYTE [DISTWIDTH] IN BLOOD BY AUTOMATED COUNT: 22.4 % (ref 11.6–15.1)
ETHANOL SERPL-MCNC: <3 MG/DL (ref 0–3)
GFR SERPL CREATININE-BSD FRML MDRD: 63 ML/MIN/1.73SQ M
GLUCOSE SERPL-MCNC: 111 MG/DL (ref 65–140)
HCT VFR BLD AUTO: 51.7 % (ref 36.5–49.3)
HGB BLD-MCNC: 16.1 G/DL (ref 12–17)
IMM GRANULOCYTES # BLD AUTO: 0.03 THOUSAND/UL (ref 0–0.2)
IMM GRANULOCYTES NFR BLD AUTO: 0 % (ref 0–2)
LYMPHOCYTES # BLD AUTO: 0.98 THOUSANDS/ΜL (ref 0.6–4.47)
LYMPHOCYTES NFR BLD AUTO: 10 % (ref 14–44)
MCH RBC QN AUTO: 24.8 PG (ref 26.8–34.3)
MCHC RBC AUTO-ENTMCNC: 31.1 G/DL (ref 31.4–37.4)
MCV RBC AUTO: 80 FL (ref 82–98)
MONOCYTES # BLD AUTO: 0.52 THOUSAND/ΜL (ref 0.17–1.22)
MONOCYTES NFR BLD AUTO: 5 % (ref 4–12)
NEUTROPHILS # BLD AUTO: 8.34 THOUSANDS/ΜL (ref 1.85–7.62)
NEUTS SEG NFR BLD AUTO: 85 % (ref 43–75)
NRBC BLD AUTO-RTO: 0 /100 WBCS
PLATELET # BLD AUTO: 193 THOUSANDS/UL (ref 149–390)
PMV BLD AUTO: 8.9 FL (ref 8.9–12.7)
POTASSIUM SERPL-SCNC: 5 MMOL/L (ref 3.5–5.3)
PROLACTIN SERPL-MCNC: 9.6 NG/ML (ref 2.5–17.4)
PROT SERPL-MCNC: 8.2 G/DL (ref 6.4–8.4)
RBC # BLD AUTO: 6.5 MILLION/UL (ref 3.88–5.62)
SALICYLATES SERPL-MCNC: <3 MG/DL (ref 3–20)
SODIUM SERPL-SCNC: 137 MMOL/L (ref 135–147)
WBC # BLD AUTO: 9.94 THOUSAND/UL (ref 4.31–10.16)

## 2022-08-26 PROCEDURE — 80179 DRUG ASSAY SALICYLATE: CPT | Performed by: EMERGENCY MEDICINE

## 2022-08-26 PROCEDURE — 99285 EMERGENCY DEPT VISIT HI MDM: CPT

## 2022-08-26 PROCEDURE — 84146 ASSAY OF PROLACTIN: CPT | Performed by: EMERGENCY MEDICINE

## 2022-08-26 PROCEDURE — 80143 DRUG ASSAY ACETAMINOPHEN: CPT | Performed by: EMERGENCY MEDICINE

## 2022-08-26 PROCEDURE — 80177 DRUG SCRN QUAN LEVETIRACETAM: CPT | Performed by: EMERGENCY MEDICINE

## 2022-08-26 PROCEDURE — 99239 HOSP IP/OBS DSCHRG MGMT >30: CPT | Performed by: PHYSICIAN ASSISTANT

## 2022-08-26 PROCEDURE — 93005 ELECTROCARDIOGRAM TRACING: CPT

## 2022-08-26 PROCEDURE — 36415 COLL VENOUS BLD VENIPUNCTURE: CPT | Performed by: EMERGENCY MEDICINE

## 2022-08-26 PROCEDURE — G1004 CDSM NDSC: HCPCS

## 2022-08-26 PROCEDURE — 80053 COMPREHEN METABOLIC PANEL: CPT | Performed by: EMERGENCY MEDICINE

## 2022-08-26 PROCEDURE — 82077 ASSAY SPEC XCP UR&BREATH IA: CPT | Performed by: EMERGENCY MEDICINE

## 2022-08-26 PROCEDURE — 99285 EMERGENCY DEPT VISIT HI MDM: CPT | Performed by: EMERGENCY MEDICINE

## 2022-08-26 PROCEDURE — 85025 COMPLETE CBC W/AUTO DIFF WBC: CPT | Performed by: EMERGENCY MEDICINE

## 2022-08-26 PROCEDURE — 70450 CT HEAD/BRAIN W/O DYE: CPT

## 2022-08-26 RX ADMIN — ASPIRIN 81 MG: 81 TABLET, COATED ORAL at 09:41

## 2022-08-26 RX ADMIN — ESCITALOPRAM OXALATE 20 MG: 20 TABLET ORAL at 09:41

## 2022-08-26 RX ADMIN — LORATADINE 10 MG: 10 TABLET ORAL at 09:41

## 2022-08-26 RX ADMIN — PANTOPRAZOLE SODIUM 40 MG: 40 TABLET, DELAYED RELEASE ORAL at 09:41

## 2022-08-26 RX ADMIN — CARVEDILOL 12.5 MG: 12.5 TABLET, FILM COATED ORAL at 09:41

## 2022-08-26 RX ADMIN — CLOPIDOGREL BISULFATE 75 MG: 75 TABLET ORAL at 09:41

## 2022-08-26 RX ADMIN — LEVETIRACETAM 750 MG: 100 SOLUTION ORAL at 09:45

## 2022-08-26 RX ADMIN — ENOXAPARIN SODIUM 40 MG: 100 INJECTION SUBCUTANEOUS at 09:42

## 2022-08-26 RX ADMIN — Medication 100 MG: at 09:41

## 2022-08-26 RX ADMIN — FOLIC ACID 1 MG: 1 TABLET ORAL at 09:41

## 2022-08-26 NOTE — ASSESSMENT & PLAN NOTE
· Elevated blood pressures on presentation  · -180s  · Held carvedilol to allow for permissive hypertension until CVA is ruled out however elevated blood pressures may be contributing to mental status change  · Coreg initially held, resumed 8/25
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
## 2022-08-26 NOTE — ASSESSMENT & PLAN NOTE
· Continue Lexapro  · Patient's sister reports that his wife had passed away around this time of year in 2011 and she noticed he seemed "off" about a week ago and expressed concern depression may be contributing to his presentation  · Further chart review reveals that patient's caregiver Osvaldo Richard had sent a message to the PCP on 08/15/2022 concerned regarding patient's moods  · If metabolic/neurologic cause of mental status change ruled out, consider Psychiatry consult

## 2022-08-26 NOTE — TELEPHONE ENCOUNTER
Just spoke to andrea states Britni Maldonado is having another episode and the paramedics just pulled up  He will let us know if Britni Maldonado is being readmitted

## 2022-08-26 NOTE — ASSESSMENT & PLAN NOTE
Vaccine Information Statement(s) was given today. This has been reviewed, questions answered, and verbal consent given by Patient for injection(s) and administration of Influenza (Inactivated).    Patient tolerated without incident. See immunization grid for documentation.    Deop injection given. Verbal order for injection from Gagan Mckeon. Patient aware to return to clinic in 12 weeks 1/13-1/27/20 for next injection. Patient tolerated without incident. See medication administration record ( MAR ).         · Continue statin

## 2022-08-26 NOTE — PROGRESS NOTES
Pastoral Care Progress Note    2022  Patient: Kaleb Lobo : 1940  Admission Date & Time: 2022 1326  MRN: [id number] CSN: [id number]         made introductory visit with patient  He was confused at first but became more lucid as conversation continued                  Chaplaincy Interventions Utilized:       Exploration: Explored emotional needs & resources, Facilitated life review, Facilitated story telling, and Identified, evaluated & reinforced appropriate coping strategies      Relationship Building: Cultivated a relationship of care and support    Ritual: Provided prayer    Chaplaincy Outcomes Achieved:  Expressed peace and Expressed ultimate hope       22 1035   Clinical Encounter Type   Visited With Patient   Routine Visit Introduction   [facility] Encounters   [facility] Needs Prayer

## 2022-08-26 NOTE — PLAN OF CARE
Problem: Potential for Falls  Goal: Patient will remain free of falls  Description: INTERVENTIONS:  - Educate patient/family on patient safety including physical limitations  - Instruct patient to call for assistance with activity   - Consult OT/PT to assist with strengthening/mobility   - Keep Call bell within reach  - Keep bed low and locked with side rails adjusted as appropriate  - Keep care items and personal belongings within reach  - Initiate and maintain comfort rounds  - Make Fall Risk Sign visible to staff  - Offer Toileting every 2 Hours, in advance of need  - Initiate/Maintain bed alarm  - Obtain necessary fall risk management equipment: socks  - Apply yellow socks and bracelet for high fall risk patients  - Consider moving patient to room near nurses station  Outcome: Progressing     Problem: MOBILITY - ADULT  Goal: Maintain or return to baseline ADL function  Description: INTERVENTIONS:  - Educate patient/family on patient safety including physical limitations  - Instruct patient to call for assistance with activity   - Consult OT/PT to assist with strengthening/mobility   - Keep Call bell within reach  - Keep bed low and locked with side rails adjusted as appropriate  - Keep care items and personal belongings within reach  - Initiate and maintain comfort rounds  - Make Fall Risk Sign visible to staff  - Offer Toileting every 2 Hours, in advance of need  - Initiate/Maintain bed alarm  - Obtain necessary fall risk management equipment: socks  - Apply yellow socks and bracelet for high fall risk patients  - Consider moving patient to room near nurses station  Outcome: Progressing  Goal: Maintains/Returns to pre admission functional level  Description: INTERVENTIONS:  - Perform BMAT or MOVE assessment daily    - Set and communicate daily mobility goal to care team and patient/family/caregiver     - Collaborate with rehabilitation services on mobility goals if consulted  - Perform Range of Motion x times a day   - Reposition patient every x hours    - Dangle patient x times a day  - Stand patient x times a day  - Ambulate patient x times a day  - Out of bed to chair x times a day   - Out of bed for meals x times a day  - Out of bed for toileting  - Record patient progress and toleration of activity level   Outcome: Progressing     Problem: PAIN - ADULT  Goal: Verbalizes/displays adequate comfort level or baseline comfort level  Description: Interventions:  - Encourage patient to monitor pain and request assistance  - Assess pain using appropriate pain scale  - Administer analgesics based on type and severity of pain and evaluate response  - Implement non-pharmacological measures as appropriate and evaluate response  - Consider cultural and social influences on pain and pain management  - Notify physician/advanced practitioner if interventions unsuccessful or patient reports new pain  Outcome: Progressing     Problem: INFECTION - ADULT  Goal: Absence or prevention of progression during hospitalization  Description: INTERVENTIONS:  - Assess and monitor for signs and symptoms of infection  - Monitor lab/diagnostic results  - Monitor all insertion sites, i e  indwelling lines, tubes, and drains  - Monitor endotracheal if appropriate and nasal secretions for changes in amount and color  - [place] appropriate cooling/warming therapies per order  - Administer medications as ordered  - Instruct and encourage patient and family to use good hand hygiene technique  - Identify and instruct in appropriate isolation precautions for identified infection/condition  Outcome: Progressing     Problem: SAFETY ADULT  Goal: Patient will remain free of falls  Description: INTERVENTIONS:  - Educate patient/family on patient safety including physical limitations  - Instruct patient to call for assistance with activity   - Consult OT/PT to assist with strengthening/mobility   - Keep Call bell within reach  - Keep bed low and locked with side rails adjusted as appropriate  - Keep care items and personal belongings within reach  - Initiate and maintain comfort rounds  - Make Fall Risk Sign visible to staff  - Offer Toileting every 2 Hours, in advance of need  - Initiate/Maintain bed alarm  - Obtain necessary fall risk management equipment: socks  - Apply yellow socks and bracelet for high fall risk patients  - Consider moving patient to room near nurses station  Outcome: Progressing  Goal: Maintain or return to baseline ADL function  Description: INTERVENTIONS:  - Educate patient/family on patient safety including physical limitations  - Instruct patient to call for assistance with activity   - Consult OT/PT to assist with strengthening/mobility   - Keep Call bell within reach  - Keep bed low and locked with side rails adjusted as appropriate  - Keep care items and personal belongings within reach  - Initiate and maintain comfort rounds  - Make Fall Risk Sign visible to staff  - Offer Toileting every 2 Hours, in advance of need  - Initiate/Maintain bed alarm  - Obtain necessary fall risk management equipment: socks  - Apply yellow socks and bracelet for high fall risk patients  - Consider moving patient to room near nurses station  Outcome: Progressing  Goal: Maintains/Returns to pre admission functional level  Description: INTERVENTIONS:  - Perform BMAT or MOVE assessment daily    - Set and communicate daily mobility goal to care team and patient/family/caregiver  - Collaborate with rehabilitation services on mobility goals if consulted  - Perform Range of Motion x times a day  - Reposition patient every xxxx hours    - Dangle patient x times a day  - Stand patient x times a day  - Ambulate patient x times a day  - Out of bed to chair x times a day   - Out of bed for meals x times a day  - Out of bed for toileting  - Record patient progress and toleration of activity level   Outcome: Progressing     Problem: DISCHARGE PLANNING  Goal: Discharge to home or other facility with appropriate resources  Description: INTERVENTIONS:  - Identify barriers to discharge w/patient and caregiver  - Arrange for needed discharge resources and transportation as appropriate  - Identify discharge learning needs (meds, wound care, etc )  - Arrange for interpretive services to assist at discharge as needed  - Refer to Case Management Department for coordinating discharge planning if the patient needs post-hospital services based on physician/advanced practitioner order or complex needs related to functional status, cognitive ability, or social support system  Outcome: Progressing     Problem: Knowledge Deficit  Goal: Patient/family/caregiver demonstrates understanding of disease process, treatment plan, medications, and discharge instructions  Description: Complete learning assessment and assess knowledge base  Interventions:  - Provide teaching at level of understanding  - Provide teaching via preferred learning methods  Outcome: Progressing     Problem: Neurological Deficit  Goal: Neurological status is stable or improving  Description: Interventions:  - Monitor and assess patient's level of consciousness, motor function, sensory function, and level of assistance needed for ADLs  - Monitor and report changes from baseline  Collaborate with interdisciplinary team to initiate plan and implement interventions as ordered  - Provide and maintain a safe environment  - Consider seizure precautions  - Consider fall precautions  - Consider aspiration precautions  - Consider bleeding precautions  Outcome: Progressing     Problem: Activity Intolerance/Impaired Mobility  Goal: Mobility/activity is maintained at optimum level for patient  Description: Interventions:  - Assess and monitor patient  barriers to mobility and need for assistive/adaptive devices  - Assess patient's emotional response to limitations    - Collaborate with interdisciplinary team and initiate plans and interventions as ordered  - Encourage independent activity per ability   - Maintain proper body alignment  - Perform active/passive rom as tolerated/ordered  - Plan activities to conserve energy   - Turn patient as appropriate  Outcome: Progressing     Problem: Communication Impairment  Goal: Ability to express needs and understand communication  Description: Assess patient's communication skills and ability to understand information  Patient will demonstrate use of effective communication techniques, alternative methods of communication and understanding even if not able to speak  - Encourage communication and provide alternate methods of communication as needed  - Collaborate with case management/ for discharge needs  - Include patient/family/caregiver in decisions related to communication  Outcome: Progressing     Problem: Potential for Aspiration  Goal: Non-ventilated patient's risk of aspiration is minimized  Description: Assess and monitor vital signs, respiratory status, and labs (WBC)  Monitor for signs of aspiration (tachypnea, cough, rales, wheezing, cyanosis, fever)  - Assess and monitor patient's ability to swallow  - Place patient up in chair to eat if possible  - HOB up at 90 degrees to eat if unable to get patient up into chair   - Supervise patient during oral intake  - Instruct patient/ family to take small bites  - Instruct patient/ family to take small single sips when taking liquids  - Follow patient-specific strategies generated by speech pathologist   Outcome: Progressing     Problem: Nutrition  Goal: Nutrition/Hydration status is improving  Description: Monitor and assess patient's nutrition/hydration status for malnutrition (ex- brittle hair, bruises, dry skin, pale skin and conjunctiva, muscle wasting, smooth red tongue, and disorientation)  Collaborate with interdisciplinary team and initiate plan and interventions as ordered    Monitor patient's weight and dietary intake as ordered or per policy  Utilize nutrition screening tool and intervene per policy  Determine patient's food preferences and provide high-protein, high-caloric foods as appropriate  - Assist patient with eating   - Allow adequate time for meals   - Encourage patient to take dietary supplement as ordered  - Collaborate with clinical nutritionist   - Include patient/family/caregiver in decisions related to nutrition    Outcome: Progressing     Problem: Prexisting or High Potential for Compromised Skin Integrity  Goal: Skin integrity is maintained or improved  Description: INTERVENTIONS:  - Identify patients at risk for skin breakdown  - Assess and monitor skin integrity  - Assess and monitor nutrition and hydration status  - Monitor labs   - Assess for incontinence   - Turn and reposition patient  - Assist with mobility/ambulation  - Relieve pressure over bony prominences  - Avoid friction and shearing  - Provide appropriate hygiene as needed including keeping skin clean and dry  - Evaluate need for skin moisturizer/barrier cream  - Collaborate with interdisciplinary team   - Patient/family teaching  - Consider wound care consult   Outcome: Progressing   x

## 2022-08-26 NOTE — TELEPHONE ENCOUNTER
Pt needs TCM with you  Can I schedule him on 9/1 last appt of the morning? Its only 20min slot    No 40mins within 2 weeks

## 2022-08-26 NOTE — CASE MANAGEMENT
Case Management Progress Note    Patient name Rahul Canales  Location /-51 MRN [id number]  : 1940 Date 2022       LOS (days): 0  Geometric Mean LOS (GMLOS) (days): 3 30  Days to GMLOS:3 3        OBJECTIVE:    Current admission status: Inpatient  Preferred Pharmacy:   [id number] - [id number] [id number]  Phone: [phone] Fax: [phone]    Primary Care Provider: EEL Elizabeth    Primary Insurance: [facility]  Secondary Insurance:     PROGRESS NOTE:  CM notified by SLIM of Pt's discharge today  Call placed to Pt's sister([phone]), informed Pt's sister of Pt's discharge today  Pt's sister acknowledges discharge and requested CM to call Pt's caregiver(Magen: [phone]) to inform of discharge today  Pt's caregiver will transport Pt home  Call placed to Pt's caregiver(Magen: [phone]), informed of Pt's discharge today  Pt's caregiver reports he will transport Pt at 12:30pm  Pt's nurse(Azael) and Pt made aware of transport time

## 2022-08-27 LAB
ANION GAP SERPL CALCULATED.3IONS-SCNC: 7 MMOL/L (ref 4–13)
ATRIAL RATE: 56 BPM
BUN SERPL-MCNC: 16 MG/DL (ref 5–25)
CALCIUM SERPL-MCNC: 8.8 MG/DL (ref 8.3–10.1)
CHLORIDE SERPL-SCNC: 106 MMOL/L (ref 96–108)
CO2 SERPL-SCNC: 29 MMOL/L (ref 21–32)
CREAT SERPL-MCNC: 0.95 MG/DL (ref 0.6–1.3)
ERYTHROCYTE [DISTWIDTH] IN BLOOD BY AUTOMATED COUNT: 22.3 % (ref 11.6–15.1)
FERRITIN SERPL-MCNC: 43 NG/ML (ref 8–388)
FOLATE SERPL-MCNC: >20 NG/ML (ref 3.1–17.5)
GFR SERPL CREATININE-BSD FRML MDRD: 74 ML/MIN/1.73SQ M
GLUCOSE P FAST SERPL-MCNC: 91 MG/DL (ref 65–99)
GLUCOSE SERPL-MCNC: 87 MG/DL (ref 65–140)
GLUCOSE SERPL-MCNC: 91 MG/DL (ref 65–140)
HCT VFR BLD AUTO: 46.1 % (ref 36.5–49.3)
HGB BLD-MCNC: 14.5 G/DL (ref 12–17)
IRON SATN MFR SERPL: 14 % (ref 20–50)
IRON SERPL-MCNC: 37 UG/DL (ref 65–175)
MAGNESIUM SERPL-MCNC: 2.3 MG/DL (ref 1.6–2.6)
MCH RBC QN AUTO: 25 PG (ref 26.8–34.3)
MCHC RBC AUTO-ENTMCNC: 31.5 G/DL (ref 31.4–37.4)
MCV RBC AUTO: 79 FL (ref 82–98)
P AXIS: 52 DEGREES
PHOSPHATE SERPL-MCNC: 4.1 MG/DL (ref 2.3–4.1)
PLATELET # BLD AUTO: 170 THOUSANDS/UL (ref 149–390)
PMV BLD AUTO: 8.7 FL (ref 8.9–12.7)
POTASSIUM SERPL-SCNC: 3.9 MMOL/L (ref 3.5–5.3)
PR INTERVAL: 174 MS
QRS AXIS: 92 DEGREES
QRSD INTERVAL: 96 MS
QT INTERVAL: 462 MS
QTC INTERVAL: 445 MS
RBC # BLD AUTO: 5.81 MILLION/UL (ref 3.88–5.62)
SODIUM SERPL-SCNC: 142 MMOL/L (ref 135–147)
T WAVE AXIS: 79 DEGREES
TIBC SERPL-MCNC: 262 UG/DL (ref 250–450)
VENTRICULAR RATE: 56 BPM
VIT B12 SERPL-MCNC: 432 PG/ML (ref 100–900)
WBC # BLD AUTO: 7.3 THOUSAND/UL (ref 4.31–10.16)

## 2022-08-27 PROCEDURE — 82607 VITAMIN B-12: CPT | Performed by: PHYSICIAN ASSISTANT

## 2022-08-27 PROCEDURE — 83540 ASSAY OF IRON: CPT | Performed by: PHYSICIAN ASSISTANT

## 2022-08-27 PROCEDURE — 82746 ASSAY OF FOLIC ACID SERUM: CPT | Performed by: PHYSICIAN ASSISTANT

## 2022-08-27 PROCEDURE — 82652 VIT D 1 25-DIHYDROXY: CPT | Performed by: PHYSICIAN ASSISTANT

## 2022-08-27 PROCEDURE — 84425 ASSAY OF VITAMIN B-1: CPT | Performed by: PHYSICIAN ASSISTANT

## 2022-08-27 PROCEDURE — 80048 BASIC METABOLIC PNL TOTAL CA: CPT | Performed by: PHYSICIAN ASSISTANT

## 2022-08-27 PROCEDURE — 85027 COMPLETE CBC AUTOMATED: CPT | Performed by: PHYSICIAN ASSISTANT

## 2022-08-27 PROCEDURE — 99220 PR INITIAL OBSERVATION CARE/DAY 70 MINUTES: CPT | Performed by: HOSPITALIST

## 2022-08-27 PROCEDURE — 93010 ELECTROCARDIOGRAM REPORT: CPT | Performed by: INTERNAL MEDICINE

## 2022-08-27 PROCEDURE — 84100 ASSAY OF PHOSPHORUS: CPT | Performed by: PHYSICIAN ASSISTANT

## 2022-08-27 PROCEDURE — 82728 ASSAY OF FERRITIN: CPT | Performed by: PHYSICIAN ASSISTANT

## 2022-08-27 PROCEDURE — 83735 ASSAY OF MAGNESIUM: CPT | Performed by: PHYSICIAN ASSISTANT

## 2022-08-27 PROCEDURE — 83550 IRON BINDING TEST: CPT | Performed by: PHYSICIAN ASSISTANT

## 2022-08-27 PROCEDURE — 82948 REAGENT STRIP/BLOOD GLUCOSE: CPT

## 2022-08-27 PROCEDURE — G0425 INPT/ED TELECONSULT30: HCPCS | Performed by: PSYCHIATRY & NEUROLOGY

## 2022-08-27 RX ORDER — PANTOPRAZOLE SODIUM 40 MG/1
40 TABLET, DELAYED RELEASE ORAL
Status: DISCONTINUED | OUTPATIENT
Start: 2022-08-27 | End: 2022-08-29 | Stop reason: HOSPADM

## 2022-08-27 RX ORDER — CARVEDILOL 12.5 MG/1
12.5 TABLET ORAL 2 TIMES DAILY
Status: DISCONTINUED | OUTPATIENT
Start: 2022-08-27 | End: 2022-08-29 | Stop reason: HOSPADM

## 2022-08-27 RX ORDER — LANOLIN ALCOHOL/MO/W.PET/CERES
400 CREAM (GRAM) TOPICAL DAILY
Status: DISCONTINUED | OUTPATIENT
Start: 2022-08-27 | End: 2022-08-29 | Stop reason: HOSPADM

## 2022-08-27 RX ORDER — CALCIUM CARBONATE 200(500)MG
1000 TABLET,CHEWABLE ORAL DAILY PRN
Status: DISCONTINUED | OUTPATIENT
Start: 2022-08-27 | End: 2022-08-29 | Stop reason: HOSPADM

## 2022-08-27 RX ORDER — ATORVASTATIN CALCIUM 40 MG/1
40 TABLET, FILM COATED ORAL
Status: DISCONTINUED | OUTPATIENT
Start: 2022-08-27 | End: 2022-08-29 | Stop reason: HOSPADM

## 2022-08-27 RX ORDER — LEVETIRACETAM 100 MG/ML
750 SOLUTION ORAL 2 TIMES DAILY
Status: DISCONTINUED | OUTPATIENT
Start: 2022-08-27 | End: 2022-08-29 | Stop reason: HOSPADM

## 2022-08-27 RX ORDER — ONDANSETRON 2 MG/ML
4 INJECTION INTRAMUSCULAR; INTRAVENOUS EVERY 6 HOURS PRN
Status: DISCONTINUED | OUTPATIENT
Start: 2022-08-27 | End: 2022-08-29 | Stop reason: HOSPADM

## 2022-08-27 RX ORDER — ESCITALOPRAM OXALATE 20 MG/1
20 TABLET ORAL DAILY
Status: DISCONTINUED | OUTPATIENT
Start: 2022-08-27 | End: 2022-08-29 | Stop reason: HOSPADM

## 2022-08-27 RX ORDER — ASPIRIN 81 MG/1
81 TABLET ORAL DAILY
Status: DISCONTINUED | OUTPATIENT
Start: 2022-08-27 | End: 2022-08-29 | Stop reason: HOSPADM

## 2022-08-27 RX ORDER — CLOPIDOGREL BISULFATE 75 MG/1
75 TABLET ORAL DAILY
Status: DISCONTINUED | OUTPATIENT
Start: 2022-08-27 | End: 2022-08-29 | Stop reason: HOSPADM

## 2022-08-27 RX ORDER — SODIUM CHLORIDE 9 MG/ML
75 INJECTION, SOLUTION INTRAVENOUS CONTINUOUS
Status: DISCONTINUED | OUTPATIENT
Start: 2022-08-27 | End: 2022-08-28

## 2022-08-27 RX ORDER — LANOLIN ALCOHOL/MO/W.PET/CERES
100 CREAM (GRAM) TOPICAL DAILY
Status: DISCONTINUED | OUTPATIENT
Start: 2022-08-27 | End: 2022-08-29 | Stop reason: HOSPADM

## 2022-08-27 RX ORDER — ENOXAPARIN SODIUM 100 MG/ML
40 INJECTION SUBCUTANEOUS DAILY
Status: DISCONTINUED | OUTPATIENT
Start: 2022-08-27 | End: 2022-08-29 | Stop reason: HOSPADM

## 2022-08-27 RX ORDER — LORATADINE 10 MG/1
10 TABLET ORAL DAILY
Status: DISCONTINUED | OUTPATIENT
Start: 2022-08-27 | End: 2022-08-29 | Stop reason: HOSPADM

## 2022-08-27 RX ORDER — ACETAMINOPHEN 325 MG/1
650 TABLET ORAL EVERY 6 HOURS PRN
Status: DISCONTINUED | OUTPATIENT
Start: 2022-08-27 | End: 2022-08-29 | Stop reason: HOSPADM

## 2022-08-27 RX ORDER — SODIUM CHLORIDE, SODIUM GLUCONATE, SODIUM ACETATE, POTASSIUM CHLORIDE, MAGNESIUM CHLORIDE, SODIUM PHOSPHATE, DIBASIC, AND POTASSIUM PHOSPHATE .53; .5; .37; .037; .03; .012; .00082 G/100ML; G/100ML; G/100ML; G/100ML; G/100ML; G/100ML; G/100ML
75 INJECTION, SOLUTION INTRAVENOUS CONTINUOUS
Status: DISCONTINUED | OUTPATIENT
Start: 2022-08-27 | End: 2022-08-27

## 2022-08-27 RX ADMIN — ESCITALOPRAM OXALATE 20 MG: 20 TABLET ORAL at 08:00

## 2022-08-27 RX ADMIN — SODIUM CHLORIDE 75 ML/HR: 0.9 INJECTION, SOLUTION INTRAVENOUS at 03:07

## 2022-08-27 RX ADMIN — CLOPIDOGREL BISULFATE 75 MG: 75 TABLET ORAL at 08:00

## 2022-08-27 RX ADMIN — ENOXAPARIN SODIUM 40 MG: 100 INJECTION SUBCUTANEOUS at 08:00

## 2022-08-27 RX ADMIN — SODIUM CHLORIDE, SODIUM GLUCONATE, SODIUM ACETATE, POTASSIUM CHLORIDE, MAGNESIUM CHLORIDE, SODIUM PHOSPHATE, DIBASIC, AND POTASSIUM PHOSPHATE 75 ML/HR: .53; .5; .37; .037; .03; .012; .00082 INJECTION, SOLUTION INTRAVENOUS at 01:27

## 2022-08-27 RX ADMIN — Medication 100 MG: at 08:00

## 2022-08-27 RX ADMIN — CARVEDILOL 12.5 MG: 12.5 TABLET, FILM COATED ORAL at 20:12

## 2022-08-27 RX ADMIN — PANTOPRAZOLE SODIUM 40 MG: 40 TABLET, DELAYED RELEASE ORAL at 06:07

## 2022-08-27 RX ADMIN — LEVETIRACETAM 750 MG: 100 SOLUTION ORAL at 01:27

## 2022-08-27 RX ADMIN — ASPIRIN 81 MG: 81 TABLET, COATED ORAL at 08:00

## 2022-08-27 RX ADMIN — LORATADINE 10 MG: 10 TABLET ORAL at 08:00

## 2022-08-27 RX ADMIN — LEVETIRACETAM 750 MG: 100 SOLUTION ORAL at 20:12

## 2022-08-27 RX ADMIN — FOLIC ACID TAB 400 MCG 400 MCG: 400 TAB at 08:00

## 2022-08-27 RX ADMIN — LEVETIRACETAM 750 MG: 100 SOLUTION ORAL at 08:00

## 2022-08-27 RX ADMIN — CARVEDILOL 12.5 MG: 12.5 TABLET, FILM COATED ORAL at 08:00

## 2022-08-27 RX ADMIN — ATORVASTATIN CALCIUM 40 MG: 40 TABLET, FILM COATED ORAL at 17:36

## 2022-08-27 NOTE — ASSESSMENT & PLAN NOTE
· Admitted 08/24-8/26 for encephalopathy described as confusion and repetitive speech shows believed to be secondary to hypertension  · MRI brain negative for acute stroke but did show chronic infarcts  · Was discharged home after being evaluated by PT and OT who had no rehab recommendations  · Caretaker, Bradley Daley, reports that upon arriving home the patient did not seem to be able to / want to get out of the car  He was mumbling words but did not have any loss of consciousness  Caretaker was able to finally get him out of the car with a lot of assistance, which is unusual for the patient  · Caretaker reports this was the exact same scenario that happened prior to other admission except patient continued to mumble his words on EMS arrival this time whereas previously he had returned to normal before their arrival  · [facility]: No acute intracranial abnormality or significant interval change from prior  · Coma panel and labs relatively unremarkable  · On admission, patient is alert and oriented to self and place  He initially thought it was 2023 but then corrected himself to 2022  He did not know the month as he thought it was December  He thought [place] was president    · Nonfocal neurological exam otherwise with normal strength, sensation, and coordination no dysarthria or aphasia  · Etiology unclear:  hypertensive encephalopathy although unlikely as patient normotensive on arrival to the ED vs seizure, however patient without any seizure-like activity or LOC  · Re-consult Neurology   · Check thiamine level, vitamin B12, iron level, vitamin-D level, and folic acid level  · consult PT, OT, and Case Management  · If workup completely unrevealing consideration for psychiatry consult as caretaker does report increased depressive mood over the last couple weeks
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
## 2022-08-27 NOTE — ASSESSMENT & PLAN NOTE
· Seizure history   · Keppra 750 mg b i d   · Keppra level on admission currently pending   · Continue Keppra for now
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
## 2022-08-27 NOTE — PLAN OF CARE
Problem: PAIN - ADULT  Goal: Verbalizes/displays adequate comfort level or baseline comfort level  Description: Interventions:  - Encourage patient to monitor pain and request assistance  - Assess pain using appropriate pain scale  - Administer analgesics based on type and severity of pain and evaluate response  - Implement non-pharmacological measures as appropriate and evaluate response  - Consider cultural and social influences on pain and pain management  - Notify physician/advanced practitioner if interventions unsuccessful or patient reports new pain  Outcome: Progressing     Problem: SAFETY ADULT  Goal: Patient will remain free of falls  Description: INTERVENTIONS:  - Educate patient/family on patient safety including physical limitations  - Instruct patient to call for assistance with activity   - Consult OT/PT to assist with strengthening/mobility   - Keep Call bell within reach  - Keep bed low and locked with side rails adjusted as appropriate  - Keep care items and personal belongings within reach  - Initiate and maintain comfort rounds  - Make Fall Risk Sign visible to staff  - Offer Toileting every 2 Hours, in advance of need  - Initiate/Maintain bed alarm  - Obtain necessary fall risk management equipment:   - Apply yellow socks and bracelet for high fall risk patients  - Consider moving patient to room near nurses station  Outcome: Progressing  Goal: Maintain or return to baseline ADL function  Description: INTERVENTIONS:  -  Assess patient's ability to carry out ADLs; assess patient's baseline for ADL function and identify physical deficits which impact ability to perform ADLs (bathing, care of mouth/teeth, toileting, grooming, dressing, etc )  - Assess/evaluate cause of self-care deficits   - Assess range of motion  - Assess patient's mobility; develop plan if impaired  - Assess patient's need for assistive devices and provide as appropriate  - Encourage maximum independence but intervene and supervise when necessary  - Involve family in performance of ADLs  - Assess for home care needs following discharge   - Consider OT consult to assist with ADL evaluation and planning for discharge  - Provide patient education as appropriate  Outcome: Progressing  Goal: Maintains/Returns to pre admission functional level  Description: INTERVENTIONS:  - Perform BMAT or MOVE assessment daily    - Set and communicate daily mobility goal to care team and patient/family/caregiver  - Collaborate with rehabilitation services on mobility goals if consulted  - Perform Range of Motion 2 times a day  - Reposition patient every 3 hours  - Dangle patient 2 times a day  - Stand patient 3 times a day  - Ambulate patient 3 times a day  - Out of bed to chair 3 times a day   - Out of bed for meals 3 times a day  - Out of bed for toileting  - Record patient progress and toleration of activity level   Outcome: Progressing     Problem: DISCHARGE PLANNING  Goal: Discharge to home or other facility with appropriate resources  Description: INTERVENTIONS:  - Identify barriers to discharge w/patient and caregiver  - Arrange for needed discharge resources and transportation as appropriate  - Identify discharge learning needs (meds, wound care, etc )  - Arrange for interpretive services to assist at discharge as needed  - Refer to Case Management Department for coordinating discharge planning if the patient needs post-hospital services based on physician/advanced practitioner order or complex needs related to functional status, cognitive ability, or social support system  Outcome: Progressing     Problem: Knowledge Deficit  Goal: Patient/family/caregiver demonstrates understanding of disease process, treatment plan, medications, and discharge instructions  Description: Complete learning assessment and assess knowledge base    Interventions:  - Provide teaching at level of understanding  - Provide teaching via preferred learning methods  Outcome: Progressing

## 2022-08-27 NOTE — PLAN OF CARE
Problem: PAIN - ADULT  Goal: Verbalizes/displays adequate comfort level or baseline comfort level  Description: Interventions:  - Encourage patient to monitor pain and request assistance  - Assess pain using appropriate pain scale  - Administer analgesics based on type and severity of pain and evaluate response  - Implement non-pharmacological measures as appropriate and evaluate response  - Consider cultural and social influences on pain and pain management  - Notify physician/advanced practitioner if interventions unsuccessful or patient reports new pain  Outcome: Progressing     Problem: SAFETY ADULT  Goal: Patient will remain free of falls  Description: INTERVENTIONS:  - Educate patient/family on patient safety including physical limitations  - Instruct patient to call for assistance with activity   - Consult OT/PT to assist with strengthening/mobility   - Keep Call bell within reach  - Keep bed low and locked with side rails adjusted as appropriate  - Keep care items and personal belongings within reach  - Initiate and maintain comfort rounds  - Make Fall Risk Sign visible to staff  - Offer Toileting every 2 Hours, in advance of need  - Initiate/Maintain bed alarm  - Obtain necessary fall risk management equipment: non slip socks  - Apply yellow socks and bracelet for high fall risk patients  - Consider moving patient to room near nurses station  Outcome: Progressing  Goal: Maintain or return to baseline ADL function  Description: INTERVENTIONS:  -  Assess patient's ability to carry out ADLs; assess patient's baseline for ADL function and identify physical deficits which impact ability to perform ADLs (bathing, care of mouth/teeth, toileting, grooming, dressing, etc )  - Assess/evaluate cause of self-care deficits   - Assess range of motion  - Assess patient's mobility; develop plan if impaired  - Assess patient's need for assistive devices and provide as appropriate  - Encourage maximum independence but intervene and supervise when necessary  - Involve family in performance of ADLs  - Assess for home care needs following discharge   - Consider OT consult to assist with ADL evaluation and planning for discharge  - Provide patient education as appropriate  Outcome: Progressing  Goal: Maintains/Returns to pre admission functional level  Description: INTERVENTIONS:  - Perform BMAT or MOVE assessment daily    - Set and communicate daily mobility goal to care team and patient/family/caregiver  - Collaborate with rehabilitation services on mobility goals if consulted  - Perform Range of Motion 5 times a day  - Reposition patient every 2 hours  - Dangle patient 3 times a day  - Stand patient 3 times a day  - Ambulate patient 3 times a day  - Out of bed to chair 3 times a day   - Out of bed for meals 3 times a day  - Out of bed for toileting  - Record patient progress and toleration of activity level   Outcome: Progressing     Problem: DISCHARGE PLANNING  Goal: Discharge to home or other facility with appropriate resources  Description: INTERVENTIONS:  - Identify barriers to discharge w/patient and caregiver  - Arrange for needed discharge resources and transportation as appropriate  - Identify discharge learning needs (meds, wound care, etc )  - Arrange for interpretive services to assist at discharge as needed  - Refer to Case Management Department for coordinating discharge planning if the patient needs post-hospital services based on physician/advanced practitioner order or complex needs related to functional status, cognitive ability, or social support system  Outcome: Progressing     Problem: Knowledge Deficit  Goal: Patient/family/caregiver demonstrates understanding of disease process, treatment plan, medications, and discharge instructions  Description: Complete learning assessment and assess knowledge base    Interventions:  - Provide teaching at level of understanding  - Provide teaching via preferred learning methods  Outcome: Progressing     Problem: Potential for Falls  Goal: Patient will remain free of falls  Description: INTERVENTIONS:  - Educate patient/family on patient safety including physical limitations  - Instruct patient to call for assistance with activity   - Consult OT/PT to assist with strengthening/mobility   - Keep Call bell within reach  - Keep bed low and locked with side rails adjusted as appropriate  - Keep care items and personal belongings within reach  - Initiate and maintain comfort rounds  - Make Fall Risk Sign visible to staff  - Offer Toileting every 2 Hours, in advance of need  - Initiate/Maintain bed alarm  - Obtain necessary fall risk management equipment: non slip socks  - Apply yellow socks and bracelet for high fall risk patients  - Consider moving patient to room near nurses station  Outcome: Progressing     Problem: Prexisting or High Potential for Compromised Skin Integrity  Goal: Skin integrity is maintained or improved  Description: INTERVENTIONS:  - Identify patients at risk for skin breakdown  - Assess and monitor skin integrity  - Assess and monitor nutrition and hydration status  - Monitor labs   - Assess for incontinence   - Turn and reposition patient  - Assist with mobility/ambulation  - Relieve pressure over bony prominences  - Avoid friction and shearing  - Provide appropriate hygiene as needed including keeping skin clean and dry  - Evaluate need for skin moisturizer/barrier cream  - Collaborate with interdisciplinary team   - Patient/family teaching  - Consider wound care consult   Outcome: Progressing     Problem: MOBILITY - ADULT  Goal: Maintain or return to baseline ADL function  Description: INTERVENTIONS:  -  Assess patient's ability to carry out ADLs; assess patient's baseline for ADL function and identify physical deficits which impact ability to perform ADLs (bathing, care of mouth/teeth, toileting, grooming, dressing, etc )  - Assess/evaluate cause of self-care deficits   - Assess range of motion  - Assess patient's mobility; develop plan if impaired  - Assess patient's need for assistive devices and provide as appropriate  - Encourage maximum independence but intervene and supervise when necessary  - Involve family in performance of ADLs  - Assess for home care needs following discharge   - Consider OT consult to assist with ADL evaluation and planning for discharge  - Provide patient education as appropriate  Outcome: Progressing  Goal: Maintains/Returns to pre admission functional level  Description: INTERVENTIONS:  - Perform BMAT or MOVE assessment daily    - Set and communicate daily mobility goal to care team and patient/family/caregiver  - Collaborate with rehabilitation services on mobility goals if consulted  - Perform Range of Motion 5 times a day  - Reposition patient every 2 hours    - Dangle patient 3 times a day  - Stand patient 3 times a day  - Ambulate patient 3 times a day  - Out of bed to chair 3 times a day   - Out of bed for meals 3 times a day  - Out of bed for toileting  - Record patient progress and toleration of activity level   Outcome: Progressing

## 2022-08-27 NOTE — ASSESSMENT & PLAN NOTE
· CAD status post CABG over 2 decades ago with catheterization 2008 showing occluded saphenous graft to the PDA with well-developed collaterals  · Last echo 8//2022:  LVEF at 55%  G1 DD    · Continue aspirin and Plavix
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
## 2022-08-28 LAB — LEVETIRACETAM SERPL-MCNC: 26 UG/ML (ref 10–40)

## 2022-08-28 PROCEDURE — 99232 SBSQ HOSP IP/OBS MODERATE 35: CPT | Performed by: INTERNAL MEDICINE

## 2022-08-28 RX ADMIN — LORATADINE 10 MG: 10 TABLET ORAL at 08:51

## 2022-08-28 RX ADMIN — ENOXAPARIN SODIUM 40 MG: 100 INJECTION SUBCUTANEOUS at 08:50

## 2022-08-28 RX ADMIN — LEVETIRACETAM 750 MG: 100 SOLUTION ORAL at 20:20

## 2022-08-28 RX ADMIN — FOLIC ACID TAB 400 MCG 400 MCG: 400 TAB at 08:51

## 2022-08-28 RX ADMIN — PANTOPRAZOLE SODIUM 40 MG: 40 TABLET, DELAYED RELEASE ORAL at 05:44

## 2022-08-28 RX ADMIN — LEVETIRACETAM 750 MG: 100 SOLUTION ORAL at 08:50

## 2022-08-28 RX ADMIN — ESCITALOPRAM OXALATE 20 MG: 20 TABLET ORAL at 08:51

## 2022-08-28 RX ADMIN — CARVEDILOL 12.5 MG: 12.5 TABLET, FILM COATED ORAL at 20:20

## 2022-08-28 RX ADMIN — ATORVASTATIN CALCIUM 40 MG: 40 TABLET, FILM COATED ORAL at 17:52

## 2022-08-28 RX ADMIN — CARVEDILOL 12.5 MG: 12.5 TABLET, FILM COATED ORAL at 08:51

## 2022-08-28 RX ADMIN — ASPIRIN 81 MG: 81 TABLET, COATED ORAL at 08:51

## 2022-08-28 RX ADMIN — CLOPIDOGREL BISULFATE 75 MG: 75 TABLET ORAL at 08:51

## 2022-08-28 RX ADMIN — Medication 100 MG: at 08:51

## 2022-08-28 NOTE — PROGRESS NOTES
[place]  Progress Note Josee Vasquez 1940, 81 y o  male MRN: [id number]  Unit/Bed#: -01 Encounter: [id number]  Primary Care Provider: ELE Barrera   Date and time admitted to hospital: 8/26/2022  3:54 PM    * Acute metabolic encephalopathy  Assessment & Plan  · Admitted 08/24-8/26 for encephalopathy described as confusion and repetitive speech shows believed to be secondary to hypertension  · MRI brain negative for acute stroke but did show chronic infarcts  · Was discharged home after being evaluated by PT and OT who had no rehab recommendations  · Caretaker, Crissy Brown, reports that upon arriving home the patient did not seem to be able to / want to get out of the car  He was mumbling words but did not have any loss of consciousness  Caretaker was able to finally get him out of the car with a lot of assistance, which is unusual for the patient  · Caretaker reports this was the exact same scenario that happened prior to other admission except patient continued to mumble his words on EMS arrival this time whereas previously he had returned to normal before their arrival  · [facility]: No acute intracranial abnormality or significant interval change from prior  · Coma panel and labs relatively unremarkable  · On admission, patient is alert and oriented to self and place  He initially thought it was 2023 but then corrected himself to 2022  He did not know the month as he thought it was December  He thought [place] was president  · Nonfocal neurological exam otherwise with normal strength, sensation, and coordination no dysarthria or aphasia  · Neurology consult appreciated  No further inpatient workup  · consult PT, OT, and Case Management  · Psych consult appreciated  Agree with no further workup  Continue Lexapro      History of seizure  Assessment & Plan  · Seizure history   · Keppra 750 mg b i d   · Keppra level on admission currently pending   · Continue Keppra for now    Depression, recurrent ([facility] [address] )  Assessment & Plan  · Continue Lexapro 20 mg daily   · Caretaker reports patient has seemed off over the last few weeks and more depressed   · This is around the time his wife passed away in 2011  · Psych was consulted  No further recommendations  History of CVA (cerebrovascular accident)  Assessment & Plan  · Maintain on aspirin Plavix, and statin  · Recent MRI brain without acute infarct  · Continue home medications    GERD (gastroesophageal reflux disease)  Assessment & Plan  · Continue Protonix    Essential hypertension  Assessment & Plan  · Home regimen: Carvedilol 12 5 mg b i d    · Continue    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  Assessment & Plan  · CAD status post CABG over 2 decades ago with catheterization 2008 showing occluded saphenous graft to the PDA with well-developed collaterals  · Last echo 8//2022:  LVEF at 55%  G1 DD  · Continue aspirin and Plavix        VTE Pharmacologic Prophylaxis: VTE Score: 4 Moderate Risk (Score 3-4) - Pharmacological DVT Prophylaxis Ordered: enoxaparin (Lovenox)  Patient Centered Rounds: I performed bedside rounds with nursing staff today  Discussions with Specialists or Other Care Team Provider: RN    Education and Discussions with Family / Patient: Patient declined call to   Time Spent for Care: 30 minutes  More than 50% of total time spent on counseling and coordination of care as described above  Current Length of Stay: 1 day(s)  Current Patient Status: Inpatient   Certification Statement: The patient will continue to require additional inpatient hospital stay due to await pt/ot  Discharge Plan: Anticipate discharge tomorrow to discharge location to be determined pending rehab evaluations  Code Status: Level 1 - Full Code    Subjective:   Overnight events reported by nursing  Awaiting PT bowels  No suicidal or homicidal ideations  Denies hallucinations    No pain presently  Objective:     Vitals:   Temp (24hrs), Av 5 °F (36 4 °C), Min:97 2 °F (36 2 °C), Max:97 8 °F (36 6 °C)    Temp:  [97 2 °F (36 2 °C)-97 8 °F (36 6 °C)] 97 8 °F (36 6 °C)  HR:  [54-61] 61  Resp:  [16-18] 18  BP: (116-188)/(60-88) 188/88  SpO2:  [92 %-97 %] 96 %  There is no height or weight on file to calculate BMI  Input and Output Summary (last 24 hours): Intake/Output Summary (Last 24 hours) at 2022 0820  Last data filed at 2022 0301  Gross per 24 hour   Intake 240 ml   Output 425 ml   Net -185 ml       Physical Exam:   Physical Exam  Vitals and nursing note reviewed  Constitutional:       General: He is not in acute distress  Appearance: Normal appearance  HENT:      Head: Normocephalic  Mouth/Throat:      Mouth: Mucous membranes are moist    Eyes:      Pupils: Pupils are equal, round, and reactive to light  Cardiovascular:      Rate and Rhythm: Normal rate and regular rhythm  Heart sounds: No murmur heard  Pulmonary:      Effort: Pulmonary effort is normal  No respiratory distress  Breath sounds: Normal breath sounds  No wheezing, rhonchi or rales  Abdominal:      General: Bowel sounds are normal  There is no distension  Palpations: Abdomen is soft  Tenderness: There is no abdominal tenderness  There is no guarding  Musculoskeletal:         General: No deformity  Cervical back: Normal range of motion  Right lower leg: No edema  Left lower leg: No edema  Skin:     Capillary Refill: Capillary refill takes less than 2 seconds  Neurological:      General: No focal deficit present  Mental Status: He is alert  He is disoriented and confused            Additional Data:     Labs:  Results from last 7 days   Lab Units 22  0517 22  1659   WBC Thousand/uL 7 30 9 94   HEMOGLOBIN g/dL 14 5 16 1   HEMATOCRIT % 46 1 51 7*   PLATELETS Thousands/uL 170 193   NEUTROS PCT %  --  85*   LYMPHS PCT %  --  10*   MONOS PCT % --  5   EOS PCT %  --  0     Results from last 7 days   Lab Units 08/27/22  0517 08/26/22  1659   SODIUM mmol/L 142 137   POTASSIUM mmol/L 3 9 5 0   CHLORIDE mmol/L 106 102   CO2 mmol/L 29 28   BUN mg/dL 16 16   CREATININE mg/dL 0 95 1 09   ANION GAP mmol/L 7 7   CALCIUM mg/dL 8 8 9 3   ALBUMIN g/dL  --  4 1   TOTAL BILIRUBIN mg/dL  --  1 40*   ALK PHOS U/L  --  89   ALT U/L  --  36   AST U/L  --  22   GLUCOSE RANDOM mg/dL 91 111     Results from last 7 days   Lab Units 08/24/22  1350   INR  1 06     Results from last 7 days   Lab Units 08/27/22  0113 08/24/22  1337   POC GLUCOSE mg/dl 87 100     Results from last 7 days   Lab Units 08/25/22  0447   HEMOGLOBIN A1C % 5 5           Lines/Drains:  Invasive Devices  Report    Peripheral Intravenous Line  Duration           Peripheral IV 08/26/22 Left Antecubital 1 day                      Imaging: No pertinent imaging reviewed      Recent Cultures (last 7 days):         Last 24 Hours Medication List:   Current Facility-Administered Medications   Medication Dose Route Frequency Provider Last Rate    acetaminophen  650 mg Oral Q6H PRN Regulo Duarte, PA-C      aspirin  81 mg Oral Daily Regulo Duarte, PA-C      atorvastatin  40 mg Oral After Ivis Ron, PA-C      calcium carbonate  1,000 mg Oral Daily PRN Regulo Duarte, PA-C      carvedilol  12 5 mg Oral BID Regulo Duarte, PA-C      clopidogrel  75 mg Oral Daily Regulo Duarte, PA-C      enoxaparin  40 mg Subcutaneous Daily Regulo Duarte, PA-C      escitalopram  20 mg Oral Daily Regulo Duarte, PA-C      folic acid  767 mcg Oral Daily Regulo Duarte, PA-C      levETIRAcetam  750 mg Oral BID Regulo Duarte, PA-C      loratadine  10 mg Oral Daily Regulo Duarte, PA-C      ondansetron  4 mg Intravenous Q6H PRN Regulo Duarte, PA-C      pantoprazole  40 mg Oral Daily Before Breakfast Regulo Duarte, PA-C      sodium chloride  75 mL/hr Intravenous Continuous Britton Betts PA-C 75 mL/hr (08/27/22 0307)    thiamine  100 mg Oral Daily Britton Betts PA-C          Today, Patient Was Seen By: Africa Mendez PA-C    **Please Note: This note may have been constructed using a voice recognition system  **

## 2022-08-28 NOTE — ASSESSMENT & PLAN NOTE
· Admitted 08/24-8/26 for encephalopathy described as confusion and repetitive speech shows believed to be secondary to hypertension  · MRI brain negative for acute stroke but did show chronic infarcts  · Was discharged home after being evaluated by PT and OT who had no rehab recommendations  · Caretaker, Victor Hugo Kinney, reports that upon arriving home the patient did not seem to be able to / want to get out of the car  He was mumbling words but did not have any loss of consciousness  Caretaker was able to finally get him out of the car with a lot of assistance, which is unusual for the patient  · Caretaker reports this was the exact same scenario that happened prior to other admission except patient continued to mumble his words on EMS arrival this time whereas previously he had returned to normal before their arrival  · [facility]: No acute intracranial abnormality or significant interval change from prior  · Coma panel and labs relatively unremarkable  · On admission, patient is alert and oriented to self and place  He initially thought it was 2023 but then corrected himself to 2022  He did not know the month as he thought it was December  He thought [place] was president  · Nonfocal neurological exam otherwise with normal strength, sensation, and coordination no dysarthria or aphasia  · Neurology consult appreciated  No further inpatient workup  · consult PT, OT, and Case Management  · Psych consult appreciated  Agree with no further workup  Continue Lexapro

## 2022-08-28 NOTE — ASSESSMENT & PLAN NOTE
· Continue Lexapro 20 mg daily   · Caretaker reports patient has seemed off over the last few weeks and more depressed   · This is around the time his wife passed away in 2011  · Psych was consulted  No further recommendations

## 2022-08-28 NOTE — ASSESSMENT & PLAN NOTE
· Maintain on aspirin Plavix, and statin  · Recent MRI brain without acute infarct  · Continue home medications

## 2022-08-29 VITALS
DIASTOLIC BLOOD PRESSURE: 65 MMHG | HEART RATE: 50 BPM | SYSTOLIC BLOOD PRESSURE: 131 MMHG | RESPIRATION RATE: 15 BRPM | OXYGEN SATURATION: 97 % | TEMPERATURE: 97.6 F

## 2022-08-29 LAB — 1,25(OH)2D3 SERPL-MCNC: 60.2 PG/ML (ref 24.8–81.5)

## 2022-08-29 PROCEDURE — 97163 PT EVAL HIGH COMPLEX 45 MIN: CPT

## 2022-08-29 PROCEDURE — 97166 OT EVAL MOD COMPLEX 45 MIN: CPT

## 2022-08-29 PROCEDURE — 99239 HOSP IP/OBS DSCHRG MGMT >30: CPT | Performed by: PHYSICIAN ASSISTANT

## 2022-08-29 RX ORDER — AMLODIPINE BESYLATE 5 MG/1
5 TABLET ORAL DAILY
Qty: 30 TABLET | Refills: 0 | Status: SHIPPED | OUTPATIENT
Start: 2022-08-30

## 2022-08-29 RX ORDER — AMLODIPINE BESYLATE 5 MG/1
5 TABLET ORAL DAILY
Status: DISCONTINUED | OUTPATIENT
Start: 2022-08-29 | End: 2022-08-29 | Stop reason: HOSPADM

## 2022-08-29 RX ADMIN — FOLIC ACID TAB 400 MCG 400 MCG: 400 TAB at 09:53

## 2022-08-29 RX ADMIN — ENOXAPARIN SODIUM 40 MG: 100 INJECTION SUBCUTANEOUS at 09:53

## 2022-08-29 RX ADMIN — AMLODIPINE BESYLATE 5 MG: 5 TABLET ORAL at 09:59

## 2022-08-29 RX ADMIN — PANTOPRAZOLE SODIUM 40 MG: 40 TABLET, DELAYED RELEASE ORAL at 05:23

## 2022-08-29 RX ADMIN — ESCITALOPRAM OXALATE 20 MG: 20 TABLET ORAL at 09:54

## 2022-08-29 RX ADMIN — CARVEDILOL 12.5 MG: 12.5 TABLET, FILM COATED ORAL at 09:54

## 2022-08-29 RX ADMIN — ASPIRIN 81 MG: 81 TABLET, COATED ORAL at 09:53

## 2022-08-29 RX ADMIN — LORATADINE 10 MG: 10 TABLET ORAL at 09:54

## 2022-08-29 RX ADMIN — LEVETIRACETAM 750 MG: 100 SOLUTION ORAL at 11:15

## 2022-08-29 RX ADMIN — Medication 100 MG: at 09:53

## 2022-08-29 RX ADMIN — CLOPIDOGREL BISULFATE 75 MG: 75 TABLET ORAL at 09:53

## 2022-08-29 NOTE — PLAN OF CARE
Problem: PHYSICAL THERAPY ADULT  Goal: Performs mobility at highest level of function for planned discharge setting  See evaluation for individualized goals  Description: Treatment/Interventions: Functional transfer training, ADL retraining, LE strengthening/ROM, Therapeutic exercise, Endurance training, Patient/family training, Cognitive reorientation, Equipment eval/education, Bed mobility, Gait training, Compensatory technique education, Continued evaluation, OT, Spoke to nursing, Spoke to advanced practitioner  Equipment Recommended: Michelle Perez       See flowsheet documentation for full assessment, interventions and recommendations  Note: Prognosis: Fair  Problem List: Decreased endurance, Impaired balance, Decreased mobility, Decreased cognition, Impaired judgement, Decreased safety awareness  Assessment: Pt is an 81 y o  male who presented to ED 8/26/22 after being DC'd from hospital earlier in the day with c/o possible seizure, difficulty getting out of car  Dx:  Change in mental status, r/o toxic metabolic encephalopathy vs intracranial lesion vs seizure; HTN  Comorbidities affecting pt's physical performance at time of assessment include: cognition  Personal factors affecting pt at time of IE include: need for AD and VC for safety, catheter  PLOF and home set up listed above;  concerns for return home include but are not limited to personal issues with caregiver (pt states he has "personal issues" with caregiver that he did not elaborate on), need for 24/7 assist at baseline  Upon evaluation: Pt requires S for bed mobility, S for sit to stand, and S for ambulation with RW, able to perform steps  Full objective findings from PT assessment regarding body systems outlined above  Current limitations include impaired balance, decreased endurance/activity tolerance, gait deviations, cognition   Pt's clinical presentation is currently unstable/unpredictable seen in pt's presentation of continuous monitoring in hosptial, cognition/behaviors  Pt would benefit from continued PT while in hospital and follow up with HHCPT at D/C to increase strength, balance, endurance, independence with funcitonal mobility to return to PLOF, maximize independence, decrease caregiver burden and improve quality of life  The patient's AM-PAC Basic Mobility Inpatient Short Form Raw Score is 22  A Raw score of greater than 17 suggests the patient may benefit from discharge to home  Please also refer to the recommendation of the Physical Therapist for safe discharge planning  PT Discharge Recommendation: Home with home health rehabilitation (24/7 caregiver and use of RW for all mobility)    See flowsheet documentation for full assessment
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
## 2022-08-29 NOTE — CASE MANAGEMENT
Case Management Discharge Planning Note    Patient name Madan Hernandez  Location /-24 MRN [id number]  : 1940 Date 2022       Current Admission Date: 2022  Current Admission Diagnosis:Acute metabolic encephalopathy   Patient Active Problem List    Diagnosis Date Noted    Knee pain 2022    Chronic neck pain 2022    BMI 25 0-25 9,adult 2022    History of seizure 2021    Right epiphora 09/10/2020    Hypertensive encephalopathy 05/15/2020    Depression, recurrent ([facility] ) 2020    History of CVA (cerebrovascular accident) 11/15/2019    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    Carotid stenosis, asymptomatic, bilateral 10/16/2019    Diverticulosis of intestine 10/07/2019    Foraminal stenosis of cervical region 10/07/2019    TIA (transient ischemic attack) 2019    S/P inguinal hernia repair 2018    Left inguinal hernia 2018    Osteoporosis 2017    Peripheral neuropathy 2017    Acute metabolic encephalopathy     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 2017    Essential hypertension 2017    Peripheral arterial disease ([facility] [address] ) 2016    DJD (degenerative joint disease) 2015    GERD (gastroesophageal reflux disease) 2015    Vitamin D deficiency 2013    Benign colon polyp 2013    ASCVD (arteriosclerotic cardiovascular disease) 10/15/2012    Hyperlipidemia 10/15/2012      LOS (days): 2  Geometric Mean LOS (GMLOS) (days):   Days to GMLOS:     OBJECTIVE:  Risk of Unplanned Readmission Score: 12 51         Current admission status: Inpatient   Preferred Pharmacy:   [facility]/pharmacy [id number] - [place] - [address] [address]  Phone: [phone] Fax: [phone]    Primary Care Provider: ELE Martinez    Primary Insurance: [facility]  Secondary Insurance:     DISCHARGE DETAILS:    Discharge planning discussed with[de-identified] pt and caregiver Agustin Cabello  Freedom of Choice: Yes  Comments - Freedom of Choice: discussed  CM contacted family/caregiver?: Yes Zari Cordero)  Were Treatment Team discharge recommendations reviewed with patient/caregiver?: Yes  Did patient/caregiver verbalize understanding of patient care needs?: Yes       Contacts  Patient Contacts: Estevan Osman  Relationship to Patient[de-identified] Other (Comment) (Caregiver)  Contact Method: Phone  Phone Number: [phone]  Reason/Outcome: Emergency Contact, Referral    Requested [address]         Is the patient interested in Nick Alexander at discharge?: Yes  [address] requested[de-identified] [address], Occupational Therapy, Physical [address] Name[de-identified] [employer] Provider[de-identified] PCP  Home Health Services Needed[de-identified] Gait/ADL Training, Evaluate Functional Status and Safety, Strengthening/Theraputic Exercises to Improve Function  Homebound Criteria Met[de-identified] Uses an Assist Device (i e  cane, walker, etc), Requires the Assistance of Another Person for Safe Ambulation or to Leave the Home  Supporting Clincal Findings[de-identified] Limited Endurance    DME Referral Provided  Referral made for DME?: No    Other Referral/Resources/Interventions Provided:  Interventions: [facility]  Referral Comments: [facility] PT/OT/HHA         Treatment Team Recommendation: Home with [address]  Discharge Destination Plan[de-identified] Home with Gabrishyamtad at Discharge : Family                             IMM Given (Date):: 08/29/22        Additional Comments: Cm spoke with pt and pts caregiver Estevan Osman about dc plan for today  Pt will return home with [facility] for PT/OT/HHA   Cm also provided pt with requested list for non skilled homecare services

## 2022-08-29 NOTE — DISCHARGE SUMMARY
[facility]  Discharge- Noé Robertson 1940, 81 y o  male MRN: [id number]  Unit/Bed#: -01 Encounter: [id number]  Primary Care Provider: ELE Will Ra   Date and time admitted to hospital: 8/26/2022  3:54 PM    * Acute metabolic encephalopathy  Assessment & Plan  · Admitted 08/24-8/26 for encephalopathy described as confusion and repetitive speech shows believed to be secondary to hypertension  · MRI brain negative for acute stroke but did show chronic infarcts  · Was discharged home after being evaluated by PT and OT who had no rehab recommendations  · Caretaker, Erlin Whitlock, reports that upon arriving home the patient did not seem to be able to / want to get out of the car  He was mumbling words but did not have any loss of consciousness  Caretaker was able to finally get him out of the car with a lot of assistance, which is unusual for the patient  · Caretaker reports this was the exact same scenario that happened prior to other admission except patient continued to mumble his words on EMS arrival this time whereas previously he had returned to normal before their arrival  · [facility]: No acute intracranial abnormality or significant interval change from prior  · Coma panel and labs relatively unremarkable  · On admission, patient is alert and oriented to self and place  He initially thought it was 2023 but then corrected himself to 2022  He did not know the month as he thought it was December  He thought [place] was president  · Nonfocal neurological exam otherwise with normal strength, sensation, and coordination no dysarthria or aphasia  · Neurology consult appreciated  No further inpatient workup  · PT/OT - home with home health  · Psych consult appreciated  Agree with no further workup  Continue Lexapro  · Suspect possible cognitive decline/behavioral changes in the setting of microvascular disease   Will refer to [facility] as an outpatient to further workup cognitive dysfunction and give recs for behavioral changes  · Will also give son more info for additional home health services  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  Assessment & Plan  · CAD status post CABG over 2 decades ago with catheterization 2008 showing occluded saphenous graft to the PDA with well-developed collaterals  · Last echo 8//2022:  LVEF at 55%  G1 DD  · Continue aspirin and Plavix    Essential hypertension  Assessment & Plan  · Home regimen: Carvedilol 12 5 mg b i d    · Continue    GERD (gastroesophageal reflux disease)  Assessment & Plan  · Continue Protonix    History of CVA (cerebrovascular accident)  Assessment & Plan  · Maintain on aspirin Plavix, and statin  · Recent MRI brain without acute infarct  · Continue home medications    Depression, recurrent (HCC)  Assessment & Plan  · Continue Lexapro 20 mg daily   · Caretaker reports patient has seemed off over the last few weeks and more depressed   · This is around the time his wife passed away in 2011  · Psych was consulted  No further recommendations  History of seizure  Assessment & Plan  · Seizure history   · Keppra 750 mg b i d   · Keppra level on admission currently pending   · Continue Keppra for now        Medical Problems             Resolved Problems  Date Reviewed: 8/29/2022   None               Discharging Physician / Practitioner: Pastor Morrison PA-C  PCP: Adrienne Eid  Admission Date:   Admission Orders (From admission, onward)     Ordered        08/27/22 1509  Inpatient Admission  Once            08/26/22 2057  Place in Observation  Once                      Discharge Date: 08/29/22    Consultations During Hospital Stay:  · [address]    Procedures Performed:     CT head  No acute intracranial abnormality or significant interval change from recent studies      Significant Findings / Test Results:   · none    Incidental Findings:   · none     Test Results Pending at Discharge (will require follow up):   · Keppra level     Outpatient Tests Requested:  · none    Complications:  none    Reason for Admission: mental status change    Hospital Course:   Lay Mathis is a 81 y o  male patient who originally presented to the hospital on 8/26/2022 due to recurrent metabolic encephalopathy  Patient had recently been admitted 08/20 4/8/2026 for encephalopathy  Workup unremarkable  Upon arriving home, patient did not seem able to/want to get out of a car  He was also mumbling words  Patient brought back in  The admitting provider spoke with Neurology who agreed again no further workup necessary at this time  We consult behavioral health with no changes in medications at this time  Patient remains at his baseline  Suspect his spells are secondary to behavioral issues with some possible underlying cognitive decline  Will refer to geriatrics as an outpatient  PT/OT recommending home health care  Please see above list of diagnoses and related plan for additional information  Condition at Discharge: good    Discharge Day Visit / Exam:   Subjective:  Overall doing well  Patient did note that he had a short episode of hallucinations last evening  No events per nursing  Vitals: Blood Pressure: (!) 179/80 (08/29/22 0751)  Pulse: 57 (08/29/22 0954)  Temperature: 98 1 °F (36 7 °C) (08/29/22 0751)  Temp Source: Temporal (08/26/22 1558)  Respirations: 16 (08/29/22 0751)  SpO2: 98 % (08/29/22 0751)  Exam:   Physical Exam  Vitals and nursing note reviewed  Constitutional:       Appearance: He is well-developed  HENT:      Head: Normocephalic and atraumatic  Eyes:      Conjunctiva/sclera: Conjunctivae normal    Cardiovascular:      Rate and Rhythm: Normal rate and regular rhythm  Heart sounds: No murmur heard  Pulmonary:      Effort: Pulmonary effort is normal  No respiratory distress  Breath sounds: Normal breath sounds  Abdominal:      Palpations: Abdomen is soft  Tenderness: There is no abdominal tenderness  Musculoskeletal:      Cervical back: Neck supple  Skin:     General: Skin is warm and dry  Neurological:      Mental Status: He is alert  Discussion with Family: Updated  (son) via phone  Discharge instructions/Information to patient and family:   See after visit summary for information provided to patient and family  Provisions for Follow-Up Care:  See after visit summary for information related to follow-up care and any pertinent home health orders  Disposition:   Home with [facility] (Reminder: Complete face to face encounter)    Planned Readmission: none     Discharge Statement:  I spent 45 minutes discharging the patient  This time was spent on the day of discharge  I had direct contact with the patient on the day of discharge  Greater than 50% of the total time was spent examining patient, answering all patient questions, arranging and discussing plan of care with patient as well as directly providing post-discharge instructions  Additional time then spent on discharge activities  Discharge Medications:  See after visit summary for reconciled discharge medications provided to patient and/or family        **Please Note: This note may have been constructed using a voice recognition system**

## 2022-08-29 NOTE — PLAN OF CARE
Problem: PAIN - ADULT  Goal: Verbalizes/displays adequate comfort level or baseline comfort level  Description: Interventions:  - Encourage patient to monitor pain and request assistance  - Assess pain using appropriate pain scale  - Administer analgesics based on type and severity of pain and evaluate response  - Implement non-pharmacological measures as appropriate and evaluate response  - Consider cultural and social influences on pain and pain management  - Notify physician/advanced practitioner if interventions unsuccessful or patient reports new pain  Outcome: Progressing     Problem: SAFETY ADULT  Goal: Patient will remain free of falls  Description: INTERVENTIONS:  - Educate patient/family on patient safety including physical limitations  - Instruct patient to call for assistance with activity   - Consult OT/PT to assist with strengthening/mobility   - Keep Call bell within reach  - Keep bed low and locked with side rails adjusted as appropriate  - Keep care items and personal belongings within reach  - Initiate and maintain comfort rounds  - Make Fall Risk Sign visible to staff  - Offer Toileting every 2 Hours, in advance of need  - Initiate/Maintain bed alarm  - Obtain necessary fall risk management equipment: non slip socks  - Apply yellow socks and bracelet for high fall risk patients  - Consider moving patient to room near nurses station  Outcome: Progressing  Goal: Maintain or return to baseline ADL function  Description: INTERVENTIONS:  -  Assess patient's ability to carry out ADLs; assess patient's baseline for ADL function and identify physical deficits which impact ability to perform ADLs (bathing, care of mouth/teeth, toileting, grooming, dressing, etc )  - Assess/evaluate cause of self-care deficits   - Assess range of motion  - Assess patient's mobility; develop plan if impaired  - Assess patient's need for assistive devices and provide as appropriate  - Encourage maximum independence but intervene and supervise when necessary  - Involve family in performance of ADLs  - Assess for home care needs following discharge   - Consider OT consult to assist with ADL evaluation and planning for discharge  - Provide patient education as appropriate  Outcome: Progressing  Goal: Maintains/Returns to pre admission functional level  Description: INTERVENTIONS:  - Perform BMAT or MOVE assessment daily    - Set and communicate daily mobility goal to care team and patient/family/caregiver  - Collaborate with rehabilitation services on mobility goals if consulted  - Perform Range of Motion 5 times a day  - Reposition patient every 2 hours  - Dangle patient 3 times a day  - Stand patient 3 times a day  - Ambulate patient 3 times a day  - Out of bed to chair 3 times a day   - Out of bed for meals 3 times a day  - Out of bed for toileting  - Record patient progress and toleration of activity level   Outcome: Progressing     Problem: DISCHARGE PLANNING  Goal: Discharge to home or other facility with appropriate resources  Description: INTERVENTIONS:  - Identify barriers to discharge w/patient and caregiver  - Arrange for needed discharge resources and transportation as appropriate  - Identify discharge learning needs (meds, wound care, etc )  - Arrange for interpretive services to assist at discharge as needed  - Refer to Case Management Department for coordinating discharge planning if the patient needs post-hospital services based on physician/advanced practitioner order or complex needs related to functional status, cognitive ability, or social support system  Outcome: Progressing     Problem: Knowledge Deficit  Goal: Patient/family/caregiver demonstrates understanding of disease process, treatment plan, medications, and discharge instructions  Description: Complete learning assessment and assess knowledge base    Interventions:  - Provide teaching at level of understanding  - Provide teaching via preferred learning methods  Outcome: Progressing     Problem: Potential for Falls  Goal: Patient will remain free of falls  Description: INTERVENTIONS:  - Educate patient/family on patient safety including physical limitations  - Instruct patient to call for assistance with activity   - Consult OT/PT to assist with strengthening/mobility   - Keep Call bell within reach  - Keep bed low and locked with side rails adjusted as appropriate  - Keep care items and personal belongings within reach  - Initiate and maintain comfort rounds  - Make Fall Risk Sign visible to staff  - Offer Toileting every 2 Hours, in advance of need  - Initiate/Maintain bed alarm  - Obtain necessary fall risk management equipment: non slip socks  - Apply yellow socks and bracelet for high fall risk patients  - Consider moving patient to room near nurses station  Outcome: Progressing     Problem: Prexisting or High Potential for Compromised Skin Integrity  Goal: Skin integrity is maintained or improved  Description: INTERVENTIONS:  - Identify patients at risk for skin breakdown  - Assess and monitor skin integrity  - Assess and monitor nutrition and hydration status  - Monitor labs   - Assess for incontinence   - Turn and reposition patient  - Assist with mobility/ambulation  - Relieve pressure over bony prominences  - Avoid friction and shearing  - Provide appropriate hygiene as needed including keeping skin clean and dry  - Evaluate need for skin moisturizer/barrier cream  - Collaborate with interdisciplinary team   - Patient/family teaching  - Consider wound care consult   Outcome: Progressing     Problem: MOBILITY - ADULT  Goal: Maintain or return to baseline ADL function  Description: INTERVENTIONS:  -  Assess patient's ability to carry out ADLs; assess patient's baseline for ADL function and identify physical deficits which impact ability to perform ADLs (bathing, care of mouth/teeth, toileting, grooming, dressing, etc )  - Assess/evaluate cause of self-care deficits   - Assess range of motion  - Assess patient's mobility; develop plan if impaired  - Assess patient's need for assistive devices and provide as appropriate  - Encourage maximum independence but intervene and supervise when necessary  - Involve family in performance of ADLs  - Assess for home care needs following discharge   - Consider OT consult to assist with ADL evaluation and planning for discharge  - Provide patient education as appropriate  Outcome: Progressing  Goal: Maintains/Returns to pre admission functional level  Description: INTERVENTIONS:  - Perform BMAT or MOVE assessment daily    - Set and communicate daily mobility goal to care team and patient/family/caregiver  - Collaborate with rehabilitation services on mobility goals if consulted  - Perform Range of Motion 5 times a day  - Reposition patient every 2 hours    - Dangle patient 3 times a day  - Stand patient 3 times a day  - Ambulate patient 3 times a day  - Out of bed to chair 3 times a day   - Out of bed for meals 3 times a day  - Out of bed for toileting  - Record patient progress and toleration of activity level   Outcome: Progressing No

## 2022-08-29 NOTE — PLAN OF CARE
Problem: PAIN - ADULT  Goal: Verbalizes/displays adequate comfort level or baseline comfort level  Description: Interventions:  - Encourage patient to monitor pain and request assistance  - Assess pain using appropriate pain scale  - Administer analgesics based on type and severity of pain and evaluate response  - Implement non-pharmacological measures as appropriate and evaluate response  - Consider cultural and social influences on pain and pain management  - Notify physician/advanced practitioner if interventions unsuccessful or patient reports new pain  8/29/2022 1259 by Jaron Sanchez RN  Outcome: Progressing  8/29/2022 1258 by Jaron Sanchez RN  Outcome: Progressing     Problem: SAFETY ADULT  Goal: Patient will remain free of falls  Description: INTERVENTIONS:  - Educate patient/family on patient safety including physical limitations  - Instruct patient to call for assistance with activity   - Consult OT/PT to assist with strengthening/mobility   - Keep Call bell within reach  - Keep bed low and locked with side rails adjusted as appropriate  - Keep care items and personal belongings within reach  - Initiate and maintain comfort rounds  - Make Fall Risk Sign visible to staff  - Offer Toileting every 2 Hours, in advance of need  - Initiate/Maintain bed alarm  - Obtain necessary fall risk management equipment: non slip socks  - Apply yellow socks and bracelet for high fall risk patients  - Consider moving patient to room near nurses station  8/29/2022 1259 by Jaron Sanchez RN  Outcome: Progressing  8/29/2022 1258 by Jaron Sanchez RN  Outcome: Progressing  Goal: Maintain or return to baseline ADL function  Description: INTERVENTIONS:  -  Assess patient's ability to carry out ADLs; assess patient's baseline for ADL function and identify physical deficits which impact ability to perform ADLs (bathing, care of mouth/teeth, toileting, grooming, dressing, etc )  - Assess/evaluate cause of self-care deficits   - Assess range of motion  - Assess patient's mobility; develop plan if impaired  - Assess patient's need for assistive devices and provide as appropriate  - Encourage maximum independence but intervene and supervise when necessary  - Involve family in performance of ADLs  - Assess for home care needs following discharge   - Consider OT consult to assist with ADL evaluation and planning for discharge  - Provide patient education as appropriate  8/29/2022 1259 by Jaron Sanchez RN  Outcome: Progressing  8/29/2022 1258 by Jaron Sanchez RN  Outcome: Progressing  Goal: Maintains/Returns to pre admission functional level  Description: INTERVENTIONS:  - Perform BMAT or MOVE assessment daily    - Set and communicate daily mobility goal to care team and patient/family/caregiver  - Collaborate with rehabilitation services on mobility goals if consulted  - Perform Range of Motion 5 times a day  - Reposition patient every 2 hours    - Dangle patient 3 times a day  - Stand patient 3 times a day  - Ambulate patient 3 times a day  - Out of bed to chair 3 times a day   - Out of bed for meals 3 times a day  - Out of bed for toileting  - Record patient progress and toleration of activity level   8/29/2022 1259 by Jaron Sanchez RN  Outcome: Progressing  8/29/2022 1258 by Jaron Sanchez RN  Outcome: Progressing     Problem: DISCHARGE PLANNING  Goal: Discharge to home or other facility with appropriate resources  Description: INTERVENTIONS:  - Identify barriers to discharge w/patient and caregiver  - Arrange for needed discharge resources and transportation as appropriate  - Identify discharge learning needs (meds, wound care, etc )  - Arrange for interpretive services to assist at discharge as needed  - Refer to Case Management Department for coordinating discharge planning if the patient needs post-hospital services based on physician/advanced practitioner order or complex needs related to functional status, cognitive ability, or social support system  8/29/2022 1259 by Andi Bowser RN  Outcome: Progressing  8/29/2022 1258 by Andi Bowser RN  Outcome: Progressing     Problem: Knowledge Deficit  Goal: Patient/family/caregiver demonstrates understanding of disease process, treatment plan, medications, and discharge instructions  Description: Complete learning assessment and assess knowledge base    Interventions:  - Provide teaching at level of understanding  - Provide teaching via preferred learning methods  8/29/2022 1259 by Andi Bowser RN  Outcome: Progressing  8/29/2022 1258 by Andi Bowser RN  Outcome: Progressing     Problem: Potential for Falls  Goal: Patient will remain free of falls  Description: INTERVENTIONS:  - Educate patient/family on patient safety including physical limitations  - Instruct patient to call for assistance with activity   - Consult OT/PT to assist with strengthening/mobility   - Keep Call bell within reach  - Keep bed low and locked with side rails adjusted as appropriate  - Keep care items and personal belongings within reach  - Initiate and maintain comfort rounds  - Make Fall Risk Sign visible to staff  - Offer Toileting every 2 Hours, in advance of need  - Initiate/Maintain bed alarm  - Obtain necessary fall risk management equipment: non slip socks  - Apply yellow socks and bracelet for high fall risk patients  - Consider moving patient to room near nurses station  8/29/2022 1259 by Andi Bowser RN  Outcome: Progressing  8/29/2022 1258 by Andi Bowser RN  Outcome: Progressing     Problem: Prexisting or High Potential for Compromised Skin Integrity  Goal: Skin integrity is maintained or improved  Description: INTERVENTIONS:  - Identify patients at risk for skin breakdown  - Assess and monitor skin integrity  - Assess and monitor nutrition and hydration status  - Monitor labs   - Assess for incontinence   - Turn and reposition patient  - Assist with mobility/ambulation  - Relieve pressure over bony prominences  - Avoid friction and shearing  - Provide appropriate hygiene as needed including keeping skin clean and dry  - Evaluate need for skin moisturizer/barrier cream  - Collaborate with interdisciplinary team   - Patient/family teaching  - Consider wound care consult   8/29/2022 1259 by Bijan Lozano RN  Outcome: Progressing  8/29/2022 1258 by Bijan Lozano RN  Outcome: Progressing     Problem: MOBILITY - ADULT  Goal: Maintain or return to baseline ADL function  Description: INTERVENTIONS:  -  Assess patient's ability to carry out ADLs; assess patient's baseline for ADL function and identify physical deficits which impact ability to perform ADLs (bathing, care of mouth/teeth, toileting, grooming, dressing, etc )  - Assess/evaluate cause of self-care deficits   - Assess range of motion  - Assess patient's mobility; develop plan if impaired  - Assess patient's need for assistive devices and provide as appropriate  - Encourage maximum independence but intervene and supervise when necessary  - Involve family in performance of ADLs  - Assess for home care needs following discharge   - Consider OT consult to assist with ADL evaluation and planning for discharge  - Provide patient education as appropriate  8/29/2022 1259 by Bijan Lozano RN  Outcome: Progressing  8/29/2022 1258 by Bijan Lozano RN  Outcome: Progressing  Goal: Maintains/Returns to pre admission functional level  Description: INTERVENTIONS:  - Perform BMAT or MOVE assessment daily    - Set and communicate daily mobility goal to care team and patient/family/caregiver  - Collaborate with rehabilitation services on mobility goals if consulted  - Perform Range of Motion 5 times a day  - Reposition patient every 2 hours    - Dangle patient 3 times a day  - Stand patient 3 times a day  - Ambulate patient 3 times a day  - Out of bed to chair 3 times a day   - Out of bed for meals 3 times a day  - Out of bed for toileting  - Record patient progress and toleration of activity level   8/29/2022 1259 by Tere Ron, RN  Outcome: Progressing  8/29/2022 1258 by Tere Ron, RN  Outcome: Progressing

## 2022-08-29 NOTE — ASSESSMENT & PLAN NOTE
· Admitted 08/24-8/26 for encephalopathy described as confusion and repetitive speech shows believed to be secondary to hypertension  · MRI brain negative for acute stroke but did show chronic infarcts  · Was discharged home after being evaluated by PT and OT who had no rehab recommendations  · Caretaker, Osvaldo Richard, reports that upon arriving home the patient did not seem to be able to / want to get out of the car  He was mumbling words but did not have any loss of consciousness  Caretaker was able to finally get him out of the car with a lot of assistance, which is unusual for the patient  · Caretaker reports this was the exact same scenario that happened prior to other admission except patient continued to mumble his words on EMS arrival this time whereas previously he had returned to normal before their arrival  · [facility]: No acute intracranial abnormality or significant interval change from prior  · Coma panel and labs relatively unremarkable  · On admission, patient is alert and oriented to self and place  He initially thought it was 2023 but then corrected himself to 2022  He did not know the month as he thought it was December  He thought [place] was president  · Nonfocal neurological exam otherwise with normal strength, sensation, and coordination no dysarthria or aphasia  · Neurology consult appreciated  No further inpatient workup  · PT/OT - home with home health  · Psych consult appreciated  Agree with no further workup  Continue Lexapro  · Suspect possible cognitive decline/behavioral changes in the setting of microvascular disease  Will refer to [facility] as an outpatient to further workup cognitive dysfunction and give recs for behavioral changes  · Will also give son more info for additional home health services

## 2022-08-29 NOTE — CASE MANAGEMENT
Case Management Progress Note    Patient name Chester Cordero  Location /-57 MRN [id number]  : 1940 Date 2022       LOS (days): 2  Geometric Mean LOS (GMLOS) (days):   Days to GMLOS:        OBJECTIVE:        Current admission status: Inpatient  Preferred Pharmacy:   [id number] - [id number] [id number]  Phone: [phone] Fax: [phone]    Primary Care Provider: ELE Mercer    Primary Insurance: Silvia [facility]  Secondary Insurance:     PROGRESS NOTE: PT/OT recommending Nick Alexander  Cm spoke with pts sister  No preference of agency  Referrals sent  [facility] is able to accept for PT/OT/[facility] 9/3-  Cm informed Provider  Pt also wants a private pay list for agencies   Cm provided list
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
## 2022-08-30 ENCOUNTER — TELEPHONE (OUTPATIENT)
Dept: GERIATRICS | Age: 82
End: 2022-08-30

## 2022-08-30 ENCOUNTER — TRANSITIONAL CARE MANAGEMENT (OUTPATIENT)
Dept: FAMILY MEDICINE CLINIC | Facility: HOSPITAL | Age: 82
End: 2022-08-30

## 2022-08-30 LAB — LEVETIRACETAM SERPL-MCNC: 28.3 UG/ML (ref 10–40)

## 2022-08-30 NOTE — TELEPHONE ENCOUNTER
Ofelia Sifuentes [facility]  [address], [address] [facility] [place], [address] [place] [address],[address]  [place], [address] [place] [address] [place]    [phone]    Telephone Intake: Geriatric Assessment     Referral source: Hospital, Neurology department from August, 2022    Caller who is scheduling/relationship to pt: Caregiver, Yasmeen Sanchez  Caller's phone number: [phone]    Reason for referral: Patient concerns , Family member concerns and Provider concerns regarding memory concerns, behavior changes/concerns and mood concerns  If there are behavioral concerns, is the pt prescribed medications to manage these? no   If so, how many? none   Has the patient ever had an inpatient psychiatric hospitalization? No,   What is the goal of the visit? initial assessment and diagnosis; resources; address episodes of confusion and depression  Has the patient been seen by a Neurologist or Geriatrician? Yes, Neurologist during recent hospital stay  If yes, is this appointment for a second opinion? No  Has the patient ever been diagnosed with dementia? No      Preferred language? English  Highest education level? High School Graduate  Does the patient wear glasses? Yes   Does the patient use hearing aids? No     Is there a living will/healthcare POA in place/If so, who? No,     Does the pt/caregiver have access for a virtual visit (computer/smart phone with audio/video)? Yes     Caller was informed: Please make sure the pt is accompanied by someone who knows them well / caregiver / family member to participate in this appointment  Who will accompany the pt (name and relationship)? Yasmeen Sanchez, caregiver  Phone number of person accompanying pt: [phone]    Office packet mailed out to: [address] [place] [phone]    Added to wait list for sooner appointments?  Yes     NOTE FOR : Please route to provider for chart review prior to scheduling and let the caller know that this phone intake will be reviewed IF -   Pt was recently hospitalized   Pt is prescribed medications for behavior management or has a history of psychiatric hospitalization   Pt plans to attend alone

## 2022-08-30 NOTE — UTILIZATION REVIEW
Notification of Discharge   This is a Notification of Discharge from our facility [address]  Please be advised that this patient has been discharge from our facility  Below you will find the admission and discharge date and time including the patients disposition  UTILIZATION REVIEW CONTACT:  [facility]  Utilization   Network Utilization Review Department  Phone: [phone] carefully listen to the prompts  All voicemails are confidential   Email: [email or web address]     PHYSICIAN ADVISORY SERVICES:  FOR [facility] REVIEW - MEDICAL NECESSITY DENIAL  Phone: [phone]  Fax: [phone]  Email: [email or web address]     PRESENTATION DATE: 8/26/2022  3:54 PM  OBERVATION ADMISSION DATE:   INPATIENT ADMISSION DATE: 8/27/22  3:09 PM   DISCHARGE DATE: 8/29/2022  4:45 PM  DISPOSITION: Home with [place] with [address] INFORMATION:  Send all requests for admission clinical reviews, approved or denied determinations and any other requests to dedicated fax number below belonging to the campus where the patient is receiving treatment   List of dedicated fax numbers:  [address] DENIALS (Administrative/Medical Necessity) [phone]   1000  [facility] (Maternity/NICU/Pediatrics) [phone]   Curtis Yaa [phone]   [address] [facility] [address] [phone]   [address] [place] [address] [phone]   [address] [phone]   [facility]  [phone]   [address] [place] [address], [facility]  [facility] [facility] 1000 [facility] [phone]

## 2022-08-31 ENCOUNTER — TELEPHONE (OUTPATIENT)
Dept: FAMILY MEDICINE CLINIC | Facility: HOSPITAL | Age: 82
End: 2022-08-31

## 2022-08-31 NOTE — TELEPHONE ENCOUNTER
TALKED TO SHWETA   HE IS VERY OVERWHELMED WITH [facility]    DO YOU THINK I SHOULD SENT A REFERRAL  TO [address]

## 2022-08-31 NOTE — TELEPHONE ENCOUNTER
I agree with referral to Complex Medical Care Management with Corey Marshalls  They didn't add any med while in hospital- I really dont have anything to add in   If he is a danger to himself or others then he needs to be back in ER

## 2022-08-31 NOTE — TELEPHONE ENCOUNTER
Oliver Canchola called, left message on office voicemail, says that Juwan Castillo is hallucinating, he is saying that computers are coming to get him  He was just recently d/c from [facility]   Oliver Canchola asking for a return call

## 2022-08-31 NOTE — TELEPHONE ENCOUNTER
Girlfriend's son calling again - ohara now having different hallucinations  Told girlfriend she will need to hold his hand tonight so they don't take him away  Patient does have moments of lucidity when aware of time and place, was able to use a smart phone this morning  Confirmed patient is not on any new medications  He is eating and drinking some each day  No known problems w/ urinating, but did lose his bowels this morning

## 2022-09-01 ENCOUNTER — HOSPITAL ENCOUNTER (EMERGENCY)
Facility: HOSPITAL | Age: 82
Discharge: HOME | End: 2022-09-01
Attending: EMERGENCY MEDICINE
Payer: COMMERCIAL

## 2022-09-01 ENCOUNTER — TELEPHONE (OUTPATIENT)
Dept: FAMILY MEDICINE CLINIC | Facility: HOSPITAL | Age: 82
End: 2022-09-01

## 2022-09-01 ENCOUNTER — APPOINTMENT (EMERGENCY)
Dept: CT IMAGING | Facility: HOSPITAL | Age: 82
End: 2022-09-01
Payer: COMMERCIAL

## 2022-09-01 VITALS
RESPIRATION RATE: 17 BRPM | HEART RATE: 67 BPM | HEIGHT: 67 IN | WEIGHT: 160.94 LBS | TEMPERATURE: 97.9 F | DIASTOLIC BLOOD PRESSURE: 88 MMHG | SYSTOLIC BLOOD PRESSURE: 179 MMHG | OXYGEN SATURATION: 94 % | BODY MASS INDEX: 25.26 KG/M2

## 2022-09-01 DIAGNOSIS — R44.1 VISUAL HALLUCINATIONS: Primary | ICD-10-CM

## 2022-09-01 LAB
ALBUMIN SERPL BCP-MCNC: 3.6 G/DL (ref 3.5–5)
ALP SERPL-CCNC: 76 U/L (ref 46–116)
ALT SERPL W P-5'-P-CCNC: 43 U/L (ref 12–78)
AMPHETAMINES SERPL QL SCN: NEGATIVE
ANION GAP SERPL CALCULATED.3IONS-SCNC: 6 MMOL/L (ref 4–13)
AST SERPL W P-5'-P-CCNC: 27 U/L (ref 5–45)
BARBITURATES UR QL: NEGATIVE
BASOPHILS # BLD AUTO: 0.02 THOUSANDS/ΜL (ref 0–0.1)
BASOPHILS NFR BLD AUTO: 0 % (ref 0–1)
BENZODIAZ UR QL: NEGATIVE
BILIRUB SERPL-MCNC: 0.8 MG/DL (ref 0.2–1)
BILIRUB UR QL STRIP: NEGATIVE
BUN SERPL-MCNC: 11 MG/DL (ref 5–25)
CALCIUM SERPL-MCNC: 8.7 MG/DL (ref 8.3–10.1)
CHLORIDE SERPL-SCNC: 105 MMOL/L (ref 96–108)
CLARITY UR: CLEAR
CO2 SERPL-SCNC: 31 MMOL/L (ref 21–32)
COCAINE UR QL: NEGATIVE
COLOR UR: YELLOW
CREAT SERPL-MCNC: 0.82 MG/DL (ref 0.6–1.3)
EOSINOPHIL # BLD AUTO: 0.13 THOUSAND/ΜL (ref 0–0.61)
EOSINOPHIL NFR BLD AUTO: 2 % (ref 0–6)
ERYTHROCYTE [DISTWIDTH] IN BLOOD BY AUTOMATED COUNT: 21.4 % (ref 11.6–15.1)
ETHANOL EXG-MCNC: 0 MG/DL
GFR SERPL CREATININE-BSD FRML MDRD: 82 ML/MIN/1.73SQ M
GLUCOSE SERPL-MCNC: 97 MG/DL (ref 65–140)
GLUCOSE UR STRIP-MCNC: NEGATIVE MG/DL
HCT VFR BLD AUTO: 42.4 % (ref 36.5–49.3)
HGB BLD-MCNC: 13.1 G/DL (ref 12–17)
HGB UR QL STRIP.AUTO: NEGATIVE
IMM GRANULOCYTES # BLD AUTO: 0.01 THOUSAND/UL (ref 0–0.2)
IMM GRANULOCYTES NFR BLD AUTO: 0 % (ref 0–2)
KETONES UR STRIP-MCNC: NEGATIVE MG/DL
LEUKOCYTE ESTERASE UR QL STRIP: NEGATIVE
LYMPHOCYTES # BLD AUTO: 1.29 THOUSANDS/ΜL (ref 0.6–4.47)
LYMPHOCYTES NFR BLD AUTO: 19 % (ref 14–44)
MCH RBC QN AUTO: 24.7 PG (ref 26.8–34.3)
MCHC RBC AUTO-ENTMCNC: 30.9 G/DL (ref 31.4–37.4)
MCV RBC AUTO: 80 FL (ref 82–98)
METHADONE UR QL: NEGATIVE
MONOCYTES # BLD AUTO: 0.61 THOUSAND/ΜL (ref 0.17–1.22)
MONOCYTES NFR BLD AUTO: 9 % (ref 4–12)
NEUTROPHILS # BLD AUTO: 4.86 THOUSANDS/ΜL (ref 1.85–7.62)
NEUTS SEG NFR BLD AUTO: 70 % (ref 43–75)
NITRITE UR QL STRIP: NEGATIVE
NRBC BLD AUTO-RTO: 0 /100 WBCS
OPIATES UR QL SCN: NEGATIVE
OXYCODONE+OXYMORPHONE UR QL SCN: NEGATIVE
PCP UR QL: NEGATIVE
PH UR STRIP.AUTO: 6.5 [PH]
PLATELET # BLD AUTO: 181 THOUSANDS/UL (ref 149–390)
PMV BLD AUTO: 9 FL (ref 8.9–12.7)
POTASSIUM SERPL-SCNC: 3.9 MMOL/L (ref 3.5–5.3)
PROT SERPL-MCNC: 7.2 G/DL (ref 6.4–8.4)
PROT UR STRIP-MCNC: NEGATIVE MG/DL
RBC # BLD AUTO: 5.31 MILLION/UL (ref 3.88–5.62)
SARS-COV-2 RNA RESP QL NAA+PROBE: NEGATIVE
SODIUM SERPL-SCNC: 142 MMOL/L (ref 135–147)
SP GR UR STRIP.AUTO: 1.02 (ref 1–1.03)
THC UR QL: NEGATIVE
TSH SERPL DL<=0.05 MIU/L-ACNC: 3.51 UIU/ML (ref 0.45–4.5)
UROBILINOGEN UR QL STRIP.AUTO: 0.2 E.U./DL
VIT B1 BLD-SCNC: 265.9 NMOL/L (ref 66.5–200)
WBC # BLD AUTO: 6.92 THOUSAND/UL (ref 4.31–10.16)

## 2022-09-01 PROCEDURE — 81003 URINALYSIS AUTO W/O SCOPE: CPT | Performed by: EMERGENCY MEDICINE

## 2022-09-01 PROCEDURE — U0003 INFECTIOUS AGENT DETECTION BY NUCLEIC ACID (DNA OR RNA); SEVERE ACUTE RESPIRATORY SYNDROME CORONAVIRUS 2 (SARS-COV-2) (CORONAVIRUS DISEASE [COVID-19]), AMPLIFIED PROBE TECHNIQUE, MAKING USE OF HIGH THROUGHPUT TECHNOLOGIES AS DESCRIBED BY CMS-2020-01-R: HCPCS | Performed by: EMERGENCY MEDICINE

## 2022-09-01 PROCEDURE — 80053 COMPREHEN METABOLIC PANEL: CPT | Performed by: EMERGENCY MEDICINE

## 2022-09-01 PROCEDURE — G1004 CDSM NDSC: HCPCS

## 2022-09-01 PROCEDURE — 80307 DRUG TEST PRSMV CHEM ANLYZR: CPT | Performed by: EMERGENCY MEDICINE

## 2022-09-01 PROCEDURE — 82075 ASSAY OF BREATH ETHANOL: CPT | Performed by: EMERGENCY MEDICINE

## 2022-09-01 PROCEDURE — 70450 CT HEAD/BRAIN W/O DYE: CPT

## 2022-09-01 PROCEDURE — 93005 ELECTROCARDIOGRAM TRACING: CPT

## 2022-09-01 PROCEDURE — 99285 EMERGENCY DEPT VISIT HI MDM: CPT | Performed by: EMERGENCY MEDICINE

## 2022-09-01 PROCEDURE — 36415 COLL VENOUS BLD VENIPUNCTURE: CPT | Performed by: EMERGENCY MEDICINE

## 2022-09-01 PROCEDURE — 85025 COMPLETE CBC W/AUTO DIFF WBC: CPT | Performed by: EMERGENCY MEDICINE

## 2022-09-01 PROCEDURE — 84443 ASSAY THYROID STIM HORMONE: CPT | Performed by: EMERGENCY MEDICINE

## 2022-09-01 PROCEDURE — 99285 EMERGENCY DEPT VISIT HI MDM: CPT

## 2022-09-01 PROCEDURE — U0005 INFEC AGEN DETEC AMPLI PROBE: HCPCS | Performed by: EMERGENCY MEDICINE

## 2022-09-01 NOTE — TELEPHONE ENCOUNTER
Hunter Vasquez has been hallucinating and dementia and falling asleep while walking  Please call Joyce Brochure as he is very concerned about Hunter Vasquez being a danger to himself and what to be looking for in his behavior to know he is doing the right thing in regards to caring for him    Thank you

## 2022-09-01 NOTE — TELEPHONE ENCOUNTER
I talked with Candelaria Silveira, he says that Dani Bates is not in any danger  I said he could either go back to the hospital or wait for sarah to call  I did tell him I didn't know when sarah would be calling and I told him I wasn't sure if she would be calling today  I told him he should try and keep a log of when Dani Bates is having these symptoms to see if its happening all day or just at night  I told him with the lack of sleep that's probably not helping his situation  And he should try and rest as well as try to hydrate  I told him to call us if he has any other questions or concerns  Candelaria Silveira mainly wanted to just make the office aware of the symptoms he was having so that we knew what was going on
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
## 2022-09-01 NOTE — TELEPHONE ENCOUNTER
Pt's contact Soniya Can called this am, stating that last night was very bad again for patient  Stated he is seeing little children and continuing with the hallucinations    Wanted it noted in his chart, and see if it is advised that pt needs to go to ER   PCB

## 2022-09-01 NOTE — Clinical Note
Micah Tavarez should be transferred out to [address] [place] [address] and has been medically cleared

## 2022-09-02 ENCOUNTER — HOSPITAL ENCOUNTER (EMERGENCY)
Facility: HOSPITAL | Age: 82
End: 2022-09-03
Attending: EMERGENCY MEDICINE
Payer: COMMERCIAL

## 2022-09-02 ENCOUNTER — APPOINTMENT (OUTPATIENT)
Dept: RADIOLOGY | Facility: HOSPITAL | Age: 82
End: 2022-09-02
Payer: COMMERCIAL

## 2022-09-02 DIAGNOSIS — R44.1 VISUAL HALLUCINATIONS: Primary | ICD-10-CM

## 2022-09-02 LAB
ATRIAL RATE: 71 BPM
ETHANOL EXG-MCNC: 0 MG/DL
P AXIS: 58 DEGREES
PR INTERVAL: 184 MS
QRS AXIS: 75 DEGREES
QRSD INTERVAL: 98 MS
QT INTERVAL: 390 MS
QTC INTERVAL: 423 MS
T WAVE AXIS: 47 DEGREES
VENTRICULAR RATE: 71 BPM

## 2022-09-02 PROCEDURE — 82075 ASSAY OF BREATH ETHANOL: CPT | Performed by: EMERGENCY MEDICINE

## 2022-09-02 PROCEDURE — NC001 PR NO CHARGE: Performed by: EMERGENCY MEDICINE

## 2022-09-02 PROCEDURE — 99285 EMERGENCY DEPT VISIT HI MDM: CPT

## 2022-09-02 PROCEDURE — 93010 ELECTROCARDIOGRAM REPORT: CPT | Performed by: INTERNAL MEDICINE

## 2022-09-02 RX ORDER — ASPIRIN 81 MG/1
81 TABLET ORAL DAILY
Status: DISCONTINUED | OUTPATIENT
Start: 2022-09-03 | End: 2022-09-03 | Stop reason: HOSPADM

## 2022-09-02 RX ORDER — LEVETIRACETAM 250 MG/1
750 TABLET ORAL DAILY
Status: DISCONTINUED | OUTPATIENT
Start: 2022-09-03 | End: 2022-09-03 | Stop reason: HOSPADM

## 2022-09-02 RX ORDER — LORATADINE 10 MG/1
10 TABLET ORAL DAILY
Status: DISCONTINUED | OUTPATIENT
Start: 2022-09-03 | End: 2022-09-03 | Stop reason: HOSPADM

## 2022-09-02 RX ORDER — PANTOPRAZOLE SODIUM 40 MG/1
40 TABLET, DELAYED RELEASE ORAL DAILY
Status: DISCONTINUED | OUTPATIENT
Start: 2022-09-02 | End: 2022-09-03 | Stop reason: HOSPADM

## 2022-09-02 RX ORDER — AMLODIPINE BESYLATE 5 MG/1
5 TABLET ORAL DAILY
Status: DISCONTINUED | OUTPATIENT
Start: 2022-09-02 | End: 2022-09-03 | Stop reason: HOSPADM

## 2022-09-02 RX ORDER — ESCITALOPRAM OXALATE 10 MG/1
20 TABLET ORAL DAILY
Status: DISCONTINUED | OUTPATIENT
Start: 2022-09-03 | End: 2022-09-03 | Stop reason: HOSPADM

## 2022-09-02 RX ORDER — CLOPIDOGREL BISULFATE 75 MG/1
75 TABLET ORAL DAILY
Status: DISCONTINUED | OUTPATIENT
Start: 2022-09-03 | End: 2022-09-03 | Stop reason: HOSPADM

## 2022-09-02 RX ORDER — LANOLIN ALCOHOL/MO/W.PET/CERES
100 CREAM (GRAM) TOPICAL DAILY
Status: DISCONTINUED | OUTPATIENT
Start: 2022-09-03 | End: 2022-09-03 | Stop reason: HOSPADM

## 2022-09-02 RX ORDER — CARVEDILOL 12.5 MG/1
12.5 TABLET ORAL 2 TIMES DAILY WITH MEALS
Status: DISCONTINUED | OUTPATIENT
Start: 2022-09-02 | End: 2022-09-03 | Stop reason: HOSPADM

## 2022-09-02 RX ORDER — ATORVASTATIN CALCIUM 40 MG/1
40 TABLET, FILM COATED ORAL
Status: DISCONTINUED | OUTPATIENT
Start: 2022-09-02 | End: 2022-09-03 | Stop reason: HOSPADM

## 2022-09-02 RX ORDER — LANOLIN ALCOHOL/MO/W.PET/CERES
400 CREAM (GRAM) TOPICAL DAILY
Status: DISCONTINUED | OUTPATIENT
Start: 2022-09-03 | End: 2022-09-03 | Stop reason: HOSPADM

## 2022-09-02 RX ADMIN — AMLODIPINE BESYLATE 5 MG: 5 TABLET ORAL at 19:03

## 2022-09-02 RX ADMIN — ATORVASTATIN CALCIUM 40 MG: 40 TABLET, FILM COATED ORAL at 19:02

## 2022-09-02 RX ADMIN — CARVEDILOL 12.5 MG: 12.5 TABLET, FILM COATED ORAL at 19:02

## 2022-09-02 RX ADMIN — PANTOPRAZOLE SODIUM 40 MG: 40 TABLET, DELAYED RELEASE ORAL at 19:02

## 2022-09-02 NOTE — ED NOTES
Patient restless and trying to climb out of bed  States there is a "body under him"  Requesting pants so he can leave  Patient redirected back into bed multiple times  Patient offered to ambulate in hallway - patient refused and states he wants to lay in the bed          Rebeca Fermin RN  09/02/22 6081

## 2022-09-02 NOTE — ED NOTES
Spoke with Soniya Can (patient's caregiver), Soniya Can stated that he is on his way to pick the patient up        Annette Ny RN  09/01/22 4030

## 2022-09-02 NOTE — Clinical Note
Sarai Showers should be transferred out to inpatient [facility] and has been medically cleared

## 2022-09-02 NOTE — ED NOTES
Pt triggered bed alarm, offered ambulation to the bathroom  Patient accepted  Pt ambulatory to the bathroom with assistance of walker, gait steady       Maria Fernanda Chapin RN  09/02/22 2970

## 2022-09-02 NOTE — ED NOTES
[facility] spoke to Barbie Alonso to let them know the Patient will be discharged 1430 on 9/03/2022

## 2022-09-02 NOTE — ED NOTES
Patient changed into new brief, and fresh sheets provided  Currently resting comfortably, no distress noted  1:1 virtual remains in progress        Amaury Romero RN  09/02/22 8881

## 2022-09-02 NOTE — ED NOTES
Pt ate 40% of lunch and is now resting comfortably in bed with no complaints or requests       Killian Tracey  09/02/22 7234

## 2022-09-02 NOTE — ED NOTES
Called [facility] spoke to Jeff  for an update on transport time  Still in process  Awaiting time for transport

## 2022-09-02 NOTE — ED NOTES
Pt discharged with caregiver Jenny Ramos via wheelchair  All belongings returned to patient, AVS provided to Jenny London RN  09/01/22 2106

## 2022-09-02 NOTE — ED NOTES
Called [facility] to arrange transport to [facility]  Spoke with Jeff and awaiting transport time
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
## 2022-09-02 NOTE — ED NOTES
Patient found sitting at edge of bed with bed alarm triggered  Patient confused and irritable with staff requesting pants while exiting litter  RN explained to the patient that he is incontinent and needs frequent brief changes  Patient non-receptive, attempting to hit staff  RN reinforced that the patient's behavior is not tolerated in the ED, patient continues to be irate with staff  Patient eventually agreeable to get back in litter, bed alarm reset  Virtual 1:1 remains in room        Robin Sam RN  09/02/22 8226

## 2022-09-02 NOTE — ED NOTES
Pt presented to ED w/ caregiver the second time this evening due to a change of mindset and willingness to accept mental [address]; caregiver supplied collateral information post assessment  This worker introduced self and role and Pt appeared to understand and attempted to assist in the assessment and LOC planning  Reportedly for the recent past of two weeks or more the Pt has been experiencing active hallucinations of objects moving around as well as seeing snakes and other things that are not there  Caregiver stated that today the Pt was talking about swinging at individuals that he perceives are bothering him but are not there  Caregiver stated that the Pt does have a HX of mental health issues but does not see anyone for therapy or psychiatry; Pt does receive depression medications from PCP  Pt denies any SI/HI now or in the past  There was no information regarding inpatient [address] or substance abuse that was available  Caregiver stated that the Pt does have a HX of alcoholism but does not currently drink  Pt has posed no physical threats or been aggressive with caregiver or other adults in the home  Pt does not currently have any legal issues or a history of aggression or acting out  Caregiver stated that he has been assisting Pt for several months or so  Pt reportedly keeps to self but does not actively engage in any activities to stimulate his mind  Pt has been evaluated numerous times medically in the last two weeks  Caregiver reported that Pt is depressed and that Pt is positive for AV hallucinations but there has been no indication that there are command voices present  Sleep and diet would appear to be normal  Pt was explained the process of the 201 and the 72 hour notice  Pt acknowledged understanding the documents and was willing to sign the 201  The Pt was somewhat irritable at times during the assessment and was difficult to understand due to the loss of so many of his teeth   Pt appeared to be oriented x4 but was tangential and not able to answer questions posed to him at times or replaced the answer with another complete topic

## 2022-09-02 NOTE — ED NOTES
Patient trying to climb out of bed  This nurse and other staff at bedside  Patient talking about "all these white people" and told this nurse that I want to hurt white people  Patient rambling about how the staff can keep making fools of themselves  Crisis at bedside, introduced self, patient responded "keep it down this is sexual"  Staff questioned what he said and patient replied that "now you're taking it too far"  Patient offered again if he wanted to get out of bed and go for a walk, patient again declined and laid back in bed        Reggie Willard RN  09/02/22 0421

## 2022-09-02 NOTE — ED NOTES
External catheter placed on patient, wicking pad and bed alarm placed underneath patient  Virtual 1:1 initiated for patient safety       Nohemy Gaytan RN  09/02/22 0336

## 2022-09-02 NOTE — ED NOTES
Patient continually attempting to get OOB, pt appears to be visual hallucinating  Bed alarm placed underneath patient, virtual 1:1 remains with patient       Bertha Simmonds, RN  09/01/22 1435

## 2022-09-02 NOTE — ED NOTES
Insurance Authorization for admission:   Phone call placed to [facility] number: [phone]  Spoke to [place]    5 days approved  Level of care: inpatient mental health  Review on 9/6/22  Authorization # [id number]       EVS (Eligibility Verification System) called - [phone]  Automated system indicates: Not Active    Insurance Authorization for Transportation:    Phone call placed to **  Phone number **  Spoke to **     Authorization #: **

## 2022-09-02 NOTE — ED CARE HANDOFF
Emergency Department Sign Out Note        Sign out and transfer of care from Dr Mendoza Ascencio  See Separate Emergency Department note  The patient, Miles Pnag, was evaluated by the previous provider for hallucinations  Workup Completed:  Patient medically cleared for inpatient psychiatric admission  Crisis consulted  201 signed  Awaiting placement  ED Course / Workup Pending (followup): Patient accepted at [facility]  Transport pending  Home medications ordered  Procedures  MDM        Disposition  Final diagnoses:   Visual hallucinations     Time reflects when diagnosis was documented in both MDM as applicable and the Disposition within this note     Time User Action Codes Description Comment    9/2/2022  2:25 AM Jo-Ann Rubio Add [R44 1] Visual hallucinations       ED Disposition     ED Disposition   Transfer to [address] [place] [address]   --    Date/Time   Fri Sep 2, 2022 11:35 AM    Comment   Miles Pang should be transferred out to [facility] and has been medically cleared             MD Marvin Mills Most Recent Value   Patient Condition The patient has been stabilized such that within reasonable medical probability, no material deterioration of the patient condition or the condition of the unborn child(bala) is likely to result from the transfer   Benefits of Transfer Specialized equipment and/or services available at the receiving facility (Include comment)________________________  [inpatient psychiatry]   Risks of Transfer Potential for delay in receiving treatment, Potential deterioration of medical condition, Increased discomfort during transfer, Possible worsening of condition or death during transfer   Accepting Physician Dr Margarita Carballo MD   [address] Name, [address] [place] [address]    (Name & Tel number) Hitesh Bhakta   Provider Certification General risk, such as traffic hazards, adverse weather conditions, rough terrain or turbulence, possible failure of equipment (including vehicle or aircraft), or consequences of actions of persons outside the control of the transport personnel, Unanticipated needs of medical equipment and personnel during transport, Risk of worsening condition, The possibility of a transport vehicle being unavailable      RN Documentation    Flowsheet Row Most [address] [facility] Name, [address] [place] [address]    (Name & Tel number) [facility]      Follow-up Information    None       Patient's Medications   Discharge Prescriptions    No medications on file     No discharge procedures on file         ED Provider  Electronically Signed by     Selby Bence, MD  09/02/22 Taryn Sepulveda MD  09/02/22 9725
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
## 2022-09-02 NOTE — EMTALA/ACUTE CARE TRANSFER
[facility] EMERGENCY DEPARTMENT  [address] [facility] [address]  Dept: [phone]      EMTALA TRANSFER CONSENT    NAME Sylvia HODGES 1940                              MRN [id number]    I have been informed of my rights regarding examination, treatment, and transfer   by Dr Ericka Tracy att  providers found    Benefits: Specialized equipment and/or services available at the receiving facility (Include comment)________________________ (inpatient psychiatry)    Risks: Potential for delay in receiving treatment, Potential deterioration of medical condition, Increased discomfort during transfer, Possible worsening of condition or death during transfer      Consent for Transfer:  I acknowledge that my medical condition has been evaluated and explained to me by the emergency department physician or other qualified medical person and/or my attending physician, who has recommended that I be transferred to the service of  Accepting Physician: Dr Darren Bergman MD at [address] [place] [address] Name, [address] : [place], [place]  The above potential benefits of such transfer, the potential risks associated with such transfer, and the probable risks of not being transferred have been explained to me, and I fully understand them  The doctor has explained that, in my case, the benefits of transfer outweigh the risks  I agree to be transferred  I authorize the performance of emergency medical procedures and treatments upon me in both transit and upon arrival at the receiving facility  Additionally, I authorize the release of any and all medical records to the receiving facility and request they be transported with me, if possible  I understand that the safest mode of transportation during a medical emergency is an ambulance and that the Hospital advocates the use of this mode of transport   Risks of traveling to the receiving facility by car, including absence of medical control, life sustaining equipment, such as oxygen, and medical personnel has been explained to me and I fully understand them  (JOSE CORRECT BOX BELOW)  [  ]  I consent to the stated transfer and to be transported by ambulance/helicopter  [  ]  I consent to the stated transfer, but refuse transportation by ambulance and accept full responsibility for my transportation by car  I understand the risks of non-ambulance transfers and I exonerate the Hospital and its staff from any deterioration in my condition that results from this refusal     X___________________________________________    DATE  22  TIME________  Signature of patient or legally responsible individual signing on patient behalf           RELATIONSHIP TO PATIENT_________________________          Provider Certification    NAME Kaleb Lobo                                        [facility] 1940                              MRN [id number]    A medical screening exam was performed on the above named patient  Based on the examination:    Condition Necessitating Transfer The encounter diagnosis was Visual hallucinations      Patient Condition: The patient has been stabilized such that within reasonable medical probability, no material deterioration of the patient condition or the condition of the unborn child(bala) is likely to result from the transfer    Reason for Transfer:      Transfer Requirements: [place], [place]   · Space available and qualified personnel available for treatment as acknowledged by [employer]  · Agreed to accept transfer and to provide appropriate medical treatment as acknowledged by       Dr Jadyn Arguello MD  · Appropriate medical records of the examination and treatment of the patient are provided at the time of transfer   [address] _______  · Transfer will be performed by qualified personnel from    and appropriate transfer equipment as required, including the use of necessary and appropriate life support measures  Provider Certification: I have examined the patient and explained the following risks and benefits of being transferred/refusing transfer to the patient/family:  General risk, such as traffic hazards, adverse weather conditions, rough terrain or turbulence, possible failure of equipment (including vehicle or aircraft), or consequences of actions of persons outside the control of the transport personnel, Unanticipated needs of medical equipment and personnel during transport, Risk of worsening condition, The possibility of a transport vehicle being unavailable      Based on these reasonable risks and benefits to the patient and/or the unborn child(bala), and based upon the information available at the time of the patients examination, I certify that the medical benefits reasonably to be expected from the provision of appropriate medical treatments at another medical facility outweigh the increasing risks, if any, to the individuals medical condition, and in the case of labor to the unborn child, from effecting the transfer      X____________________________________________ DATE 09/02/22        TIME_______      ORIGINAL - SEND TO MEDICAL RECORDS   COPY - SEND WITH PATIENT DURING TRANSFER

## 2022-09-02 NOTE — ED NOTES
Bed Search    Eileen Morse): clinical faxed   [facility]): clinical faxed   Mamta Jacobs): no beds  [facility]): clinical faxed  Ana Luisa Gupta): voicemail left  [place] [facility]): clinical faxed   Burl Leyden: voicemail left  [place]: voicemail left  [facility]): clinical faxed  Renetta Li [facility]): clinical faxed

## 2022-09-02 NOTE — ED NOTES
Patient is accepted at [facility]  Patient is accepted by Dr Alonzo Johnson per [address] is arranged with **     Transportation is scheduled for **  Patient may go to the floor at 1300         Nurse report is to be called to [phone] prior to patient transfer  Tax ID: [id number]  NPI [id number]    [address] [phone]  F: [phone]    Needs to go to ED to get a new COVID in ER

## 2022-09-02 NOTE — ED NOTES
Patient was presented an 61 51 81 and refused to sign  Epidermal Autograft Text: The defect edges were debeveled with a #15 scalpel blade.  Given the location of the defect, shape of the defect and the proximity to free margins an epidermal autograft was deemed most appropriate.  Using a sterile surgical marker, the primary defect shape was transferred to the donor site. The epidermal graft was then harvested.  The skin graft was then placed in the primary defect and oriented appropriately.

## 2022-09-02 NOTE — ED NOTES
Pt incontinent of urine  Bed sheets and gown changed  New sheets applied to litter, wicking pad placed, depends and external catheter placed on patient  Pt remains cooperative at this time, disoriented to situation  Denies SI/HI       Violette Regalado, KARINA  09/01/22 2047

## 2022-09-03 VITALS
OXYGEN SATURATION: 96 % | TEMPERATURE: 97.6 F | HEIGHT: 67 IN | HEART RATE: 59 BPM | RESPIRATION RATE: 16 BRPM | SYSTOLIC BLOOD PRESSURE: 159 MMHG | WEIGHT: 160 LBS | DIASTOLIC BLOOD PRESSURE: 76 MMHG | BODY MASS INDEX: 25.11 KG/M2

## 2022-09-03 PROCEDURE — 96372 THER/PROPH/DIAG INJ SC/IM: CPT

## 2022-09-03 RX ORDER — OLANZAPINE 10 MG/1
5 INJECTION, POWDER, LYOPHILIZED, FOR SOLUTION INTRAMUSCULAR ONCE
Status: COMPLETED | OUTPATIENT
Start: 2022-09-03 | End: 2022-09-03

## 2022-09-03 RX ADMIN — OLANZAPINE 5 MG: 10 INJECTION, POWDER, LYOPHILIZED, FOR SOLUTION INTRAMUSCULAR at 01:40

## 2022-09-03 RX ADMIN — LEVETIRACETAM 750 MG: 250 TABLET, FILM COATED ORAL at 13:25

## 2022-09-03 RX ADMIN — Medication 100 MG: at 13:25

## 2022-09-03 RX ADMIN — ESCITALOPRAM OXALATE 20 MG: 10 TABLET ORAL at 13:25

## 2022-10-01 DIAGNOSIS — I10 ESSENTIAL HYPERTENSION: ICD-10-CM

## 2022-10-01 RX ORDER — CARVEDILOL 12.5 MG/1
TABLET ORAL
Qty: 180 TABLET | Refills: 0 | Status: SHIPPED | OUTPATIENT
Start: 2022-10-01

## 2022-10-05 ENCOUNTER — TELEPHONE (OUTPATIENT)
Dept: FAMILY MEDICINE CLINIC | Facility: HOSPITAL | Age: 82
End: 2022-10-05

## 2022-10-05 NOTE — TELEPHONE ENCOUNTER
Pt scheduled for TCM for 10/13/22 @1:00, will be getting D/C'd on 10/7/2022 from [facility]   Sep 1-6 behavioral, Sep 12 medical, than Sep 19 to October 7 back to behavioral   They are faxing over all pt's records

## 2022-10-07 ENCOUNTER — TRANSITIONAL CARE MANAGEMENT (OUTPATIENT)
Dept: FAMILY MEDICINE CLINIC | Facility: HOSPITAL | Age: 82
End: 2022-10-07

## 2022-10-13 ENCOUNTER — TELEPHONE (OUTPATIENT)
Dept: FAMILY MEDICINE CLINIC | Facility: HOSPITAL | Age: 82
End: 2022-10-13

## 2022-10-13 ENCOUNTER — OFFICE VISIT (OUTPATIENT)
Dept: FAMILY MEDICINE CLINIC | Facility: HOSPITAL | Age: 82
End: 2022-10-13
Payer: COMMERCIAL

## 2022-10-13 VITALS
BODY MASS INDEX: 25.11 KG/M2 | HEART RATE: 53 BPM | HEIGHT: 67 IN | SYSTOLIC BLOOD PRESSURE: 138 MMHG | TEMPERATURE: 97.9 F | OXYGEN SATURATION: 98 % | WEIGHT: 160 LBS | DIASTOLIC BLOOD PRESSURE: 68 MMHG

## 2022-10-13 DIAGNOSIS — H61.22 IMPACTED CERUMEN OF LEFT EAR: ICD-10-CM

## 2022-10-13 DIAGNOSIS — G30.0 MILD EARLY ONSET ALZHEIMER'S DEMENTIA WITH PSYCHOTIC DISTURBANCE (HCC): ICD-10-CM

## 2022-10-13 DIAGNOSIS — I10 ESSENTIAL HYPERTENSION: ICD-10-CM

## 2022-10-13 DIAGNOSIS — G45.9 TIA (TRANSIENT ISCHEMIC ATTACK): ICD-10-CM

## 2022-10-13 DIAGNOSIS — M17.12 PRIMARY OSTEOARTHRITIS OF LEFT KNEE: ICD-10-CM

## 2022-10-13 DIAGNOSIS — Z76.89 ENCOUNTER FOR SUPPORT AND COORDINATION OF TRANSITION OF CARE: Primary | ICD-10-CM

## 2022-10-13 DIAGNOSIS — I73.9 PERIPHERAL ARTERIAL DISEASE (HCC): ICD-10-CM

## 2022-10-13 DIAGNOSIS — G89.29 CHRONIC NECK PAIN: ICD-10-CM

## 2022-10-13 DIAGNOSIS — F33.42 RECURRENT MAJOR DEPRESSIVE DISORDER, IN FULL REMISSION (HCC): ICD-10-CM

## 2022-10-13 DIAGNOSIS — Z23 ENCOUNTER FOR IMMUNIZATION: ICD-10-CM

## 2022-10-13 DIAGNOSIS — F02.A2 MILD EARLY ONSET ALZHEIMER'S DEMENTIA WITH PSYCHOTIC DISTURBANCE (HCC): ICD-10-CM

## 2022-10-13 DIAGNOSIS — M54.2 CHRONIC NECK PAIN: ICD-10-CM

## 2022-10-13 PROBLEM — G93.41 ACUTE METABOLIC ENCEPHALOPATHY: Status: RESOLVED | Noted: 2017-04-13 | Resolved: 2022-10-13

## 2022-10-13 PROBLEM — I67.4 HYPERTENSIVE ENCEPHALOPATHY: Status: RESOLVED | Noted: 2020-05-15 | Resolved: 2022-10-13

## 2022-10-13 PROCEDURE — 90662 IIV NO PRSV INCREASED AG IM: CPT

## 2022-10-13 PROCEDURE — 69209 REMOVE IMPACTED EAR WAX UNI: CPT | Performed by: NURSE PRACTITIONER

## 2022-10-13 PROCEDURE — G0008 ADMIN INFLUENZA VIRUS VAC: HCPCS

## 2022-10-13 PROCEDURE — 99496 TRANSJ CARE MGMT HIGH F2F 7D: CPT | Performed by: NURSE PRACTITIONER

## 2022-10-13 RX ORDER — QUETIAPINE FUMARATE 25 MG/1
25 TABLET, FILM COATED ORAL 2 TIMES DAILY
COMMUNITY
Start: 2022-09-19 | End: 2022-10-25 | Stop reason: SDUPTHER

## 2022-10-13 RX ORDER — ESCITALOPRAM OXALATE 10 MG/1
TABLET ORAL
Start: 2022-10-13

## 2022-10-13 RX ORDER — CHLORTHALIDONE 25 MG/1
25 TABLET ORAL
COMMUNITY
Start: 2022-09-19 | End: 2022-10-25 | Stop reason: SDUPTHER

## 2022-10-13 NOTE — TELEPHONE ENCOUNTER
PERLA    Patient has appointment today and will be coming with his caregiver  Services for patient were opened on Tuesday, October 11, 2022  Patient was in Psych Unit at [facility]     Nurse saw patient Tuesday and again today  Nurse noted that patient had hallucinations last night  Patient's heart rate was irregular today   45s and 50s whereas they were in 62s on Tuesday  No other symptoms  Nurse saw all scans look good (MRI or MRA, carotid scan, and others) according to caregiver but she is asking for provider to check out patient's left ear and neck  Intermittent pain in the back side of ear and lateral neck  No one can figure out what is wrong  Patient started Seroquil and Lexapro  Caregiver has all paperwork

## 2022-10-13 NOTE — PROGRESS NOTES
Name: Layton Roe      : 1940      MRN: [id number]  Encounter Provider: ELE Cartagena  Encounter Date: 10/13/2022   Encounter department: 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 ([facility] )  Assessment & Plan:  Continue meds as rx'd inpatient  Schedule outpatient psych follow up as previously advised      3  Recurrent major depressive disorder, in full remission ([facility] )  Assessment & Plan:  Mood stable on 10mg dose of lexapro  Continue same and return in 3 months for follow up    Orders:  -     escitalopram (LEXAPRO) 10 mg tablet; Take 1 tablet daily    4  Primary osteoarthritis of left knee  Assessment & Plan:  Left knee  Consult ortho for further evaluation    Orders:  -     Ambulatory Referral to Orthopedic Surgery; Future    5  Encounter for immunization  -     influenza vaccine, high-dose, PF 0 7 mL (FLUZONE HIGH-DOSE)    6  Essential hypertension  Assessment & Plan:  Stable control on meds as rx'd  Return in 3 months for next follow up      7  TIA (transient ischemic attack)  Assessment & Plan:  Clinically stable  Reschedule canceled neurology appt      8  Peripheral arterial disease ([facility] )  Assessment & Plan:  Reschedule canceled ISMAEL study      9  Chronic neck pain  Assessment & Plan: To start home PT           Subjective      Here with caretaker for TCM follow up  Discharged from [facility] after psych inpatient stay  Was started on seroquel and chlorathalidone, otherwise meds were unchanged  Dominick Nightingale states he has been well since home  Caretaker states he may have had a few episodes of visual hallucinations the past 2 days  Seems to be "half awake half asleep"  Notified VNA of episodes as he was told to do  Has VNA coming a couple times/week and last visit was today   PT and OT will be scheduled also  He has a number to call and make psych follow up appt but has yet to do so  Neurology and vascular appts had to be canceled and will be rescheduled  Geriatric appt scheduled in March  TCM Call     Date and time call was made  10/7/2022  9:36 AM    Hospital care reviewed  Records reviewed    Patient was hospitialized at  [facility] ([facility])    [address] [place] [address]    Date of Admission  09/01/22    Date of discharge  10/07/22    Diagnosis  visual hallucinatiions    Disposition  Home    Were the patients medications reviewed and updated  No    Current Symptoms  None      TCM Call     Post hospital issues  None    Should patient be enrolled in anticoag monitoring? No    Scheduled for follow up? Yes    Did you obtain your prescribed medications  Yes    Do you need help managing your prescriptions or medications  No    Is transportation to your appointment needed  No    I have advised the patient to call PCP with any new or worsening symptoms  Kervin Courser MA    [address] ([address])         Review of Systems   HENT: Positive for ear pain (left ear)  Respiratory: Negative  Cardiovascular: Negative  Musculoskeletal: Positive for arthralgias (left knee), neck pain (left sided) and neck stiffness (left sided)  Psychiatric/Behavioral: Positive for hallucinations  Negative for confusion and dysphoric mood         Current Outpatient Medications on File Prior to Visit   Medication Sig   • amLODIPine (NORVASC) 5 mg tablet Take 1 tablet (5 mg total) by mouth daily   • aspirin (ECOTRIN LOW STRENGTH) 81 mg EC tablet Take 1 tablet (81 mg total) by mouth daily   • atorvastatin (LIPITOR) 40 mg tablet Take 1 tablet (40 mg total) by mouth daily   • carvedilol (COREG) 12 5 mg tablet TAKE 1 TABLET BY MOUTH TWICE A DAY   • clopidogrel (PLAVIX) 75 mg tablet TAKE 1 TABLET BY MOUTH EVERY DAY   • levETIRAcetam (KEPPRA) 100 mg/mL oral solution TAKE 7 5 ML (750 MG TOTAL) BY MOUTH 2 (TWO) TIMES A DAY   • pantoprazole (PROTONIX) 40 mg tablet TAKE 1 TABLET BY MOUTH EVERY DAY   • QUEtiapine (SEROquel) 25 mg tablet Take 25 mg by mouth 2 (two) times a day   • [DISCONTINUED] escitalopram (LEXAPRO) 20 mg tablet TAKE 1 TABLET BY MOUTH EVERY DAY (Patient taking differently: 10 mg)   • chlorthalidone 25 mg tablet 25 mg   • [DISCONTINUED] folic acid (FOLVITE) 500 mcg tablet Take 400 mcg by mouth daily (Patient not taking: Reported on 10/13/2022)   • [DISCONTINUED] loratadine (CLARITIN) 10 mg tablet Take 1 tablet (10 mg total) by mouth daily (Patient not taking: Reported on 10/13/2022)   • [DISCONTINUED] thiamine (VITAMIN B1) 100 mg tablet Take 1 tablet (100 mg total) by mouth daily (Patient not taking: Reported on 10/13/2022)       Objective     /68   Pulse (!) 53   Temp 97 9 °F (36 6 °C)   Ht 5' 7" (1 702 m)   Wt 72 6 kg (160 lb)   SpO2 98%   BMI 25 06 kg/m²     Physical Exam  Vitals reviewed  Constitutional:       General: He is not in acute distress  Appearance: Normal appearance  Eyes:      General: No scleral icterus  Neck:      Vascular: No carotid bruit  Cardiovascular:      Rate and Rhythm: Normal rate and regular rhythm  Heart sounds: No murmur heard  Pulmonary:      Effort: Pulmonary effort is normal  No respiratory distress  Breath sounds: Decreased breath sounds present  Musculoskeletal:      Cervical back: Tenderness (left paracervical muscles) present  Lymphadenopathy:      Cervical: No cervical adenopathy  Skin:     General: Skin is warm and dry  Neurological:      General: No focal deficit present  Mental Status: He is alert  Mental status is at baseline  Psychiatric:         Mood and Affect: Mood normal          Behavior: Behavior normal          Ear cerumen removal    Date/Time: 10/13/2022 4:57 PM  Performed by: ELE Santiago  Authorized by:  ELE Santiago   Universal Protocol:  Procedure performed by: (NP student)  Consent given by: patient  Timeout called at: 10/13/2022 4:58 PM   Patient understanding: patient states understanding of the procedure being performed      Patient location:  Clinic  Procedure details:     Location:  L ear    Procedure type: irrigation only      Approach:  External  Post-procedure details:     Complication:  None    Hearing quality:  Improved    Patient tolerance of procedure:   Tolerated well, no immediate complications      ELE Mercer

## 2022-10-18 ENCOUNTER — TELEPHONE (OUTPATIENT)
Dept: GERIATRICS | Age: 82
End: 2022-10-18

## 2022-10-19 ENCOUNTER — TELEPHONE (OUTPATIENT)
Dept: FAMILY MEDICINE CLINIC | Facility: HOSPITAL | Age: 82
End: 2022-10-19

## 2022-10-19 NOTE — TELEPHONE ENCOUNTER
Bill by OT, will continue to work with pt for independence and safety  Also would like to know if an rx can be sent somewhere that will deliver a commode to pt   PCB Melinda @[phone]

## 2022-10-21 ENCOUNTER — OFFICE VISIT (OUTPATIENT)
Dept: OBGYN CLINIC | Facility: CLINIC | Age: 82
End: 2022-10-21
Payer: COMMERCIAL

## 2022-10-21 VITALS
BODY MASS INDEX: 25.58 KG/M2 | SYSTOLIC BLOOD PRESSURE: 140 MMHG | HEIGHT: 67 IN | WEIGHT: 163 LBS | DIASTOLIC BLOOD PRESSURE: 70 MMHG

## 2022-10-21 DIAGNOSIS — R53.81 PHYSICAL DECONDITIONING: Primary | ICD-10-CM

## 2022-10-21 DIAGNOSIS — M17.11 PRIMARY OSTEOARTHRITIS OF RIGHT KNEE: ICD-10-CM

## 2022-10-21 LAB
DME PARACHUTE DELIVERY DATE REQUESTED: NORMAL
DME PARACHUTE ITEM DESCRIPTION: NORMAL
DME PARACHUTE ORDER STATUS: NORMAL
DME PARACHUTE SUPPLIER NAME: NORMAL
DME PARACHUTE SUPPLIER PHONE: NORMAL

## 2022-10-21 PROCEDURE — 20610 DRAIN/INJ JOINT/BURSA W/O US: CPT | Performed by: STUDENT IN AN ORGANIZED HEALTH CARE EDUCATION/TRAINING PROGRAM

## 2022-10-21 PROCEDURE — 99203 OFFICE O/P NEW LOW 30 MIN: CPT | Performed by: STUDENT IN AN ORGANIZED HEALTH CARE EDUCATION/TRAINING PROGRAM

## 2022-10-21 RX ORDER — BUPIVACAINE HYDROCHLORIDE 2.5 MG/ML
4 INJECTION, SOLUTION INFILTRATION; PERINEURAL
Status: COMPLETED | OUTPATIENT
Start: 2022-10-21 | End: 2022-10-21

## 2022-10-21 RX ORDER — TRIAMCINOLONE ACETONIDE 40 MG/ML
40 INJECTION, SUSPENSION INTRA-ARTICULAR; INTRAMUSCULAR
Status: COMPLETED | OUTPATIENT
Start: 2022-10-21 | End: 2022-10-21

## 2022-10-21 RX ADMIN — TRIAMCINOLONE ACETONIDE 40 MG: 40 INJECTION, SUSPENSION INTRA-ARTICULAR; INTRAMUSCULAR at 13:54

## 2022-10-21 RX ADMIN — BUPIVACAINE HYDROCHLORIDE 4 ML: 2.5 INJECTION, SOLUTION INFILTRATION; PERINEURAL at 13:54
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
## 2022-10-24 LAB
DME PARACHUTE DELIVERY DATE ACTUAL: NORMAL
DME PARACHUTE DELIVERY DATE REQUESTED: NORMAL
DME PARACHUTE ITEM DESCRIPTION: NORMAL
DME PARACHUTE ORDER STATUS: NORMAL
DME PARACHUTE SUPPLIER NAME: NORMAL
DME PARACHUTE SUPPLIER PHONE: NORMAL

## 2022-10-25 DIAGNOSIS — G30.0 MILD EARLY ONSET ALZHEIMER'S DEMENTIA WITH PSYCHOTIC DISTURBANCE (HCC): ICD-10-CM

## 2022-10-25 DIAGNOSIS — F02.A2 MILD EARLY ONSET ALZHEIMER'S DEMENTIA WITH PSYCHOTIC DISTURBANCE (HCC): ICD-10-CM

## 2022-10-25 DIAGNOSIS — I10 PRIMARY HYPERTENSION: Primary | ICD-10-CM

## 2022-10-25 RX ORDER — CHLORTHALIDONE 25 MG/1
25 TABLET ORAL DAILY
Qty: 30 TABLET | Refills: 5 | Status: SHIPPED | OUTPATIENT
Start: 2022-10-25 | End: 2023-05-09

## 2022-10-25 RX ORDER — QUETIAPINE FUMARATE 25 MG/1
25 TABLET, FILM COATED ORAL 2 TIMES DAILY
Qty: 60 TABLET | Refills: 0 | Status: SHIPPED | OUTPATIENT
Start: 2022-10-25 | End: 2022-12-01

## 2022-10-27 DIAGNOSIS — G30.0 MILD EARLY ONSET ALZHEIMER'S DEMENTIA WITH PSYCHOTIC DISTURBANCE (HCC): Primary | ICD-10-CM

## 2022-10-27 DIAGNOSIS — F02.A2 MILD EARLY ONSET ALZHEIMER'S DEMENTIA WITH PSYCHOTIC DISTURBANCE (HCC): Primary | ICD-10-CM

## 2022-11-01 ENCOUNTER — TELEPHONE (OUTPATIENT)
Dept: NEUROLOGY | Facility: CLINIC | Age: 82
End: 2022-11-01

## 2022-11-01 NOTE — TELEPHONE ENCOUNTER
Spoke to pt in regards to his referral  I let him know a referral from [facility] is needed   They will get a referral from the neurologist  I let them know I will close the current referral

## 2022-11-02 ENCOUNTER — TELEPHONE (OUTPATIENT)
Dept: FAMILY MEDICINE CLINIC | Facility: HOSPITAL | Age: 82
End: 2022-11-02

## 2022-11-02 ENCOUNTER — OFFICE VISIT (OUTPATIENT)
Dept: PODIATRY | Facility: CLINIC | Age: 82
End: 2022-11-02

## 2022-11-02 VITALS — BODY MASS INDEX: 25.58 KG/M2 | WEIGHT: 163 LBS | HEIGHT: 67 IN

## 2022-11-02 DIAGNOSIS — I73.9 PERIPHERAL ARTERIAL DISEASE (HCC): Primary | ICD-10-CM

## 2022-11-02 DIAGNOSIS — B35.1 ONYCHOMYCOSIS: ICD-10-CM
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
## 2022-11-03 ENCOUNTER — TELEPHONE (OUTPATIENT)
Dept: NEUROLOGY | Facility: CLINIC | Age: 82
End: 2022-11-03

## 2022-11-03 DIAGNOSIS — F33.42 RECURRENT MAJOR DEPRESSIVE DISORDER, IN FULL REMISSION (HCC): ICD-10-CM

## 2022-11-03 DIAGNOSIS — F02.A2 MILD EARLY ONSET ALZHEIMER'S DEMENTIA WITH PSYCHOTIC DISTURBANCE (HCC): Primary | ICD-10-CM

## 2022-11-03 DIAGNOSIS — G30.0 MILD EARLY ONSET ALZHEIMER'S DEMENTIA WITH PSYCHOTIC DISTURBANCE (HCC): Primary | ICD-10-CM

## 2022-11-03 RX ORDER — ESCITALOPRAM OXALATE 10 MG/1
TABLET ORAL
Qty: 30 TABLET | Refills: 0 | Status: SHIPPED | OUTPATIENT
Start: 2022-11-03

## 2022-11-03 NOTE — TELEPHONE ENCOUNTER
Spoke w/ pt's Caregiver Rene Gamboa ([employer]  Consent)  Pt was due to see Dr Magalys Ricardo on 9/28, but was taken to [facility] located in [place], [place]  Pt was prescribed Seroquel, (already taking Lexapro as well)  Nataliya Quintana stated that PCP was able to fill Seroquel & Lexapro as an accomodation, but stated that pt needs to be established w/ Neuropsych or Behavioral Health specialist      He spoke w/ Neuropsych who advised him that pt would need referral from Neurology in order to be seen  I advised him I would notify nurse team of information  Please call back at [phone]

## 2022-11-03 NOTE — TELEPHONE ENCOUNTER
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 Thank you  No f/u is scheduled

## 2022-11-06 DIAGNOSIS — K21.9 GASTROESOPHAGEAL REFLUX DISEASE WITHOUT ESOPHAGITIS: ICD-10-CM

## 2022-11-06 RX ORDER — PANTOPRAZOLE SODIUM 40 MG/1
TABLET, DELAYED RELEASE ORAL
Qty: 90 TABLET | Refills: 1 | Status: SHIPPED | OUTPATIENT
Start: 2022-11-06

## 2022-11-08 ENCOUNTER — TELEPHONE (OUTPATIENT)
Dept: PSYCHIATRY | Facility: CLINIC | Age: 82
End: 2022-11-08

## 2022-11-08 NOTE — TELEPHONE ENCOUNTER
Spoke to pt's caregiver, wants to scedule for med mgmt pt was added to waitlist and an additional resource packet will be sent in the mail

## 2022-11-09 DIAGNOSIS — G45.9 TIA (TRANSIENT ISCHEMIC ATTACK): ICD-10-CM

## 2022-11-09 RX ORDER — CLOPIDOGREL BISULFATE 75 MG/1
TABLET ORAL
Qty: 90 TABLET | Refills: 1 | Status: SHIPPED | OUTPATIENT
Start: 2022-11-09

## 2022-11-11 ENCOUNTER — HOSPITAL ENCOUNTER (OUTPATIENT)
Dept: NEUROLOGY | Facility: HOSPITAL | Age: 82
End: 2022-11-11

## 2022-11-11 DIAGNOSIS — R41.82 ALTERED MENTAL STATUS: ICD-10-CM

## 2022-11-15 ENCOUNTER — TELEPHONE (OUTPATIENT)
Dept: FAMILY MEDICINE CLINIC | Facility: HOSPITAL | Age: 82
End: 2022-11-15

## 2022-11-18 ENCOUNTER — OFFICE VISIT (OUTPATIENT)
Dept: OBGYN CLINIC | Facility: CLINIC | Age: 82
End: 2022-11-18

## 2022-11-18 ENCOUNTER — APPOINTMENT (OUTPATIENT)
Dept: RADIOLOGY | Facility: CLINIC | Age: 82
End: 2022-11-18

## 2022-11-18 VITALS
DIASTOLIC BLOOD PRESSURE: 73 MMHG | HEART RATE: 45 BPM | BODY MASS INDEX: 25.74 KG/M2 | SYSTOLIC BLOOD PRESSURE: 125 MMHG | HEIGHT: 67 IN | WEIGHT: 164 LBS

## 2022-11-18 DIAGNOSIS — M25.551 RIGHT HIP PAIN: ICD-10-CM

## 2022-11-18 DIAGNOSIS — M25.551 RIGHT HIP PAIN: Primary | ICD-10-CM

## 2022-11-18 DIAGNOSIS — M16.11 PRIMARY OSTEOARTHRITIS OF ONE HIP, RIGHT: ICD-10-CM
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
## 2022-11-29 DIAGNOSIS — F33.42 RECURRENT MAJOR DEPRESSIVE DISORDER, IN FULL REMISSION (HCC): ICD-10-CM

## 2022-11-29 RX ORDER — ESCITALOPRAM OXALATE 10 MG/1
TABLET ORAL
Qty: 90 TABLET | Refills: 1 | Status: SHIPPED | OUTPATIENT
Start: 2022-11-29

## 2022-12-05 DIAGNOSIS — S02.92XA CLOSED EXTENSIVE FACIAL FRACTURES (HCC): ICD-10-CM

## 2022-12-06 RX ORDER — ATORVASTATIN CALCIUM 40 MG/1
40 TABLET, FILM COATED ORAL DAILY
Qty: 90 TABLET | Refills: 2 | Status: SHIPPED | OUTPATIENT
Start: 2022-12-06

## 2022-12-12 DIAGNOSIS — R09.81 SINUS CONGESTION: Primary | ICD-10-CM

## 2022-12-12 RX ORDER — FLUTICASONE PROPIONATE 50 MCG
SPRAY, SUSPENSION (ML) NASAL
Qty: 16 G | Refills: 5 | Status: SHIPPED | OUTPATIENT
Start: 2022-12-12 | End: 2023-03-30

## 2022-12-15 ENCOUNTER — TELEPHONE (OUTPATIENT)
Dept: NEUROLOGY | Facility: CLINIC | Age: 82
End: 2022-12-15

## 2022-12-28 DIAGNOSIS — I10 ESSENTIAL HYPERTENSION: ICD-10-CM

## 2022-12-28 RX ORDER — CARVEDILOL 12.5 MG/1
TABLET ORAL
Qty: 180 TABLET | Refills: 0 | Status: SHIPPED | OUTPATIENT
Start: 2022-12-28

## 2022-12-29 ENCOUNTER — HOSPITAL ENCOUNTER (OUTPATIENT)
Dept: RADIOLOGY | Facility: CLINIC | Age: 82
Discharge: HOME | End: 2022-12-29

## 2022-12-29 VITALS
RESPIRATION RATE: 18 BRPM | SYSTOLIC BLOOD PRESSURE: 102 MMHG | HEART RATE: 55 BPM | TEMPERATURE: 97.7 F | OXYGEN SATURATION: 93 % | DIASTOLIC BLOOD PRESSURE: 64 MMHG

## 2022-12-29 DIAGNOSIS — M16.11 PRIMARY OSTEOARTHRITIS OF ONE HIP, RIGHT: ICD-10-CM

## 2022-12-29 DIAGNOSIS — M25.551 RIGHT HIP PAIN: ICD-10-CM

## 2022-12-29 RX ORDER — METHYLPREDNISOLONE ACETATE 80 MG/ML
80 INJECTION, SUSPENSION INTRA-ARTICULAR; INTRALESIONAL; INTRAMUSCULAR; PARENTERAL; SOFT TISSUE ONCE
Status: COMPLETED | OUTPATIENT
Start: 2022-12-29 | End: 2022-12-29

## 2022-12-29 RX ADMIN — LIDOCAINE HYDROCHLORIDE 2 ML: 20 INJECTION, SOLUTION EPIDURAL; INFILTRATION; INTRACAUDAL at 12:00

## 2022-12-29 RX ADMIN — METHYLPREDNISOLONE ACETATE 80 MG: 80 INJECTION, SUSPENSION INTRA-ARTICULAR; INTRALESIONAL; INTRAMUSCULAR; SOFT TISSUE at 12:00

## 2022-12-29 RX ADMIN — IOHEXOL 1 ML: 300 INJECTION, SOLUTION INTRAVENOUS at 12:00

## 2022-12-29 NOTE — DISCHARGE INSTRUCTIONS
Steroid Joint Injection   WHAT YOU NEED TO KNOW:   A steroid joint injection is a procedure to inject steroid medicine into a joint  Steroid medicine decreases pain and inflammation  The injection may also contain an anesthetic (numbing medicine) to decrease pain  It may be done to treat conditions such as arthritis, gout, or carpal tunnel syndrome  The injections may be given in your knee, ankle, shoulder, elbow, wrist, ankle or sacroiliac joint  Do not apply heat to any area that is numb  If you have discomfort or soreness at the injection site, you may apply ice today, 20 minutes on and 20 minutes off  Tomorrow you may use ice or warm, moist heat  Do not apply ice or heat directly to the skin  You may have an increase or change in the discomfort for 36-48 hours after your treatment  Apply ice and continue with any pain medicine you have been prescribed  Do not do anything strenuous today  You may shower, but no tub baths or hot tubs today  You may resume your normal activities tomorrow, but do not “overdo it”  Resume normal activities slowly when you are feeling better  If you experience redness, drainage or swelling at the injection site, or if you develop a fever above 100 degrees, please call [facility] at [phone] or go to the Emergency Room  Continue to take all routine medicines prescribed by your primary care physician unless otherwise instructed by our staff  Most blood thinners should be started again according to your regularly scheduled dosing  If you have any questions, please give our office a call  As no general anesthesia was used in today's procedure, you should not experience any side effects related to anesthesia  If you are diabetic, the steroids used in today's injection may temporarily increase your blood sugar levels after the first few days after your injection   Please keep a close eye on your sugars and alert the doctor who manages your diabetes if your sugars are significantly high from your baseline or you are symptomatic  If you have a problem specifically related to your procedure, please call our office at [phone]  Problems not related to your procedure should be directed to your primary care physician
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
## 2023-01-05 ENCOUNTER — TELEPHONE (OUTPATIENT)
Dept: PAIN MEDICINE | Facility: CLINIC | Age: 83
End: 2023-01-05

## 2023-01-08 ENCOUNTER — TELEPHONE (OUTPATIENT)
Dept: OTHER | Facility: OTHER | Age: 83
End: 2023-01-08

## 2023-01-08 NOTE — TELEPHONE ENCOUNTER
Patients caregiver is calling back to confirm visit for 1/9/23  He stated  He received a call from the office regarding instructions for the upcoming visit and wants to make sure he does not need to do anything else prior to the visit

## 2023-01-09 ENCOUNTER — CONSULT (OUTPATIENT)
Age: 83
End: 2023-01-09

## 2023-01-09 VITALS
HEART RATE: 57 BPM | WEIGHT: 170.4 LBS | RESPIRATION RATE: 16 BRPM | SYSTOLIC BLOOD PRESSURE: 122 MMHG | BODY MASS INDEX: 25.82 KG/M2 | OXYGEN SATURATION: 98 % | TEMPERATURE: 97.9 F | DIASTOLIC BLOOD PRESSURE: 70 MMHG | HEIGHT: 68 IN

## 2023-01-09 DIAGNOSIS — R54 FRAILTY: ICD-10-CM

## 2023-01-09 DIAGNOSIS — I10 ESSENTIAL HYPERTENSION: ICD-10-CM

## 2023-01-09 DIAGNOSIS — F02.A2 MILD EARLY ONSET ALZHEIMER'S DEMENTIA WITH PSYCHOTIC DISTURBANCE (HCC): ICD-10-CM

## 2023-01-09 DIAGNOSIS — E78.5 HYPERLIPIDEMIA, UNSPECIFIED HYPERLIPIDEMIA TYPE: ICD-10-CM

## 2023-01-09 DIAGNOSIS — K21.9 GASTROESOPHAGEAL REFLUX DISEASE WITHOUT ESOPHAGITIS: ICD-10-CM

## 2023-01-09 DIAGNOSIS — M25.551 RIGHT HIP PAIN: ICD-10-CM

## 2023-01-09 DIAGNOSIS — G93.41 ACUTE METABOLIC ENCEPHALOPATHY: Primary | ICD-10-CM

## 2023-01-09 DIAGNOSIS — F33.9 DEPRESSION, RECURRENT (HCC): ICD-10-CM

## 2023-01-09 DIAGNOSIS — G30.0 MILD EARLY ONSET ALZHEIMER'S DEMENTIA WITH PSYCHOTIC DISTURBANCE (HCC): ICD-10-CM

## 2023-01-09 DIAGNOSIS — R26.2 AMBULATORY DYSFUNCTION: ICD-10-CM

## 2023-01-09 DIAGNOSIS — I25.10 ASCVD (ARTERIOSCLEROTIC CARDIOVASCULAR DISEASE): ICD-10-CM

## 2023-01-09 DIAGNOSIS — Z87.898 HISTORY OF SEIZURE: ICD-10-CM

## 2023-01-09 NOTE — PROGRESS NOTES
[facility]  [address], [address] [facility], [address] [place] [address]  [phone]    Social Work [address] presents today for a geriatric assessment, accompanied by caregiver-Magen (like a step-son per Pauleen Friday)  Patient became upset during assessment, stating "his blood pressure is rising," and "stating if he knew this is what was going to happen he wouldn't have came " LSW attempted to continue the assessment, but patient declined to repeat 5 words back, and declined to continue the various sections of the MoCA  Patient did complete GDS at home prior to visit, score 4/15  Social/Family History    Marital status: , has girlfriend-Lidya (since 2020)    Does patient have children? Yes How Many? 2 children  (If yes, where do the children live?) No  Family members assisting patient at home: Pauleen Friday  Are any relationships causing problems right now: sometimes gets in arguments with Reese Moore   Who is available to provide care in case of illness or crisis (please specify relation to patient / level of care able to provide)? Pauleen Friday        Employment and Education   Education: Highest Level of Education: [employer]    Currently Employed: No    Retired: Yes                        Date of long-term? 2008   Type of employment: production   [employer]: Yes  [employer] (if applicable): does not have benefits, but may qualify, gave brochure for [facility] and Organizations: No  Major life events in past two years (ex: long-term, death in family, major illness etc ): pneumonia in the ICU, TIA   Have you ever used a home care agency, meal delivery service, or respite care?: has had visiting nurses   Services that assessment team feels would be beneficial to patient/family: may benefit from home health aide, step son has tried to obtain waiver for him or for son to become paid caregiver       Financial   Total Monthly income: $2000    Source of income: Social Security  Meet current needs? Yes    Funds available for home care? Yes    Funds available for nursing home? No    Do you rent or own your home? Own       End-Of-Life Checklist   Have wishes or desires for end-of-life care been discussed? yes   Advanced directives: no POA, has DNR/DNI-POLST  Discussed obtaining POA through elder law   Working on capacity eval      For all patients, we encourage seeing a  to establish a Financial Power of , a [employer] [facility], and an Advanced Directive (living will)  Particularly for patients experiencing memory concerns, we strongly recommend that this is completed as soon as possible  Safety Assessment   Is the patient still driving? No    Is the patient taking medications as prescribed? Yes     Is there a system in place for medication management? Erlin Whitlock dispenses them   Are firearms or weapons in the home? No    Does the patient live alone? No   1  What is the layout of the home? Single story mobile home   2  Do you feel comfortable leaving the person home alone? left a couple hours at a time only   3  Have you noticed any burned pans or other signs of issues with the stove or other appliances? No   4  Do you have any concerns about the person’s cooking or eating habits? No   5  Are there working smoke detectors and fire extinguishers in the home? Yes    6  Have you thought about when it will no longer be safe for the patient to live alone?  Have him remain in the home with care if needed

## 2023-01-09 NOTE — PROGRESS NOTES
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  · We will not order NeuroTrax as patient declined to complete MoCA  · Patient advised to remain active physically, mentally and sleep  · Neuropsych evaluation pending  · Currently on Seroquel 12 5mg in am and 25mg pm, tolerated  Per caregiver Robert Boo visual hallucinations improved since Seroquel started  · Pharmacological and nonpharmacological management will be discussed at the conference  · Maintain chronic conditions under control  Ambulatory dysfunction:  · Patient advised to use a walker at all times for support  · Supportive care  · Fall precautions  · Patient will benefit from physical therapy to increase muscle strength and gait stability, however patient declined     Frailty:  · Most likely secondary to advanced age and multiple co-morbidities    · Continue supportive care  · Maintain chronic conditions under control  · Physical therapy if patient agrees to maximize safety    History of seizure:  · Currently on Keppra, monitor levels  · No history of recent episodes  · Patient to follow up with neurology     Depression:  · GDS 4/15  · Lexapro 10mg daily, managed by PCP  · Psych referral by PCP, pending     Hypertension:  · Blood pressure stable with current regimen  · Continue following with PCP    Hyperlipidemia:  · Continue atorvastatin  · Monitor lipid profile and adjust treatment as needed    Cardiovascular disease:  · Currently asymptomatic  · Maintain BP and hyperlipidemia under control  · Follow up with PCP    History of stroke and TIA:  · Takes ASA and Plavix   · Maintain BP and hyperlipidemia under control    Osteoarthritis and chronic pain:  · Received right hip injection for pain on 12/29/22  · Follow up with ortho  · Recommend Tylenol 650mg every 6 hours as needed for pain      GERD  · Continue protonix    Chief Complaint     Chief Complaint   Patient presents with   • Geriatric Evaluation     Patient is here for a geriatric assessment for memory issues, falls and depression       History of Present Illness     Madan Hernandez who is a 81 y o  male was seen in Geriatric consultation today for memory difficulties and was accompanied by Jenny Ramos (caregiver)  Patient with medical history significant for multiple TIA and CVA, ETOH use, depression, CAD, seizures, hypertension, hyperlipidemia, and recent inpatient psych stay for visual hallucinations and behavioral issues  Patient lost his wife 10 years ago  He currently lives with a girlfriend for almost 2 years and her son Josie Brink) moved in a year ago to assist after patient suffered a stroke  Keanu Pena noticed a change in patient's behavioral in July of 2022  Patient was more depressed and confused  Patient reported visual hallucinations with aggressive behavior in September for which he was admitted to inpatient psych  Symptoms improved and patient was discharged on Lexapro and Seroquel  He was referred to neuropsych and psychiatrist as outpatient for follow up  Jenny Ramos states that patient has short term memory issues  He stopped ETOH use a year ago  Patient does not drive  He is independent with ADLs but needs assistance with IADLs  Jenny Ramos assists with finances  Patient has history of falls, uses a walker for ambulation and had a life line  Patient also has chronic arthritis and recently received right hip injection  Urinary incontinence intermittently  Reports fair appetite, no GI symptoms  No hearing loss or trouble sleeping        The following portions of the patient's history were reviewed and updated as appropriate: allergies, current medications, past family history, past medical history, past social history, past surgical history and problem list     Review of Systems     Constitutional: Negative for activity change, appetite change, fever, chills and fatigue  HENT: Negative for congestion, facial swelling, hearing loss, mouth sores, postnasal drip, sinus pain, sore throat, trouble swallowing and voice change  Respiratory: Negative for cough, shortness of breath and wheezing  Cardiovascular: Negative for chest pain, palpitation, leg swelling  Gastrointestinal: Negative for abdominal pain, constipation, diarrhea, nausea and vomiting  Genitourinary: Positive for urinary incontinence intermittently  Negative for difficulty urinating, dysuria  Endocrine: Negative for cold intolerance, heat intolerance, polyphagia and polyuria  Musculoskeletal: Positive for right hip pain and gait problems  Neurological: Negative for dizziness, tremors, seizures, syncope, facial asymmetry, speech difficulty, weakness, light-headedness, and numbness   Psychiatric/Behavioral: Negative for confusion, self-injury and suicidal ideas  Objective     /70 (BP Location: Left arm, Patient Position: Sitting, Cuff Size: Standard)   Pulse 57   Temp 97 9 °F (36 6 °C) (Temporal)   Resp 16   Ht 5' 8" (1 727 m) Comment: as per patient  Wt 77 3 kg (170 lb 6 4 oz) Comment: w shoes and vess  SpO2 98%   BMI 25 91 kg/m²     Physical Exam  Vitals and nursing note reviewed  Constitutional:       Appearance: Normal appearance  HENT:      Head: Normocephalic and atraumatic  Ears:     External ear normal    Eyes:      Extraocular Movements: Extraocular movements intact  Conjunctiva/sclera: Conjunctivae normal       Pupils: Pupils are equal, round, and reactive to light  Mouth/Throat:     Poor dentition  Oropharynx is clear and moist  No oropharyngeal exudate  Neck:    Normal range of motion  Neck supple  No JVD present  No tracheal deviation present   No thyromegaly present  Cardiovascular:      Rate and Rhythm: Normal rate  Pulses: Normal pulses  Heart sounds: Normal heart sounds  No murmur heard  Pulmonary:      Effort: Pulmonary effort is normal  No respiratory distress  Breath sounds: Normal breath sounds  Breath sounds: No wheezing or rales  Abdominal:      General: Abdomen is flat  Palpations: Abdomen is soft  Musculoskeletal:      Cervical back: Normal range of motion and neck supple  Skin:     General: Skin is warm and dry  Neurological:      Mental Status: alert and oriented to person, date, month and year     Cranial Nerves: No cranial nerve deficit, dysarthria or facial asymmetry  Sensory: Sensation is intact  Motor: No weakness or atrophy      Gait: slow, uses a walker for ambulation     MoCA started, however patient declined to complete     TUG unable to assess   Psychiatric:     Mood and Affect: Mood normal      Behavior: Behavior normal      GDS 4/15    Pertinent Laboratory/Diagnostic Studies:  CT head 8/26/22 revealed old focal lacunar infarct right cerebellum  Old left frontal and right parietal lobe infarcts  Prominent perivascular CSF spaces or old lacunar infarcts bilateral basal ganglia    Age-related cortical atrophy  Periventricular white matter hypodensity compatible with chronic small vessel ischemic disease       Lab results from 10/2022 reviewd    Current Medications     Current Outpatient Medications:   •  amLODIPine (NORVASC) 5 mg tablet, Take 1 tablet (5 mg total) by mouth daily, Disp: 30 tablet, Rfl: 5  •  aspirin (ECOTRIN LOW STRENGTH) 81 mg EC tablet, Take 1 tablet (81 mg total) by mouth daily, Disp: 10 tablet, Rfl: 0  •  atorvastatin (LIPITOR) 40 mg tablet, Take 1 tablet (40 mg total) by mouth daily, Disp: 90 tablet, Rfl: 2  •  carvedilol (COREG) 12 5 mg tablet, TAKE 1 TABLET BY MOUTH TWICE A DAY, Disp: 180 tablet, Rfl: 0  •  chlorthalidone 25 mg tablet, Take 1 tablet (25 mg total) by mouth daily (Patient taking differently: Take 12 5 mg by mouth daily), Disp: 30 tablet, Rfl: 5  •  clopidogrel (PLAVIX) 75 mg tablet, TAKE 1 TABLET BY MOUTH EVERY DAY, Disp: 90 tablet, Rfl: 1  •  escitalopram (LEXAPRO) 10 mg tablet, TAKE 1 TABLET BY MOUTH EVERY DAY, Disp: 90 tablet, Rfl: 1  •  fluticasone (FLONASE) 50 mcg/act nasal spray, Use 2 sprays in each nostril at bedtime, Disp: 16 g, Rfl: 5  •  levETIRAcetam (KEPPRA) 100 mg/mL oral solution, TAKE 7 5 ML (750 MG TOTAL) BY MOUTH 2 (TWO) TIMES A DAY, Disp: 473 mL, Rfl: 5  •  pantoprazole (PROTONIX) 40 mg tablet, TAKE 1 TABLET BY MOUTH EVERY DAY, Disp: 90 tablet, Rfl: 1  •  QUEtiapine (SEROquel) 25 mg tablet, TAKE 1 TABLET BY MOUTH TWICE A DAY (Patient taking differently: 12 5 mg 12 5 in the AM and 25 mg in evening), Disp: 60 tablet, Rfl: 2      Name: Valeriy Ojeda  : 1940  MRN: [id number]  DOS: 2023    Diagnoses:   Diagnosis ICD-10-CM Associated Orders   1  Acute metabolic encephalopathy  J00 15 Ambulatory Referral to Senior Care      2  Forgetfulness  R68 89       3  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

## 2023-01-11 LAB
25(OH)D3+25(OH)D2 SERPL-MCNC: 43.5 NG/ML (ref 30–100)
25(OH)D3+25(OH)D2 SERPL-MCNC: 43.8 NG/ML (ref 30–100)
ALBUMIN SERPL-MCNC: 4.5 G/DL (ref 3.6–4.6)
ALBUMIN/GLOB SERPL: 1.6 {RATIO} (ref 1.2–2.2)
ALP SERPL-CCNC: 90 IU/L (ref 44–121)
ALT SERPL-CCNC: 37 IU/L (ref 0–44)
AST SERPL-CCNC: 24 IU/L (ref 0–40)
BASOPHILS # BLD AUTO: 0 X10E3/UL (ref 0–0.2)
BASOPHILS # BLD AUTO: 0 X10E3/UL (ref 0–0.2)
BASOPHILS NFR BLD AUTO: 1 %
BASOPHILS NFR BLD AUTO: 1 %
BILIRUB SERPL-MCNC: 0.8 MG/DL (ref 0–1.2)
BUN SERPL-MCNC: 24 MG/DL (ref 8–27)
BUN/CREAT SERPL: 29 (ref 10–24)
CALCIUM SERPL-MCNC: 9.2 MG/DL (ref 8.6–10.2)
CHLORIDE SERPL-SCNC: 97 MMOL/L (ref 96–106)
CHOLEST SERPL-MCNC: 157 MG/DL (ref 100–199)
CHOLEST/HDLC SERPL: 2 RATIO (ref 0–5)
CO2 SERPL-SCNC: 27 MMOL/L (ref 20–29)
CREAT SERPL-MCNC: 0.83 MG/DL (ref 0.76–1.27)
EGFR: 88 ML/MIN/1.73
EOSINOPHIL # BLD AUTO: 0.2 X10E3/UL (ref 0–0.4)
EOSINOPHIL # BLD AUTO: 0.2 X10E3/UL (ref 0–0.4)
EOSINOPHIL NFR BLD AUTO: 2 %
EOSINOPHIL NFR BLD AUTO: 2 %
ERYTHROCYTE [DISTWIDTH] IN BLOOD BY AUTOMATED COUNT: 13.7 % (ref 11.6–15.4)
ERYTHROCYTE [DISTWIDTH] IN BLOOD BY AUTOMATED COUNT: 13.9 % (ref 11.6–15.4)
FERRITIN SERPL-MCNC: 69 NG/ML (ref 30–400)
GLOBULIN SER-MCNC: 2.9 G/DL (ref 1.5–4.5)
GLUCOSE SERPL-MCNC: 90 MG/DL (ref 70–99)
HCT VFR BLD AUTO: 45.2 % (ref 37.5–51)
HCT VFR BLD AUTO: 46.4 % (ref 37.5–51)
HDLC SERPL-MCNC: 79 MG/DL
HGB BLD-MCNC: 15.5 G/DL (ref 13–17.7)
HGB BLD-MCNC: 15.6 G/DL (ref 13–17.7)
IMM GRANULOCYTES # BLD: 0 X10E3/UL (ref 0–0.1)
IMM GRANULOCYTES # BLD: 0 X10E3/UL (ref 0–0.1)
IMM GRANULOCYTES NFR BLD: 0 %
IMM GRANULOCYTES NFR BLD: 0 %
IRON SERPL-MCNC: 73 UG/DL (ref 38–169)
LDLC SERPL CALC-MCNC: 68 MG/DL (ref 0–99)
LYMPHOCYTES # BLD AUTO: 1.6 X10E3/UL (ref 0.7–3.1)
LYMPHOCYTES # BLD AUTO: 1.6 X10E3/UL (ref 0.7–3.1)
LYMPHOCYTES NFR BLD AUTO: 21 %
LYMPHOCYTES NFR BLD AUTO: 21 %
MCH RBC QN AUTO: 28.6 PG (ref 26.6–33)
MCH RBC QN AUTO: 29.6 PG (ref 26.6–33)
MCHC RBC AUTO-ENTMCNC: 33.4 G/DL (ref 31.5–35.7)
MCHC RBC AUTO-ENTMCNC: 34.5 G/DL (ref 31.5–35.7)
MCV RBC AUTO: 86 FL (ref 79–97)
MCV RBC AUTO: 86 FL (ref 79–97)
MONOCYTES # BLD AUTO: 0.6 X10E3/UL (ref 0.1–0.9)
MONOCYTES # BLD AUTO: 0.7 X10E3/UL (ref 0.1–0.9)
MONOCYTES NFR BLD AUTO: 10 %
MONOCYTES NFR BLD AUTO: 8 %
NEUTROPHILS # BLD AUTO: 4.9 X10E3/UL (ref 1.4–7)
NEUTROPHILS # BLD AUTO: 5 X10E3/UL (ref 1.4–7)
NEUTROPHILS NFR BLD AUTO: 66 %
NEUTROPHILS NFR BLD AUTO: 68 %
PLATELET # BLD AUTO: 198 X10E3/UL (ref 150–450)
PLATELET # BLD AUTO: 209 X10E3/UL (ref 150–450)
POTASSIUM SERPL-SCNC: 4 MMOL/L (ref 3.5–5.2)
PROT SERPL-MCNC: 7.4 G/DL (ref 6–8.5)
RBC # BLD AUTO: 5.27 X10E6/UL (ref 4.14–5.8)
RBC # BLD AUTO: 5.42 X10E6/UL (ref 4.14–5.8)
SL AMB VLDL CHOLESTEROL CALC: 10 MG/DL (ref 5–40)
SODIUM SERPL-SCNC: 138 MMOL/L (ref 134–144)
TRIGL SERPL-MCNC: 45 MG/DL (ref 0–149)
TSH SERPL DL<=0.005 MIU/L-ACNC: 1.88 UIU/ML (ref 0.45–4.5)
WBC # BLD AUTO: 7.4 X10E3/UL (ref 3.4–10.8)
WBC # BLD AUTO: 7.4 X10E3/UL (ref 3.4–10.8)

## 2023-01-12 NOTE — TELEPHONE ENCOUNTER
Lm to cb with % improvement and pain level 2wk post inj
Pt caregiver reports no improvement post inj  Pain has not improved much    Patient is aware I will call back next week to see how he's doing        right hip inj 12/29, no f/u
Statement Selected

## 2023-01-13 ENCOUNTER — TELEPHONE (OUTPATIENT)
Dept: NEUROLOGY | Facility: CLINIC | Age: 83
End: 2023-01-13

## 2023-01-13 NOTE — TELEPHONE ENCOUNTER
He is being seen at [facility]  He will also go to neurology to get an appt with them and see if neuropsychology is still needed

## 2023-01-16 ENCOUNTER — OFFICE VISIT (OUTPATIENT)
Dept: FAMILY MEDICINE CLINIC | Facility: HOSPITAL | Age: 83
End: 2023-01-16

## 2023-01-16 VITALS
DIASTOLIC BLOOD PRESSURE: 62 MMHG | TEMPERATURE: 98.4 F | HEIGHT: 68 IN | SYSTOLIC BLOOD PRESSURE: 110 MMHG | BODY MASS INDEX: 25.98 KG/M2 | WEIGHT: 171.4 LBS | HEART RATE: 58 BPM

## 2023-01-16 DIAGNOSIS — I25.10 ASCVD (ARTERIOSCLEROTIC CARDIOVASCULAR DISEASE): ICD-10-CM

## 2023-01-16 DIAGNOSIS — G30.0 MILD EARLY ONSET ALZHEIMER'S DEMENTIA WITH PSYCHOTIC DISTURBANCE (HCC): ICD-10-CM

## 2023-01-16 DIAGNOSIS — E55.9 VITAMIN D DEFICIENCY: ICD-10-CM

## 2023-01-16 DIAGNOSIS — E78.5 HYPERLIPIDEMIA, UNSPECIFIED HYPERLIPIDEMIA TYPE: ICD-10-CM

## 2023-01-16 DIAGNOSIS — I73.9 PERIPHERAL ARTERIAL DISEASE (HCC): ICD-10-CM

## 2023-01-16 DIAGNOSIS — Z00.00 ENCOUNTER FOR SUBSEQUENT ANNUAL WELLNESS VISIT IN MEDICARE PATIENT: ICD-10-CM

## 2023-01-16 DIAGNOSIS — Z86.73 HISTORY OF CVA (CEREBROVASCULAR ACCIDENT): ICD-10-CM

## 2023-01-16 DIAGNOSIS — I10 ESSENTIAL HYPERTENSION: Primary | ICD-10-CM

## 2023-01-16 DIAGNOSIS — F02.A2 MILD EARLY ONSET ALZHEIMER'S DEMENTIA WITH PSYCHOTIC DISTURBANCE (HCC): ICD-10-CM

## 2023-01-16 PROBLEM — Z98.890 S/P INGUINAL HERNIA REPAIR: Status: RESOLVED | Noted: 2018-03-16 | Resolved: 2023-01-16

## 2023-01-16 PROBLEM — Z87.19 S/P INGUINAL HERNIA REPAIR: Status: RESOLVED | Noted: 2018-03-16 | Resolved: 2023-01-16

## 2023-01-16 RX ORDER — CARVEDILOL 12.5 MG/1
12.5 TABLET ORAL 2 TIMES DAILY
Qty: 180 TABLET | Refills: 1 | Status: SHIPPED | OUTPATIENT
Start: 2023-01-16

## 2023-01-16 NOTE — ASSESSMENT & PLAN NOTE
neuropsych requiring neurology eval before scheduling appt -  Neuro consult entered & caretaker will schedule appt

## 2023-01-16 NOTE — PROGRESS NOTES
Name: David Eugene      : 1940      MRN: [id number]  Encounter Provider: ELE Castro  Encounter Date: 2023   Encounter department: [facility] [address] [facility]  Essential hypertension  Assessment & Plan:  BP well controlled on meds as rx'd  Carvedilol refill issued as requested  Return in 6 months for follow up    Orders:  -     carvedilol (COREG) 12 5 mg tablet; Take 1 tablet (12 5 mg total) by mouth 2 (two) times a day    2  Peripheral arterial disease ([facility] )  Assessment & Plan:  Clinically stable on plavix and baby aspirin daily      3  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 ([facility] )  Assessment & Plan:  neuropsych requiring neurology eval before scheduling appt -  Neuro consult entered & caretaker will schedule appt    Orders:  -     Ambulatory Referral to Neurology; Future    4  History of CVA (cerebrovascular accident)  Assessment & Plan:  Consult entered to f/u with neurology    Orders:  -     Ambulatory Referral to Neurology; Future    5  Vitamin D deficiency  Assessment & Plan:  Supplementing daily w/D3 2000iu  Recheck level in 1 year      6  Hyperlipidemia, unspecified hyperlipidemia type  Assessment & Plan:  FLP at goal on mod intensity statin  Recheck next in 6 months      7  ASCVD (arteriosclerotic cardiovascular disease)  Assessment & Plan:  Caretaker believes he is due for cardiology follow up and will schedule appt        8  BMI 26 0-26 9,adult  Assessment & Plan:  BMI has gone up slightly  Healthy diet encouraged      9  Encounter for subsequent annual wellness visit in Medicare patient  Comments:  AWV updated, next due in 1 year         Subjective      Here with caretaker Angel Barber to update AWV  Recently saw geriatrics  Caretaker believes he is due to see cardiology and also needs consult to neuro before he can schedule w/neuropsych  Due to see eye MD this week for cataract follow up            Review of Systems   Constitutional: Negative  Eyes: Positive for redness (right eye, uses lubricant drops) and visual disturbance (eye appt f/u this week, s/p cataract sx)  Respiratory: Positive for cough (chronic, comes/goes) and shortness of breath (w/talking too much, denies MART w/activity, sleeping or lying down)  Cardiovascular: Negative  Psychiatric/Behavioral: Negative for confusion  Current Outpatient Medications on File Prior to Visit   Medication Sig   • amLODIPine (NORVASC) 5 mg tablet Take 1 tablet (5 mg total) by mouth daily   • aspirin (ECOTRIN LOW STRENGTH) 81 mg EC tablet Take 1 tablet (81 mg total) by mouth daily   • atorvastatin (LIPITOR) 40 mg tablet Take 1 tablet (40 mg total) by mouth daily   • chlorthalidone 25 mg tablet Take 1 tablet (25 mg total) by mouth daily (Patient taking differently: Take 12 5 mg by mouth daily)   • clopidogrel (PLAVIX) 75 mg tablet TAKE 1 TABLET BY MOUTH EVERY DAY   • escitalopram (LEXAPRO) 10 mg tablet TAKE 1 TABLET BY MOUTH EVERY DAY   • fluticasone (FLONASE) 50 mcg/act nasal spray Use 2 sprays in each nostril at bedtime   • levETIRAcetam (KEPPRA) 100 mg/mL oral solution TAKE 7 5 ML (750 MG TOTAL) BY MOUTH 2 (TWO) TIMES A DAY   • pantoprazole (PROTONIX) 40 mg tablet TAKE 1 TABLET BY MOUTH EVERY DAY   • QUEtiapine (SEROquel) 25 mg tablet TAKE 1 TABLET BY MOUTH TWICE A DAY (Patient taking differently: 12 5 mg 12 5 in the AM and 25 mg in evening)   • [DISCONTINUED] carvedilol (COREG) 12 5 mg tablet TAKE 1 TABLET BY MOUTH TWICE A DAY       Objective     /62   Pulse 58   Temp 98 4 °F (36 9 °C)   Ht 5' 8" (1 727 m)   Wt 77 7 kg (171 lb 6 4 oz)   BMI 26 06 kg/m²     Physical Exam  Vitals reviewed  Constitutional:       General: He is not in acute distress  Appearance: Normal appearance  HENT:      Head: Normocephalic  Eyes:      General: No scleral icterus  Neck:      Vascular: No carotid bruit     Cardiovascular:      Rate and Rhythm: Normal rate and regular rhythm  Heart sounds: No murmur heard  Pulmonary:      Effort: Pulmonary effort is normal  No respiratory distress  Breath sounds: Decreased breath sounds present  Comments: No cough  Musculoskeletal:      Cervical back: Normal range of motion  Right lower leg: No edema  Left lower leg: No edema  Lymphadenopathy:      Cervical: No cervical adenopathy  Skin:     General: Skin is warm and dry  Neurological:      General: No focal deficit present  Mental Status: He is alert and oriented to person, place, and time  Psychiatric:         Attention and Perception: Attention normal          Mood and Affect: Affect is flat  Speech: Speech is slurred (as usual/normal)  Behavior: Behavior normal          Thought Content: Thought content normal          Cognition and Memory: Cognition normal        Johan Speed, CRNP     Answers for HPI/ROS submitted by the patient on 1/15/2023  How would you rate your overall health?: fair  Compared to last year, how is your physical health?: same  In general, how satisfied are you with your life?: satisfied  Compared to last year, how is your eyesight?: same  Compared to last year, how is your hearing?: same  Compared to last year, how is your emotional/mental health?: slightly worse  How often is anger a problem for you?: never, rarely  How often do you feel unusually tired/fatigued?: sometimes  In the past 7 days, how much pain have you experienced?: none  In the past 6 months, have you lost or gained 10 pounds without trying?: Yes  One or more falls in the last year: No  Do you have trouble with the stairs inside or outside your home?: No  Does your home have working smoke alarms?: Yes  Does your home have a carbon monoxide monitor?: Yes  Which safety hazards (if any) have you experienced in your home? Please select all that apply : uneven floors  How would you describe your current diet?  Please select all that apply : Regular  In addition to prescription medications, are you taking any over-the-counter supplements?: Yes  If yes, what supplements are you taking?: Iron and Vitamin D3  Can you manage your medications?: No  Are you currently taking any opioid medications?: No  Can you walk and transfer into and out of your bed and chair?: Yes  Can you dress and groom yourself?: Yes  Can you bathe or shower yourself?: Yes  Can you feed yourself?: Yes  Can you do your laundry/ housekeeping?: No  Can you manage your money, pay your bills, and track your expenses?: No  Can you make your own meals?: No  Can you do your own shopping?: No  Within the last 12 months, have you had any hospitalizations or Emergency Department visits?: Yes  If yes, how many times have you been hospitalized within the past year?: 3-4  Do you have a living will?: No  Do you have a Durable POA (Power of ) for healthcare decisions?: No  Do you have an Advanced Directive for end of life decisions?: Yes  How often have you used an illegal drug (including marijuana) or a prescription medication for non-medical reasons in the past year?: never  What is the typical number of drinks you consume in a day?: 0  What is the typical number of drinks you consume in a week?: 0  How often did you have a drink containing alcohol in the past year?: never  How many drinks did you have on a typical day  when you were drinking in the past year?: 0  How often did you have 6 or more drinks on one occasion in the past year?: never

## 2023-01-16 NOTE — PATIENT INSTRUCTIONS
Weight Management   AMBULATORY CARE:   Why it is important to manage your weight:  Being overweight increases your risk of health conditions such as heart disease, high blood pressure, type 2 diabetes, and certain types of cancer  It can also increase your risk for osteoarthritis, sleep apnea, and other respiratory problems  Aim for a slow, steady weight loss  Even a small amount of weight loss can lower your risk of health problems  Risks of being overweight:  Extra weight can cause many health problems, including the following:  · Diabetes (high blood sugar level)    · High blood pressure or high cholesterol    · Heart disease    · Stroke    · Gallbladder or liver disease    · Cancer of the colon, breast, prostate, liver, or kidney    · Sleep apnea    · Arthritis or gout    Screening  is done to check for health conditions before you have signs or symptoms  If you are 28to 79years old, your blood sugar level may be checked every 3 years for signs of prediabetes or diabetes  Your healthcare provider will check your blood pressure at each visit  High blood pressure can lead to a stroke or other problems  Your provider may check for signs of heart disease, cancer, or other health problems  How to lose weight safely:  A safe and healthy way to lose weight is to eat fewer calories and get regular exercise  · You can lose up about 1 pound a week by decreasing the number of calories you eat by 500 calories each day  You can decrease calories by eating smaller portion sizes or by cutting out high-calorie foods  Read labels to find out how many calories are in the foods you eat  · You can also burn calories with exercise such as walking, swimming, or biking  You will be more likely to keep weight off if you make these changes part of your lifestyle  Exercise at least 30 minutes per day on most days of the week   You can also fit in more physical activity by taking the stairs instead of the elevator or parking farther away from stores  Ask your healthcare provider about the best exercise plan for you  Healthy meal plan for weight management:  A healthy meal plan includes a variety of foods, contains fewer calories, and helps you stay healthy  A healthy meal plan includes the following:     · Eat whole-grain foods more often  A healthy meal plan should contain fiber  Fiber is the part of grains, fruits, and vegetables that is not broken down by your body  Whole-grain foods are healthy and provide extra fiber in your diet  Some examples of whole-grain foods are whole-wheat breads and pastas, oatmeal, brown rice, and bulgur  · Eat a variety of vegetables every day  Include dark, leafy greens such as spinach, kale, anabel greens, and mustard greens  Eat yellow and orange vegetables such as carrots, sweet potatoes, and winter squash  · Eat a variety of fruits every day  Choose fresh or canned fruit (canned in its own juice or light syrup) instead of juice  Fruit juice has very little or no fiber  · Eat low-fat dairy foods  Drink fat-free (skim) milk or 1% milk  Eat fat-free yogurt and low-fat cottage cheese  Try low-fat cheeses such as mozzarella and other reduced-fat cheeses  · Choose meat and other protein foods that are low in fat  Choose beans or other legumes such as split peas or lentils  Choose fish, skinless poultry (chicken or turkey), or lean cuts of red meat (beef or pork)  Before you cook meat or poultry, cut off any visible fat  · Use less fat and oil  Try baking foods instead of frying them  Add less fat, such as margarine, sour cream, regular salad dressing and mayonnaise to foods  Eat fewer high-fat foods  Some examples of high-fat foods include french fries, doughnuts, ice cream, and cakes  · Eat fewer sweets  Limit foods and drinks that are high in sugar  This includes candy, cookies, regular soda, and sweetened drinks  Ways to decrease calories:   · Eat smaller portions  ? Use a small plate with smaller servings  ? Do not eat second helpings  ? When you eat at a restaurant, ask for a box and place half of your meal in the box before you eat  ? Share an entrée with someone else  · Replace high-calorie snacks with healthy, low-calorie snacks  ? Choose fresh fruit, vegetables, fat-free rice cakes, or air-popped popcorn instead of potato chips, nuts, or chocolate  ? Choose water or calorie-free drinks instead of soda or sweetened drinks  · Do not shop for groceries when you are hungry  You may be more likely to make unhealthy food choices  Take a grocery list of healthy foods and shop after you have eaten  · Eat regular meals  Do not skip meals  Skipping meals can lead to overeating later in the day  This can make it harder for you to lose weight  Eat a healthy snack in place of a meal if you do not have time to eat a regular meal  Talk with a dietitian to help you create a meal plan and schedule that is right for you  Other things to consider as you try to lose weight:   · Be aware of situations that may give you the urge to overeat, such as eating while watching television  Find ways to avoid these situations  For example, read a book, go for a walk, or do crafts  · Meet with a weight loss support group or friends who are also trying to lose weight  This may help you stay motivated to continue working on your weight loss goals  © Copyright [employer] 2022 Information is for End User's use only and may not be sold, redistributed or otherwise used for commercial purposes  All illustrations and images included in CareNotes® are the copyrighted property of A D A M , Inc  or [facility] Kavon Medina   The above information is an  only  It is not intended as medical advice for individual conditions or treatments  Talk to your doctor, nurse or pharmacist before following any medical regimen to see if it is safe and effective for you

## 2023-01-16 NOTE — ASSESSMENT & PLAN NOTE
BP well controlled on meds as rx'd  Carvedilol refill issued as requested  Return in 6 months for follow up

## 2023-01-30 ENCOUNTER — OFFICE VISIT (OUTPATIENT)
Dept: PODIATRY | Facility: CLINIC | Age: 83
End: 2023-01-30

## 2023-01-30 VITALS
SYSTOLIC BLOOD PRESSURE: 121 MMHG | WEIGHT: 171 LBS | DIASTOLIC BLOOD PRESSURE: 80 MMHG | HEART RATE: 50 BPM | BODY MASS INDEX: 25.91 KG/M2 | HEIGHT: 68 IN

## 2023-01-30 DIAGNOSIS — B35.1 ONYCHOMYCOSIS: ICD-10-CM

## 2023-01-30 DIAGNOSIS — I73.9 PERIPHERAL ARTERIAL DISEASE (HCC): Primary | ICD-10-CM
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
## 2023-02-05 DIAGNOSIS — K21.9 GASTROESOPHAGEAL REFLUX DISEASE WITHOUT ESOPHAGITIS: ICD-10-CM

## 2023-02-06 RX ORDER — PANTOPRAZOLE SODIUM 40 MG/1
40 TABLET, DELAYED RELEASE ORAL DAILY
Qty: 90 TABLET | Refills: 0 | Status: SHIPPED | OUTPATIENT
Start: 2023-02-06

## 2023-02-08 ENCOUNTER — OFFICE VISIT (OUTPATIENT)
Dept: CARDIOLOGY CLINIC | Facility: CLINIC | Age: 83
End: 2023-02-08

## 2023-02-08 VITALS — BODY MASS INDEX: 26.1 KG/M2 | WEIGHT: 172.2 LBS | HEIGHT: 68 IN

## 2023-02-08 DIAGNOSIS — I25.810 CORONARY ARTERY DISEASE INVOLVING AUTOLOGOUS VEIN CORONARY BYPASS GRAFT WITHOUT ANGINA PECTORIS: Primary | ICD-10-CM

## 2023-02-08 DIAGNOSIS — I10 ESSENTIAL HYPERTENSION: ICD-10-CM

## 2023-02-08 DIAGNOSIS — I65.23 CAROTID STENOSIS, ASYMPTOMATIC, BILATERAL: ICD-10-CM

## 2023-02-08 DIAGNOSIS — E78.5 HYPERLIPIDEMIA, UNSPECIFIED HYPERLIPIDEMIA TYPE: ICD-10-CM
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
## 2023-02-09 NOTE — PROGRESS NOTES
[facility]  [address] [facility], [address] [place] [address], [address] [place] [address]  [phone]    Virtual Care Conference    NAME: Birder Romberg  AGE: 81 y o  SEX: male  YOB: 1940  DATE OF ASSESSMENT: 01/09/23  DATE OF CONFERENCE: 02/13/23    Family Present: Foreign Palacios (caregiver)  Staff Present: Dr Remy Taylor, JAIROW    Patient / Family Goals of Care: Review cognitive decline and associated symptoms, discuss treatment options and care planning  Medical Concerns (Current/Historical): history of seizure, depression, hypertension, hyperlipidemia, cardiovascular disease, history of stroke and TIA, osteoarthritis and chronic pain, GERD    Geriatric Syndromes/Age Related Syndromes: Dementia with psychotic disturbance, ambulatory dysfunction, frality    Neuropsychological  • Dementia with psychotic disturbance  • Multifactorial, history of CVA, CAD, ETOH use and advanced age  • 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  o Adan Cognitive Assessment: declined to complete  • Remain active physically, mentally and socially  • Pharmaceutical and non-pharmaceutical interventions discussed  Benefits and side effects of treatment discussed at length  Patient and caregiver agreed not to start medication for memory at this time and continue supportive care  • Engage in cognitively challenging exercises such as crosswords and puzzles  • Maintain chronic conditions under control  • Behavioral issues and hallucinations are controlled with Quetiapine  Referred to Neuropsych  • Patient does not drive  • Repeat cognitive assessment in 6 months    Diagnostic Studies  • Review of bloodwork 1/10/23: TSH, Vit D, CBC, CMP and lipid within range   • Review of previous imaging: MRI (08/25/22): IMPRESSION: White matter changes suggestive of chronic microangiopathy  No acute intracranial pathology  Chronic infarcts are noted  No evidence of recent infarct   Chronic right maxillary sinus opacification  Physical Finding Impacting Function   Timed Up and Go Test: unable to assess   Activities of Daily Living: Independent   Instrumental Activities of Daily Living: assist    • Encourage appropriate footwear at all times  • Review fall risk prevention tips and adjust within the home environment as needed    Medications Reviewed   • Medications seem appropriate for present conditions  • Check with PCP before using over the counter medications  • Avoid over the counter medications that can affect cognition (e g , Benadryl, Tylenol PM)   • Avoid NSAIDs due to risk of GI bleed and renal impairment    Other Findings   Overall health  • BMI: 26 18 kg/m2  • Maintain well-balanced diet  • Continue following with primary care physician regularly  Ambulatory dysfunction:  • Patient advised to use a walker at all times for support  • Supportive care  • Fall precautions as patient is a high fall risk  • Physical therapy is recommended to increase muscle strength and gait stability  Patient agreed, referral placed in Epic   Frailty:  • Most likely secondary to advanced age and multiple co-morbidities    • Continue supportive care  • Maintain chronic conditions under control  • Recommend nutrition supplement  • Physical therapy if patient agrees to maximize safety   History of seizure:  • Currently on Keppra, monitor levels  • No history of recent episodes  • Patient to follow up with neurology   Depression:  • GDS 4/15  • Lexapro 10mg daily, managed by PCP  • Psych referral by PCP, pending   Hypertension:  • Blood pressure stable with current regimen  • Continue following with PCP  Hyperlipidemia:  • Continue atorvastatin  • Monitor lipid profile and adjust treatment as needed  Cardiovascular disease:  • Currently asymptomatic  • Maintain BP and hyperlipidemia under control  • Follow up with PCP  History of stroke and TIA:  • Takes ASA and Plavix   • Maintain BP and hyperlipidemia under control  Osteoarthritis and chronic pain:  • Received right hip injection for pain on 12/29/22  • Follow up with ortho  • Recommend Tylenol 650mg every 6 hours as needed for pain    GERD  • Continue protonix    Recommended Health Maintenance   Immunizations, if not contraindicated:   • Influenza vaccine yearly  • Pneumo vaccine every 5 years (65 years and over)  • Shingles vaccine  • COVID-19 vaccine    Social / Safety Concerns  • Consider an Adult Day Program for positive socialization, physical exercise, cognitive stimulation and family respite  • Stay in touch with family and friends  • Plan self-care activities for your mental well-being each week  • Recommend review of fall risk prevention tips  • Recommend use of fall precautions including fall alert device  • Consider assistance for medication administration such as blister packaging or use of an automated pill dispenser  • Recommend contacting your local [facility] Agency on Aging for possible eligible programs such as OPTIONS, Caregiver Support Program, or [address] [place] [address] [address] updated advance directives and provide a copy to your primary care provider  • Consider caregiver support groups and educational resources through the Alzheimer's Association; access Alzheimer's Association 24/7 Helpline at 6-430.563.5259  ? [facility] does offer a monthly caregiver support group  If interested, please speak with a   • Utilize reorientation and redirection as needed (dependent on situation)  • Educational information provided  • Recommended literature: 36 Hour Day, Untangling Alzheimer's, Learning to Speak Alzheimer's    Patient and family verbalized understanding of above care plan  For care coordination purposes, this care plan will be shared with your primary care provider  With any questions, please contact our office at [phone]     Virtual Regular Visit    Verification of patient location:    Patient is located in the following state in which I hold an active license      Assessment/Plan:    Problem List Items Addressed This Visit        Cardiovascular and Mediastinum    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    Essential hypertension    TIA (transient ischemic attack)       Nervous and Auditory    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)       Musculoskeletal and Integument    Primary osteoarthritis of one hip, right       Other    Hyperlipidemia    Depression, recurrent ([address] )    Ambulatory dysfunction - Primary    Relevant Orders    Ambulatory Referral to Physical Therapy    Frailty            Reason for visit is   Chief Complaint   Patient presents with   • Follow-up     Care conf     • Virtual Regular Visit      Virtual visit for family conference to discuss results and recommendations for memory loss  Encounter provider Melissa Jha MD    Provider located at [place] [address]  [phone]           The patient was identified by name and date of birth  Víctor Levy was informed that this is a telemedicine visit and that the visit is being conducted through [employer]  He acknowledged consent and understanding of privacy and security of the video platform  The patient has agreed to participate and understands they can discontinue the visit at any time  Patient is aware this is a billable service  Subjective    HPI     Subjective    Víctor Levy is a 81 y o  y o  male who was seen virtually for family conference to review memory loss and associated symptoms, discuss treatment options and care planning  Patient's caregiver Elvira Liz) was also in attendance  Patient reports feeling well and denies any medication changes since last visit  Patient states that he fell a few weeks ago with no major injuries   No recent hospitalization or any other acute complaints  The following portions of the patient's history were reviewed and updated as appropriate: allergies, current medications, past family history, past medical history, past social history, past surgical history and problem list      Review of Systems    Constitutional: Negative for activity change  HENT: Negative for trouble swallowing or hearing loss  Eyes: Negative for visual disturbance  Respiratory: Negative for choking and shortness of breath  Gastrointestinal: Negative for nausea  Genitourinary: Negative for dysuria and frequency  Musculoskeletal: Negative for back pain and neck pain  Neurological: Negative for dizziness, facial asymmetry, speech difficulty, weakness and light-headedness  Psychiatric/Behavioral: Negative for behavioral problems and confusion  Physical Exam     Video Exam    Vitals: There were no vitals filed for this visit  Physical Exam   Constitutional: not in acute distress  Normal appearance  Not ill-appearing, toxic-appearing or diaphoretic  HENT: Normocephalic and atraumatic  External ear normal b/l  Nose normal  No congestion or rhinorrhea  Eyes:  EOMI  No scleral icterus  No discharge  Conjunctivae normal   Neck: No tracheal deviation present  Pulmonary/Chest: Effort normal  No respiratory distress  Musculoskeletal: Sitting comfortably in a chair   Neurological: Patient is alert  Facial expression roughly symmetric, no dysarthria,  Skin: No pallor     Psychiatric: Patient has a normal mood and affect; behavior is normal

## 2023-02-13 ENCOUNTER — TELEMEDICINE (OUTPATIENT)
Age: 83
End: 2023-02-13

## 2023-02-13 DIAGNOSIS — I25.810 CORONARY ARTERY DISEASE INVOLVING AUTOLOGOUS VEIN CORONARY BYPASS GRAFT WITHOUT ANGINA PECTORIS: ICD-10-CM

## 2023-02-13 DIAGNOSIS — F02.A2 MILD EARLY ONSET ALZHEIMER'S DEMENTIA WITH PSYCHOTIC DISTURBANCE (HCC): ICD-10-CM

## 2023-02-13 DIAGNOSIS — M16.11 PRIMARY OSTEOARTHRITIS OF ONE HIP, RIGHT: ICD-10-CM

## 2023-02-13 DIAGNOSIS — G45.9 TIA (TRANSIENT ISCHEMIC ATTACK): ICD-10-CM

## 2023-02-13 DIAGNOSIS — F33.9 DEPRESSION, RECURRENT (HCC): ICD-10-CM

## 2023-02-13 DIAGNOSIS — R54 FRAILTY: ICD-10-CM

## 2023-02-13 DIAGNOSIS — G30.0 MILD EARLY ONSET ALZHEIMER'S DEMENTIA WITH PSYCHOTIC DISTURBANCE (HCC): ICD-10-CM

## 2023-02-13 DIAGNOSIS — R26.2 AMBULATORY DYSFUNCTION: Primary | ICD-10-CM

## 2023-02-13 DIAGNOSIS — I10 ESSENTIAL HYPERTENSION: ICD-10-CM

## 2023-02-13 DIAGNOSIS — E78.5 HYPERLIPIDEMIA, UNSPECIFIED HYPERLIPIDEMIA TYPE: ICD-10-CM

## 2023-02-13 NOTE — PROGRESS NOTES
[facility]  [address], [address], [facility]  [phone]    Care Conference: Resources Provided    LSW participated in today's conference and provided the following resources, along with a copy of care plan:  [address] [place] [phone]     LSW sent referral to [facility] which can do capacity testing for patient  Patient referred to [facility] but they want patient seen by neurology first     General Information  - 10 Ways to Love Your Brain  - Memory loss ladder  - Packet with Alzheimer's Association information including: definition of dementia, communication tips for different stages, common behaviors and response strategies  - NIH VASILIY "Now What? Next Steps After an Alzheimer's Diagnosis"    Caregiver Support  - Flyer for [facility] Virtual Caregiver Support Group (meeting monthly by [employer])  - Alzheimer's Association Rx Pad (guide to website and 24/7 helpline, 7-404.249.7904)    Safety  - [facility] fall prevention / home safety [facility] in [address] [place] [address] contains information on home care agencies, senior centers, adult day centers and more  -[facility] resource list  -Vaccines  gov information to find where to get original covid vaccine as patient's friend stated many places do not have the original vaccine, just booster doses

## 2023-03-02 ENCOUNTER — OFFICE VISIT (OUTPATIENT)
Dept: NEUROLOGY | Facility: CLINIC | Age: 83
End: 2023-03-02

## 2023-03-02 VITALS
BODY MASS INDEX: 26.3 KG/M2 | WEIGHT: 173 LBS | SYSTOLIC BLOOD PRESSURE: 122 MMHG | HEART RATE: 57 BPM | DIASTOLIC BLOOD PRESSURE: 62 MMHG

## 2023-03-02 DIAGNOSIS — G47.9 SLEEP DISORDER: ICD-10-CM

## 2023-03-02 DIAGNOSIS — R56.9 SEIZURE (HCC): Primary | ICD-10-CM

## 2023-03-02 DIAGNOSIS — I10 ESSENTIAL HYPERTENSION: ICD-10-CM

## 2023-03-02 DIAGNOSIS — G47.33 OBSTRUCTIVE SLEEP APNEA (ADULT) (PEDIATRIC): ICD-10-CM

## 2023-03-02 RX ORDER — AMLODIPINE BESYLATE 5 MG/1
5 TABLET ORAL DAILY
Qty: 90 TABLET | Refills: 2 | Status: SHIPPED | OUTPATIENT
Start: 2023-03-02

## 2023-03-02 NOTE — PROGRESS NOTES
Patient ID: Jony Chase is a 81 y o  male  Assessment/Plan:    No problem-specific Assessment & Plan notes found for this encounter  {Assess/PlanSmartLinks:41616}       Subjective:    HPI    {[facility] HPI texts:91301}    {Common ambulatory SmartLinks:28458}         Objective: There were no vitals taken for this visit  Physical Exam    Neurological Exam      ROS:    Review of Systems   Constitutional: Negative  Negative for appetite change and fever  HENT: Negative  Negative for hearing loss, tinnitus, trouble swallowing and voice change  Eyes: Negative  Negative for photophobia, pain and visual disturbance  Respiratory: Negative  Negative for shortness of breath  Cardiovascular: Negative  Negative for palpitations  Gastrointestinal: Negative  Negative for nausea and vomiting  Endocrine: Negative  Negative for cold intolerance  Genitourinary: Negative  Negative for dysuria, frequency and urgency  Musculoskeletal: Negative  Negative for gait problem, myalgias and neck pain  Skin: Negative  Negative for rash  Allergic/Immunologic: Negative  Neurological: Negative  Negative for dizziness, tremors, seizures, syncope, facial asymmetry, speech difficulty, weakness, light-headedness, numbness and headaches  Hematological: Negative  Does not bruise/bleed easily  Psychiatric/Behavioral: Negative  Negative for confusion, hallucinations and sleep disturbance

## 2023-03-02 NOTE — PATIENT INSTRUCTIONS
Recommend doing physical therapy for exercise, strengthening, and joint mobility  Referral to sleep medicine for concerns of sleep apnea  Repeat routine EEG  If no concerns for seizure on this study, may consider discontinuing Keppra  Continue taking Aspirin, Atorvastatin, and Plavix as prescribed

## 2023-03-02 NOTE — PROGRESS NOTES
Patient ID: Ericka Patel is a 81 y o  male  Assessment/Plan:    History of CVA (cerebrovascular accident)  · History of chronic infarcts:  · MRI brain wo contrast on 8/25/2022 demonstrated chronic infarcts; particularly, encephalomalacia in R cerebellum, R parietal lobe cortex, L frontal lobe, and chronic lacunar infarct in L centrum semiovale; no recent infarct  · [facility] wo contrast in 9/1/2022 showed no acute intracranial abn, and was just significant for old R-sided infarcts  · No acute neurological focal deficits  · No seizure-like activity since 10/2021  · LUE resting tremor, cogwheel rigidity; noted shuffling gait - possibly considering for early Parkinson's Disease    Plan:  · Follow up in 1 year  · Continue taking ASA, Plavix, Statin  · Reach out to office or go to nearest ED if any signs of sudden weakness, worsening slurred speech, worsening facial asymmetry  · Continue managing blood pressure, per PCP  · Ambulatory referral to sleep medicine for evaluation of possible sleep apnea  · Recommend PT  · Recommend f/u with Dr Kenya Benjamin as well (movement specialist)    History of seizure  · Witnessed seizure while patient in CT scanner on 10/2021  · Denies seizure-like activity ever since    Plan:  · Repeat routine EEG  · If suspicion for seizure appreciated on that result, will consider discontinuing Keppra       Diagnoses and all orders for this visit:    Seizure ([address] )  -     EEG awake or drowsy routine; Future    Obstructive sleep apnea (adult) (pediatric)    Sleep disorder  -     Ambulatory Referral to Sleep Medicine; Future           Subjective:    80YO M with history of CVAs, CAD with autologous vein bypass graft, b/l carotid stenosis, L vert artery stenosis, HLD, HTN, peripheral neuropathy presents for follow-up visit for management of history of CVA and evaluation concerning for possible TIA in 08/2022       MRI brain wo contrast on 8/25/2022 demonstrated chronic infarcts; particularly, encephalomalacia in R cerebellum, R parietal lobe cortex, L frontal lobe, and chronic lacunar infarct in L centrum semiovale; no recent infarct  [address] wo contrast in 9/1/2022 showed no acute intracranial abn, and was just significant for old R-sided infarcts  Patient has been compliant with his medications, which includes ASA, Plavix, and Lipitor 40mg daily  He met with cardiology last month and was told to continue DAPT therapy  Denies any new neurological symptoms since last OP neuro visit on 12/13/2021 with Alley Armstrong PA-C  Was evaluated at hospital on 8/2022 for concerns of TIA secondary to elevated BP when patient experienced worsening of slurred speech  Patient denied any worsening neurological symptoms since  Experienced chronic balance issues and slurred speech at baseline  Denies smoking or drinking  Patient's caretaker concerned for sleep apnea; patient reports snoring if he does not use Flonase at night; is agreeable to sleep medicine referral      Denies history of seizure-like activity ever since 10/2021 when he was witnessed to have a seizure in [facility]  Has been taking Keppra 750mg BID  Has been trying to follow healthy diet  Admits to sedentary lifestyle  Scheduled for physical therapy next week per caretaker  The following portions of the patient's history were reviewed and updated as appropriate: allergies, current medications, past family history, past medical history, past social history, past surgical history and problem list          Objective:    Blood pressure 122/62, pulse 57, weight 78 5 kg (173 lb)  Physical Exam  Vitals and nursing note reviewed  Constitutional:       General: He is not in acute distress  Appearance: He is not diaphoretic  HENT:      Head: Normocephalic and atraumatic        Right Ear: External ear normal       Left Ear: External ear normal       Nose: Nose normal       Mouth/Throat:      Mouth: Mucous membranes are moist    Eyes: General: Lids are normal          Right eye: No discharge  Left eye: No discharge  Extraocular Movements: Extraocular movements intact  Conjunctiva/sclera: Conjunctivae normal       Pupils: Pupils are equal, round, and reactive to light  Pulmonary:      Effort: Pulmonary effort is normal  No respiratory distress  Musculoskeletal:         General: Tenderness (R shoulder, R neck) present  Normal range of motion  Cervical back: Normal range of motion and neck supple  Right lower leg: No edema  Left lower leg: No edema  Skin:     General: Skin is warm and dry  Neurological:      Mental Status: He is oriented to person, place, and time  Mental status is at baseline  Cranial Nerves: Dysarthria (slurred speech; chronic) present  Motor: Weakness (R shoulder) present  Deep Tendon Reflexes:      Reflex Scores:       Tricep reflexes are 2+ on the right side and 2+ on the left side  Bicep reflexes are 2+ on the right side and 2+ on the left side  Brachioradialis reflexes are 2+ on the right side and 2+ on the left side  Patellar reflexes are 2+ on the right side and 2+ on the left side  Psychiatric:         Mood and Affect: Mood normal          Behavior: Behavior normal          Neurological Exam  Mental Status  Awake, alert and oriented to person, place and time  Oriented to person, place and time  Oriented to person, place, and time  Moderate dysarthria (slurred speech; chronic) present  Language is fluent with no aphasia  Attention and concentration are normal  Fund of knowledge is appropriate for level of education  Cranial Nerves  CN II: Visual acuity is normal  Visual fields full to confrontation  CN III, IV, VI: Extraocular movements intact bilaterally  Normal lids and orbits bilaterally  Pupils equal round and reactive to light bilaterally  CN V: Facial sensation is normal   CN VII: Full and symmetric facial movement    CN VIII: Hearing is normal   CN IX, X: Palate elevates symmetrically  Normal gag reflex  CN XI:  Right: Trapezius strength is weak  Left: Trapezius strength is normal   CN XII: Tongue midline without atrophy or fasciculations  Motor  Normal muscle bulk throughout  No fasciculations present  Normal muscle tone  No abnormal involuntary movements  Cogwheel rigidity appreciated in LUE compared to RUE  R shoulder weakness; otherwise, 5/5 strength in all 4 extremities  Sensory  Sensation is intact to light touch, pinprick, vibration and proprioception in all four extremities  Reflexes                                            Right                      Left  Brachioradialis                    2+                         2+  Biceps                                 2+                         2+  Triceps                                2+                         2+  Patellar                                2+                         2+    Coordination  Right: Finger-to-nose normal  Rapid alternating movement normal Left: Finger-to-nose abnormality: Rapid alternating movement abnormality:  Resting tremor noted in LUE  Gait    Shuffling  ROS:    Review of Systems   Constitutional: Negative  Negative for appetite change and fever  HENT: Negative  Negative for hearing loss, tinnitus, trouble swallowing and voice change  Eyes: Positive for visual disturbance (cataracts)  Negative for photophobia and pain  Respiratory: Negative  Negative for shortness of breath  Cardiovascular: Negative  Negative for palpitations  Gastrointestinal: Negative  Negative for nausea and vomiting  Endocrine: Negative  Negative for cold intolerance  Genitourinary: Negative  Negative for dysuria, frequency and urgency  Musculoskeletal: Positive for gait problem and neck pain  Negative for myalgias  Skin: Negative  Negative for rash  Allergic/Immunologic: Negative      Neurological: Positive for tremors, speech difficulty and headaches  Negative for dizziness, seizures, syncope, facial asymmetry, weakness, light-headedness and numbness  Patient states headache in the back of head and neck  Patient stated bilateral tremors   Hematological: Negative  Does not bruise/bleed easily  Psychiatric/Behavioral: Negative for confusion and hallucinations

## 2023-03-02 NOTE — ASSESSMENT & PLAN NOTE
· History of chronic infarcts:  · MRI brain wo contrast on 8/25/2022 demonstrated chronic infarcts; particularly, encephalomalacia in R cerebellum, R parietal lobe cortex, L frontal lobe, and chronic lacunar infarct in L centrum semiovale; no recent infarct  · [facility] wo contrast in 9/1/2022 showed no acute intracranial abn, and was just significant for old R-sided infarcts     · No acute neurological focal deficits  · No seizure-like activity since 10/2021  · LUE resting tremor, cogwheel rigidity; noted shuffling gait - possibly considering for early Parkinson's Disease    Plan:  · Follow up in 1 year  · Continue taking ASA, Plavix, Statin  · Reach out to office or go to nearest ED if any signs of sudden weakness, worsening slurred speech, worsening facial asymmetry  · Continue managing blood pressure, per PCP  · Ambulatory referral to sleep medicine for evaluation of possible sleep apnea  · Recommend PT  · Recommend f/u with Dr Mojgan Etienne as well (movement specialist)

## 2023-03-02 NOTE — ASSESSMENT & PLAN NOTE
· Witnessed seizure while patient in CT scanner on 10/2021  · Denies seizure-like activity ever since    Plan:  · Repeat routine EEG  · If suspicion for seizure appreciated on that result, will consider discontinuing Keppra

## 2023-03-08 ENCOUNTER — EVALUATION (OUTPATIENT)
Dept: PHYSICAL THERAPY | Facility: CLINIC | Age: 83
End: 2023-03-08

## 2023-03-08 DIAGNOSIS — M25.561 CHRONIC PAIN OF BOTH KNEES: ICD-10-CM

## 2023-03-08 DIAGNOSIS — M54.2 NECK PAIN: Primary | ICD-10-CM

## 2023-03-08 DIAGNOSIS — R26.2 DIFFICULTY WALKING: ICD-10-CM

## 2023-03-08 DIAGNOSIS — G89.29 CHRONIC PAIN OF BOTH KNEES: ICD-10-CM

## 2023-03-08 DIAGNOSIS — M25.562 CHRONIC PAIN OF BOTH KNEES: ICD-10-CM

## 2023-03-08 DIAGNOSIS — R29.3 POOR POSTURE: ICD-10-CM

## 2023-03-08 NOTE — PROGRESS NOTES
PT Evaluation     Today's date: 3/8/2023  Patient name: Víctor Levy  : 1940  MRN: [id number]  Referring provider: Austen Segal  Dx:   Encounter Diagnosis     ICD-10-CM    1  Neck pain  M54 2       2  Poor posture  R29 3       3  Chronic pain of both knees  M25 561     M25 562     G89 29       4  Difficulty walking  R26 2                      Assessment  Assessment details: Víctor Levy is a 81 y o  male presenting to outpatient physical therapy at [facility] with complaints of chronic progressive neck and bilateral knee pain  He presents with decreased range of motion, decreased strength, limited flexibility, poor postural awareness, poor body mechanics, altered gait pattern, poor balance, decreased tolerance to activity and decreased functional mobility due to Neck pain  (primary encounter diagnosis), Poor posture, Chronic pain of both knees, and Difficulty walking  Therapist discussed diagnosis, prognosis, plan of care, proper responses to exercises, HEP, and modalities to use at home  [facility] would benefit from skilled PT services in order to address these deficits and reach maximum level of function   Thank you for the referral!  Impairments: abnormal coordination, abnormal gait, abnormal muscle firing, abnormal muscle tone, abnormal or restricted ROM, abnormal movement, activity intolerance, impaired balance, impaired physical strength, lacks appropriate home exercise program, pain with function, scapular dyskinesis, poor posture  and poor body mechanics    Symptom irritability: moderateUnderstanding of Dx/Px/POC: good   Prognosis: good    Goals  STGs (in 4 weeks):  1  Pt will report having at least a 50% improvement since I E    2  Pt will report having at most a 2/10 pain level with functional mobility with his neck  3  Pt will report having at most a 2/10 pain level with functional mobility with his knees     4  Pt will demonstrate improved CROm in all planes to within normative age related values  5  Pt will demonstrate at least 4/5 MMT throughout BLE  LTGs (in 12 weeks):  1  Pt will report having at least a 75% improvement since I E   2  Pt will be able to perform at least 10 sit<>stands in 30 seconds to demonstrate improved BLE strength and balance  3  Pt will be able to perform TUG in less than 18 seconds  4  Pt will be able to amb at least 180 meters during 6 MWT with good posture  5  Pt will be independent with HEP  Plan  Patient would benefit from: skilled physical therapy  Planned modality interventions: TENS and unattended electrical stimulation  Planned therapy interventions: joint mobilization, manual therapy, neuromuscular re-education, patient education, self care, strengthening, stretching, therapeutic activities, therapeutic exercise, balance, flexibility and home exercise program  Frequency: 2x week  Plan of Care beginning date: 3/8/2023  Plan of Care expiration date: 2023  Treatment plan discussed with: patient, caregiver and family        Subjective Evaluation    History of Present Illness  Mechanism of injury: Pt is a 81year old male who presents with chief complaint of chronic progressive neck pain and bilateral knee pain  Caregiver reports that imaging reveals OA with bilateral knees R worse than L  Now referred to skilled OP PT for ambulatory dysfunction     Quality of life: good    Pain  Current pain ratin  At best pain ratin  At worst pain ratin  Pain location: neck pain; bilateral knee pain (R worse than L)  Quality: discomfort, dull ache, pressure and tight  Relieving factors: relaxation and rest  Aggravating factors: walking, overhead activity, lifting, stair climbing and standing (sit to stand transfer)  Progression: worsening    Hand dominance: right    Treatments  Previous treatment: injection treatment  Patient Goals  Patient goals for therapy: decreased pain, improved balance, increased motion, increased strength, independence with ADLs/IADLs and return to sport/leisure activities          Objective    Objective:       Posture: Pt's sitting posture is FHP and rounded shoulders       Cervical Screen: cervical AROM limitations  Flexion  35  Extension 40  R rotation 40  L rotation 29  R sidebend 10  L sidebend 10  Retraction 50%    AROM     R   L  Knee Flex  105   100  Knee Extension -8 (hypo)/-5  -5 (hypo)/0      MMT      R   L  S' Flex   4/5   4/5  S' ABD   4-/5   4-/5  S' ER   4-/5   4-/5    Static K' Ext  4-/5   4-/5  Static K' Flex  3+/5   3+/5  Dynamic K' Ext  3-/5   3-/5  Dynamic K' Ext  3-/5   3-/5    Hip Flex  4-/5   4-/5  Hip ABD  3+/5   3+/5    Ankle DF  3-/5   3-/5  Ankle PF  3/5   3/5        Special Tests: (-) Janelle test    Functional mobility:  Ambulation: Pt ambs using RW with forward trunk flexion with FHP and rounded shoulders    Functional testin second repeated sit<>stand: 3 times with definite use of BUE  TUG: NT  6 MWT: NT        Precautions: fall risk, dementia, hx of CVA/TIA; hx of cortisone injections in knees and hip    HEP: HR, TR, chin tucks, scap retraction    Specialty Daily Treatment Diary       Manual 3/8       STM; TPR B UT, LS        STM/TPR B knees                Knee flexion stretch        Knee Ext stretch                Exercise Diary         UBE (retro)        RB        Laps                 HR 10x       TR 10x       Mini Squats        Chair squats                Slant board gastroc stretch                Marching        Standing H' ABD        Standing H' Ext                Floor -> Airex NBOS        Floor -> Airex Tandem        Floor -> Airex SLS                1 cone tap        2 cone tap                Stepping over hurdles forward        Laterally stepping over hurdles                Stepping on random pads                                TB Scap Retraction No TB 5 sec x 10       TB B S' Ext                Low pec stretch        Mid pec stretch                        Seated HS stretch Seated 2 way piriformis stretch        Chin tucks        SNAGs Ext        Seated Thoracic Extension over half foam                                LAQ                SLR (Flex)                Ball Squeezes        [place] c Bridge                Hooklying TB B Clamshells        TB Bridges                Sidelying H' ABD                                HEP/EDU Diagnosis, prognosis, POC, proper responses to exercises, HEP, and modalities to use at home       Modalities        MH                           Skin checks performed pre and post application: intact

## 2023-03-09 ENCOUNTER — TELEPHONE (OUTPATIENT)
Dept: VASCULAR SURGERY | Facility: CLINIC | Age: 83
End: 2023-03-09

## 2023-03-09 NOTE — TELEPHONE ENCOUNTER
Patient had BBOO and CV scheduled for 3/17/23 but has not been seen for his legs since 2018 and his carotids since 2019  When auth was submitted it was denied for clinical documentation  Called patient to advise and let him know we need to schedule an office visit in order to have the studies approved  Patient can see any provider

## 2023-03-13 ENCOUNTER — APPOINTMENT (OUTPATIENT)
Dept: PHYSICAL THERAPY | Facility: CLINIC | Age: 83
End: 2023-03-13

## 2023-03-17 ENCOUNTER — OFFICE VISIT (OUTPATIENT)
Dept: PHYSICAL THERAPY | Facility: CLINIC | Age: 83
End: 2023-03-17

## 2023-03-17 DIAGNOSIS — M25.561 CHRONIC PAIN OF BOTH KNEES: ICD-10-CM

## 2023-03-17 DIAGNOSIS — R29.3 POOR POSTURE: ICD-10-CM

## 2023-03-17 DIAGNOSIS — R26.2 DIFFICULTY WALKING: ICD-10-CM

## 2023-03-17 DIAGNOSIS — M54.2 NECK PAIN: Primary | ICD-10-CM

## 2023-03-17 DIAGNOSIS — M25.562 CHRONIC PAIN OF BOTH KNEES: ICD-10-CM

## 2023-03-17 DIAGNOSIS — G89.29 CHRONIC PAIN OF BOTH KNEES: ICD-10-CM

## 2023-03-17 NOTE — PROGRESS NOTES
Daily Note     Today's date: 3/17/2023  Patient name: Jony Chase  : 1940  MRN: [id number]  Referring provider: Jeanine Alonso  Dx:   Encounter Diagnosis     ICD-10-CM    1  Neck pain  M54 2       2  Poor posture  R29 3       3  Chronic pain of both knees  M25 561     M25 562     G89 29       4  Difficulty walking  R26 2                      Subjective: Has been doing HEP  Objective: See treatment diary below      Assessment: Tolerated treatment well  Patient would benefit from continued PT  Pt tolerated session well today, though fatigues quickly  Able to perform sit<>stand w/o UE support though it was very challenging  Had some back discomfort w/ increased stride length, but able to perform "medium" strides well  Had tingling in toes post ambulation, but decreased w/ 30 seconds seated rest      Plan: Continue per plan of care            Manual 3/8 3/17      STM; TPR B UT, LS        STM/TPR B knees                Knee flexion stretch        Knee Ext stretch  PROM B knee ext JE              Exercise Diary         UBE (retro)        RB        Laps   2x2 laps w/ FWW - cues for glutes, upright trunk, increased stride length              HR 10x 2x10      TR 10x 2x10      Mini Squats        Chair squats                Slant board gastroc stretch                Marching        Standing H' ABD        Standing H' Ext                Floor -> Airex NBOS        Floor -> Airex Tandem        Floor -> Airex SLS                1 cone tap        2 cone tap                Stepping over hurdles forward        Laterally stepping over hurdles                Stepping on random pads                Sit<>stand  2x5 w/ CS-CG w/o UE support              TB Scap Retraction No TB 5 sec x 10 2x10x5 sec      TB B S' Ext                Low pec stretch        Mid pec stretch                        Seated HS stretch  3x30 sec each      Seated 2 way piriformis stretch        Chin tucks        SNAGs Ext        Seated Thoracic Extension over half foam                                LAQ                SLR (Flex)                Ball Squeezes        [place] c Bridge                Hooklying TB B Clamshells        TB Bridges                Sidelying H' ABD                                HEP/EDU Diagnosis, prognosis, POC, proper responses to exercises, HEP, and modalities to use at home       Modalities        MH

## 2023-03-20 ENCOUNTER — APPOINTMENT (OUTPATIENT)
Dept: PHYSICAL THERAPY | Facility: CLINIC | Age: 83
End: 2023-03-20

## 2023-03-27 ENCOUNTER — OFFICE VISIT (OUTPATIENT)
Dept: PHYSICAL THERAPY | Facility: CLINIC | Age: 83
End: 2023-03-27

## 2023-03-27 ENCOUNTER — TELEPHONE (OUTPATIENT)
Dept: PSYCHIATRY | Facility: CLINIC | Age: 83
End: 2023-03-27

## 2023-03-27 DIAGNOSIS — M25.562 CHRONIC PAIN OF BOTH KNEES: ICD-10-CM

## 2023-03-27 DIAGNOSIS — M25.561 CHRONIC PAIN OF BOTH KNEES: ICD-10-CM

## 2023-03-27 DIAGNOSIS — R26.2 DIFFICULTY WALKING: ICD-10-CM

## 2023-03-27 DIAGNOSIS — R29.3 POOR POSTURE: Primary | ICD-10-CM

## 2023-03-27 DIAGNOSIS — G89.29 CHRONIC PAIN OF BOTH KNEES: ICD-10-CM

## 2023-03-27 NOTE — PROGRESS NOTES
"Daily Note     Today's date: 3/27/2023  Patient name: Joe Valero  : 1940  MRN: [id number]  Referring provider: Nimesh Ramon  Dx:   Encounter Diagnosis     ICD-10-CM    1  Poor posture  R29 3       2  Chronic pain of both knees  M25 561     M25 562     G89 29       3  Difficulty walking  R26 2           Start Time: 1430  Stop Time: 1510  Total time in clinic (min): 40 minutes    Subjective: Patient states that he feels tired and stiff today  Objective: See treatment diary below      Assessment: Tolerated treatment well  Added LAQ, seated hip adduction, seated marches, seated T/S ext over 1/2 foam, standing hip abduction and FTEO on solid surface as indicated below  Continuous cuing required during ambulation for gait pattern  Patient was unable to perform STS from foam without HHA this session  Advance balance and dynamic stability as able and appropriate  Patient would benefit from continued PT      Plan: Continue per plan of care        Manual 3/8 3/17 3/24     STM; TPR B UT, LS        STM/TPR B knees                Knee flexion stretch        Knee Ext stretch  PROM B knee ext JE NV             Exercise Diary         UBE (retro)        RB        Laps   2x2 laps w/ FWW - cues for glutes, upright trunk, increased stride length 2 laps with cues for pattern/posture             HR 10x 2x10 2x10     TR 10x 2x10 2x10     Mini Squats        Chair squats                Slant board gastroc stretch                Marching        Standing H' ABD   10x ea     Standing H' Ext                Floor -> Airex NBOS   30\"x2 Foam    Floor -> Airex Tandem   NV     Floor -> Airex SLS                1 cone tap        2 cone tap                Stepping over hurdles forward        Laterally stepping over hurdles                Stepping on random pads                Sit<>stand  2x5 w/ CS-CG w/o UE support Chair+Airex 2x5 CG with UE support             TB Scap Retraction No TB 5 sec x 10 2x10x5 sec NV     TB B " "S' Ext                Low pec stretch        Mid pec stretch                        Seated HS stretch  3x30 sec each 30\"x3 ea     Seated 2 way piriformis stretch        Chin tucks        SNAGs Ext        Seated Thoracic Extension over half foam   3\" x10                             LAQ   3\" x10 ea     Seated Marches   3\" x10 ea     SLR (Flex)                Ball Squeezes   Seated Blue 5\" x10     Ball c Bridge                Hooklying TB B Clamshells        TB Bridges                Sidelying H' ABD                                HEP/EDU Diagnosis, prognosis, POC, proper responses to exercises, HEP, and modalities to use at home       Modalities        MH                                "

## 2023-03-30 DIAGNOSIS — R09.81 SINUS CONGESTION: ICD-10-CM

## 2023-03-30 RX ORDER — FLUTICASONE PROPIONATE 50 MCG
SPRAY, SUSPENSION (ML) NASAL
Qty: 48 ML | Refills: 2 | Status: SHIPPED | OUTPATIENT
Start: 2023-03-30

## 2023-04-03 ENCOUNTER — OFFICE VISIT (OUTPATIENT)
Dept: PHYSICAL THERAPY | Facility: CLINIC | Age: 83
End: 2023-04-03

## 2023-04-03 DIAGNOSIS — R29.3 POOR POSTURE: Primary | ICD-10-CM

## 2023-04-03 DIAGNOSIS — M25.562 CHRONIC PAIN OF BOTH KNEES: ICD-10-CM

## 2023-04-03 DIAGNOSIS — R26.2 DIFFICULTY WALKING: ICD-10-CM

## 2023-04-03 DIAGNOSIS — G89.29 CHRONIC PAIN OF BOTH KNEES: ICD-10-CM

## 2023-04-03 DIAGNOSIS — M25.561 CHRONIC PAIN OF BOTH KNEES: ICD-10-CM

## 2023-04-03 DIAGNOSIS — M54.2 NECK PAIN: ICD-10-CM

## 2023-04-03 NOTE — PROGRESS NOTES
"Daily Note     Today's date: 4/3/2023  Patient name: Kathleen Vargas  : 1940  MRN: [id number]  Referring provider: Christine Aguilera  Dx:   Encounter Diagnosis     ICD-10-CM    1  Poor posture  R29 3       2  Chronic pain of both knees  M25 561     M25 562     G89 29       3  Difficulty walking  R26 2       4  Neck pain  M54 2                      Subjective: Patient reports that his back hurts all the way down to his feet  He reports that he has not fallen  He is unsure as to why he is having pain  Objective: See treatment diary below      Assessment: Tolerated treatment well; initiated POC with CKC strengthening exercises  VCs provided on proper sequencing with exercises; tried performing NBOS on airex however stepping off the foam he reports having some knee pain that did not linger  Patient demonstrated fatigue post treatment and would benefit from continued PT      Plan: Continue per plan of care          Precautions: fall risk, dementia, hx of CVA/TIA; hx of cortisone injections in knees and hip    HEP: HR, TR, chin tucks, scap retraction    Specialty Daily Treatment Diary      Manual 3/8 3/17 3/24 4/3    STM; TPR B UT, LS        STM/TPR B knees                Knee flexion stretch        Knee Ext stretch  PROM B knee ext JE NV NV            Exercise Diary         UBE (retro)        RB        Laps   2x2 laps w/ FWW - cues for glutes, upright trunk, increased stride length 2 laps with cues for pattern/posture             HR 10x 2x10 2x10 2x10    TR 10x 2x10 2x10 2x10    Mini Squats        Chair squats                Slant board gastroc stretch                Marching        Standing H' ABD   10x ea 10 ea     Standing H' Ext    0# 10 ea            Floor -> Airex NBOS   30\"x2 Foam: 2 mins (perform 1 min NV due to knee discomfort post activity)    Floor -> Airex Tandem   NV     Floor -> Airex SLS                1 cone tap        2 cone tap                Stepping over hurdles forward      " "  Laterally stepping over hurdles                Stepping on random pads                Sit<>stand  2x5 w/ CS-CG w/o UE support Chair+Airex 2x5 CG with UE support Chair+airex: 2x5 CGA with support support            TB Scap Retraction No TB 5 sec x 10 2x10x5 sec NV GTB 2 sec; 2x10    TB B S' Ext                Low pec stretch        Mid pec stretch                        Seated HS stretch  3x30 sec each 30\"x3 ea 30 sec x 3 ea    Seated 2 way piriformis stretch        Chin tucks        SNAGs Ext        Seated Thoracic Extension over half foam   3\" x10 2 sec x 10                            LAQ   3\" x10 ea 0# 2 sec; 3x10 ea    Seated Marches   3\" x10 ea 0# 2 sec; 15 ea    SLR (Flex)                Ball Squeezes   Seated Blue 5\" x10 Seated: Blue: 5 sec; 2x10    Ball c Bridge                Hooklying TB B Clamshells    Seated: BTB 5 sec; 2x10    TB Bridges                Sidelying H' ABD                                HEP/EDU Diagnosis, prognosis, POC, proper responses to exercises, HEP, and modalities to use at home       Modalities        MH                                  "

## 2023-04-24 ENCOUNTER — OFFICE VISIT (OUTPATIENT)
Dept: VASCULAR SURGERY | Facility: CLINIC | Age: 83
End: 2023-04-24

## 2023-04-24 ENCOUNTER — APPOINTMENT (OUTPATIENT)
Dept: PHYSICAL THERAPY | Facility: CLINIC | Age: 83
End: 2023-04-24

## 2023-04-24 VITALS
HEIGHT: 68 IN | OXYGEN SATURATION: 100 % | WEIGHT: 170 LBS | HEART RATE: 59 BPM | DIASTOLIC BLOOD PRESSURE: 76 MMHG | BODY MASS INDEX: 25.76 KG/M2 | SYSTOLIC BLOOD PRESSURE: 112 MMHG

## 2023-04-24 DIAGNOSIS — I73.9 PAD (PERIPHERAL ARTERY DISEASE) (HCC): Primary | ICD-10-CM

## 2023-04-24 NOTE — ASSESSMENT & PLAN NOTE
67-year-old male with HTN, HLD, CAD, CABG, stroke/TIA, asymptomatic bilateral carotid stenosis, PAD, Alzheimer's  Patient presents to the office for vascular evaluation prior to vascular imaging     -Patient with known carotid stenosis, asymptomatic  -Prior hospitalization for confusion, pepetitive speech, acute metabolic encephalopathy, work up for TIA/stroke  -CTA head/neck 8/24/22 showed moderate bilateral ICA stenosis, approximately 50-60% stenosis   -MRI of brain 8/25/23 showed right cerebellum, right parietal lobe cortex and left frontal lobe compatible with chronic infarcts  Chronic lacunar infarct also noted in the left centrum semiovale  -Recommended carotid duplex for evaluation of carotid stenosis  -He is on aspirin, Plavix and Lipitor since prior TIA/stroke   Continue   -Follow up after CV duplex to review

## 2023-04-24 NOTE — PROGRESS NOTES
Assessment/Plan:    Carotid stenosis, asymptomatic, bilateral  81-year-old male with HTN, HLD, CAD, CABG, stroke/TIA, asymptomatic bilateral carotid stenosis, PAD, Alzheimer's  Patient presents to the office for vascular evaluation prior to vascular imaging     -Patient with known carotid stenosis, asymptomatic  -Prior hospitalization for confusion, pepetitive speech, acute metabolic encephalopathy, work up for TIA/stroke  -CTA head/neck 8/24/22 showed moderate bilateral ICA stenosis, approximately 50-60% stenosis   -MRI of brain 8/25/23 showed right cerebellum, right parietal lobe cortex and left frontal lobe compatible with chronic infarcts  Chronic lacunar infarct also noted in the left centrum semiovale  -Recommended carotid duplex for evaluation of carotid stenosis  -He is on aspirin, Plavix and Lipitor since prior TIA/stroke  Continue   -Follow up after CV duplex to review     PAD (peripheral artery disease) ([facility])  -Known diffuse bilateral lower extremity atherosclerotic changes, right IMSAEL 1 01 and left ISMAEL noncompressible   -Slow gait with walker  -No claudication symptoms  -Prior non invasive testing 2018  -Recommended lower extremity arterial study now   -Continue antiplatelet and statin therapy, on for stroke prevention  -Follow up after duplex to review        Diagnoses and all orders for this visit:    PAD (peripheral artery disease) ([facility] [address] )          Subjective:      Patient ID: Dwain Amezcua is a 81 y o  male  Patient is here today for a follow up office visit  Last testing was a CTA Head and Neck done 8/24/2022  Patient was last seen in the office on 10/16/2019  His insurance requires a office visit before any further testing  Patient denies any headache, dizziness, sudden loss of vision, trouble swallowing, slurred speech, TIA or Stroke symptoms  Patient also denies any pain in his legs when walking or any open wounds  He is a former smoker  Patient is taking ASA 81 mg, Plavix and Atorvastatin  HPI  81-year-old male with HTN, HLD, CAD, CABG, stroke/TIA, asymptomatic bilateral carotid stenosis, PAD, Alzheimer's  Patient presents to the office for vascular evaluation prior to vascular imaging   Patient presents the office with his son  He was previously seen in the office by Dr Sebastian Cat in 2019  He was ordered for surveillance carotid and lower extremity arterial studies however needed office visit prior to testing as he has not been seen in several years  He denies any lower extremity claudication symptoms  He walks very slowly with a walker  He denies any lower extremity ischemic rest pain and is not experiencing any tissue loss  He had several admissions/ED visits with hallucinations and confusion, geriatric psych  He denies any new focal neurological events consistent with TIA/CVA  In August of last year he was hospitalized for confusion, had a stroke work-up, MRI did not show any acute infarcts, notable for bilateral chronic infarcts  He has bilateral ICA stenosis, left greater than right  He has not had any unilateral limb paresthesias or facial drooping  He is on aspirin, Plavix and atorvastatin for stroke prevention  The following portions of the patient's history were reviewed and updated as appropriate: allergies, current medications, past family history, past medical history, past social history, past surgical history and problem list   ROS reviewed     Review of Systems   Constitutional: Negative  HENT: Negative  Eyes: Negative  Respiratory: Negative  Cardiovascular: Positive for leg swelling  Gastrointestinal: Negative  Endocrine: Negative  Genitourinary: Negative  Musculoskeletal: Negative  Skin: Negative  Allergic/Immunologic: Negative  Neurological: Negative  Hematological: Negative  Psychiatric/Behavioral: Negative            Objective:    I have reviewed and made appropriate changes to the review of systems input by the medical "assistant  Vitals:    04/24/23 1342   BP: 112/76   BP Location: Right arm   Patient Position: Sitting   Cuff Size: Standard   Pulse: 59   SpO2: 100%   Weight: 77 1 kg (170 lb)   Height: 5' 8\" (1 727 m)       Patient Active Problem List   Diagnosis   • 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   • Essential hypertension   • ASCVD (arteriosclerotic cardiovascular disease)   • DJD (degenerative joint disease)   • GERD (gastroesophageal reflux disease)   • Hyperlipidemia   • PAD (peripheral artery disease) ([facility])   • Benign colon polyp   • Left inguinal hernia   • Osteoporosis   • Peripheral neuropathy   • Vitamin D deficiency   • TIA (transient ischemic attack)   • Diverticulosis of intestine   • Foraminal stenosis of cervical region   • Carotid stenosis, asymptomatic, bilateral   • History of CVA (cerebrovascular accident)   • Stenosis of left vertebral artery   • Depression, recurrent ([facility])   • Right epiphora   • History of seizure   • BMI 26 0-26 9,adult   • Knee pain   • Chronic neck pain   • Recurrent major depressive disorder, in full remission (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)   • Primary osteoarthritis of one hip, right   • Right hip pain   • Ambulatory dysfunction   • Frailty       Past Surgical History:   Procedure Laterality Date   • COLONOSCOPY      Complete;  Last Assessed: 1/23/2015   • COLONOSCOPY      Resolved: Approx [id number]   • CORONARY ARTERY BYPASS GRAFT  1994 5 vessel with LIMA to the LAD, VG to the diagonal, marginal and sequential to PDA and PLV   • HERNIA REPAIR     • MAXILLARY LE FORTE OSTEOTOMY Bilateral 9/4/2020    Procedure: OPEN REDUCTION W/ INTERNAL FIXATION (ORIF) MAXILLARY FRACTURES LEFORTE, closure nasal  laceration and right lower eyelid laceration, closure of lower lip laceration;  Surgeon: Jacey Michel DMD;  Location: [facility];  Service: Maxillofacial   • MS RPR 1ST INGUN HRNA AGE 6 YRS/> REDUCIBLE Left " 2/27/2018    Procedure: REPAIR HERNIA INGUINAL;  Surgeon: Tierney Patel MD;  Location:  MAIN OR;  Service: General   • PROSTATE SURGERY     • REMOVAL OF IMPACTED TOOTH - COMPLETELY BONY N/A 9/4/2020    Procedure: EXTRACTION TEETH MULTIPLE 25, 26, 31,27, 10, 13, 14, 9;  Surgeon: Lopez Bridges DMD;  Location:  MAIN OR;  Service: Maxillofacial   • SKIN GRAFT  50 years ago       Family History   Problem Relation Age of Onset   • Heart disease Mother         Premature Coronary   • Heart disease Father         Premature Coronary       Social History     Socioeconomic History   • Marital status: Single     Spouse name: Not on file   • Number of children: Not on file   • Years of education: Not on file   • Highest education level: Not on file   Occupational History   • Occupation: Retired   Tobacco Use   • Smoking status: Former     Types: Cigarettes   • Smokeless tobacco: Never   • Tobacco comments:     n/a   Vaping Use   • Vaping Use: Never used   Substance and Sexual Activity   • Alcohol use: Not Currently     Alcohol/week: 3 0 standard drinks     Types: 3 Cans of beer per week     Comment: 5-6 beers a day   • Drug use: No     Comment: n/a   • Sexual activity: Not on file   Other Topics Concern   • Not on file   Social History Narrative    ** Merged History Encounter **          Social Determinants of Health     Financial Resource Strain: High Risk   • Difficulty of Paying Living Expenses: Very hard   Food Insecurity: Not on file   Transportation Needs: No Transportation Needs   • Lack of Transportation (Medical): No   • Lack of Transportation (Non-Medical): No   Physical Activity: Not on file   Stress: Not on file   Social Connections: Not on file   Intimate Partner Violence: Not on file   Housing Stability: Not on file       No Known Allergies      Current Outpatient Medications:   •  amLODIPine (NORVASC) 5 mg tablet, TAKE 1 TABLET (5 MG TOTAL) BY MOUTH DAILY  , Disp: 90 tablet, Rfl: 2  •  aspirin (Kayla Johnsonas "LOW STRENGTH) 81 mg EC tablet, Take 1 tablet (81 mg total) by mouth daily, Disp: 10 tablet, Rfl: 0  •  atorvastatin (LIPITOR) 40 mg tablet, Take 1 tablet (40 mg total) by mouth daily, Disp: 90 tablet, Rfl: 2  •  carvedilol (COREG) 12 5 mg tablet, Take 1 tablet (12 5 mg total) by mouth 2 (two) times a day, Disp: 180 tablet, Rfl: 1  •  chlorthalidone 25 mg tablet, Take 1 tablet (25 mg total) by mouth daily (Patient taking differently: Take 12 5 mg by mouth daily), Disp: 30 tablet, Rfl: 5  •  clopidogrel (PLAVIX) 75 mg tablet, TAKE 1 TABLET BY MOUTH EVERY DAY, Disp: 90 tablet, Rfl: 1  •  escitalopram (LEXAPRO) 10 mg tablet, TAKE 1 TABLET BY MOUTH EVERY DAY, Disp: 90 tablet, Rfl: 1  •  fluticasone (FLONASE) 50 mcg/act nasal spray, USE 2 SPRAYS IN EACH NOSTRIL AT BEDTIME, Disp: 48 mL, Rfl: 2  •  levETIRAcetam (KEPPRA) 100 mg/mL oral solution, TAKE 7 5 ML (750 MG TOTAL) BY MOUTH 2 (TWO) TIMES A DAY, Disp: 473 mL, Rfl: 5  •  pantoprazole (PROTONIX) 40 mg tablet, Take 1 tablet (40 mg total) by mouth daily, Disp: 90 tablet, Rfl: 0  •  QUEtiapine (SEROquel) 25 mg tablet, 12 5 in the AM and 25 mg in evening, Disp: 135 tablet, Rfl: 0    /76 (BP Location: Right arm, Patient Position: Sitting, Cuff Size: Standard)   Pulse 59   Ht 5' 8\" (1 727 m)   Wt 77 1 kg (170 lb)   SpO2 100%   BMI 25 85 kg/m²          Physical Exam  Vitals and nursing note reviewed  Exam conducted with a chaperone present  Constitutional:       Appearance: He is well-developed  HENT:      Head: Normocephalic  Eyes:      Extraocular Movements: Extraocular movements intact  Cardiovascular:      Pulses:           Femoral pulses are 2+ on the right side and 2+ on the left side  Dorsalis pedis pulses are 0 on the right side and 0 on the left side  Posterior tibial pulses are 0 on the right side and 0 on the left side  Heart sounds: Normal heart sounds     Pulmonary:      Effort: Pulmonary effort is normal       Breath sounds: " Normal breath sounds  Abdominal:      General: Bowel sounds are normal       Palpations: Abdomen is soft  Musculoskeletal:         General: No swelling  Cervical back: Neck supple  Comments: Slow gait, ambulates with rolling walker    Skin:     General: Skin is warm  Neurological:      Mental Status: He is alert and oriented to person, place, and time     Psychiatric:         Mood and Affect: Mood normal          Behavior: Behavior normal

## 2023-04-25 ENCOUNTER — OFFICE VISIT (OUTPATIENT)
Dept: PHYSICAL THERAPY | Facility: CLINIC | Age: 83
End: 2023-04-25

## 2023-04-25 DIAGNOSIS — G89.29 CHRONIC PAIN OF BOTH KNEES: ICD-10-CM

## 2023-04-25 DIAGNOSIS — R29.3 POOR POSTURE: Primary | ICD-10-CM

## 2023-04-25 DIAGNOSIS — M25.562 CHRONIC PAIN OF BOTH KNEES: ICD-10-CM

## 2023-04-25 DIAGNOSIS — R26.2 DIFFICULTY WALKING: ICD-10-CM

## 2023-04-25 DIAGNOSIS — M25.561 CHRONIC PAIN OF BOTH KNEES: ICD-10-CM

## 2023-04-25 DIAGNOSIS — K21.9 GASTROESOPHAGEAL REFLUX DISEASE WITHOUT ESOPHAGITIS: ICD-10-CM

## 2023-04-25 RX ORDER — PANTOPRAZOLE SODIUM 40 MG/1
TABLET, DELAYED RELEASE ORAL
Qty: 90 TABLET | Refills: 0 | Status: SHIPPED | OUTPATIENT
Start: 2023-04-25

## 2023-04-25 NOTE — PROGRESS NOTES
"Daily Note     Today's date: 2023  Patient name: Sena Bence  : 1940  MRN: [id number]  Referring provider: Jorje Castanon  Dx:   Encounter Diagnosis     ICD-10-CM    1  Poor posture  R29 3       2  Chronic pain of both knees  M25 561     M25 562     G89 29       3  Difficulty walking  R26 2                      Subjective: pt reports some knee stiffness from HEP this AM      Pt reports cervical pain, it has been consistent  Elaina Gonsales stated that his neck pain is worse than his knee pain  Objective: See treatment diary below      Assessment: Tolerated treatment well  Improved standing posture with STS  Progressed with increased resistance for shoulder rows, well tolerated  Added standing gastroc stretching to address tightness/stiffness, cuing for form  Fair tolerance to knee ext stretching today d/t pain  Continued PT would be beneficial to improve function         Plan: Continue per plan of care         Manual 3/24 4/3 4/10 4/17 4/25   STM; TPR B UT, LS        STM/TPR B knees                Knee flexion stretch        Knee Ext stretch NV NV NV MB - seated MB - seated           Exercise Diary         UBE (retro)        RB        Laps  2 laps with cues for pattern/posture               HR 2x10 2x10 2x10 2x10 (1 min) 2x10 (1 min)   TR 2x10 2x10 2x10 2x10 (1 min) 2x10 (1 min)   Mini Squats        Chair squats                Slant board gastroc stretch        1/2 foam gastroc stretch     2x 20 sec   Marching        Standing H' ABD 10x ea 10 ea  10x ea  10x ea  15x ea    Standing H' Ext  0# 10 ea 0# 10x ea  0# 10x ea  15x ea            Floor -> Airex NBOS 30\"x2 Foam: 2 mins (perform 1 min NV due to knee discomfort post activity) nv     Floor -> Airex Tandem NV       Floor -> Airex SLS                1 cone tap        2 cone tap                Stepping over hurdles forward        Laterally stepping over hurdles                Stepping on random pads                Sit<>stand Chair+Airex 2x5 " "CG with UE support Chair+airex: 2x5 CGA with support support Chair+airex 2x5 CGA and armrest support Chair+airex 10x CGA and armrest support Chair+airex 10x CGA and armrest support           TB Scap Retraction NV GTB 2 sec; 2x10 GTB 2 sec; 2x10  GTB 2 sec; 2x10 seated BTB 2 sec; 2x10 seated   TB B S' Ext                Low pec stretch        Mid pec stretch                        Seated HS stretch 30\"x3 ea 30 sec x 3 ea 30 sec x 3 ea 30 sec x 3 ea 30 sec x 3 ea   Seated 2 way piriformis stretch        Chin tucks        SNAGs Ext        Seated Thoracic Extension over half foam 3\" x10 2 sec x 10 2 sec x 10  2 sec x 10 (1 min) 2 sec x 10 (1 min)                           LAQ 3\" x10 ea 0# 2 sec; 3x10 ea 0# 2 sec; 3x10 3 ea 0# 2 sec; 3x10 ea (2 mins) 0# 2 sec; 3x10 ea (2 mins)   Seated Marches 3\" x10 ea 0# 2 sec; 15 ea 0# 2 sec; 15 ea  0# 2 sec; 15 ea (1 5 mins) 0# 2 sec; 15 ea (1 5 mins)   SLR (Flex)                Ball Squeezes Seated Blue 5\" x10 Seated: Blue: 5 sec; 2 mix10 Seated: Blue: 5 sec; 2x10  Seated: Blue: 5 sec; 2x10  Seated: Blue: 5 sec; 2x10    Ball c Bridge                Hooklying TB B Clamshells  Seated: BTB 5 sec; 2x10 Seated: BTB 5 sec; 2x10  Seated: BTB 5 sec; 2x10 Seated: Blue: 5 sec; 2x10    TB Bridges                Sidelying H' ABD                                HEP/EDU        Modalities        MH                                        "

## 2023-05-01 ENCOUNTER — OFFICE VISIT (OUTPATIENT)
Dept: PHYSICAL THERAPY | Facility: CLINIC | Age: 83
End: 2023-05-01

## 2023-05-01 DIAGNOSIS — R26.2 DIFFICULTY WALKING: ICD-10-CM

## 2023-05-01 DIAGNOSIS — R29.3 POOR POSTURE: Primary | ICD-10-CM

## 2023-05-01 DIAGNOSIS — M25.561 CHRONIC PAIN OF BOTH KNEES: ICD-10-CM

## 2023-05-01 DIAGNOSIS — M54.2 NECK PAIN: ICD-10-CM

## 2023-05-01 DIAGNOSIS — M25.562 CHRONIC PAIN OF BOTH KNEES: ICD-10-CM

## 2023-05-01 DIAGNOSIS — G89.29 CHRONIC PAIN OF BOTH KNEES: ICD-10-CM

## 2023-05-01 NOTE — PROGRESS NOTES
Daily Note     Today's date: 2023  Patient name: Orion Edwards  : 1940  MRN: [id number]  Referring provider: Jacob Leonard  Dx:   Encounter Diagnosis     ICD-10-CM    1  Poor posture  R29 3       2  Chronic pain of both knees  M25 561     M25 562     G89 29       3  Difficulty walking  R26 2       4  Neck pain  M54 2                      Subjective: He reports that his knee is having more pain today  Objective: See treatment diary below      Assessment: Tolerated treatment well; initiated POC with CKC strengthening exercises  VCs provided on proper sequencing with exercises; added supine quad sets due to decreased TKE as a result from tightness with R knee joint as well as decreased hip flexor flexibility  Patient demonstrated fatigue post treatment and would benefit from continued PT      Plan: Continue per plan of care            Precautions: fall risk, dementia, hx of CVA/TIA; hx of cortisone injections in knees and hip    HEP: HR, TR, chin tucks, scap retraction    Specialty Daily Treatment Diary      Manual 5/1  4/10 4/17 4/25   STM; TPR B UT, LS        STM/TPR B knees                Knee flexion stretch        Knee Ext stretch   NV MB - seated MB - seated           R pat mobs SMF               Seated Knee AP glides at high table SMF               Exercise Diary         UBE (retro)        RB        Laps                 HR 2x10  2x10 2x10 (1 min) 2x10 (1 min)   TR 2x10  2x10 2x10 (1 min) 2x10 (1 min)   Mini Squats        Chair squats                Slant board gastroc stretch        1/2 foam gastroc stretch NV    2x 20 sec   Marching 10 ea (R knee pain)       Standing H' ABD   10x ea  10x ea  15x ea    Standing H' Ext   0# 10x ea  0# 10x ea  15x ea            Standing Lumbar Extension with slight OP from therapist while holding on to bar 2 sec x 10               Floor -> Airex NBOS   nv     Floor -> Airex Tandem        Floor -> Airex SLS                1 cone tap        2 cone tap                Stepping over hurdles forward        Laterally stepping over hurdles                Stepping on random pads                Sit<>stand Chair+airex: 10x CGA and armrest support  Chair+airex 2x5 CGA and armrest support Chair+airex 10x CGA and armrest support Chair+airex 10x CGA and armrest support           TB Scap Retraction BTB standin sec x 10; BTB seated: 5 sec x 15  GTB 2 sec; 2x10  GTB 2 sec; 2x10 seated BTB 2 sec; 2x10 seated   TB B S' Ext                Low pec stretch        Mid pec stretch                        Seated HS stretch 30 sec x 3 ea  30 sec x 3 ea 30 sec x 3 ea 30 sec x 3 ea   Seated 2 way piriformis stretch        Chin tucks 2 sec x 10       SNAGs Ext 2 sec; 2x10       Seated Thoracic Extension over half foam 2 sec x 10 (1 min)   2 sec x 10  2 sec x 10 (1 min) 2 sec x 10 (1 min)                           LAQ NV  0# 2 sec; 3x10 3 ea 0# 2 sec; 3x10 ea (2 mins) 0# 2 sec; 3x10 ea (2 mins)   Seated Marches NV  0# 2 sec; 15 ea  0# 2 sec; 15 ea (1 5 mins) 0# 2 sec; 15 ea (1 5 mins)   SLR (Flex)                Quad sets c half foam 5 sec x 15               Ball Squeezes Seated: Blue: 5 sec; 2x10  Seated: Blue: 5 sec; 2x10  Seated: Blue: 5 sec; 2x10  Seated: Blue: 5 sec; 2x10    Ball c Bridge                Hooklying TB B Clamshells Seated: Blue: 5 sec; 2x10  Seated: BTB 5 sec; 2x10  Seated: BTB 5 sec; 2x10 Seated: Blue: 5 sec; 2x10    TB Bridges                Sidelying H' ABD                                HEP/EDU Emphasis on upright posture       Modalities        MH

## 2023-05-08 ENCOUNTER — APPOINTMENT (OUTPATIENT)
Dept: PHYSICAL THERAPY | Facility: CLINIC | Age: 83
End: 2023-05-08
Payer: COMMERCIAL

## 2023-05-09 DIAGNOSIS — I10 PRIMARY HYPERTENSION: ICD-10-CM

## 2023-05-09 RX ORDER — CHLORTHALIDONE 25 MG/1
25 TABLET ORAL DAILY
Qty: 90 TABLET | Refills: 2 | Status: SHIPPED | OUTPATIENT
Start: 2023-05-09

## 2023-05-10 ENCOUNTER — HOSPITAL ENCOUNTER (OUTPATIENT)
Dept: NEUROLOGY | Facility: HOSPITAL | Age: 83
Discharge: HOME | End: 2023-05-10

## 2023-05-10 DIAGNOSIS — R56.9 SEIZURE (HCC): ICD-10-CM

## 2023-05-15 ENCOUNTER — APPOINTMENT (OUTPATIENT)
Dept: PHYSICAL THERAPY | Facility: CLINIC | Age: 83
End: 2023-05-15
Payer: COMMERCIAL

## 2023-05-22 ENCOUNTER — APPOINTMENT (OUTPATIENT)
Dept: PHYSICAL THERAPY | Facility: CLINIC | Age: 83
End: 2023-05-22
Payer: COMMERCIAL

## 2023-05-23 ENCOUNTER — OFFICE VISIT (OUTPATIENT)
Dept: SLEEP CENTER | Facility: CLINIC | Age: 83
End: 2023-05-23

## 2023-05-23 VITALS
HEIGHT: 68 IN | HEART RATE: 59 BPM | WEIGHT: 172 LBS | SYSTOLIC BLOOD PRESSURE: 118 MMHG | BODY MASS INDEX: 26.07 KG/M2 | DIASTOLIC BLOOD PRESSURE: 68 MMHG | OXYGEN SATURATION: 94 %

## 2023-05-23 DIAGNOSIS — G47.9 SLEEP DISORDER: ICD-10-CM

## 2023-05-23 DIAGNOSIS — R29.818 SUSPECTED SLEEP APNEA: ICD-10-CM

## 2023-05-23 DIAGNOSIS — G47.59 OTHER PARASOMNIA: Primary | ICD-10-CM

## 2023-05-23 DIAGNOSIS — R06.83 SNORING: ICD-10-CM

## 2023-05-23 RX ORDER — FERROUS SULFATE 325(65) MG
325 TABLET ORAL
COMMUNITY

## 2023-05-23 NOTE — ASSESSMENT & PLAN NOTE
Patient has loud snoring with witnessed apneas concerning for JASMEET  · Sleep study in lab  · F/u sleep medicine
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
## 2023-05-23 NOTE — ASSESSMENT & PLAN NOTE
Patient has high pretest probability of JASMEET based on snoring, observed apneas, daytime tiredness, hypertension  He admits right leg restlessness during sleep and acting out dreams sometimes in violent manner   [place] score 6    · In lab sleep study, add to cancellation list   · F/u sleep medicine

## 2023-05-23 NOTE — ASSESSMENT & PLAN NOTE
Patient has episodes of acting out dreams while sleeping, sometimes in violent manner  Also has frequent movements of only right leg during sleep   Prior to his visit today with sleep medicine, there was concern for Parkinson's thus given sleep findings we will investigate if JASMEET or REM movement disorder are related  · In lab sleep study   · F/u sleep medicine
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
## 2023-05-25 ENCOUNTER — OFFICE VISIT (OUTPATIENT)
Dept: PHYSICAL THERAPY | Facility: CLINIC | Age: 83
End: 2023-05-25

## 2023-05-25 DIAGNOSIS — R29.3 POOR POSTURE: Primary | ICD-10-CM

## 2023-05-25 DIAGNOSIS — G89.29 CHRONIC PAIN OF BOTH KNEES: ICD-10-CM

## 2023-05-25 DIAGNOSIS — M25.562 CHRONIC PAIN OF BOTH KNEES: ICD-10-CM

## 2023-05-25 DIAGNOSIS — M25.561 CHRONIC PAIN OF BOTH KNEES: ICD-10-CM

## 2023-05-25 DIAGNOSIS — M54.2 NECK PAIN: ICD-10-CM

## 2023-05-25 DIAGNOSIS — R26.2 DIFFICULTY WALKING: ICD-10-CM

## 2023-05-25 NOTE — PROGRESS NOTES
"Daily Note     Today's date: 2023  Patient name: Devon Garrido  : 1940  MRN: [id number]  Referring provider: Natasha Nunez  Dx:   Encounter Diagnosis     ICD-10-CM    1  Poor posture  R29 3       2  Chronic pain of both knees  M25 561     M25 562     G89 29       3  Difficulty walking  R26 2       4  Neck pain  M54 2                      Subjective: Knees have been hurting him a lot  Couldn't get out of bed the day after last session  Objective: See treatment diary below      Assessment: Tolerated treatment well  Patient would benefit from continued PT  Pt tolerated session well today and reported feeling \"work\" but not \"pain\" t/o session, except w/ hamstring stretch, which he stopped then  He did well w/ seated there ex, w/ multiple rests t/o sesison      Plan: Continue per plan of care        Manual    STM; TPR B UT, LS        STM/TPR B knees                Knee flexion stretch        Knee Ext stretch    MB - seated MB - seated           R pat mobs SMF               Seated Knee AP glides at high table SMF               Exercise Diary         UBE (retro)        RB        Laps                 HR 2x10   2x10 (1 min) 2x10 (1 min)   TR 2x10   2x10 (1 min) 2x10 (1 min)   Mini Squats        Chair squats                Slant board gastroc stretch        1/2 foam gastroc stretch NV    2x 20 sec   Marching 10 ea (R knee pain)       Standing H' ABD    10x ea  15x ea    Standing H' Ext    0# 10x ea  15x ea            Standing Lumbar Extension with slight OP from therapist while holding on to bar 2 sec x 10               Floor -> Airex NBOS        Floor -> Airex Tandem        Floor -> Airex SLS                1 cone tap        2 cone tap                Stepping over hurdles forward        Laterally stepping over hurdles                Stepping on random pads                Sit<>stand Chair+airex: 10x CGA and armrest support Chair+airex: 2x5 CS and armrest support  Chair+airex " 10x CGA and armrest support Chair+airex 10x CGA and armrest support           TB Scap Retraction BTB standin sec x 10; BTB seated: 5 sec x 15 BTB standin sec x 10; BTB seated: 5 sec 2x10  GTB 2 sec; 2x10 seated BTB 2 sec; 2x10 seated   TB B S' Ext                Low pec stretch        Mid pec stretch                        Seated HS stretch 30 sec x 3 ea Tried but DC due to pain in knees  30 sec x 3 ea 30 sec x 3 ea   Seated 2 way piriformis stretch        Chin tucks 2 sec x 10 2 sec x 10      SNAGs Ext 2 sec; 2x10       Seated Thoracic Extension over half foam 2 sec x 10 (1 min)    2 sec x 10 (1 min) 2 sec x 10 (1 min)                           LAQ NV 2x10x2 sec  0# 2 sec; 3x10 ea (2 mins) 0# 2 sec; 3x10 ea (2 mins)   Seated Marches NV 2x10   0# 2 sec; 15 ea (1 5 mins) 0# 2 sec; 15 ea (1 5 mins)   SLR (Flex)                Quad sets c half foam 5 sec x 15               Ball Squeezes Seated: Blue: 5 sec; 2x10 Seated: Blue: 5 sec; 2x10  Seated: Blue: 5 sec; 2x10  Seated: Blue: 5 sec; 2x10    Ball c Bridge                Hooklying TB B Clamshells Seated: Blue: 5 sec; 2x10 Seated: Blue: 5 sec; 2x10  Seated: BTB 5 sec; 2x10 Seated: Blue: 5 sec; 2x10    TB Bridges                Sidelying H' ABD                                HEP/EDU Emphasis on upright posture       Modalities        MH

## 2023-05-29 DIAGNOSIS — F33.42 RECURRENT MAJOR DEPRESSIVE DISORDER, IN FULL REMISSION (HCC): ICD-10-CM

## 2023-05-29 DIAGNOSIS — G45.9 TIA (TRANSIENT ISCHEMIC ATTACK): ICD-10-CM

## 2023-05-29 RX ORDER — CLOPIDOGREL BISULFATE 75 MG/1
TABLET ORAL
Qty: 90 TABLET | Refills: 1 | Status: SHIPPED | OUTPATIENT
Start: 2023-05-29

## 2023-05-29 RX ORDER — ESCITALOPRAM OXALATE 10 MG/1
TABLET ORAL
Qty: 90 TABLET | Refills: 1 | Status: SHIPPED | OUTPATIENT
Start: 2023-05-29

## 2023-05-30 ENCOUNTER — EVALUATION (OUTPATIENT)
Dept: PHYSICAL THERAPY | Facility: CLINIC | Age: 83
End: 2023-05-30

## 2023-05-30 DIAGNOSIS — M25.562 CHRONIC PAIN OF BOTH KNEES: ICD-10-CM

## 2023-05-30 DIAGNOSIS — M54.2 NECK PAIN: ICD-10-CM

## 2023-05-30 DIAGNOSIS — M25.561 CHRONIC PAIN OF BOTH KNEES: ICD-10-CM

## 2023-05-30 DIAGNOSIS — R29.3 POOR POSTURE: Primary | ICD-10-CM

## 2023-05-30 DIAGNOSIS — G89.29 CHRONIC PAIN OF BOTH KNEES: ICD-10-CM

## 2023-05-30 DIAGNOSIS — R26.2 DIFFICULTY WALKING: ICD-10-CM

## 2023-05-30 NOTE — PROGRESS NOTES
PT Evaluation     Today's date: 2023  Patient name: Melonie Trejo  : 1940  MRN: [id number]  Referring provider: Frank Mendes  Dx:   Encounter Diagnosis     ICD-10-CM    1  Neck pain  M54 2       2  Poor posture  R29 3       3  Chronic pain of both knees  M25 561     M25 562     G89 29       4  Difficulty walking  R26 2                      Assessment  Assessment details: Melonie Trejo is a 81 y o  male presenting to outpatient physical therapy at [facility] with complaints of chronic progressive neck and bilateral knee pain  He reports overall he is improving since I E; however, he continues to present with decreased range of motion, decreased strength, limited flexibility, poor postural awareness, poor body mechanics, altered gait pattern, poor balance, decreased tolerance to activity and decreased functional mobility due to Neck pain  (primary encounter diagnosis), Poor posture, Chronic pain of both knees, and Difficulty walking  Therapist discussed diagnosis, prognosis, plan of care, proper responses to exercises, HEP, and modalities to use at home  [facility] would benefit from skilled PT services in order to address these deficits and reach maximum level of function   Thank you for the referral!  Impairments: abnormal coordination, abnormal gait, abnormal muscle firing, abnormal muscle tone, abnormal or restricted ROM, abnormal movement, activity intolerance, impaired balance, impaired physical strength, lacks appropriate home exercise program, pain with function, scapular dyskinesis, poor posture  and poor body mechanics    Symptom irritability: moderateUnderstanding of Dx/Px/POC: good   Prognosis: good    Goals  STGs (in 4 weeks):  1  Pt will report having at least a 50% improvement since I E  goal met   2  Pt will report having at most a 2/10 pain level with functional mobility with his neck  Progressing toward  3   Pt will report having at most a 2/10 pain level with functional mobility with his knees  Progressing toward   4  Pt will demonstrate improved CROm in all planes to within normative age related values  - Progressing towards  5  Pt will demonstrate at least 4/5 MMT throughout BLE  - Progressing towards    LTGs (in 12 weeks):  1  Pt will report having at least a 75% improvement since I E - progressing toward   2  Pt will be able to perform at least 10 sit<>stands in 30 seconds to demonstrate improved BLE strength and balance  Progressing toward   3  Pt will be able to perform TUG in less than 18 seconds  - progressing toward   4  Pt will be able to amb at least 180 meters during 6 MWT with good posture  - progressing toward   5  Pt will be independent with HEP  Goal met     Plan  Patient would benefit from: skilled physical therapy  Planned modality interventions: TENS and unattended electrical stimulation  Planned therapy interventions: joint mobilization, manual therapy, neuromuscular re-education, patient education, self care, strengthening, stretching, therapeutic activities, therapeutic exercise, balance, flexibility and home exercise program  Frequency: 2x week  Plan of Care beginning date: 2023  Plan of Care expiration date: 8/15/2023  Treatment plan discussed with: patient, caregiver and family        Subjective Evaluation    History of Present Illness    RE: 23: Patient reports that he has been improving with his overall functional mobility  Mechanism of injury: Pt is a 81year old male who presents with chief complaint of chronic progressive neck pain and bilateral knee pain  Caregiver reports that imaging reveals OA with bilateral knees R worse than L  Now referred to skilled OP PT for ambulatory dysfunction     Quality of life: good    Pain  Current pain ratin  At best pain ratin  At worst pain ratin  Pain location: neck pain; bilateral knee pain (R worse than L)  Quality: discomfort, dull ache, pressure and tight  Relieving factors: relaxation and rest  Aggravating factors: walking, overhead activity, lifting, stair climbing and standing (sit to stand transfer)  Progression: slowly improving    Hand dominance: right    Treatments  Previous treatment: injection treatment  Patient Goals  Patient goals for therapy: decreased pain, improved balance, increased motion, increased strength, independence with ADLs/IADLs and return to sport/leisure activities          Objective    Objective:       Posture: Pt's sitting posture is FHP and rounded shoulders       Cervical Screen: cervical AROM limitations  Flexion  35  Extension 40  R rotation 40  L rotation 29  R sidebend 10  L sidebend 10  Retraction 50%    AROM     R   L  Knee Flex  105   100  Knee Extension -8 (hypo)/-5  -5 (hypo)/0      MMT      R   L  S' Flex   4/5   4/5  S' ABD   4/5   4/5  S' ER   4-/5   4-/5    Static K' Ext  4/5   4/5  Static K' Flex  3+/5   3+/5  Dynamic K' Ext  3-/5   3-/5  Dynamic K' Ext  3-/5   3-/5    Hip Flex  4-/5   4-/5  Hip ABD  3+/5   3+/5    Ankle DF  3-/5   3-/5  Ankle PF  3/5   3/5        Special Tests: (-) Janelle test    Functional mobility:  Ambulation: Pt ambs using RW with forward trunk flexion with FHP and rounded shoulders    Functional testin second repeated sit<>stand: 3 times with definite use of BUE  TU seconds   6 MWT: NT        Precautions: fall risk, dementia, hx of CVA/TIA; hx of cortisone injections in knees and hip    HEP: HR, TR, chin tucks, scap retraction      Specialty Daily Treatment Diary       Manual    STM; TPR B UT, LS        STM/TPR B knees                Knee flexion stretch        Knee Ext stretch     MB - seated           R pat mobs SMF               Seated Knee AP glides at high table SMF               Exercise Diary         UBE (retro)        RB        Laps                 HR 2x10    2x10 (1 min)   TR 2x10    2x10 (1 min)   Mini Squats        Chair squats                Slant board gastroc stretch        1/2 foam gastroc stretch NV    2x 20 sec   Marching 10 ea (R knee pain)       Standing H' ABD     15x ea    Standing H' Ext     15x ea            Standing Lumbar Extension with slight OP from therapist while holding on to bar 2 sec x 10               Floor -> Airex NBOS        Floor -> Airex Tandem        Floor -> Airex SLS                1 cone tap        2 cone tap                Stepping over hurdles forward        Laterally stepping over hurdles                Stepping on random pads                Sit<>stand Chair+airex: 10x CGA and armrest support Chair+airex: 2x5 CS and armrest support Chair+airex: 2x5 CS and armrest support  Chair+airex 10x CGA and armrest support           TB Scap Retraction BTB standin sec x 10; BTB seated: 5 sec x 15 BTB standin sec x 10; BTB seated: 5 sec 2x10 BTB standin sec x 10; BTB seated: 5 sec 2x10  BTB 2 sec; 2x10 seated   TB B S' Ext                Low pec stretch        Mid pec stretch                        Seated HS stretch 30 sec x 3 ea Tried but DC due to pain in knees   30 sec x 3 ea   Seated 2 way piriformis stretch        Chin tucks 2 sec x 10 2 sec x 10 2 sec x 10      SNAGs Ext 2 sec; 2x10       Seated Thoracic Extension over half foam 2 sec x 10 (1 min)   2 x 10 sec (1 min)   2 sec x 10 (1 min)                           LAQ NV 2x10x2 sec 2x10x2 sec  0# 2 sec; 3x10 ea (2 mins)   Seated Marches NV 2x10  2x10   0# 2 sec; 15 ea (1 5 mins)   SLR (Flex)                Quad sets c half foam 5 sec x 15               Ball Squeezes Seated: Blue: 5 sec; 2x10 Seated: Blue: 5 sec; 2x10 Seated blue 5 sec x 20   Seated: Blue: 5 sec; 2x10    Ball c Bridge                Hooklying TB B Clamshells Seated: Blue: 5 sec; 2x10 Seated: Blue: 5 sec; 2x10 Seated blue 5 sec x 20   Seated: Blue: 5 sec; 2x10    TB Bridges                Sidelying H' ABD                                HEP/EDU Emphasis on upright posture       Modalities        MH

## 2023-06-05 ENCOUNTER — OFFICE VISIT (OUTPATIENT)
Dept: PHYSICAL THERAPY | Facility: CLINIC | Age: 83
End: 2023-06-05
Payer: COMMERCIAL

## 2023-06-05 DIAGNOSIS — G40.909 SEIZURE DISORDER (HCC): Primary | ICD-10-CM

## 2023-06-05 DIAGNOSIS — G89.29 CHRONIC PAIN OF BOTH KNEES: ICD-10-CM

## 2023-06-05 DIAGNOSIS — R26.2 DIFFICULTY WALKING: ICD-10-CM

## 2023-06-05 DIAGNOSIS — M54.2 NECK PAIN: ICD-10-CM

## 2023-06-05 DIAGNOSIS — M25.562 CHRONIC PAIN OF BOTH KNEES: ICD-10-CM

## 2023-06-05 DIAGNOSIS — M25.561 CHRONIC PAIN OF BOTH KNEES: ICD-10-CM

## 2023-06-05 DIAGNOSIS — R29.3 POOR POSTURE: Primary | ICD-10-CM

## 2023-06-05 PROCEDURE — 97530 THERAPEUTIC ACTIVITIES: CPT

## 2023-06-05 PROCEDURE — 97112 NEUROMUSCULAR REEDUCATION: CPT

## 2023-06-05 PROCEDURE — 97110 THERAPEUTIC EXERCISES: CPT

## 2023-06-05 NOTE — TELEPHONE ENCOUNTER
6/2/23 at 4:24 pm    Hello  this is for [facility]  YOB: 1940  And a prescription we need refilled is Keppra, levetiracetam oral solution is uh, a 100 and milligrams per ml  I believe that's the dosage  It's also numerous Keppra  I tried to do it on the portal and it's saying that I can be done on the [facility]  my chart thing  So once again, this is for [facility] Date of birth 1940  The prescription is for Keppra and this is his caregiver  II can be reached at [phone]   Thank you, sanjuana

## 2023-06-05 NOTE — PROGRESS NOTES
"Daily Note     Today's date: 2023  Patient name: Bashir Herrera  : 1940  MRN: [id number]  Referring provider: Jasmeet Gonzalez  Dx:   Encounter Diagnosis     ICD-10-CM    1  Poor posture  R29 3       2  Chronic pain of both knees  M25 561     M25 562     G89 29       3  Difficulty walking  R26 2       4  Neck pain  M54 2                      Subjective: Jayda Villarreal reports muscle soreness following PT session, worse first thing in the morning but improves with activity  Notes he is doing \"some\" exercises at home  Objective: See treatment diary below      Assessment: Aldo tolerated PT treatment fair  Pt is appropriately challenged with current exercise program  Additional foam A required to complete successful STS from chair w/o UE use  Limited tolerance to WBing exercises d/t fatigue  Pt was able to complete standing march w/o knee provocation  Pt displaying limited thoracic and lumbar extension ROM  Continue to address posture and functional strength  Pt would benefit from continued PT  Plan: Continue per plan of care        Manual    STM; TPR B UT, LS        STM/TPR B knees                Knee flexion stretch        Knee Ext stretch     MB - seated           R pat mobs SMF               Seated Knee AP glides at high table SMF               Exercise Diary         UBE (retro)        RB        Laps                 HR 2x10  2x10  2x10 (1 min)   TR 2x10  2x10   2x10 (1 min)   Mini Squats        Chair squats                Slant board gastroc stretch        1/2 foam gastroc stretch NV    2x 20 sec   Marching 10 ea (R knee pain)  2x10 ea     Standing H' ABD     15x ea    Standing H' Ext     15x ea            Standing Lumbar Extension with slight OP from therapist while holding on to bar 2 sec x 10  2 sec x 10             Floor -> Airex NBOS        Floor -> Airex Tandem        Floor -> Airex SLS                1 cone tap        2 cone tap                Stepping over " hurdles forward        Laterally stepping over hurdles                Stepping on random pads                Sit<>stand Chair+airex: 10x CGA and armrest support Chair+airex: 2x5 CS and armrest support 2 foam 2x5 no UE use   Chair+airex 10x CGA and armrest support           TB Scap Retraction BTB standin sec x 10; BTB seated: 5 sec x 15 BTB standin sec x 10; BTB seated: 5 sec 2x10 BTB standin sec x 10; BTB seated: 5 sec 2x10  BTB 2 sec; 2x10 seated   TB B S' Ext                Low pec stretch        Mid pec stretch                        Seated HS stretch 30 sec x 3 ea Tried but DC due to pain in knees 30 sec x 3   30 sec x 3 ea   Seated 2 way piriformis stretch        Chin tucks 2 sec x 10 2 sec x 10 2 sec x 10     SNAGs Ext 2 sec; 2x10       Seated Thoracic Extension over half foam 2 sec x 10 (1 min)   2x10   2 sec x 10 (1 min)                           LAQ NV 2x10x2 sec 2x10x2 sec  0# 2 sec; 3x10 ea (2 mins)   Seated Marches NV 2x10  2x10   0# 2 sec; 15 ea (1 5 mins)   SLR (Flex)                Quad sets c half foam 5 sec x 15               Ball Squeezes Seated: Blue: 5 sec; 2x10 Seated: Blue: 5 sec; 2x10 Seated: Blue: 5 sec; 2x10  Seated: Blue: 5 sec; 2x10    Ball c Bridge                Hooklying TB B Clamshells Seated: Blue: 5 sec; 2x10 Seated: Blue: 5 sec; 2x10 Seated: Blue: 5 sec; 2x10  Seated: Blue: 5 sec; 2x10    TB Bridges                Sidelying H' ABD                                HEP/EDU Emphasis on upright posture       Modalities        MH

## 2023-06-05 NOTE — TELEPHONE ENCOUNTER
"Pt has requested refills for Keppra 750 mg BID  Per office note on 3-2-23, \"Repeat routine EEG  If no concerns for seizure on this study, may consider discontinuing Keppra  \"  EEG completed 5-10-23  Dr Neptali Veras, please sign pended script if in agreement or provide  recommendations  Thank you!   "

## 2023-06-06 ENCOUNTER — TELEPHONE (OUTPATIENT)
Dept: NEUROLOGY | Facility: CLINIC | Age: 83
End: 2023-06-06

## 2023-06-06 RX ORDER — LEVETIRACETAM 100 MG/ML
7.5 SOLUTION ORAL 2 TIMES DAILY
Qty: 473 ML | Refills: 5 | Status: SHIPPED | OUTPATIENT
Start: 2023-06-06

## 2023-06-06 NOTE — TELEPHONE ENCOUNTER
Patients caretaker called and stated that the patient is low on his Keppra and needs a refill   The medication can be sent to [facility] in [place], [place]

## 2023-06-12 ENCOUNTER — EVALUATION (OUTPATIENT)
Dept: PHYSICAL THERAPY | Facility: CLINIC | Age: 83
End: 2023-06-12
Payer: COMMERCIAL

## 2023-06-12 DIAGNOSIS — G89.29 CHRONIC PAIN OF BOTH KNEES: ICD-10-CM

## 2023-06-12 DIAGNOSIS — R29.3 POOR POSTURE: Primary | ICD-10-CM

## 2023-06-12 DIAGNOSIS — M54.2 NECK PAIN: ICD-10-CM

## 2023-06-12 DIAGNOSIS — M25.562 CHRONIC PAIN OF BOTH KNEES: ICD-10-CM

## 2023-06-12 DIAGNOSIS — R26.2 DIFFICULTY WALKING: ICD-10-CM

## 2023-06-12 DIAGNOSIS — M25.561 CHRONIC PAIN OF BOTH KNEES: ICD-10-CM

## 2023-06-12 PROCEDURE — 97110 THERAPEUTIC EXERCISES: CPT | Performed by: PHYSICAL THERAPIST

## 2023-06-12 PROCEDURE — 97112 NEUROMUSCULAR REEDUCATION: CPT | Performed by: PHYSICAL THERAPIST

## 2023-06-12 NOTE — PROGRESS NOTES
Daily Note     Today's date: 2023  Patient name: Dutch Fleischer  : 1940  MRN: [id number]  Referring provider: Elva Wade  Dx:   Encounter Diagnosis     ICD-10-CM    1  Poor posture  R29 3       2  Chronic pain of both knees  M25 561     M25 562     G89 29       3  Difficulty walking  R26 2       4  Neck pain  M54 2                      Subjective: Ashutosh Larsen reports some intermittent compliance to prescribed home exercise program  He reports some challenges and difficulty with transfers out of bed  Objective: See treatment diary below      Assessment: No aggressive progression in activities related to recent soreness  Continued emphasis on general strengthening activities as tolerated to decrease risk of a fall and improve overall endurance  All activities tolerated well with visibile fatigue post session  Plan: Continue per plan of care  Manual     STM; TPR B UT, LS        STM/TPR B knees                Knee flexion stretch        Knee Ext stretch                R pat mobs SMF               Seated Knee AP glides at high table SMF               Exercise Diary         UBE (retro)        RB        Laps                 HR 2x10  2x10 2x10    TR 2x10  2x10  2x10    Mini Squats        Chair squats                Slant board gastroc stretch        1/2 foam gastroc stretch NV       Marching 10 ea (R knee pain)  2x10 ea 2x10 ea      Standing H' ABD        Standing H' Ext                Standing Lumbar Extension with slight OP from therapist while holding on to bar 2 sec x 10  2 sec x 10 2 sec x 10            Floor -> Airex NBOS        Floor -> Airex Tandem        Floor -> Airex SLS                1 cone tap        2 cone tap                Stepping over hurdles forward        Laterally stepping over hurdles                Stepping on random pads                Sit<>stand Chair+airex: 10x CGA and armrest support Chair+airex: 2x5 CS and armrest support 2 foam 2x5 no UE use  2 foam 2x5 no UE use            TB Scap Retraction BTB standin sec x 10; BTB seated: 5 sec x 15 BTB standin sec x 10; BTB seated: 5 sec 2x10 BTB standin sec x 10; BTB seated: 5 sec 2x10 BTB 5 sec 2x10 standing     TB B S' Ext                Low pec stretch        Mid pec stretch                        Seated HS stretch 30 sec x 3 ea Tried but DC due to pain in knees 30 sec x 3  30 sec x 3    Seated 2 way piriformis stretch        Chin tucks 2 sec x 10 2 sec x 10 2 sec x 10 2 sec x 10     SNAGs Ext 2 sec; 2x10       Seated Thoracic Extension over half foam 2 sec x 10 (1 min)   2x10                              LAQ NV 2x10x2 sec 2x10x2 sec 2x10x2 sec    Seated Marches NV 2x10  2x10  2x10    SLR (Flex)                Quad sets c half foam 5 sec x 15               Ball Squeezes Seated: Blue: 5 sec; 2x10 Seated: Blue: 5 sec; 2x10 Seated: Blue: 5 sec; 2x10 Seated: Blue: 5 sec; 2x10     Ball c Bridge                Hooklying TB B Clamshells Seated: Blue: 5 sec; 2x10 Seated: Blue: 5 sec; 2x10 Seated: Blue: 5 sec; 2x10 Seated: Blue 5 sec; 2x10     TB Bridges                Sidelying H' ABD                                HEP/EDU Emphasis on upright posture       Modalities        MH

## 2023-06-19 ENCOUNTER — OFFICE VISIT (OUTPATIENT)
Dept: PHYSICAL THERAPY | Facility: CLINIC | Age: 83
End: 2023-06-19
Payer: COMMERCIAL

## 2023-06-19 DIAGNOSIS — M54.2 NECK PAIN: ICD-10-CM

## 2023-06-19 DIAGNOSIS — G89.29 CHRONIC PAIN OF BOTH KNEES: ICD-10-CM

## 2023-06-19 DIAGNOSIS — M25.562 CHRONIC PAIN OF BOTH KNEES: ICD-10-CM

## 2023-06-19 DIAGNOSIS — R26.2 DIFFICULTY WALKING: ICD-10-CM

## 2023-06-19 DIAGNOSIS — M25.561 CHRONIC PAIN OF BOTH KNEES: ICD-10-CM

## 2023-06-19 DIAGNOSIS — R29.3 POOR POSTURE: Primary | ICD-10-CM

## 2023-06-19 PROCEDURE — 97110 THERAPEUTIC EXERCISES: CPT | Performed by: PHYSICAL THERAPIST

## 2023-06-19 PROCEDURE — 97112 NEUROMUSCULAR REEDUCATION: CPT | Performed by: PHYSICAL THERAPIST

## 2023-06-19 NOTE — PROGRESS NOTES
Daily Note     Today's date: 2023  Patient name: Kenji Traore  : 1940  MRN: [id number]  Referring provider: Seth Bazan  Dx:   Encounter Diagnosis     ICD-10-CM    1  Poor posture  R29 3       2  Chronic pain of both knees  M25 561     M25 562     G89 29       3  Difficulty walking  R26 2       4  Neck pain  M54 2                      Subjective: He reports that his stomach has been bothering him this weekend  Objective: See treatment diary below      Assessment: Tolerated treatment fair; initiated POC with standing there ex at railing  VCs provided on proper sequencing with exercises; modified program due to stomach pains today  He requires frequent rest breaks throughout session  Patient demonstrated fatigue post treatment and would benefit from continued PT      Plan: Continue per plan of care  Manual    STM; TPR B UT, LS        STM/TPR B knees                Knee flexion stretch        Knee Ext stretch                R pat mobs SMF               Seated Knee AP glides at high table SMF               Exercise Diary         UBE (retro)        RB        Laps                 HR 2x10  2x10 2x10 2x10   TR 2x10  2x10  2x10 2x10   Mini Squats        Chair squats                Slant board gastroc stretch        1/2 foam gastroc stretch NV       Marching 10 ea (R knee pain)  2x10 ea 2x10 ea   NV   Standing H' ABD        Standing H' Ext                Standing Lumbar Extension with slight OP from therapist while holding on to bar 2 sec x 10  2 sec x 10 2 sec x 10 NV           Floor -> Airex NBOS        Floor -> Airex Tandem        Floor -> Airex SLS                1 cone tap        2 cone tap                Stepping over hurdles forward        Laterally stepping over hurdles                Stepping on random pads                Sit<>stand Chair+airex: 10x CGA and armrest support Chair+airex: 2x5 CS and armrest support 2 foam 2x5 no UE use  2 foam 2x5 no UE use 2 foam: 1x10 (UE support today)           TB Scap Retraction BTB standin sec x 10; BTB seated: 5 sec x 15 BTB standin sec x 10; BTB seated: 5 sec 2x10 BTB standin sec x 10; BTB seated: 5 sec 2x10 BTB 5 sec 2x10 standing  BTB 5 sec; 2x10 (standing)    TB B S' Ext                Low pec stretch        Mid pec stretch                        Seated HS stretch 30 sec x 3 ea Tried but DC due to pain in knees 30 sec x 3  30 sec x 3 NV   Seated 2 way piriformis stretch        Chin tucks 2 sec x 10 2 sec x 10 2 sec x 10 2 sec x 10  2 sec x 10   SNAGs Ext 2 sec; 2x10       Seated Thoracic Extension over half foam 2 sec x 10 (1 min)   2x10                              LAQ NV 2x10x2 sec 2x10x2 sec 2x10x2 sec 2 sec; 2x10   Seated Marches NV 2x10  2x10  2x10 2x10   SLR (Flex)                Quad sets c half foam 5 sec x 15               Ball Squeezes Seated: Blue: 5 sec; 2x10 Seated: Blue: 5 sec; 2x10 Seated: Blue: 5 sec; 2x10 Seated: Blue: 5 sec; 2x10  Seated: Black: 5 sec x 15   Ball c Bridge                Hooklying TB B Clamshells Seated: Blue: 5 sec; 2x10 Seated: Blue: 5 sec; 2x10 Seated: Blue: 5 sec; 2x10 Seated: Blue 5 sec; 2x10  Seated: Blue TB: 5 sec; 2x10   TB Bridges                Sidelying H' ABD                                HEP/EDU Emphasis on upright posture       Modalities        MH

## 2023-06-21 ENCOUNTER — HOSPITAL ENCOUNTER (OUTPATIENT)
Dept: NON INVASIVE DIAGNOSTICS | Age: 83
Discharge: HOME | End: 2023-06-21
Payer: COMMERCIAL

## 2023-06-21 DIAGNOSIS — I65.23 CAROTID STENOSIS, ASYMPTOMATIC, BILATERAL: ICD-10-CM

## 2023-06-21 DIAGNOSIS — I73.9 PERIPHERAL VASCULAR DISEASE, UNSPECIFIED (HCC): ICD-10-CM

## 2023-06-21 PROCEDURE — 93925 LOWER EXTREMITY STUDY: CPT

## 2023-06-21 PROCEDURE — 93880 EXTRACRANIAL BILAT STUDY: CPT

## 2023-06-21 PROCEDURE — 93923 UPR/LXTR ART STDY 3+ LVLS: CPT

## 2023-06-22 PROCEDURE — 93922 UPR/L XTREMITY ART 2 LEVELS: CPT | Performed by: SURGERY

## 2023-06-22 PROCEDURE — 93880 EXTRACRANIAL BILAT STUDY: CPT | Performed by: SURGERY

## 2023-06-22 PROCEDURE — 93925 LOWER EXTREMITY STUDY: CPT | Performed by: SURGERY

## 2023-06-26 ENCOUNTER — APPOINTMENT (OUTPATIENT)
Dept: PHYSICAL THERAPY | Facility: CLINIC | Age: 83
End: 2023-06-26
Payer: COMMERCIAL

## 2023-06-28 ENCOUNTER — OFFICE VISIT (OUTPATIENT)
Dept: PHYSICAL THERAPY | Facility: CLINIC | Age: 83
End: 2023-06-28
Payer: COMMERCIAL

## 2023-06-28 DIAGNOSIS — M25.562 CHRONIC PAIN OF BOTH KNEES: ICD-10-CM

## 2023-06-28 DIAGNOSIS — R29.3 POOR POSTURE: Primary | ICD-10-CM

## 2023-06-28 DIAGNOSIS — M54.2 NECK PAIN: ICD-10-CM

## 2023-06-28 DIAGNOSIS — G89.29 CHRONIC PAIN OF BOTH KNEES: ICD-10-CM

## 2023-06-28 DIAGNOSIS — M25.561 CHRONIC PAIN OF BOTH KNEES: ICD-10-CM

## 2023-06-28 DIAGNOSIS — R26.2 DIFFICULTY WALKING: ICD-10-CM

## 2023-06-28 PROCEDURE — 97110 THERAPEUTIC EXERCISES: CPT

## 2023-06-28 PROCEDURE — 97112 NEUROMUSCULAR REEDUCATION: CPT

## 2023-06-28 PROCEDURE — 97530 THERAPEUTIC ACTIVITIES: CPT

## 2023-06-28 NOTE — PROGRESS NOTES
"Daily Note     Today's date: 2023  Patient name: Justa Salas  : 1940  MRN: [id number]  Referring provider: Anthony Nunez  Dx:   Encounter Diagnosis     ICD-10-CM    1  Poor posture  R29 3       2  Chronic pain of both knees  M25 561     M25 562     G89 29       3  Difficulty walking  R26 2       4  Neck pain  M54 2                      Subjective: Welford Gone offers no new complaints upon arrival  Notes he feels \"alright\" today  Objective: See treatment diary below      Assessment: Aldo tolerated PT treatment fair  Continued with current exercise program  Pt requiring frequent cues throughout session for proper technique with therapeutic exercises  Added standing hip abduction to further address hip weakness  Standing tolerance limited d/t B/L knee pain  No UE use required with STS transfer from 2 foam A  Continue to advance functional strengthening as able  Fatigue evident post session  Pt would benefit from continued PT to further address impairments and maximize functional level  Plan: Continue per plan of care  Manual    STM; TPR B UT, LS        STM/TPR B knees                Knee flexion stretch        Knee Ext stretch                R pat mobs                Seated Knee AP glides at high table                Exercise Diary         UBE (retro)        RB        Laps                 HR 3x10 ea  2x10 2x10 2x10   TR 3x10 ea   2x10  2x10 2x10   Mini Squats        Chair squats                Slant board gastroc stretch        1/2 foam gastroc stretch        Marching 2x10 ea  2x10 ea 2x10 ea   NV   Standing H' ABD 2x10 ea        Standing H' Ext                Standing Lumbar Extension with slight OP from therapist while holding on to bar NV  2 sec x 10 2 sec x 10 NV           Floor -> Airex NBOS        Floor -> Airex Tandem        Floor -> Airex SLS                1 cone tap        2 cone tap                Stepping over hurdles forward        Laterally " stepping over hurdles                Stepping on random pads                Sit<>stand 2 foam no UE 2x5   2 foam 2x5 no UE use  2 foam 2x5 no UE use 2 foam: 1x10 (UE support today)           TB Scap Retraction BTB 5 sec; 2x10 (standing)   BTB standin sec x 10; BTB seated: 5 sec 2x10 BTB 5 sec 2x10 standing  BTB 5 sec; 2x10 (standing)    TB B S' Ext                Low pec stretch        Mid pec stretch                        Seated HS stretch 30 sec x 3   30 sec x 3  30 sec x 3 NV   Seated 2 way piriformis stretch        Chin tucks x15   2 sec x 10 2 sec x 10  2 sec x 10   SNAGs Ext        Seated Thoracic Extension over half foam   2x10                              LAQ 2 sec; 2x10  2x10x2 sec 2x10x2 sec 2 sec; 2x10   Seated Marches 2x10 ea   2x10  2x10 2x10   SLR (Flex)                Quad sets c half foam                Ball Squeezes Seated: Black: x30   Seated: Blue: 5 sec; 2x10 Seated: Blue: 5 sec; 2x10  Seated: Black: 5 sec x 15   Ball c Bridge                Hooklying TB B Clamshells Seated BTB x30   Seated: Blue: 5 sec; 2x10 Seated: Blue 5 sec; 2x10  Seated: Blue TB: 5 sec; 2x10   TB Bridges                Sidelying H' ABD                                HEP/EDU        Modalities        MH

## 2023-07-05 ENCOUNTER — OFFICE VISIT (OUTPATIENT)
Dept: PHYSICAL THERAPY | Facility: CLINIC | Age: 83
End: 2023-07-05
Payer: COMMERCIAL

## 2023-07-05 DIAGNOSIS — M54.2 NECK PAIN: ICD-10-CM

## 2023-07-05 DIAGNOSIS — G89.29 CHRONIC PAIN OF BOTH KNEES: ICD-10-CM

## 2023-07-05 DIAGNOSIS — M25.562 CHRONIC PAIN OF BOTH KNEES: ICD-10-CM

## 2023-07-05 DIAGNOSIS — M25.561 CHRONIC PAIN OF BOTH KNEES: ICD-10-CM

## 2023-07-05 DIAGNOSIS — G40.909 SEIZURE DISORDER (HCC): ICD-10-CM

## 2023-07-05 DIAGNOSIS — R26.2 DIFFICULTY WALKING: ICD-10-CM

## 2023-07-05 DIAGNOSIS — R29.3 POOR POSTURE: Primary | ICD-10-CM

## 2023-07-05 PROCEDURE — 97110 THERAPEUTIC EXERCISES: CPT | Performed by: PHYSICAL THERAPIST

## 2023-07-05 PROCEDURE — 97530 THERAPEUTIC ACTIVITIES: CPT | Performed by: PHYSICAL THERAPIST

## 2023-07-05 PROCEDURE — 97112 NEUROMUSCULAR REEDUCATION: CPT | Performed by: PHYSICAL THERAPIST

## 2023-07-05 RX ORDER — LEVETIRACETAM 100 MG/ML
7.5 SOLUTION ORAL 2 TIMES DAILY
Qty: 473 ML | Refills: 0 | Status: CANCELLED | OUTPATIENT
Start: 2023-07-05

## 2023-07-05 NOTE — PROGRESS NOTES
Daily Note     Today's date: 2023  Patient name: Andrew London  : 1940  MRN: [id number]  Referring provider: Susie Taylor  Dx:   Encounter Diagnosis     ICD-10-CM    1. Poor posture  R29.3       2. Chronic pain of both knees  M25.561     M25.562     G89.29       3. Difficulty walking  R26.2       4. Neck pain  M54.2                      Subjective: He reports that he is sore all over. Objective: See treatment diary below      Assessment: Tolerated treatment well; initiated POC with seated there ex. VCs provided on proper sequencing with exercises; he continues to have significant forward trunk flexion and requires constant verbal cues and gentle tactile cues on upright posture. He had knee crepitus with standing lumbar extension and thus held today due to buckling. He was instructed on using heat at home. Patient demonstrated fatigue post treatment and would benefit from continued PT      Plan: Continue per plan of care.       Precautions: fall risk, dementia, hx of CVA/TIA; hx of cortisone injections in knees and hip    HEP: HR, TR, chin tucks, scap retraction      Specialty Daily Treatment Diary     Manual    STM; TPR B UT, LS        STM/TPR B knees                Knee flexion stretch        Knee Ext stretch                R pat mobs                Seated Knee AP glides at high table                Exercise Diary         UBE (retro)        RB        Laps                 HR 3x10 ea 3x10 ea   2x10   TR 3x10 ea  3x10 ea   2x10   Mini Squats        Chair squats                Slant board gastroc stretch  Slant board gastroc: 20 sec x 3 ea      1/2 foam gastroc stretch        Marching 2x10 ea 2x10 ea   NV   Standing H' ABD 2x10 ea  0# 2x10 ea      Standing H' Ext                Standing Lumbar Extension with slight OP from therapist while holding on to bar NV 5x (significant knee crepitus)    NV           Floor -> Airex NBOS        Floor -> Airex Tandem        Floor -> Airex SLS                1 cone tap        2 cone tap                Stepping over hurdles forward        Laterally stepping over hurdles                Stepping on random pads                Sit<>stand 2 foam no UE 2x5  2 foam (no UE): 2x5   2 foam: 1x10 (UE support today)           TB Scap Retraction BTB 5 sec; 2x10 (standing)  BTB 5 sec; 2x10 (standing)    BTB 5 sec; 2x10 (standing)    TB B S' Ext                Low pec stretch        Mid pec stretch                        Seated HS stretch 30 sec x 3  30 sec x 3 ea   NV   Seated 2 way piriformis stretch        Chin tucks x15  x15   2 sec x 10   SNAGs Ext        Seated Scap Retraction using half foam roller  5 sec x 20      Seated Thoracic Extension over half foam                                LAQ 2 sec; 2x10 0# 2 sec; 2x10    2 sec; 2x10   Seated Marches 2x10 ea  0# 2x10 ea   2x10   SLR (Flex)                Quad sets c half foam                Ball Squeezes Seated: Black: x30  Seated: Black: 5 sec x 30   Seated: Black: 5 sec x 15   Ball c Bridge                Hooklying TB B Clamshells Seated BTB x30  Seated BTB: 2 sec x 30   Seated: Blue TB: 5 sec; 2x10   TB Bridges                Sidelying H' ABD                                HEP/EDU        Modalities        MH

## 2023-07-06 NOTE — TELEPHONE ENCOUNTER
refilll for levetiracetam was sent to the pharmacy on 6/6 with 5 refills; patient to call pharmacy to refill; my chart message sent.

## 2023-07-12 ENCOUNTER — OFFICE VISIT (OUTPATIENT)
Dept: PHYSICAL THERAPY | Facility: CLINIC | Age: 83
End: 2023-07-12
Payer: COMMERCIAL

## 2023-07-12 DIAGNOSIS — G89.29 CHRONIC PAIN OF BOTH KNEES: ICD-10-CM

## 2023-07-12 DIAGNOSIS — R29.3 POOR POSTURE: Primary | ICD-10-CM

## 2023-07-12 DIAGNOSIS — R26.2 DIFFICULTY WALKING: ICD-10-CM

## 2023-07-12 DIAGNOSIS — M54.2 NECK PAIN: ICD-10-CM

## 2023-07-12 DIAGNOSIS — M25.561 CHRONIC PAIN OF BOTH KNEES: ICD-10-CM

## 2023-07-12 DIAGNOSIS — K21.9 GASTROESOPHAGEAL REFLUX DISEASE WITHOUT ESOPHAGITIS: ICD-10-CM

## 2023-07-12 DIAGNOSIS — M25.562 CHRONIC PAIN OF BOTH KNEES: ICD-10-CM

## 2023-07-12 PROCEDURE — 97112 NEUROMUSCULAR REEDUCATION: CPT | Performed by: PHYSICAL THERAPIST

## 2023-07-12 PROCEDURE — 97530 THERAPEUTIC ACTIVITIES: CPT | Performed by: PHYSICAL THERAPIST

## 2023-07-12 RX ORDER — PANTOPRAZOLE SODIUM 40 MG/1
TABLET, DELAYED RELEASE ORAL
Qty: 90 TABLET | Refills: 0 | Status: SHIPPED | OUTPATIENT
Start: 2023-07-12

## 2023-07-12 NOTE — PROGRESS NOTES
Daily Note     Today's date: 2023  Patient name: Key Nassar  : 1940  MRN: [id number]  Referring provider: Morena Levy  Dx:   Encounter Diagnosis     ICD-10-CM    1. Poor posture  R29.3       2. Chronic pain of both knees  M25.561     M25.562     G89.29       3. Difficulty walking  R26.2       4. Neck pain  M54.2                      Subjective: He reports that he feels stiff all over. Objective: See treatment diary below      Assessment: Tolerated treatment well; modified program due to late arrival. VCs provided on proper sequencing with exercises. He demonstrates improving upright tolerance with OP performing lumbar extension. He denies having increased pain throughout session. Patient demonstrated fatigue post treatment and would benefit from continued PT      Plan: Continue per plan of care.          Precautions: fall risk, dementia, hx of CVA/TIA; hx of cortisone injections in knees and hip    HEP: HR, TR, chin tucks, scap retraction    Specialty Daily Treatment Diary     Manual    STM; TPR B UT, LS        STM/TPR B knees                Knee flexion stretch        Knee Ext stretch                R pat mobs                Seated Knee AP glides at high table                Exercise Diary         UBE (retro)        RB        Laps                 HR 3x10 ea 3x10 ea 3x10 ea  2x10   TR 3x10 ea  3x10 ea 3x10 ea  2x10   Mini Squats        Chair squats                Slant board gastroc stretch  Slant board gastroc: 20 sec x 3 ea Slant board: 20 sec x 3 ea     1/2 foam gastroc stretch        Marching 2x10 ea 2x10 ea 10 ea  NV   Standing H' ABD 2x10 ea  0# 2x10 ea 0# 2x10 ea     Standing H' Ext                Standing Lumbar Extension with slight OP from therapist while holding on to bar NV 5x (significant knee crepitus)  2x10 (no knee crepitus today)  NV           Floor -> Airex NBOS        Floor -> Airex Tandem        Floor -> Airex SLS                1 cone tap 2 cone tap                Stepping over hurdles forward        Laterally stepping over hurdles                Stepping on random pads                Sit<>stand 2 foam no UE 2x5  2 foam (no UE): 2x5 2 foam (no UE): 1x5  2 foam: 1x10 (UE support today)           TB Scap Retraction BTB 5 sec; 2x10 (standing)  BTB 5 sec; 2x10 (standing)  NV  BTB 5 sec; 2x10 (standing)    TB B S' Ext                Low pec stretch        Mid pec stretch                        Seated HS stretch 30 sec x 3  30 sec x 3 ea   NV   Seated 2 way piriformis stretch        Chin tucks x15  x15   2 sec x 10   SNAGs Ext        Seated Scap Retraction using half foam roller  5 sec x 20      Seated Thoracic Extension over half foam                                LAQ 2 sec; 2x10 0# 2 sec; 2x10  0# 10 ea  2 sec; 2x10   Seated Marches 2x10 ea  0# 2x10 ea 0# 3x10 ea  2x10   SLR (Flex)                Quad sets c half foam                Ball Squeezes Seated: Black: x30  Seated: Black: 5 sec x 30 Seated: Black: 5 sec x 20   Seated: Black: 5 sec x 15   Ball c Bridge                Hooklying TB B Clamshells Seated BTB x30  Seated BTB: 2 sec x 30 Seated: GTB: 5 sec x 20 Use Blue NV Seated: Blue TB: 5 sec; 2x10   TB Bridges                Sidelying H' ABD                                HEP/EDU

## 2023-07-14 DIAGNOSIS — S02.92XA CLOSED EXTENSIVE FACIAL FRACTURES (HCC): ICD-10-CM

## 2023-07-14 DIAGNOSIS — I10 ESSENTIAL HYPERTENSION: ICD-10-CM

## 2023-07-14 RX ORDER — CARVEDILOL 12.5 MG/1
TABLET ORAL
Qty: 180 TABLET | Refills: 1 | Status: SHIPPED | OUTPATIENT
Start: 2023-07-14

## 2023-07-14 RX ORDER — ATORVASTATIN CALCIUM 40 MG/1
TABLET, FILM COATED ORAL
Qty: 90 TABLET | Refills: 2 | Status: SHIPPED | OUTPATIENT
Start: 2023-07-14

## 2023-07-17 ENCOUNTER — APPOINTMENT (OUTPATIENT)
Dept: PHYSICAL THERAPY | Facility: CLINIC | Age: 83
End: 2023-07-17
Payer: COMMERCIAL

## 2023-07-21 ENCOUNTER — HOSPITAL ENCOUNTER (OUTPATIENT)
Dept: SLEEP CENTER | Facility: HOSPITAL | Age: 83
Discharge: HOME | End: 2023-07-21
Attending: INTERNAL MEDICINE
Payer: COMMERCIAL

## 2023-07-21 DIAGNOSIS — G47.59 OTHER PARASOMNIA: ICD-10-CM

## 2023-07-21 DIAGNOSIS — R06.83 SNORING: ICD-10-CM

## 2023-07-21 PROCEDURE — 95810 POLYSOM 6/> YRS 4/> PARAM: CPT

## 2023-07-21 PROCEDURE — 95810 POLYSOM 6/> YRS 4/> PARAM: CPT | Performed by: INTERNAL MEDICINE

## 2023-07-21 NOTE — PROGRESS NOTES
Sleep Study Documentation    Pre-Sleep Study       Sleep testing procedure explained to patient:YES    Patient napped prior to study:NO    Caffeine:Dayshift worker after 12PM.  Caffeine use:NO    Alcohol:Dayshift workers after 5PM: Alcohol use:NO    Typical day for patient:NO       Study Documentation    Sleep Study Indications: EDS, Lyman Sleepiness Scale, chronic or AM headache, and witnessed apneas    Sleep Study: Diagnostic   Snore:Severe  Supplemental O2: no    O2 flow rate (L/min) range NA  O2 flow rate (L/min) final NA  Minimum SaO2 70%  Baseline SaO2 95%      EKG abnormalities: yes:  EPOCH example and comments: PVCs/PVC run epochs 415, 468, 500, 620, 714, 806    EEG abnormalities: no    Sleep Study Recorded < 2 hours: N/A    Sleep Study Recorded > 2 hours but incomplete study: N/A    Sleep Study Recorded 6 hours but no sleep obtained: NO    Patient classification: retired       Post-Sleep Study    Medication used at bedtime or during sleep study:YES other prescription medications    Patient reports time it took to fall asleep:less than 20 minutes    Patient reports waking up during study:1 to 2 times. Patient reports returning to sleep without difficulty. Patient reports sleeping more than 8 hours with dreaming. Patient reports sleep during study:typical    Patient rated sleepiness: Somewhat sleepy or tired    PAP treatment:no.

## 2023-07-24 DIAGNOSIS — G47.33 OSA (OBSTRUCTIVE SLEEP APNEA): Primary | ICD-10-CM

## 2023-08-02 ENCOUNTER — TELEPHONE (OUTPATIENT)
Dept: SLEEP CENTER | Facility: CLINIC | Age: 83
End: 2023-08-02

## 2023-08-02 NOTE — TELEPHONE ENCOUNTER
Call placed to patient's caregiver Aravind Chavez. Advised sleep study is resulted and shows severe JASMEET (AHI 54.7) with hypoxemia and increased PLMs. CPAP titration study 11/10/2023 in [place]. Patient added to wait list for a sooner date in [place]. Teams message sent to notify staff.

## 2023-08-04 ENCOUNTER — OFFICE VISIT (OUTPATIENT)
Dept: FAMILY MEDICINE CLINIC | Facility: HOSPITAL | Age: 83
End: 2023-08-04
Payer: COMMERCIAL

## 2023-08-04 VITALS
SYSTOLIC BLOOD PRESSURE: 126 MMHG | HEART RATE: 74 BPM | BODY MASS INDEX: 25.64 KG/M2 | WEIGHT: 169.2 LBS | HEIGHT: 68 IN | DIASTOLIC BLOOD PRESSURE: 68 MMHG | TEMPERATURE: 98.4 F

## 2023-08-04 DIAGNOSIS — E55.9 VITAMIN D DEFICIENCY: ICD-10-CM

## 2023-08-04 DIAGNOSIS — F33.42 RECURRENT MAJOR DEPRESSIVE DISORDER, IN FULL REMISSION (HCC): ICD-10-CM

## 2023-08-04 DIAGNOSIS — G89.29 CHRONIC PAIN OF BOTH KNEES: ICD-10-CM

## 2023-08-04 DIAGNOSIS — E78.5 HYPERLIPIDEMIA, UNSPECIFIED HYPERLIPIDEMIA TYPE: ICD-10-CM

## 2023-08-04 DIAGNOSIS — I10 ESSENTIAL HYPERTENSION: Primary | ICD-10-CM

## 2023-08-04 DIAGNOSIS — I25.10 ASCVD (ARTERIOSCLEROTIC CARDIOVASCULAR DISEASE): ICD-10-CM

## 2023-08-04 DIAGNOSIS — G47.33 OSA (OBSTRUCTIVE SLEEP APNEA): ICD-10-CM

## 2023-08-04 DIAGNOSIS — M25.561 CHRONIC PAIN OF BOTH KNEES: ICD-10-CM

## 2023-08-04 DIAGNOSIS — I65.23 CAROTID STENOSIS, ASYMPTOMATIC, BILATERAL: ICD-10-CM

## 2023-08-04 DIAGNOSIS — I73.9 PAD (PERIPHERAL ARTERY DISEASE) (HCC): ICD-10-CM

## 2023-08-04 DIAGNOSIS — M25.562 CHRONIC PAIN OF BOTH KNEES: ICD-10-CM

## 2023-08-04 PROBLEM — F02.A0 MILD EARLY ONSET ALZHEIMER'S DEMENTIA WITHOUT BEHAVIORAL DISTURBANCE, PSYCHOTIC DISTURBANCE, MOOD DISTURBANCE, OR ANXIETY (HCC): Status: ACTIVE | Noted: 2022-10-13

## 2023-08-04 PROCEDURE — 99214 OFFICE O/P EST MOD 30 MIN: CPT | Performed by: NURSE PRACTITIONER

## 2023-08-04 RX ORDER — ACETAMINOPHEN 160 MG
TABLET,DISINTEGRATING ORAL
COMMUNITY
Start: 2022-09-01

## 2023-08-04 NOTE — PROGRESS NOTES
Name: Alan Saeed      : 1940      MRN: [id number]  Encounter Provider: ELE Saldana  Encounter Date: 2023   Encounter department: [address]     1. Essential hypertension  Assessment & Plan:  BP well controlled  Continue same meds as rx'd  Return in 6 months    Orders:  -     CBC and differential; Future; Expected date: 2024  -     CBC and differential    2. JASMEET (obstructive sleep apnea)  Assessment & Plan:  F/U with sleep medicine as scheduled      3. Recurrent major depressive disorder, in full remission ([address])  Assessment & Plan:  PHQ score of 8 on daily lexapro  Continue same dose and return in 6 months    Orders:  -     CBC and differential; Future; Expected date: 2024  -     CBC and differential    4. ASCVD (arteriosclerotic cardiovascular disease)  Assessment & Plan:  Clinically stable  Annual cardiology f/u UTD      5. Carotid stenosis, asymptomatic, bilateral  Assessment & Plan:  Stable per recent carotid doppler  F/U with vascular as scheduled      6. PAD (peripheral artery disease) ([facility])  Assessment & Plan:  Recent arterial study stable  F/U with vascular as scheduled      7. Hyperlipidemia, unspecified hyperlipidemia type  Assessment & Plan:  Compliant w/statin as rx'd  Update FLP before next appt in     Orders:  -     CBC and differential; Future; Expected date: 2024  -     Comprehensive metabolic panel; Future; Expected date: 2024  -     TSH, 3rd generation with Free T4 reflex; Future; Expected date: 2024  -     Lipid Panel with Direct LDL reflex; Future; Expected date: 2024  -     CBC and differential  -     Comprehensive metabolic panel  -     TSH, 3rd generation with Free T4 reflex  -     Lipid Panel with Direct LDL reflex    8. Chronic pain of both knees  Assessment & Plan:  Persistent s/p PT treatments  Consider return to ortho for further evaluation      9.  Vitamin D deficiency  Assessment & Plan:  Recheck D level w/next labs ordered    Orders:  -     Comprehensive metabolic panel; Future; Expected date: 01/08/2024  -     Vitamin D 25 hydroxy; Future; Expected date: 01/08/2024  -     Comprehensive metabolic panel  -     Vitamin D 25 hydroxy    Will update shingrix, flu and PCV at pharmacy  Covid booster recently updated  Update AWV at next appt in Jan       Subjective      Here with caregiver Jessica Suarez for routine follow up. Both state he has been well but has to be fitted for c-pap mask. UTD with vascular study and has to see vascular to follow up - scheduled this month. Annual cardiology UTD. Had covid booster this week. Review of Systems   Constitutional: Negative. Respiratory: Negative. Cardiovascular: Negative. Musculoskeletal: Positive for arthralgias (bilat knee pain, had been doing PT, takes aleve at bedtime and helps him sleep better). Psychiatric/Behavioral: Negative for dysphoric mood. Current Outpatient Medications on File Prior to Visit   Medication Sig   • amLODIPine (NORVASC) 5 mg tablet TAKE 1 TABLET (5 MG TOTAL) BY MOUTH DAILY. • aspirin (ECOTRIN LOW STRENGTH) 81 mg EC tablet Take 1 tablet (81 mg total) by mouth daily   • atorvastatin (LIPITOR) 40 mg tablet TAKE 1 TABLET BY MOUTH EVERY DAY   • carvedilol (COREG) 12.5 mg tablet TAKE 1 TABLET BY MOUTH 2 TIMES A DAY. • chlorthalidone 25 mg tablet TAKE 1 TABLET (25 MG TOTAL) BY MOUTH DAILY.    • Cholecalciferol (Vitamin D3) 50 MCG (2000 UT) capsule    • clopidogrel (PLAVIX) 75 mg tablet TAKE 1 TABLET BY MOUTH EVERY DAY   • escitalopram (LEXAPRO) 10 mg tablet TAKE 1 TABLET BY MOUTH EVERY DAY   • ferrous sulfate 325 (65 Fe) mg tablet Take 325 mg by mouth daily with dinner   • fluticasone (FLONASE) 50 mcg/act nasal spray USE 2 SPRAYS IN EACH NOSTRIL AT BEDTIME   • levETIRAcetam (KEPPRA) 100 mg/mL oral solution Take 7.5 mL (750 mg total) by mouth 2 (two) times a day   • pantoprazole (PROTONIX) 40 mg tablet TAKE 1 TABLET BY MOUTH EVERY DAY   • QUEtiapine (SEROquel) 25 mg tablet TAKE 12.5MG IN MORNING AND 25MG IN EVENING BY MOUTH DAILY       Objective     /68   Pulse 74   Temp 98.4 °F (36.9 °C)   Ht 5' 8" (1.727 m)   Wt 76.7 kg (169 lb 3.2 oz)   BMI 25.73 kg/m²       Physical Exam  Vitals reviewed. Constitutional:       General: He is not in acute distress. Eyes:      General: No scleral icterus. Neck:      Thyroid: No thyromegaly. Vascular: No carotid bruit. Cardiovascular:      Rate and Rhythm: Normal rate and regular rhythm. Heart sounds: Murmur (1/6 JOI) heard. Pulmonary:      Effort: Pulmonary effort is normal. No respiratory distress. Breath sounds: Normal breath sounds. Musculoskeletal:      Cervical back: Normal range of motion. Right lower leg: No edema. Left lower leg: No edema. Lymphadenopathy:      Cervical: No cervical adenopathy. Skin:     General: Skin is warm and dry. Neurological:      General: No focal deficit present. Mental Status: He is alert and oriented to person, place, and time.    Psychiatric:         Mood and Affect: Mood normal.         Behavior: Behavior normal.          ELE Mcgraw

## 2023-08-14 ENCOUNTER — OFFICE VISIT (OUTPATIENT)
Age: 83
End: 2023-08-14
Payer: COMMERCIAL

## 2023-08-14 VITALS
OXYGEN SATURATION: 92 % | HEART RATE: 53 BPM | TEMPERATURE: 97.6 F | HEIGHT: 68 IN | WEIGHT: 169.4 LBS | RESPIRATION RATE: 16 BRPM | BODY MASS INDEX: 25.67 KG/M2 | SYSTOLIC BLOOD PRESSURE: 124 MMHG | DIASTOLIC BLOOD PRESSURE: 64 MMHG

## 2023-08-14 DIAGNOSIS — R54 FRAILTY: ICD-10-CM

## 2023-08-14 DIAGNOSIS — E55.9 VITAMIN D DEFICIENCY: ICD-10-CM

## 2023-08-14 DIAGNOSIS — G30.0 MILD EARLY ONSET ALZHEIMER'S DEMENTIA WITHOUT BEHAVIORAL DISTURBANCE, PSYCHOTIC DISTURBANCE, MOOD DISTURBANCE, OR ANXIETY (HCC): Primary | ICD-10-CM

## 2023-08-14 DIAGNOSIS — F33.9 DEPRESSION, RECURRENT (HCC): ICD-10-CM

## 2023-08-14 DIAGNOSIS — Z86.73 HISTORY OF CVA (CEREBROVASCULAR ACCIDENT): ICD-10-CM

## 2023-08-14 DIAGNOSIS — E78.5 HYPERLIPIDEMIA, UNSPECIFIED HYPERLIPIDEMIA TYPE: ICD-10-CM

## 2023-08-14 DIAGNOSIS — M17.11 PRIMARY OSTEOARTHRITIS OF RIGHT KNEE: ICD-10-CM

## 2023-08-14 DIAGNOSIS — R26.2 AMBULATORY DYSFUNCTION: ICD-10-CM

## 2023-08-14 DIAGNOSIS — F02.A0 MILD EARLY ONSET ALZHEIMER'S DEMENTIA WITHOUT BEHAVIORAL DISTURBANCE, PSYCHOTIC DISTURBANCE, MOOD DISTURBANCE, OR ANXIETY (HCC): Primary | ICD-10-CM

## 2023-08-14 DIAGNOSIS — R56.9 SEIZURE (HCC): ICD-10-CM

## 2023-08-14 PROBLEM — R32 URINARY INCONTINENCE: Status: ACTIVE | Noted: 2023-08-14

## 2023-08-14 PROCEDURE — 99215 OFFICE O/P EST HI 40 MIN: CPT | Performed by: INTERNAL MEDICINE

## 2023-08-14 NOTE — PROGRESS NOTES
[facility]  Follow-up  [address]  [phone]    NAME: Vinod Dasilva  AGE: 81 y.o. SEX: male    DATE OF ENCOUNTER: 8/14/2023    Assessment and Plan     1. 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 - No hearing, vision issues noted. - Remain active physically, mentally and socially. - Maintain chronic conditions under control, continue to follow outpatient with PCP.  - Physical therapy recommendation, order placed and discussed with caregiver to call and set up appointment. Patient and caregiver understood and in agreement with plan. - Prognosis and management discussed at length eith patient and caregiver. - Follow up in 6 months and consider repeating MoCA if patient wishes. 2. History of CVA (cerebrovascular accident)  - Hx of CVA/TIA and seizures. - Follows with neurology, outpatient. - Home medication Plavix 75 mg daily and Aspirin 81 mg daily, Lipitor 40 mg daily  - Continue to follow with PCP and Neurology       3. Primary osteoarthritis of right knee  Plan and Assessment as per ambulatory dysfunction, Physical therapy, fall precautions, use of walker at all times recommended. - Continue following with PCP and Vitamin D.       4. Ambulatory dysfunction  - TUG 25 seconds at today's visit   - Recommendation to restart Physical therapy to increase muscle strength and gait stability   - Uses walker at baseline, recommendation to use it at all times   - Fall precautions discussion        5. Depression, recurrent ([address] )  Carolyn Zamudio 5/15. Home medication: Lexapro 10 mg daily.   - Monitor symptoms and continue medication.   - Psychiatric evaluation pending, caregiver reports he is on a waitlist for [facility].        6. Frailty  - Multifactorial, advanced age, underlying dementia, osteoarthritis, ambulatory dysfunction and multiple comorbidities. - Recommendation to remain physically active as tolerated. - Maintain chronic conditions under control.   - Vitamin D supplements. 7. Hyperlipidemia, unspecified hyperlipidemia type  - Home medication: Lipitor 40 mg. History of CVA/TIA. - Continue medication and PCP follow up. [facility]  - Per Neurology office note on 3/2/23 noted - May consider discontinue Keppra if no concern for seizures on studies.  - Continue home medication and follow recommendation per Neurology. 9. Vitamin D deficiency  - Last Vitamin D 25 hydroxy level - 43.6 within normal limits. - Continue medication and PCP follow up. 10. Urinary incontinence  - Caregiver reports sporadic urinary incontinence at nighttime.   - Monitor off medications, frequent toileting recommended (every ~3L if applicable). 12. Hypertension  Stable. Well managed with medication: Coreg and Amlodipine. Orders Placed This Encounter   Procedures   • Ambulatory Referral to Physical Therapy          Chief Complaint     Chief Complaint   Patient presents with   • Follow-up     6 month    Patient is here today for follow up of memory issues and chronic conditions     History of Present Illness       We had the pleasure of evaluating  Armani Blackwood who is a 81 y.o. male  in Geriatric follow-up today. Since our last follow-up 6 months ago on 1/9/23  has not had any falls, has not had any hospitalizations and has not had medication changes or major life events. His behavior has been stable since the last visit. Caregiver today reports since the last visit, he has had sporadic events of urinary incontinence at nighttime. No events of wanderings in the night, getting up or difficulty sleeping were noted. Sleeps 8-10 hours at night and naps throughout the day, however reports that they plan to receive a CPAP for his sleep apnea (plan for Nov 2023).  No visual or hearing issues noted. Psych evaluation is also pending at the moment, patient is currently on the wait-list for [facility] and in [facility] per the caregiver. Family members accompanying the patient today include (Evand,Magen , friend). They report the patients behaviors has been stable since the last visit. The following portions of the patient's history were reviewed and updated as appropriate: allergies, current medications, past family history, past medical history, past social history, past surgical history and problem list.      Review of Systems     Review of Systems   Eyes: Negative for visual disturbance. Respiratory: Negative for cough, chest tightness and shortness of breath. Cardiovascular: Negative for chest pain and leg swelling. Gastrointestinal: Negative for abdominal pain, constipation, diarrhea, nausea and vomiting. Genitourinary: Negative for difficulty urinating, frequency and urgency. Musculoskeletal: Positive for arthralgias. Skin: Negative for rash. Neurological: Negative for seizures, syncope, light-headedness, numbness and headaches. All other systems reviewed and are negative. Objective     /64 (BP Location: Left arm, Patient Position: Sitting, Cuff Size: Adult)   Pulse (!) 53   Temp 97.6 °F (36.4 °C) (Temporal)   Resp 16   Ht 5' 8" (1.727 m)   Wt 76.8 kg (169 lb 6.4 oz)   SpO2 92%   BMI 25.76 kg/m²     Physical Exam  Constitutional:       General: He is not in acute distress. Appearance: He is normal weight. He is not ill-appearing or toxic-appearing. HENT:      Head: Normocephalic and atraumatic. Right Ear: Tympanic membrane, ear canal and external ear normal. There is no impacted cerumen. Left Ear: Tympanic membrane, ear canal and external ear normal. There is no impacted cerumen. Nose: Nose normal. No congestion or rhinorrhea. Mouth/Throat:      Mouth: Mucous membranes are moist.      Pharynx: Oropharynx is clear.  No posterior oropharyngeal erythema. Eyes:      General: No scleral icterus. Extraocular Movements: Extraocular movements intact. Neck:      Vascular: No carotid bruit. Cardiovascular:      Rate and Rhythm: Normal rate and regular rhythm. Pulses: Normal pulses. Heart sounds: Normal heart sounds. No murmur heard. No friction rub. Pulmonary:      Effort: Pulmonary effort is normal. No respiratory distress. Breath sounds: Normal breath sounds. No wheezing. Abdominal:      General: Abdomen is flat. Bowel sounds are normal. There is no distension. Palpations: Abdomen is soft. Tenderness: There is no abdominal tenderness. Musculoskeletal:         General: Normal range of motion. Cervical back: No rigidity. Right lower leg: No edema. Left lower leg: No edema. Lymphadenopathy:      Cervical: No cervical adenopathy. Skin:     General: Skin is warm. Coloration: Skin is not jaundiced. Findings: No erythema or rash. Neurological:      General: No focal deficit present. Mental Status: He is alert and oriented to person, place, and time. Mental status is at baseline. Comments: MoCA declined by patient. TUG 25 seconds. Psychiatric:         Mood and Affect: Mood normal.         Behavior: Behavior normal.      Comments: GDS 5/15. Name: Geni Mansfield  : 1940  MRN: [id number]  DOS: 2023    Diagnoses:   Diagnosis ICD-10-CM Associated Orders   1. 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 A0       2. History of CVA (cerebrovascular accident)  Z86.73       3. Primary osteoarthritis of right knee  M17.11       4. Ambulatory dysfunction  R26.2 Ambulatory Referral to Physical Therapy      5. Depression, recurrent ([address])  F33.9       6. Frailty  R54       7. Hyperlipidemia, unspecified hyperlipidemia type  E78.5       8.  Seizure ([address])  R56.9 9. Vitamin D deficiency  E55.9

## 2023-08-14 NOTE — PROGRESS NOTES
[facility]  [address] [facility], [address] [place] [address], [facility] [address]  [phone]    Social Work Follow-Up    LSW met with Fabien Irving for follow up visit. LSW attempted MoCA assessment (switched to Blind version as patient had difficulty seeing visual parts), patient declined to complete. Patient declined to complete MoCA at previous visit. Patient did complete Geriatric Depression Screen, 5/15 (previously 4/15 in 01/09/23). Patient stated he is here with his girlfriend's son/caregiver-Magen. LSW to remain available as needed.

## 2023-08-28 ENCOUNTER — OFFICE VISIT (OUTPATIENT)
Dept: VASCULAR SURGERY | Facility: CLINIC | Age: 83
End: 2023-08-28
Payer: COMMERCIAL

## 2023-08-28 VITALS
BODY MASS INDEX: 25.61 KG/M2 | SYSTOLIC BLOOD PRESSURE: 112 MMHG | HEIGHT: 68 IN | DIASTOLIC BLOOD PRESSURE: 60 MMHG | HEART RATE: 52 BPM | OXYGEN SATURATION: 98 % | WEIGHT: 169 LBS

## 2023-08-28 DIAGNOSIS — I65.23 CAROTID STENOSIS, ASYMPTOMATIC, BILATERAL: ICD-10-CM

## 2023-08-28 DIAGNOSIS — I73.9 PAD (PERIPHERAL ARTERY DISEASE) (HCC): Primary | ICD-10-CM

## 2023-08-28 PROCEDURE — 99213 OFFICE O/P EST LOW 20 MIN: CPT | Performed by: NURSE PRACTITIONER

## 2023-08-28 NOTE — PROGRESS NOTES
Assessment/Plan:    Carotid stenosis, asymptomatic, bilateral  56-year-old male with HTN, HLD, CAD, CABG, Bilateral CVA, asymptomatic bilateral carotid stenosis, PAD, Alzheimer's. Patient returns the office to review carotid and lower extremity arterial duplex 6/21/2023    -CV duplex showed bilateral ICA less than 50% stenosis  -Continue antiplatelet and statin therapy  -Repeat carotid duplex in 1 year  -Follow-up in the office in 1 year    PAD (peripheral artery disease) ([address])  -LEAD showed diffuse femoral-popliteal disease without focal stenosis, tibial peroneal occlusive disease, right ISMAEL 1.5 7/127/57 and left lower extremity diffuse disease without focal stenosis, tibial peroneal occlusive disease, left ISMAEL 1.52/130/60  -Ambulates with a rolling walker  -Only complaint is left knee pain  -Claudication symptoms. No ischemic rest pain, no ischemic wounds  -Encourage daily walking  -Continue antiplatelet and statin therapy  -Repeat lower extremity arterial duplex in 1 year  -Follow-up in the office in 1 year       Diagnoses and all orders for this visit:    PAD (peripheral artery disease) ([facility])  -     VAS lower limb arterial duplex, complete bilateral; Future    Carotid stenosis, asymptomatic, bilateral  -     VAS carotid complete study; Future          Subjective:      Patient ID: Leah Cyr is a 81 y.o. male. Patient is here today to review results of a BOBO and CV done 6/21/2023. Patient c/o bilateral knee pain. He denies any other pain in his legs when walking or any open wounds. . Patient also denies any headaches, dizziness, sudden loss of vision, trouble swallowing, slurred speech, or any other TIA or Stroke symptoms. Patient is taking ASA 81 mg, Plavix and Atorvastatin. Patient is a former smoker. HPI  56-year-old male with HTN, HLD, CAD, CABG, Bilateral CVA, asymptomatic bilateral carotid stenosis, PAD, Alzheimer's.   Patient returns the office to review carotid and lower extremity arterial duplex 6/21/2023  Patient is accompanied by his son for office visit. Patient ambulates with a walker. Denies any claudication symptoms. No new focal neurological events consistent with TIA/CVA. He is on aspirin, Plavix and atorvastatin  Patient minimally ambulatory. Walks a length of the hallway approximately 25 feet and does a few laps to and from the bathroom, takes the "long way around" for extra exercise. Limited exam due to visible bedbug infestation in patient's shoes/socks  The following portions of the patient's history were reviewed and updated as appropriate: allergies, current medications, past family history, past medical history, past social history, past surgical history and problem list.  ROS reviewed     Review of Systems   Constitutional: Negative. HENT: Negative. Eyes: Negative. Respiratory: Negative. Cardiovascular: Negative. Gastrointestinal: Negative. Endocrine: Negative. Genitourinary: Negative. Musculoskeletal: Positive for arthralgias. Knee pain   Skin: Negative. Allergic/Immunologic: Negative. Neurological: Negative. Hematological: Negative. Psychiatric/Behavioral: Negative. Objective:  I have reviewed and made appropriate changes to the review of systems input by the medical assistant.     Vitals:    08/28/23 1431   BP: 112/60   BP Location: Right arm   Patient Position: Sitting   Cuff Size: Large   Pulse: (!) 52   SpO2: 98%   Weight: 76.7 kg (169 lb)   Height: 5' 8" (1.727 m)       Patient Active Problem List   Diagnosis   • 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   • Essential hypertension   • ASCVD (arteriosclerotic cardiovascular disease)   • DJD (degenerative joint disease)   • GERD (gastroesophageal reflux disease)   • Hyperlipidemia   • PAD (peripheral artery disease) ([facility])   • Benign colon polyp   • Left inguinal hernia   • Osteoporosis   • Peripheral neuropathy   • Vitamin D deficiency   • TIA (transient ischemic attack)   • Diverticulosis of intestine   • Foraminal stenosis of cervical region   • Carotid stenosis, asymptomatic, bilateral   • History of CVA (cerebrovascular accident)   • Stenosis of left vertebral artery   • Depression, recurrent (HCC)   • Right epiphora   • History of seizure   • BMI 26.0-26.9,adult   • Knee pain   • Chronic neck pain   • Recurrent major depressive disorder, in full remission ([address])   • 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, psychotic disturbance, mood disturbance, or anxiety ([facility])   • Primary osteoarthritis of one hip, right   • Right hip pain   • Ambulatory dysfunction   • Frailty   • Seizure (HCC)   • Other parasomnia   • Snoring   • Suspected sleep apnea   • JASMEET (obstructive sleep apnea)   • Urinary incontinence       Past Surgical History:   Procedure Laterality Date   • COLONOSCOPY      Complete;  Last Assessed: 1/23/2015   • COLONOSCOPY      Resolved: Approx [id number]   • CORONARY ARTERY BYPASS GRAFT  1994    5 vessel with LIMA to the LAD, VG to the diagonal, marginal and sequential to PDA and PLV   • EYE SURGERY  may 2022    cataract/lens replacement   • HERNIA REPAIR     • MAXILLARY LE FORTE OSTEOTOMY Bilateral 09/04/2020    Procedure: OPEN REDUCTION W/ INTERNAL FIXATION (ORIF) MAXILLARY FRACTURES LEFORTE, closure nasal  laceration and right lower eyelid laceration, closure of lower lip laceration;  Surgeon: Aline Michel DMD;  Location: [facility];  Service: Maxillofacial   • VA RPR 1ST INGUN HRNA AGE 5 YRS/> REDUCIBLE Left 02/27/2018    Procedure: REPAIR HERNIA INGUINAL;  Surgeon: Josh Feliciano MD;  Location: [facility];  Service: General   • PROSTATE SURGERY     • REMOVAL OF IMPACTED TOOTH - COMPLETELY BONY N/A 09/04/2020    Procedure: EXTRACTION TEETH MULTIPLE 25, 26, 31,27, 10, 13, 14, 9;  Surgeon: Madhav Singleton DMD;  Location: [facility];  Service: Maxillofacial   • SKIN GRAFT  50 years ago       Family History Problem Relation Age of Onset   • Heart disease Mother         Premature Coronary   • Heart disease Father         Premature Coronary   • Psychiatric Illness Sister        Social History     Socioeconomic History   • Marital status: Single     Spouse name: Not on file   • Number of children: Not on file   • Years of education: Not on file   • Highest education level: Not on file   Occupational History   • Occupation: Retired   Tobacco Use   • Smoking status: Former     Years: 25.00     Types: Cigarettes   • Smokeless tobacco: Former   • Tobacco comments:     n/a   Vaping Use   • Vaping Use: Never used   Substance and Sexual Activity   • Alcohol use: Not Currently     Alcohol/week: 3.0 standard drinks of alcohol     Types: 3 Cans of beer per week     Comment: 5-6 beers a day   • Drug use: Never     Comment: n/a   • Sexual activity: Not Currently   Other Topics Concern   • Not on file   Social History Narrative    ** Merged History Encounter **          Social Determinants of Health     Financial Resource Strain: High Risk (1/16/2023)    Overall Financial Resource Strain (CARDIA)    • Difficulty of Paying Living Expenses: Very hard   Food Insecurity: Food Insecurity Present (3/1/2021)    Hunger Vital Sign    • Worried About Running Out of Food in the Last Year: Sometimes true    • Ran Out of Food in the Last Year: Sometimes true   Transportation Needs: No Transportation Needs (1/16/2023)    PRAPARE - Transportation    • Lack of Transportation (Medical): No    • Lack of Transportation (Non-Medical):  No   Physical Activity: Not on file   Stress: Not on file   Social Connections: Unknown (3/1/2021)    Social Connection and Isolation Panel [NHANES]    • Frequency of Communication with Friends and Family: More than three times a week    • Frequency of Social Gatherings with Friends and Family: More than three times a week    • Attends Hinduism Services: Not on file    • Active Member of Clubs or Organizations: Not on file    • Attends Club or Organization Meetings: Not on file    • Marital Status: Not on file   Intimate Partner Violence: Not on file   Housing Stability: Not on file       No Known Allergies      Current Outpatient Medications:   •  amLODIPine (NORVASC) 5 mg tablet, TAKE 1 TABLET (5 MG TOTAL) BY MOUTH DAILY. , Disp: 90 tablet, Rfl: 2  •  aspirin (ECOTRIN LOW STRENGTH) 81 mg EC tablet, Take 1 tablet (81 mg total) by mouth daily, Disp: 10 tablet, Rfl: 0  •  atorvastatin (LIPITOR) 40 mg tablet, TAKE 1 TABLET BY MOUTH EVERY DAY, Disp: 90 tablet, Rfl: 2  •  carvedilol (COREG) 12.5 mg tablet, TAKE 1 TABLET BY MOUTH 2 TIMES A DAY., Disp: 180 tablet, Rfl: 1  •  chlorthalidone 25 mg tablet, TAKE 1 TABLET (25 MG TOTAL) BY MOUTH DAILY. , Disp: 90 tablet, Rfl: 2  •  Cholecalciferol (Vitamin D3) 50 MCG (2000 UT) capsule, , Disp: , Rfl:   •  clopidogrel (PLAVIX) 75 mg tablet, TAKE 1 TABLET BY MOUTH EVERY DAY, Disp: 90 tablet, Rfl: 1  •  escitalopram (LEXAPRO) 10 mg tablet, TAKE 1 TABLET BY MOUTH EVERY DAY, Disp: 90 tablet, Rfl: 1  •  ferrous sulfate 325 (65 Fe) mg tablet, Take 325 mg by mouth daily with dinner, Disp: , Rfl:   •  fluticasone (FLONASE) 50 mcg/act nasal spray, USE 2 SPRAYS IN EACH NOSTRIL AT BEDTIME, Disp: 48 mL, Rfl: 2  •  levETIRAcetam (KEPPRA) 100 mg/mL oral solution, Take 7.5 mL (750 mg total) by mouth 2 (two) times a day, Disp: 473 mL, Rfl: 5  •  pantoprazole (PROTONIX) 40 mg tablet, TAKE 1 TABLET BY MOUTH EVERY DAY, Disp: 90 tablet, Rfl: 0  •  QUEtiapine (SEROquel) 25 mg tablet, TAKE 12.5MG IN MORNING AND 25MG IN EVENING BY MOUTH DAILY, Disp: 135 tablet, Rfl: 1      /60 (BP Location: Right arm, Patient Position: Sitting, Cuff Size: Large)   Pulse (!) 52   Ht 5' 8" (1.727 m)   Wt 76.7 kg (169 lb)   SpO2 98%   BMI 25.70 kg/m²          Physical Exam  Vitals and nursing note reviewed. HENT:      Head: Normocephalic.       Comments: Right facial scarring   Eyes:      Extraocular Movements: Extraocular movements intact. Pulmonary:      Effort: Pulmonary effort is normal.   Musculoskeletal:         General: No swelling. Comments: Ambulates with walker, steady gate   Neurological:      Mental Status: He is alert and oriented to person, place, and time.    Psychiatric:         Mood and Affect: Mood normal.         Behavior: Behavior normal.

## 2023-08-28 NOTE — ASSESSMENT & PLAN NOTE
-LEAD showed diffuse femoral-popliteal disease without focal stenosis, tibial peroneal occlusive disease, right ISMAEL 1.5 7/127/57 and left lower extremity diffuse disease without focal stenosis, tibial peroneal occlusive disease, left ISMAEL 1.52/130/60  -Ambulates with a rolling walker  -Only complaint is left knee pain  -Claudication symptoms.   No ischemic rest pain, no ischemic wounds  -Encourage daily walking  -Continue antiplatelet and statin therapy  -Repeat lower extremity arterial duplex in 1 year  -Follow-up in the office in 1 year

## 2023-08-28 NOTE — ASSESSMENT & PLAN NOTE
81-year-old male with HTN, HLD, CAD, CABG, Bilateral CVA, asymptomatic bilateral carotid stenosis, PAD, Alzheimer's.   Patient returns the office to review carotid and lower extremity arterial duplex 6/21/2023    -CV duplex showed bilateral ICA less than 50% stenosis  -Continue antiplatelet and statin therapy  -Repeat carotid duplex in 1 year  -Follow-up in the office in 1 year

## 2023-09-29 DIAGNOSIS — G45.9 TIA (TRANSIENT ISCHEMIC ATTACK): ICD-10-CM

## 2023-09-29 DIAGNOSIS — G30.0 MILD EARLY ONSET ALZHEIMER'S DEMENTIA WITH PSYCHOTIC DISTURBANCE (HCC): ICD-10-CM

## 2023-09-29 DIAGNOSIS — I10 ESSENTIAL HYPERTENSION: ICD-10-CM

## 2023-09-29 DIAGNOSIS — F02.A2 MILD EARLY ONSET ALZHEIMER'S DEMENTIA WITH PSYCHOTIC DISTURBANCE (HCC): ICD-10-CM

## 2023-09-29 DIAGNOSIS — F33.42 RECURRENT MAJOR DEPRESSIVE DISORDER, IN FULL REMISSION (HCC): ICD-10-CM

## 2023-09-29 RX ORDER — ESCITALOPRAM OXALATE 10 MG/1
TABLET ORAL
Qty: 90 TABLET | Refills: 1 | Status: SHIPPED | OUTPATIENT
Start: 2023-09-29

## 2023-09-29 RX ORDER — CLOPIDOGREL BISULFATE 75 MG/1
TABLET ORAL
Qty: 90 TABLET | Refills: 1 | Status: SHIPPED | OUTPATIENT
Start: 2023-09-29

## 2023-09-29 RX ORDER — QUETIAPINE FUMARATE 25 MG/1
TABLET, FILM COATED ORAL
Qty: 135 TABLET | Refills: 1 | Status: SHIPPED | OUTPATIENT
Start: 2023-09-29

## 2023-09-29 RX ORDER — AMLODIPINE BESYLATE 5 MG/1
5 TABLET ORAL DAILY
Qty: 90 TABLET | Refills: 2 | Status: SHIPPED | OUTPATIENT
Start: 2023-09-29

## 2023-10-04 ENCOUNTER — TELEPHONE (OUTPATIENT)
Dept: NEUROLOGY | Facility: CLINIC | Age: 83
End: 2023-10-04

## 2023-10-07 DIAGNOSIS — K21.9 GASTROESOPHAGEAL REFLUX DISEASE WITHOUT ESOPHAGITIS: ICD-10-CM

## 2023-10-07 RX ORDER — PANTOPRAZOLE SODIUM 40 MG/1
TABLET, DELAYED RELEASE ORAL
Qty: 90 TABLET | Refills: 0 | Status: SHIPPED | OUTPATIENT
Start: 2023-10-07

## 2023-10-09 ENCOUNTER — TELEPHONE (OUTPATIENT)
Dept: NEUROLOGY | Facility: CLINIC | Age: 83
End: 2023-10-09

## 2023-10-17 ENCOUNTER — OFFICE VISIT (OUTPATIENT)
Dept: NEUROLOGY | Facility: CLINIC | Age: 83
End: 2023-10-17

## 2023-10-17 VITALS
DIASTOLIC BLOOD PRESSURE: 64 MMHG | WEIGHT: 172.9 LBS | OXYGEN SATURATION: 97 % | TEMPERATURE: 98.2 F | HEART RATE: 57 BPM | HEIGHT: 68 IN | BODY MASS INDEX: 26.21 KG/M2 | SYSTOLIC BLOOD PRESSURE: 114 MMHG

## 2023-10-17 DIAGNOSIS — Z86.73 HISTORY OF CVA (CEREBROVASCULAR ACCIDENT): ICD-10-CM

## 2023-10-17 DIAGNOSIS — G20.C PARKINSONISM: Primary | ICD-10-CM

## 2023-10-17 NOTE — PROGRESS NOTES
Patient ID: Kenya Arnold is a 81 y.o. male. Assessment/Plan:    History of CVA (cerebrovascular accident)  Remains on aspirin and statin for stroke prevention. He will continue yearly follow-up with neurovascular attending. Parkinsonism  Patient presents for evaluation for parkinsonism at the request of stroke team.  History is limited given patient's cognitive status, caregiver present today has known the patient for the past 2 years. He has a history of  left hand resting tremor for the past 6 to 8 months. His caregiver has also noted dragging of the left leg for the past 3 months. He does have a history of dream reenactment that would be consistent with REM behavioral sleep disorder. It does appear he has a history of dementia with  cognitive changes for at least 2 years, he did have hallucinations last year which are improved with the use of Seroquel. No clear fluctuations of cognition. On exam he does have left hand resting tremor, left greater than right bradykinesia and left upper extremity cogwheeling and  in gait changes that would be consistent with parkinsonism. Time spent discussing diagnosis of parkinsonism. Given cognitive complaints and hallucinations were noted prior to onset of motor symptoms do question lewy body dementia vs idiopathic parkinson's disease. Given some of his changes in gait we did opt to trial carbidopa levodopa. He will start carbidopa levodopa 25/100 mg 1/2 tab TID, if no improvement in 1 week can increase to 1 tab TID. Should report any increase in hallucinations with medication. He would also benefit from referral for physical therapy in which she was agreeable to today. Plan for follow up in 6-8 weeks for reassessment. To contact the office sooner with any concerns or worsening symptoms. Diagnoses and all orders for this visit:    Parkinsonism  -     Ambulatory Referral to Physical Therapy;  Future  -     carbidopa-levodopa (Sinemet)  mg per tablet; Take 1/2 tab TID for 1 week then 1 tab TID, if no improvement after 1 week increase to 1.5 tabs TID    History of CVA (cerebrovascular accident)           Subjective:    HPI  Patient is an 52-year-old male with history of CVA, TIA, CAD, bilateral carotid stenosis,  L vert artery stenosis, HLD, HTN, PAD, peripheral neuropathy who presents for evaluation for parkinsonism. He does follow with our office for history of stroke and seizure, most recently seen in March 2023. He is maintained on aspirin, Plavix, statin. He was referred to movement team for concern for parkinsonism on exam of left hand pill-rolling tremor, increased rigidity, and bradykinesia. He does follow with senior care for dementia. Interval History:   He presents with his caregiver Nga Gamboa who assists with history who has known him for the past 2 years. He states he never really noted hand tremor until his visit in march 2023. Since this time has noted increased frequency of tremor. Occurs in left hand, he will really only note this when bracing to stand. Caregiver has noted resting tremor and action as well. For the past 3 months his caregiver has noted him dragging his left leg when turning. He has had shuffling for the past year on and off. He does feel it is difficult to start his gait when he first stand. No recent falls. He does utilize a walker for a number of years, he states since his strokes. No difficulty swallowing. No drooling. His caregiver feels speech has been softer the past 6 months. He sleeps well. He does awaken 2-3 times overnight to urinate, able to fall back asleep. He does have sleep apnea and will be getting cpap. He can talk in his sleep, will move arms and legs around bed a lot, he has fallen out of bed once last year. This happens most nights. He did have hallucinations in fall of 2022, was hospitalized and worked up, had stay at inpatient psych was started on seroquel for this.      He has had memory issues for the at least 2 years, have been gradually progressive. Caregiver manages medications, finances, household tasks as patient was suffering from TIA stroke at the time. He is independent with ADLs. He does not drive. Denies any fluctuating cognitive. No loss of smell, no constipation, no changes in hand writing but he doesn't write much. No neuroleptic exposure. Denies any CVA, TIA symptoms since last visit, no seizure activity. MRI brain 8/2022: Small scattered hyperintensities on T2/FLAIR imaging are   noted in the periventricular and subcortical white matter demonstrating an appearance that is statistically most likely to represent moderate microangiopathic change. Encephalomalacia identified right cerebellum, right parietal lobe cortex and left   frontal lobe compatible with chronic infarcts. Chronic lacunar infarct also noted in the left centrum semiovale. No evidence of recent infarct. Chronic right maxillary sinus opacification. The following portions of the patient's history were reviewed and updated as appropriate: allergies, current medications, past family history, past medical history, past social history, past surgical history and problem list.       Objective:    Blood pressure 114/64, pulse 57, temperature 98.2 °F (36.8 °C), temperature source Temporal, height 5' 8" (1.727 m), weight 78.4 kg (172 lb 14.4 oz), SpO2 97 %. Physical Exam  Constitutional:       General: He is awake. HENT:      Right Ear: Hearing normal.      Left Ear: Hearing normal.   Eyes:      Extraocular Movements: Extraocular movements intact. Pupils: Pupils are equal, round, and reactive to light. Neurological:      Mental Status: He is alert. Motor: Motor strength is normal.     Deep Tendon Reflexes:      Reflex Scores:       Bicep reflexes are 2+ on the right side and 2+ on the left side. Brachioradialis reflexes are 2+ on the right side and 2+ on the left side. Patellar reflexes are 2+ on the right side and 2+ on the left side. Neurological Exam  Mental Status  Awake and alert. Oriented only to person, place and situation. Speech: Hypophonia, dysarthria (chronic). Language is fluent with no aphasia. Cranial Nerves  CN III, IV, VI: Extraocular movements intact bilaterally. Pupils equal round and reactive to light bilaterally. CN V:  Right: Facial sensation is normal.  Left: Facial sensation is normal on the left. CN VII:  Right: There is no facial weakness. Left: There is no facial weakness. CN VIII:  Right: Hearing is normal.  Left: Hearing is normal.  CN IX, X: Palate elevates symmetrically  CN XI:  Right: Trapezius strength is normal.  Left: Trapezius strength is normal.  CN XII: Tongue midline without atrophy or fasciculations. Motor   Strength is 5/5 throughout all four extremities. Sensory  Light touch is normal in upper and lower extremities. Temperature is normal in upper and lower extremities. Vibration is normal in upper and lower extremities. Reflexes                                            Right                      Left  Brachioradialis                    2+                         2+  Biceps                                 2+                         2+  Patellar                                2+                         2+  Achilles                                Tr                         Tr    Coordination  Right: Finger-to-nose normal. Rapid alternating movement normal.Left: Finger-to-nose normal. Rapid alternating movement abnormality:  See UPDRS III    L>R bradykinesia with decrement  Intermittent left hand resting tremor  LUE cogwheeling. Gait  Casual gait: Unable to rise from chair without using arms. Ambulated with walker, shortened stride L>R, enbloc turn, no freezing, mildly stooped posture.     UPDRSIII                  10/17/23   Speech  1   Facial Expression  1   Postural Tremor (Right) 0   Postural Tremor (Left) 1 Kinetic Tremor (Right)  0   Kinetic Tremor (Left)  0   Rest tremor amplitude RUE 2   Rest tremor amplitude LUE 0   Rest tremor amplitude RLE 0   Rest tremor amplitude LLE 0   Lip/Jaw Tremor  0   Consistency of tremor 1   Finger Taps (Right)   2   Finger Taps (Left)  2   Hand Movement (Right)  1   Hand Movement (Left)   2   Pronation/Supination (Right)  1   Pronation/Supination (Left)   2   Toe Tapping (Right) 0   Toe Tapping (Left) 1   Leg Agility (Right)  0   Leg Agility (Left)   1   Rigidity - Neck     Rigidity - Upper Extremity (Right)  1   Rigidity - Upper Extremity (Left)   2   Rigidity - Lower Extremity (Right)  0   Rigidity - Lower Extremity (Left)   0   Arising from Chair   1    Gait   3-walker   Freezing of Gait 0   Postural Stability     Posture 1   Global spontaneity of movement 1-2       I have personally reviewed the ROS performed by the MA.      ROS:    Review of Systems   Constitutional:  Negative for appetite change, fatigue and fever. HENT: Negative. Negative for hearing loss, tinnitus, trouble swallowing and voice change. Eyes: Negative. Negative for photophobia, pain and visual disturbance. Respiratory: Negative. Negative for shortness of breath. Cardiovascular: Negative. Negative for palpitations. Gastrointestinal: Negative. Negative for nausea and vomiting. Endocrine: Negative. Negative for cold intolerance. Genitourinary: Negative. Negative for dysuria, frequency and urgency. Musculoskeletal:  Positive for gait problem (uses walker, balance issues). Negative for back pain, myalgias and neck pain. Skin: Negative. Negative for rash. Allergic/Immunologic: Negative. Neurological:  Positive for tremors (left hand- all the time). Negative for dizziness, seizures, syncope, facial asymmetry, speech difficulty, weakness, light-headedness, numbness and headaches. Hematological: Negative. Does not bruise/bleed easily. Psychiatric/Behavioral: Negative.   Negative for confusion, hallucinations and sleep disturbance. All other systems reviewed and are negative.

## 2023-10-17 NOTE — PATIENT INSTRUCTIONS
Exam consistent with parkinsonism-given history possible lewy body dementia or parkinson's disease      Recommend to start PT for walking and balance      Recommend to start carbidopa levodopa 25/100 mg-take 1/2 3x/day (about every 6 hours) for 1 week, then 1 tab 3x/day, if no improvement in 1 week can increase to 1.5 tabs 3x/day.  Call if no improvement or side effect concerns (increased hallucinations, dizziness, nausea, fatigue, other abnormal movements)

## 2023-10-18 PROBLEM — G20.C PARKINSONISM: Status: ACTIVE | Noted: 2023-10-18

## 2023-10-18 NOTE — ASSESSMENT & PLAN NOTE
Remains on aspirin and statin for stroke prevention. He will continue yearly follow-up with neurovascular attending.

## 2023-10-18 NOTE — ASSESSMENT & PLAN NOTE
Patient presents for evaluation for parkinsonism at the request of stroke team.  History is limited given patient's cognitive status, caregiver present today has known the patient for the past 2 years. He has a history of  left hand resting tremor for the past 6 to 8 months. His caregiver has also noted dragging of the left leg for the past 3 months. He does have a history of dream reenactment that would be consistent with REM behavioral sleep disorder. It does appear he has a history of dementia with  cognitive changes for at least 2 years, he did have hallucinations last year which are improved with the use of Seroquel. No clear fluctuations of cognition. On exam he does have left hand resting tremor, left greater than right bradykinesia and left upper extremity cogwheeling and  in gait changes that would be consistent with parkinsonism. Time spent discussing diagnosis of parkinsonism. Given cognitive complaints and hallucinations were noted prior to onset of motor symptoms do question lewy body dementia vs idiopathic parkinson's disease. Given some of his changes in gait we did opt to trial carbidopa levodopa. He will start carbidopa levodopa 25/100 mg 1/2 tab TID, if no improvement in 1 week can increase to 1 tab TID. Should report any increase in hallucinations with medication. He would also benefit from referral for physical therapy in which she was agreeable to today. Plan for follow up in 6-8 weeks for reassessment. To contact the office sooner with any concerns or worsening symptoms.

## 2023-10-22 ENCOUNTER — NURSE TRIAGE (OUTPATIENT)
Dept: OTHER | Facility: OTHER | Age: 83
End: 2023-10-22

## 2023-10-22 NOTE — TELEPHONE ENCOUNTER
Patient is calling regarding possible medication reaction. Started on Sinemet 25-100mg on 10/17. Today went to bathroom had BM, was [facility] down the mera and ran back but didn't fully make it to the bathroom. He experienced diarrhea both times, states stool was grey-green color. Did get covid booster yesterday unsure if related, asking if he should continue medication? Per On-call provider, can decrease Sinemet to 1/2 tab twice a day and monitor symptoms. Sinemet or booster could cause some stomach upset. If diarrhea continues and has bad odor, please contact PCP with concern for infection. Call back with any further concerns.       Reason for Disposition   [1] Caller has URGENT medicine question about med that PCP or specialist prescribed AND [2] triager unable to answer question    Protocols used: Medication Question Call-ADULT-

## 2023-10-22 NOTE — TELEPHONE ENCOUNTER
Regarding: medication management  ----- Message from Laurence Hope sent at 10/22/2023 11:56 AM EDT -----  "Galilea Mcrae was seen in the office for Parkinson's, since then they put him on  carbidopa-levodopa (Sinemet)  mg per tablet.  Since being on it he has had really loose bowel movements, should he continue to take this medication?"

## 2023-10-23 NOTE — TELEPHONE ENCOUNTER
Please contact patient and see how he is doing, if diarrhea persists with reduction of sinemet after 3 days can stop sinemet all together. If the diarrhea persists he should follow up with PT. Given he had covid booster at the same time and cannot rule out something like a viral infection would recommend a re-trial of medication in the future after his symptoms have cleared for several weeks.

## 2023-11-10 ENCOUNTER — HOSPITAL ENCOUNTER (OUTPATIENT)
Dept: SLEEP CENTER | Facility: HOSPITAL | Age: 83
Discharge: HOME | End: 2023-11-10
Payer: COMMERCIAL

## 2023-11-10 DIAGNOSIS — G47.33 OSA (OBSTRUCTIVE SLEEP APNEA): ICD-10-CM

## 2023-11-10 PROCEDURE — 95811 POLYSOM 6/>YRS CPAP 4/> PARM: CPT | Performed by: INTERNAL MEDICINE

## 2023-11-10 PROCEDURE — 95811 POLYSOM 6/>YRS CPAP 4/> PARM: CPT

## 2023-11-11 NOTE — PROGRESS NOTES
Sleep Study Documentation    Pre-Sleep Study       Sleep testing procedure explained to patient:YES    Patient napped prior to study:YES- more than 30 minutes. Napped after 2PM: no    Caffeine:Dayshift worker after 12PM.  Caffeine use:NO    Alcohol:Dayshift workers after 5PM: Alcohol use:NO    Typical day for patient:YES       Study Documentation    Sleep Study Indications: JASMEET-diagnosed in-lab study    Sleep Study: Treatment   Optimal PAP pressure: 15 cm H2O with EPR set to 3  Leak:Medium  Snore:Eliminated  REM Obtained:no  Supplemental O2: no    Minimum SaO2 88%  Baseline SaO2 94  PAP mask tried (list all) F and P medium Vitera full face mask  PAP mask choice (final) F and P medium Vitera full face mask  PAP mask type:full face  PAP pressure at which snoring was eliminated 15 cm H2O  Minimum SaO2 at final PAP pressure 88%  Mode of Therapy:BiPAP  ETCO2:No  CPAP changed to BiPAP:Yes. If yes why rare Ohs on CPAP 15 cm H2O in supine position    Mode of Therapy:BiPAP    EKG abnormalities: yes:  EPOCH example and comments: PVCs epoch 409, 643, 762 etc    EEG abnormalities: no    Were abnormal behaviors in sleep observed:NO    Is Total Sleep Study Recording Time < 2 hours: N/A    Is Total Sleep Study Recording Time > 2 hours but study is incomplete: N/A    Is Total Sleep Study Recording Time 6 hours or more but sleep was not obtained: NO    Patient classification: retired       Post-Sleep Study    Medication used at bedtime or during sleep study:YES other prescription medications    Patient reports time it took to fall asleep:less than 20 minutes    Patient reports waking up during study:1 to 2 times. Patient reports returning to sleep without difficulty. Patient reports sleeping more than 8 hours with dreaming.     Does the Patient feel this is a typical night of sleep:typical    Patient rated sleepiness: Somewhat sleepy or tired    PAP treatment:yes: Post PAP treatment patient reports feeling unchanged and would wear PAP mask at home.

## 2023-11-13 DIAGNOSIS — G47.33 OSA (OBSTRUCTIVE SLEEP APNEA): Primary | ICD-10-CM

## 2023-11-21 ENCOUNTER — HOSPITAL ENCOUNTER (EMERGENCY)
Facility: HOSPITAL | Age: 83
Discharge: HOME | End: 2023-11-21
Attending: EMERGENCY MEDICINE
Payer: COMMERCIAL

## 2023-11-21 ENCOUNTER — APPOINTMENT (EMERGENCY)
Dept: RADIOLOGY | Facility: HOSPITAL | Age: 83
End: 2023-11-21
Payer: COMMERCIAL

## 2023-11-21 VITALS
OXYGEN SATURATION: 96 % | SYSTOLIC BLOOD PRESSURE: 120 MMHG | TEMPERATURE: 97.5 F | HEART RATE: 79 BPM | DIASTOLIC BLOOD PRESSURE: 67 MMHG | RESPIRATION RATE: 19 BRPM

## 2023-11-21 DIAGNOSIS — R94.31 ABNORMAL EKG: ICD-10-CM

## 2023-11-21 DIAGNOSIS — J40 BRONCHITIS: ICD-10-CM

## 2023-11-21 DIAGNOSIS — J44.1 COPD EXACERBATION (HCC): Primary | ICD-10-CM

## 2023-11-21 LAB
2HR DELTA HS TROPONIN: 2 NG/L
ACANTHOCYTES BLD QL SMEAR: PRESENT
ALBUMIN SERPL BCP-MCNC: 4.2 G/DL (ref 3.5–5)
ALP SERPL-CCNC: 93 U/L (ref 34–104)
ALT SERPL W P-5'-P-CCNC: 16 U/L (ref 7–52)
ANION GAP SERPL CALCULATED.3IONS-SCNC: 13 MMOL/L
ANISOCYTOSIS BLD QL SMEAR: PRESENT
AST SERPL W P-5'-P-CCNC: 25 U/L (ref 13–39)
ATRIAL RATE: 87 BPM
BASOPHILS # BLD MANUAL: 0 THOUSAND/UL (ref 0–0.1)
BASOPHILS NFR MAR MANUAL: 0 % (ref 0–1)
BILIRUB SERPL-MCNC: 0.9 MG/DL (ref 0.2–1)
BNP SERPL-MCNC: 255 PG/ML (ref 0–100)
BUN SERPL-MCNC: 19 MG/DL (ref 5–25)
CALCIUM SERPL-MCNC: 9.3 MG/DL (ref 8.4–10.2)
CARDIAC TROPONIN I PNL SERPL HS: 7 NG/L
CARDIAC TROPONIN I PNL SERPL HS: 9 NG/L
CHLORIDE SERPL-SCNC: 99 MMOL/L (ref 96–108)
CO2 SERPL-SCNC: 25 MMOL/L (ref 21–32)
CREAT SERPL-MCNC: 1.02 MG/DL (ref 0.6–1.3)
EOSINOPHIL # BLD MANUAL: 0 THOUSAND/UL (ref 0–0.4)
EOSINOPHIL NFR BLD MANUAL: 0 % (ref 0–6)
ERYTHROCYTE [DISTWIDTH] IN BLOOD BY AUTOMATED COUNT: 13.5 % (ref 11.6–15.1)
FLUAV RNA RESP QL NAA+PROBE: NEGATIVE
FLUBV RNA RESP QL NAA+PROBE: NEGATIVE
GFR SERPL CREATININE-BSD FRML MDRD: 68 ML/MIN/1.73SQ M
GLUCOSE SERPL-MCNC: 73 MG/DL (ref 65–140)
HCT VFR BLD AUTO: 47.5 % (ref 36.5–49.3)
HGB BLD-MCNC: 15.1 G/DL (ref 12–17)
LYMPHOCYTES # BLD AUTO: 0.39 THOUSAND/UL (ref 0.6–4.47)
LYMPHOCYTES # BLD AUTO: 2 % (ref 14–44)
MCH RBC QN AUTO: 27.7 PG (ref 26.8–34.3)
MCHC RBC AUTO-ENTMCNC: 31.8 G/DL (ref 31.4–37.4)
MCV RBC AUTO: 87 FL (ref 82–98)
MICROCYTES BLD QL AUTO: PRESENT
MONOCYTES # BLD AUTO: 0.31 THOUSAND/UL (ref 0–1.22)
MONOCYTES NFR BLD: 4 % (ref 4–12)
MYELOCYTES NFR BLD MANUAL: 1 % (ref 0–1)
NEUTROPHILS # BLD MANUAL: 7.02 THOUSAND/UL (ref 1.85–7.62)
NEUTS BAND NFR BLD MANUAL: 12 % (ref 0–8)
NEUTS SEG NFR BLD AUTO: 78 % (ref 43–75)
P AXIS: 36 DEGREES
PLATELET # BLD AUTO: 205 THOUSANDS/UL (ref 149–390)
PLATELET BLD QL SMEAR: ADEQUATE
PMV BLD AUTO: 8.3 FL (ref 8.9–12.7)
POTASSIUM SERPL-SCNC: 4.1 MMOL/L (ref 3.5–5.3)
PR INTERVAL: 160 MS
PROCALCITONIN SERPL-MCNC: 0.54 NG/ML
PROT SERPL-MCNC: 8.1 G/DL (ref 6.4–8.4)
QRS AXIS: 75 DEGREES
QRSD INTERVAL: 86 MS
QT INTERVAL: 344 MS
QTC INTERVAL: 413 MS
RBC # BLD AUTO: 5.45 MILLION/UL (ref 3.88–5.62)
RBC MORPH BLD: PRESENT
RSV RNA RESP QL NAA+PROBE: NEGATIVE
SARS-COV-2 RNA RESP QL NAA+PROBE: NEGATIVE
SODIUM SERPL-SCNC: 137 MMOL/L (ref 135–147)
T WAVE AXIS: 67 DEGREES
VARIANT LYMPHS # BLD AUTO: 3 %
VENTRICULAR RATE: 87 BPM
WBC # BLD AUTO: 7.8 THOUSAND/UL (ref 4.31–10.16)

## 2023-11-21 PROCEDURE — 71045 X-RAY EXAM CHEST 1 VIEW: CPT

## 2023-11-21 PROCEDURE — 36415 COLL VENOUS BLD VENIPUNCTURE: CPT | Performed by: PHYSICIAN ASSISTANT

## 2023-11-21 PROCEDURE — 0241U HB NFCT DS VIR RESP RNA 4 TRGT: CPT | Performed by: PHYSICIAN ASSISTANT

## 2023-11-21 PROCEDURE — 93005 ELECTROCARDIOGRAM TRACING: CPT

## 2023-11-21 PROCEDURE — 85007 BL SMEAR W/DIFF WBC COUNT: CPT | Performed by: PHYSICIAN ASSISTANT

## 2023-11-21 PROCEDURE — 96374 THER/PROPH/DIAG INJ IV PUSH: CPT

## 2023-11-21 PROCEDURE — 84484 ASSAY OF TROPONIN QUANT: CPT | Performed by: PHYSICIAN ASSISTANT

## 2023-11-21 PROCEDURE — 80053 COMPREHEN METABOLIC PANEL: CPT | Performed by: PHYSICIAN ASSISTANT

## 2023-11-21 PROCEDURE — 85027 COMPLETE CBC AUTOMATED: CPT | Performed by: PHYSICIAN ASSISTANT

## 2023-11-21 PROCEDURE — 99285 EMERGENCY DEPT VISIT HI MDM: CPT

## 2023-11-21 PROCEDURE — 84145 PROCALCITONIN (PCT): CPT | Performed by: PHYSICIAN ASSISTANT

## 2023-11-21 PROCEDURE — 94640 AIRWAY INHALATION TREATMENT: CPT

## 2023-11-21 PROCEDURE — 93010 ELECTROCARDIOGRAM REPORT: CPT | Performed by: INTERNAL MEDICINE

## 2023-11-21 PROCEDURE — 83880 ASSAY OF NATRIURETIC PEPTIDE: CPT | Performed by: PHYSICIAN ASSISTANT

## 2023-11-21 PROCEDURE — 99285 EMERGENCY DEPT VISIT HI MDM: CPT | Performed by: PHYSICIAN ASSISTANT

## 2023-11-21 RX ORDER — AZITHROMYCIN 500 MG/1
500 TABLET, FILM COATED ORAL ONCE
Status: COMPLETED | OUTPATIENT
Start: 2023-11-21 | End: 2023-11-21

## 2023-11-21 RX ORDER — METHYLPREDNISOLONE SODIUM SUCCINATE 125 MG/2ML
125 INJECTION, POWDER, LYOPHILIZED, FOR SOLUTION INTRAMUSCULAR; INTRAVENOUS ONCE
Status: COMPLETED | OUTPATIENT
Start: 2023-11-21 | End: 2023-11-21

## 2023-11-21 RX ORDER — ALBUTEROL SULFATE 90 UG/1
2 AEROSOL, METERED RESPIRATORY (INHALATION) EVERY 6 HOURS PRN
Qty: 18 G | Refills: 0 | Status: SHIPPED | OUTPATIENT
Start: 2023-11-21

## 2023-11-21 RX ORDER — METHYLPREDNISOLONE SOD SUCC 125 MG
1 VIAL (EA) INJECTION ONCE
Status: COMPLETED | OUTPATIENT
Start: 2023-11-21 | End: 2023-11-21

## 2023-11-21 RX ORDER — IPRATROPIUM BROMIDE AND ALBUTEROL SULFATE .5; 3 MG/3ML; MG/3ML
1 SOLUTION RESPIRATORY (INHALATION) ONCE
Status: COMPLETED | OUTPATIENT
Start: 2023-11-21 | End: 2023-11-21

## 2023-11-21 RX ORDER — ALBUTEROL SULFATE 2.5 MG/3ML
1 SOLUTION RESPIRATORY (INHALATION) ONCE
Status: COMPLETED | OUTPATIENT
Start: 2023-11-21 | End: 2023-11-21

## 2023-11-21 RX ORDER — PREDNISONE 10 MG/1
TABLET ORAL
Qty: 30 TABLET | Refills: 0 | Status: SHIPPED | OUTPATIENT
Start: 2023-11-21 | End: 2023-12-02

## 2023-11-21 RX ORDER — ALBUTEROL SULFATE 2.5 MG/3ML
2.5 SOLUTION RESPIRATORY (INHALATION) EVERY 6 HOURS PRN
Qty: 75 ML | Refills: 0 | Status: SHIPPED | OUTPATIENT
Start: 2023-11-21

## 2023-11-21 RX ORDER — AZITHROMYCIN 250 MG/1
TABLET, FILM COATED ORAL
Qty: 6 TABLET | Refills: 0 | Status: SHIPPED | OUTPATIENT
Start: 2023-11-21 | End: 2023-11-25

## 2023-11-21 RX ORDER — IPRATROPIUM BROMIDE AND ALBUTEROL SULFATE 2.5; .5 MG/3ML; MG/3ML
3 SOLUTION RESPIRATORY (INHALATION)
Status: DISCONTINUED | OUTPATIENT
Start: 2023-11-21 | End: 2023-11-22 | Stop reason: HOSPADM

## 2023-11-21 RX ADMIN — METHYLPREDNISOLONE SODIUM SUCCINATE 125 MG: 125 INJECTION, POWDER, FOR SOLUTION INTRAMUSCULAR; INTRAVENOUS at 16:58

## 2023-11-21 RX ADMIN — IPRATROPIUM BROMIDE AND ALBUTEROL SULFATE 3 ML: 2.5; .5 SOLUTION RESPIRATORY (INHALATION) at 16:50

## 2023-11-21 RX ADMIN — AZITHROMYCIN 500 MG: 500 TABLET, FILM COATED ORAL at 20:19
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
## 2023-11-22 ENCOUNTER — TELEPHONE (OUTPATIENT)
Dept: NEUROLOGY | Facility: CLINIC | Age: 83
End: 2023-11-22

## 2023-11-22 ENCOUNTER — VBI (OUTPATIENT)
Dept: FAMILY MEDICINE CLINIC | Facility: HOSPITAL | Age: 83
End: 2023-11-22

## 2023-11-22 DIAGNOSIS — Z71.89 COMPLEX CARE COORDINATION: Primary | ICD-10-CM

## 2023-11-22 NOTE — DISCHARGE INSTRUCTIONS
Discussed return emergency department for any newly developing or worsening signs or symptoms. Patient understood all instructions prior to discharge and plan agreed upon by patient and myself. Discussed overall common severe and commonly common side effects of prescription medication and advised review of all prescription package inserts for personal review prior to taking prescribed medications. Discussed use of nasal saline and nasal clearance with appropriate modalities (I.e. blowing, v. Suction, etc). Discussed use of saline nebs for lower respiratory clearance.  Techniques for upper and lower airway clearance prior to meals, recumbency, periods of sleep, etc.

## 2023-11-22 NOTE — ED NOTES
Patient caregiver called for ride home, Maira Jean Baptiste able to come approx 30-40 minutes to drive patient home.       Lila Roberts RN  11/21/23 2026

## 2023-11-22 NOTE — TELEPHONE ENCOUNTER
11/22/23 8:30 AM    Patient contacted post ED visit, first outreach attempt made. Message was left for patient to return a call to the VBI Department at [facility]: Phone [phone]. Thank you.   TEGAN MANUEL  [facility]

## 2023-11-22 NOTE — TELEPHONE ENCOUNTER
11/22/23 9:03 AM    Patient contacted post ED visit, VBI department spoke with patient/caregiver and outreach was successful. Thank you.   TEGAN MANUEL  [facility]

## 2023-11-22 NOTE — RESULT ENCOUNTER NOTE
Called patient and spoke with his caregiver. I informed him of left lung base opacity. He has not had an increased cough or fevers. I instructed them to f/u with PCP for recheck and repeat CXR.

## 2023-11-24 ENCOUNTER — PATIENT OUTREACH (OUTPATIENT)
Dept: FAMILY MEDICINE CLINIC | Facility: HOSPITAL | Age: 83
End: 2023-11-24

## 2023-11-24 NOTE — PROGRESS NOTES
Outpatient Care Management Note:    New care management referral received from . Chart review: Patient was in ER on 11/21/23 with COPD exacerbation. He was complaining of being short of breath and his pulse ox was 72% when EMS arrived. There were bed bugs in the home and on patient. He was stabilized with nebulizer treatments and steroids in ER. He is to follow up with his PCP and was referred to cardiology. Voice mail message left for Lata Dfufy and Pooja Muro ( who is listed on his communication form), with my contact information, introducing myself and requesting a call back.

## 2023-11-27 ENCOUNTER — PATIENT OUTREACH (OUTPATIENT)
Dept: FAMILY MEDICINE CLINIC | Facility: HOSPITAL | Age: 83
End: 2023-11-27

## 2023-11-27 NOTE — LETTER
Date: 11/27/23    Dear Samra Pederson,   My name is Brionna Guerrero. I am a registered nurse care manager working with   [address] [facility] [place] [id number]. I have not been able to reach you and would like to set a time that I can talk with you over the phone. My work is to help patients that have complex medical conditions get the care they need. This includes patients who may have been in the hospital or emergency room. Please call me with any questions you may have. I look forward to speaking with you.   Sincerely,  Brionna Guerrero  [phone]  Outpatient Care Manager  Copy:  (primary care physician name and address)

## 2023-11-27 NOTE — PROGRESS NOTES
Outpatient Care Management Note:    Voice mail message left for Cata Hsu and Genna Gonzales, with my contact information, requesting a call back. (2nd attempt)    Unable to reach letter sent via my chart.

## 2023-11-29 ENCOUNTER — OFFICE VISIT (OUTPATIENT)
Dept: NEUROLOGY | Facility: CLINIC | Age: 83
End: 2023-11-29
Payer: COMMERCIAL

## 2023-11-29 VITALS
BODY MASS INDEX: 25.64 KG/M2 | HEART RATE: 58 BPM | HEIGHT: 68 IN | TEMPERATURE: 98.3 F | WEIGHT: 169.2 LBS | DIASTOLIC BLOOD PRESSURE: 64 MMHG | OXYGEN SATURATION: 98 % | SYSTOLIC BLOOD PRESSURE: 118 MMHG

## 2023-11-29 DIAGNOSIS — G20.C PARKINSONISM: Primary | ICD-10-CM

## 2023-11-29 PROCEDURE — 99213 OFFICE O/P EST LOW 20 MIN: CPT | Performed by: NURSE PRACTITIONER

## 2023-11-29 NOTE — PROGRESS NOTES
Patient ID: Raquel Horton is a 81 y.o. male. Assessment/Plan:    Parkinsonism  Left sided bradykinesia, rigidity, changes in gait and left hand resting tremor consistent with parkinsonism. He does have a diagnosis of dementia-follows with senior care. Cognitive symptoms with hallucinations started prior to motor symptoms so do question lewy body dementia vs parkinson's disease. He did attempt trial of sinemet however did not tolerate increases due to fatigue. Discussed slow increases to limit side effects vs trial of sinemet CR. He will increase sinemet 25/100 mg as follows:  Week 1: 1/2 tab TID  Week 2: 1 tab in the AM, 1/2 tab in the afternoon and evening  Week 3: 1 tab in the AM and afternoon, 1/2 tab in the evenign  Week 4: 1 tab TID    He should contact the office if no improvement or side effect concerns. If he experiences side effects again would wean off medication and consider sinemet CR. I did again recommend PT for gait, however patient does not wish to pursue. Plan for follow up in 3 months. To contact the office sooner with any concerns or worsening symptoms. Diagnoses and all orders for this visit:    Parkinsonism           Subjective:    HPI    Patient is an 28-year-old male with history of CVA, TIA, CAD, bilateral carotid stenosis,  L vert artery stenosis, HLD, HTN, PAD, peripheral neuropathy who presents for follow up for parkinsonism. He does follow with our office for history of stroke and seizure, most recently seen in March 2023 by neurovascular team.  He is maintained on aspirin, Plavix, statin. He was referred to movement team for concern for parkinsonism on exam of left hand pill-rolling tremor, increased rigidity, and bradykinesia that was noted at that visit, noted 3 months prior increased difficulty with gait, left leg drag.       He does follow with senior care for dementia.     last office visit 10/2023 in which he was started on sinemet, was referred to PT. Current medications:   Sinemet 25/100 mg 1 tab daily     Interval History:   He presents with his caregiver Maren Denton who assists with history who has known him for the past 2 years. He did contact the office after starteing sinemet as he was experiencing diarrhea-he did have 2 vaccines at the time so was unsure if it was related to this or medication. He did go back on sinemet, however he was unable to tolerate increased doses due to PT. Benefit is questionable as he could not tolerate higher doses. He does not want to do PT.         MRI brain 8/2022: Small scattered hyperintensities on T2/FLAIR imaging are   noted in the periventricular and subcortical white matter demonstrating an appearance that is statistically most likely to represent moderate microangiopathic change. Encephalomalacia identified right cerebellum, right parietal lobe cortex and left   frontal lobe compatible with chronic infarcts. Chronic lacunar infarct also noted in the left centrum semiovale. No evidence of recent infarct. Chronic right maxillary sinus opacification. The following portions of the patient's history were reviewed and updated as appropriate: allergies, current medications, past family history, past medical history, past social history, past surgical history and problem list.          Objective:    Blood pressure 118/64, pulse 58, temperature 98.3 °F (36.8 °C), temperature source Temporal, height 5' 8" (1.727 m), weight 76.7 kg (169 lb 3.2 oz), SpO2 98 %. Physical Exam  Constitutional:       General: He is awake. HENT:      Right Ear: Hearing normal.      Left Ear: Hearing normal.   Eyes:      Extraocular Movements: Extraocular movements intact. Pupils: Pupils are equal, round, and reactive to light. Neurological:      Mental Status: He is alert. Motor: Motor strength is normal.        Neurological Exam  Mental Status  Awake and alert. Oriented only to person, place and situation.  Speech: Hypophonia, dysarthria (chronic). Language is fluent with no aphasia. Cranial Nerves  CN III, IV, VI: Extraocular movements intact bilaterally. Pupils equal round and reactive to light bilaterally. CN V:  Right: Facial sensation is normal.  Left: Facial sensation is normal on the left. CN VII:  Right: There is no facial weakness. Left: There is no facial weakness. CN VIII:  Right: Hearing is normal.  Left: Hearing is normal.  CN IX, X: Palate elevates symmetrically  CN XI:  Right: Trapezius strength is normal.  Left: Trapezius strength is normal.  CN XII: Tongue midline without atrophy or fasciculations. Motor   Strength is 5/5 throughout all four extremities. Sensory  Light touch is normal in upper and lower extremities. Coordination  Right: Finger-to-nose normal. Rapid alternating movement normal.Left: Finger-to-nose normal. Rapid alternating movement abnormality:  See UPDRS III    L>R bradykinesia with decrement  Intermittent left hand resting tremor  LUE cogwheeling. Gait  Casual gait: Unable to rise from chair without using arms. Ambulated with walker, shortened stride L>R, enbloc turn, no freezing, mildly stooped posture.     UPDRSIII                  10/17/23 11/29/23   Speech  1 1   Facial Expression  1 1   Postural Tremor (Right) 0 0   Postural Tremor (Left) 1 0   Kinetic Tremor (Right)  0 0   Kinetic Tremor (Left)  0 0   Rest tremor amplitude RUE 2 10   Rest tremor amplitude LUE 0 0   Rest tremor amplitude RLE 0 0   Rest tremor amplitude LLE 0 0   Lip/Jaw Tremor  0 0   Consistency of tremor 1 1   Finger Taps (Right)   2 2   Finger Taps (Left)  2 2   Hand Movement (Right)  1 1   Hand Movement (Left)   2 2   Pronation/Supination (Right)  1 1   Pronation/Supination (Left)   2 2   Toe Tapping (Right) 0 1   Toe Tapping (Left) 1 2   Leg Agility (Right)  0 1   Leg Agility (Left)   1 1   Rigidity - Neck       Rigidity - Upper Extremity (Right)  1 1   Rigidity - Upper Extremity (Left)   2 2 Rigidity - Lower Extremity (Right)  0 0   Rigidity - Lower Extremity (Left)   0 0   Arising from Chair   1 1    Gait   3-walker 3-walker   Freezing of Gait 0 0   Postural Stability       Posture 1 1   Global spontaneity of movement 1-2 1-2       I have personally reviewed the ROS performed by the MA.    ROS:    Review of Systems   Constitutional:  Negative for appetite change, fatigue and fever. HENT: Negative. Negative for hearing loss, tinnitus, trouble swallowing and voice change. Eyes: Negative. Negative for photophobia, pain and visual disturbance. Respiratory: Negative. Negative for shortness of breath. Cardiovascular: Negative. Negative for palpitations. Gastrointestinal: Negative. Negative for nausea and vomiting. Endocrine: Negative. Negative for cold intolerance. Genitourinary: Negative. Negative for dysuria, frequency and urgency. Musculoskeletal:  Positive for gait problem (walks with a walker). Negative for back pain, myalgias and neck pain. Skin: Negative. Negative for rash. Allergic/Immunologic: Negative. Neurological:  Positive for tremors (left hand the same since last visit, occasinally right hand) and weakness (b/l legs). Negative for dizziness, seizures, syncope, facial asymmetry, speech difficulty, light-headedness, numbness and headaches. Hematological: Negative. Does not bruise/bleed easily. Psychiatric/Behavioral: Negative. Negative for confusion, hallucinations and sleep disturbance. All other systems reviewed and are negative.

## 2023-11-29 NOTE — PATIENT INSTRUCTIONS
Retrial increase in sinemet as follows:  Week 1: 1/2 tab 3x/day  Week 2: 1 tab in the AM, 1/2 tab in the afternoon, and evening  Week 3: 1 tab in the AM and afternoon, 1/2 tab in the evening  Week 4: 1 tab 3x/day    Would space about every 6 hours    Call if no improvement in symptoms or side effects

## 2023-11-29 NOTE — ASSESSMENT & PLAN NOTE
Left sided bradykinesia, rigidity, changes in gait and left hand resting tremor consistent with parkinsonism. He does have a diagnosis of dementia-follows with senior care. Cognitive symptoms with hallucinations started prior to motor symptoms so do question lewy body dementia vs parkinson's disease. He did attempt trial of sinemet however did not tolerate increases due to fatigue. Discussed slow increases to limit side effects vs trial of sinemet CR. He will increase sinemet 25/100 mg as follows:  Week 1: 1/2 tab TID  Week 2: 1 tab in the AM, 1/2 tab in the afternoon and evening  Week 3: 1 tab in the AM and afternoon, 1/2 tab in the evenign  Week 4: 1 tab TID    He should contact the office if no improvement or side effect concerns. If he experiences side effects again would wean off medication and consider sinemet CR. I did again recommend PT for gait, however patient does not wish to pursue. Plan for follow up in 3 months. To contact the office sooner with any concerns or worsening symptoms.

## 2023-12-01 ENCOUNTER — OFFICE VISIT (OUTPATIENT)
Dept: FAMILY MEDICINE CLINIC | Facility: HOSPITAL | Age: 83
End: 2023-12-01
Payer: COMMERCIAL

## 2023-12-01 VITALS
SYSTOLIC BLOOD PRESSURE: 126 MMHG | WEIGHT: 172.2 LBS | HEART RATE: 56 BPM | DIASTOLIC BLOOD PRESSURE: 76 MMHG | HEIGHT: 68 IN | TEMPERATURE: 97.7 F | BODY MASS INDEX: 26.1 KG/M2

## 2023-12-01 DIAGNOSIS — G47.33 OSA (OBSTRUCTIVE SLEEP APNEA): ICD-10-CM

## 2023-12-01 DIAGNOSIS — E78.5 HYPERLIPIDEMIA, UNSPECIFIED HYPERLIPIDEMIA TYPE: ICD-10-CM

## 2023-12-01 DIAGNOSIS — J44.1 COPD EXACERBATION (HCC): Primary | ICD-10-CM

## 2023-12-01 DIAGNOSIS — R91.8 OPACITY OF LUNG ON IMAGING STUDY: ICD-10-CM

## 2023-12-01 DIAGNOSIS — I10 ESSENTIAL HYPERTENSION: ICD-10-CM

## 2023-12-01 PROBLEM — J40 BRONCHITIS: Status: RESOLVED | Noted: 2023-11-21 | Resolved: 2023-12-01

## 2023-12-01 PROCEDURE — 99214 OFFICE O/P EST MOD 30 MIN: CPT | Performed by: NURSE PRACTITIONER

## 2023-12-01 NOTE — PROGRESS NOTES
Name: Chino Pena      : 1940      MRN: [id number]  Encounter Provider: ELE Tovar  Encounter Date: 2023   Encounter department: [address]     1. COPD exacerbation ([address])  Assessment & Plan:  Improved s/p ED evaluation  Scripts given to obtain nebulizer and supplies - advise he use albuterol via nebulizer prn SOB/wheezing  Advise he obtain RSV vaccine at pharmacy - he is agreeable    Orders:  -     Nebulizer Supplies  -     Nebulizer    2. Essential hypertension  Assessment & Plan:  Well controlled on current meds  Continue same doses as rx'd  Return in 3 months      3. Opacity of lung on imaging study  Comments:  recheck CXR mid December as ordered  Orders:  -     XR chest pa & lateral; Future; Expected date: 12/15/2023    4. JASMEET (obstructive sleep apnea)  Assessment & Plan:  F/U with pulmonary as scheduled next week      5. Hyperlipidemia, unspecified hyperlipidemia type  Assessment & Plan:  Update FLP as previously ordered before next appt in March      Update AWV at next appt in March       Subjective      Here with caretaker Chaya Mike and states he has been well. Was seen in ED on  for bronchitis/COPD exacerbation. Was discharged with Zpack and prednisone and has been feeling better. Hasn't had the need for inhaler. States he was discharged w/nebulizer solution but they don't have a nebulizer. Appt w/sleep medicine next week. Has had shingrix, flu and covid boosters this season at pharmacy. Review of Systems   Constitutional: Negative. Respiratory: Negative. Cardiovascular: Negative. Neurological: Negative.         Current Outpatient Medications on File Prior to Visit   Medication Sig    albuterol (2.5 mg/3 mL) 0.083 % nebulizer solution Take 3 mL (2.5 mg total) by nebulization every 6 (six) hours as needed for wheezing or shortness of breath    albuterol (Ventolin HFA) 90 mcg/act inhaler Inhale 2 puffs every 6 (six) hours as needed for wheezing    amLODIPine (NORVASC) 5 mg tablet TAKE 1 TABLET (5 MG TOTAL) BY MOUTH DAILY. aspirin (ECOTRIN LOW STRENGTH) 81 mg EC tablet Take 1 tablet (81 mg total) by mouth daily    atorvastatin (LIPITOR) 40 mg tablet TAKE 1 TABLET BY MOUTH EVERY DAY    carbidopa-levodopa (Sinemet)  mg per tablet Take 1/2 tab TID for 1 week then 1 tab TID, if no improvement after 1 week increase to 1.5 tabs TID    carvedilol (COREG) 12.5 mg tablet TAKE 1 TABLET BY MOUTH 2 TIMES A DAY. chlorthalidone 25 mg tablet TAKE 1 TABLET (25 MG TOTAL) BY MOUTH DAILY. Cholecalciferol (Vitamin D3) 50 MCG (2000 UT) capsule     clopidogrel (PLAVIX) 75 mg tablet TAKE 1 TABLET BY MOUTH EVERY DAY    escitalopram (LEXAPRO) 10 mg tablet TAKE 1 TABLET BY MOUTH EVERY DAY    ferrous sulfate 325 (65 Fe) mg tablet Take 325 mg by mouth daily with dinner    fluticasone (FLONASE) 50 mcg/act nasal spray USE 2 SPRAYS IN EACH NOSTRIL AT BEDTIME    levETIRAcetam (KEPPRA) 100 mg/mL oral solution Take 7.5 mL (750 mg total) by mouth 2 (two) times a day    pantoprazole (PROTONIX) 40 mg tablet TAKE 1 TABLET BY MOUTH EVERY DAY    predniSONE 10 mg tablet Take 4 tablets (40 mg total) by mouth daily for 3 days, THEN 3 tablets (30 mg total) daily for 3 days, THEN 2 tablets (20 mg total) daily for 3 days, THEN 1 tablet (10 mg total) daily for 3 days. QUEtiapine (SEROquel) 25 mg tablet TAKE 1/2 TABLET IN MORNING AND 1 TABLET IN EVENING BY MOUTH DAILY       Objective     /76   Pulse 56   Temp 97.7 °F (36.5 °C) (Tympanic)   Ht 5' 8" (1.727 m)   Wt 78.1 kg (172 lb 3.2 oz)   BMI 26.18 kg/m²       Physical Exam  Vitals reviewed. Constitutional:       General: He is not in acute distress. Appearance: Normal appearance. Eyes:      General: No scleral icterus. Neck:      Thyroid: No thyromegaly. Vascular: No carotid bruit. Cardiovascular:      Rate and Rhythm: Normal rate and regular rhythm.       Heart sounds: Murmur (2/6 JOI) heard. Pulmonary:      Effort: Pulmonary effort is normal. No respiratory distress. Breath sounds: Decreased breath sounds present. Comments: No cough  Musculoskeletal:      Cervical back: Normal range of motion. Right lower leg: No edema. Left lower leg: No edema. Lymphadenopathy:      Cervical: No cervical adenopathy. Skin:     General: Skin is warm and dry. Neurological:      General: No focal deficit present. Mental Status: He is alert and oriented to person, place, and time. Gait: Gait abnormal (steady w/walker). Psychiatric:         Mood and Affect: Mood normal.         Speech: Speech is slurred (as per his usual). Behavior: Behavior normal.         Thought Content:  Thought content normal.         Judgment: Judgment normal.         ELE Martinez

## 2023-12-01 NOTE — ASSESSMENT & PLAN NOTE
Improved s/p ED evaluation  Scripts given to obtain nebulizer and supplies - advise he use albuterol via nebulizer prn SOB/wheezing  Advise he obtain RSV vaccine at pharmacy - he is agreeable

## 2023-12-05 ENCOUNTER — TELEPHONE (OUTPATIENT)
Dept: SLEEP CENTER | Facility: CLINIC | Age: 83
End: 2023-12-05

## 2023-12-05 ENCOUNTER — OFFICE VISIT (OUTPATIENT)
Age: 83
End: 2023-12-05
Payer: COMMERCIAL

## 2023-12-05 VITALS
TEMPERATURE: 96.8 F | SYSTOLIC BLOOD PRESSURE: 122 MMHG | BODY MASS INDEX: 26.52 KG/M2 | DIASTOLIC BLOOD PRESSURE: 64 MMHG | HEART RATE: 54 BPM | WEIGHT: 175 LBS | HEIGHT: 68 IN | OXYGEN SATURATION: 96 %

## 2023-12-05 DIAGNOSIS — R06.00 DYSPNEA, UNSPECIFIED TYPE: ICD-10-CM

## 2023-12-05 DIAGNOSIS — G47.33 OSA (OBSTRUCTIVE SLEEP APNEA): Primary | ICD-10-CM

## 2023-12-05 DIAGNOSIS — G47.59 OTHER PARASOMNIA: ICD-10-CM

## 2023-12-05 DIAGNOSIS — G47.61 PERIODIC LIMB MOVEMENTS OF SLEEP: ICD-10-CM

## 2023-12-05 PROCEDURE — 99214 OFFICE O/P EST MOD 30 MIN: CPT | Performed by: INTERNAL MEDICINE
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
## 2023-12-05 NOTE — ASSESSMENT & PLAN NOTE
Frequent episodes of acting out dreams while sleeping. Frequent movement of the arms and legs. Will treat sleep apnea and see if this resolves. There is some concern for alpha-synuclein apathy's. No REM behavior disorder but no REM sleep was noted during the July 2023 PSG. There was also no REM sleep noted during the BiPAP study in November.

## 2023-12-05 NOTE — ASSESSMENT & PLAN NOTE
Recently had a ED visit for sudden onset shortness of breath and hypoxia. He had wheezing and respiratory distress. This is apparently never happened before according to the patient. He does not have any respiratory symptoms at baseline. He was given albuterol nebulization and improved. He was also given a dose of steroids. In the ED he was discharged on room air. He has had no respiratory symptoms since then. He is pending a repeat chest x-ray to evaluate the left lung opacity. He has albuterol inhaled and nebulized at home if needed. No pulmonary function testing on file  He quit smoking 30 years ago.

## 2023-12-05 NOTE — ASSESSMENT & PLAN NOTE
Severe obstructive sleep apnea diagnosed via PSG in July 2023. AHI 54.7.  238 minutes with SpO2 below 90%. This is associated with unrefreshing sleep, daytime sleepiness, witnessed apneas, gasping during sleep, snoring. He underwent BiPAP study showed necessity of auto titrating BiPAP with min EPAP 10, PS V4, IPAP 15    This BiPAP has been ordered. I have messaged the sleep department to process this order    I told the patient that a DME, [facility] will call him for set up. I will follow-up with him in 2 to 3 months for compliance data reviewed.

## 2023-12-05 NOTE — TELEPHONE ENCOUNTER
Patient with severe JASMEET AHI 54.7 completed CPAP study and BIPAP is ordered for the patient   Patient seen by Dr. Oswald Pimentel [facility]     Scheduled for today with Dr. Alice Cardona in [place] pulmonary patient has COPD. Was unable to leave a message for the patient.  Some one picks up then hangs up the phone

## 2023-12-05 NOTE — PATIENT INSTRUCTIONS
Nursing Support:  When: Monday through Friday 7A-5PM except holidays  Where: Our direct line is [phone]. CPAP MAINTENANCE AND MANAGEMENT  1. Continue use of CPAP equipment nightly - use any time you are laying down to rest, watch TV, etc to increase use and in case you fall asleep to prevent falling asleep without it  2. If air is too hot, turn down the tubing heating setting  3. If excessive dry mouth in the morning, turn up humidifier setting and be sure to only use distilled water in your humidifier. You can also turn down the tubing heating setting  4. Change your filter regularly and wash the non-disposable filter  5. Continue to clean your equipment, as discussed, using mild soap and water. You can use unscented baby wipe on the mask. 6.  Contact the Sleep Disorders Center with any questions or concerns prior to your next visit, as needed  7. Schedule visit for follow-up in 3 months. You should see your sleep physician at least once a year. HOW TO CLEAN YOUR AUTO CPAP MACHINE    Mask: Clean the cushion of your mask daily. Dish soap and warm water. (Usually eligible for mask cushions every 3 months)  2. Headgear: Once a week. I find when you wash it daily it eats the material of the Velcro faster.  (Usually eligible for new headgear every 6 months)  3. Heated Tubing: Clean the tube every 3-7 days. One drop of dish soap run warm water through the tube then hang it over your shower curtain and let it drip dry. (Usually eligible every 3 months)  4. Humidifier: Rinse out every 1-3 days. Dish soap warm water or , top shelf. (eligible every 6 months) **DISTILLED WATER ONLY**  5. Filters:  Dark blue (reusable for 6 months)- Rinse under warm water (no soap) sit and drip dry every 2-3 weeks. (eligible for a new one every 6 months)  Light Blue (disposable) throw away every 2-4 weeks depending on how dirty it looks.  (eligible for 6 every 3 months)    Check with your insurance and durable medical equipment company regarding supply replacements. If you are having a true emergency please call 911. In the event that the line is busy or it is after hours please leave a voice message and we will return your call. Please speak clearly, leaving your full name, birth date, best number to reach you and the reason for your call. Medication refills: We will need the name of the medication, the dosage, the ordering provider, whether you get a 30 or 90 day refill, and the pharmacy name and address. Medications will be ordered by the provider only. Nurses cannot call in prescriptions. Please allow 7 days for medication refills. Physician requested updates: If your provider requested that you call with an update after starting medication, please be ready to provide us the medication and dosage, what time you take your medication, the time you attempt to fall asleep, time you fall asleep, when you wake up, and what time you get out of bed. Sleep Study Results: We will contact you with sleep study results and/or next steps after the physician has reviewed your testing. Usually one of our nurses will call to talk about the results within 1-2 weeks of testing.

## 2023-12-05 NOTE — ASSESSMENT & PLAN NOTE
Iron levels were 73 and ferritin was 69 in January 2023. Will have to monitor symptoms after initiation of BiPAP. He is already on Sinemet. Also on Lexapro and quetiapine.

## 2023-12-06 NOTE — TELEPHONE ENCOUNTER
Per Dr. Niels Walker     I am seeing Mr. Lula Price in the office and relayed the sleep study results - please process his BIPAP order and we will see him in follow up in 3 months in [facility]

## 2023-12-07 LAB

## 2023-12-11 ENCOUNTER — PATIENT OUTREACH (OUTPATIENT)
Dept: FAMILY MEDICINE CLINIC | Facility: HOSPITAL | Age: 83
End: 2023-12-11

## 2023-12-11 NOTE — PROGRESS NOTES
Outpatient Care Management Note:    No response to telephone calls or unable to reach letter sent to patient. Patient closed to care management services, but CM will remain available if they call back. Re-consult as needed.  12/11/23

## 2023-12-13 ENCOUNTER — TELEPHONE (OUTPATIENT)
Dept: PSYCHIATRY | Facility: CLINIC | Age: 83
End: 2023-12-13

## 2023-12-14 DIAGNOSIS — G40.909 SEIZURE DISORDER (HCC): ICD-10-CM

## 2023-12-14 LAB
DME PARACHUTE DELIVERY DATE ACTUAL: NORMAL
DME PARACHUTE DELIVERY DATE EXPECTED: NORMAL
DME PARACHUTE DELIVERY DATE REQUESTED: NORMAL
DME PARACHUTE ITEM DESCRIPTION: NORMAL
DME PARACHUTE ORDER STATUS: NORMAL
DME PARACHUTE SUPPLIER NAME: NORMAL
DME PARACHUTE SUPPLIER PHONE: NORMAL

## 2023-12-14 RX ORDER — LEVETIRACETAM 100 MG/ML
7.5 SOLUTION ORAL 2 TIMES DAILY
Qty: 300 ML | Refills: 2 | Status: SHIPPED | OUTPATIENT
Start: 2023-12-14

## 2023-12-25 DIAGNOSIS — I10 PRIMARY HYPERTENSION: ICD-10-CM

## 2023-12-25 RX ORDER — CHLORTHALIDONE 25 MG/1
25 TABLET ORAL DAILY
Qty: 90 TABLET | Refills: 2 | Status: SHIPPED | OUTPATIENT
Start: 2023-12-25

## 2024-01-03 DIAGNOSIS — K21.9 GASTROESOPHAGEAL REFLUX DISEASE WITHOUT ESOPHAGITIS: ICD-10-CM

## 2024-01-04 RX ORDER — PANTOPRAZOLE SODIUM 40 MG/1
TABLET, DELAYED RELEASE ORAL
Qty: 90 TABLET | Refills: 0 | Status: SHIPPED | OUTPATIENT
Start: 2024-01-04

## 2024-01-07 DIAGNOSIS — R09.81 SINUS CONGESTION: ICD-10-CM

## 2024-01-07 RX ORDER — FLUTICASONE PROPIONATE 50 MCG
SPRAY, SUSPENSION (ML) NASAL
Qty: 48 ML | Refills: 2 | Status: SHIPPED | OUTPATIENT
Start: 2024-01-07

## 2024-01-12 ENCOUNTER — HOSPITAL ENCOUNTER (OUTPATIENT)
Dept: RADIOLOGY | Facility: HOSPITAL | Age: 84
End: 2024-01-12
Payer: COMMERCIAL

## 2024-01-12 DIAGNOSIS — R91.8 OPACITY OF LUNG ON IMAGING STUDY: ICD-10-CM

## 2024-01-12 PROCEDURE — 71046 X-RAY EXAM CHEST 2 VIEWS: CPT

## 2024-01-18 ENCOUNTER — TELEPHONE (OUTPATIENT)
Dept: PSYCHIATRY | Facility: CLINIC | Age: 84
End: 2024-01-18

## 2024-01-24 ENCOUNTER — APPOINTMENT (EMERGENCY)
Dept: CT IMAGING | Facility: HOSPITAL | Age: 84
DRG: 689 | End: 2024-01-24
Attending: EMERGENCY MEDICINE
Payer: MEDICARE

## 2024-01-24 ENCOUNTER — APPOINTMENT (EMERGENCY)
Dept: RADIOLOGY | Facility: HOSPITAL | Age: 84
DRG: 689 | End: 2024-01-24
Payer: MEDICARE

## 2024-01-24 ENCOUNTER — HOSPITAL ENCOUNTER (INPATIENT)
Facility: HOSPITAL | Age: 84
LOS: 3 days | Discharge: NURSING, REHAB OR LONG-TERM CARE | DRG: 689 | End: 2024-01-28
Attending: EMERGENCY MEDICINE | Admitting: INTERNAL MEDICINE
Payer: MEDICARE

## 2024-01-24 DIAGNOSIS — N39.0 URINARY TRACT INFECTION: ICD-10-CM

## 2024-01-24 DIAGNOSIS — N39.0 ACUTE URINARY TRACT INFECTION: ICD-10-CM

## 2024-01-24 DIAGNOSIS — R26.2 AMBULATORY DYSFUNCTION: ICD-10-CM

## 2024-01-24 DIAGNOSIS — R53.1 GENERALIZED WEAKNESS: Primary | ICD-10-CM

## 2024-01-24 DIAGNOSIS — R60.9 PERIPHERAL EDEMA: ICD-10-CM

## 2024-01-24 DIAGNOSIS — R60.0 LOCALIZED EDEMA: ICD-10-CM

## 2024-01-24 LAB
2HR DELTA HS TROPONIN: -2 NG/L
ALBUMIN SERPL BCP-MCNC: 3.8 G/DL (ref 3.5–5)
ALP SERPL-CCNC: 55 U/L (ref 34–104)
ALT SERPL W P-5'-P-CCNC: 18 U/L (ref 7–52)
ANION GAP SERPL CALCULATED.3IONS-SCNC: 8 MMOL/L
AST SERPL W P-5'-P-CCNC: 21 U/L (ref 13–39)
ATRIAL RATE: 76 BPM
BACTERIA UR QL AUTO: ABNORMAL /HPF
BASOPHILS # BLD AUTO: 0.04 THOUSANDS/ÂΜL (ref 0–0.1)
BASOPHILS NFR BLD AUTO: 0 % (ref 0–1)
BILIRUB SERPL-MCNC: 1.89 MG/DL (ref 0.2–1)
BILIRUB UR QL STRIP: NEGATIVE
BNP SERPL-MCNC: 195 PG/ML (ref 0–100)
BUN SERPL-MCNC: 18 MG/DL (ref 5–25)
CALCIUM SERPL-MCNC: 9 MG/DL (ref 8.4–10.2)
CARDIAC TROPONIN I PNL SERPL HS: 7 NG/L
CARDIAC TROPONIN I PNL SERPL HS: 9 NG/L
CHLORIDE SERPL-SCNC: 98 MMOL/L (ref 96–108)
CLARITY UR: ABNORMAL
CO2 SERPL-SCNC: 30 MMOL/L (ref 21–32)
COLOR UR: YELLOW
CREAT SERPL-MCNC: 1.08 MG/DL (ref 0.6–1.3)
EOSINOPHIL # BLD AUTO: 0.01 THOUSAND/ÂΜL (ref 0–0.61)
EOSINOPHIL NFR BLD AUTO: 0 % (ref 0–6)
ERYTHROCYTE [DISTWIDTH] IN BLOOD BY AUTOMATED COUNT: 14.9 % (ref 11.6–15.1)
FLUAV RNA RESP QL NAA+PROBE: NEGATIVE
FLUBV RNA RESP QL NAA+PROBE: NEGATIVE
GFR SERPL CREATININE-BSD FRML MDRD: 63 ML/MIN/1.73SQ M
GLUCOSE SERPL-MCNC: 103 MG/DL (ref 65–140)
GLUCOSE UR STRIP-MCNC: NEGATIVE MG/DL
HCT VFR BLD AUTO: 41.4 % (ref 36.5–49.3)
HGB BLD-MCNC: 13.1 G/DL (ref 12–17)
HGB UR QL STRIP.AUTO: ABNORMAL
IMM GRANULOCYTES # BLD AUTO: 0.08 THOUSAND/UL (ref 0–0.2)
IMM GRANULOCYTES NFR BLD AUTO: 1 % (ref 0–2)
KETONES UR STRIP-MCNC: NEGATIVE MG/DL
LEUKOCYTE ESTERASE UR QL STRIP: ABNORMAL
LYMPHOCYTES # BLD AUTO: 0.99 THOUSANDS/ÂΜL (ref 0.6–4.47)
LYMPHOCYTES NFR BLD AUTO: 8 % (ref 14–44)
MCH RBC QN AUTO: 27.2 PG (ref 26.8–34.3)
MCHC RBC AUTO-ENTMCNC: 31.6 G/DL (ref 31.4–37.4)
MCV RBC AUTO: 86 FL (ref 82–98)
MONOCYTES # BLD AUTO: 1.18 THOUSAND/ÂΜL (ref 0.17–1.22)
MONOCYTES NFR BLD AUTO: 9 % (ref 4–12)
NEUTROPHILS # BLD AUTO: 10.26 THOUSANDS/ÂΜL (ref 1.85–7.62)
NEUTS SEG NFR BLD AUTO: 82 % (ref 43–75)
NITRITE UR QL STRIP: POSITIVE
NON-SQ EPI CELLS URNS QL MICRO: ABNORMAL /HPF
NRBC BLD AUTO-RTO: 0 /100 WBCS
P AXIS: 43 DEGREES
PH UR STRIP.AUTO: 5.5 [PH]
PLATELET # BLD AUTO: 171 THOUSANDS/UL (ref 149–390)
PMV BLD AUTO: 8.6 FL (ref 8.9–12.7)
POTASSIUM SERPL-SCNC: 3.8 MMOL/L (ref 3.5–5.3)
PR INTERVAL: 186 MS
PROT SERPL-MCNC: 7.3 G/DL (ref 6.4–8.4)
PROT UR STRIP-MCNC: ABNORMAL MG/DL
QRS AXIS: 62 DEGREES
QRSD INTERVAL: 98 MS
QT INTERVAL: 380 MS
QTC INTERVAL: 427 MS
RBC # BLD AUTO: 4.82 MILLION/UL (ref 3.88–5.62)
RBC #/AREA URNS AUTO: ABNORMAL /HPF
RSV RNA RESP QL NAA+PROBE: NEGATIVE
SARS-COV-2 RNA RESP QL NAA+PROBE: NEGATIVE
SODIUM SERPL-SCNC: 136 MMOL/L (ref 135–147)
SP GR UR STRIP.AUTO: 1.02 (ref 1–1.03)
T WAVE AXIS: 65 DEGREES
TSH SERPL DL<=0.05 MIU/L-ACNC: 1.18 UIU/ML (ref 0.45–4.5)
UROBILINOGEN UR STRIP-ACNC: <2 MG/DL
VENTRICULAR RATE: 76 BPM
WBC # BLD AUTO: 12.56 THOUSAND/UL (ref 4.31–10.16)
WBC #/AREA URNS AUTO: ABNORMAL /HPF

## 2024-01-24 PROCEDURE — 84484 ASSAY OF TROPONIN QUANT: CPT | Performed by: EMERGENCY MEDICINE

## 2024-01-24 PROCEDURE — 71045 X-RAY EXAM CHEST 1 VIEW: CPT

## 2024-01-24 PROCEDURE — 80177 DRUG SCRN QUAN LEVETIRACETAM: CPT | Performed by: EMERGENCY MEDICINE

## 2024-01-24 PROCEDURE — 84443 ASSAY THYROID STIM HORMONE: CPT | Performed by: EMERGENCY MEDICINE

## 2024-01-24 PROCEDURE — 80053 COMPREHEN METABOLIC PANEL: CPT | Performed by: EMERGENCY MEDICINE

## 2024-01-24 PROCEDURE — 87186 SC STD MICRODIL/AGAR DIL: CPT | Performed by: EMERGENCY MEDICINE

## 2024-01-24 PROCEDURE — 93010 ELECTROCARDIOGRAM REPORT: CPT | Performed by: INTERNAL MEDICINE

## 2024-01-24 PROCEDURE — 99285 EMERGENCY DEPT VISIT HI MDM: CPT | Performed by: EMERGENCY MEDICINE

## 2024-01-24 PROCEDURE — 0241U HB NFCT DS VIR RESP RNA 4 TRGT: CPT | Performed by: EMERGENCY MEDICINE

## 2024-01-24 PROCEDURE — 87077 CULTURE AEROBIC IDENTIFY: CPT | Performed by: EMERGENCY MEDICINE

## 2024-01-24 PROCEDURE — 87086 URINE CULTURE/COLONY COUNT: CPT | Performed by: EMERGENCY MEDICINE

## 2024-01-24 PROCEDURE — 97167 OT EVAL HIGH COMPLEX 60 MIN: CPT

## 2024-01-24 PROCEDURE — 97163 PT EVAL HIGH COMPLEX 45 MIN: CPT

## 2024-01-24 PROCEDURE — 36415 COLL VENOUS BLD VENIPUNCTURE: CPT | Performed by: EMERGENCY MEDICINE

## 2024-01-24 PROCEDURE — 70450 CT HEAD/BRAIN W/O DYE: CPT

## 2024-01-24 PROCEDURE — 93005 ELECTROCARDIOGRAM TRACING: CPT

## 2024-01-24 PROCEDURE — 99285 EMERGENCY DEPT VISIT HI MDM: CPT

## 2024-01-24 PROCEDURE — 96374 THER/PROPH/DIAG INJ IV PUSH: CPT

## 2024-01-24 PROCEDURE — 83880 ASSAY OF NATRIURETIC PEPTIDE: CPT | Performed by: EMERGENCY MEDICINE

## 2024-01-24 PROCEDURE — 85025 COMPLETE CBC W/AUTO DIFF WBC: CPT | Performed by: EMERGENCY MEDICINE

## 2024-01-24 PROCEDURE — 81001 URINALYSIS AUTO W/SCOPE: CPT | Performed by: EMERGENCY MEDICINE

## 2024-01-24 PROCEDURE — G1004 CDSM NDSC: HCPCS

## 2024-01-24 RX ORDER — CEPHALEXIN 250 MG/1
500 CAPSULE ORAL 2 TIMES DAILY
Status: DISCONTINUED | OUTPATIENT
Start: 2024-01-25 | End: 2024-01-27

## 2024-01-24 RX ORDER — ESCITALOPRAM OXALATE 10 MG/1
10 TABLET ORAL DAILY
Status: DISCONTINUED | OUTPATIENT
Start: 2024-01-25 | End: 2024-01-28 | Stop reason: HOSPADM

## 2024-01-24 RX ORDER — HYDROCHLOROTHIAZIDE 25 MG/1
25 TABLET ORAL DAILY
Status: DISCONTINUED | OUTPATIENT
Start: 2024-01-25 | End: 2024-01-26

## 2024-01-24 RX ORDER — PANTOPRAZOLE SODIUM 40 MG/1
40 TABLET, DELAYED RELEASE ORAL
Status: DISCONTINUED | OUTPATIENT
Start: 2024-01-25 | End: 2024-01-28 | Stop reason: HOSPADM

## 2024-01-24 RX ORDER — LEVETIRACETAM 100 MG/ML
750 SOLUTION ORAL 2 TIMES DAILY
Status: DISCONTINUED | OUTPATIENT
Start: 2024-01-24 | End: 2024-01-28 | Stop reason: HOSPADM

## 2024-01-24 RX ORDER — ALBUTEROL SULFATE 90 UG/1
2 AEROSOL, METERED RESPIRATORY (INHALATION) EVERY 4 HOURS PRN
Status: DISCONTINUED | OUTPATIENT
Start: 2024-01-24 | End: 2024-01-28 | Stop reason: HOSPADM

## 2024-01-24 RX ORDER — MELATONIN
2000 DAILY
Status: DISCONTINUED | OUTPATIENT
Start: 2024-01-25 | End: 2024-01-28 | Stop reason: HOSPADM

## 2024-01-24 RX ORDER — AMLODIPINE BESYLATE 5 MG/1
5 TABLET ORAL DAILY
Status: DISCONTINUED | OUTPATIENT
Start: 2024-01-25 | End: 2024-01-28 | Stop reason: HOSPADM

## 2024-01-24 RX ORDER — CLOPIDOGREL BISULFATE 75 MG/1
75 TABLET ORAL ONCE
Status: COMPLETED | OUTPATIENT
Start: 2024-01-24 | End: 2024-01-24

## 2024-01-24 RX ORDER — QUETIAPINE FUMARATE 25 MG/1
25 TABLET, FILM COATED ORAL
Status: DISCONTINUED | OUTPATIENT
Start: 2024-01-24 | End: 2024-01-28 | Stop reason: HOSPADM

## 2024-01-24 RX ORDER — FUROSEMIDE 10 MG/ML
40 INJECTION INTRAMUSCULAR; INTRAVENOUS ONCE
Status: COMPLETED | OUTPATIENT
Start: 2024-01-24 | End: 2024-01-24

## 2024-01-24 RX ORDER — FERROUS SULFATE 325(65) MG
325 TABLET ORAL
Status: DISCONTINUED | OUTPATIENT
Start: 2024-01-25 | End: 2024-01-28 | Stop reason: HOSPADM

## 2024-01-24 RX ORDER — ASPIRIN 81 MG/1
81 TABLET, CHEWABLE ORAL DAILY
Status: DISCONTINUED | OUTPATIENT
Start: 2024-01-25 | End: 2024-01-28 | Stop reason: HOSPADM

## 2024-01-24 RX ORDER — FLUTICASONE PROPIONATE 50 MCG
2 SPRAY, SUSPENSION (ML) NASAL DAILY
Status: DISCONTINUED | OUTPATIENT
Start: 2024-01-25 | End: 2024-01-26

## 2024-01-24 RX ORDER — CEPHALEXIN 500 MG/1
500 CAPSULE ORAL 2 TIMES DAILY
Qty: 20 CAPSULE | Refills: 0 | Status: SHIPPED | OUTPATIENT
Start: 2024-01-24 | End: 2024-01-28

## 2024-01-24 RX ORDER — QUETIAPINE FUMARATE 25 MG/1
12.5 TABLET, FILM COATED ORAL DAILY
Status: DISCONTINUED | OUTPATIENT
Start: 2024-01-24 | End: 2024-01-28 | Stop reason: HOSPADM

## 2024-01-24 RX ORDER — CEPHALEXIN 250 MG/1
500 CAPSULE ORAL ONCE
Status: COMPLETED | OUTPATIENT
Start: 2024-01-24 | End: 2024-01-24

## 2024-01-24 RX ORDER — ATORVASTATIN CALCIUM 40 MG/1
40 TABLET, FILM COATED ORAL
Status: DISCONTINUED | OUTPATIENT
Start: 2024-01-24 | End: 2024-01-28 | Stop reason: HOSPADM

## 2024-01-24 RX ORDER — CARVEDILOL 12.5 MG/1
12.5 TABLET ORAL 2 TIMES DAILY WITH MEALS
Status: DISCONTINUED | OUTPATIENT
Start: 2024-01-24 | End: 2024-01-28 | Stop reason: HOSPADM

## 2024-01-24 RX ADMIN — CARVEDILOL 12.5 MG: 12.5 TABLET, FILM COATED ORAL at 16:48

## 2024-01-24 RX ADMIN — LEVETIRACETAM 750 MG: 100 SOLUTION ORAL at 21:16

## 2024-01-24 RX ADMIN — CEPHALEXIN 500 MG: 250 CAPSULE ORAL at 13:28

## 2024-01-24 RX ADMIN — CLOPIDOGREL BISULFATE 75 MG: 75 TABLET ORAL at 16:48

## 2024-01-24 RX ADMIN — QUETIAPINE FUMARATE 25 MG: 25 TABLET ORAL at 21:12

## 2024-01-24 RX ADMIN — FUROSEMIDE 40 MG: 10 INJECTION, SOLUTION INTRAMUSCULAR; INTRAVENOUS at 12:46

## 2024-01-24 RX ADMIN — CARBIDOPA AND LEVODOPA 1.5 TABLET: 25; 100 TABLET ORAL at 16:49

## 2024-01-24 RX ADMIN — ATORVASTATIN CALCIUM 40 MG: 40 TABLET, FILM COATED ORAL at 16:54

## 2024-01-24 RX ADMIN — QUETIAPINE FUMARATE 12.5 MG: 25 TABLET ORAL at 16:55

## 2024-01-24 RX ADMIN — CARBIDOPA AND LEVODOPA 1.5 TABLET: 25; 100 TABLET ORAL at 21:12

## 2024-01-24 NOTE — CASE MANAGEMENT
Case Management Progress Note    Patient name Aldo Rodriguez  Location ED 06/ED 06 MRN [id number]  : 1940 Date 2024       LOS (days): 0  Geometric Mean LOS (GMLOS) (days):   Days to GMLOS:        OBJECTIVE:        Current admission status: Emergency  Preferred Pharmacy:   [facility]/pharm[facility] #[id number] - [place] - [address]  [address]  [facility] [address]  Phone: [phone] Fax: [phone]    Primary Care Provider: ELE Ruiz    Primary Insurance: [facility]  Secondary Insurance:     PROGRESS NOTE: Pt accepted bed at [facility]. Authorization requested through CM support Staff. Cm waiting for auth.

## 2024-01-24 NOTE — PLAN OF CARE
Problem: PHYSICAL THERAPY ADULT  Goal: Performs mobility at highest level of function for planned discharge setting.  See evaluation for individualized goals.  Description: Treatment/Interventions: Functional transfer training, LE strengthening/ROM, Therapeutic exercise, Endurance training, Cognitive reorientation, Patient/family training, Equipment eval/education, Bed mobility, Gait training, Spoke to nursing, Spoke to case management, OT          See flowsheet documentation for full assessment, interventions and recommendations.  Note: Prognosis: Guarded  Problem List: Decreased strength, Impaired balance, Decreased endurance, Decreased mobility, Decreased cognition, Impaired judgement, Decreased safety awareness, Impaired hearing, Pain  Assessment: Patient is an 84y/o M who presented after a fall with generalized weakness. Patient resides with caregiver (g/f's son) in a home with no steps. He reports that he is independent with a RW. Current medical status includes multiple lines, telemetry, fall risk, pain, decreased cognition, decreased strength, balance, endurance and mobility. Patient was received supine in bed. He required assistance of 1-2 for bed mobility, transfers, and ambulation. Only tolerated a very short ambulation distance. He is at risk for falls and is not at his functional baseline. Recommending level 2 resources. Moderate intensity. The patient's AM-PAC Basic Mobility Inpatient Short Form Raw Score is 14. A Raw score of less than or equal to 17 suggests the patient may benefit from discharge to post-acute rehabilitation services. Please also refer to the recommendation of the Physical Therapist for safe discharge planning.  Barriers to Discharge: Decreased caregiver support     Rehab Resource Intensity Level, PT: II (Moderate Resource Intensity)    See flowsheet documentation for full assessment.

## 2024-01-24 NOTE — CASE MANAGEMENT
DC Support Center received request for authorization from Care Manager.  Authorization request for: SNF  Facility Name: [facility] NPI: [id number]  Facility MD: Jose Falcon NPI: [id number]   Authorization initiated by contacting insurance: [facility]   Via: Fax  Clinicals submitted via: fax #  [phone]    CM sent request to DCS team for BLS auth. Per insurance auth is required. Informed CM that once SNF auth is received BLS will be sent to insurance.

## 2024-01-24 NOTE — DISCHARGE INSTRUCTIONS
Take a full hydrochlorothiazide tablet daily for the next week until you follow-up with your primary care physician return to the ER if you change your mind about admission to a skilled nursing facility or the hospital as we discussed.  Keflex antibiotic was called into the pharmacy for probable urinary tract infection
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
## 2024-01-24 NOTE — CASE MANAGEMENT
Case Management Progress Note    Patient name Aldo Rodriguez  Location ED 06/ED 06 MRN [id number]  : 1940 Date 2024       LOS (days): 0  Geometric Mean LOS (GMLOS) (days):   Days to GMLOS:        OBJECTIVE:        Current admission status: Emergency  Preferred Pharmacy:   [facility]pharm[facility][id number] - [place] - [address]  [address]  [place] [address]  Phone: [phone] Fax: [phone]    Primary Care Provider: ELE Ruiz    Primary Insurance: [facility]  Secondary Insurance:     PROGRESS NOTE: Cm consulted for possible rehab need. PT/Ot saw pt and he is in fact recommended rehab. Pt refuses rehab and wants to go home. Cm will get pt [facility] and he can return home. Cm spoke with pts caregiver Mao. He can pick pt up around 2pm. [facility] referrals sent. Cm waiting for agency.

## 2024-01-24 NOTE — CASE MANAGEMENT
Case Management Progress Note    Patient name Aldo Rodriguez  Location ED 06/ED 06 MRN [id number]  : 1940 Date 2024       LOS (days): 0  Geometric Mean LOS (GMLOS) (days):   Days to GMLOS:        OBJECTIVE:        Current admission status: Emergency  Preferred Pharmacy:   [facility]/pharmacy #[id number] - [place] - [address]  [address]  [place] [address]  Phone: [phone] Fax: [phone]    Primary Care Provider: ELE Ruiz    Primary Insurance: [facility]  Secondary Insurance:     PROGRESS NOTE: Cm notified that pts caregiver has now convinced him to go to rehab. Referrals sent.

## 2024-01-24 NOTE — ED NOTES
Patient now agreeable to go to a short term rehab facility - case management made aware      Maria M Ascencio, RN  01/24/24 1611

## 2024-01-24 NOTE — PLAN OF CARE
Problem: OCCUPATIONAL THERAPY ADULT  Goal: Performs self-care activities at highest level of function for planned discharge setting.  See evaluation for individualized goals.  Description: Treatment Interventions: ADL retraining, Visual perceptual retraining, Functional transfer training, Endurance training, Cognitive reorientation, Patient/family training, Equipment evaluation/education, Compensatory technique education, Continued evaluation, Energy conservation, Activityengagement          See flowsheet documentation for full assessment, interventions and recommendations.   1/24/2024 1259 by Koki Sagastume OT  Note: Limitation: Decreased ADL status, Decreased Safe judgement during ADL, Decreased cognition, Decreased endurance, Decreased self-care trans, Decreased high-level ADLs  Prognosis: Good  Assessment: Pt is a 83 y.o. male seen for OT evaluation s/p admission to [facility] on 1/24/2024 due to fall at home. Diagnosed with <principal problem not specified>. Personal and env factors supporting pt at time of IE include attitude towards recovery and accessible home environment. Personal and env factors inhibiting engagement in occupations include advanced age, difficulty completing ADLs, and difficulty completing IADLs. Performance deficits that affect the pt’s occupational performance can be seen above. Due to pt's current functional limitations and medical complications pt is functioning below baseline. Pt would benefit from continued skilled OT treatment in order to maximize safety, independence and overall performance with ADLs, functional mobility, functional transfers, and cognition in order to achieve highest level of function.     Rehab Resource Intensity Level, OT: II (Moderate Resource Intensity)       1/24/2024 1259 by Koki Sagastume OT  Note: Limitation: Decreased ADL status, Decreased Safe judgement during ADL, Decreased cognition, Decreased endurance, Decreased self-care trans, Decreased  high-level ADLs  Prognosis: Good  Assessment: Pt is a 83 y.o. male seen for OT evaluation s/p admission to [facility] on 1/24/2024 due to fall at home. Diagnosed with <principal problem not specified>. Personal and env factors supporting pt at time of IE include attitude towards recovery and accessible home environment. Personal and env factors inhibiting engagement in occupations include advanced age, difficulty completing ADLs, and difficulty completing IADLs. Performance deficits that affect the pt’s occupational performance can be seen above. Due to pt's current functional limitations and medical complications pt is functioning below baseline. Pt would benefit from continued skilled OT treatment in order to maximize safety, independence and overall performance with ADLs, functional mobility, functional transfers, and cognition in order to achieve highest level of function.     Rehab Resource Intensity Level, OT: II (Moderate Resource Intensity)

## 2024-01-24 NOTE — ED NOTES
Patient caregiver states they are able to take patient home, per case management patient cannot leave until homecare is arranged      Maria M Ascencio RN  01/24/24 7180
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
## 2024-01-24 NOTE — CASE MANAGEMENT
Case Management Progress Note    Patient name Aldo Rodriguez  Location ED 06/ED 06 MRN [id number]  : 1940 Date 2024       LOS (days): 0  Geometric Mean LOS (GMLOS) (days):   Days to GMLOS:        OBJECTIVE:        Current admission status: Emergency  Preferred Pharmacy:   [facility]/pharmacy #[id number] - [place] - [address]  [address]  [facility] [place] [address]  Phone: [phone] Fax: [phone]    Primary Care Provider: ELE Ruiz    Primary Insurance: [facility]  Secondary Insurance:     PROGRESS NOTE: Cm able to confirm 1 agency to accept. [facility] accepted pt.  Agency added to follow up. Pt can dc home now

## 2024-01-25 PROBLEM — R60.0 LOCALIZED EDEMA: Status: ACTIVE | Noted: 2024-01-25

## 2024-01-25 PROBLEM — N39.0 ACUTE URINARY TRACT INFECTION: Status: ACTIVE | Noted: 2024-01-25

## 2024-01-25 PROBLEM — J44.1 COPD EXACERBATION (HCC): Status: RESOLVED | Noted: 2023-11-21 | Resolved: 2024-01-25

## 2024-01-25 LAB
ALBUMIN SERPL BCP-MCNC: 3.8 G/DL (ref 3.5–5)
ALP SERPL-CCNC: 65 U/L (ref 34–104)
ALT SERPL W P-5'-P-CCNC: 15 U/L (ref 7–52)
ANION GAP SERPL CALCULATED.3IONS-SCNC: 7 MMOL/L
AST SERPL W P-5'-P-CCNC: 20 U/L (ref 13–39)
BASOPHILS # BLD AUTO: 0.02 THOUSANDS/ÂΜL (ref 0–0.1)
BASOPHILS NFR BLD AUTO: 0 % (ref 0–1)
BILIRUB SERPL-MCNC: 1.1 MG/DL (ref 0.2–1)
BUN SERPL-MCNC: 18 MG/DL (ref 5–25)
CALCIUM SERPL-MCNC: 9.1 MG/DL (ref 8.4–10.2)
CHLORIDE SERPL-SCNC: 98 MMOL/L (ref 96–108)
CK SERPL-CCNC: 186 U/L (ref 39–308)
CO2 SERPL-SCNC: 33 MMOL/L (ref 21–32)
CREAT SERPL-MCNC: 0.96 MG/DL (ref 0.6–1.3)
DME PARACHUTE DELIVERY DATE ACTUAL: NORMAL
DME PARACHUTE DELIVERY DATE EXPECTED: NORMAL
DME PARACHUTE DELIVERY DATE REQUESTED: NORMAL
DME PARACHUTE ITEM DESCRIPTION: NORMAL
DME PARACHUTE ORDER STATUS: NORMAL
DME PARACHUTE SUPPLIER NAME: NORMAL
DME PARACHUTE SUPPLIER PHONE: NORMAL
EOSINOPHIL # BLD AUTO: 0.13 THOUSAND/ÂΜL (ref 0–0.61)
EOSINOPHIL NFR BLD AUTO: 2 % (ref 0–6)
ERYTHROCYTE [DISTWIDTH] IN BLOOD BY AUTOMATED COUNT: 15 % (ref 11.6–15.1)
GFR SERPL CREATININE-BSD FRML MDRD: 73 ML/MIN/1.73SQ M
GLUCOSE SERPL-MCNC: 108 MG/DL (ref 65–140)
HCT VFR BLD AUTO: 41 % (ref 36.5–49.3)
HGB BLD-MCNC: 13.3 G/DL (ref 12–17)
IMM GRANULOCYTES # BLD AUTO: 0.05 THOUSAND/UL (ref 0–0.2)
IMM GRANULOCYTES NFR BLD AUTO: 1 % (ref 0–2)
LYMPHOCYTES # BLD AUTO: 1.3 THOUSANDS/ÂΜL (ref 0.6–4.47)
LYMPHOCYTES NFR BLD AUTO: 16 % (ref 14–44)
MCH RBC QN AUTO: 27.7 PG (ref 26.8–34.3)
MCHC RBC AUTO-ENTMCNC: 32.4 G/DL (ref 31.4–37.4)
MCV RBC AUTO: 85 FL (ref 82–98)
MONOCYTES # BLD AUTO: 1.23 THOUSAND/ÂΜL (ref 0.17–1.22)
MONOCYTES NFR BLD AUTO: 15 % (ref 4–12)
NEUTROPHILS # BLD AUTO: 5.46 THOUSANDS/ÂΜL (ref 1.85–7.62)
NEUTS SEG NFR BLD AUTO: 66 % (ref 43–75)
NRBC BLD AUTO-RTO: 0 /100 WBCS
PLATELET # BLD AUTO: 190 THOUSANDS/UL (ref 149–390)
PMV BLD AUTO: 8.6 FL (ref 8.9–12.7)
POTASSIUM SERPL-SCNC: 3.1 MMOL/L (ref 3.5–5.3)
PROT SERPL-MCNC: 7 G/DL (ref 6.4–8.4)
RBC # BLD AUTO: 4.81 MILLION/UL (ref 3.88–5.62)
SODIUM SERPL-SCNC: 138 MMOL/L (ref 135–147)
TSH SERPL DL<=0.05 MIU/L-ACNC: 1.58 UIU/ML (ref 0.45–4.5)
WBC # BLD AUTO: 8.19 THOUSAND/UL (ref 4.31–10.16)

## 2024-01-25 PROCEDURE — 99223 1ST HOSP IP/OBS HIGH 75: CPT | Performed by: INTERNAL MEDICINE

## 2024-01-25 PROCEDURE — 85025 COMPLETE CBC W/AUTO DIFF WBC: CPT | Performed by: EMERGENCY MEDICINE

## 2024-01-25 PROCEDURE — 82550 ASSAY OF CK (CPK): CPT | Performed by: EMERGENCY MEDICINE

## 2024-01-25 PROCEDURE — 80053 COMPREHEN METABOLIC PANEL: CPT | Performed by: EMERGENCY MEDICINE

## 2024-01-25 PROCEDURE — 94664 DEMO&/EVAL PT USE INHALER: CPT

## 2024-01-25 PROCEDURE — 84443 ASSAY THYROID STIM HORMONE: CPT | Performed by: INTERNAL MEDICINE

## 2024-01-25 RX ORDER — FUROSEMIDE 10 MG/ML
20 INJECTION INTRAMUSCULAR; INTRAVENOUS DAILY
Status: DISCONTINUED | OUTPATIENT
Start: 2024-01-26 | End: 2024-01-26

## 2024-01-25 RX ORDER — ONDANSETRON 2 MG/ML
4 INJECTION INTRAMUSCULAR; INTRAVENOUS EVERY 6 HOURS PRN
Status: DISCONTINUED | OUTPATIENT
Start: 2024-01-25 | End: 2024-01-28 | Stop reason: HOSPADM

## 2024-01-25 RX ORDER — ENOXAPARIN SODIUM 100 MG/ML
40 INJECTION SUBCUTANEOUS DAILY
Status: DISCONTINUED | OUTPATIENT
Start: 2024-01-26 | End: 2024-01-28 | Stop reason: HOSPADM

## 2024-01-25 RX ORDER — POTASSIUM CHLORIDE 20 MEQ/1
20 TABLET, EXTENDED RELEASE ORAL ONCE
Status: COMPLETED | OUTPATIENT
Start: 2024-01-25 | End: 2024-01-25

## 2024-01-25 RX ORDER — ACETAMINOPHEN 325 MG/1
650 TABLET ORAL EVERY 6 HOURS PRN
Status: DISCONTINUED | OUTPATIENT
Start: 2024-01-25 | End: 2024-01-28 | Stop reason: HOSPADM

## 2024-01-25 RX ADMIN — FERROUS SULFATE TAB 325 MG (65 MG ELEMENTAL FE) 325 MG: 325 (65 FE) TAB at 08:04

## 2024-01-25 RX ADMIN — POTASSIUM CHLORIDE 20 MEQ: 1500 TABLET, EXTENDED RELEASE ORAL at 18:22

## 2024-01-25 RX ADMIN — Medication 2000 UNITS: at 08:03

## 2024-01-25 RX ADMIN — CARBIDOPA AND LEVODOPA 1.5 TABLET: 25; 100 TABLET ORAL at 08:03

## 2024-01-25 RX ADMIN — ASPIRIN 81 MG CHEWABLE TABLET 81 MG: 81 TABLET CHEWABLE at 08:02

## 2024-01-25 RX ADMIN — CARBIDOPA AND LEVODOPA 1.5 TABLET: 25; 100 TABLET ORAL at 17:25

## 2024-01-25 RX ADMIN — HYDROCHLOROTHIAZIDE 25 MG: 25 TABLET ORAL at 08:04

## 2024-01-25 RX ADMIN — AMLODIPINE BESYLATE 5 MG: 5 TABLET ORAL at 08:04

## 2024-01-25 RX ADMIN — GLYCERIN 1 DROP: .002; .002; .01 SOLUTION/ DROPS OPHTHALMIC at 21:05

## 2024-01-25 RX ADMIN — CEPHALEXIN 500 MG: 250 CAPSULE ORAL at 08:03

## 2024-01-25 RX ADMIN — FLUTICASONE PROPIONATE 2 SPRAY: 50 SPRAY, METERED NASAL at 10:01

## 2024-01-25 RX ADMIN — QUETIAPINE FUMARATE 25 MG: 25 TABLET ORAL at 21:05

## 2024-01-25 RX ADMIN — CARVEDILOL 12.5 MG: 12.5 TABLET, FILM COATED ORAL at 17:25

## 2024-01-25 RX ADMIN — LEVETIRACETAM 750 MG: 100 SOLUTION ORAL at 21:05

## 2024-01-25 RX ADMIN — ATORVASTATIN CALCIUM 40 MG: 40 TABLET, FILM COATED ORAL at 17:25

## 2024-01-25 RX ADMIN — CEPHALEXIN 500 MG: 250 CAPSULE ORAL at 21:05

## 2024-01-25 RX ADMIN — QUETIAPINE FUMARATE 12.5 MG: 25 TABLET ORAL at 08:03

## 2024-01-25 RX ADMIN — PANTOPRAZOLE SODIUM 40 MG: 40 TABLET, DELAYED RELEASE ORAL at 06:06

## 2024-01-25 RX ADMIN — LEVETIRACETAM 750 MG: 100 SOLUTION ORAL at 08:02

## 2024-01-25 RX ADMIN — ESCITALOPRAM OXALATE 10 MG: 10 TABLET ORAL at 08:03

## 2024-01-25 RX ADMIN — CARVEDILOL 12.5 MG: 12.5 TABLET, FILM COATED ORAL at 08:03

## 2024-01-25 RX ADMIN — CARBIDOPA AND LEVODOPA 1.5 TABLET: 25; 100 TABLET ORAL at 21:06

## 2024-01-25 NOTE — CASE MANAGEMENT
Patient's primary insurance updated to [place] 66 (P#: [phone]). Called to begin auth process with insurance. Spoke with Juanita who started patient's plan terminated on 12/31/2023 and auth cannot be created. CM and DCS team notified.    01/25 @ [facility] - DCS lead sent email to insurance verifiers for insurance clarification. Per CM leadership, use secondary insurance approval. CM notified

## 2024-01-25 NOTE — ASSESSMENT & PLAN NOTE
"    Significant lower extremity edema which according to the patient has worsened over the last week or 2    Abnormal portable chest x-ray findings quoted below:  \"Impression:  Suspected mild pulmonary vascular congestion with possible pleural effusions. \"      History of chronic mild diastolic CHF, will check echocardiogram  Start Lasix 20 mg IV daily  "

## 2024-01-25 NOTE — ASSESSMENT & PLAN NOTE
Patient reported significant dysuria prior to admission.    Continue p.o. Keflex    Follow-up urine culture

## 2024-01-25 NOTE — ED NOTES
Per case management - patient will need admission due to insurance. Provider and primary RN aware.      Maria M Ascencio RN  01/25/24 9528

## 2024-01-25 NOTE — CASE MANAGEMENT
Case Management Discharge Planning Note    Patient name Aldo Rodriguez  Location ED 06/ED 06 MRN [id number]  : 1940 Date 2024       Current Admission Date: 2024  Current Admission Diagnosis:  Patient Active Problem List    Diagnosis Date Noted    Dyspnea 2023    Periodic limb movements of sleep 2023    COPD exacerbation (HCC) 2023    Parkinsonism 10/18/2023    Urinary incontinence 2023    JASMEET (obstructive sleep apnea)     Other parasomnia 2023    Snoring 2023    Suspected sleep apnea 2023    Seizure ([facility])     Frailty 2023    Ambulatory dysfunction 2023    Primary osteoarthritis of one hip, right     Right hip pain     Recurrent major depressive disorder, in full remission ([facility]) 10/13/2022    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, psychotic disturbance, mood disturbance, or anxiety ([facility]) 10/13/2022    Knee pain 2022    Chronic neck pain 2022    BMI 26.0-26.9,adult 2022    History of seizure 2021    Right epiphora 09/10/2020    Depression, recurrent ([facility]) 2020    History of CVA (cerebrovascular accident) 11/15/2019    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    Carotid stenosis, asymptomatic, bilateral 10/16/2019    Diverticulosis of intestine 10/07/2019    Foraminal stenosis of cervical region 10/07/2019    TIA (transient ischemic attack) 2019    Left inguinal hernia 2018    Osteoporosis 2017    Peripheral neuropathy 2017    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 2017    Essential hypertension 2017    PAD (peripheral artery disease) ([facility]) 2016    DJD (degenerative joint disease) 2015    GERD (gastroesophageal reflux disease) 2015    Vitamin D deficiency 2013    Benign colon polyp 2013    ASCVD (arteriosclerotic cardiovascular disease) 10/15/2012    Hyperlipidemia 10/15/2012      LOS  (days): 0  Geometric Mean LOS (GMLOS) (days):   Days to GMLOS:     OBJECTIVE:            Current admission status: Emergency   Preferred Pharmacy:   [facility]/pharmacy #[id number] - [place] - [address]  [address]  [place] [address]  Phone: [phone] Fax: [phone]    Primary Care Provider: ELE Ruiz    Primary Insurance: [employer]  Secondary Insurance: [facility]    DISCHARGE DETAILS:                                                                                                               Facility Insurance Auth Number: [id number]
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
## 2024-01-25 NOTE — CASE MANAGEMENT
[facility] has received approved authorization from insurance: [facility]       Called in by Rep: Jesusita GOFF# [phone]  Authorization received for: SNF  Facility: [facility]   Authorization #: [id number]  Start of Care: 01/25  Next Review Date: 02/24  Submit next review to F#: [phone]    Care Manager notified: Hellen Green

## 2024-01-25 NOTE — CASE MANAGEMENT
Case Management Progress Note    Patient name Aldo Rodriguez  Location ED 06/ED 06 MRN [id number]  : 1940 Date 2024       LOS (days): 0  Geometric Mean LOS (GMLOS) (days):   Days to GMLOS:        OBJECTIVE:        Current admission status: Inpatient  Preferred Pharmacy:   [facility]/pharmacy #[id number] - [place] - [address]  [address]  [facility] [place] [facility] [place] [address]  Phone: [phone] Fax: [phone]    Primary Care Provider: ELE Ruiz    Primary Insurance: MEDICARE  Secondary Insurance: [facility]    PROGRESS NOTE: Pt found to have medicare. He is not able to dc to facility today. Will admit and plan for dc  to [facility].

## 2024-01-25 NOTE — ASSESSMENT & PLAN NOTE
Patient reports ambulating around his house with the use of a walker    Acute ambulatory dysfunction likely due to UTI, check PT OT consult    May also be multifactorial, history of Parkinson's, acute infection may worsen his mobility secondary to his Parkinson's disease

## 2024-01-25 NOTE — CASE MANAGEMENT
Case Management Progress Note    Patient name Aldo Rodriguez  Location ED 06/ED 06 MRN [id number]  : 1940 Date 2024       LOS (days): 0  Geometric Mean LOS (GMLOS) (days):   Days to GMLOS:        OBJECTIVE:        Current admission status: Emergency  Preferred Pharmacy:   [facility]/pharmacy #[id number] - [place] - [address]  [address]  [place] [place] [address]  Phone: [phone] Fax: [phone]    Primary Care Provider: ELE Ruiz    Primary Insurance: [facility]  Secondary Insurance: [facility]    PROGRESS NOTE: Pt insurance is now showing as [facility]  replacement. New auth submitted. Transport is scheduled for 4:15 with [facility]. Cm support staff working on auth. Cm following

## 2024-01-26 ENCOUNTER — APPOINTMENT (INPATIENT)
Dept: NON INVASIVE DIAGNOSTICS | Facility: HOSPITAL | Age: 84
DRG: 689 | End: 2024-01-26
Payer: MEDICARE

## 2024-01-26 LAB
ALBUMIN SERPL BCP-MCNC: 3.6 G/DL (ref 3.5–5)
ALP SERPL-CCNC: 58 U/L (ref 34–104)
ALT SERPL W P-5'-P-CCNC: 3 U/L (ref 7–52)
ANION GAP SERPL CALCULATED.3IONS-SCNC: 6 MMOL/L
AORTIC ROOT: 3.2 CM
APICAL FOUR CHAMBER EJECTION FRACTION: 51 %
ASCENDING AORTA: 3.2 CM
AST SERPL W P-5'-P-CCNC: 17 U/L (ref 13–39)
BASOPHILS # BLD AUTO: 0.02 THOUSANDS/ÂΜL (ref 0–0.1)
BASOPHILS NFR BLD AUTO: 0 % (ref 0–1)
BILIRUB SERPL-MCNC: 0.91 MG/DL (ref 0.2–1)
BSA FOR ECHO PROCEDURE: 1.93 M2
BUN SERPL-MCNC: 16 MG/DL (ref 5–25)
CALCIUM SERPL-MCNC: 8.6 MG/DL (ref 8.4–10.2)
CHLORIDE SERPL-SCNC: 98 MMOL/L (ref 96–108)
CO2 SERPL-SCNC: 33 MMOL/L (ref 21–32)
CREAT SERPL-MCNC: 0.86 MG/DL (ref 0.6–1.3)
DOP CALC LVOT AREA: 3.46 CM2
DOP CALC LVOT DIAMETER: 2.1 CM
E WAVE DECELERATION TIME: 133 MS
E/A RATIO: 1.13
EOSINOPHIL # BLD AUTO: 0.21 THOUSAND/ÂΜL (ref 0–0.61)
EOSINOPHIL NFR BLD AUTO: 3 % (ref 0–6)
ERYTHROCYTE [DISTWIDTH] IN BLOOD BY AUTOMATED COUNT: 14.9 % (ref 11.6–15.1)
FRACTIONAL SHORTENING: 35 (ref 28–44)
GFR SERPL CREATININE-BSD FRML MDRD: 80 ML/MIN/1.73SQ M
GLUCOSE SERPL-MCNC: 110 MG/DL (ref 65–140)
HCT VFR BLD AUTO: 39.6 % (ref 36.5–49.3)
HGB BLD-MCNC: 12.5 G/DL (ref 12–17)
IMM GRANULOCYTES # BLD AUTO: 0.02 THOUSAND/UL (ref 0–0.2)
IMM GRANULOCYTES NFR BLD AUTO: 0 % (ref 0–2)
INR PPP: 0.96 (ref 0.84–1.19)
INTERVENTRICULAR SEPTUM IN DIASTOLE (PARASTERNAL SHORT AXIS VIEW): 1 CM
INTERVENTRICULAR SEPTUM: 1 CM (ref 0.6–1.1)
LAAS-AP2: 23.2 CM2
LAAS-AP4: 21.1 CM2
LEFT ATRIUM SIZE: 4.4 CM
LEFT ATRIUM VOLUME (MOD BIPLANE): 63 ML
LEFT ATRIUM VOLUME INDEX (MOD BIPLANE): 32.6 ML/M2
LEFT INTERNAL DIMENSION IN SYSTOLE: 3.1 CM (ref 2.1–4)
LEFT VENTRICULAR INTERNAL DIMENSION IN DIASTOLE: 4.8 CM (ref 3.5–6)
LEFT VENTRICULAR POSTERIOR WALL IN END DIASTOLE: 0.9 CM
LEFT VENTRICULAR STROKE VOLUME: 69 ML
LEVETIRACETAM SERPL-MCNC: 21 UG/ML (ref 10–40)
LVSV (TEICH): 69 ML
LYMPHOCYTES # BLD AUTO: 1.31 THOUSANDS/ÂΜL (ref 0.6–4.47)
LYMPHOCYTES NFR BLD AUTO: 17 % (ref 14–44)
MAGNESIUM SERPL-MCNC: 2.4 MG/DL (ref 1.9–2.7)
MCH RBC QN AUTO: 26.8 PG (ref 26.8–34.3)
MCHC RBC AUTO-ENTMCNC: 31.6 G/DL (ref 31.4–37.4)
MCV RBC AUTO: 85 FL (ref 82–98)
MONOCYTES # BLD AUTO: 1.36 THOUSAND/ÂΜL (ref 0.17–1.22)
MONOCYTES NFR BLD AUTO: 17 % (ref 4–12)
MV E'TISSUE VEL-SEP: 7 CM/S
MV PEAK A VEL: 0.53 M/S
MV PEAK E VEL: 60 CM/S
MV STENOSIS PRESSURE HALF TIME: 38 MS
MV VALVE AREA P 1/2 METHOD: 5.79
NEUTROPHILS # BLD AUTO: 4.93 THOUSANDS/ÂΜL (ref 1.85–7.62)
NEUTS SEG NFR BLD AUTO: 63 % (ref 43–75)
NRBC BLD AUTO-RTO: 0 /100 WBCS
PHOSPHATE SERPL-MCNC: 3.1 MG/DL (ref 2.3–4.1)
PLATELET # BLD AUTO: 187 THOUSANDS/UL (ref 149–390)
PMV BLD AUTO: 9 FL (ref 8.9–12.7)
POTASSIUM SERPL-SCNC: 3.4 MMOL/L (ref 3.5–5.3)
PROT SERPL-MCNC: 6.7 G/DL (ref 6.4–8.4)
PROTHROMBIN TIME: 13.1 SECONDS (ref 11.6–14.5)
RA PRESSURE ESTIMATED: 8 MMHG
RBC # BLD AUTO: 4.67 MILLION/UL (ref 3.88–5.62)
RIGHT ATRIUM AREA SYSTOLE A4C: 11.3 CM2
RIGHT VENTRICLE ID DIMENSION: 4.5 CM
RV PSP: 55 MMHG
SL CV LEFT ATRIUM LENGTH A2C: 6.4 CM
SL CV LV EF: 60
SL CV PED ECHO LEFT VENTRICLE DIASTOLIC VOLUME (MOD BIPLANE) 2D: 107 ML
SL CV PED ECHO LEFT VENTRICLE SYSTOLIC VOLUME (MOD BIPLANE) 2D: 39 ML
SL CV TR MAX PG ANTEGRADE: 48 MMHG
SODIUM SERPL-SCNC: 137 MMOL/L (ref 135–147)
TR MAX PG: 47 MMHG
TR PEAK VELOCITY: 3.4 M/S
TRICUSPID ANNULAR PLANE SYSTOLIC EXCURSION: 1.3 CM
TRICUSPID VALVE PEAK REGURGITATION VELOCITY: 3.44 M/S
TV MEAN GRADIENT: 28 MMHG
TV MV D: 2.5 M/S
TV VTI: 113.55 CM
WBC # BLD AUTO: 7.85 THOUSAND/UL (ref 4.31–10.16)

## 2024-01-26 PROCEDURE — 85610 PROTHROMBIN TIME: CPT | Performed by: INTERNAL MEDICINE

## 2024-01-26 PROCEDURE — C8929 TTE W OR WO FOL WCON,DOPPLER: HCPCS

## 2024-01-26 PROCEDURE — 80053 COMPREHEN METABOLIC PANEL: CPT | Performed by: INTERNAL MEDICINE

## 2024-01-26 PROCEDURE — 99232 SBSQ HOSP IP/OBS MODERATE 35: CPT | Performed by: INTERNAL MEDICINE

## 2024-01-26 PROCEDURE — 85025 COMPLETE CBC W/AUTO DIFF WBC: CPT | Performed by: INTERNAL MEDICINE

## 2024-01-26 PROCEDURE — 83735 ASSAY OF MAGNESIUM: CPT | Performed by: INTERNAL MEDICINE

## 2024-01-26 PROCEDURE — 99222 1ST HOSP IP/OBS MODERATE 55: CPT | Performed by: INTERNAL MEDICINE

## 2024-01-26 PROCEDURE — 84100 ASSAY OF PHOSPHORUS: CPT | Performed by: INTERNAL MEDICINE

## 2024-01-26 PROCEDURE — 93306 TTE W/DOPPLER COMPLETE: CPT | Performed by: INTERNAL MEDICINE

## 2024-01-26 RX ORDER — FLUTICASONE PROPIONATE 50 MCG
2 SPRAY, SUSPENSION (ML) NASAL
Status: DISCONTINUED | OUTPATIENT
Start: 2024-01-26 | End: 2024-01-28 | Stop reason: HOSPADM

## 2024-01-26 RX ORDER — FUROSEMIDE 10 MG/ML
20 INJECTION INTRAMUSCULAR; INTRAVENOUS
Status: DISCONTINUED | OUTPATIENT
Start: 2024-01-26 | End: 2024-01-26

## 2024-01-26 RX ORDER — SPIRONOLACTONE 25 MG/1
25 TABLET ORAL DAILY
Status: DISCONTINUED | OUTPATIENT
Start: 2024-01-27 | End: 2024-01-28 | Stop reason: HOSPADM

## 2024-01-26 RX ORDER — POTASSIUM CHLORIDE 20 MEQ/1
40 TABLET, EXTENDED RELEASE ORAL ONCE
Status: COMPLETED | OUTPATIENT
Start: 2024-01-26 | End: 2024-01-26

## 2024-01-26 RX ORDER — FUROSEMIDE 20 MG/1
20 TABLET ORAL DAILY
Status: DISCONTINUED | OUTPATIENT
Start: 2024-01-27 | End: 2024-01-26

## 2024-01-26 RX ADMIN — CEPHALEXIN 500 MG: 250 CAPSULE ORAL at 08:20

## 2024-01-26 RX ADMIN — Medication 2000 UNITS: at 08:20

## 2024-01-26 RX ADMIN — POTASSIUM CHLORIDE 40 MEQ: 1500 TABLET, EXTENDED RELEASE ORAL at 13:44

## 2024-01-26 RX ADMIN — PERFLUTREN 0.8 ML/MIN: 6.52 INJECTION, SUSPENSION INTRAVENOUS at 11:02

## 2024-01-26 RX ADMIN — LEVETIRACETAM 750 MG: 100 SOLUTION ORAL at 08:19

## 2024-01-26 RX ADMIN — QUETIAPINE FUMARATE 25 MG: 25 TABLET ORAL at 21:45

## 2024-01-26 RX ADMIN — GLYCERIN 1 DROP: .002; .002; .01 SOLUTION/ DROPS OPHTHALMIC at 08:33

## 2024-01-26 RX ADMIN — FLUTICASONE PROPIONATE 2 SPRAY: 50 SPRAY, METERED NASAL at 21:43

## 2024-01-26 RX ADMIN — ATORVASTATIN CALCIUM 40 MG: 40 TABLET, FILM COATED ORAL at 17:25

## 2024-01-26 RX ADMIN — QUETIAPINE FUMARATE 12.5 MG: 25 TABLET ORAL at 08:20

## 2024-01-26 RX ADMIN — CARVEDILOL 12.5 MG: 12.5 TABLET, FILM COATED ORAL at 17:25

## 2024-01-26 RX ADMIN — CARVEDILOL 12.5 MG: 12.5 TABLET, FILM COATED ORAL at 08:20

## 2024-01-26 RX ADMIN — CARBIDOPA AND LEVODOPA 1.5 TABLET: 25; 100 TABLET ORAL at 08:20

## 2024-01-26 RX ADMIN — GLYCERIN 1 DROP: .002; .002; .01 SOLUTION/ DROPS OPHTHALMIC at 17:27

## 2024-01-26 RX ADMIN — CARBIDOPA AND LEVODOPA 1.5 TABLET: 25; 100 TABLET ORAL at 21:44

## 2024-01-26 RX ADMIN — ESCITALOPRAM OXALATE 10 MG: 10 TABLET ORAL at 08:20

## 2024-01-26 RX ADMIN — ENOXAPARIN SODIUM 40 MG: 100 INJECTION SUBCUTANEOUS at 08:19

## 2024-01-26 RX ADMIN — FUROSEMIDE 20 MG: 10 INJECTION, SOLUTION INTRAMUSCULAR; INTRAVENOUS at 08:19

## 2024-01-26 RX ADMIN — AMLODIPINE BESYLATE 5 MG: 5 TABLET ORAL at 08:20

## 2024-01-26 RX ADMIN — FLUTICASONE PROPIONATE 2 SPRAY: 50 SPRAY, METERED NASAL at 08:33

## 2024-01-26 RX ADMIN — LEVETIRACETAM 750 MG: 100 SOLUTION ORAL at 21:40

## 2024-01-26 RX ADMIN — PANTOPRAZOLE SODIUM 40 MG: 40 TABLET, DELAYED RELEASE ORAL at 04:58

## 2024-01-26 RX ADMIN — FERROUS SULFATE TAB 325 MG (65 MG ELEMENTAL FE) 325 MG: 325 (65 FE) TAB at 08:20

## 2024-01-26 RX ADMIN — CEPHALEXIN 500 MG: 250 CAPSULE ORAL at 21:45

## 2024-01-26 RX ADMIN — CARBIDOPA AND LEVODOPA 1.5 TABLET: 25; 100 TABLET ORAL at 17:25

## 2024-01-26 RX ADMIN — ASPIRIN 81 MG CHEWABLE TABLET 81 MG: 81 TABLET CHEWABLE at 08:20

## 2024-01-26 NOTE — ASSESSMENT & PLAN NOTE
"  Significant lower extremity edema which according to the patient has worsened over the last week or 2  BNP is 185    Abnormal portable chest x-ray findings quoted below:  \"Impression:  Suspected mild pulmonary vascular congestion with possible pleural effusions. \"    History of chronic mild diastolic CHF, will check echocardiogram  On Lasix 20 mg IV twice daily  "

## 2024-01-26 NOTE — ASSESSMENT & PLAN NOTE
Patient reported significant dysuria prior to admission.  Urine culture growing Klebsiella.  Follow-up final culture sensitivity results  Continue p.o. Keflex  Trend WBC and fever curve

## 2024-01-26 NOTE — CASE MANAGEMENT
Case Management Assessment & Discharge Planning Note    Patient name Aldo Rodriguez  Location /-01 MRN [id number]  : 1940 Date 2024       Current Admission Date: 2024  Current Admission Diagnosis:Acute urinary tract infection   Patient Active Problem List    Diagnosis Date Noted    Acute urinary tract infection 2024    Localized edema 2024    Dyspnea 2023    Periodic limb movements of sleep 2023    Parkinsonism 10/18/2023    Urinary incontinence 2023    JASMEET (obstructive sleep apnea)     Other parasomnia 2023    Snoring 2023    Suspected sleep apnea 2023    Seizure ([facility])     Frailty 2023    Ambulatory dysfunction 2023    Primary osteoarthritis of one hip, right     Right hip pain     Recurrent major depressive disorder, in full remission ([facility]) 10/13/2022    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, psychotic disturbance, mood disturbance, or anxiety ([facility]) 10/13/2022    Knee pain 2022    Chronic neck pain 2022    BMI 26.0-26.9,adult 2022    History of seizure 2021    Right epiphora 09/10/2020    Depression, recurrent ([facility]) 2020    History of CVA (cerebrovascular accident) 11/15/2019    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    Carotid stenosis, asymptomatic, bilateral 10/16/2019    Diverticulosis of intestine 10/07/2019    Foraminal stenosis of cervical region 10/07/2019    TIA (transient ischemic attack) 2019    Left inguinal hernia 2018    Osteoporosis 2017    Peripheral neuropathy 2017    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 2017    Essential hypertension 2017    PAD (peripheral artery disease) ([facility]) 2016    DJD (degenerative joint disease) 2015    GERD (gastroesophageal reflux disease) 2015    Vitamin D deficiency 2013    Benign colon polyp 2013    ASCVD  (arteriosclerotic cardiovascular disease) 10/15/2012    Hyperlipidemia 10/15/2012      LOS (days): 1  Geometric Mean LOS (GMLOS) (days): 2.9  Days to GMLOS:2     OBJECTIVE:    Risk of Unplanned Readmission Score: 16.07         Current admission status: Inpatient       Preferred Pharmacy:   [id number] - [place] - [address]  [address]  [facility] [address]  Phone: [phone] Fax: [phone]    Primary Care Provider: ELE Ruiz    Primary Insurance: MEDICARE  Secondary Insurance: [facility]    ASSESSMENT:  Active Health Care Proxies    There are no active Health Care Proxies on file.         Patient Information  Admitted from:: Home  During Assessment patient was accompanied by: Not accompanied during assessment  Assessment information provided by:: Other - please comment (Caregiver Mao)  Primary Caregiver: Self  Support Systems: Self, Friend  What [place] you [place] [facility] [place]  Home entry access options. Select all that apply.: Stairs  Number of steps to enter home.: 2  Do the steps have railings?: Yes  Living Arrangements: Lives w/ Friend  Is patient a ?: No    Activities of Daily Living Prior to Admission  Functional Status: Independent  Completes ADLs independently?: Yes  Ambulates independently?: Yes  Does patient use assisted devices?: Yes  Assisted Devices (DME) used: Walker, Nebulizer, CPAP  Does patient currently own DME?: Yes  What DME does the patient currently own?: Walker, Nebulizer, CPAP  Does patient have a history of Outpatient Therapy (PT/OT)?: No  Does the patient have a history of Short-Term Rehab?: Yes ([facility] in 2021)  Does patient have a history of HHC?: No  Does patient currently have HHC?: No         Patient Information Continued  Income Source: SSI/SSD  Does patient have prescription coverage?: Yes  Does patient receive dialysis treatments?: No  Does patient have a history of substance abuse?: Yes  Historical  substance use preference: Alcohol/ETOH  History of Withdrawal Symptoms: Denies past symptoms  Is patient currently in treatment for substance abuse?: N/A - sober  Does patient have a history of Mental Health Diagnosis?: Yes (Recurrent major depressive disorder, in full remission; 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, psychotic disturbance, mood disturbance, or anxiety)  Is patient receiving treatment for mental health?: Yes  Has patient received inpatient treatment related to mental health in the last 2 years?: Yes ([facility] at [facility] in 2022 was there for 10 days)         Means of Transportation  Means of Transport to Appts:: Friends      Housing Stability: Low Risk  (1/26/2024)    Housing Stability Vital Sign     Unable to Pay for Housing in the Last Year: No     Number of Places Lived in the Last Year: 1     Unstable Housing in the Last Year: No   Food Insecurity: No Food Insecurity (1/26/2024)    Hunger Vital Sign     Worried About Running Out of Food in the Last Year: Never true     Ran Out of Food in the Last Year: Never true   Transportation Needs: No Transportation Needs (1/26/2024)    PRAPARE - Transportation     Lack of Transportation (Medical): No     Lack of Transportation (Non-Medical): No   Utilities: Not At Risk (1/26/2024)    [facility] Utilities     Threatened with loss of utilities: No       DISCHARGE DETAILS:    Discharge planning discussed with:: Ptpino Cavanaugh  Freedom of Choice: Yes     CM contacted family/caregiver?: Yes  Were Treatment Team discharge recommendations reviewed with patient/caregiver?: Yes  Did patient/caregiver verbalize understanding of patient care needs?: Yes  Were patient/caregiver advised of the risks associated with not following Treatment Team discharge recommendations?: Yes    Contacts  Patient Contacts: Magen Luu  Relationship to Patient:: Other (Comment)  Contact Method: Phone  Phone Number: [phone]  Reason/Outcome: Emergency  Contact, Referral, Discharge Planning    Requested Home Health Care         Is the patient interested in HHC at discharge?: No    DME Referral Provided  Referral made for DME?: No        Additional Comments: Spoek with bri pt's caregiver/friend Mao who reports that they live together in a 1SH with 2 steps to enter. Per Mao, pt has been using walker, CPAP, and nebulizer at home. Pt has no current hx with HHC but has been to [facility] in 2021 at [place]. Per Mao, the pt was at [facility] for 10 days on a 210 due to Hallucinations. Pt is also on a waiting for the [facility] for psych/ medication management.  Pt has been accepted a [facility] for a Sunday 1/28 admission. Spoek with Melinda Serna from [facility] to update her on the pt's psych stay hx. Melinda will accept the pt has a 30day exception. CM updated the pt's PASSAR and sent to Melinda for her records. CM to follow for ongoing discharge planning needs.
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
## 2024-01-26 NOTE — PLAN OF CARE
Problem: Prexisting or High Potential for Compromised Skin Integrity  Goal: Skin integrity is maintained or improved  Description: INTERVENTIONS:  - Identify patients at risk for skin breakdown  - Assess and monitor skin integrity  - Assess and monitor nutrition and hydration status  - Monitor labs   - Assess for incontinence   - Turn and reposition patient  - Assist with mobility/ambulation  - Relieve pressure over bony prominences  - Avoid friction and shearing  - Provide appropriate hygiene as needed including keeping skin clean and dry  - Evaluate need for skin moisturizer/barrier cream  - Collaborate with interdisciplinary team   - Patient/family teaching  - Consider wound care consult   Outcome: Progressing     Problem: GENITOURINARY - ADULT  Goal: Maintains or returns to baseline urinary function  Description: INTERVENTIONS:  - Assess urinary function  - Encourage oral fluids to ensure adequate hydration if ordered  - Administer IV fluids as ordered to ensure adequate hydration  - Administer ordered medications as needed  - Offer frequent toileting  - Follow urinary retention protocol if ordered  Outcome: Progressing  Goal: Absence of urinary retention  Description: INTERVENTIONS:  - Assess patient’s ability to void and empty bladder  - Monitor I/O  - Bladder scan as needed  - Discuss with physician/AP medications to alleviate retention as needed  - Discuss catheterization for long term situations as appropriate  Outcome: Progressing  Goal: Urinary catheter remains patent  Description: INTERVENTIONS:  - Assess patency of urinary catheter  - If patient has a chronic nolasco, consider changing catheter if non-functioning  - Follow guidelines for intermittent irrigation of non-functioning urinary catheter  Outcome: Progressing     Problem: MUSCULOSKELETAL - ADULT  Goal: Maintain or return mobility to safest level of function  Description: INTERVENTIONS:  - Assess patient's ability to carry out ADLs; assess  patient's baseline for ADL function and identify physical deficits which impact ability to perform ADLs (bathing, care of mouth/teeth, toileting, grooming, dressing, etc.)  - Assess/evaluate cause of self-care deficits   - Assess range of motion  - Assess patient's mobility  - Assess patient's need for assistive devices and provide as appropriate  - Encourage maximum independence but intervene and supervise when necessary  - Involve family in performance of ADLs  - Assess for home care needs following discharge   - Consider OT consult to assist with ADL evaluation and planning for discharge  - Provide patient education as appropriate  Outcome: Progressing  Goal: Maintain proper alignment of affected body part  Description: INTERVENTIONS:  - Support, maintain and protect limb and body alignment  - Provide patient/ family with appropriate education  Outcome: Progressing

## 2024-01-26 NOTE — PLAN OF CARE
Problem: Potential for Falls  Goal: Patient will remain free of falls  Description: INTERVENTIONS:  - Educate patient/family on patient safety including physical limitations  - Instruct patient to call for assistance with activity   - Consult OT/PT to assist with strengthening/mobility   - Keep Call bell within reach  - Keep bed low and locked with side rails adjusted as appropriate  - Keep care items and personal belongings within reach  - Initiate and maintain comfort rounds  - Make Fall Risk Sign visible to staff  - Offer Toileting every 2 Hours, in advance of need  - Initiate/Maintain alarm  - Obtain necessary fall risk management equipment:   - Apply yellow socks and bracelet for high fall risk patients  - Consider moving patient to room near nurses station  Outcome: Progressing     Problem: Prexisting or High Potential for Compromised Skin Integrity  Goal: Skin integrity is maintained or improved  Description: INTERVENTIONS:  - Identify patients at risk for skin breakdown  - Assess and monitor skin integrity  - Assess and monitor nutrition and hydration status  - Monitor labs   - Assess for incontinence   - Turn and reposition patient  - Assist with mobility/ambulation  - Relieve pressure over bony prominences  - Avoid friction and shearing  - Provide appropriate hygiene as needed including keeping skin clean and dry  - Evaluate need for skin moisturizer/barrier cream  - Collaborate with interdisciplinary team   - Patient/family teaching  - Consider wound care consult   Outcome: Progressing     Problem: GENITOURINARY - ADULT  Goal: Maintains or returns to baseline urinary function  Description: INTERVENTIONS:  - Assess urinary function  - Encourage oral fluids to ensure adequate hydration if ordered  - Administer IV fluids as ordered to ensure adequate hydration  - Administer ordered medications as needed  - Offer frequent toileting  - Follow urinary retention protocol if ordered  Outcome: Progressing  Goal:  Absence of urinary retention  Description: INTERVENTIONS:  - Assess patient's ability to void and empty bladder  - Monitor I/O  - Bladder scan as needed  - Discuss with physician/AP medications to alleviate retention as needed  - Discuss catheterization for long term situations as appropriate  Outcome: Progressing  Goal: Urinary catheter remains patent  Description: INTERVENTIONS:  - Assess patency of urinary catheter  - If patient has a chronic nolasco, consider changing catheter if non-functioning  - Follow guidelines for intermittent irrigation of non-functioning urinary catheter  Outcome: Progressing     Problem: MUSCULOSKELETAL - ADULT  Goal: Maintain or return mobility to safest level of function  Description: INTERVENTIONS:  - Assess patient's ability to carry out ADLs; assess patient's baseline for ADL function and identify physical deficits which impact ability to perform ADLs (bathing, care of mouth/teeth, toileting, grooming, dressing, etc.)  - Assess/evaluate cause of self-care deficits   - Assess range of motion  - Assess patient's mobility  - Assess patient's need for assistive devices and provide as appropriate  - Encourage maximum independence but intervene and supervise when necessary  - Involve family in performance of ADLs  - Assess for home care needs following discharge   - Consider OT consult to assist with ADL evaluation and planning for discharge  - Provide patient education as appropriate  Outcome: Progressing  Goal: Maintain proper alignment of affected body part  Description: INTERVENTIONS:  - Support, maintain and protect limb and body alignment  - Provide patient/ family with appropriate education  Outcome: Progressing

## 2024-01-26 NOTE — PROGRESS NOTES
"[facility]  Progress Note  Name: Aldo Rodriguez I  MRN: [id number]  Unit/Bed#: -01 I Date of Admission: 1/24/2024   Date of Service: 1/26/2024 I Hospital Day: 1    Assessment/Plan   Localized edema  Assessment & Plan    Significant lower extremity edema which according to the patient has worsened over the last week or 2  BNP is 185    Abnormal portable chest x-ray findings quoted below:  \"Impression:  Suspected mild pulmonary vascular congestion with possible pleural effusions. \"    History of chronic mild diastolic CHF, will check echocardiogram  On Lasix 20 mg IV twice daily    Parkinsonism  Assessment & Plan  Continue Sinemet    PT OT consult    Urinary incontinence  Assessment & Plan  History of urinary incontinence    Likely contributing to acute UTI      JASMEET (obstructive sleep apnea)  Assessment & Plan  Patient utilizes home CPAP, patient offered CPAP here in the hospital, he declined.    Seizure (HCC)  Assessment & Plan  History of seizures, no reported seizure activity, continue Keppra    Ambulatory dysfunction  Assessment & Plan  Patient reports ambulating around his house with the use of a walker    Acute ambulatory dysfunction likely due to UTI, check PT OT consult    May also be multifactorial, history of Parkinson's, acute infection may worsen his mobility secondary to his Parkinson's disease    BMI 26.0-26.9,adult  Assessment & Plan  BMI 26.6    History of CVA (cerebrovascular accident)  Assessment & Plan  Continue aspirin, statin    Essential hypertension  Assessment & Plan  History of hypertension, continue Norvasc, Coreg, Lasix    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  Assessment & Plan  Continue medical management    Aspirin statin beta-blocker    No chest pain    * Acute urinary tract infection  Assessment & Plan  Patient reported significant dysuria prior to admission.  Urine culture growing Klebsiella.  Follow-up final culture " "sensitivity results  Continue p.o. Keflex  Trend WBC and fever curve  Patient is recommended rehab.  Patient behaviors are stable.  Patient will likely need less than 30-day stay at skilled rehab nursing facility.           Labs & Imaging: I have personally reviewed pertinent reports.      VTE Prophylaxis: in place.    Code Status:   Level 1 - Full Code    Patient Centered Rounds: I have performed bedside rounds with nursing staff today.    Mobility:   Basic Mobility Inpatient Raw Score: 11  -HLM Goal: 4: Move to chair/commode  JH-HLM Achieved: 2: Bed activities/Dependent transfer  HLM Goal NOT achieved. Continue with multidisciplinary rounding and encourage appropriate mobility to improve upon HLM goals.    Discussions with Specialists or Other Care Team Provider: cm    Education and Discussions with Family / Patient: Sister- no response    Total Time Spent on Date of Encounter in care of patient: 35 mins. This time was spent on one or more of the following: performing physical exam; counseling and coordination of care; obtaining or reviewing history; documenting in the medical record; reviewing/ordering tests, medications or procedures; communicating with other healthcare professionals and discussing with patient's family/caregivers.    Current Length of Stay: 1 day(s)    Current Patient Status: Inpatient   Certification Statement: The patient will continue to require additional inpatient hospital stay due to see my assessment and plan.     Subjective:   Patient is seen and examined at bedside.  No new complaints.  Afebrile  All other ROS are negative.    Objective:    Vitals: Blood pressure 139/70, pulse 65, temperature 97.8 °F (36.6 °C), temperature source Oral, resp. rate 18, height 5' 8\" (1.727 m), weight 79.4 kg (175 lb), SpO2 (!) 86%.,Body mass index is 26.61 kg/m².  SPO2 RA Rest      Flowsheet Row ED to Hosp-Admission (Current) from 1/24/2024 in [facility] Med [facility]   SpO2 86 % "   SpO2 Activity At Rest   O2 Device None (Room air)   O2 Flow Rate --          I&O:   Intake/Output Summary (Last 24 hours) at 1/26/2024 0754  Last data filed at 1/26/2024 0501  Gross per 24 hour   Intake --   Output 800 ml   Net -800 ml       Physical Exam:    General- Alert, lying comfortably in bed. Not in any acute distress.  Neck- Supple, No JVD  CVS- regular, S1 and S2 normal  Chest- Bilateral Air entry, No rhochi, crackles or wheezing present.  Abdomen- soft, nontender, not distended, no guarding or rigidity, BS+  Extremities-  trace pedal edema, No calf tenderness                         Normal ROM in all extremities.  CNS-   Alert, awake and orientedx3. No focal deficits present.    Invasive Devices       Peripheral Intravenous Line  Duration             Peripheral IV 01/24/24 Left Antecubital 1 day                          Social History  reviewed  Family History   Problem Relation Age of Onset    Heart disease Mother         Premature Coronary    Heart disease Father         Premature Coronary    Psychiatric Illness Sister     reviewed    Meds:  Current Facility-Administered Medications   Medication Dose Route Frequency Provider Last Rate Last Admin    acetaminophen (TYLENOL) tablet 650 mg  650 mg Oral Q6H PRN Sergio Bush DO        albuterol (PROVENTIL HFA,VENTOLIN HFA) inhaler 2 puff  2 puff Inhalation Q4H PRN Sergio Bush DO        amLODIPine (NORVASC) tablet 5 mg  5 mg Oral Daily Sergio Bush DO   5 mg at 01/25/24 0804    aspirin chewable tablet 81 mg  81 mg Oral Daily Sergio Bush DO   81 mg at 01/25/24 0802    atorvastatin (LIPITOR) tablet 40 mg  40 mg Oral Daily With Dinner Sergio Bush DO   40 mg at 01/25/24 1725    carbidopa-levodopa (SINEMET)  mg per tablet 1.5 tablet  1.5 tablet Oral TID Sergio Bush DO   1.5 tablet at 01/25/24 2106    carvedilol (COREG) tablet 12.5 mg  12.5 mg Oral BID With Meals Sergio Bush DO   12.5 mg at 01/25/24 1725     cephalexin (KEFLEX) capsule 500 mg  500 mg Oral BID Sergio Bush DO   500 mg at 01/25/24 2105    cholecalciferol (VITAMIN D3) tablet 2,000 Units  2,000 Units Oral Daily Sergio Bush DO   2,000 Units at 01/25/24 0803    enoxaparin (LOVENOX) subcutaneous injection 40 mg  40 mg Subcutaneous Daily Sergio Bush DO        escitalopram (LEXAPRO) tablet 10 mg  10 mg Oral Daily Sergio Bush DO   10 mg at 01/25/24 0803    ferrous sulfate tablet 325 mg  325 mg Oral Daily With Breakfast Sergio Bush DO   325 mg at 01/25/24 0804    fluticasone (FLONASE) 50 mcg/act nasal spray 2 spray  2 spray Each Nare Daily Sergio Bush DO   2 spray at 01/25/24 1001    furosemide (LASIX) injection 20 mg  20 mg Intravenous BID (diuretic) Kashmir Patel MD        glycerin-hypromellose- (ARTIFICIAL TEARS) ophthalmic solution 1 drop  1 drop Both Eyes BID Sergio Bush DO   1 drop at 01/25/24 2105    levETIRAcetam (KEPPRA) oral solution 750 mg  750 mg Oral BID Sergio Bush DO   750 mg at 01/25/24 2105    ondansetron (ZOFRAN) injection 4 mg  4 mg Intravenous Q6H PRN Sergio Bush DO        pantoprazole (PROTONIX) EC tablet 40 mg  40 mg Oral Early Morning Sergio Bush DO   40 mg at 01/26/24 0458    QUEtiapine (SEROquel) tablet 12.5 mg  12.5 mg Oral Daily Sergio Bush DO   12.5 mg at 01/25/24 0803    QUEtiapine (SEROquel) tablet 25 mg  25 mg Oral HS Sergio Bush DO   25 mg at 01/25/24 2105      Medications Prior to Admission   Medication    albuterol (2.5 mg/3 mL) 0.083 % nebulizer solution    albuterol (Ventolin HFA) 90 mcg/act inhaler    amLODIPine (NORVASC) 5 mg tablet    aspirin (ECOTRIN LOW STRENGTH) 81 mg EC tablet    atorvastatin (LIPITOR) 40 mg tablet    carbidopa-levodopa (Sinemet)  mg per tablet    carvedilol (COREG) 12.5 mg tablet    chlorthalidone 25 mg tablet    Cholecalciferol (Vitamin D3) 50 MCG (2000 UT) capsule    clopidogrel (PLAVIX)  75 mg tablet    escitalopram (LEXAPRO) 10 mg tablet    ferrous sulfate 325 (65 Fe) mg tablet    fluticasone (FLONASE) 50 mcg/act nasal spray    levETIRAcetam (KEPPRA) 100 mg/mL oral solution    pantoprazole (PROTONIX) 40 mg tablet    QUEtiapine (SEROquel) 25 mg tablet       Labs:  Results from last 7 days   Lab Units 01/26/24  0506 01/25/24  1728 01/24/24  0834   WBC Thousand/uL 7.85 8.19 12.56*   HEMOGLOBIN g/dL 12.5 13.3 13.1   HEMATOCRIT % 39.6 41.0 41.4   PLATELETS Thousands/uL 187 190 171   NEUTROS PCT % 63 66 82*   LYMPHS PCT % 17 16 8*   MONOS PCT % 17* 15* 9   EOS PCT % 3 2 0     Results from last 7 days   Lab Units 01/26/24  0506 01/25/24  1728 01/24/24  0834   POTASSIUM mmol/L 3.4* 3.1* 3.8   CHLORIDE mmol/L 98 98 98   CO2 mmol/L 33* 33* 30   BUN mg/dL 16 18 18   CREATININE mg/dL 0.86 0.96 1.08   CALCIUM mg/dL 8.6 9.1 9.0   ALK PHOS U/L 58 65 55   ALT U/L 3* 15 18   AST U/L 17 20 21     Lab Results   Component Value Date    TROPONINI 0.05 (H) 05/15/2020    TROPONINI 0.07 (H) 05/15/2020    TROPONINI <0.02 09/18/2019    CKMB 4.2 04/14/2017    CKMB 10.6 (H) 04/13/2017    CKTOTAL 186 01/25/2024    CKTOTAL 290 04/14/2017    CKTOTAL 628 (H) 04/13/2017    CKTOTAL 56 04/13/2016     Results from last 7 days   Lab Units 01/26/24  0506   INR  0.96     Lab Results   Component Value Date    BLOODCX No Growth After 5 Days. 05/15/2020    BLOODCX No Growth After 5 Days. 05/15/2020    BLOODCX No Growth After 5 Days. 04/13/2017    BLOODCX No Growth After 5 Days. 04/13/2017    URINECX >100,000 cfu/ml Klebsiella aerogenes (A) 01/24/2024    URINECX <10,000 cfu/ml Mixed Contaminants X2 04/13/2017         Imaging:  Results for orders placed during the hospital encounter of 01/24/24    XR chest 1 view portable    Narrative  CHEST    INDICATION:   Weakness.    COMPARISON: Chest radiograph 1/12/2024    EXAM PERFORMED/VIEWS:  XR CHEST PORTABLE  Images:  1    FINDINGS: Midline sternotomy wires.    Heart shadow is enlarged but  unchanged from prior exam.    Blunting of the costophrenic angles.    Bilateral reticular opacities.    No pneumothorax.    Osseous structures appear within normal limits for patient age.    Impression  Suspected mild pulmonary vascular congestion with possible pleural effusions.            Resident: DELLA VALENCIA I, the attending radiologist, have reviewed the images and agree with the final report above.    Workstation performed: [id number]    Results for orders placed during the hospital encounter of 01/12/24    XR chest pa & lateral    Narrative  CHEST    INDICATION:   Other nonspecific abnormal finding of lung field.    COMPARISON:  Comparison made with previous examination(s) dated (DX) 21-Nov-2023,(DX) 24-Aug-2022,(CT) 04-Sep-2020,(MR) 20-Sep-2019.    EXAM PERFORMED/VIEWS:  XR CHEST PA & LATERAL    FINDINGS:    Cardiomediastinal silhouette appears enlarged.. Small eventration right hemidiaphragm.    The lungs are clear.  No pneumothorax or pleural effusion.    Patient is post median sternotomy.    Impression  No acute cardiopulmonary disease. Median sternotomy with cardiomegaly. Apparent CABG surgery. Small eventration right hemidiaphragm.    Electronically signed: 01/12/2024 04:29 PM Jose De Jesus Caban MD      Last 24 Hours Medication List:   Current Facility-Administered Medications   Medication Dose Route Frequency Provider Last Rate    acetaminophen  650 mg Oral Q6H PRN Sergio Bush, DO      albuterol  2 puff Inhalation Q4H PRN Sergio Bush, DO      amLODIPine  5 mg Oral Daily Sergio Bush, DO      aspirin  81 mg Oral Daily Sergio Bush, DO      atorvastatin  40 mg Oral Daily With Dinner Sergio Bush DO      carbidopa-levodopa  1.5 tablet Oral TID Sergio Bush, DO      carvedilol  12.5 mg Oral BID With Meals Sergio Bush, DO      cephalexin  500 mg Oral BID Sergio Bush, DO      cholecalciferol  2,000 Units Oral Daily Sergio Bush, DO       enoxaparin  40 mg Subcutaneous Daily Sergio Bush DO      escitalopram  10 mg Oral Daily Sergio Bush DO      ferrous sulfate  325 mg Oral Daily With Breakfast Sergio Bush DO      fluticasone  2 spray Each Nare Daily Sergio Bush DO      furosemide  20 mg Intravenous BID (diuretic) Kashmir Patel MD      glycerin-hypromellose-  1 drop Both Eyes BID Sergio Bush DO      levETIRAcetam  750 mg Oral BID Sergio Bush DO      ondansetron  4 mg Intravenous Q6H PRN Sergio Bush DO      pantoprazole  40 mg Oral Early Morning Sergio Bush DO      QUEtiapine  12.5 mg Oral Daily Sergio Bush, DO      QUEtiapine  25 mg Oral HS Sergio Bush DO          Today, Patient Was Seen By: Kashmir Patel MD    ** Please Note: Dictation voice to text software may have been used in the creation of this document. **

## 2024-01-27 LAB
ANION GAP SERPL CALCULATED.3IONS-SCNC: 6 MMOL/L
BACTERIA UR CULT: ABNORMAL
BASOPHILS # BLD AUTO: 0.02 THOUSANDS/ÂΜL (ref 0–0.1)
BASOPHILS NFR BLD AUTO: 0 % (ref 0–1)
BUN SERPL-MCNC: 16 MG/DL (ref 5–25)
CALCIUM SERPL-MCNC: 8.6 MG/DL (ref 8.4–10.2)
CHLORIDE SERPL-SCNC: 100 MMOL/L (ref 96–108)
CO2 SERPL-SCNC: 33 MMOL/L (ref 21–32)
CREAT SERPL-MCNC: 0.8 MG/DL (ref 0.6–1.3)
EOSINOPHIL # BLD AUTO: 0.32 THOUSAND/ÂΜL (ref 0–0.61)
EOSINOPHIL NFR BLD AUTO: 4 % (ref 0–6)
ERYTHROCYTE [DISTWIDTH] IN BLOOD BY AUTOMATED COUNT: 14.6 % (ref 11.6–15.1)
GFR SERPL CREATININE-BSD FRML MDRD: 83 ML/MIN/1.73SQ M
GLUCOSE SERPL-MCNC: 107 MG/DL (ref 65–140)
HCT VFR BLD AUTO: 37.2 % (ref 36.5–49.3)
HGB BLD-MCNC: 12 G/DL (ref 12–17)
IMM GRANULOCYTES # BLD AUTO: 0.03 THOUSAND/UL (ref 0–0.2)
IMM GRANULOCYTES NFR BLD AUTO: 0 % (ref 0–2)
LYMPHOCYTES # BLD AUTO: 1.34 THOUSANDS/ÂΜL (ref 0.6–4.47)
LYMPHOCYTES NFR BLD AUTO: 18 % (ref 14–44)
MCH RBC QN AUTO: 27.4 PG (ref 26.8–34.3)
MCHC RBC AUTO-ENTMCNC: 32.3 G/DL (ref 31.4–37.4)
MCV RBC AUTO: 85 FL (ref 82–98)
MONOCYTES # BLD AUTO: 1.23 THOUSAND/ÂΜL (ref 0.17–1.22)
MONOCYTES NFR BLD AUTO: 16 % (ref 4–12)
NEUTROPHILS # BLD AUTO: 4.58 THOUSANDS/ÂΜL (ref 1.85–7.62)
NEUTS SEG NFR BLD AUTO: 62 % (ref 43–75)
NRBC BLD AUTO-RTO: 0 /100 WBCS
PLATELET # BLD AUTO: 182 THOUSANDS/UL (ref 149–390)
PMV BLD AUTO: 8.5 FL (ref 8.9–12.7)
POTASSIUM SERPL-SCNC: 3.5 MMOL/L (ref 3.5–5.3)
RBC # BLD AUTO: 4.38 MILLION/UL (ref 3.88–5.62)
SODIUM SERPL-SCNC: 139 MMOL/L (ref 135–147)
WBC # BLD AUTO: 7.52 THOUSAND/UL (ref 4.31–10.16)

## 2024-01-27 PROCEDURE — 80048 BASIC METABOLIC PNL TOTAL CA: CPT | Performed by: INTERNAL MEDICINE

## 2024-01-27 PROCEDURE — 85025 COMPLETE CBC W/AUTO DIFF WBC: CPT | Performed by: INTERNAL MEDICINE

## 2024-01-27 PROCEDURE — 99232 SBSQ HOSP IP/OBS MODERATE 35: CPT | Performed by: INTERNAL MEDICINE

## 2024-01-27 RX ORDER — CEFUROXIME AXETIL 250 MG/1
500 TABLET ORAL EVERY 12 HOURS SCHEDULED
Status: DISCONTINUED | OUTPATIENT
Start: 2024-01-27 | End: 2024-01-28 | Stop reason: HOSPADM

## 2024-01-27 RX ADMIN — ASPIRIN 81 MG CHEWABLE TABLET 81 MG: 81 TABLET CHEWABLE at 10:00

## 2024-01-27 RX ADMIN — PANTOPRAZOLE SODIUM 40 MG: 40 TABLET, DELAYED RELEASE ORAL at 05:35

## 2024-01-27 RX ADMIN — ESCITALOPRAM OXALATE 10 MG: 10 TABLET ORAL at 10:00

## 2024-01-27 RX ADMIN — FLUTICASONE PROPIONATE 2 SPRAY: 50 SPRAY, METERED NASAL at 22:14

## 2024-01-27 RX ADMIN — CEFUROXIME AXETIL 500 MG: 250 TABLET, FILM COATED ORAL at 15:42

## 2024-01-27 RX ADMIN — CARVEDILOL 12.5 MG: 12.5 TABLET, FILM COATED ORAL at 10:00

## 2024-01-27 RX ADMIN — LEVETIRACETAM 750 MG: 100 SOLUTION ORAL at 22:07

## 2024-01-27 RX ADMIN — CARBIDOPA AND LEVODOPA 1.5 TABLET: 25; 100 TABLET ORAL at 22:05

## 2024-01-27 RX ADMIN — QUETIAPINE FUMARATE 25 MG: 25 TABLET ORAL at 22:12

## 2024-01-27 RX ADMIN — ENOXAPARIN SODIUM 40 MG: 100 INJECTION SUBCUTANEOUS at 09:59

## 2024-01-27 RX ADMIN — GLYCERIN 1 DROP: .002; .002; .01 SOLUTION/ DROPS OPHTHALMIC at 15:41

## 2024-01-27 RX ADMIN — CEPHALEXIN 500 MG: 250 CAPSULE ORAL at 10:00

## 2024-01-27 RX ADMIN — GLYCERIN 1 DROP: .002; .002; .01 SOLUTION/ DROPS OPHTHALMIC at 09:59

## 2024-01-27 RX ADMIN — FERROUS SULFATE TAB 325 MG (65 MG ELEMENTAL FE) 325 MG: 325 (65 FE) TAB at 10:00

## 2024-01-27 RX ADMIN — CEFUROXIME AXETIL 500 MG: 250 TABLET, FILM COATED ORAL at 22:05

## 2024-01-27 RX ADMIN — AMLODIPINE BESYLATE 5 MG: 5 TABLET ORAL at 10:00

## 2024-01-27 RX ADMIN — LEVETIRACETAM 750 MG: 100 SOLUTION ORAL at 09:59

## 2024-01-27 RX ADMIN — ATORVASTATIN CALCIUM 40 MG: 40 TABLET, FILM COATED ORAL at 15:42

## 2024-01-27 RX ADMIN — CARVEDILOL 12.5 MG: 12.5 TABLET, FILM COATED ORAL at 15:41

## 2024-01-27 RX ADMIN — Medication 2000 UNITS: at 10:00

## 2024-01-27 RX ADMIN — CARBIDOPA AND LEVODOPA 1.5 TABLET: 25; 100 TABLET ORAL at 15:42

## 2024-01-27 RX ADMIN — CARBIDOPA AND LEVODOPA 1.5 TABLET: 25; 100 TABLET ORAL at 10:00

## 2024-01-27 RX ADMIN — SPIRONOLACTONE 25 MG: 25 TABLET ORAL at 10:00

## 2024-01-27 RX ADMIN — QUETIAPINE FUMARATE 12.5 MG: 25 TABLET ORAL at 10:00

## 2024-01-27 NOTE — PLAN OF CARE
Problem: Potential for Falls  Goal: Patient will remain free of falls  Description: INTERVENTIONS:  - Educate patient/family on patient safety including physical limitations  - Instruct patient to call for assistance with activity   - Consult OT/PT to assist with strengthening/mobility   - Keep Call bell within reach  - Keep bed low and locked with side rails adjusted as appropriate  - Keep care items and personal belongings within reach  - Initiate and maintain comfort rounds  - Make Fall Risk Sign visible to staff  - Offer Toileting every 2 Hours, in advance of need  - Initiate/Maintain alarm  - Obtain necessary fall risk management equipment:   - Apply yellow socks and bracelet for high fall risk patients  - Consider moving patient to room near nurses station  Outcome: Progressing     Problem: GENITOURINARY - ADULT  Goal: Maintains or returns to baseline urinary function  Description: INTERVENTIONS:  - Assess urinary function  - Encourage oral fluids to ensure adequate hydration if ordered  - Administer IV fluids as ordered to ensure adequate hydration  - Administer ordered medications as needed  - Offer frequent toileting  - Follow urinary retention protocol if ordered  Outcome: Progressing  Goal: Absence of urinary retention  Description: INTERVENTIONS:  - Assess patient's ability to void and empty bladder  - Monitor I/O  - Bladder scan as needed  - Discuss with physician/AP medications to alleviate retention as needed  - Discuss catheterization for long term situations as appropriate  Outcome: Progressing  Goal: Urinary catheter remains patent  Description: INTERVENTIONS:  - Assess patency of urinary catheter  - If patient has a chronic nolasco, consider changing catheter if non-functioning  - Follow guidelines for intermittent irrigation of non-functioning urinary catheter  Outcome: Progressing     Problem: MUSCULOSKELETAL - ADULT  Goal: Maintain or return mobility to safest level of function  Description:  INTERVENTIONS:  - Assess patient's ability to carry out ADLs; assess patient's baseline for ADL function and identify physical deficits which impact ability to perform ADLs (bathing, care of mouth/teeth, toileting, grooming, dressing, etc.)  - Assess/evaluate cause of self-care deficits   - Assess range of motion  - Assess patient's mobility  - Assess patient's need for assistive devices and provide as appropriate  - Encourage maximum independence but intervene and supervise when necessary  - Involve family in performance of ADLs  - Assess for home care needs following discharge   - Consider OT consult to assist with ADL evaluation and planning for discharge  - Provide patient education as appropriate  Outcome: Progressing  Goal: Maintain proper alignment of affected body part  Description: INTERVENTIONS:  - Support, maintain and protect limb and body alignment  - Provide patient/ family with appropriate education  Outcome: Progressing
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
## 2024-01-27 NOTE — ASSESSMENT & PLAN NOTE
"  Significant lower extremity edema which according to the patient has worsened over the last week or 2  BNP is 185    Abnormal portable chest x-ray findings quoted below:  \"Impression:  Suspected mild pulmonary vascular congestion with possible pleural effusions. \"    History of chronic mild diastolic CHF  Patient was started on spironolactone 25 mg p.o. daily per cardiology  "

## 2024-01-27 NOTE — ASSESSMENT & PLAN NOTE
Patient reported significant dysuria prior to admission.  Urine culture growing Klebsiella.  Resistant to cefazolin, but sensitive to cefuroxime  Switch Keflex to cefuroxime  Trend WBC and fever curve  Urine clear in the back  Pt has been accepted a [facility] for a Sunday 1/28 admission per case management notes

## 2024-01-28 ENCOUNTER — HOSPITAL ENCOUNTER (EMERGENCY)
Facility: HOSPITAL | Age: 84
Discharge: NURSING, REHAB OR LONG-TERM CARE | End: 2024-01-28
Attending: EMERGENCY MEDICINE | Admitting: EMERGENCY MEDICINE
Payer: MEDICARE

## 2024-01-28 VITALS
TEMPERATURE: 98.1 F | OXYGEN SATURATION: 91 % | RESPIRATION RATE: 18 BRPM | DIASTOLIC BLOOD PRESSURE: 63 MMHG | SYSTOLIC BLOOD PRESSURE: 137 MMHG | HEART RATE: 55 BPM

## 2024-01-28 VITALS
DIASTOLIC BLOOD PRESSURE: 74 MMHG | RESPIRATION RATE: 16 BRPM | HEART RATE: 62 BPM | WEIGHT: 175 LBS | BODY MASS INDEX: 26.52 KG/M2 | HEIGHT: 68 IN | SYSTOLIC BLOOD PRESSURE: 150 MMHG | TEMPERATURE: 97 F | OXYGEN SATURATION: 94 %

## 2024-01-28 DIAGNOSIS — Z71.1 NO PROBLEM, FEARED COMPLAINT UNFOUNDED: Primary | ICD-10-CM

## 2024-01-28 LAB
ANION GAP SERPL CALCULATED.3IONS-SCNC: 6 MMOL/L
BASOPHILS # BLD AUTO: 0.03 THOUSANDS/ÂΜL (ref 0–0.1)
BASOPHILS NFR BLD AUTO: 1 % (ref 0–1)
BUN SERPL-MCNC: 14 MG/DL (ref 5–25)
CALCIUM SERPL-MCNC: 8.9 MG/DL (ref 8.4–10.2)
CHLORIDE SERPL-SCNC: 100 MMOL/L (ref 96–108)
CO2 SERPL-SCNC: 32 MMOL/L (ref 21–32)
CREAT SERPL-MCNC: 0.74 MG/DL (ref 0.6–1.3)
EOSINOPHIL # BLD AUTO: 0.41 THOUSAND/ÂΜL (ref 0–0.61)
EOSINOPHIL NFR BLD AUTO: 7 % (ref 0–6)
ERYTHROCYTE [DISTWIDTH] IN BLOOD BY AUTOMATED COUNT: 14.5 % (ref 11.6–15.1)
GFR SERPL CREATININE-BSD FRML MDRD: 85 ML/MIN/1.73SQ M
GLUCOSE SERPL-MCNC: 103 MG/DL (ref 65–140)
HCT VFR BLD AUTO: 41.1 % (ref 36.5–49.3)
HGB BLD-MCNC: 13.2 G/DL (ref 12–17)
IMM GRANULOCYTES # BLD AUTO: 0.02 THOUSAND/UL (ref 0–0.2)
IMM GRANULOCYTES NFR BLD AUTO: 0 % (ref 0–2)
LYMPHOCYTES # BLD AUTO: 1.15 THOUSANDS/ÂΜL (ref 0.6–4.47)
LYMPHOCYTES NFR BLD AUTO: 19 % (ref 14–44)
MCH RBC QN AUTO: 27.3 PG (ref 26.8–34.3)
MCHC RBC AUTO-ENTMCNC: 32.1 G/DL (ref 31.4–37.4)
MCV RBC AUTO: 85 FL (ref 82–98)
MONOCYTES # BLD AUTO: 0.69 THOUSAND/ÂΜL (ref 0.17–1.22)
MONOCYTES NFR BLD AUTO: 11 % (ref 4–12)
NEUTROPHILS # BLD AUTO: 3.93 THOUSANDS/ÂΜL (ref 1.85–7.62)
NEUTS SEG NFR BLD AUTO: 62 % (ref 43–75)
NRBC BLD AUTO-RTO: 0 /100 WBCS
PLATELET # BLD AUTO: 196 THOUSANDS/UL (ref 149–390)
PMV BLD AUTO: 8.3 FL (ref 8.9–12.7)
POTASSIUM SERPL-SCNC: 3.8 MMOL/L (ref 3.5–5.3)
RBC # BLD AUTO: 4.84 MILLION/UL (ref 3.88–5.62)
SODIUM SERPL-SCNC: 138 MMOL/L (ref 135–147)
WBC # BLD AUTO: 6.23 THOUSAND/UL (ref 4.31–10.16)

## 2024-01-28 PROCEDURE — 99239 HOSP IP/OBS DSCHRG MGMT >30: CPT | Performed by: STUDENT IN AN ORGANIZED HEALTH CARE EDUCATION/TRAINING PROGRAM

## 2024-01-28 PROCEDURE — 99282 EMERGENCY DEPT VISIT SF MDM: CPT

## 2024-01-28 PROCEDURE — 99283 EMERGENCY DEPT VISIT LOW MDM: CPT | Performed by: PHYSICIAN ASSISTANT

## 2024-01-28 PROCEDURE — 85025 COMPLETE CBC W/AUTO DIFF WBC: CPT | Performed by: INTERNAL MEDICINE

## 2024-01-28 PROCEDURE — 80048 BASIC METABOLIC PNL TOTAL CA: CPT | Performed by: INTERNAL MEDICINE

## 2024-01-28 RX ORDER — CEFUROXIME AXETIL 500 MG/1
500 TABLET ORAL EVERY 12 HOURS SCHEDULED
Qty: 8 TABLET | Refills: 0 | Status: SHIPPED | OUTPATIENT
Start: 2024-01-28 | End: 2024-02-02

## 2024-01-28 RX ORDER — SPIRONOLACTONE 25 MG/1
25 TABLET ORAL DAILY
Qty: 30 TABLET | Refills: 0 | Status: SHIPPED | OUTPATIENT
Start: 2024-01-29 | End: 2024-02-28

## 2024-01-28 RX ADMIN — CARVEDILOL 12.5 MG: 12.5 TABLET, FILM COATED ORAL at 08:07

## 2024-01-28 RX ADMIN — ESCITALOPRAM OXALATE 10 MG: 10 TABLET ORAL at 08:08

## 2024-01-28 RX ADMIN — PANTOPRAZOLE SODIUM 40 MG: 40 TABLET, DELAYED RELEASE ORAL at 05:07

## 2024-01-28 RX ADMIN — GLYCERIN 1 DROP: .002; .002; .01 SOLUTION/ DROPS OPHTHALMIC at 08:09

## 2024-01-28 RX ADMIN — CEFUROXIME AXETIL 500 MG: 250 TABLET, FILM COATED ORAL at 08:07

## 2024-01-28 RX ADMIN — SPIRONOLACTONE 25 MG: 25 TABLET ORAL at 08:08

## 2024-01-28 RX ADMIN — LEVETIRACETAM 750 MG: 100 SOLUTION ORAL at 08:06

## 2024-01-28 RX ADMIN — ASPIRIN 81 MG CHEWABLE TABLET 81 MG: 81 TABLET CHEWABLE at 08:08

## 2024-01-28 RX ADMIN — FERROUS SULFATE TAB 325 MG (65 MG ELEMENTAL FE) 325 MG: 325 (65 FE) TAB at 08:07

## 2024-01-28 RX ADMIN — Medication 2000 UNITS: at 08:07

## 2024-01-28 RX ADMIN — AMLODIPINE BESYLATE 5 MG: 5 TABLET ORAL at 08:07

## 2024-01-28 RX ADMIN — QUETIAPINE FUMARATE 12.5 MG: 25 TABLET ORAL at 08:08

## 2024-01-28 RX ADMIN — ENOXAPARIN SODIUM 40 MG: 100 INJECTION SUBCUTANEOUS at 08:06

## 2024-01-28 RX ADMIN — CARBIDOPA AND LEVODOPA 1.5 TABLET: 25; 100 TABLET ORAL at 08:08

## 2024-01-28 NOTE — TRANSPORTATION MEDICAL NECESSITY
"Section I - General Information    Name of Patient: Aldo Rodriguez                 : 1940    Medicare #: [id number]  Transport Date: 24 (PCS is valid for round trips on this date and for all repetitive trips in the 60-day range as noted below.)  Origin: [facility] MED SURG UNIT                                                        Destination:  [facility]  Is the pt's stay covered under Medicare Part A (PPS/DRG)   [x]     Closest appropriate facility? If no, why is transport to more distant facility required? Yes  If hospice pt, is this transport related to pt's terminal illness? NA       Section II - Medical Necessity Questionnaire  Ambulance transportation is medically necessary only if other means of transport are contraindicated or would be potentially harmful to the patient. To meet this requirement, the patient must either be \"bed confined\" or suffer from a condition such that transport by means other than ambulance is contraindicated by the patient's condition. The following questions must be answered by the medical professional signing below for this form to be valid:    1)  Describe the MEDICAL CONDITION (physical and/or mental) of this patient AT THE TIME OF AMBULANCE TRANSPORT that requires the patient to be transported in an ambulance and why transport by other means is contraindicated by the patient's condition:  Patinet is on O2 and can not self administer, patient with confusion and not safe to sit in WC for length of kirsten to SNF    2) Is the patient \"bed confined\" as defined below?     No  To be \"be confined\" the patient must satisfy all three of the following conditions: (1) unable to get up from bed without Assistance; AND (2) unable to ambulate; AND (3) unable to sit in a chair or wheelchair. Confused and not safe to sit in WC     3) Can this patient safely be transported by car or wheelchair van (i.e., seated during transport without a medical attendant or " monitoring)?   No    4) In addition to completing questions 1-3 above, please check any of the following conditions that apply*:   *Note: supporting documentation for any boxes checked must be maintained in the patient's medical records.  If hosp-hosp transfer, describe services needed at 2nd facility not available at 1st facility?   Patient is confused  Requires oxygen-unable to self administer      Section III - Signature of Physician or Healthcare Professional  I certify that the above information is true and correct based on my evaluation of this patient, and represent that the patient requires transport by ambulance and that other forms of transport are contraindicated. I understand that this information will be used by the Centers for Medicare and Medicaid Services (CMS) to support the determination of medical necessity for ambulance services, and I represent that I have personal knowledge of the patient's condition at time of transport.    []  If this box is checked, I also certify that the patient is physically or mentally incapable of signing the ambulance service's claim and that the institution with which I am affiliated has furnished care, services, or assistance to the patient.    My signature below is made on behalf of the patient pursuant to 42 CFR §424.36(b)(4). In accordance with 42 CFR §424.37, the specific reason(s) that the patient is physically or mentally incapable of signing the claim form is as follows: .      Signature of Physician* or Healthcare Professional______________________________________________________________  Signature Date 01/28/24 (For scheduled repetitive transports, this form is not valid for transports performed more than 60 days after this date)    Printed Name & Credentials of Physician or Healthcare Professional (MD, DO, RN, etc.)_____________Yanely Ryan___________________  *Form must be signed by patient's attending physician for scheduled, repetitive transports. For  non-repetitive, unscheduled ambulance transports, if unable to obtain the signature of the attending physician, any of the following may sign (choose appropriate option below)  [] Physician Assistant []  Clinical Nurse Specialist []  Registered Nurse  []  Nurse Practitioner  [] Discharge Planner

## 2024-01-28 NOTE — ASSESSMENT & PLAN NOTE
"  Significant lower extremity edema which according to the patient has worsened over the last week or 2  BNP is 185    Abnormal portable chest x-ray findings quoted below:  \"Impression:  Suspected mild pulmonary vascular congestion with possible pleural effusions. \"    History of chronic mild diastolic CHF  Patient was started on spironolactone 25 mg p.o. daily per cardiology->continue on discharge    F/u with cardio outpt  "

## 2024-01-28 NOTE — CASE MANAGEMENT
Case Management Discharge Planning Note    Patient name Aldo Rodriguez  Location /-01 MRN [id number]  : 1940 Date 2024       Current Admission Date: 2024  Current Admission Diagnosis:Acute urinary tract infection   Patient Active Problem List    Diagnosis Date Noted    Acute urinary tract infection 2024    Localized edema 2024    Dyspnea 2023    Periodic limb movements of sleep 2023    Parkinsonism 10/18/2023    Urinary incontinence 2023    JASMEET (obstructive sleep apnea)     Other parasomnia 2023    Snoring 2023    Suspected sleep apnea 2023    Seizure ([facility])     Frailty 2023    Ambulatory dysfunction 2023    Primary osteoarthritis of one hip, right     Right hip pain     Recurrent major depressive disorder, in full remission ([facility]) 10/13/2022    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, psychotic disturbance, mood disturbance, or anxiety ([facility]) 10/13/2022    Knee pain 2022    Chronic neck pain 2022    BMI 26.0-26.9,adult 2022    History of seizure 2021    Right epiphora 09/10/2020    Depression, recurrent ([facility]) 2020    History of CVA (cerebrovascular accident) 11/15/2019    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    Carotid stenosis, asymptomatic, bilateral 10/16/2019    Diverticulosis of intestine 10/07/2019    Foraminal stenosis of cervical region 10/07/2019    TIA (transient ischemic attack) 2019    Left inguinal hernia 2018    Osteoporosis 2017    Peripheral neuropathy 2017    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 2017    Essential hypertension 2017    PAD (peripheral artery disease) (HCC) 2016    DJD (degenerative joint disease) 2015    GERD (gastroesophageal reflux disease) 2015    Vitamin D deficiency 2013    Benign colon polyp 2013    ASCVD (arteriosclerotic  cardiovascular disease) 10/15/2012    Hyperlipidemia 10/15/2012      LOS (days): 3  Geometric Mean LOS (GMLOS) (days): 2.9  Days to GMLOS:0.2     OBJECTIVE:  Risk of Unplanned Readmission Score: 16         Current admission status: Inpatient   Preferred Pharmacy:   [id number] [place] - [address]  [address]  [facility] [address]  Phone: [phone] Fax: [phone]    Primary Care Provider: ELE Ruiz    Primary Insurance: MEDICARE  Secondary Insurance: [facility]    DISCHARGE DETAILS:     As per roundtrip, [facility] can transport patient to [facility] at 2 pm.  Notified attending, Magnus, patient's nurse and left MOM for Magen, patient's friend at [phone] of transport time.

## 2024-01-28 NOTE — ASSESSMENT & PLAN NOTE
Patient reports ambulating around his house with the use of a walker    PT OT recommending Level 2 care    May also be multifactorial, history of Parkinson's, acute infection may worsen his mobility secondary to his Parkinson's disease

## 2024-01-28 NOTE — CASE MANAGEMENT
Case Management Discharge Planning Note    Patient name Aldo Rodriguez  Location /-01 MRN [id number]  : 1940 Date 2024       Current Admission Date: 2024  Current Admission Diagnosis:Acute urinary tract infection   Patient Active Problem List    Diagnosis Date Noted    Acute urinary tract infection 2024    Localized edema 2024    Dyspnea 2023    Periodic limb movements of sleep 2023    Parkinsonism 10/18/2023    Urinary incontinence 2023    JASMEET (obstructive sleep apnea)     Other parasomnia 2023    Snoring 2023    Suspected sleep apnea 2023    Seizure ([facility])     Frailty 2023    Ambulatory dysfunction 2023    Primary osteoarthritis of one hip, right     Right hip pain     Recurrent major depressive disorder, in full remission ([facility]) 10/13/2022    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, psychotic disturbance, mood disturbance, or anxiety ([facility]) 10/13/2022    Knee pain 2022    Chronic neck pain 2022    BMI 26.0-26.9,adult 2022    History of seizure 2021    Right epiphora 09/10/2020    Depression, recurrent ([facility]) 2020    History of CVA (cerebrovascular accident) 11/15/2019    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    Carotid stenosis, asymptomatic, bilateral 10/16/2019    Diverticulosis of intestine 10/07/2019    Foraminal stenosis of cervical region 10/07/2019    TIA (transient ischemic attack) 2019    Left inguinal hernia 2018    Osteoporosis 2017    Peripheral neuropathy 2017    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 2017    Essential hypertension 2017    PAD (peripheral artery disease) (HCC) 2016    DJD (degenerative joint disease) 2015    GERD (gastroesophageal reflux disease) 2015    Vitamin D deficiency 2013    Benign colon polyp 2013    ASCVD (arteriosclerotic  cardiovascular disease) 10/15/2012    Hyperlipidemia 10/15/2012      LOS (days): 3  Geometric Mean LOS (GMLOS) (days): 2.9  Days to GMLOS:0.2     OBJECTIVE:  Risk of Unplanned Readmission Score: 16         Current admission status: Inpatient   Preferred Pharmacy:   [id number] - [place] - [address]  [address]  [facility] [address]  Phone: [phone] Fax: [phone]    Primary Care Provider: ELE Ruiz    Primary Insurance: MEDICARE  Secondary Insurance: [facility]    DISCHARGE DETAILS:     Patient for discharge to [facility]. Confirmed with Mica of admissions there, she is requesting a note in the d/c summary stating patient is a 30 day exception as physician anticipate discharge  from SNF within 30 days.Request BLS transport at 2 pm via roundtrip, wait confirmation of transportation.

## 2024-01-28 NOTE — NURSING NOTE
"Pt transported to [facility]. When this RN called and gave report, the supervisor stated, \" The pt has bed bugs, they are being sent back to the hospital.\"     When [facility] cleaned the pt's room they stated that they did not find a single bed bug. The pt also did not have any marks on his body indicating that he had bed bugs.   "

## 2024-01-28 NOTE — DISCHARGE INSTR - AVS FIRST PAGE
You are to follow-up with your PCP next week    You are to follow-up with cardiology    You are to take spironolactone 25 mg daily    You are to complete cefuroxime antibiotic for 4 more days.

## 2024-01-28 NOTE — DISCHARGE SUMMARY
"[facility]  Discharge- Aldo Rodriguez 1940, 83 y.o. male MRN: [id number]  Unit/Bed#: -[facility] Encounter: [id number]  Primary Care Provider: ELE Ruiz   Date and time admitted to hospital: 1/24/2024  8:21 AM    * Acute urinary tract infection  Assessment & Plan  Patient reported significant dysuria prior to admission.  Urine culture growing Klebsiella.  Resistant to cefazolin, but sensitive to cefuroxime  Continue cefuroxime day 3-> discharge with 4 more days  Trend WBC and fever curve  Urine clear in the back  Dispo: [facility] he is a 30 day exception for the [facility] as he had a U admission within 2 yr      Acute on chronic diastolic heart failure (HCC)  Assessment & Plan    Significant lower extremity edema which according to the patient has worsened over the last week or 2  BNP is 185    Abnormal portable chest x-ray findings quoted below:  \"Impression:  Suspected mild pulmonary vascular congestion with possible pleural effusions. \"    History of chronic mild diastolic CHF  Patient was started on spironolactone 25 mg p.o. daily per cardiology->continue on discharge    F/u with cardio outpt    Parkinsonism  Assessment & Plan  Continue Sinemet    PT OT consult    Urinary incontinence  Assessment & Plan  History of urinary incontinence    Likely contributing to acute UTI      JASMEET (obstructive sleep apnea)  Assessment & Plan  Patient utilizes home CPAP, patient offered CPAP here in the hospital, he declined.    Seizure (HCC)  Assessment & Plan  History of seizures, no reported seizure activity, continue Kera    Ambulatory dysfunction  Assessment & Plan  Patient reports ambulating around his house with the use of a walker    PT OT recommending Level 2 care    May also be multifactorial, history of Parkinson's, acute infection may worsen his mobility secondary to his Parkinson's disease    BMI 26.0-26.9,adult  Assessment & Plan  BMI 26.6    History of CVA " (cerebrovascular accident)  Assessment & Plan  Continue aspirin, statin    Essential hypertension  Assessment & Plan  History of hypertension, continue Norvasc, Coreg, Lasix    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  Assessment & Plan  Continue medical management    Aspirin statin beta-blocker    No chest pain        Medical Problems       Resolved Problems  Date Reviewed: 1/27/2024   None       Discharging Physician / Practitioner: Jennifer Ken MD  PCP: ELE Ruiz  Admission Date:   Admission Orders (From admission, onward)       Ordered        01/25/24 1541  INPATIENT ADMISSION  Once                          Discharge Date: 01/28/24    Consultations During Hospital Stay:  Cardio  CM  PT  OT    Procedures Performed:   CT head without contrast   Final Result      No acute intracranial abnormality.      Stable chronic microangiopathic changes and chronic infarcts.                  Resident: ESPERANZA MARRUFO I, the attending radiologist, have reviewed the images and agree with the final report above.      Workstation performed: [id number]         XR chest 1 view portable   ED Interpretation   No acute infiltrate      Final Result   Suspected mild pulmonary vascular congestion with possible pleural effusions.                  Resident: DELLA VALENCIA I, the attending radiologist, have reviewed the images and agree with the final report above.      Workstation performed: [id number]           01/26 ECHO:  Left Ventricle: Left ventricular cavity size is normal. Wall thickness is mildly increased. The left ventricular ejection fraction is 60%. Systolic function is normal. Although no diagnostic regional wall motion abnormality was identified, this possibility cannot be completely excluded on the basis of this study. Diastolic function is mildly abnormal, consistent with grade I (abnormal) relaxation.    Right Ventricle: Right ventricular cavity size is mildly dilated.  "Systolic function is normal.    Aortic Valve: There is aortic valve sclerosis.    Tricuspid Valve: There is mild to moderate regurgitation. The right ventricular systolic pressure is moderately elevated. The estimated right ventricular systolic pressure is 55.00 mmHg.       Significant Findings / Test Results:   See above    Incidental Findings:   none       Test Results Pending at Discharge (will require follow up):   none     Outpatient Tests Requested:  none    Complications:  none known at this time    Reason for Admission: UTI    Hospital Course:   Aldo Rodriguez is a 83 y.o. male patient who originally presented to the hospital on 1/24/2024 due to generalized weakness and difficulty ambulating.  In the ED patient did not meet SIRS criteria however was found to have acute UTI.  Was started on Keflex.  PT OT was consulted.  Cardiology was consulted for possible vascular congestion.  Patient was placed on spironolactone by cardiology.  Urine culture grew Klebsiella and antibiotics were changed to cefuroxime due to sensitivities.  PT OT evaluated the patient and recommended level 2 care.  He is otherwise remained hemodynamically stable afebrile no acute respiratory distress.  He is to follow-up with his PCP and cardiology.  He will be discharged with 4 more days of cefuroxime and spironolactone.      Please see above list of diagnoses and related plan for additional information.     Condition at Discharge: stable    Discharge Day Visit / Exam:   Subjective:  Aldo was seen and examined at bedside.  No acute events overnight.  Discussed plan of care.  All questions and concerns were answered and addressed.  Has no acute complaints at this time.  Vitals: Blood Pressure: 150/74 (01/28/24 0751)  Pulse: 62 (01/28/24 0751)  Temperature: (!) 97 °F (36.1 °C) (01/26/24 1959)  Temp Source: Temporal (01/26/24 1959)  Respirations: 16 (01/27/24 2205)  Height: 5' 8\" (172.7 cm) (01/26/24 1100)  Weight - Scale: 79.4 kg (175 " lb) (01/26/24 1100)  SpO2: 94 % (01/28/24 0751)  Exam:   Physical Exam  Vitals and nursing note reviewed.   Constitutional:       General: He is not in acute distress.     Appearance: He is ill-appearing (chronically).   HENT:      Head: Normocephalic and atraumatic.      Comments: Large right scar, deviated septum  Cardiovascular:      Rate and Rhythm: Normal rate and regular rhythm.      Pulses: Normal pulses.      Heart sounds: Normal heart sounds.   Pulmonary:      Effort: Pulmonary effort is normal.      Breath sounds: Normal breath sounds.   Abdominal:      General: Abdomen is flat. Bowel sounds are normal.      Palpations: Abdomen is soft.   Musculoskeletal:      Right lower leg: Edema present.      Left lower leg: Edema present.   Skin:     General: Skin is warm.   Neurological:      General: No focal deficit present.      Mental Status: He is alert and oriented to person, place, and time.          Discussion with Family: Patient declined call to .     Discharge instructions/Information to patient and family:   See after visit summary for information provided to patient and family.      Provisions for Follow-Up Care:  See after visit summary for information related to follow-up care and any pertinent home health orders.      Mobility at time of Discharge:   Basic Mobility Inpatient Raw Score: 11  JH-HLM Goal: 4: Move to chair/commode  JH-HLM Achieved: 4: Move to chair/commode  HLM Goal achieved. Continue to encourage appropriate mobility.     Disposition:   Other Skilled Nursing Facility at [facility]    Planned Readmission: yes     Discharge Statement:  I spent 40 minutes discharging the patient. This time was spent on the day of discharge. I had direct contact with the patient on the day of discharge. Greater than 50% of the total time was spent examining patient, answering all patient questions, arranging and discussing plan of care with patient as well as directly providing  post-discharge instructions.  Additional time then spent on discharge activities.    Discharge Medications:  See after visit summary for reconciled discharge medications provided to patient and/or family.      **Please Note: This note may have been constructed using a voice recognition system**
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
## 2024-01-28 NOTE — ED NOTES
Patient assessed, treated and discharged by provider.     Christianne Browning RN  01/28/24 [id number]

## 2024-01-28 NOTE — ASSESSMENT & PLAN NOTE
Continue medical management    Aspirin statin beta-blocker    No chest pain   Discoloration of skin of finger

## 2024-01-28 NOTE — PLAN OF CARE
Problem: GENITOURINARY - ADULT  Goal: Maintains or returns to baseline urinary function  Description: INTERVENTIONS:  - Assess urinary function  - Encourage oral fluids to ensure adequate hydration if ordered  - Administer IV fluids as ordered to ensure adequate hydration  - Administer ordered medications as needed  - Offer frequent toileting  - Follow urinary retention protocol if ordered  Outcome: Progressing  Goal: Absence of urinary retention  Description: INTERVENTIONS:  - Assess patient's ability to void and empty bladder  - Monitor I/O  - Bladder scan as needed  - Discuss with physician/AP medications to alleviate retention as needed  - Discuss catheterization for long term situations as appropriate  Outcome: Progressing  Goal: Urinary catheter remains patent  Description: INTERVENTIONS:  - Assess patency of urinary catheter  - If patient has a chronic nolasco, consider changing catheter if non-functioning  - Follow guidelines for intermittent irrigation of non-functioning urinary catheter  Outcome: Progressing     Problem: MUSCULOSKELETAL - ADULT  Goal: Maintain or return mobility to safest level of function  Description: INTERVENTIONS:  - Assess patient's ability to carry out ADLs; assess patient's baseline for ADL function and identify physical deficits which impact ability to perform ADLs (bathing, care of mouth/teeth, toileting, grooming, dressing, etc.)  - Assess/evaluate cause of self-care deficits   - Assess range of motion  - Assess patient's mobility  - Assess patient's need for assistive devices and provide as appropriate  - Encourage maximum independence but intervene and supervise when necessary  - Involve family in performance of ADLs  - Assess for home care needs following discharge   - Consider OT consult to assist with ADL evaluation and planning for discharge  - Provide patient education as appropriate  Outcome: Progressing  Goal: Maintain proper alignment of affected body part  Description:  INTERVENTIONS:  - Support, maintain and protect limb and body alignment  - Provide patient/ family with appropriate education  Outcome: Progressing

## 2024-01-28 NOTE — ASSESSMENT & PLAN NOTE
Patient reported significant dysuria prior to admission.  Urine culture growing Klebsiella.  Resistant to cefazolin, but sensitive to cefuroxime  Continue cefuroxime day 3-> discharge with 4 more days  Trend WBC and fever curve  Urine clear in the back  Dispo: Hector Taylor he is a 30 day exception for the [facility] as he had a [facility] admission within 2 yr

## 2024-01-28 NOTE — ED NOTES
Primary RN and charge RN provided incontinence care to patient. No bedbugs noted. Condom catheter reapplied.      Christianne Browning, KARINA  01/28/24 3510

## 2024-01-29 ENCOUNTER — TELEPHONE (OUTPATIENT)
Dept: PSYCHIATRY | Facility: CLINIC | Age: 84
End: 2024-01-29

## 2024-01-31 PROBLEM — I50.33 ACUTE ON CHRONIC DIASTOLIC HEART FAILURE (HCC): Status: ACTIVE | Noted: 2024-01-25
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
## 2024-02-02 ENCOUNTER — OFFICE VISIT (OUTPATIENT)
Dept: CARDIOLOGY CLINIC | Facility: CLINIC | Age: 84
End: 2024-02-02
Payer: MEDICARE

## 2024-02-02 VITALS
WEIGHT: 169 LBS | HEART RATE: 60 BPM | DIASTOLIC BLOOD PRESSURE: 58 MMHG | BODY MASS INDEX: 25.61 KG/M2 | HEIGHT: 68 IN | SYSTOLIC BLOOD PRESSURE: 108 MMHG

## 2024-02-02 DIAGNOSIS — R60.0 LOCALIZED EDEMA: ICD-10-CM

## 2024-02-02 DIAGNOSIS — I25.810 CORONARY ARTERY DISEASE INVOLVING AUTOLOGOUS VEIN CORONARY BYPASS GRAFT WITHOUT ANGINA PECTORIS: Primary | ICD-10-CM

## 2024-02-02 PROCEDURE — 99214 OFFICE O/P EST MOD 30 MIN: CPT | Performed by: PHYSICIAN ASSISTANT

## 2024-02-02 RX ORDER — LEVETIRACETAM 750 MG/1
750 TABLET ORAL 2 TIMES DAILY
COMMUNITY

## 2024-02-02 NOTE — PATIENT INSTRUCTIONS
No changes to medications today, please continue everything the same. If you need refills of your cardiac medications prescribed by our office, please call us ahead of time so that they can be filled.   We will see you back in the office in 3 months. If you have any questions or concerns in the mean time please do not hesitate to call our office.

## 2024-02-19 NOTE — DISCHARGE INSTRUCTIONS
----- Message from [place] sent at 2/19/2024 12:20 PM CST -----  Contact: [phone]  Requesting an RX refill or new RX.  Is this a refill or new RX: refill  RX name and strength :  HYDROcodone-acetaminophen (NORCO)  mg per tablet 120 tablet  Is this a 30 day or 90 day RX:   Pharmacy name and phone # [facility]   Phone: [phone]  Fax: [phone]        The doctors have asked that we provide their patients with the following 2 reminders -- prescription refills can take up to 72 hours, and a friendly reminder that in the future you can use your [facility] account to request refills: Patient is out of medication       Patient does not have any insect bites or visible insects.  Safe to be discharged to SNF.

## 2024-02-20 ENCOUNTER — TELEPHONE (OUTPATIENT)
Dept: NEUROLOGY | Facility: CLINIC | Age: 84
End: 2024-02-20

## 2024-02-20 NOTE — TELEPHONE ENCOUNTER
LMOM to confirm pt's appt w/ Callie Caro in [facility] on 2/28/24 at 10:45. Advised pt to arrive 10 minutes prior to check in with the .

## 2024-02-21 PROBLEM — N39.0 ACUTE URINARY TRACT INFECTION: Status: RESOLVED | Noted: 2024-01-25 | Resolved: 2024-02-21

## 2024-02-28 ENCOUNTER — TELEPHONE (OUTPATIENT)
Dept: NEUROLOGY | Facility: CLINIC | Age: 84
End: 2024-02-28

## 2024-03-04 ENCOUNTER — TELEPHONE (OUTPATIENT)
Dept: NEUROLOGY | Facility: CLINIC | Age: 84
End: 2024-03-04

## 2024-03-04 NOTE — TELEPHONE ENCOUNTER
LMOM to confirm pt's appt w/ Callie Caro in [facility] on 3/13/24 at 2:15. Advised pt to arrive 10 minutes prior to check in with the .

## 2024-03-08 ENCOUNTER — TELEPHONE (OUTPATIENT)
Dept: FAMILY MEDICINE CLINIC | Facility: HOSPITAL | Age: 84
End: 2024-03-08

## 2024-03-08 DIAGNOSIS — M62.830 SPASM OF MUSCLE OF LOWER BACK: Primary | ICD-10-CM

## 2024-03-08 RX ORDER — METHOCARBAMOL 500 MG/1
TABLET, FILM COATED ORAL
Qty: 30 TABLET | Refills: 0 | Status: SHIPPED | OUTPATIENT
Start: 2024-03-08

## 2024-03-08 NOTE — TELEPHONE ENCOUNTER
Script for muscle relaxer sent to pharmacy. Over the counter topical voltaren gel may also be helpful, and should continue ice or heat as needed.

## 2024-03-08 NOTE — TELEPHONE ENCOUNTER
Start last night with low back spasm.  Wants to know he can take for this?  Tylenol, heat or cold is not helping.

## 2024-03-13 ENCOUNTER — OFFICE VISIT (OUTPATIENT)
Dept: NEUROLOGY | Facility: CLINIC | Age: 84
End: 2024-03-13
Payer: MEDICARE

## 2024-03-13 VITALS
OXYGEN SATURATION: 97 % | TEMPERATURE: 97.7 F | HEIGHT: 68 IN | DIASTOLIC BLOOD PRESSURE: 60 MMHG | HEART RATE: 59 BPM | BODY MASS INDEX: 26.48 KG/M2 | SYSTOLIC BLOOD PRESSURE: 100 MMHG | WEIGHT: 174.7 LBS

## 2024-03-13 DIAGNOSIS — G20.C PARKINSONISM: Primary | ICD-10-CM

## 2024-03-13 PROCEDURE — 99214 OFFICE O/P EST MOD 30 MIN: CPT | Performed by: NURSE PRACTITIONER

## 2024-03-13 NOTE — PROGRESS NOTES
Review of Systems   Constitutional:  Negative for appetite change, fatigue and fever.   HENT: Negative.  Negative for hearing loss, tinnitus, trouble swallowing and voice change.    Eyes: Negative.  Negative for photophobia, pain and visual disturbance.   Respiratory: Negative.  Negative for shortness of breath.    Cardiovascular: Negative.  Negative for palpitations.   Gastrointestinal: Negative.  Negative for nausea and vomiting.   Endocrine: Negative.  Negative for cold intolerance.   Genitourinary: Negative.  Negative for dysuria, frequency and urgency.   Musculoskeletal:  Negative for back pain, gait problem, myalgias, neck pain and neck stiffness.   Skin: Negative.  Negative for rash.   Allergic/Immunologic: Negative.    Neurological: Negative.  Negative for dizziness, tremors, seizures, syncope, facial asymmetry, speech difficulty, weakness, light-headedness, numbness and headaches.   Hematological: Negative.  Does not bruise/bleed easily.   Psychiatric/Behavioral: Negative.  Negative for confusion, hallucinations and sleep disturbance.    All other systems reviewed and are negative.

## 2024-03-13 NOTE — PROGRESS NOTES
Patient ID: Aldo Rodriguez is a 83 y.o. male.    Assessment/Plan:    Parkinsonism  Left sided bradykinesia, rigidity, changes in gait and left hand resting tremor consistent with parkinsonism. He does have a diagnosis of dementia-follows with senior care. 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   He has noted improvement in tremor and gait since starting sinemet and completing PT during his rehab stay last month. Mild improvement noted on exam as well. We did discuss considering further increases but given improvement and potential side effects opted to continue current dose.   He will remain on current dose of sinemet 25/100 mg 1 tab Tid.     Plan for follow up in 6 months, is due for follow up with vascular team. To contact the office sooner with any concerns or worsening symptoms.       Diagnoses and all orders for this visit:    Parkinsonism  -     Ambulatory Referral to Physical Therapy; Future           Subjective:    HPI    Patient is an 83-year-old male with history of CVA, TIA, CAD, bilateral carotid stenosis,  L vert artery stenosis, HLD, HTN, PAD, peripheral neuropathy who presents for follow up for parkinsonism.     He does follow with our office for history of stroke and seizure, most recently seen in March 2023 by neurovascular team.  He is maintained on aspirin, Plavix, statin.  He was referred to movement team for concern for parkinsonism on exam of left hand pill-rolling tremor, increased rigidity, and bradykinesia that was noted at that visit, noted 3 months prior increased difficulty with gait, left leg drag.      He does follow with senior care for dementia.      last office visit 11/2023 in which he was to slowly increase sinemet.      Current medications:   Sinemet 25/100 mg 1 tab TID      Interval History:   He presents with his caregiver  "Magen who assists with history who has known him for the past 2 years.    He was able to tolerate increase sinemet, tremor appears to be less. Walking a little bit better. He was in rehab for a month after he was hospitalized for UTI. He is going to restart PT outpatient PT once home are is completed.       NO med side effects. No hallucinations.         MRI brain 8/2022: Small scattered hyperintensities on T2/FLAIR imaging are   noted in the periventricular and subcortical white matter demonstrating an appearance that is statistically most likely to represent moderate microangiopathic change.   Encephalomalacia identified right cerebellum, right parietal lobe cortex and left   frontal lobe compatible with chronic infarcts.  Chronic lacunar infarct also noted in the left centrum semiovale.  No evidence of recent infarct.  Chronic right maxillary sinus opacification.       The following portions of the patient's history were reviewed and updated as appropriate: allergies, current medications, past family history, past medical history, past social history, past surgical history and problem list.       Objective:    Blood pressure 100/60, pulse 59, temperature 97.7 °F (36.5 °C), temperature source Temporal, height 5' 8\" (1.727 m), weight 79.2 kg (174 lb 11.2 oz), SpO2 97%.    Physical Exam  Constitutional:       General: He is awake.   HENT:      Right Ear: Hearing normal.      Left Ear: Hearing normal.   Eyes:      Extraocular Movements: Extraocular movements intact.      Pupils: Pupils are equal, round, and reactive to light.   Neurological:      Mental Status: He is alert.      Motor: Motor strength is normal.        Neurological Exam  Mental Status  Awake and alert. Oriented only to person, place and situation. Speech: Hypophonia, dysarthria (chronic). Language is fluent with no aphasia.    Cranial Nerves  CN III, IV, VI: Extraocular movements intact bilaterally. Pupils equal round and reactive to light " bilaterally.  CN V:  Right: Facial sensation is normal.  Left: Facial sensation is normal on the left.  CN VII:  Right: There is no facial weakness.  Left: There is no facial weakness.  CN VIII:  Right: Hearing is normal.  Left: Hearing is normal.  CN IX, X: Palate elevates symmetrically  CN XI:  Right: Trapezius strength is normal.  Left: Trapezius strength is normal.  CN XII: Tongue midline without atrophy or fasciculations.    Motor   Strength is 5/5 throughout all four extremities.    Sensory  Light touch is normal in upper and lower extremities.     Coordination  Right: Finger-to-nose normal. Rapid alternating movement normal.Left: Finger-to-nose normal. Rapid alternating movement abnormality:  See UPDRS III    .    Gait  Casual gait: Unable to rise from chair without using arms.  Ambulated with walker, shortened stride L>R, enbloc turn, no freezing, mildly stooped posture.  UPDRSIII                  3/13/24 11/29/23   Speech  1 1   Facial Expression  1 1   Postural Tremor (Right) 0 0   Postural Tremor (Left) 0 0   Kinetic Tremor (Right)  0 0   Kinetic Tremor (Left)  0 0   Rest tremor amplitude RUE 0 10   Rest tremor amplitude LUE 0 0   Rest tremor amplitude RLE 0 0   Rest tremor amplitude LLE 0 0   Lip/Jaw Tremor  0 0   Consistency of tremor 0 1   Finger Taps (Right)   2 2   Finger Taps (Left)  1 2   Hand Movement (Right)  2 1   Hand Movement (Left)   1 2   Pronation/Supination (Right)  2 1   Pronation/Supination (Left)   1 2   Toe Tapping (Right) 2 1   Toe Tapping (Left) 2 2   Leg Agility (Right)  1 1   Leg Agility (Left)   1 1   Rigidity - Neck       Rigidity - Upper Extremity (Right)  2 1   Rigidity - Upper Extremity (Left)   1 2   Rigidity - Lower Extremity (Right)  0 0   Rigidity - Lower Extremity (Left)   0 0   Arising from Chair   1 1    Gait   3 3-walker   Freezing of Gait 0 0   Postural Stability       Posture 1 1   Global spontaneity of movement 1 1-2       I have personally reviewed the ROS  performed by the MA.    ROS:    Review of Systems  Review of Systems   Constitutional:  Negative for appetite change, fatigue and fever.   HENT: Negative.  Negative for hearing loss, tinnitus, trouble swallowing and voice change.    Eyes: Negative.  Negative for photophobia, pain and visual disturbance.   Respiratory: Negative.  Negative for shortness of breath.    Cardiovascular: Negative.  Negative for palpitations.   Gastrointestinal: Negative.  Negative for nausea and vomiting.   Endocrine: Negative.  Negative for cold intolerance.   Genitourinary: Negative.  Negative for dysuria, frequency and urgency.   Musculoskeletal:  Negative for back pain, gait problem, myalgias, neck pain and neck stiffness.   Skin: Negative.  Negative for rash.   Allergic/Immunologic: Negative.    Neurological: Negative.  Negative for dizziness, tremors, seizures, syncope, facial asymmetry, speech difficulty, weakness, light-headedness, numbness and headaches.   Hematological: Negative.  Does not bruise/bleed easily.   Psychiatric/Behavioral: Negative.  Negative for confusion, hallucinations and sleep disturbance.    All other systems reviewed and are negative.

## 2024-03-13 NOTE — ASSESSMENT & PLAN NOTE
Left sided bradykinesia, rigidity, changes in gait and left hand resting tremor consistent with parkinsonism. He does have a diagnosis of dementia-follows with senior care. 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   He has noted improvement in tremor and gait since starting sinemet and completing PT during his rehab stay last month. Mild improvement noted on exam as well. We did discuss considering further increases but given improvement and potential side effects opted to continue current dose.   He will remain on current dose of sinemet 25/100 mg 1 tab Tid.     Plan for follow up in 6 months, is due for follow up with vascular team. To contact the office sooner with any concerns or worsening symptoms.

## 2024-03-17 DIAGNOSIS — I10 ESSENTIAL HYPERTENSION: ICD-10-CM

## 2024-03-17 RX ORDER — CARVEDILOL 12.5 MG/1
12.5 TABLET ORAL 2 TIMES DAILY
Qty: 180 TABLET | Refills: 1 | Status: SHIPPED | OUTPATIENT
Start: 2024-03-17

## 2024-03-19 ENCOUNTER — APPOINTMENT (OUTPATIENT)
Dept: LAB | Facility: HOSPITAL | Age: 84
End: 2024-03-19
Payer: MEDICARE

## 2024-03-19 DIAGNOSIS — Z00.00 ROUTINE GENERAL MEDICAL EXAMINATION AT HEALTH CARE FACILITY: ICD-10-CM

## 2024-03-19 LAB
25(OH)D3 SERPL-MCNC: 50.1 NG/ML (ref 30–100)
ALBUMIN SERPL BCP-MCNC: 4.1 G/DL (ref 3.5–5)
ALP SERPL-CCNC: 83 U/L (ref 34–104)
ALT SERPL W P-5'-P-CCNC: 7 U/L (ref 7–52)
ANION GAP SERPL CALCULATED.3IONS-SCNC: 7 MMOL/L (ref 4–13)
AST SERPL W P-5'-P-CCNC: 18 U/L (ref 13–39)
BASOPHILS # BLD AUTO: 0.05 THOUSANDS/ÂΜL (ref 0–0.1)
BASOPHILS NFR BLD AUTO: 1 % (ref 0–1)
BILIRUB SERPL-MCNC: 0.82 MG/DL (ref 0.2–1)
BUN SERPL-MCNC: 13 MG/DL (ref 5–25)
CALCIUM SERPL-MCNC: 9.5 MG/DL (ref 8.4–10.2)
CHLORIDE SERPL-SCNC: 101 MMOL/L (ref 96–108)
CHOLEST SERPL-MCNC: 105 MG/DL
CO2 SERPL-SCNC: 32 MMOL/L (ref 21–32)
CREAT SERPL-MCNC: 0.9 MG/DL (ref 0.6–1.3)
EOSINOPHIL # BLD AUTO: 0.47 THOUSAND/ÂΜL (ref 0–0.61)
EOSINOPHIL NFR BLD AUTO: 6 % (ref 0–6)
ERYTHROCYTE [DISTWIDTH] IN BLOOD BY AUTOMATED COUNT: 15.1 % (ref 11.6–15.1)
GFR SERPL CREATININE-BSD FRML MDRD: 79 ML/MIN/1.73SQ M
GLUCOSE P FAST SERPL-MCNC: 95 MG/DL (ref 65–99)
HCT VFR BLD AUTO: 46.6 % (ref 36.5–49.3)
HDLC SERPL-MCNC: 51 MG/DL
HGB BLD-MCNC: 14.5 G/DL (ref 12–17)
IMM GRANULOCYTES # BLD AUTO: 0.03 THOUSAND/UL (ref 0–0.2)
IMM GRANULOCYTES NFR BLD AUTO: 0 % (ref 0–2)
LDLC SERPL CALC-MCNC: 42 MG/DL (ref 0–100)
LYMPHOCYTES # BLD AUTO: 1.6 THOUSANDS/ÂΜL (ref 0.6–4.47)
LYMPHOCYTES NFR BLD AUTO: 20 % (ref 14–44)
MCH RBC QN AUTO: 26.9 PG (ref 26.8–34.3)
MCHC RBC AUTO-ENTMCNC: 31.1 G/DL (ref 31.4–37.4)
MCV RBC AUTO: 86 FL (ref 82–98)
MONOCYTES # BLD AUTO: 0.67 THOUSAND/ÂΜL (ref 0.17–1.22)
MONOCYTES NFR BLD AUTO: 8 % (ref 4–12)
NEUTROPHILS # BLD AUTO: 5.24 THOUSANDS/ÂΜL (ref 1.85–7.62)
NEUTS SEG NFR BLD AUTO: 65 % (ref 43–75)
NRBC BLD AUTO-RTO: 0 /100 WBCS
PLATELET # BLD AUTO: 253 THOUSANDS/UL (ref 149–390)
PMV BLD AUTO: 8.8 FL (ref 8.9–12.7)
POTASSIUM SERPL-SCNC: 4.4 MMOL/L (ref 3.5–5.3)
PROT SERPL-MCNC: 7.6 G/DL (ref 6.4–8.4)
RBC # BLD AUTO: 5.4 MILLION/UL (ref 3.88–5.62)
SODIUM SERPL-SCNC: 140 MMOL/L (ref 135–147)
TRIGL SERPL-MCNC: 62 MG/DL
TSH SERPL DL<=0.05 MIU/L-ACNC: 1.97 UIU/ML (ref 0.45–4.5)
WBC # BLD AUTO: 8.06 THOUSAND/UL (ref 4.31–10.16)

## 2024-03-19 PROCEDURE — 85025 COMPLETE CBC W/AUTO DIFF WBC: CPT

## 2024-03-19 PROCEDURE — 36415 COLL VENOUS BLD VENIPUNCTURE: CPT

## 2024-03-19 PROCEDURE — 80053 COMPREHEN METABOLIC PANEL: CPT

## 2024-03-19 PROCEDURE — 80061 LIPID PANEL: CPT

## 2024-03-19 PROCEDURE — 84443 ASSAY THYROID STIM HORMONE: CPT

## 2024-03-19 PROCEDURE — 82306 VITAMIN D 25 HYDROXY: CPT

## 2024-03-22 ENCOUNTER — OFFICE VISIT (OUTPATIENT)
Dept: FAMILY MEDICINE CLINIC | Facility: HOSPITAL | Age: 84
End: 2024-03-22
Payer: MEDICARE

## 2024-03-22 VITALS
HEIGHT: 68 IN | WEIGHT: 174.8 LBS | DIASTOLIC BLOOD PRESSURE: 76 MMHG | BODY MASS INDEX: 26.49 KG/M2 | HEART RATE: 67 BPM | TEMPERATURE: 98.1 F | SYSTOLIC BLOOD PRESSURE: 122 MMHG

## 2024-03-22 DIAGNOSIS — G20.A1 PARKINSON'S DISEASE, UNSPECIFIED WHETHER DYSKINESIA PRESENT, UNSPECIFIED WHETHER MANIFESTATIONS FLUCTUATE: ICD-10-CM

## 2024-03-22 DIAGNOSIS — F33.42 RECURRENT MAJOR DEPRESSIVE DISORDER, IN FULL REMISSION (HCC): ICD-10-CM

## 2024-03-22 DIAGNOSIS — G30.0 MILD EARLY ONSET ALZHEIMER'S DEMENTIA WITH PSYCHOTIC DISTURBANCE (HCC): ICD-10-CM

## 2024-03-22 DIAGNOSIS — E78.5 HYPERLIPIDEMIA, UNSPECIFIED HYPERLIPIDEMIA TYPE: ICD-10-CM

## 2024-03-22 DIAGNOSIS — F02.A0 MILD EARLY ONSET ALZHEIMER'S DEMENTIA WITHOUT BEHAVIORAL DISTURBANCE, PSYCHOTIC DISTURBANCE, MOOD DISTURBANCE, OR ANXIETY (HCC): ICD-10-CM

## 2024-03-22 DIAGNOSIS — E55.9 VITAMIN D DEFICIENCY: ICD-10-CM

## 2024-03-22 DIAGNOSIS — F33.9 DEPRESSION, RECURRENT (HCC): ICD-10-CM

## 2024-03-22 DIAGNOSIS — R26.2 AMBULATORY DYSFUNCTION: ICD-10-CM

## 2024-03-22 DIAGNOSIS — I10 ESSENTIAL HYPERTENSION: Primary | ICD-10-CM

## 2024-03-22 DIAGNOSIS — F02.A2 MILD EARLY ONSET ALZHEIMER'S DEMENTIA WITH PSYCHOTIC DISTURBANCE (HCC): ICD-10-CM

## 2024-03-22 DIAGNOSIS — G30.0 MILD EARLY ONSET ALZHEIMER'S DEMENTIA WITHOUT BEHAVIORAL DISTURBANCE, PSYCHOTIC DISTURBANCE, MOOD DISTURBANCE, OR ANXIETY (HCC): ICD-10-CM

## 2024-03-22 DIAGNOSIS — Z00.00 MEDICARE ANNUAL WELLNESS VISIT, SUBSEQUENT: ICD-10-CM

## 2024-03-22 PROCEDURE — G0438 PPPS, INITIAL VISIT: HCPCS | Performed by: NURSE PRACTITIONER

## 2024-03-22 PROCEDURE — 99214 OFFICE O/P EST MOD 30 MIN: CPT | Performed by: NURSE PRACTITIONER

## 2024-03-22 RX ORDER — QUETIAPINE FUMARATE 25 MG/1
TABLET, FILM COATED ORAL
Qty: 135 TABLET | Refills: 1 | Status: SHIPPED | OUTPATIENT
Start: 2024-03-22

## 2024-03-22 RX ORDER — ESCITALOPRAM OXALATE 10 MG/1
TABLET ORAL
Qty: 90 TABLET | Refills: 1 | Status: SHIPPED | OUTPATIENT
Start: 2024-03-22

## 2024-03-22 NOTE — PATIENT INSTRUCTIONS
Medicare Preventive Visit Patient Instructions  Thank you for completing your Welcome to Medicare Visit or Medicare Annual Wellness Visit today. Your next wellness visit will be due in one year (3/23/2025).  The screening/preventive services that you may require over the next 5-10 years are detailed below. Some tests may not apply to you based off risk factors and/or age. Screening tests ordered at today's visit but not completed yet may show as past due. Also, please note that scanned in results may not display below.  Preventive Screenings:  Service Recommendations Previous Testing/Comments   Colorectal Cancer Screening  Colonoscopy    Fecal Occult Blood Test (FOBT)/Fecal Immunochemical Test (FIT)  Fecal DNA/Cologuard Test  Flexible Sigmoidoscopy Age: 45-75 years old   Colonoscopy: every 10 years (May be performed more frequently if at higher risk)  OR  FOBT/FIT: every 1 year  OR  Cologuard: every 3 years  OR  Sigmoidoscopy: every 5 years  Screening may be recommended earlier than age 45 if at higher risk for colorectal cancer. Also, an individualized decision between you and your healthcare provider will decide whether screening between the ages of 76-85 would be appropriate. Colonoscopy: 08/28/2019  FOBT/FIT: 07/13/2022  Cologuard: Not on file  Sigmoidoscopy: Not on file    Screening Current     Prostate Cancer Screening Individualized decision between patient and health care provider in men between ages of 55-69   Medicare will cover every 12 months beginning on the day after your 50th birthday PSA: No results in last 5 years     History Prostate Cancer  Screening Not Indicated     Hepatitis C Screening Once for adults born between 1945 and 1965  More frequently in patients at high risk for Hepatitis C Hep C Antibody: Not on file        Diabetes Screening 1-2 times per year if you're at risk for diabetes or have pre-diabetes Fasting glucose: 95 mg/dL (3/19/2024)  A1C: 5.5 % (8/25/2022)  Screening Current    Cholesterol Screening Once every 5 years if you don't have a lipid disorder. May order more often based on risk factors. Lipid panel: 03/19/2024  Screening Not Indicated  History Lipid Disorder      Other Preventive Screenings Covered by Medicare:  Abdominal Aortic Aneurysm (AAA) Screening: covered once if your at risk. You're considered to be at risk if you have a family history of AAA or a male between the age of 65-75 who smoking at least 100 cigarettes in your lifetime.  Lung Cancer Screening: covers low dose CT scan once per year if you meet all of the following conditions: (1) Age 55-77; (2) No signs or symptoms of lung cancer; (3) Current smoker or have quit smoking within the last 15 years; (4) You have a tobacco smoking history of at least 20 pack years (packs per day x number of years you smoked); (5) You get a written order from a healthcare provider.  Glaucoma Screening: covered annually if you're considered high risk: (1) You have diabetes OR (2) Family history of glaucoma OR (3)  aged 50 and older OR (4)  American aged 65 and older  Osteoporosis Screening: covered every 2 years if you meet one of the following conditions: (1) Have a vertebral abnormality; (2) On glucocorticoid therapy for more than 3 months; (3) Have primary hyperparathyroidism; (4) On osteoporosis medications and need to assess response to drug therapy.  HIV Screening: covered annually if you're between the age of 15-65. Also covered annually if you are younger than 15 and older than 65 with risk factors for HIV infection. For pregnant patients, it is covered up to 3 times per pregnancy.    Immunizations:  Immunization Recommendations   Influenza Vaccine Annual influenza vaccination during flu season is recommended for all persons aged >= 6 months who do not have contraindications   Pneumococcal Vaccine   * Pneumococcal conjugate vaccine = PCV13 (Prevnar 13), PCV15 (Vaxneuvance), PCV20 (Prevnar 20)  *  Pneumococcal polysaccharide vaccine = PPSV23 (Pneumovax) Adults 19-63 yo with certain risk factors or if 65+ yo  If never received any pneumonia vaccine: recommend Prevnar 20 (PCV20)  Give PCV20 if previously received 1 dose of PCV13 or PPSV23   Hepatitis B Vaccine 3 dose series if at intermediate or high risk (ex: diabetes, end stage renal disease, liver disease)   Respiratory syncytial virus (RSV) Vaccine - COVERED BY MEDICARE PART D  * RSVPreF3 (Arexvy) CDC recommends that adults 60 years of age and older may receive a single dose of RSV vaccine using shared clinical decision-making (SCDM)   Tetanus (Td) Vaccine - COST NOT COVERED BY MEDICARE PART B Following completion of primary series, a booster dose should be given every 10 years to maintain immunity against tetanus. Td may also be given as tetanus wound prophylaxis.   Tdap Vaccine - COST NOT COVERED BY MEDICARE PART B Recommended at least once for all adults. For pregnant patients, recommended with each pregnancy.   Shingles Vaccine (Shingrix) - COST NOT COVERED BY MEDICARE PART B  2 shot series recommended in those 19 years and older who have or will have weakened immune systems or those 50 years and older     Health Maintenance Due:      Topic Date Due   • Colorectal Cancer Screening  08/28/2024     Immunizations Due:      Topic Date Due   • COVID-19 Vaccine (5 - 2023-24 season) 12/16/2023     Advance Directives   What are advance directives?  Advance directives are legal documents that state your wishes and plans for medical care. These plans are made ahead of time in case you lose your ability to make decisions for yourself. Advance directives can apply to any medical decision, such as the treatments you want, and if you want to donate organs.   What are the types of advance directives?  There are many types of advance directives, and each state has rules about how to use them. You may choose a combination of any of the following:  Living will:  This is  a written record of the treatment you want. You can also choose which treatments you do not want, which to limit, and which to stop at a certain time. This includes surgery, medicine, IV fluid, and tube feedings.   Durable power of  for healthcare (DPAHC):  This is a written record that states who you want to make healthcare choices for you when you are unable to make them for yourself. This person, called a proxy, is usually a family member or a friend. You may choose more than 1 proxy.  Do not resuscitate (DNR) order:  A DNR order is used in case your heart stops beating or you stop breathing. It is a request not to have certain forms of treatment, such as CPR. A DNR order may be included in other types of advance directives.  Medical directive:  This covers the care that you want if you are in a coma, near death, or unable to make decisions for yourself. You can list the treatments you want for each condition. Treatment may include pain medicine, surgery, blood transfusions, dialysis, IV or tube feedings, and a ventilator (breathing machine).  Values history:  This document has questions about your views, beliefs, and how you feel and think about life. This information can help others choose the care that you would choose.  Why are advance directives important?  An advance directive helps you control your care. Although spoken wishes may be used, it is better to have your wishes written down. Spoken wishes can be misunderstood, or not followed. Treatments may be given even if you do not want them. An advance directive may make it easier for your family to make difficult choices about your care.   Fall Prevention    Fall prevention  includes ways to make your home and other areas safer. It also includes ways you can move more carefully to prevent a fall. Health conditions that cause changes in your blood pressure, vision, or muscle strength and coordination may increase your risk for falls. Medicines may  also increase your risk for falls if they make you dizzy, weak, or sleepy.   Fall prevention tips:   Stand or sit up slowly.    Use assistive devices as directed.    Wear shoes that fit well and have soles that .    Wear a personal alarm.    Stay active.    Manage your medical conditions.    Home Safety Tips:  Add items to prevent falls in the bathroom.    Keep paths clear.    Install bright lights in your home.    Keep items you use often on shelves within reach.    Paint or place reflective tape on the edges of your stairs.    Weight Management   Why it is important to manage your weight:  Being overweight increases your risk of health conditions such as heart disease, high blood pressure, type 2 diabetes, and certain types of cancer. It can also increase your risk for osteoarthritis, sleep apnea, and other respiratory problems. Aim for a slow, steady weight loss. Even a small amount of weight loss can lower your risk of health problems.  How to lose weight safely:  A safe and healthy way to lose weight is to eat fewer calories and get regular exercise. You can lose up about 1 pound a week by decreasing the number of calories you eat by 500 calories each day.   Healthy meal plan for weight management:  A healthy meal plan includes a variety of foods, contains fewer calories, and helps you stay healthy. A healthy meal plan includes the following:  Eat whole-grain foods more often.  A healthy meal plan should contain fiber. Fiber is the part of grains, fruits, and vegetables that is not broken down by your body. Whole-grain foods are healthy and provide extra fiber in your diet. Some examples of whole-grain foods are whole-wheat breads and pastas, oatmeal, brown rice, and bulgur.  Eat a variety of vegetables every day.  Include dark, leafy greens such as spinach, kale, anabel greens, and mustard greens. Eat yellow and orange vegetables such as carrots, sweet potatoes, and winter squash.   Eat a variety of  fruits every day.  Choose fresh or canned fruit (canned in its own juice or light syrup) instead of juice. Fruit juice has very little or no fiber.  Eat low-fat dairy foods.  Drink fat-free (skim) milk or 1% milk. Eat fat-free yogurt and low-fat cottage cheese. Try low-fat cheeses such as mozzarella and other reduced-fat cheeses.  Choose meat and other protein foods that are low in fat.  Choose beans or other legumes such as split peas or lentils. Choose fish, skinless poultry (chicken or turkey), or lean cuts of red meat (beef or pork). Before you cook meat or poultry, cut off any visible fat.   Use less fat and oil.  Try baking foods instead of frying them. Add less fat, such as margarine, sour cream, regular salad dressing and mayonnaise to foods. Eat fewer high-fat foods. Some examples of high-fat foods include french fries, doughnuts, ice cream, and cakes.  Eat fewer sweets.  Limit foods and drinks that are high in sugar. This includes candy, cookies, regular soda, and sweetened drinks.  Exercise:  Exercise at least 30 minutes per day on most days of the week. Some examples of exercise include walking, biking, dancing, and swimming. You can also fit in more physical activity by taking the stairs instead of the elevator or parking farther away from stores. Ask your healthcare provider about the best exercise plan for you.      © Copyright [employer] 2018 Information is for End User's use only and may not be sold, redistributed or otherwise used for commercial purposes. All illustrations and images included in CareNotes® are the copyrighted property of [employer] or [employer]

## 2024-03-22 NOTE — ASSESSMENT & PLAN NOTE
Mood stable w/PHQ score of 2  Continue same lexapro dose as rx'd  Return in 6 months - sooner with concerns
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
## 2024-03-25 DIAGNOSIS — K21.9 GASTROESOPHAGEAL REFLUX DISEASE WITHOUT ESOPHAGITIS: ICD-10-CM

## 2024-03-25 RX ORDER — PANTOPRAZOLE SODIUM 40 MG/1
TABLET, DELAYED RELEASE ORAL
Qty: 90 TABLET | Refills: 1 | Status: SHIPPED | OUTPATIENT
Start: 2024-03-25

## 2024-04-08 DIAGNOSIS — R60.0 LOCALIZED EDEMA: ICD-10-CM

## 2024-04-08 RX ORDER — SPIRONOLACTONE 25 MG/1
25 TABLET ORAL DAILY
Qty: 30 TABLET | Refills: 0 | Status: SHIPPED | OUTPATIENT
Start: 2024-04-08 | End: 2024-05-08

## 2024-04-21 DIAGNOSIS — S02.92XA CLOSED EXTENSIVE FACIAL FRACTURES (HCC): ICD-10-CM

## 2024-04-21 RX ORDER — ATORVASTATIN CALCIUM 40 MG/1
TABLET, FILM COATED ORAL
Qty: 90 TABLET | Refills: 2 | Status: SHIPPED | OUTPATIENT
Start: 2024-04-21

## 2024-04-23 DIAGNOSIS — G40.909 SEIZURE DISORDER (HCC): Primary | ICD-10-CM

## 2024-04-23 NOTE — TELEPHONE ENCOUNTER
4/22/24, 0912    [facility], this is for a Aldo Rodriguez, date of birth 1940. And I am calling to get a refill on his keppra, levetiracetam 100 milligram per ml solution. We have tablets but it's better that he take the solution. He takes 7.5 ml by mouth twice a day. So if I can get this prescription sent to the pharmacy for refill, I appreciate that. And my phone number is [phone]. This is Magen Luu on Aldo's caregiver, and I am calling to get his keppra solution refilled at the pharmacy. Thank you. Bye.    Called [phone], spoke to pt's caregiver Magen. Acknowledge . Requesting keppra 100 mg/ml solution be sent to [facility] pharmacy on file.    Rx entered. Pls review and sign off    thanks

## 2024-04-24 RX ORDER — LEVETIRACETAM 100 MG/ML
SOLUTION ORAL
Qty: 450 ML | Refills: 6 | Status: SHIPPED | OUTPATIENT
Start: 2024-04-24

## 2024-05-04 DIAGNOSIS — R60.0 LOCALIZED EDEMA: ICD-10-CM

## 2024-05-05 RX ORDER — SPIRONOLACTONE 25 MG/1
25 TABLET ORAL DAILY
Qty: 90 TABLET | Refills: 1 | Status: SHIPPED | OUTPATIENT
Start: 2024-05-05 | End: 2024-11-01

## 2024-05-06 ENCOUNTER — TELEPHONE (OUTPATIENT)
Dept: PULMONOLOGY | Facility: CLINIC | Age: 84
End: 2024-05-06

## 2024-05-06 NOTE — TELEPHONE ENCOUNTER
1X Attempt, called patient to schedule the 3M FU (around 3/5/2024) from Recall List and left a voicemail message.

## 2024-05-13 ENCOUNTER — EVALUATION (OUTPATIENT)
Dept: PHYSICAL THERAPY | Facility: CLINIC | Age: 84
End: 2024-05-13
Payer: MEDICARE

## 2024-05-13 DIAGNOSIS — R26.2 AMBULATORY DYSFUNCTION: ICD-10-CM

## 2024-05-13 DIAGNOSIS — M62.81 MUSCLE WEAKNESS (GENERALIZED): Primary | ICD-10-CM

## 2024-05-13 DIAGNOSIS — R53.81 PHYSICAL DECONDITIONING: ICD-10-CM

## 2024-05-13 DIAGNOSIS — G20.C PARKINSONISM: Primary | ICD-10-CM

## 2024-05-13 DIAGNOSIS — R29.898 MUSCULAR DECONDITIONING: ICD-10-CM

## 2024-05-13 DIAGNOSIS — G20.C PARKINSONISM, UNSPECIFIED PARKINSONISM TYPE: Chronic | ICD-10-CM

## 2024-05-13 DIAGNOSIS — R26.2 DIFFICULTY WALKING: ICD-10-CM

## 2024-05-13 PROCEDURE — 97162 PT EVAL MOD COMPLEX 30 MIN: CPT | Performed by: PHYSICAL THERAPIST

## 2024-05-13 PROCEDURE — 97110 THERAPEUTIC EXERCISES: CPT | Performed by: PHYSICAL THERAPIST

## 2024-05-13 NOTE — PROGRESS NOTES
PT Evaluation     Today's date: 2024  Patient name: Aldo Rodriguez  : 1940  MRN: [id number]  Referring provider: Callie Caro CRNP  Dx:   Encounter Diagnosis     ICD-10-CM    1. Muscle weakness (generalized)  M62.81       2. Parkinsonism, unspecified Parkinsonism type  G20.C Ambulatory Referral to Physical Therapy      3. Muscular deconditioning  R29.898       4. Difficulty walking  R26.2       5. Physical deconditioning  R53.81 Ambulatory Referral to Physical Therapy      6. Ambulatory dysfunction  R26.2 Ambulatory Referral to Physical Therapy          Assessment  Assessment details: Aldo Rodriguez is a 83 y.o. male presenting to outpatient physical therapy at [facility] with complaints of chronic bilateral knee pain and onset of falls. Within the past year, caregiver reports being diagnosed with Parkinsonism. He presents with decreased range of motion, decreased strength, limited flexibility, poor postural awareness, poor body mechanics, altered gait pattern, poor balance, decreased tolerance to activity and decreased functional mobility due to Neck pain  (primary encounter diagnosis), Poor posture, Chronic pain of both knees, and Difficulty walking. Therapist discussed diagnosis, prognosis, plan of care, proper responses to exercises, HEP, and modalities to use at home.  He would benefit from skilled PT services in order to address these deficits and reach maximum level of function.  Thank you for the referral!  Impairments: abnormal coordination, abnormal gait, abnormal muscle firing, abnormal muscle tone, abnormal or restricted ROM, abnormal movement, activity intolerance, impaired balance, impaired physical strength, lacks appropriate home exercise program, pain with function, scapular dyskinesis, poor posture  and poor body mechanics    Symptom irritability: moderateUnderstanding of Dx/Px/POC: good   Prognosis: good    Goals  STGs (in 4 weeks):  1. Pt will report having at least a 50%  improvement since I.E.   2. Pt will report having less than a 5/10 pain level with B knees.   3. Pt will demonstrate at least 4/5 MMT throughout BLE.     LTGs (in 12 weeks):  1. Pt will report having at least a 75% improvement since I.E.  2. Pt will be able to perform at least 10 sit<>stands in 30 seconds to demonstrate improved BLE strength and balance.   3. Pt will be able to perform TUG in less than 18 seconds.   4. Pt will be able to amb at least 140 meters during 2 MWT with good posture in order to increase endurance to normative age related values.   5. Pt will be independent with HEP.     Plan  Patient would benefit from: skilled physical therapy  Planned modality interventions: TENS and unattended electrical stimulation  Planned therapy interventions: joint mobilization, manual therapy, neuromuscular re-education, patient education, self care, strengthening, stretching, therapeutic activities, therapeutic exercise, balance, flexibility and home exercise program  Frequency: 2x week  Plan of Care beginning date: 2024  Plan of Care expiration date: 2024  Treatment plan discussed with: patient, caregiver and family        Subjective Evaluation    History of Present Illness  Mechanism of injury: Pt is a 83 year old male who presents with chief complaint of chronic progressive neck pain and bilateral knee pain. Caregiver reports that reveals OA with bilateral knees R worse than L. He reports that patient has been having bilateral LE swelling which he has tried to elevate his legs throughout the day. Caregiver reports that he has not been seen by ortho for his knee but has bought him a knee brace for support. Now referred to skilled OP PT for ambulatory dysfunction.   Quality of life: good    Pain  Current pain ratin  At best pain ratin  At worst pain ratin  Pain location: bilateral knee pain (R worse than L)  Quality: discomfort, dull ache, pressure and tight  Relieving factors: relaxation and  rest  Aggravating factors: walking, lifting, stair climbing and standing (sit to stand transfer)  Progression: worsening    Hand dominance: right    Treatments  Previous treatment: injection treatment  Patient Goals  Patient goals for therapy: decreased pain, improved balance, increased motion, increased strength, independence with ADLs/IADLs and return to sport/leisure activities          Objective    Objective:       Posture: Pt's sitting posture is FHP and rounded shoulders.       AROM     R   L  Knee Flex  NT   NT  Knee Extension NT   NT      MMT      R   L  S' Flex   4/5   4/5  S' ABD   4-/5   4-/5  S' ER   4-/5   4-/5    Static K' Ext  4-/5   4-/5  Static K' Flex  3+/5   3+/5  Dynamic K' Ext 3-/5   3-/5  Dynamic K' Ext 3-/5   3-/5    Hip Flex  4-/5   4-/5  Hip ABD  3+/5   3+/5    Ankle DF  3-/5   3-/5  Ankle PF  3/5   3/5        Special Tests: opposition: (R) decreased speed (L) decreased speed  Heel to shin: (R) decreased speed (L) decreased speed  Nose to finger: (R) decreased speed (L) decreased speed    Functional mobility:  Ambulation: Pt ambs using RW with forward trunk flexion with FHP and rounded shoulders    Functional testin second repeated sit<>stand: 3 times with definite use of BUE  TU seconds with definite use of BUEs using RW  2 MWT: 160 ft (48.8 meters) using RW        Precautions: fall risk, dementia, hx of CVA/TIA    HEP: TBA    Specialty Daily Treatment Diary       Manual        STM; TPR B UT, LS        STM/TPR B knees                Knee flexion stretch        Knee Ext stretch                Exercise Diary         UBE (retro)        RB        Laps                 HR        TR        Mini Squats        Chair squats        St H' ABD                Slant board gastroc stretch                Marching        Standing H' ABD        Standing H' Ext                Floor -> Airex NBOS        Floor -> Airex Tandem        Floor -> Airex SLS                1 cone tap        2 cone tap                 Stepping over hurdles forward        Laterally stepping over hurdles                Stepping on random pads                                TB Scap Retraction No TB 5 sec x 10       TB B S' Ext                Low pec stretch        Mid pec stretch                        Seated HS stretch        Seated 2 way piriformis stretch        Chin tucks        SNAGs Ext        Seated Thoracic Extension over half foam                                LAQ                SLR (Flex)                Ball Squeezes        Ball c Bridge                Hooklying TB B Clamshells        TB Bridges                Sidelying H' ABD                                HEP/EDU Diagnosis, prognosis, POC, proper responses to exercises,        Modalities        MH                           Skin checks performed pre and post application: intact

## 2024-05-16 ENCOUNTER — OFFICE VISIT (OUTPATIENT)
Dept: PHYSICAL THERAPY | Facility: CLINIC | Age: 84
End: 2024-05-16
Payer: MEDICARE

## 2024-05-16 DIAGNOSIS — M62.81 MUSCLE WEAKNESS (GENERALIZED): ICD-10-CM

## 2024-05-16 DIAGNOSIS — R26.2 AMBULATORY DYSFUNCTION: ICD-10-CM

## 2024-05-16 DIAGNOSIS — R26.2 DIFFICULTY WALKING: ICD-10-CM

## 2024-05-16 DIAGNOSIS — R53.81 PHYSICAL DECONDITIONING: Primary | ICD-10-CM

## 2024-05-16 DIAGNOSIS — R29.898 MUSCULAR DECONDITIONING: ICD-10-CM

## 2024-05-16 DIAGNOSIS — G40.909 SEIZURE DISORDER (HCC): ICD-10-CM

## 2024-05-16 DIAGNOSIS — G20.C PARKINSONISM, UNSPECIFIED PARKINSONISM TYPE: ICD-10-CM

## 2024-05-16 PROCEDURE — 97112 NEUROMUSCULAR REEDUCATION: CPT | Performed by: PHYSICAL THERAPIST

## 2024-05-16 PROCEDURE — 97530 THERAPEUTIC ACTIVITIES: CPT | Performed by: PHYSICAL THERAPIST

## 2024-05-16 PROCEDURE — 97110 THERAPEUTIC EXERCISES: CPT | Performed by: PHYSICAL THERAPIST

## 2024-05-16 RX ORDER — LEVETIRACETAM 100 MG/ML
SOLUTION ORAL
Qty: 100 ML | Refills: 3 | Status: SHIPPED | OUTPATIENT
Start: 2024-05-16

## 2024-05-16 NOTE — PROGRESS NOTES
Daily Note     Today's date: 2024  Patient name: Aldo Rodriguez  : 1940  MRN: [id number]  Referring provider: Callie Caro CRNP  Dx:   Encounter Diagnosis     ICD-10-CM    1. Physical deconditioning  R53.81       2. Muscle weakness (generalized)  M62.81       3. Ambulatory dysfunction  R26.2       4. Parkinsonism, unspecified Parkinsonism type  G20.C       5. Muscular deconditioning  R29.898       6. Difficulty walking  R26.2                      Subjective: Patient reports that his knees are achy today from the weather. He reports that he thinks the compression socks are working well for him.       Objective: See treatment diary below      Assessment: Tolerated treatment well; initiated POC with MH to B knees seated while performing there ex. Vcs provided on proper sequencing with exercises; added TR, HR, and hip extension with focus on upright posture. Session focused on improving postural awareness in standing. Discussed DOMS with patient and proper responses to exercises. Encouraged pt to use hat at home with skin checks.   Patient demonstrated fatigue post treatment and would benefit from continued PT      Plan: Continue per plan of care.        Precautions: fall risk, dementia, hx of CVA/TIA    HEP: TBA    Specialty Daily Treatment Diary       Manual       STM; TPR B UT, LS        STM/TPR B knees                Knee flexion stretch        Knee Ext stretch                Exercise Diary         UBE (retro)        RB        Laps                 HR  20      TR  20      Mini Squats        Chair squats        St H' ABD                Slant board gastroc stretch        MERLINE (over high counter top with pillow behind)   2 sec; 2x10              Marching        Standing H' ABD        Standing H' Ext  0# 10 ea              Floor -> Airex NBOS        Floor -> Airex Tandem        Floor -> Airex SLS                1 cone tap        2 cone tap                Stepping over hurdles forward         Laterally stepping over hurdles                Stepping on random pads                                TB Scap Retraction No TB 5 sec x 10 GTB 2 sec; 2x10      TB B S' Ext                Low pec stretch        Mid pec stretch                Seated Heel slides using slider (knee flexion and extension stretch)  5 sec x 10 ea c MH B knees      Seated HS stretch        Seated 2 way piriformis stretch        Chin tucks        SNAGs Ext  2 sec x 10      Seated Thoracic Extension over half foam  No foam: 2 sec x 10 (hands clasped)                                LAQ  0# 15 ea              SLR (Flex)                Ball Squeezes  Seated: Blue 5 sec x for 1 min      Ball c Bridge                Hooklying TB B Clamshells  Seated: GTB 5 sec for 2 minutes      TB Bridges                Sidelying H' ABD                                HEP/EDU Diagnosis, prognosis, POC, proper responses to exercises,  Proper responses to exercises, DOMS, heat      Modalities        MH  5 mins B knees                         Skin checks performed pre and post application: intact

## 2024-05-21 ENCOUNTER — TELEPHONE (OUTPATIENT)
Age: 84
End: 2024-05-21

## 2024-05-21 ENCOUNTER — OFFICE VISIT (OUTPATIENT)
Dept: PHYSICAL THERAPY | Facility: CLINIC | Age: 84
End: 2024-05-21
Payer: MEDICARE

## 2024-05-21 DIAGNOSIS — R26.2 DIFFICULTY WALKING: ICD-10-CM

## 2024-05-21 DIAGNOSIS — R29.898 MUSCULAR DECONDITIONING: Primary | ICD-10-CM

## 2024-05-21 PROCEDURE — 97530 THERAPEUTIC ACTIVITIES: CPT

## 2024-05-21 PROCEDURE — 97112 NEUROMUSCULAR REEDUCATION: CPT

## 2024-05-21 PROCEDURE — 97110 THERAPEUTIC EXERCISES: CPT

## 2024-05-21 NOTE — TELEPHONE ENCOUNTER
What are the reasons for this request? I need symptoms or diagnosis to order culture. I am out the rest of the week so please forward to another provider if I'm not here.

## 2024-05-21 NOTE — PROGRESS NOTES
"Daily Note     Today's date: 2024  Patient name: Aldo Rodriguez  : 1940  MRN: [id number]  Referring provider: Callie Caro CRNP  Dx:   Encounter Diagnosis     ICD-10-CM    1. Muscular deconditioning  R29.898       2. Difficulty walking  R26.2                        Subjective: Patient states that his knees are painful prior to the start of session. He states he has had some inc dizziness this week. During session he states he had some b/l numbness in Les, but states he has had this before       Objective: See treatment diary below.   /68mmHg,   93 O2Sat,   68 BPM       Assessment: Tolerated treatment well. Continues with MHP to B/L knees. Hand over hand cues used t/o session. Inc fatigue noted, with inc LE weakness this session, but was able to complete activities. Patient's family alerted to inc B/L LE sxs and urge to F/U with PCP if sxs persists.  Patient demonstrated fatigue post treatment and would benefit from continued PT      Plan: Continue per plan of care.        Precautions: fall risk, dementia, hx of CVA/TIA    HEP: TBA    Specialty Daily Treatment Diary       Manual      STM; TPR B UT, LS        STM/TPR B knees                Knee flexion stretch        Knee Ext stretch                Exercise Diary         UBE (retro)        RB        Laps                 HR  20      TR  20      Mini Squats        Chair squats        St H' ABD                Slant board gastroc stretch        MERLINE (over high counter top with pillow behind)   2 sec; 2x10 2x10, 2\"             Marching   Seated 2x10     Standing H' ABD   2x10     Standing H' Ext  0# 10 ea              Floor -> Airex NBOS        Floor -> Airex Tandem        Floor -> Airex SLS                1 cone tap        2 cone tap                Stepping over hurdles forward        Laterally stepping over hurdles                Stepping on random pads                                TB Scap Retraction No TB 5 sec x 10 GTB 2 sec; " "2x10 GTB 2x10, 2\"     TB B S' Ext                Low pec stretch        Mid pec stretch                Seated Heel slides using slider (knee flexion and extension stretch)  5 sec x 10 ea c MH B knees 5 sec x 2x10ea c MH B knees     Seated HS stretch        Seated 2 way piriformis stretch        Chin tucks        SNAGs Ext  2 sec x 10 3x10     Seated Thoracic Extension over half foam  No foam: 2 sec x 10 (hands clasped)   2x10                             LAQ  0# 15 ea 2x10             SLR (Flex)                Ball Squeezes  Seated: Blue 5 sec x for 1 min 5\", x30     Ball c Bridge                Hooklying TB B Clamshells  Seated: GTB 5 sec for 2 minutes nv     TB Bridges                Sidelying H' ABD                                HEP/EDU Diagnosis, prognosis, POC, proper responses to exercises,  Proper responses to exercises, DOMS, heat      Modalities        MH  5 mins B knees 5 min b/l knees                        Skin checks performed pre and post application: intact   "

## 2024-05-22 ENCOUNTER — APPOINTMENT (OUTPATIENT)
Dept: LAB | Facility: HOSPITAL | Age: 84
End: 2024-05-22
Payer: MEDICARE

## 2024-05-22 DIAGNOSIS — R39.9 UTI SYMPTOMS: Primary | ICD-10-CM

## 2024-05-22 DIAGNOSIS — R39.9 UTI SYMPTOMS: ICD-10-CM

## 2024-05-22 PROCEDURE — 87086 URINE CULTURE/COLONY COUNT: CPT

## 2024-05-22 PROCEDURE — 87186 SC STD MICRODIL/AGAR DIL: CPT

## 2024-05-22 PROCEDURE — 87077 CULTURE AEROBIC IDENTIFY: CPT

## 2024-05-23 ENCOUNTER — APPOINTMENT (OUTPATIENT)
Dept: PHYSICAL THERAPY | Facility: CLINIC | Age: 84
End: 2024-05-23
Payer: MEDICARE

## 2024-05-24 DIAGNOSIS — N30.00 ACUTE CYSTITIS WITHOUT HEMATURIA: Primary | ICD-10-CM

## 2024-05-24 LAB — BACTERIA UR CULT: ABNORMAL

## 2024-05-24 RX ORDER — SULFAMETHOXAZOLE AND TRIMETHOPRIM 800; 160 MG/1; MG/1
1 TABLET ORAL EVERY 12 HOURS SCHEDULED
Qty: 10 TABLET | Refills: 0 | Status: SHIPPED | OUTPATIENT
Start: 2024-05-24 | End: 2024-05-29

## 2024-05-28 ENCOUNTER — OFFICE VISIT (OUTPATIENT)
Dept: PHYSICAL THERAPY | Facility: CLINIC | Age: 84
End: 2024-05-28
Payer: MEDICARE

## 2024-05-28 DIAGNOSIS — R29.898 MUSCULAR DECONDITIONING: ICD-10-CM

## 2024-05-28 DIAGNOSIS — G20.C PARKINSONISM, UNSPECIFIED PARKINSONISM TYPE: ICD-10-CM

## 2024-05-28 DIAGNOSIS — M62.81 MUSCLE WEAKNESS (GENERALIZED): ICD-10-CM

## 2024-05-28 DIAGNOSIS — R26.2 AMBULATORY DYSFUNCTION: Primary | ICD-10-CM

## 2024-05-28 DIAGNOSIS — R53.81 PHYSICAL DECONDITIONING: ICD-10-CM

## 2024-05-28 DIAGNOSIS — R26.2 DIFFICULTY WALKING: ICD-10-CM

## 2024-05-28 PROCEDURE — 97112 NEUROMUSCULAR REEDUCATION: CPT | Performed by: PHYSICAL THERAPIST

## 2024-05-28 PROCEDURE — 97530 THERAPEUTIC ACTIVITIES: CPT | Performed by: PHYSICAL THERAPIST

## 2024-05-28 NOTE — PROGRESS NOTES
"Daily Note     Today's date: 2024  Patient name: Aldo Rodriguez  : 1940  MRN: [id number]  Referring provider: Callie Caro CRNP  Dx:   Encounter Diagnosis     ICD-10-CM    1. Ambulatory dysfunction  R26.2       2. Muscular deconditioning  R29.898       3. Difficulty walking  R26.2       4. Physical deconditioning  R53.81       5. Muscle weakness (generalized)  M62.81       6. Parkinsonism, unspecified Parkinsonism type  G20.C                      Subjective: Patient reports that he is not feeling unsteady however caregiver reports that he is finishing up medication for UTI and has been a little more unsteady.       Objective: See treatment diary below      Assessment: Tolerated treatment well; initiated POC with chair exercises. Vcs provided on proper sequencing with exercises. He defers heat today while performing seated there ex. He requires constant verbal cues on proper sequencing with exercises and proper form with exercises. Therapist continues to encourage upright posture in sitting and standing. He denies having increased pain during there ex.   Patient demonstrated fatigue post treatment and would benefit from continued PT      Plan: Continue per plan of care.      Precautions: fall risk, dementia, hx of CVA/TIA    HEP: TBA    Specialty Daily Treatment Diary       Manual     STM; TPR B UT, LS        STM/TPR B knees                Knee flexion stretch        Knee Ext stretch                Exercise Diary         UBE (retro)        RB        Laps                 HR  20      TR  20      Mini Squats        Chair squats        St H' ABD                Slant board gastroc stretch        MERLINE (over high counter top with pillow behind)   2 sec; 2x10 2x10, 2\" 2 sec; 2x10            Marching   Seated 2x10 Seated: 0# 2x10 ea    Standing H' ABD   2x10 0# 2x10 ea    Standing H' Ext  0# 10 ea              Floor -> Airex NBOS        Floor -> Airex Tandem        Floor -> Airex SLS      " "          1 cone tap        2 cone tap                Stepping over hurdles forward        Laterally stepping over hurdles                Stepping on random pads                Bicep Curls    U/L: 3# 1x15; 5# 1x10 ea    OH Press     U/L 3# 2x10 ea    TB Scap Retraction No TB 5 sec x 10 GTB 2 sec; 2x10 GTB 2x10, 2\" GTB 2 sec; 2x15    TB B S' Ext                Low pec stretch        Mid pec stretch                Seated Heel slides using slider (knee flexion and extension stretch)  5 sec x 10 ea c MH B knees 5 sec x 2x10ea c MH B knees 5 sec x 20 ea (no MH today)    Seated HS stretch        Seated 2 way piriformis stretch        Chin tucks        SNAGs Ext  2 sec x 10 3x10 2x15    Seated Thoracic Extension over half foam  No foam: 2 sec x 10 (hands clasped)   2x10 2x10 (half foam)                            LAQ  0# 15 ea 2x10 0# 2x10            SLR (Flex)                Ball Squeezes  Seated: Blue 5 sec x for 1 min 5\", x30 Seated: Blue: 5 sec x 30    Ball c Bridge                Hooklying TB B Clamshells  Seated: GTB 5 sec for 2 minutes nv Seated: GTB 5 sec x 30    TB Bridges                Sidelying H' ABD                                HEP/EDU Diagnosis, prognosis, POC, proper responses to exercises,  Proper responses to exercises, DOMS, heat      Modalities        MH  5 mins B knees 5 min b/l knees Def today                       Skin checks performed pre and post application: intact        "

## 2024-05-30 ENCOUNTER — OFFICE VISIT (OUTPATIENT)
Dept: PHYSICAL THERAPY | Facility: CLINIC | Age: 84
End: 2024-05-30
Payer: MEDICARE

## 2024-05-30 DIAGNOSIS — R29.898 MUSCULAR DECONDITIONING: ICD-10-CM

## 2024-05-30 DIAGNOSIS — R53.81 PHYSICAL DECONDITIONING: ICD-10-CM

## 2024-05-30 DIAGNOSIS — R26.2 AMBULATORY DYSFUNCTION: Primary | ICD-10-CM

## 2024-05-30 DIAGNOSIS — R26.2 DIFFICULTY WALKING: ICD-10-CM

## 2024-05-30 DIAGNOSIS — M62.81 MUSCLE WEAKNESS (GENERALIZED): ICD-10-CM

## 2024-05-30 DIAGNOSIS — G20.C PARKINSONISM, UNSPECIFIED PARKINSONISM TYPE: ICD-10-CM

## 2024-05-30 PROCEDURE — 97112 NEUROMUSCULAR REEDUCATION: CPT

## 2024-05-30 PROCEDURE — 97110 THERAPEUTIC EXERCISES: CPT

## 2024-05-30 NOTE — PROGRESS NOTES
"Daily Note     Today's date: 2024  Patient name: Aldo Rodriguez  : 1940  MRN: [id number]  Referring provider: Callie Caro CRNP  Dx:   Encounter Diagnosis     ICD-10-CM    1. Ambulatory dysfunction  R26.2       2. Muscular deconditioning  R29.898       3. Difficulty walking  R26.2       4. Physical deconditioning  R53.81       5. Muscle weakness (generalized)  M62.81       6. Parkinsonism, unspecified Parkinsonism type  G20.C                        Subjective: Aldo reports \"I am having a good day today\".       Objective: See treatment diary below      Assessment: Aldo tolerated PT treatment well, pt is approprietly challenged with current exercise program, emphasizing posture training and LE strengthening. Intermittent rest breaks required throughout session d/t fatigue. Pt would benefit from continued PT to further address impairments and maximize functional level.    Plan: Continue per plan of care.      Precautions: fall risk, dementia, hx of CVA/TIA    HEP: TBA    Specialty Daily Treatment Diary       Manual    STM; TPR B UT, LS        STM/TPR B knees                Knee flexion stretch        Knee Ext stretch                Exercise Diary         UBE (retro)        RB        Laps                 HR  20      TR  20      Mini Squats        Chair squats        St H' ABD                Slant board gastroc stretch        MERLINE (over high counter top with pillow behind)   2 sec; 2x10 2x10, 2\" 2 sec; 2x10 2 sec; 2x10           Marching   Seated 2x10 Seated: 0# 2x10 ea Seated: 0# 3x10 ea   Standing H' ABD   2x10 0# 2x10 ea 0# 2x10 ea   Standing H' Ext  0# 10 ea              Floor -> Airex NBOS        Floor -> Airex Tandem        Floor -> Airex SLS                1 cone tap        2 cone tap                Stepping over hurdles forward        Laterally stepping over hurdles                Stepping on random pads                Bicep Curls    U/L: 3# 1x15; 5# 1x10 ea U/L: " "3# 1x15; 5# 12x10 ea   OH Press     U/L 3# 2x10 ea B/L 3# 3x10    TB Scap Retraction No TB 5 sec x 10 GTB 2 sec; 2x10 GTB 2x10, 2\" GTB 2 sec; 2x15 GTB 2 sec; 2x15   TB B S' Ext                Low pec stretch        Mid pec stretch                Seated Heel slides using slider (knee flexion and extension stretch)  5 sec x 10 ea c MH B knees 5 sec x 2x10ea c MH B knees 5 sec x 20 ea (no MH today) :05x20 ea    Seated HS stretch        Seated 2 way piriformis stretch        Chin tucks        SNAGs Ext  2 sec x 10 3x10 2x15 2x15   Seated Thoracic Extension over half foam  No foam: 2 sec x 10 (hands clasped)   2x10 2x10 (half foam) 2x10 (half foam)                           LAQ  0# 15 ea 2x10 0# 2x10 0# 2x10           SLR (Flex)                Ball Squeezes  Seated: Blue 5 sec x for 1 min 5\", x30 Seated: Blue: 5 sec x 30 Seated: Blue: 5 sec x 30   Ball c Bridge                Hooklying TB B Clamshells  Seated: GTB 5 sec for 2 minutes nv Seated: GTB 5 sec x 30 Seated: GTB 5 sec x 30   TB Bridges                Sidelying H' ABD                                HEP/EDU Diagnosis, prognosis, POC, proper responses to exercises,  Proper responses to exercises, DOMS, heat      Modalities        MH  5 mins B knees 5 min b/l knees Def today 5 mins b/l knee                      Skin checks performed pre and post application: intact        "

## 2024-06-04 ENCOUNTER — OFFICE VISIT (OUTPATIENT)
Dept: NEUROLOGY | Facility: CLINIC | Age: 84
End: 2024-06-04
Payer: MEDICARE

## 2024-06-04 VITALS
RESPIRATION RATE: 14 BRPM | DIASTOLIC BLOOD PRESSURE: 80 MMHG | OXYGEN SATURATION: 98 % | WEIGHT: 169 LBS | BODY MASS INDEX: 25.61 KG/M2 | HEIGHT: 68 IN | SYSTOLIC BLOOD PRESSURE: 130 MMHG | HEART RATE: 55 BPM | TEMPERATURE: 97.8 F

## 2024-06-04 DIAGNOSIS — R56.9 SEIZURE (HCC): ICD-10-CM

## 2024-06-04 DIAGNOSIS — G47.33 OBSTRUCTIVE SLEEP APNEA (ADULT) (PEDIATRIC): ICD-10-CM

## 2024-06-04 DIAGNOSIS — Z86.73 HISTORY OF CVA (CEREBROVASCULAR ACCIDENT): Primary | ICD-10-CM

## 2024-06-04 DIAGNOSIS — G20.A1 PARKINSON'S DISEASE: ICD-10-CM

## 2024-06-04 PROCEDURE — 99215 OFFICE O/P EST HI 40 MIN: CPT | Performed by: PSYCHIATRY & NEUROLOGY

## 2024-06-04 PROCEDURE — G2211 COMPLEX E/M VISIT ADD ON: HCPCS | Performed by: PSYCHIATRY & NEUROLOGY
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
## 2024-06-05 ENCOUNTER — OFFICE VISIT (OUTPATIENT)
Dept: PHYSICAL THERAPY | Facility: CLINIC | Age: 84
End: 2024-06-05
Payer: MEDICARE

## 2024-06-05 DIAGNOSIS — G20.C PARKINSONISM, UNSPECIFIED PARKINSONISM TYPE: ICD-10-CM

## 2024-06-05 DIAGNOSIS — R29.898 MUSCULAR DECONDITIONING: ICD-10-CM

## 2024-06-05 DIAGNOSIS — R26.2 AMBULATORY DYSFUNCTION: ICD-10-CM

## 2024-06-05 DIAGNOSIS — R53.81 PHYSICAL DECONDITIONING: ICD-10-CM

## 2024-06-05 DIAGNOSIS — R26.2 DIFFICULTY WALKING: ICD-10-CM

## 2024-06-05 DIAGNOSIS — M62.81 MUSCLE WEAKNESS (GENERALIZED): Primary | ICD-10-CM

## 2024-06-05 PROCEDURE — 97530 THERAPEUTIC ACTIVITIES: CPT | Performed by: PHYSICAL THERAPIST

## 2024-06-05 PROCEDURE — 97112 NEUROMUSCULAR REEDUCATION: CPT | Performed by: PHYSICAL THERAPIST

## 2024-06-05 PROCEDURE — 97110 THERAPEUTIC EXERCISES: CPT | Performed by: PHYSICAL THERAPIST

## 2024-06-05 NOTE — PROGRESS NOTES
"Daily Note     Today's date: 2024  Patient name: Aldo Rodriguez  : 1940  MRN: [id number]  Referring provider: Callie Caro CRNP  Dx:   Encounter Diagnosis     ICD-10-CM    1. Muscle weakness (generalized)  M62.81       2. Ambulatory dysfunction  R26.2       3. Muscular deconditioning  R29.898       4. Parkinsonism, unspecified Parkinsonism type  G20.C       5. Difficulty walking  R26.2       6. Physical deconditioning  R53.81                      Subjective: Patient reports that his knees are hurting a little.       Objective: See treatment diary below      Assessment: Tolerated treatment well; initiated POC with seated there ex with B knee MH while performing seated there ex. Vcs provided on proper sequencing with exercises; time constraints due to late arrival. Focus during standing exercises was to increase hip extension to improve upright posture by performing glute set.  He denies having pain throughout session. Patient demonstrated fatigue post treatment and would benefit from continued PT      Plan: Continue per plan of care.      Precautions: fall risk, dementia, hx of CVA/TIA    HEP: TBA    Specialty Daily Treatment Diary       Manual    STM; TPR B UT, LS        STM/TPR B knees                Knee flexion stretch        Knee Ext stretch                Exercise Diary         UBE (retro)        RB        Laps                 HR 15       TR 15       Mini Squats        Chair squats No foam: 10x       St H' ABD                Slant board gastroc stretch        MERLINE (over high counter top with pillow behind)    2x10, 2\" 2 sec; 2x10 2 sec; 2x10           Marching Seated: 0# 2 sec; 3x10 ea  Seated 2x10 Seated: 0# 2x10 ea Seated: 0# 3x10 ea   Standing H' ABD NV?  2x10 0# 2x10 ea 0# 2x10 ea   Standing H' Ext                Floor -> Airex NBOS        Floor -> Airex Tandem        Floor -> Airex SLS                1 cone tap        2 cone tap                Stepping over hurdles " "forward        Laterally stepping over hurdles                Stepping on random pads                Bicep Curls U/L: NV?  B/L: 5# 30    U/L: 3# 1x15; 5# 1x10 ea U/L: 3# 1x15; 5# 12x10 ea   OH Press  B/L: 3# 3x10   U/L 3# 2x10 ea B/L 3# 3x10    TB Scap Retraction NV?  GTB 2x10, 2\" GTB 2 sec; 2x15 GTB 2 sec; 2x15   TB B S' Ext                Low pec stretch        Mid pec stretch                Seated Heel slides using slider (knee flexion and extension stretch) MH: 5 sec x 20 ea  5 sec x 2x10ea c MH B knees 5 sec x 20 ea (no MH today) :05x20 ea    Seated HS stretch        Seated 2 way piriformis stretch        Chin tucks        SNAGs Ext 2 sec; 2x10  3x10 2x15 2x15   Seated Thoracic Extension over half foam 2x10 (half foam)  2x10 2x10 (half foam) 2x10 (half foam)                           LAQ 0# 2 sec; 3x10 ea  2x10 0# 2x10 0# 2x10           SLR (Flex)                Ball Squeezes Black: 5 sec x 30  5\", x30 Seated: Blue: 5 sec x 30 Seated: Blue: 5 sec x 30   Ball c Bridge                Hooklying TB B Clamshells Seated: GTB 5 sec x 30  nv Seated: GTB 5 sec x 30 Seated: GTB 5 sec x 30   TB Bridges                Sidelying H' ABD                                HEP/EDU        Modalities        MH 5 mins with B knees  5 min b/l knees Def today 5 mins b/l knee                      Skin checks performed pre and post application: intact          "

## 2024-06-07 ENCOUNTER — OFFICE VISIT (OUTPATIENT)
Dept: PHYSICAL THERAPY | Facility: CLINIC | Age: 84
End: 2024-06-07
Payer: MEDICARE

## 2024-06-07 DIAGNOSIS — R26.2 AMBULATORY DYSFUNCTION: ICD-10-CM

## 2024-06-07 DIAGNOSIS — G20.C PARKINSONISM, UNSPECIFIED PARKINSONISM TYPE: ICD-10-CM

## 2024-06-07 DIAGNOSIS — R29.898 MUSCULAR DECONDITIONING: ICD-10-CM

## 2024-06-07 DIAGNOSIS — G20.C PARKINSONISM: ICD-10-CM

## 2024-06-07 DIAGNOSIS — M62.81 MUSCLE WEAKNESS (GENERALIZED): Primary | ICD-10-CM

## 2024-06-07 DIAGNOSIS — R26.2 DIFFICULTY WALKING: ICD-10-CM

## 2024-06-07 DIAGNOSIS — R53.81 PHYSICAL DECONDITIONING: ICD-10-CM

## 2024-06-07 DIAGNOSIS — R09.81 SINUS CONGESTION: ICD-10-CM

## 2024-06-07 PROCEDURE — 97110 THERAPEUTIC EXERCISES: CPT

## 2024-06-07 PROCEDURE — 97112 NEUROMUSCULAR REEDUCATION: CPT

## 2024-06-07 PROCEDURE — 97530 THERAPEUTIC ACTIVITIES: CPT

## 2024-06-07 RX ORDER — FLUTICASONE PROPIONATE 50 MCG
SPRAY, SUSPENSION (ML) NASAL
Qty: 48 ML | Refills: 1 | Status: SHIPPED | OUTPATIENT
Start: 2024-06-07

## 2024-06-07 NOTE — PROGRESS NOTES
Daily Note     Today's date: 2024  Patient name: Aldo Rodriguez  : 1940  MRN: [id number]  Referring provider: Callie Caro CRNP  Dx:   Encounter Diagnosis     ICD-10-CM    1. Muscle weakness (generalized)  M62.81       2. Ambulatory dysfunction  R26.2       3. Muscular deconditioning  R29.898       4. Parkinsonism, unspecified Parkinsonism type  G20.C       5. Difficulty walking  R26.2       6. Physical deconditioning  R53.81           Start Time: 1305  Stop Time: 1345  Total time in clinic (min): 40 minutes    Subjective: Pt reports he is doing okay today. Does have some pain in his knees.       Objective: See treatment diary below      Assessment: Tolerated treatment well. Completed ex's as indicated below without difficulty or complaints noted. Pt continues to be challenged appropriately with ex's as noted. Patient demonstrated fatigue post treatment, exhibited good technique with therapeutic exercises, and would benefit from continued PT      Plan: Continue per plan of care.      Precautions: fall risk, dementia, hx of CVA/TIA    HEP: TBA    Specialty Daily Treatment Diary       Manual    STM; TPR B UT, LS        STM/TPR B knees                Knee flexion stretch        Knee Ext stretch                Exercise Diary         UBE (retro)        RB        Laps                 HR 15 20      TR 15 20      Mini Squats        Chair squats No foam: 10x No foam: 15x       St H' ABD                Slant board gastroc stretch        MERLINE (over high counter top with pillow behind)      2 sec; 2x10           Marching Seated: 0# 2 sec; 3x10 ea Seated: 0# 2 sec; 3x10 ea    Seated: 0# 3x10 ea   Standing H' ABD NV? nv   0# 2x10 ea   Standing H' Ext                Floor -> Airex NBOS        Floor -> Airex Tandem        Floor -> Airex SLS                1 cone tap        2 cone tap                Stepping over hurdles forward        Laterally stepping over hurdles                Stepping on random  pads                Bicep Curls U/L: NV?  B/L: 5# 30  U/L: 3# 3x10;   B/L: 5# 30x    U/L: 3# 1x15; 5# 12x10 ea   OH Press  B/L: 3# 3x10 B/L: 3# 3x10    B/L 3# 3x10    TB Scap Retraction NV? nv   GTB 2 sec; 2x15   TB B S' Ext                Low pec stretch        Mid pec stretch                Seated Heel slides using slider (knee flexion and extension stretch) MH: 5 sec x 20 ea MH: 5 sec x 20 ea   :05x20 ea    Seated HS stretch        Seated 2 way piriformis stretch        Chin tucks        SNAGs Ext 2 sec; 2x10 2 sec; 2x10    2x15   Seated Thoracic Extension over half foam 2x10 (half foam) 2x10 (half foam)    2x10 (half foam)                           LAQ 0# 2 sec; 3x10 ea 0# 2 sec; 3x10 ea    0# 2x10           SLR (Flex)                Ball Squeezes Black: 5 sec x 30 Black: 5 sec x 30    Seated: Blue: 5 sec x 30   Ball c Bridge                Hooklying TB B Clamshells Seated: GTB 5 sec x 30 Seated: GTB 5 sec x 30    Seated: GTB 5 sec x 30   TB Bridges                Sidelying H' ABD                                HEP/EDU        Modalities        MH 5 mins with B knees 5 mins with B knees    5 mins b/l knee                      Skin checks performed pre and post application: intact

## 2024-06-11 NOTE — TELEPHONE ENCOUNTER
Called pt. Left a detailed message on his voice mail requesting a return call with current dosing. Also sent a message to pt through [employer].

## 2024-06-11 NOTE — TELEPHONE ENCOUNTER
Eduar Wallace PA-C   to Neurology [place] Clinical Team 4       6/7/24 12:40 PM  Can we please call the patient and verify the current dose of Sinemet he is taking so that we can refill appropriately?  Thank you!

## 2024-06-12 ENCOUNTER — OFFICE VISIT (OUTPATIENT)
Dept: PHYSICAL THERAPY | Facility: CLINIC | Age: 84
End: 2024-06-12
Payer: MEDICARE

## 2024-06-12 DIAGNOSIS — R53.81 PHYSICAL DECONDITIONING: ICD-10-CM

## 2024-06-12 DIAGNOSIS — R26.2 DIFFICULTY WALKING: Primary | ICD-10-CM

## 2024-06-12 PROCEDURE — 97110 THERAPEUTIC EXERCISES: CPT

## 2024-06-12 PROCEDURE — 97530 THERAPEUTIC ACTIVITIES: CPT

## 2024-06-12 NOTE — PROGRESS NOTES
"Daily Note     Today's date: 2024  Patient name: Aldo Rodriguez  : 1940  MRN: [id number]  Referring provider: Callie Caro CRNP  Dx:   Encounter Diagnosis     ICD-10-CM    1. Difficulty walking  R26.2       2. Physical deconditioning  R53.81                      Subjective: Aldo states upon arrival,  \"you worked me hard last visit\".       Objective: See treatment diary below      Assessment: Aldo tolerated PT treatment well. Upon review of exercise diary, activity was not progressed in terms of repetitions and resistance; pt appropriately challenged. Foam Added with STS from chair to complete successfully w/o UE use. Intermittent rest breaks required throughout session d/t fatigue and b/l knee discomfort.       Plan: Continue per plan of care.      Precautions: fall risk, dementia, hx of CVA/TIA    HEP: TBA    Specialty Daily Treatment Diary       Manual    STM; TPR B UT, LS        STM/TPR B knees                Knee flexion stretch        Knee Ext stretch                Exercise Diary         UBE (retro)        RB        Laps                 HR 15 20 20     TR 15 20 20     Mini Squats        Chair squats No foam: 10x No foam: 15x  2 foam no UEA   X10      St H' ABD                Slant board gastroc stretch        MERLINE (over high counter top with pillow behind)      2 sec; 2x10           Marching Seated: 0# 2 sec; 3x10 ea Seated: 0# 2 sec; 3x10 ea  Seated: 0# 2 sec; 3x10 ea   Seated: 0# 3x10 ea   Standing H' ABD NV? nv   0# 2x10 ea   Standing H' Ext                Floor -> Airex NBOS        Floor -> Airex Tandem        Floor -> Airex SLS                1 cone tap        2 cone tap                Stepping over hurdles forward        Laterally stepping over hurdles                Stepping on random pads                Bicep Curls U/L: NV?  B/L: 5# 30  U/L: 3# 3x10;   B/L: 5# 30x  U/L: 3# 3x10;   B/L: 5# 30x   U/L: 3# 1x15; 5# 12x10 ea   OH Press  B/L: 3# 3x10 B/L: 3# 3x10  " B/L: 3# 3x10   B/L 3# 3x10    TB Scap Retraction NV? nv   GTB 2 sec; 2x15   TB B S' Ext                Low pec stretch        Mid pec stretch                Seated Heel slides using slider (knee flexion and extension stretch) MH: 5 sec x 20 ea MH: 5 sec x 20 ea MH: 5 sec x 20 ea  :05x20 ea    Seated HS stretch        Seated 2 way piriformis stretch        Chin tucks        SNAGs Ext 2 sec; 2x10 2 sec; 2x10  2 sec; 2x10   2x15   Seated Thoracic Extension over half foam 2x10 (half foam) 2x10 (half foam)  2x10 (half foam)   2x10 (half foam)                           LAQ 0# 2 sec; 3x10 ea 0# 2 sec; 3x10 ea  0# 2 sec; 3x10 ea   0# 2x10           SLR (Flex)                Ball Squeezes Black: 5 sec x 30 Black: 5 sec x 30  Black: 5 sec x 30   Seated: Blue: 5 sec x 30   Ball c Bridge                Hooklying TB B Clamshells Seated: GTB 5 sec x 30 Seated: GTB 5 sec x 30  Seated: GTB 5 sec x 30   Seated: GTB 5 sec x 30   TB Bridges                Sidelying H' ABD                                HEP/EDU        Modalities        MH 5 mins with B knees 5 mins with B knees  5 mins with B knees   5 mins b/l knee                      Skin checks performed pre and post application: intact

## 2024-06-12 NOTE — TELEPHONE ENCOUNTER
Per Anda message, pt is taking one tablet TID. Pended med as pt reports he is taking it, and routed to provider to review.

## 2024-06-13 ENCOUNTER — OFFICE VISIT (OUTPATIENT)
Dept: PHYSICAL THERAPY | Facility: CLINIC | Age: 84
End: 2024-06-13
Payer: MEDICARE

## 2024-06-13 DIAGNOSIS — G20.C PARKINSONISM, UNSPECIFIED PARKINSONISM TYPE: ICD-10-CM

## 2024-06-13 DIAGNOSIS — R26.2 AMBULATORY DYSFUNCTION: ICD-10-CM

## 2024-06-13 DIAGNOSIS — R53.81 PHYSICAL DECONDITIONING: ICD-10-CM

## 2024-06-13 DIAGNOSIS — R26.2 DIFFICULTY WALKING: Primary | ICD-10-CM

## 2024-06-13 DIAGNOSIS — R29.898 MUSCULAR DECONDITIONING: ICD-10-CM

## 2024-06-13 DIAGNOSIS — M62.81 MUSCLE WEAKNESS (GENERALIZED): ICD-10-CM

## 2024-06-13 PROCEDURE — 97110 THERAPEUTIC EXERCISES: CPT | Performed by: PHYSICAL THERAPIST

## 2024-06-13 PROCEDURE — 97112 NEUROMUSCULAR REEDUCATION: CPT

## 2024-06-13 PROCEDURE — 97530 THERAPEUTIC ACTIVITIES: CPT

## 2024-06-18 ENCOUNTER — OFFICE VISIT (OUTPATIENT)
Dept: PHYSICAL THERAPY | Facility: CLINIC | Age: 84
End: 2024-06-18
Payer: MEDICARE

## 2024-06-18 DIAGNOSIS — G20.C PARKINSONISM, UNSPECIFIED PARKINSONISM TYPE: ICD-10-CM

## 2024-06-18 DIAGNOSIS — R26.2 AMBULATORY DYSFUNCTION: ICD-10-CM

## 2024-06-18 DIAGNOSIS — R53.81 PHYSICAL DECONDITIONING: ICD-10-CM

## 2024-06-18 DIAGNOSIS — R26.2 DIFFICULTY WALKING: ICD-10-CM

## 2024-06-18 DIAGNOSIS — M62.81 MUSCLE WEAKNESS (GENERALIZED): ICD-10-CM

## 2024-06-18 DIAGNOSIS — R29.898 MUSCULAR DECONDITIONING: Primary | ICD-10-CM

## 2024-06-18 PROCEDURE — 97530 THERAPEUTIC ACTIVITIES: CPT | Performed by: PHYSICAL THERAPIST

## 2024-06-18 PROCEDURE — 97110 THERAPEUTIC EXERCISES: CPT | Performed by: PHYSICAL THERAPIST

## 2024-06-18 PROCEDURE — 97112 NEUROMUSCULAR REEDUCATION: CPT | Performed by: PHYSICAL THERAPIST

## 2024-06-18 NOTE — PROGRESS NOTES
PT Evaluation     Today's date: 2024  Patient name: Aldo Rodriguez  : 1940  MRN: [id number]  Referring provider: Callie Caro CRNP  Dx:   Encounter Diagnosis     ICD-10-CM    1. Muscle weakness (generalized)  M62.81       2. Parkinsonism, unspecified Parkinsonism type  G20.C Ambulatory Referral to Physical Therapy      3. Muscular deconditioning  R29.898       4. Difficulty walking  R26.2       5. Physical deconditioning  R53.81 Ambulatory Referral to Physical Therapy      6. Ambulatory dysfunction  R26.2 Ambulatory Referral to Physical Therapy          Assessment  Assessment details: Aldo Rodriguez is a 83 y.o. male presenting to outpatient physical therapy at [facility] with complaints of chronic bilateral knee pain and onset of falls. Within the past year, caregiver reports being diagnosed with Parkinsonism. He is gradually improving with his exercise tolerance; however, he continues to present with decreased range of motion, decreased strength, limited flexibility, poor postural awareness, poor body mechanics, altered gait pattern, poor balance, decreased tolerance to activity and decreased functional mobility due to Neck pain  (primary encounter diagnosis), Poor posture, Chronic pain of both knees, and Difficulty walking. Therapist discussed diagnosis, prognosis, plan of care, proper responses to exercises, HEP, and modalities to use at home.  He would benefit from skilled PT services in order to address these deficits and reach maximum level of function.  Thank you for the referral!  Impairments: abnormal coordination, abnormal gait, abnormal muscle firing, abnormal muscle tone, abnormal or restricted ROM, abnormal movement, activity intolerance, impaired balance, impaired physical strength, lacks appropriate home exercise program, pain with function, scapular dyskinesis, poor posture  and poor body mechanics    Symptom irritability: moderateUnderstanding of Dx/Px/POC: good   Prognosis:  good    Goals  STGs (in 4 weeks):  1. Pt will report having at least a 50% improvement since I.E. - Progressing towards   2. Pt will report having less than a 5/10 pain level with B knees. - Progressing towards  3. Pt will demonstrate at least 4/5 MMT throughout BLE. - Progressing towards     LTGs (in 12 weeks):  1. Pt will report having at least a 75% improvement since I.E. - Progressing towards   2. Pt will be able to perform at least 10 sit<>stands in 30 seconds to demonstrate improved BLE strength and balance. - Progressing towards   3. Pt will be able to perform TUG in less than 18 seconds. - Progressing towards   4. Pt will be able to amb at least 140 meters during 2 MWT with good posture in order to increase endurance to normative age related values. - Progressing towards   5. Pt will be independent with HEP. - Progressing towards     Plan  Patient would benefit from: skilled physical therapy  Planned modality interventions: TENS and unattended electrical stimulation  Planned therapy interventions: joint mobilization, manual therapy, neuromuscular re-education, patient education, self care, strengthening, stretching, therapeutic activities, therapeutic exercise, balance, flexibility and home exercise program  Frequency: 2x week  Plan of Care beginning date: 5/13/2024  Plan of Care expiration date: 8/2/2024  Treatment plan discussed with: patient, caregiver and family        Subjective Evaluation    History of Present Illness    RE: 6/18/24: Patient reports that his pain has been okay.     Mechanism of injury: Pt is a 83 year old male who presents with chief complaint of chronic progressive neck pain and bilateral knee pain. Caregiver reports that reveals OA with bilateral knees R worse than L. He reports that patient has been having bilateral LE swelling which he has tried to elevate his legs throughout the day. Caregiver reports that he has not been seen by ortho for his knee but has bought him a knee brace  for support. Now referred to skilled OP PT for ambulatory dysfunction.   Quality of life: good    Pain  Current pain ratin  At best pain ratin  At worst pain ratin  Pain location: bilateral knee pain (R worse than L)  Quality: discomfort, dull ache, pressure and tight  Relieving factors: relaxation and rest  Aggravating factors: walking, lifting, stair climbing and standing (sit to stand transfer)  Progression: no change    Hand dominance: right    Treatments  Previous treatment: injection treatment  Patient Goals  Patient goals for therapy: decreased pain, improved balance, increased motion, increased strength, independence with ADLs/IADLs and return to sport/leisure activities          Objective    Objective:       Posture: Pt's sitting posture is FHP and rounded shoulders.       AROM     R   L  Knee Flex  NT   NT  Knee Extension NT   NT      MMT      R   L  S' Flex   4/5   4/5  S' ABD   4-/5   4-/5  S' ER   4-/5   4-/5    Static K' Ext  4-/5   4-/5  Static K' Flex  3+/5   3+/5  Dynamic K' Ext 3-/5   3-/5  Dynamic K' Ext 3-/5   3-/5    Hip Flex  4-/5   4-/5  Hip ABD  3+/5   3+/5    Ankle DF  3-/5   3-/5  Ankle PF  3/5   3/5        Special Tests: opposition: (R) decreased speed (L) decreased speed  Heel to shin: (R) decreased speed (L) decreased speed  Nose to finger: (R) decreased speed (L) decreased speed    Functional mobility:  Ambulation: Pt ambs using RW with forward trunk flexion with FHP and rounded shoulders    Functional testin second repeated sit<>stand: 3 times with definite use of BUE  TU seconds with definite use of BUEs using RW  2 MWT: 160 ft (48.8 meters) using RW        Precautions: fall risk, dementia, hx of CVA/TIA    HEP: TBA    Specialty Daily Treatment Diary       Manual    STM; TPR B UT, LS        STM/TPR B knees                Knee flexion stretch        Knee Ext stretch                Exercise Diary         UBE (retro)        RB        Laps       2 mins (1.5 full laps)           HR 15 20 20 20 20   TR 15 20 20 20 20   Mini Squats        Chair squats No foam: 10x No foam: 15x  2 foam no UEA   X10  2 foam no UEA: 2x5 30 sec sit<>stand: 3x  1 airex with UE: 1x10  2 airex without UE: 1x1   St H' ABD                Slant board gastroc stretch        MERLINE (over high counter top with pillow behind)                 Marching Seated: 0# 2 sec; 3x10 ea Seated: 0# 2 sec; 3x10 ea  Seated: 0# 2 sec; 3x10 ea  Seated: 0# 2 sec; 3x10 ea  Standin# 2 sec; 1x10 ea   Standing H' ABD NV? nv      Standing H' Ext                Floor -> Airex NBOS        Floor -> Airex Tandem        Floor -> Airex SLS                1 cone tap        2 cone tap                Stepping over hurdles forward        Laterally stepping over hurdles                Stepping on random pads                Bicep Curls U/L: NV?  B/L: 5# 30  U/L: 3# 3x10;   B/L: 5# 30x  U/L: 3# 3x10;   B/L: 5# 30x  5# B/L 3x10 5# B/L: 3x10   OH Press  B/L: 3# 3x10 B/L: 3# 3x10  B/L: 3# 3x10  B/L 5# 2x10  B/L 5# 2x10   TB Scap Retraction NV? nv      TB B S' Ext                Low pec stretch        Mid pec stretch                Seated Heel slides using slider (knee flexion and extension stretch) MH: 5 sec x 20 ea MH: 5 sec x 20 ea MH: 5 sec x 20 ea MH: 5 sec x 20 ea MH 5 sec x 20 ea   Seated HS stretch                Seated 2 way piriformis stretch        Chin tucks        SNAGs Ext 2 sec; 2x10 2 sec; 2x10  2 sec; 2x10  2 sec; 2x10  2 sec; 2x10    Seated Thoracic Extension over half foam 2x10 (half foam) 2x10 (half foam)  2x10 (half foam)  2x10 (half foam)  1x10 (half foam)                            LAQ 0# 2 sec; 3x10 ea 0# 2 sec; 3x10 ea  0# 2 sec; 3x10 ea  0# 2 sec; 3x10 ea  0# 2 sec; 3x10 ea           SLR (Flex)                Ball Squeezes Black: 5 sec x 30 Black: 5 sec x 30  Black: 5 sec x 30  Black: 5 sec x 30  Black: 5 sec x 30   Ball c Bridge                Hooklying TB B Clamshells Seated: GTB 5 sec x 30 Seated:  GTB 5 sec x 30  Seated: GTB 5 sec x 30  Seated: GTB 5 sec x 30  Seated: GTB 5 sec x 30   TB Bridges                Sidelying H' ABD                                HEP/EDU        Modalities        MH 5 mins with B knees 5 mins with B knees  5 mins with B knees  5 mins with B knees  10 mins with B knee                       Skin checks performed pre and post application: intact

## 2024-06-20 ENCOUNTER — OFFICE VISIT (OUTPATIENT)
Dept: PHYSICAL THERAPY | Facility: CLINIC | Age: 84
End: 2024-06-20
Payer: MEDICARE

## 2024-06-20 DIAGNOSIS — R53.81 PHYSICAL DECONDITIONING: ICD-10-CM

## 2024-06-20 DIAGNOSIS — R26.2 AMBULATORY DYSFUNCTION: ICD-10-CM

## 2024-06-20 DIAGNOSIS — R29.898 MUSCULAR DECONDITIONING: ICD-10-CM

## 2024-06-20 DIAGNOSIS — R26.2 DIFFICULTY WALKING: ICD-10-CM

## 2024-06-20 DIAGNOSIS — M62.81 MUSCLE WEAKNESS (GENERALIZED): Primary | ICD-10-CM

## 2024-06-20 DIAGNOSIS — G20.C PARKINSONISM, UNSPECIFIED PARKINSONISM TYPE: ICD-10-CM

## 2024-06-20 PROCEDURE — 97530 THERAPEUTIC ACTIVITIES: CPT | Performed by: PHYSICAL THERAPIST

## 2024-06-20 PROCEDURE — 97112 NEUROMUSCULAR REEDUCATION: CPT | Performed by: PHYSICAL THERAPIST

## 2024-06-20 PROCEDURE — 97110 THERAPEUTIC EXERCISES: CPT | Performed by: PHYSICAL THERAPIST

## 2024-06-20 NOTE — PROGRESS NOTES
Daily Note     Today's date: 2024  Patient name: Aldo Rodriguez  : 1940  MRN: [id number]  Referring provider: Callie Caro CRNP  Dx:   Encounter Diagnosis     ICD-10-CM    1. Muscle weakness (generalized)  M62.81       2. Muscular deconditioning  R29.898       3. Difficulty walking  R26.2       4. Ambulatory dysfunction  R26.2       5. Physical deconditioning  R53.81       6. Parkinsonism, unspecified Parkinsonism type  G20.C                      Subjective: Patient reports that his knees hurt him more today.       Objective: See treatment diary below      Assessment: Tolerated treatment well; initiated POC with seated there ex. Vcs provided on proper sequencing with exercises; he has difficulty with STS and requires attempts prior to completing transfer. He denies having increased pain throughout session.   Patient demonstrated fatigue post treatment and would benefit from continued PT      Plan: Continue per plan of care.      Precautions: fall risk, dementia, hx of CVA/TIA    HEP: TBA    Specialty Daily Treatment Diary       Manual    STM; TPR B UT, LS        STM/TPR B knees                Knee flexion stretch        Knee Ext stretch                Exercise Diary         UBE (retro)        RB        Laps      2 mins (1.5 full laps)           HR 20  20 20 20   TR 20  20 20 20   Mini Squats        Chair squats 2 foam: no UE: 1x5  2 foam no UEA   X10  2 foam no UEA: 2x5 30 sec sit<>stand: 3x  1 airex with UE: 1x10  2 airex without UE: 1x1   St H' ABD                Slant board gastroc stretch        MERLINE (over high counter top with pillow behind)                 Marching NV?  Seated: 0# 2 sec; 3x10 ea  Seated: 0# 2 sec; 3x10 ea  Standin# 2 sec; 1x10 ea   Standing H' ABD        Standing H' Ext                Floor -> Airex NBOS        Floor -> Airex Tandem        Floor -> Airex SLS                1 cone tap        2 cone tap                Stepping over hurdles forward         Laterally stepping over hurdles                Stepping on random pads                Bicep Curls 5# B/L: 3x10  U/L: 3# 3x10;   B/L: 5# 30x  5# B/L 3x10 5# B/L: 3x10   OH Press  B/L: 5# 2x10  B/L: 3# 3x10  B/L 5# 2x10  B/L 5# 2x10   TB Scap Retraction        TB B S' Ext                Low pec stretch        Mid pec stretch                Seated Heel slides using slider (knee flexion and extension stretch) MH: 5 sec x 20 ea  MH: 5 sec x 20 ea MH: 5 sec x 20 ea MH 5 sec x 20 ea   Seated HS stretch                Seated 2 way piriformis stretch        Chin tucks        SNAGs Ext 2 sec; 2x10  2 sec; 2x10  2 sec; 2x10  2 sec; 2x10    Seated Thoracic Extension over half foam NV?  2x10 (half foam)  2x10 (half foam)  1x10 (half foam)                            LAQ 0# 2 sec; 2x10 ea  0# 2 sec; 3x10 ea  0# 2 sec; 3x10 ea  0# 2 sec; 3x10 ea           SLR (Flex)                Ball Squeezes Blue: 5 sec x 20  Black: 5 sec x 30  Black: 5 sec x 30  Black: 5 sec x 30   Ball c Bridge                Hooklying TB B Clamshells Seated: GTB 5 sec x 20  Seated: GTB 5 sec x 30  Seated: GTB 5 sec x 30  Seated: GTB 5 sec x 30   TB Bridges                Sidelying H' ABD                                HEP/EDU        Modalities        MH 10 mins with B knee  5 mins with B knees  5 mins with B knees  10 mins with B knee                       Skin checks performed pre and post application: intact

## 2024-06-22 DIAGNOSIS — I10 ESSENTIAL HYPERTENSION: ICD-10-CM

## 2024-06-22 RX ORDER — AMLODIPINE BESYLATE 5 MG/1
5 TABLET ORAL DAILY
Qty: 90 TABLET | Refills: 1 | Status: SHIPPED | OUTPATIENT
Start: 2024-06-22

## 2024-06-25 ENCOUNTER — OFFICE VISIT (OUTPATIENT)
Dept: PHYSICAL THERAPY | Facility: CLINIC | Age: 84
End: 2024-06-25
Payer: MEDICARE

## 2024-06-25 DIAGNOSIS — R29.898 MUSCULAR DECONDITIONING: ICD-10-CM

## 2024-06-25 DIAGNOSIS — R26.2 DIFFICULTY WALKING: ICD-10-CM

## 2024-06-25 DIAGNOSIS — R53.81 PHYSICAL DECONDITIONING: ICD-10-CM

## 2024-06-25 DIAGNOSIS — M62.81 MUSCLE WEAKNESS (GENERALIZED): Primary | ICD-10-CM

## 2024-06-25 DIAGNOSIS — G20.C PARKINSONISM, UNSPECIFIED PARKINSONISM TYPE: ICD-10-CM

## 2024-06-25 PROCEDURE — 97110 THERAPEUTIC EXERCISES: CPT

## 2024-06-25 PROCEDURE — 97530 THERAPEUTIC ACTIVITIES: CPT

## 2024-06-25 PROCEDURE — 97112 NEUROMUSCULAR REEDUCATION: CPT

## 2024-06-25 NOTE — PROGRESS NOTES
Daily Note     Today's date: 2024  Patient name: Aldo Rodriguez  : 1940  MRN: [id number]  Referring provider: Callie Caro CRNP  Dx:   Encounter Diagnosis     ICD-10-CM    1. Muscle weakness (generalized)  M62.81       2. Muscular deconditioning  R29.898       3. Difficulty walking  R26.2       4. Physical deconditioning  R53.81       5. Parkinsonism, unspecified Parkinsonism type  G20.C                      Subjective: Aldo reports joint stiffness from the hips down.       Objective: See treatment diary below      Assessment: Tolerated treatment well. Patient would benefit from continued skilled PT services to promote BLE strength, improve his ability with independent safe transfers, and overall mobility and tolerance to activity, as he demonstrated deficits in these areas.       Plan: Continue per plan of care.       Precautions: fall risk, dementia, hx of CVA/TIA    HEP: TBA    Specialty Daily Treatment Diary       Manual    STM; TPR B UT, LS        STM/TPR B knees                Knee flexion stretch        Knee Ext stretch                Exercise Diary         UBE (retro)        RB        Laps      2 mins (1.5 full laps)           HR 20 20 20 20 20   TR 20 20 20 20 20   Mini Squats        Chair squats 2 foam: no UE: 1x5 2 foam: no UE: 1x5 2 foam no UEA   X10  2 foam no UEA: 2x5 30 sec sit<>stand: 3x  1 airex with UE: 1x10  2 airex without UE: 1x1   St H' ABD                Slant board gastroc stretch        MERLINE (over high counter top with pillow behind)                 Marching NV? Seated: 0# 2 sec; 3x10 ea  Seated: 0# 2 sec; 3x10 ea  Seated: 0# 2 sec; 3x10 ea  Standin# 2 sec; 1x10 ea   Standing H' ABD        Standing H' Ext                Floor -> Airex NBOS        Floor -> Airex Tandem        Floor -> Airex SLS                1 cone tap        2 cone tap                Stepping over hurdles forward        Laterally stepping over hurdles                Stepping  on random pads                Bicep Curls 5# B/L: 3x10 5# B/L: 3x10 U/L: 3# 3x10;   B/L: 5# 30x  5# B/L 3x10 5# B/L: 3x10   OH Press  B/L: 5# 2x10 5# B/L: 2x10 B/L: 3# 3x10  B/L 5# 2x10  B/L 5# 2x10   TB Scap Retraction        TB B S' Ext                Low pec stretch        Mid pec stretch                Seated Heel slides using slider (knee flexion and extension stretch) MH: 5 sec x 20 ea MH: 5 sec x 20 ea MH: 5 sec x 20 ea MH: 5 sec x 20 ea MH 5 sec x 20 ea   Seated HS stretch                Seated 2 way piriformis stretch        Chin tucks        SNAGs Ext 2 sec; 2x10 2 sec; 2x10 2 sec; 2x10  2 sec; 2x10  2 sec; 2x10    Seated Thoracic Extension over half foam NV? 1x10 (half foam)  2x10 (half foam)  2x10 (half foam)  1x10 (half foam)                            LAQ 0# 2 sec; 2x10 ea s 0# 2 sec; 3x10 ea  0# 2 sec; 3x10 ea  0# 2 sec; 3x10 ea           SLR (Flex)                Ball Squeezes Blue: 5 sec x 20 Blue: 5 sec x 20 Black: 5 sec x 30  Black: 5 sec x 30  Black: 5 sec x 30   Ball c Bridge                Hooklying TB B Clamshells Seated: GTB 5 sec x 20 Seated: GTB 5 sec x 20 Seated: GTB 5 sec x 30  Seated: GTB 5 sec x 30  Seated: GTB 5 sec x 30   TB Bridges                Sidelying H' ABD                                HEP/EDU        Modalities        MH 10 mins with B knee 10 mins with B knee 5 mins with B knees  5 mins with B knees  10 mins with B knee                       Skin checks performed pre and post application: intact

## 2024-06-27 ENCOUNTER — OFFICE VISIT (OUTPATIENT)
Dept: PHYSICAL THERAPY | Facility: CLINIC | Age: 84
End: 2024-06-27
Payer: MEDICARE

## 2024-06-27 DIAGNOSIS — R26.2 DIFFICULTY WALKING: ICD-10-CM

## 2024-06-27 DIAGNOSIS — M62.81 MUSCLE WEAKNESS (GENERALIZED): Primary | ICD-10-CM

## 2024-06-27 DIAGNOSIS — R26.2 AMBULATORY DYSFUNCTION: ICD-10-CM

## 2024-06-27 DIAGNOSIS — R29.898 MUSCULAR DECONDITIONING: ICD-10-CM

## 2024-06-27 DIAGNOSIS — G20.C PARKINSONISM, UNSPECIFIED PARKINSONISM TYPE: ICD-10-CM

## 2024-06-27 DIAGNOSIS — R53.81 PHYSICAL DECONDITIONING: ICD-10-CM

## 2024-06-27 PROCEDURE — 97112 NEUROMUSCULAR REEDUCATION: CPT

## 2024-06-27 PROCEDURE — 97530 THERAPEUTIC ACTIVITIES: CPT

## 2024-06-27 PROCEDURE — 97110 THERAPEUTIC EXERCISES: CPT

## 2024-06-27 NOTE — PROGRESS NOTES
"Daily Note     Today's date: 2024  Patient name: Aldo Rodriguez  : 1940  MRN: [id number]  Referring provider: Callie Caro CRNP  Dx:   Encounter Diagnosis     ICD-10-CM    1. Muscle weakness (generalized)  M62.81       2. Muscular deconditioning  R29.898       3. Difficulty walking  R26.2       4. Physical deconditioning  R53.81       5. Parkinsonism, unspecified Parkinsonism type  G20.C       6. Ambulatory dysfunction  R26.2                      Subjective: Aldo reports he was completing HEP at home but may have \"over did it\", increased R knee soreness following.       Objective: See treatment diary below      Assessment: Aldo tolerated PT treatment well. Initiated session with MH to B/L knee. Unable to complete STS today d/t R knee provocation, held until NV. Pt would benefit from additional skilled maintenance therapy in effort to prevent decline in function with resultant decrease in independent self care and transfers, and to prevent an increased risk for fall, and loss of functional strength.      Plan: Continue per plan of care.       Precautions: fall risk, dementia, hx of CVA/TIA    HEP: TBA    Specialty Daily Treatment Diary       Manual    STM; TPR B UT, LS        STM/TPR B knees                Knee flexion stretch        Knee Ext stretch                Exercise Diary         UBE (retro)        RB        Laps      2 mins (1.5 full laps)           HR 20 20 20  20   TR 20 20 20  20   Mini Squats        Chair squats 2 foam: no UE: 1x5 2 foam: no UE: 1x5 P!  30 sec sit<>stand: 3x  1 airex with UE: 1x10  2 airex without UE: 1x1   St H' ABD                Slant board gastroc stretch        MERLINE (over high counter top with pillow behind)                 Marching NV? Seated: 0# 2 sec; 3x10 ea  Seated 3x10 ea  Standin# 2 sec; 1x10 ea   Standing H' ABD        Standing H' Ext                Floor -> Airex NBOS        Floor -> Airex Tandem        Floor -> Airex SLS     "            1 cone tap        2 cone tap                Stepping over hurdles forward        Laterally stepping over hurdles                Stepping on random pads                Bicep Curls 5# B/L: 3x10 5# B/L: 3x10 5# B/L: 2x10  5# B/L: 3x10   OH Press  B/L: 5# 2x10 5# B/L: 2x10 5# B/L: 2x10  B/L 5# 2x10   TB Scap Retraction        TB B S' Ext                Low pec stretch        Mid pec stretch                Seated Heel slides using slider (knee flexion and extension stretch) MH: 5 sec x 20 ea MH: 5 sec x 20 ea MH: 5 sec x 20 ea  MH 5 sec x 20 ea   Seated HS stretch                Seated 2 way piriformis stretch        Chin tucks        SNAGs Ext 2 sec; 2x10 2 sec; 2x10 2 sec; 2x10  2 sec; 2x10    Seated Thoracic Extension over half foam NV? 1x10 (half foam)  2x10   1x10 (half foam)                            LAQ 0# 2 sec; 2x10 ea s   0# 2 sec; 3x10 ea           SLR (Flex)                Ball Squeezes Blue: 5 sec x 20 Blue: 5 sec x 20 Blue: 5 sec x 20  Black: 5 sec x 30   Ball c Bridge                Hooklying TB B Clamshells Seated: GTB 5 sec x 20 Seated: GTB 5 sec x 20 Seated: GTB 5 sec x 20  Seated: GTB 5 sec x 30   TB Bridges                Sidelying H' ABD                                HEP/EDU        Modalities        MH 10 mins with B knee 10 mins with B knee 10 mins with B knee  10 mins with B knee                       Skin checks performed pre and post application: intact

## 2024-07-02 ENCOUNTER — OFFICE VISIT (OUTPATIENT)
Dept: PHYSICAL THERAPY | Facility: CLINIC | Age: 84
End: 2024-07-02
Payer: MEDICARE

## 2024-07-02 DIAGNOSIS — R29.898 MUSCULAR DECONDITIONING: ICD-10-CM

## 2024-07-02 DIAGNOSIS — R53.81 PHYSICAL DECONDITIONING: ICD-10-CM

## 2024-07-02 DIAGNOSIS — G20.C PARKINSONISM, UNSPECIFIED PARKINSONISM TYPE: Primary | ICD-10-CM

## 2024-07-02 DIAGNOSIS — R26.2 DIFFICULTY WALKING: ICD-10-CM

## 2024-07-02 DIAGNOSIS — M62.81 MUSCLE WEAKNESS (GENERALIZED): ICD-10-CM

## 2024-07-02 DIAGNOSIS — R26.2 AMBULATORY DYSFUNCTION: ICD-10-CM

## 2024-07-02 PROCEDURE — 97112 NEUROMUSCULAR REEDUCATION: CPT | Performed by: PHYSICAL THERAPIST

## 2024-07-02 PROCEDURE — 97110 THERAPEUTIC EXERCISES: CPT | Performed by: PHYSICAL THERAPIST

## 2024-07-02 NOTE — PROGRESS NOTES
Daily Note     Today's date: 2024  Patient name: Aldo Rodriguez  : 1940  MRN: [id number]  Referring provider: Callie Caro CRNP  Dx:   Encounter Diagnosis     ICD-10-CM    1. Parkinsonism, unspecified Parkinsonism type  G20.C       2. Muscular deconditioning  R29.898       3. Muscle weakness (generalized)  M62.81       4. Ambulatory dysfunction  R26.2       5. Difficulty walking  R26.2       6. Physical deconditioning  R53.81                      Subjective: Patient reports that his knees are hurting him more.       Objective: See treatment diary below      Assessment: Tolerated treatment fair; initiated POC with MH to bilateral knee with performing there ex. Vcs provided on proper sequencing with exercises; resumed hamstring stretch seated as per pt request. Added static balancing.   Patient demonstrated fatigue post treatment and would benefit from continued PT      Plan: Continue per plan of care.      Precautions: fall risk, dementia, hx of CVA/TIA    HEP: TBA    Specialty Daily Treatment Diary       Manual     STM; TPR B UT, LS        STM/TPR B knees                Knee flexion stretch        Knee Ext stretch                Exercise Diary         UBE (retro)        RB        Laps                 HR 20 20 20 20    TR 20 20 20 20    Mini Squats        Chair squats 2 foam: no UE: 1x5 2 foam: no UE: 1x5 P!     St H' ABD                Slant board gastroc stretch        MERLINE (over high counter top with pillow behind)                 Marching NV? Seated: 0# 2 sec; 3x10 ea  Seated 3x10 ea Seated: 0# 2x10    Standing H' ABD        Standing H' Ext                Floor -> Airex NBOS    NBOS: 1 min    Floor -> Airex Tandem        Floor -> Airex SLS                1 cone tap        2 cone tap                Stepping over hurdles forward        Laterally stepping over hurdles                Stepping on random pads                Bicep Curls 5# B/L: 3x10 5# B/L: 3x10 5# B/L: 2x10 5# B/L:  3x10    OH Press  B/L: 5# 2x10 5# B/L: 2x10 5# B/L: 2x10 3# BL: 3x10    TB Scap Retraction    NV? In standing    TB B S' Ext                Low pec stretch        Mid pec stretch                Seated Heel slides using slider (knee flexion and extension stretch) MH: 5 sec x 20 ea MH: 5 sec x 20 ea MH: 5 sec x 20 ea MH 5 sec x 20     Seated HS stretch    20 sec x 3 ea            Seated 2 way piriformis stretch        Chin tucks        SNAGs Ext 2 sec; 2x10 2 sec; 2x10 2 sec; 2x10 2 sec; 2x10    Seated Thoracic Extension over half foam NV? 1x10 (half foam)  2x10  1x10                             LAQ 0# 2 sec; 2x10 ea s              SLR (Flex)                Ball Squeezes Blue: 5 sec x 20 Blue: 5 sec x 20 Blue: 5 sec x 20 Black: 5 sec x 20     Ball c Bridge                Hooklying TB B Clamshells Seated: GTB 5 sec x 20 Seated: GTB 5 sec x 20 Seated: GTB 5 sec x 20 Seated: GTB 5 sec x 20     TB Bridges                Sidelying H' ABD                                HEP/EDU        Modalities        MH 10 mins with B knee 10 mins with B knee 10 mins with B knee 10 mins with B knee                       Skin checks performed pre and post application: intact

## 2024-07-05 ENCOUNTER — OFFICE VISIT (OUTPATIENT)
Dept: PHYSICAL THERAPY | Facility: CLINIC | Age: 84
End: 2024-07-05
Payer: MEDICARE

## 2024-07-05 DIAGNOSIS — M62.81 MUSCLE WEAKNESS (GENERALIZED): ICD-10-CM

## 2024-07-05 DIAGNOSIS — R29.898 MUSCULAR DECONDITIONING: ICD-10-CM

## 2024-07-05 DIAGNOSIS — R26.2 AMBULATORY DYSFUNCTION: ICD-10-CM

## 2024-07-05 DIAGNOSIS — R26.2 DIFFICULTY WALKING: ICD-10-CM

## 2024-07-05 DIAGNOSIS — G20.C PARKINSONISM, UNSPECIFIED PARKINSONISM TYPE: Primary | ICD-10-CM

## 2024-07-05 DIAGNOSIS — R53.81 PHYSICAL DECONDITIONING: ICD-10-CM

## 2024-07-05 PROCEDURE — 97112 NEUROMUSCULAR REEDUCATION: CPT | Performed by: PHYSICAL THERAPIST

## 2024-07-05 PROCEDURE — 97530 THERAPEUTIC ACTIVITIES: CPT | Performed by: PHYSICAL THERAPIST

## 2024-07-05 PROCEDURE — 97110 THERAPEUTIC EXERCISES: CPT | Performed by: PHYSICAL THERAPIST

## 2024-07-05 NOTE — PROGRESS NOTES
"Daily Note     Today's date: 2024  Patient name: Aldo Rodriguez  : 1940  MRN: [id number]  Referring provider: Callie Caro CRNP  Dx:   Encounter Diagnosis     ICD-10-CM    1. Parkinsonism, unspecified Parkinsonism type  G20.C       2. Muscular deconditioning  R29.898       3. Muscle weakness (generalized)  M62.81       4. Physical deconditioning  R53.81       5. Difficulty walking  R26.2       6. Ambulatory dysfunction  R26.2              Subjective: Pt reported that \"I am doing okay today.\"     Objective: See treatment diary below    Assessment: Tolerated treatment well. Patient demonstrated fatigue post treatment, exhibited good technique with therapeutic exercises, and would benefit from continued PT    Plan: Continue per plan of care.      Precautions: fall risk, dementia, hx of CVA/TIA    HEP: TBA    Specialty Daily Treatment Diary       Manual    STM; TPR B UT, LS        STM/TPR B knees                Knee flexion stretch        Knee Ext stretch                Exercise Diary         UBE (retro)        RB        Laps: 6MWT     7.5 laps (150 yard)           HR 20 20 20 20 20 x    TR 20 20 20 20 20 x    Mini Squats        Chair squats 2 foam: no UE: 1x5 2 foam: no UE: 1x5 P!     Slant board gastroc stretch        MERLINE (over high counter top with pillow behind)                 Marching NV? Seated: 0# 2 sec; 3x10 ea  Seated 3x10 ea Seated: 0# 2x10 Seated 2x10 ea   Standing H' ABD     2 x 10 ea   Standing H' Ext                Floor -> Airex NBOS    NBOS: 1 min    Floor  1/2 Tandem     20 sec x 2 ea   Floor -> Airex SLS                1 cone tap        2 cone tap                Stepping over hurdles forward        Laterally stepping over hurdles                Stepping on random pads        Horizontal abduction - b/l - seated      2x10 ea   Bicep Curls 5# B/L: 3x10 5# B/L: 3x10 5# B/L: 2x10 5# B/L: 3x10    OH Press  B/L: 5# 2x10 5# B/L: 2x10 5# B/L: 2x10 3# BL: 3x10 0# 3x10 "    TB Scap Retraction    NV? In standing    TB B S' Ext                Low pec stretch        Mid pec stretch                Seated Heel slides using slider (knee flexion and extension stretch) MH: 5 sec x 20 ea MH: 5 sec x 20 ea MH: 5 sec x 20 ea MH 5 sec x 20  5 sec 20x    Seated HS stretch    20 sec x 3 ea 20 sec x 3 ea           Seated 2 way piriformis stretch        Chin tucks        SNAGs Ext 2 sec; 2x10 2 sec; 2x10 2 sec; 2x10 2 sec; 2x10 2 sec 2 x 10    Seated Thoracic Extension over half foam NV? 1x10 (half foam)  2x10  1x10  1/2 foam 2 x 10                           LAQ 0# 2 sec; 2x10 ea s              SLR (Flex)                Ball Squeezes Blue: 5 sec x 20 Blue: 5 sec x 20 Blue: 5 sec x 20 Black: 5 sec x 20     Ball c Bridge                Hooklying TB B Clamshells Seated: GTB 5 sec x 20 Seated: GTB 5 sec x 20 Seated: GTB 5 sec x 20 Seated: GTB 5 sec x 20     TB Bridges                                                HEP/EDU        Modalities        MH 10 mins with B knee 10 mins with B knee 10 mins with B knee 10 mins with B knee                       Skin checks performed pre and post application: intact

## 2024-07-08 ENCOUNTER — APPOINTMENT (OUTPATIENT)
Dept: PHYSICAL THERAPY | Facility: CLINIC | Age: 84
End: 2024-07-08
Payer: MEDICARE

## 2024-07-10 ENCOUNTER — APPOINTMENT (OUTPATIENT)
Dept: PHYSICAL THERAPY | Facility: CLINIC | Age: 84
End: 2024-07-10
Payer: MEDICARE

## 2024-07-16 ENCOUNTER — APPOINTMENT (OUTPATIENT)
Dept: PHYSICAL THERAPY | Facility: CLINIC | Age: 84
End: 2024-07-16
Payer: MEDICARE

## 2024-07-18 ENCOUNTER — APPOINTMENT (OUTPATIENT)
Dept: PHYSICAL THERAPY | Facility: CLINIC | Age: 84
End: 2024-07-18
Payer: MEDICARE

## 2024-07-19 ENCOUNTER — APPOINTMENT (OUTPATIENT)
Dept: PHYSICAL THERAPY | Facility: CLINIC | Age: 84
End: 2024-07-19
Payer: MEDICARE

## 2024-07-19 DIAGNOSIS — R39.9 UTI SYMPTOMS: Primary | ICD-10-CM

## 2024-07-19 RX ORDER — CEPHALEXIN 500 MG/1
500 CAPSULE ORAL EVERY 8 HOURS SCHEDULED
Qty: 15 CAPSULE | Refills: 0 | Status: SHIPPED | OUTPATIENT
Start: 2024-07-19 | End: 2024-07-24

## 2024-07-20 ENCOUNTER — APPOINTMENT (OUTPATIENT)
Dept: LAB | Facility: HOSPITAL | Age: 84
End: 2024-07-20
Payer: MEDICARE

## 2024-07-20 DIAGNOSIS — N39.0 URINARY TRACT INFECTION WITHOUT HEMATURIA, SITE UNSPECIFIED: Primary | ICD-10-CM

## 2024-07-20 PROCEDURE — 87086 URINE CULTURE/COLONY COUNT: CPT

## 2024-07-20 PROCEDURE — 87077 CULTURE AEROBIC IDENTIFY: CPT

## 2024-07-20 PROCEDURE — 87186 SC STD MICRODIL/AGAR DIL: CPT

## 2024-07-22 DIAGNOSIS — N30.00 ACUTE CYSTITIS WITHOUT HEMATURIA: Primary | ICD-10-CM

## 2024-07-22 LAB — BACTERIA UR CULT: ABNORMAL

## 2024-07-22 RX ORDER — SULFAMETHOXAZOLE AND TRIMETHOPRIM 800; 160 MG/1; MG/1
1 TABLET ORAL 2 TIMES DAILY
Qty: 14 TABLET | Refills: 0 | Status: SHIPPED | OUTPATIENT
Start: 2024-07-22 | End: 2024-07-29

## 2024-07-23 ENCOUNTER — OFFICE VISIT (OUTPATIENT)
Dept: PHYSICAL THERAPY | Facility: CLINIC | Age: 84
End: 2024-07-23
Payer: MEDICARE

## 2024-07-23 DIAGNOSIS — G20.C PARKINSONISM, UNSPECIFIED PARKINSONISM TYPE: Primary | ICD-10-CM

## 2024-07-23 DIAGNOSIS — R29.898 MUSCULAR DECONDITIONING: ICD-10-CM

## 2024-07-23 DIAGNOSIS — R26.2 AMBULATORY DYSFUNCTION: ICD-10-CM

## 2024-07-23 DIAGNOSIS — M62.81 MUSCLE WEAKNESS (GENERALIZED): ICD-10-CM

## 2024-07-23 DIAGNOSIS — R26.2 DIFFICULTY WALKING: ICD-10-CM

## 2024-07-23 DIAGNOSIS — R53.81 PHYSICAL DECONDITIONING: ICD-10-CM

## 2024-07-23 PROCEDURE — 97112 NEUROMUSCULAR REEDUCATION: CPT

## 2024-07-23 PROCEDURE — 97530 THERAPEUTIC ACTIVITIES: CPT

## 2024-07-23 PROCEDURE — 97110 THERAPEUTIC EXERCISES: CPT

## 2024-07-23 NOTE — PROGRESS NOTES
Daily Note     Today's date: 2024  Patient name: Aldo Rodriguez  : 1940  MRN: [id number]  Referring provider: Callie Caro CRNP  Dx:   Encounter Diagnosis     ICD-10-CM    1. Parkinsonism, unspecified Parkinsonism type  G20.C       2. Muscular deconditioning  R29.898       3. Muscle weakness (generalized)  M62.81       4. Physical deconditioning  R53.81       5. Difficulty walking  R26.2       6. Ambulatory dysfunction  R26.2                      Subjective: Patient is feeling ok today.       Objective: See treatment diary below      Assessment: Tolerated treatment well. Aldo is able to resume some tasks that were held in previous sessions. 5# DB for curls and OH press, but reduced reps. STS were tolerable with low reps. Added t-band for h-abd, good tolerance with occasional postural cuing. Fair static balance. Continued PT would be beneficial to improve function.          Plan: Continue per plan of care.       Precautions: fall risk, dementia, hx of CVA/TIA    HEP: TBA    Specialty Daily Treatment Diary       Manual    STM; TPR B UT, LS         STM/TPR B knees                  Knee flexion stretch         Knee Ext stretch                  Exercise Diary          UBE (retro)         RB         Laps: 6MWT     7.5 laps (150 yard)             HR 20 20 20 20 20 x  20x   TR 20 20 20 20 20 x  20x   Mini Squats         Chair squats 2 foam: no UE: 1x5 2 foam: no UE: 1x5 P!   1 foam no ue 8x   Slant board gastroc stretch         MERLINE (over high counter top with pillow behind)                   Marching NV? Seated: 0# 2 sec; 3x10 ea  Seated 3x10 ea Seated: 0# 2x10 Seated 2x10 ea Seated 2x10 ea   Standing H' ABD     2 x 10 ea 2x10 ea   Standing H' Ext                  Floor -> Airex NBOS    NBOS: 1 min  NBOS 1 min no UE support   Floor  1/2 Tandem     20 sec x 2 ea 20 sec x 2 ea   Floor -> Airex SLS                  1 cone tap         2 cone tap                  Stepping over  hurdles forward         Laterally stepping over hurdles                  Stepping on random pads         Horizontal abduction - b/l - seated      2x10 ea 2x10 peach TB seated   Bicep Curls 5# B/L: 3x10 5# B/L: 3x10 5# B/L: 2x10 5# B/L: 3x10  5# 2x10    OH Press  B/L: 5# 2x10 5# B/L: 2x10 5# B/L: 2x10 3# BL: 3x10  5# 2x10    TB Scap Retraction    NV? In standing     TB B S' Ext                  Low pec stretch         Mid pec stretch                  Seated Heel slides using slider (knee flexion and extension stretch) MH: 5 sec x 20 ea MH: 5 sec x 20 ea MH: 5 sec x 20 ea MH 5 sec x 20  5 sec 20x  MH 5 sec x 20   Seated HS stretch    20 sec x 3 ea 20 sec x 3 ea 20 sec x 3 ea            Seated 2 way piriformis stretch         Chin tucks         SNAGs Ext 2 sec; 2x10 2 sec; 2x10 2 sec; 2x10 2 sec; 2x10 2 sec 2 x 10  2 sec 2x10   Seated Thoracic Extension over half foam NV? 1x10 (half foam)  2x10  1x10  1/2 foam 2 x 10 1/2 foam 2 x 10                              LAQ 0# 2 sec; 2x10 ea s                SLR (Flex)                  Ball Squeezes Blue: 5 sec x 20 Blue: 5 sec x 20 Blue: 5 sec x 20 Black: 5 sec x 20   Black: 5 sec x 20    Ball c Bridge                  Hooklying TB B Clamshells Seated: GTB 5 sec x 20 Seated: GTB 5 sec x 20 Seated: GTB 5 sec x 20 Seated: GTB 5 sec x 20   Seated: GTB 5 sec x 20    TB Bridges                                                      HEP/EDU         Modalities         MH 10 mins with B knee 10 mins with B knee 10 mins with B knee 10 mins with B knee  10 mins with B knee                        Skin checks performed pre and post application: intact

## 2024-07-25 ENCOUNTER — HOSPITAL ENCOUNTER (OUTPATIENT)
Dept: NEUROLOGY | Facility: HOSPITAL | Age: 84
End: 2024-07-25
Attending: PSYCHIATRY & NEUROLOGY
Payer: MEDICARE

## 2024-07-25 DIAGNOSIS — R56.9 SEIZURE (HCC): ICD-10-CM

## 2024-07-25 PROBLEM — Z86.0100 PERSONAL HISTORY OF COLONIC POLYPS: Status: ACTIVE | Noted: 2024-07-25

## 2024-07-25 PROBLEM — Z86.73 PERSONAL HISTORY OF TRANSIENT ISCHEMIC ATTACK: Status: ACTIVE | Noted: 2019-09-18

## 2024-07-25 PROBLEM — Z86.010 PERSONAL HISTORY OF COLONIC POLYPS: Status: ACTIVE | Noted: 2024-07-25

## 2024-07-25 PROBLEM — I11.0 HYPERTENSIVE HEART DISEASE WITH CONGESTIVE HEART FAILURE (HCC): Status: ACTIVE | Noted: 2024-07-25

## 2024-07-25 PROBLEM — Z86.73 PERSONAL HISTORY OF TRANSIENT ISCHEMIC ATTACK: Status: ACTIVE | Noted: 2024-07-25

## 2024-07-25 PROCEDURE — 95816 EEG AWAKE AND DROWSY: CPT

## 2024-07-25 PROCEDURE — 95816 EEG AWAKE AND DROWSY: CPT | Performed by: STUDENT IN AN ORGANIZED HEALTH CARE EDUCATION/TRAINING PROGRAM

## 2024-07-31 ENCOUNTER — EVALUATION (OUTPATIENT)
Dept: PHYSICAL THERAPY | Facility: CLINIC | Age: 84
End: 2024-07-31
Payer: MEDICARE

## 2024-07-31 DIAGNOSIS — M62.81 MUSCLE WEAKNESS (GENERALIZED): ICD-10-CM

## 2024-07-31 DIAGNOSIS — R53.81 PHYSICAL DECONDITIONING: ICD-10-CM

## 2024-07-31 DIAGNOSIS — R26.2 DIFFICULTY WALKING: ICD-10-CM

## 2024-07-31 DIAGNOSIS — G20.C PARKINSONISM, UNSPECIFIED PARKINSONISM TYPE: Primary | ICD-10-CM

## 2024-07-31 DIAGNOSIS — R26.2 AMBULATORY DYSFUNCTION: ICD-10-CM

## 2024-07-31 DIAGNOSIS — R29.898 MUSCULAR DECONDITIONING: ICD-10-CM

## 2024-07-31 PROCEDURE — 97530 THERAPEUTIC ACTIVITIES: CPT | Performed by: PHYSICAL THERAPIST

## 2024-07-31 PROCEDURE — 97110 THERAPEUTIC EXERCISES: CPT | Performed by: PHYSICAL THERAPIST

## 2024-07-31 NOTE — PROGRESS NOTES
PT Re-Evaluation     Today's date: 2024  Patient name: Aldo Rodriguez  : 1940  MRN: [id number]  Referring provider: Callie Caro CRNP  Dx:   Encounter Diagnosis     ICD-10-CM    1. Parkinsonism, unspecified Parkinsonism type  G20.C       2. Muscular deconditioning  R29.898       3. Difficulty walking  R26.2       4. Ambulatory dysfunction  R26.2       5. Muscle weakness (generalized)  M62.81       6. Physical deconditioning  R53.81           Start Time: 1430  Stop Time: 1503  Total time in clinic (min): 33 minutes  Assessment  Assessment details: Aldo Rodriguez is a 83 y.o. male presenting to outpatient physical therapy at [facility] with complaints of chronic bilateral knee pain and onset of falls. Within the past year, caregiver reports being diagnosed with Parkinsonism. He is gradually improving with his exercise tolerance; however, he continues to present with decreased range of motion, decreased strength, limited flexibility, poor postural awareness, poor body mechanics, altered gait pattern, poor balance, decreased tolerance to activity and decreased functional mobility due to Neck pain  (primary encounter diagnosis), Poor posture, Chronic pain of both knees, and Difficulty walking. Therapist discussed diagnosis, prognosis, plan of care, proper responses to exercises, HEP, and modalities to use at home.  He would benefit from skilled PT services in order to address these deficits and reach maximum level of function.  Thank you for the referral!  Impairments: abnormal coordination, abnormal gait, abnormal muscle firing, abnormal muscle tone, abnormal or restricted ROM, abnormal movement, activity intolerance, impaired balance, impaired physical strength, lacks appropriate home exercise program, pain with function, scapular dyskinesis, poor posture  and poor body mechanics    Symptom irritability: moderateUnderstanding of Dx/Px/POC: good   Prognosis: good    UPDATE 24:   Pt has been  progressing overall though ongoing impairments persist. He exhibits improved functional abilities w/ increased ambulation distance in 2MWT, increased sit<>stand reps in 30 seconds, and increased speed w/ TUG. However, he is still below normative age-related values at this time. He exhibits some improved strength in B LEs, though ongoing impairments persist. This contributes to ongoing functional strength and endurance limitations. He has been improving overall and would benefit from continued physical therapy to address remaining impairments and to maximize functional abilities.     Goals   STGs (in 4 weeks):  1. Pt will report having at least a 50% improvement since I.E. - Progressing towards   2. Pt will report having less than a 5/10 pain level with B knees. - Goal met  3. Pt will demonstrate at least 4/5 MMT throughout BLE. - Progressing towards     LTGs (in 12 weeks):  1. Pt will report having at least a 75% improvement since I.E. - Progressing towards   2. Pt will be able to perform at least 10 sit<>stands in 30 seconds to demonstrate improved BLE strength and balance. - Progressing towards   3. Pt will be able to perform TUG in less than 18 seconds. - Progressing towards   4. Pt will be able to amb at least 140 meters during 2 MWT with good posture in order to increase endurance to normative age related values. - Progressing towards   5. Pt will be independent with HEP. - Progressing towards     Plan   Patient would benefit from: skilled physical therapy  Planned modality interventions: TENS and unattended electrical stimulation  Planned therapy interventions: joint mobilization, manual therapy, neuromuscular re-education, patient education, self care, strengthening, stretching, therapeutic activities, therapeutic exercise, balance, flexibility and home exercise program  Frequency: 2x week  Plan of Care beginning date: 7/31/2024  Plan of Care expiration date: 9/11/2024  Treatment plan discussed with: patient,  caregiver and family        Subjective Evaluation    History of Present Illness    RE: 24: Patient reports that his pain has been okay.     Mechanism of injury: Pt is a 83 year old male who presents with chief complaint of chronic progressive neck pain and bilateral knee pain. Caregiver reports that reveals OA with bilateral knees R worse than L. He reports that patient has been having bilateral LE swelling which he has tried to elevate his legs throughout the day. Caregiver reports that he has not been seen by ortho for his knee but has bought him a knee brace for support. Now referred to skilled OP PT for ambulatory dysfunction.   Quality of life: good    UPDATE 24:   He has some good days and some bad days. It only hurts when he walks, but isn't sore when he sits. He's been using some meds which have been helping him sleep better, and that helps him have better energy to get stronger. Feels PT is helpful. Finds PT helpful.  His neck gets stiff/sore w/ the fan blowing on him. The towel stretches help loosen it up.     Pain  Current pain ratin  At best pain ratin  At worst pain ratin  Pain location: bilateral knee pain (R worse than L)  Quality: discomfort, dull ache, pressure and tight  Relieving factors: relaxation and rest  Aggravating factors: walking, lifting, stair climbing and standing (sit to stand transfer)  Progression: no change    Hand dominance: right    Treatments  Previous treatment: injection treatment  Patient Goals  Patient goals for therapy: decreased pain, improved balance, increased motion, increased strength, independence with ADLs/IADLs and return to sport/leisure activities      Objective:       Posture: Pt's sitting posture is FHP and rounded shoulders.     MMT      R   L  S' Flex   4/5   4/5  S' ABD   4-/5   4-/5  S' ER   4-/5   4-/5    Static K' Ext  4-/5   4-/5  Static K' Flex  4/5   4/5  Dynamic K' Ext 3-/5   3-/5 (NT 24)  Dynamic K' Ext 3-/5   3-/5 (NT  24)    Hip Flex  4-/5   4/5  Hip ABD  3+/5   3+/5    Ankle DF  4-/5   4-/5  Ankle PF  3/5   3/5        Special Tests: opposition: (R) decreased speed (L) decreased speed  Heel to shin: (R) decreased speed (L) decreased speed  Nose to finger: (R) decreased speed (L) decreased speed    Functional mobility:  Ambulation: Pt ambs using RW with forward trunk flexion with FHP and rounded shoulders    Functional testin second repeated sit<>stand: 4 times with definite use of BUE (previously 3)  TU seconds with definite use of BUEs using RW (previously 37 sec)  2 MWT: 205 ft using RW (previously 160 ft)        Precautions: fall risk, dementia, hx of CVA/TIA    Manual  RE    STM; TPR B UT, LS        STM/TPR B knees                Knee flexion stretch        Knee Ext stretch                Exercise Diary         Pt education     Pt education on progress and POC   UBE (retro)        RB        Laps: 6MWT   7.5 laps (150 yard)  8 laps           HR 20 20 20 x  20x    TR 20 20 20 x  20x    Mini Squats        Chair squats P!   1 foam no ue 8x    Sit<>stand     X4 w/ 2x UE   Slant board gastroc stretch        MERLINE (over high counter top with pillow behind)                 Marching Seated 3x10 ea Seated: 0# 2x10 Seated 2x10 ea Seated 2x10 ea Seated 2x10 ea   Standing H' ABD   2 x 10 ea 2x10 ea review   Standing H' Ext                Floor -> Airex NBOS  NBOS: 1 min  NBOS 1 min no UE support    Floor  1/2 Tandem   20 sec x 2 ea 20 sec x 2 ea    Floor -> Airex SLS                1 cone tap        2 cone tap                Stepping over hurdles forward        Laterally stepping over hurdles                Stepping on random pads        Horizontal abduction - b/l - seated    2x10 ea 2x10 peach TB seated    Bicep Curls 5# B/L: 2x10 5# B/L: 3x10  5# 2x10     OH Press  5# B/L: 2x10 3# BL: 3x10  5# 2x10     TB Scap Retraction  NV? In standing      TB B S' Ext                Low pec stretch        Mid  pec stretch                Seated Heel slides using slider (knee flexion and extension stretch) MH: 5 sec x 20 ea MH 5 sec x 20  5 sec 20x  MH 5 sec x 20    Seated HS stretch  20 sec x 3 ea 20 sec x 3 ea 20 sec x 3 ea 20 sec x 3 ea           Seated 2 way piriformis stretch        Chin tucks        SNAGs Ext 2 sec; 2x10 2 sec; 2x10 2 sec 2 x 10  2 sec 2x10    Seated Thoracic Extension over half foam 2x10  1x10  1/2 foam 2 x 10 1/2 foam 2 x 10                            LAQ                SLR (Flex)                Ball Squeezes Blue: 5 sec x 20 Black: 5 sec x 20   Black: 5 sec x 20     Ball c Bridge                Hooklying TB B Clamshells Seated: GTB 5 sec x 20 Seated: GTB 5 sec x 20   Seated: GTB 5 sec x 20     TB Bridges                                                HEP/EDU        Modalities        MH 10 mins with B knee 10 mins with B knee  10 mins with B knee

## 2024-08-02 ENCOUNTER — APPOINTMENT (OUTPATIENT)
Dept: PHYSICAL THERAPY | Facility: CLINIC | Age: 84
End: 2024-08-02
Payer: MEDICARE

## 2024-08-06 ENCOUNTER — OFFICE VISIT (OUTPATIENT)
Dept: PHYSICAL THERAPY | Facility: CLINIC | Age: 84
End: 2024-08-06
Payer: MEDICARE

## 2024-08-06 DIAGNOSIS — R26.2 AMBULATORY DYSFUNCTION: ICD-10-CM

## 2024-08-06 DIAGNOSIS — R26.2 DIFFICULTY WALKING: ICD-10-CM

## 2024-08-06 DIAGNOSIS — M62.81 MUSCLE WEAKNESS (GENERALIZED): Primary | ICD-10-CM

## 2024-08-06 DIAGNOSIS — R29.898 MUSCULAR DECONDITIONING: ICD-10-CM

## 2024-08-06 DIAGNOSIS — R53.81 PHYSICAL DECONDITIONING: ICD-10-CM

## 2024-08-06 DIAGNOSIS — G20.C PARKINSONISM, UNSPECIFIED PARKINSONISM TYPE: ICD-10-CM

## 2024-08-06 PROCEDURE — 97110 THERAPEUTIC EXERCISES: CPT | Performed by: PHYSICAL THERAPIST

## 2024-08-06 PROCEDURE — 97112 NEUROMUSCULAR REEDUCATION: CPT | Performed by: PHYSICAL THERAPIST

## 2024-08-06 PROCEDURE — 97530 THERAPEUTIC ACTIVITIES: CPT | Performed by: PHYSICAL THERAPIST

## 2024-08-06 NOTE — PROGRESS NOTES
Daily Note     Today's date: 2024  Patient name: Aldo Rodriguez  : 1940  MRN: [id number]  Referring provider: Callie Caro CRNP  Dx:   Encounter Diagnosis     ICD-10-CM    1. Muscle weakness (generalized)  M62.81       2. Parkinsonism, unspecified Parkinsonism type  G20.C       3. Muscular deconditioning  R29.898       4. Physical deconditioning  R53.81       5. Difficulty walking  R26.2       6. Ambulatory dysfunction  R26.2                      Subjective: Patient reports that his knee hurts today.       Objective: See treatment diary below      Assessment: Tolerated treatment well; initiated POC with seated there ex with MH to B knees while performing there ex. Vcs provided on proper sequencing with exercises; session focused on decreasing rest breaks throughout session. He denies having increased pain throughout session.   Patient demonstrated fatigue post treatment and would benefit from continued PT      Plan: Continue per plan of care.      Precautions: fall risk, dementia, hx of CVA/TIA    HEP: TBA    Specialty Daily Treatment Diary     Manual  RE    STM; TPR B UT, LS        STM/TPR B knees                Knee flexion stretch        Knee Ext stretch                Exercise Diary         Pt education     Pt education on progress and POC   UBE (retro)        RB        Laps: 6MWT     8 laps           HR 20   20x    TR 20   20x    Mini Squats        Chair squats 1 foam with UE support: 5x; 1 foam without UE support: 3x   1 foam no ue 8x    Sit<>stand     X4 w/ 2x UE   Slant board gastroc stretch        MERLINE (over high counter top with pillow behind)                 Marching Standing at RW: 2 mins with UE support   Seated 2x10 ea Seated 2x10 ea   Standing H' ABD    2x10 ea review   Standing H' Ext                Floor -> Airex NBOS Floor: 30 sec x 1 (without UE support)    NBOS 1 min no UE support    Floor  1/2 Tandem    20 sec x 2 ea    Floor -> Airex SLS                1 cone  tap        2 cone tap                Stepping over hurdles forward        Laterally stepping over hurdles                Stepping on random pads        Horizontal abduction - b/l - seated     2x10 peach TB seated    Bicep Curls 5# 2x10   5# 2x10     OH Press  5# 2x10   5# 2x10     TB Scap Retraction        TB B S' Ext                Low pec stretch        Mid pec stretch                Seated Heel slides using slider (knee flexion and extension stretch) 5 sec x 20 ea   MH 5 sec x 20    Seated HS stretch    20 sec x 3 ea 20 sec x 3 ea           Seated 2 way piriformis stretch        Chin tucks        SNAGs Ext    2 sec 2x10    Seated Thoracic Extension over half foam 1/2 foam: 2 sec x 10   1/2 foam 2 x 10                            LAQ                SLR (Flex)                Ball Squeezes Seated: Soccer: 5 sec x 30   Black: 5 sec x 20     Ball c Bridge                Hooklying TB B Clamshells Seated: BTB 5 sec x 30   Seated: GTB 5 sec x 20     TB Bridges                                                HEP/EDU        Modalities        MH 15 mins with B knee   10 mins with B knee

## 2024-08-08 ENCOUNTER — APPOINTMENT (OUTPATIENT)
Dept: PHYSICAL THERAPY | Facility: CLINIC | Age: 84
End: 2024-08-08
Payer: MEDICARE

## 2024-08-12 ENCOUNTER — HOSPITAL ENCOUNTER (OUTPATIENT)
Dept: NON INVASIVE DIAGNOSTICS | Age: 84
Discharge: HOME | End: 2024-08-12
Payer: MEDICARE

## 2024-08-12 DIAGNOSIS — I73.9 PAD (PERIPHERAL ARTERY DISEASE) (HCC): ICD-10-CM

## 2024-08-12 DIAGNOSIS — I65.23 CAROTID STENOSIS, ASYMPTOMATIC, BILATERAL: ICD-10-CM

## 2024-08-12 PROCEDURE — 93922 UPR/L XTREMITY ART 2 LEVELS: CPT | Performed by: SURGERY

## 2024-08-12 PROCEDURE — 93925 LOWER EXTREMITY STUDY: CPT

## 2024-08-12 PROCEDURE — 93923 UPR/LXTR ART STDY 3+ LVLS: CPT

## 2024-08-12 PROCEDURE — 93925 LOWER EXTREMITY STUDY: CPT | Performed by: SURGERY

## 2024-08-12 PROCEDURE — 93880 EXTRACRANIAL BILAT STUDY: CPT | Performed by: SURGERY

## 2024-08-12 PROCEDURE — 93880 EXTRACRANIAL BILAT STUDY: CPT

## 2024-08-13 ENCOUNTER — OFFICE VISIT (OUTPATIENT)
Dept: PHYSICAL THERAPY | Facility: CLINIC | Age: 84
End: 2024-08-13
Payer: MEDICARE

## 2024-08-13 DIAGNOSIS — R26.2 DIFFICULTY WALKING: ICD-10-CM

## 2024-08-13 DIAGNOSIS — R29.898 MUSCULAR DECONDITIONING: ICD-10-CM

## 2024-08-13 DIAGNOSIS — R53.81 PHYSICAL DECONDITIONING: ICD-10-CM

## 2024-08-13 DIAGNOSIS — G20.C PARKINSONISM, UNSPECIFIED PARKINSONISM TYPE: ICD-10-CM

## 2024-08-13 DIAGNOSIS — M62.81 MUSCLE WEAKNESS (GENERALIZED): Primary | ICD-10-CM

## 2024-08-13 DIAGNOSIS — R26.2 AMBULATORY DYSFUNCTION: ICD-10-CM

## 2024-08-13 PROCEDURE — 97110 THERAPEUTIC EXERCISES: CPT | Performed by: PHYSICAL THERAPIST

## 2024-08-13 PROCEDURE — 97112 NEUROMUSCULAR REEDUCATION: CPT | Performed by: PHYSICAL THERAPIST

## 2024-08-13 PROCEDURE — 97530 THERAPEUTIC ACTIVITIES: CPT | Performed by: PHYSICAL THERAPIST

## 2024-08-13 NOTE — PROGRESS NOTES
"Daily Note     Today's date: 2024  Patient name: Aldo Rodriguez  : 1940  MRN: [id number]  Referring provider: Callie Caro CRNP  Dx:   Encounter Diagnosis     ICD-10-CM    1. Muscle weakness (generalized)  M62.81       2. Parkinsonism, unspecified Parkinsonism type  G20.C       3. Muscular deconditioning  R29.898       4. Difficulty walking  R26.2       5. Ambulatory dysfunction  R26.2       6. Physical deconditioning  R53.81                      Subjective: Got neck and leg MRI yesterday. Feeling ok overall.       Objective: See treatment diary below      Assessment: Tolerated treatment well. Patient would benefit from continued PT. Tolerated session well. Though reported occasional \"cracking\" in R knee which was painful, especially w/ lateral steps.       Plan: Continue per plan of care.      Precautions: fall risk, dementia, hx of CVA/TIA    HEP: TBA    Specialty Daily Treatment Diary     Manual  RE    STM; TPR B UT, LS        STM/TPR B knees                Knee flexion stretch        Knee Ext stretch                Exercise Diary         Pt education     Pt education on progress and POC   UBE (retro)        RB        Laps: 6MWT     8 laps           HR 20 x20  20x    TR 20 x20  20x    Mini Squats        Chair squats 1 foam with UE support: 5x; 1 foam without UE support: 3x 1 foam with UE support: 5x    1 foam without UE support: 5x  1 foam no ue 8x    Sit<>stand     X4 w/ 2x UE   Slant board gastroc stretch        MERLINE (over high counter top with pillow behind)                 Marching Standing at RW: 2 mins with UE support Standing at RW: 2 mins with UE support  Seated 2x10 ea Seated 2x10 ea   Standing H' ABD    2x10 ea review   Standing H' Ext                Floor -> Airex NBOS Floor: 30 sec x 1 (without UE support)  Floor: 30 sec x 1 (without UE support)   NBOS 1 min no UE support    Floor  1/2 Tandem    20 sec x 2 ea    Floor -> Airex SLS        Lateral steps  X5 each    "   1 cone tap        2 cone tap                Stepping over hurdles forward        Laterally stepping over hurdles                Stepping on random pads        Horizontal abduction - b/l - seated     2x10 peach TB seated    Bicep Curls 5# 2x10 5# 2x10  5# 2x10     OH Press  5# 2x10 5# 2x10  5# 2x10     TB Scap Retraction        TB B S' Ext                Low pec stretch        Mid pec stretch                Seated Heel slides using slider (knee flexion and extension stretch) 5 sec x 20 ea   MH 5 sec x 20    Seated HS stretch    20 sec x 3 ea 20 sec x 3 ea           Seated 2 way piriformis stretch        Chin tucks        SNAGs Ext    2 sec 2x10    Seated Thoracic Extension over half foam 1/2 foam: 2 sec x 10 1/2 foam: 2 sec x 10  1/2 foam 2 x 10                            LAQ                SLR (Flex)                Ball Squeezes Seated: Soccer: 5 sec x 30 Seated: Soccer: 5 sec x 30  Black: 5 sec x 20     Ball c Bridge                Hooklying TB B Clamshells Seated: BTB 5 sec x 30 Seated: BTB 5 sec x 30  Seated: GTB 5 sec x 20     TB Bridges                                                HEP/EDU        Modalities        MH 15 mins with B knee   10 mins with B knee

## 2024-08-14 ENCOUNTER — TELEPHONE (OUTPATIENT)
Dept: VASCULAR SURGERY | Facility: CLINIC | Age: 84
End: 2024-08-14

## 2024-08-14 NOTE — TELEPHONE ENCOUNTER
Attempted to contact patient to schedule appointment(s) listed below.  Requested patient call [phone] option 3 to schedule appointment(s).    Patient's appointment(s) are due on or after 8/28/24 .    Dopplers  [] Abdominal Aorta Iliac (AOIL)  [] Carotid (CV)   [] Celiac and/or Mesenteric  [] Endovascular Aortic Repair (EVAR)   [] Hemodialysis Access (HD)   [] Lower Limb Arterial (BOBO)  [] Lower Limb Venous (LEV)  [] Lower Limb Venous Duplex with Reflux (LEVDR)  [] Renal Artery  [] Upper Limb Arterial (UEA)    [] Upper Limb Venous (UEV)              [] ISMAEL and Waveform analysis     Advanced Imaging   [] CTA head/neck    [] CTA abdomen    [] CTA abdomen & pelvis    [] CT abdomen with/ without contrast  [] CT abdomen with contrast  [] CT abdomen without contrast    [] CT abdomen & pelvis with/ without contrast  [] CT abdomen & pelvis with contrast  [] CT abdomen & pelvis without contrast    Office Visit   [] New patient, patient last seen over 3 years ago  [x] Risk factor modification (RFM)   [x] Follow up   [] Lost to follow up (LTFU)   Called patient & LMOM to schedule 1 yr rfm ov /RR CV, BOBO 8/12/24

## 2024-08-15 ENCOUNTER — OFFICE VISIT (OUTPATIENT)
Dept: PHYSICAL THERAPY | Facility: CLINIC | Age: 84
End: 2024-08-15
Payer: MEDICARE

## 2024-08-15 DIAGNOSIS — R29.898 MUSCULAR DECONDITIONING: ICD-10-CM

## 2024-08-15 DIAGNOSIS — R53.81 PHYSICAL DECONDITIONING: ICD-10-CM

## 2024-08-15 DIAGNOSIS — G20.C PARKINSONISM, UNSPECIFIED PARKINSONISM TYPE: ICD-10-CM

## 2024-08-15 DIAGNOSIS — R26.2 DIFFICULTY WALKING: ICD-10-CM

## 2024-08-15 DIAGNOSIS — R26.2 AMBULATORY DYSFUNCTION: ICD-10-CM

## 2024-08-15 DIAGNOSIS — M62.81 MUSCLE WEAKNESS (GENERALIZED): Primary | ICD-10-CM

## 2024-08-15 PROCEDURE — 97112 NEUROMUSCULAR REEDUCATION: CPT

## 2024-08-15 PROCEDURE — 97110 THERAPEUTIC EXERCISES: CPT

## 2024-08-15 PROCEDURE — 97530 THERAPEUTIC ACTIVITIES: CPT

## 2024-08-15 NOTE — PROGRESS NOTES
"Daily Note     Today's date: 8/15/2024  Patient name: Aldo Rodriguez  : 1940  MRN: [id number]  Referring provider: Callie Caro CRNP  Dx:   Encounter Diagnosis     ICD-10-CM    1. Muscle weakness (generalized)  M62.81       2. Parkinsonism, unspecified Parkinsonism type  G20.C       3. Muscular deconditioning  R29.898       4. Difficulty walking  R26.2       5. Ambulatory dysfunction  R26.2       6. Physical deconditioning  R53.81                        Subjective: Aldo reports \"I feel pretty good today\".       Objective: See treatment diary below      Assessment: Aldo tolerated PT treatment well. Continued with current exercise program, pt approprietly challenged. Was able to complete additional repetitions of STS w/o UEA. Seated rest breaks required throughout session secondary to fatigue. Pt would benefit from continued PT to further address impairments and maximize functional level.      Plan: Continue per plan of care.      Precautions: fall risk, dementia, hx of CVA/TIA    HEP: TBA    Specialty Daily Treatment Diary     Manual 8/6 8/13 8/15  7/31 RE    STM; TPR B UT, LS        STM/TPR B knees                Knee flexion stretch        Knee Ext stretch                Exercise Diary         Pt education     Pt education on progress and POC   UBE (retro)        RB        Laps: 6MWT     8 laps           HR 20 x20 30x      TR 20 x20 30x      Mini Squats        Chair squats 1 foam with UE support: 5x; 1 foam without UE support: 3x 1 foam with UE support: 5x    1 foam without UE support: 5x 1 foam no UE 2x5      Sit<>stand     X4 w/ 2x UE   Slant board gastroc stretch        MERLINE (over high counter top with pillow behind)                 Marching Standing at RW: 2 mins with UE support Standing at RW: 2 mins with UE support Standing at bar 2 mins   Seated 2x10 ea   Standing H' ABD     review   Standing H' Ext                Floor -> [employer] NBOS Floor: 30 sec x 1 (without UE support)  Floor: 30 sec x " 1 (without UE support)  Floor: 30 sec x 1 (without UE support)      Floor  1/2 Tandem        Floor -> [employer]ire[employer] SLS        Lateral steps  X5 each 4 laps      1 cone tap        2 cone tap                Stepping over hurdles forward        Laterally stepping over hurdles                Stepping on random pads        Horizontal abduction - b/l - seated         Bicep Curls 5# 2x10 5# 2x10 5# 2x10      OH Press  5# 2x10 5# 2x10 5# 2x10      TB Scap Retraction        TB B S' Ext                Low pec stretch        Mid pec stretch                Seated Heel slides using slider (knee flexion and extension stretch) 5 sec x 20 ea       Seated HS stretch     20 sec x 3 ea           Seated 2 way piriformis stretch        Chin tucks        SNAGs Ext        Seated Thoracic Extension over half foam 1/2 foam: 2 sec x 10 1/2 foam: 2 sec x 10 1/2 foam: 2 sec x 10  Shoulder flexion                              LAQ                SLR (Flex)                Ball Squeezes Seated: Soccer: 5 sec x 30 Seated: Soccer: 5 sec x 30 Seated: Soccer: 5 sec x 30     Ball c Bridge                Hooklying TB B Clamshells Seated: BTB 5 sec x 30 Seated: BTB 5 sec x 30 Seated: BTB 5 sec x 30     TB Bridges                                                HEP/EDU        Modalities        MH 15 mins with B knee

## 2024-08-20 ENCOUNTER — OFFICE VISIT (OUTPATIENT)
Dept: PHYSICAL THERAPY | Facility: CLINIC | Age: 84
End: 2024-08-20
Payer: MEDICARE

## 2024-08-20 DIAGNOSIS — R26.2 DIFFICULTY WALKING: ICD-10-CM

## 2024-08-20 DIAGNOSIS — R26.2 AMBULATORY DYSFUNCTION: Primary | ICD-10-CM

## 2024-08-20 DIAGNOSIS — G20.C PARKINSONISM, UNSPECIFIED PARKINSONISM TYPE: ICD-10-CM

## 2024-08-20 DIAGNOSIS — R29.898 MUSCULAR DECONDITIONING: ICD-10-CM

## 2024-08-20 DIAGNOSIS — M62.81 MUSCLE WEAKNESS (GENERALIZED): ICD-10-CM

## 2024-08-20 DIAGNOSIS — R53.81 PHYSICAL DECONDITIONING: ICD-10-CM

## 2024-08-20 PROCEDURE — 97530 THERAPEUTIC ACTIVITIES: CPT | Performed by: PHYSICAL THERAPIST

## 2024-08-20 PROCEDURE — 97110 THERAPEUTIC EXERCISES: CPT | Performed by: PHYSICAL THERAPIST

## 2024-08-20 PROCEDURE — 97112 NEUROMUSCULAR REEDUCATION: CPT | Performed by: PHYSICAL THERAPIST

## 2024-08-20 NOTE — PROGRESS NOTES
Daily Note     Today's date: 2024  Patient name: Aldo Rodriguez  : 1940  MRN: [id number]  Referring provider: Callie Caro CRNP  Dx:   Encounter Diagnosis     ICD-10-CM    1. Ambulatory dysfunction  R26.2       2. Muscle weakness (generalized)  M62.81       3. Parkinsonism, unspecified Parkinsonism type  G20.C       4. Physical deconditioning  R53.81       5. Muscular deconditioning  R29.898       6. Difficulty walking  R26.2                      Subjective: He reports that his neck is hurting today.       Objective: See treatment diary below      Assessment: Tolerated treatment well; initiated POC with chin tucks in sitting. Vcs provided on proper sequencing with exercises; adde SNAGs extension today due to neck discomfort. Decreased rest breaks between exercises to increase endurance in OKC and CKC exercises. Encouraged sitting at edge of chair to increase core activation.  Patient demonstrated fatigue post treatment and would benefit from continued PT      Plan: Continue per plan of care.      Precautions: fall risk, dementia, hx of CVA/TIA    HEP: TBA    Specialty Daily Treatment Diary     Manual 8/6 8/13 8/15 8/20    STM; TPR B UT, LS        STM/TPR B knees                Knee flexion stretch        Knee Ext stretch                Exercise Diary         Pt education        UBE (retro)        RB        Laps: 6MWT                HR 20 x20 30x  30    TR 20 x20 30x  30    Mini Squats        Chair squats 1 foam with UE support: 5x; 1 foam without UE support: 3x 1 foam with UE support: 5x    1 foam without UE support: 5x 1 foam no UE 2x5  1 foam no UE: 2x5; 1 foam UE support: 1x5    Sit<>stand        Slant board gastroc stretch        MERLINE (over high counter top with pillow behind)                 Marching Standing at RW: 2 mins with UE support Standing at RW: 2 mins with UE support Standing at bar 2 mins  Standing at bar: 2 mins    Standing H' ABD        Standing H' Ext                Floor ->  [employer] NBOS Floor: 30 sec x 1 (without UE support)  Floor: 30 sec x 1 (without UE support)  Floor: 30 sec x 1 (without UE support)  Floor: 30 sec x 1 (without UE support)     Floor  1/2 Tandem        Floor -> [employer] SLS        Lateral steps  X5 each 4 laps  4 laps ea    1 cone tap        2 cone tap                Stepping over hurdles forward        Laterally stepping over hurdles                Stepping on random pads        Horizontal abduction - b/l - seated         Bicep Curls 5# 2x10 5# 2x10 5# 2x10  5# 2x10    OH Press  5# 2x10 5# 2x10 5# 2x10  5# 2x10    TB Scap Retraction        TB B S' Ext                Low pec stretch        Mid pec stretch                Seated Heel slides using slider (knee flexion and extension stretch) 5 sec x 20 ea       Seated HS stretch                Seated 2 way piriformis stretch        Chin tucks    5 sec x 20    SNAGs Ext    2 sec x 10     Seated Thoracic Extension over half foam 1/2 foam: 2 sec x 10 1/2 foam: 2 sec x 10 1/2 foam: 2 sec x 10  Shoulder flexion  1/2 foam: 2 sec x 10 (hands gently behind neck)  1/2 foam: 2 sec x 10 (hands interlocked reaching OH)                            LAQ                SLR (Flex)                Ball Squeezes Seated: Soccer: 5 sec x 30 Seated: Soccer: 5 sec x 30 Seated: Soccer: 5 sec x 30 Sitting at edge of chair: Soccer: 5 sec x 30    Ball c Bridge                Hooklying TB B Clamshells Seated: BTB 5 sec x 30 Seated: BTB 5 sec x 30 Seated: BTB 5 sec x 30 Seated BTB: 5 sec x 30    TB Bridges                                                HEP/EDU        Modalities        MH 15 mins with B knee

## 2024-08-22 ENCOUNTER — OFFICE VISIT (OUTPATIENT)
Dept: PHYSICAL THERAPY | Facility: CLINIC | Age: 84
End: 2024-08-22
Payer: MEDICARE

## 2024-08-22 DIAGNOSIS — G20.C PARKINSONISM, UNSPECIFIED PARKINSONISM TYPE: ICD-10-CM

## 2024-08-22 DIAGNOSIS — R26.2 DIFFICULTY WALKING: ICD-10-CM

## 2024-08-22 DIAGNOSIS — R53.81 PHYSICAL DECONDITIONING: ICD-10-CM

## 2024-08-22 DIAGNOSIS — R26.2 AMBULATORY DYSFUNCTION: ICD-10-CM

## 2024-08-22 DIAGNOSIS — R29.898 MUSCULAR DECONDITIONING: Primary | ICD-10-CM

## 2024-08-22 DIAGNOSIS — M62.81 MUSCLE WEAKNESS (GENERALIZED): ICD-10-CM

## 2024-08-22 PROCEDURE — 97530 THERAPEUTIC ACTIVITIES: CPT | Performed by: PHYSICAL THERAPIST

## 2024-08-22 PROCEDURE — 97110 THERAPEUTIC EXERCISES: CPT | Performed by: PHYSICAL THERAPIST

## 2024-08-22 NOTE — PROGRESS NOTES
Daily Note     Today's date: 2024  Patient name: Aldo Rodriguez  : 1940  MRN: [id number]  Referring provider: Callie Caro CRNP  Dx:   Encounter Diagnosis     ICD-10-CM    1. Muscular deconditioning  R29.898       2. Ambulatory dysfunction  R26.2       3. Muscle weakness (generalized)  M62.81       4. Difficulty walking  R26.2       5. Parkinsonism, unspecified Parkinsonism type  G20.C       6. Physical deconditioning  R53.81                      Subjective: He reports that he feels achy today.       Objective: See treatment diary below      Assessment: Tolerated treatment well; initiated POC with STS transfers. Vcs provided on proper sequencing with exercises; he does not need UE support today with STS. He demonstrates increased tolerance with less rest breaks during CKC strengthening exercises. He denies having increased pain throughout session.    Patient demonstrated fatigue post treatment and would benefit from continued PT      Plan: Continue per plan of care.      Precautions: fall risk, dementia, hx of CVA/TIA    HEP: TBA    Specialty Daily Treatment Diary     Manual 8/6 8/13 8/15 8/20 8/22   STM; TPR B UT, LS        STM/TPR B knees                Knee flexion stretch        Knee Ext stretch                Exercise Diary         Pt education        UBE (retro)        RB        Laps: 6MWT                HR 20 x20 30x  30 30   TR 20 x20 30x  30 30   Mini Squats        Chair squats 1 foam with UE support: 5x; 1 foam without UE support: 3x 1 foam with UE support: 5x    1 foam without UE support: 5x 1 foam no UE 2x5  1 foam no UE: 2x5; 1 foam UE support: 1x5 1 foam no UE: 3x5   Sit<>stand        Slant board gastroc stretch        MERLINE (over high counter top with pillow behind)                 Marching Standing at RW: 2 mins with UE support Standing at RW: 2 mins with UE support Standing at bar 2 mins  Standing at bar: 2 mins Standing at RW: 2 mins    Standing H' ABD        Standing H' Ext                 Floor -> [employer] NBOS Floor: 30 sec x 1 (without UE support)  Floor: 30 sec x 1 (without UE support)  Floor: 30 sec x 1 (without UE support)  Floor: 30 sec x 1 (without UE support)  Floor: 30 sec x 1 (without UE support)   Floor  1/2 Tandem        Floor -> [employer] SLS        Lateral steps  X5 each 4 laps  4 laps ea 4 laps ea   1 cone tap        2 cone tap                Stepping over hurdles forward        Laterally stepping over hurdles                Stepping on random pads        Horizontal abduction - b/l - seated         Bicep Curls 5# 2x10 5# 2x10 5# 2x10  5# 2x10 5# 3x10   OH Press  5# 2x10 5# 2x10 5# 2x10  5# 2x10 5# 3x10   TB Scap Retraction        TB B S' Ext                Low pec stretch        Mid pec stretch                Seated Heel slides using slider (knee flexion and extension stretch) 5 sec x 20 ea       Seated HS stretch                Seated 2 way piriformis stretch        Chin tucks    5 sec x 20 5 sec x 10   SNAGs Ext    2 sec x 10  5 sec; 2x10   Seated Thoracic Extension over half foam 1/2 foam: 2 sec x 10 1/2 foam: 2 sec x 10 1/2 foam: 2 sec x 10  Shoulder flexion  1/2 foam: 2 sec x 10 (hands gently behind neck)  1/2 foam: 2 sec x 10 (hands interlocked reaching OH) 1/2 foam: 2 sec x 10 (hands gently behind neck); 1/2 foam: 2 sec x 10 (hands interlocked reaching OH)                           LAQ                SLR (Flex)                Ball Squeezes Seated: Soccer: 5 sec x 30 Seated: Soccer: 5 sec x 30 Seated: Soccer: 5 sec x 30 Sitting at edge of chair: Soccer: 5 sec x 30 NV   Ball c Bridge                Hooklying TB B Clamshells Seated: BTB 5 sec x 30 Seated: BTB 5 sec x 30 Seated: BTB 5 sec x 30 Seated BTB: 5 sec x 30 Seated: BTB 5 sec x 20   TB Bridges                                                HEP/EDU        Modalities        MH 15 mins with B knee

## 2024-08-27 ENCOUNTER — APPOINTMENT (OUTPATIENT)
Dept: PHYSICAL THERAPY | Facility: CLINIC | Age: 84
End: 2024-08-27
Payer: MEDICARE

## 2024-08-29 ENCOUNTER — OFFICE VISIT (OUTPATIENT)
Dept: PHYSICAL THERAPY | Facility: CLINIC | Age: 84
End: 2024-08-29
Payer: MEDICARE

## 2024-08-29 DIAGNOSIS — R26.2 AMBULATORY DYSFUNCTION: ICD-10-CM

## 2024-08-29 DIAGNOSIS — G20.C PARKINSONISM, UNSPECIFIED PARKINSONISM TYPE: ICD-10-CM

## 2024-08-29 DIAGNOSIS — R26.2 DIFFICULTY WALKING: ICD-10-CM

## 2024-08-29 DIAGNOSIS — M62.81 MUSCLE WEAKNESS (GENERALIZED): ICD-10-CM

## 2024-08-29 DIAGNOSIS — R53.81 PHYSICAL DECONDITIONING: ICD-10-CM

## 2024-08-29 DIAGNOSIS — R29.898 MUSCULAR DECONDITIONING: Primary | ICD-10-CM

## 2024-08-29 PROCEDURE — 97110 THERAPEUTIC EXERCISES: CPT

## 2024-08-29 PROCEDURE — 97530 THERAPEUTIC ACTIVITIES: CPT

## 2024-08-29 NOTE — PROGRESS NOTES
Daily Note     Today's date: 2024  Patient name: Aldo Rodriguez  : 1940  MRN: [id number]  Referring provider: Callie Caro CRNP  Dx:   Encounter Diagnosis     ICD-10-CM    1. Muscular deconditioning  R29.898       2. Ambulatory dysfunction  R26.2       3. Muscle weakness (generalized)  M62.81       4. Difficulty walking  R26.2       5. Parkinsonism, unspecified Parkinsonism type  G20.C       6. Physical deconditioning  R53.81             Start Time: 1600  Stop Time: 1645  Total time in clinic (min): 45 minutes    Subjective: Aldo reports he was feeling good earlier today, walking w/o walker. R knee pain onset about an hour ago.       Objective: See treatment diary below      Assessment: Aldo tolerated PT treatment well. Initiated session with MH to B/L knee with slider ROM. Greater challenge completing STS from chair secondary to R knee aggravation, decreased repetitions. Pt displaying more erect posture with seated exercise. Continue to challenge functional strength and balance. Pt would benefit from continued PT to further address impairments and maximize functional level.       Plan: Continue per plan of care.      Precautions: fall risk, dementia, hx of CVA/TIA    HEP: TBA    Specialty Daily Treatment Diary     Manual 8/29  8/15 8/20 8/22   STM; TPR B UT, LS        STM/TPR B knees                Knee flexion stretch        Knee Ext stretch                Exercise Diary         Pt education        UBE (retro)        RB        Laps: 6MWT                HR 30x  30x  30 30   TR 30x  30x  30 30   Mini Squats        Chair squats 1 foam no UE 2x5   1 foam no UE 2x5  1 foam no UE: 2x5; 1 foam UE support: 1x5 1 foam no UE: 3x5   Sit<>stand        Slant board gastroc stretch        MERLINE (over high counter top with pillow behind)                 Marching Standing at RW: 2 mins   Standing at bar 2 mins  Standing at bar: 2 mins Standing at RW: 2 mins    Standing H' ABD        Standing H' Ext                 Floor -> [employer] NBOS [place]  Floor: 30 sec x 1 (without UE support)  Floor: 30 sec x 1 (without UE support)  Floor: 30 sec x 1 (without UE support)   Floor  1/2 Tandem        Floor -> [employer] SLS        Lateral steps   4 laps  4 laps ea 4 laps ea   1 cone tap        2 cone tap                Stepping over hurdles forward        Laterally stepping over hurdles                Stepping on random pads        Horizontal abduction - b/l - seated         Bicep Curls 5# 3x10  5# 2x10  5# 2x10 5# 3x10   OH Press  5# 3x10   5# 2x10  5# 2x10 5# 3x10   TB Scap Retraction        TB B S' Ext                Low pec stretch        Mid pec stretch                Seated Heel slides using slider (knee flexion and extension stretch) :05x20 ea       Seated HS stretch                Seated 2 way piriformis stretch        Chin tucks 5 sec 2x10   5 sec x 20 5 sec x 10   SNAGs Ext 5 sec 2x10    2 sec x 10  5 sec; 2x10   Seated Thoracic Extension over half foam 1/2 foam: 2x10   1/2 foam: 2 sec x 10  Shoulder flexion  1/2 foam: 2 sec x 10 (hands gently behind neck)  1/2 foam: 2 sec x 10 (hands interlocked reaching OH) 1/2 foam: 2 sec x 10 (hands gently behind neck); 1/2 foam: 2 sec x 10 (hands interlocked reaching OH)                           LAQ                SLR (Flex)                Ball Squeezes Sitting at edge of chair: Soccer: 5 sec x 30  Seated: Soccer: 5 sec x 30 Sitting at edge of chair: Soccer: 5 sec x 30 NV   Ball c Bridge                Hooklying TB B Clamshells Seated: BTB 5 sec x 30  Seated: BTB 5 sec x 30 Seated BTB: 5 sec x 30 Seated: BTB 5 sec x 20   TB Bridges                                                HEP/EDU        Modalities        MH 5 mins pre

## 2024-09-03 ENCOUNTER — OFFICE VISIT (OUTPATIENT)
Dept: PHYSICAL THERAPY | Facility: CLINIC | Age: 84
End: 2024-09-03
Payer: MEDICARE

## 2024-09-03 DIAGNOSIS — R26.2 DIFFICULTY WALKING: ICD-10-CM

## 2024-09-03 DIAGNOSIS — R26.2 AMBULATORY DYSFUNCTION: ICD-10-CM

## 2024-09-03 DIAGNOSIS — M62.81 MUSCLE WEAKNESS (GENERALIZED): ICD-10-CM

## 2024-09-03 DIAGNOSIS — R53.81 PHYSICAL DECONDITIONING: ICD-10-CM

## 2024-09-03 DIAGNOSIS — R29.898 MUSCULAR DECONDITIONING: Primary | ICD-10-CM

## 2024-09-03 DIAGNOSIS — G20.C PARKINSONISM, UNSPECIFIED PARKINSONISM TYPE: ICD-10-CM

## 2024-09-03 PROCEDURE — 97530 THERAPEUTIC ACTIVITIES: CPT

## 2024-09-03 PROCEDURE — 97110 THERAPEUTIC EXERCISES: CPT

## 2024-09-03 NOTE — PROGRESS NOTES
Daily Note     Today's date: 9/3/2024  Patient name: Aldo Rodriguez  : 1940  MRN: [id number]  Referring provider: Callie Caro CRNP  Dx:   Encounter Diagnosis     ICD-10-CM    1. Muscular deconditioning  R29.898       2. Parkinsonism, unspecified Parkinsonism type  G20.C       3. Ambulatory dysfunction  R26.2       4. Physical deconditioning  R53.81       5. Muscle weakness (generalized)  M62.81       6. Difficulty walking  R26.2               Start Time: 1418  Stop Time: 1505  Total time in clinic (min): 47 minutes    Subjective: Aldo reports that both (R) ankle and knee is painful intermittently at home. He states L shoulder is mildly.       Objective: See treatment diary below      Assessment: Good tolerance to program this session. Session initiated in seated abduction this session. Cues utilized for form t/o session. Patient would benefit from continued PT to prevent decline in balance and strength.      Plan: Continue per plan of care.  NV consider trialing cane.      Precautions: fall risk, dementia, hx of CVA/TIA    HEP: TBA    Specialty Daily Treatment Diary     Manual 8/29 9/3  8/20 8/22   STM; TPR B UT, LS        STM/TPR B knees                Knee flexion stretch        Knee Ext stretch                Exercise Diary         Pt education        UBE (retro)        RB        Laps: 6MWT                HR 30x 30x  30 30   TR 30x 30x  30 30   Mini Squats        Chair squats 1 foam no UE 2x5  1 foam no UE 2x5   1 foam no UE: 2x5; 1 foam UE support: 1x5 1 foam no UE: 3x5   Sit<>stand        Slant board gastroc stretch        MERLINE (over high counter top with pillow behind)                 Marching Standing at RW: 2 mins  Standing at RW: 2 mins   Standing at bar: 2 mins Standing at RW: 2 mins    Standing H' ABD        Standing H' Ext                Floor -> [employer] NBOS [place] Floor: 30 sec x 1 (without UE support)  Floor: 30 sec x 1 (without UE support)  Floor: 30 sec x 1 (without UE support)   Floor   1/2 Tandem        Floor -> [employer] SLS        Lateral steps    4 laps ea 4 laps ea   1 cone tap        2 cone tap                Stepping over hurdles forward        Laterally stepping over hurdles                Stepping on random pads        Horizontal abduction - b/l - seated         Bicep Curls 5# 3x10 5# 3x10  5# 2x10 5# 3x10   OH Press  5# 3x10  5# 3x10   5# 2x10 5# 3x10   TB Scap Retraction        TB B S' Ext                Low pec stretch        Mid pec stretch                Seated Heel slides using slider (knee flexion and extension stretch) :05x20 ea :05x20 ea      Seated HS stretch                Seated 2 way piriformis stretch        Chin tucks 5 sec 2x10 5 sec 2x10  5 sec x 20 5 sec x 10   SNAGs Ext 5 sec 2x10  5 sec 2x10   2 sec x 10  5 sec; 2x10   Seated Thoracic Extension over half foam 1/2 foam: 2x10  1/2 foam: 2x10   1/2 foam: 2 sec x 10 (hands gently behind neck)  1/2 foam: 2 sec x 10 (hands interlocked reaching OH) 1/2 foam: 2 sec x 10 (hands gently behind neck); 1/2 foam: 2 sec x 10 (hands interlocked reaching OH)                           LAQ                SLR (Flex)                Ball Squeezes Sitting at edge of chair: Soccer: 5 sec x 30 Sitting at edge of chair: Soccer: 5 sec x 30  Sitting at edge of chair: Soccer: 5 sec x 30 NV   Ball c Bridge                Hooklying TB B Clamshells Seated: BTB 5 sec x 30 Seated: BTB 5 sec x 30  Seated BTB: 5 sec x 30 Seated: BTB 5 sec x 20   TB Bridges                                                HEP/EDU        Modalities        MH 5 mins pre NV

## 2024-09-05 ENCOUNTER — OFFICE VISIT (OUTPATIENT)
Dept: PHYSICAL THERAPY | Facility: CLINIC | Age: 84
End: 2024-09-05
Payer: MEDICARE

## 2024-09-05 DIAGNOSIS — R53.81 PHYSICAL DECONDITIONING: ICD-10-CM

## 2024-09-05 DIAGNOSIS — M62.81 MUSCLE WEAKNESS (GENERALIZED): ICD-10-CM

## 2024-09-05 DIAGNOSIS — R26.2 AMBULATORY DYSFUNCTION: ICD-10-CM

## 2024-09-05 DIAGNOSIS — R29.898 MUSCULAR DECONDITIONING: Primary | ICD-10-CM

## 2024-09-05 DIAGNOSIS — G20.C PARKINSONISM, UNSPECIFIED PARKINSONISM TYPE: ICD-10-CM

## 2024-09-05 DIAGNOSIS — R26.2 DIFFICULTY WALKING: ICD-10-CM

## 2024-09-05 PROCEDURE — 97110 THERAPEUTIC EXERCISES: CPT

## 2024-09-05 PROCEDURE — 97530 THERAPEUTIC ACTIVITIES: CPT

## 2024-09-05 NOTE — PROGRESS NOTES
Daily Note     Today's date: 2024  Patient name: Aldo Rodriguez  : 1940  MRN: [id number]  Referring provider: Callie Caro CRNP  Dx:   Encounter Diagnosis     ICD-10-CM    1. Muscular deconditioning  R29.898       2. Parkinsonism, unspecified Parkinsonism type  G20.C       3. Ambulatory dysfunction  R26.2       4. Physical deconditioning  R53.81       5. Muscle weakness (generalized)  M62.81       6. Difficulty walking  R26.2                          Subjective: Aldo offers no new complaints upon arrival.       Objective: See treatment diary below      Assessment: Aldo tolerated PT treatment well. More challenge completing STS from chair w/ 1 foam A, cueing at hip required for full extension. Advanced DB weight with OH press and bicep curls, fatigue by second set. Pt would benefit from additional skilled maintenance therapy in effort to prevent decline in function with resultant decrease in independent self care and transfers, and to prevent an increased risk for fall, and loss of functional strength.      Plan: Continue per plan of care.       Precautions: fall risk, dementia, hx of CVA/TIA    HEP: TBA    Specialty Daily Treatment Diary     Manual 8/29 9/3 9/5  8/22   STM; TPR B UT, LS        STM/TPR B knees                Knee flexion stretch        Knee Ext stretch                Exercise Diary         Pt education        UBE (retro)        RB        Laps: 6MWT                HR 30x 30x 30x  30   TR 30x 30x 30x  30   Mini Squats        Chair squats 1 foam no UE 2x5  1 foam no UE 2x5  1 foam no UE 2x5   1 foam no UE: 3x5   Sit<>stand        Slant board gastroc stretch        MERLINE (over high counter top with pillow behind)                 Marching Standing at RW: 2 mins  Standing at RW: 2 mins  Standing at RW: 2 mins   Standing at RW: 2 mins    Standing H' ABD        Standing H' Ext                Floor -> [employer] NBOS [place] Floor: 30 sec x 1 (without UE support)   Floor: 30 sec x 1 (without UE  support)   Floor  1/2 Tandem        Floor -> [employer] SLS        Lateral steps     4 laps ea   1 cone tap        2 cone tap                Stepping over hurdles forward        Laterally stepping over hurdles                Stepping on random pads        Horizontal abduction - b/l - seated         Bicep Curls 5# 3x10 5# 3x10 6# 2x10   5# 3x10   OH Press  5# 3x10  5# 3x10  6# 2x10   5# 3x10   TB Scap Retraction        TB B S' Ext                Low pec stretch        Mid pec stretch                Seated Heel slides using slider (knee flexion and extension stretch) :05x20 ea :05x20 ea :05x20 ea     Seated HS stretch                Seated 2 way piriformis stretch        Chin tucks 5 sec 2x10 5 sec 2x10 5 sec 2x10   5 sec x 10   SNAGs Ext 5 sec 2x10  5 sec 2x10  5 sec 2x10   5 sec; 2x10   Seated Thoracic Extension over half foam 1/2 foam: 2x10  1/2 foam: 2x10  1/2 foam: 2x10   1/2 foam: 2 sec x 10 (hands gently behind neck); 1/2 foam: 2 sec x 10 (hands interlocked reaching OH)                           LAQ                SLR (Flex)                Ball Squeezes Sitting at edge of chair: Soccer: 5 sec x 30 Sitting at edge of chair: Soccer: 5 sec x 30 Sitting at edge of chair: Soccer: 5 sec x 30  NV   Ball c Bridge                Hooklying TB B Clamshells Seated: BTB 5 sec x 30 Seated: BTB 5 sec x 30 Seated: BTB 5 sec x 30  Seated: BTB 5 sec x 20   TB Bridges                                                HEP/EDU        Modalities        MH 5 mins pre NV 5 mins pre

## 2024-09-07 DIAGNOSIS — F02.A2 MILD EARLY ONSET ALZHEIMER'S DEMENTIA WITH PSYCHOTIC DISTURBANCE (HCC): ICD-10-CM

## 2024-09-07 DIAGNOSIS — G30.0 MILD EARLY ONSET ALZHEIMER'S DEMENTIA WITH PSYCHOTIC DISTURBANCE (HCC): ICD-10-CM

## 2024-09-07 DIAGNOSIS — I10 ESSENTIAL HYPERTENSION: ICD-10-CM

## 2024-09-07 DIAGNOSIS — K21.9 GASTROESOPHAGEAL REFLUX DISEASE WITHOUT ESOPHAGITIS: ICD-10-CM

## 2024-09-07 DIAGNOSIS — F33.42 RECURRENT MAJOR DEPRESSIVE DISORDER, IN FULL REMISSION (HCC): ICD-10-CM

## 2024-09-08 RX ORDER — ESCITALOPRAM OXALATE 10 MG/1
TABLET ORAL
Qty: 90 TABLET | Refills: 1 | Status: SHIPPED | OUTPATIENT
Start: 2024-09-08

## 2024-09-08 RX ORDER — PANTOPRAZOLE SODIUM 40 MG/1
TABLET, DELAYED RELEASE ORAL
Qty: 90 TABLET | Refills: 1 | Status: SHIPPED | OUTPATIENT
Start: 2024-09-08

## 2024-09-08 RX ORDER — CARVEDILOL 12.5 MG/1
12.5 TABLET ORAL 2 TIMES DAILY
Qty: 180 TABLET | Refills: 1 | Status: SHIPPED | OUTPATIENT
Start: 2024-09-08

## 2024-09-09 RX ORDER — QUETIAPINE FUMARATE 25 MG/1
TABLET, FILM COATED ORAL
Qty: 135 TABLET | Refills: 1 | Status: SHIPPED | OUTPATIENT
Start: 2024-09-09

## 2024-09-10 ENCOUNTER — OFFICE VISIT (OUTPATIENT)
Dept: PHYSICAL THERAPY | Facility: CLINIC | Age: 84
End: 2024-09-10
Payer: MEDICARE

## 2024-09-10 DIAGNOSIS — M62.81 MUSCLE WEAKNESS (GENERALIZED): Primary | ICD-10-CM

## 2024-09-10 DIAGNOSIS — R26.2 AMBULATORY DYSFUNCTION: ICD-10-CM

## 2024-09-10 DIAGNOSIS — G20.C PARKINSONISM, UNSPECIFIED PARKINSONISM TYPE: ICD-10-CM

## 2024-09-10 DIAGNOSIS — R53.81 PHYSICAL DECONDITIONING: ICD-10-CM

## 2024-09-10 DIAGNOSIS — R29.898 MUSCULAR DECONDITIONING: ICD-10-CM

## 2024-09-10 DIAGNOSIS — R26.2 DIFFICULTY WALKING: ICD-10-CM

## 2024-09-10 PROCEDURE — 97112 NEUROMUSCULAR REEDUCATION: CPT | Performed by: PHYSICAL THERAPIST

## 2024-09-10 PROCEDURE — 97110 THERAPEUTIC EXERCISES: CPT | Performed by: PHYSICAL THERAPIST

## 2024-09-10 PROCEDURE — 97530 THERAPEUTIC ACTIVITIES: CPT | Performed by: PHYSICAL THERAPIST

## 2024-09-10 NOTE — PROGRESS NOTES
Daily Note     Today's date: 9/10/2024  Patient name: Aldo Rodriguez  : 1940  MRN: [id number]  Referring provider: Callie Caro CRNP  Dx:   Encounter Diagnosis     ICD-10-CM    1. Muscle weakness (generalized)  M62.81       2. Muscular deconditioning  R29.898       3. Parkinsonism, unspecified Parkinsonism type  G20.C       4. Difficulty walking  R26.2       5. Ambulatory dysfunction  R26.2       6. Physical deconditioning  R53.81                      Subjective: He reports having stiffness in both of his knees.       Objective: See treatment diary below      Assessment: Tolerated treatment well; initiated POC with MH to B knees while performing there ex. Vcs provided on proper sequencing with exercises; he had increased shoulder discomfort with presses OH and thus reduced weights which improved sxs. He has increased difficulty with STS transfers today and thus requires UE support. Added lateral stepping at bar to increase lateral hip strengthening. He was instructed on foot placement.    Patient demonstrated fatigue post treatment and would benefit from continued PT      Plan: Continue per plan of care.      Precautions: fall risk, dementia, hx of CVA/TIA    HEP: TBA    Specialty Daily Treatment Diary     Manual 8/29 9/3 9/5 9/10    STM; TPR B UT, LS        STM/TPR B knees                Knee flexion stretch        Knee Ext stretch                Exercise Diary         Pt education        UBE (retro)        RB        Laps: 6MWT                HR 30x 30x 30x 30    TR 30x 30x 30x 30    Mini Squats        Chair squats 1 foam no UE 2x5  1 foam no UE 2x5  1 foam no UE 2x5  1 foam no UE: 2x5    Sit<>stand        Slant board gastroc stretch        MERLINE (over high counter top with pillow behind)                 Marching Standing at RW: 2 mins  Standing at RW: 2 mins  Standing at RW: 2 mins  Standing at RW: 2 mins    Standing H' ABD        Standing H' Ext                Floor -> Airex NBOS NV Floor: 30 sec x 1  (without UE support)      Floor  1/2 Tandem        Floor -> Airex SLS        Lateral steps        1 cone tap        2 cone tap                Stepping over hurdles forward        Laterally stepping over hurdles                Stepping on random pads        Horizontal abduction - b/l - seated         Bicep Curls 5# 3x10 5# 3x10 6# 2x10  6# 2x10    OH Press  5# 3x10  5# 3x10  6# 2x10  6# 1x10  5# 1x10    TB Scap Retraction        TB B S' Ext                Low pec stretch        Mid pec stretch                Seated Heel slides using slider (knee flexion and extension stretch) :05x20 ea :05x20 ea :05x20 ea 5 sec x 20 ea    Seated HS stretch                Seated 2 way piriformis stretch        Chin tucks 5 sec 2x10 5 sec 2x10 5 sec 2x10  5 sec; 2x10    SNAGs Ext 5 sec 2x10  5 sec 2x10  5 sec 2x10  5 sec; 2x10    Seated Thoracic Extension over half foam 1/2 foam: 2x10  1/2 foam: 2x10  1/2 foam: 2x10  1/2 foam: 2x10                            LAQ                SLR (Flex)                Ball Squeezes Sitting at edge of chair: Soccer: 5 sec x 30 Sitting at edge of chair: Soccer: 5 sec x 30 Sitting at edge of chair: Soccer: 5 sec x 30 Sitting at edge of chair: 5 sec x 30     Ball c Bridge                Hooklying TB B Clamshells Seated: BTB 5 sec x 30 Seated: BTB 5 sec x 30 Seated: BTB 5 sec x 30 Seated: BTB 5 sec x 30    TB Bridges                                                HEP/EDU        Modalities        MH 5 mins pre NV 5 mins pre  10 mins pre

## 2024-09-12 ENCOUNTER — OFFICE VISIT (OUTPATIENT)
Dept: PHYSICAL THERAPY | Facility: CLINIC | Age: 84
End: 2024-09-12
Payer: MEDICARE

## 2024-09-12 DIAGNOSIS — R29.898 MUSCULAR DECONDITIONING: ICD-10-CM

## 2024-09-12 DIAGNOSIS — M62.81 MUSCLE WEAKNESS (GENERALIZED): Primary | ICD-10-CM

## 2024-09-12 DIAGNOSIS — R26.2 AMBULATORY DYSFUNCTION: ICD-10-CM

## 2024-09-12 DIAGNOSIS — R53.81 PHYSICAL DECONDITIONING: ICD-10-CM

## 2024-09-12 DIAGNOSIS — R26.2 DIFFICULTY WALKING: ICD-10-CM

## 2024-09-12 DIAGNOSIS — G20.C PARKINSONISM, UNSPECIFIED PARKINSONISM TYPE: ICD-10-CM

## 2024-09-12 PROCEDURE — 97110 THERAPEUTIC EXERCISES: CPT

## 2024-09-12 PROCEDURE — 97530 THERAPEUTIC ACTIVITIES: CPT

## 2024-09-12 NOTE — PROGRESS NOTES
Daily Note     Today's date: 2024  Patient name: Aldo Rodriguez  : 1940  MRN: [id number]  Referring provider: Callie Caro CRNP  Dx:   Encounter Diagnosis     ICD-10-CM    1. Muscle weakness (generalized)  M62.81       2. Muscular deconditioning  R29.898       3. Parkinsonism, unspecified Parkinsonism type  G20.C       4. Difficulty walking  R26.2       5. Ambulatory dysfunction  R26.2       6. Physical deconditioning  R53.81                        Subjective: Aldo reports B/L knee stiffness upon arrival. Notes he has been completing HEP intermittently throughout the day.       Objective: See treatment diary below      Assessment: Aldo tolerated PT treatment well, pt is approprietly challenged with current exercise program. STS limited d/t reports of B/L knee pain, additional foam A required to complete w/o UEA. Pt displays good technique throughout therapeutic exercises. Pt would benefit from continued PT to maintain current levels of strength and prevent functional decline due to neurologic disease.      Plan: Continue per plan of care.      Precautions: fall risk, dementia, hx of CVA/TIA    HEP: TBA    Specialty Daily Treatment Diary     Manual 8/29 9/3 9/5 9/10 9/12   STM; TPR B UT, LS        STM/TPR B knees                Knee flexion stretch        Knee Ext stretch                Exercise Diary         Pt education        UBE (retro)        RB        Laps: 6MWT                HR 30x 30x 30x 30 30x   TR 30x 30x 30x 30 30x   Mini Squats        Chair squats 1 foam no UE 2x5  1 foam no UE 2x5  1 foam no UE 2x5  1 foam no UE: 2x5 1 foam x5   2 foam x3    Sit<>stand        Slant board gastroc stretch        MERLINE (over high counter top with pillow behind)                 Marching Standing at RW: 2 mins  Standing at RW: 2 mins  Standing at RW: 2 mins  Standing at RW: 2 mins    Standing H' ABD        Standing H' Ext                Floor -> [employer] NBOS NV Floor: 30 sec x 1 (without UE support)       Floor  1/2 Tandem        Floor -> Airex SLS        Lateral steps        1 cone tap        2 cone tap                Stepping over hurdles forward        Laterally stepping over hurdles                Stepping on random pads        Horizontal abduction - b/l - seated         Bicep Curls 5# 3x10 5# 3x10 6# 2x10  6# 2x10    OH Press  5# 3x10  5# 3x10  6# 2x10  6# 1x10  5# 1x10    TB Scap Retraction        TB B S' Ext                Low pec stretch        Mid pec stretch                Seated Heel slides using slider (knee flexion and extension stretch) :05x20 ea :05x20 ea :05x20 ea 5 sec x 20 ea :05x20 ea   Seated HS stretch                Seated 2 way piriformis stretch        Chin tucks 5 sec 2x10 5 sec 2x10 5 sec 2x10  5 sec; 2x10    SNAGs Ext 5 sec 2x10  5 sec 2x10  5 sec 2x10  5 sec; 2x10 2x10    Seated Thoracic Extension over half foam 1/2 foam: 2x10  1/2 foam: 2x10  1/2 foam: 2x10  1/2 foam: 2x10 1/2 foam: 2x10                           LAQ                SLR (Flex)                Ball Squeezes Sitting at edge of chair: Soccer: 5 sec x 30 Sitting at edge of chair: Soccer: 5 sec x 30 Sitting at edge of chair: Soccer: 5 sec x 30 Sitting at edge of chair: 5 sec x 30  Sitting at edge of chair: 5 sec x 30    Ball c Bridge                                Hooklying TB B Clamshells Seated: BTB 5 sec x 30 Seated: BTB 5 sec x 30 Seated: BTB 5 sec x 30 Seated: BTB 5 sec x 30 Seated: BTB 5 sec x 30   TB Bridges                                                HEP/EDU        Modalities        MH 5 mins pre NV 5 mins pre  10 mins pre 5 mins pre

## 2024-09-13 ENCOUNTER — OFFICE VISIT (OUTPATIENT)
Dept: FAMILY MEDICINE CLINIC | Facility: HOSPITAL | Age: 84
End: 2024-09-13
Payer: MEDICARE

## 2024-09-13 VITALS
TEMPERATURE: 98.7 F | SYSTOLIC BLOOD PRESSURE: 126 MMHG | DIASTOLIC BLOOD PRESSURE: 82 MMHG | BODY MASS INDEX: 25.85 KG/M2 | HEIGHT: 68 IN | HEART RATE: 70 BPM | WEIGHT: 170.6 LBS

## 2024-09-13 DIAGNOSIS — G83.84 TODD'S PARALYSIS (HCC): ICD-10-CM

## 2024-09-13 DIAGNOSIS — Z23 NEED FOR COVID-19 VACCINE: ICD-10-CM

## 2024-09-13 DIAGNOSIS — Z23 ENCOUNTER FOR IMMUNIZATION: ICD-10-CM

## 2024-09-13 DIAGNOSIS — R26.2 AMBULATORY DYSFUNCTION: ICD-10-CM

## 2024-09-13 DIAGNOSIS — R05.1 ACUTE COUGH: ICD-10-CM

## 2024-09-13 DIAGNOSIS — I10 ESSENTIAL HYPERTENSION: Primary | ICD-10-CM

## 2024-09-13 DIAGNOSIS — N64.4 BREAST PAIN: ICD-10-CM

## 2024-09-13 PROBLEM — R06.00 DYSPNEA: Status: RESOLVED | Noted: 2023-12-05 | Resolved: 2024-09-13

## 2024-09-13 PROBLEM — I50.33 ACUTE ON CHRONIC DIASTOLIC HEART FAILURE (HCC): Status: RESOLVED | Noted: 2024-01-25 | Resolved: 2024-09-13

## 2024-09-13 PROCEDURE — 90662 IIV NO PRSV INCREASED AG IM: CPT

## 2024-09-13 PROCEDURE — 99214 OFFICE O/P EST MOD 30 MIN: CPT | Performed by: NURSE PRACTITIONER

## 2024-09-13 PROCEDURE — 91320 SARSCV2 VAC 30MCG TRS-SUC IM: CPT

## 2024-09-13 PROCEDURE — G0008 ADMIN INFLUENZA VIRUS VAC: HCPCS

## 2024-09-13 PROCEDURE — 90480 ADMN SARSCOV2 VAC 1/ONLY CMP: CPT

## 2024-09-13 NOTE — ASSESSMENT & PLAN NOTE
BP well controlled   Continue same meds as rx'd  Return in 6 months  Orders:    TSH, 3rd generation with Free T4 reflex; Future

## 2024-09-13 NOTE — PROGRESS NOTES
"Ambulatory Visit  Name: Aldo Rodriguez      : 1940      MRN: [id number]  Encounter Provider: ELE Ruiz  Encounter Date: 2024   Encounter department: [facility] [address]     Assessment & Plan  Essential hypertension  BP well controlled   Continue same meds as rx'd  Return in 6 months  Orders:    TSH, 3rd generation with Free T4 reflex; Future    Ambulatory dysfunction  Continue PT as scheduled       Breast pain  Bilat breasts w/normal exam - evaluate further w/labs as ordered  Orders:    Prolactin; Future    Comprehensive metabolic panel; Future    CBC and differential; Future    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HCC)  Hx of seizure activity - previously established w/neurology           Acute cough  Start daily antihistamine for possible allergic cause  Call for CXR order if sx's persist x1 month or worsen       Agreeable to flu and covid boosters today  Encounter for immunization    Orders:    influenza vaccine, high-dose, PF 0.5 mL (Fluzone High Dose)    Need for COVID-19 vaccine    Orders:    COVID-19 Pfizer mRNA vaccine 12 yr and older (Comirnaty pre-filled syringe)      Falls Plan of Care: referral to physical therapy.       History of Present Illness       Here with caretaker Magen for routine follow up. States he has been well except states \"my boobs hurt\". States this has been for about a month. Pain is bilaterally he wonders if may be due to upper body exercises done in PT. Denies lump, rash or nipple discharge.   Hx of 2 UTIs this year and on bactrim both times. Last UTI was about 2 months ago.   Has been doing PT regularly for knee treatment and overall strengthening.   Magen has noted a cough recently he believes may be due to allergies.    Cough  This is a recurrent problem. The current episode started in the past 7 days. The problem has been unchanged. The problem occurs every few hours. The cough is Productive of sputum. Associated symptoms include headaches. " "Pertinent negatives include no chest pain, chills, ear congestion, ear pain, fever, heartburn, hemoptysis, myalgias, nasal congestion, postnasal drip, rash, rhinorrhea, sore throat, shortness of breath, sweats, weight loss or wheezing. The symptoms are aggravated by pollens. Risk factors for lung disease include animal exposure. His past medical history is significant for environmental allergies.       History obtained from : patient and caretaker    Review of Systems   Constitutional:  Negative for chills, fever and weight loss.   HENT:  Negative for ear pain, postnasal drip, rhinorrhea and sore throat.    Respiratory:  Positive for cough. Negative for hemoptysis, shortness of breath and wheezing.    Cardiovascular:  Negative for chest pain, palpitations and leg swelling.   Gastrointestinal:  Negative for heartburn.   Musculoskeletal:  Negative for myalgias.   Skin:  Negative for rash.   Allergic/Immunologic: Positive for environmental allergies.   Neurological:  Positive for headaches.       Objective     /82   Pulse 70   Temp 98.7 °F (37.1 °C)   Ht 5' 8\" (1.727 m)   Wt 77.4 kg (170 lb 9.6 oz)   BMI 25.94 kg/m²       Physical Exam  Vitals reviewed.   Constitutional:       General: He is not in acute distress.     Appearance: Normal appearance.   HENT:      Head: Normocephalic.   Eyes:      General: No scleral icterus.  Neck:      Thyroid: No thyromegaly.   Cardiovascular:      Rate and Rhythm: Normal rate and regular rhythm.   Pulmonary:      Effort: Pulmonary effort is normal. No respiratory distress.      Breath sounds: Decreased breath sounds present.      Comments: No cough  Musculoskeletal:      Cervical back: Normal range of motion.      Right lower leg: No edema.      Left lower leg: No edema.   Lymphadenopathy:      Cervical: No cervical adenopathy.   Skin:     General: Skin is warm and dry.   Neurological:      General: No focal deficit present.      Mental Status: He is alert. Mental status is " at baseline.      Motor: Weakness (generalized) present.      Gait: Gait abnormal (steady w/walker).   Psychiatric:         Mood and Affect: Mood normal.         Behavior: Behavior normal.         Administrative Statements   I have spent a total time of 20 minutes in caring for this patient on the day of the visit/encounter including Diagnostic results, Instructions for management, Patient and family education, Impressions, Counseling / Coordination of care, Documenting in the medical record, Reviewing / ordering tests, medicine, procedures  , and Obtaining or reviewing history  .

## 2024-09-17 ENCOUNTER — OFFICE VISIT (OUTPATIENT)
Dept: NEUROLOGY | Facility: CLINIC | Age: 84
End: 2024-09-17
Payer: MEDICARE

## 2024-09-17 ENCOUNTER — OFFICE VISIT (OUTPATIENT)
Dept: PHYSICAL THERAPY | Facility: CLINIC | Age: 84
End: 2024-09-17
Payer: MEDICARE

## 2024-09-17 VITALS
DIASTOLIC BLOOD PRESSURE: 88 MMHG | HEIGHT: 68 IN | OXYGEN SATURATION: 96 % | TEMPERATURE: 98.2 F | BODY MASS INDEX: 25.76 KG/M2 | HEART RATE: 60 BPM | WEIGHT: 170 LBS | SYSTOLIC BLOOD PRESSURE: 142 MMHG

## 2024-09-17 DIAGNOSIS — R26.2 DIFFICULTY WALKING: ICD-10-CM

## 2024-09-17 DIAGNOSIS — R26.2 AMBULATORY DYSFUNCTION: Primary | ICD-10-CM

## 2024-09-17 DIAGNOSIS — G20.A1 PARKINSON'S DISEASE WITHOUT DYSKINESIA OR FLUCTUATING MANIFESTATIONS (HCC): Primary | ICD-10-CM

## 2024-09-17 DIAGNOSIS — M62.81 MUSCLE WEAKNESS (GENERALIZED): ICD-10-CM

## 2024-09-17 DIAGNOSIS — G20.C PARKINSONISM, UNSPECIFIED PARKINSONISM TYPE (HCC): ICD-10-CM

## 2024-09-17 DIAGNOSIS — R53.81 PHYSICAL DECONDITIONING: ICD-10-CM

## 2024-09-17 DIAGNOSIS — R29.898 MUSCULAR DECONDITIONING: ICD-10-CM

## 2024-09-17 PROCEDURE — 99214 OFFICE O/P EST MOD 30 MIN: CPT | Performed by: NURSE PRACTITIONER

## 2024-09-17 PROCEDURE — 97530 THERAPEUTIC ACTIVITIES: CPT | Performed by: PHYSICAL THERAPIST

## 2024-09-17 PROCEDURE — 97112 NEUROMUSCULAR REEDUCATION: CPT | Performed by: PHYSICAL THERAPIST

## 2024-09-17 NOTE — PROGRESS NOTES
Neurology Ambulatory Visit  Name: Aldo Rodriguez       : 1940       MRN: [id number]   Encounter Provider: ELE Corley   Encounter Date: 2024  Encounter department: [facility] [place]    Assessment and Plan  1. Parkinson's disease without dyskinesia or fluctuating manifestations  Assessment & Plan:  Left sided bradykinesia, rigidity, changes in gait and left hand resting tremor consistent with parkinsonism. He does have a diagnosis of dementia-follows with senior care. 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   There has been improvement in his symptoms and exam since starting Sinemet.  He is currently in PT for knee pain.  He is currently functioning well, denies any freezing, minimal tremor.  Given his stability he will continue his current dose of Sinemet 25/100 mg 1 tab 3 times daily.  If further increases are recommended would need to watch for hallucinations.  He is currently on Seroquel, hallucinations are very occasional.  He does have some symptoms of dream reenactment in which she was advised to start low-dose melatonin at bedtime.      He will Return in about 6 months (around 3/17/2025). To contact the office sooner with any concerns or worsening symptoms.      History of Present Illness     HPI   Aldo Rodriguez is a 84 y.o. male with Patient is an 83-year-old male with history of CVA, TIA, CAD, bilateral carotid stenosis,  L vert artery stenosis, HLD, HTN, PAD, peripheral neuropathy who presents for follow up for parkinsonism.     He does follow with our office for history of stroke and seizure, most recently seen in 2023 by neurovascular team.  He is maintained on aspirin, Plavix, statin.  He was referred to movement team for concern for parkinsonism on exam of left hand pill-rolling tremor, increased  rigidity, and bradykinesia that was noted at that visit, noted 3 months prior increased difficulty with gait, left leg drag.      He does follow with senior care for dementia.      last office visit 3/2024 in which no changes were made, he did follow up with vascular team and keppra was weaned off.      Current medications:   Sinemet 25/100 mg 1 tab TID      Interval History:   He presents with his caregiver Magen who assists with history who has known him for the past several.      No change in walking or balance, is going to PT, he has knee pain that inhibits his gait at times. No freezing. No falls.     No issues with swallowing.  He denies any stroke like or seizure activity since he saw vascular team.     Tremors are controlled.     Some mild hallucinations at times-sees people. On seroquel.   He is restless when he sleeps, can thrash in his sleep, kick in his sleep, talks in sleep.           MRI brain 8/2022: Small scattered hyperintensities on T2/FLAIR imaging are   noted in the periventricular and subcortical white matter demonstrating an appearance that is statistically most likely to represent moderate microangiopathic change.   Encephalomalacia identified right cerebellum, right parietal lobe cortex and left   frontal lobe compatible with chronic infarcts.  Chronic lacunar infarct also noted in the left centrum semiovale.  No evidence of recent infarct.  Chronic right maxillary sinus opacification.   .     Review of Systems    Review of Systems   Constitutional:  Negative for appetite change, fatigue and fever.   HENT: Negative.  Negative for hearing loss, tinnitus, trouble swallowing and voice change.    Eyes: Negative.  Negative for photophobia, pain and visual disturbance.   Respiratory: Negative.  Negative for shortness of breath.    Cardiovascular: Negative.  Negative for palpitations.   Gastrointestinal: Negative.  Negative for nausea and vomiting.   Endocrine: Negative.  Negative for cold  "intolerance.   Genitourinary: Negative.  Negative for dysuria, frequency and urgency.   Musculoskeletal:  Negative for back pain, gait problem, myalgias, neck pain and neck stiffness.   Skin: Negative.  Negative for rash.   Allergic/Immunologic: Negative.    Neurological:  Negative for dizziness, tremors, seizures, syncope, facial asymmetry, speech difficulty, weakness, light-headedness, numbness and headaches.        B/l knee pain   Hematological: Negative.  Does not bruise/bleed easily.   Psychiatric/Behavioral: Negative.  Negative for confusion, hallucinations and sleep disturbance.    All other systems reviewed and are negative.     I have personally reviewed the ROS performed by the MA.      Objective     /88 (BP Location: Left arm, Patient Position: Sitting, Cuff Size: Adult)   Pulse 60   Temp 98.2 °F (36.8 °C) (Temporal)   Ht 5' 8\" (1.727 m)   Wt 77.1 kg (170 lb)   SpO2 96%   BMI 25.85 kg/m²    Physical Exam  Constitutional:       General: He is awake.   HENT:      Right Ear: Hearing normal.      Left Ear: Hearing normal.   Eyes:      Extraocular Movements: Extraocular movements intact.      Pupils: Pupils are equal, round, and reactive to light.   Neurological:      Mental Status: He is alert.      Motor: Motor strength is normal.      Neurological Exam  Mental Status  Awake and alert. Oriented only to person, place and situation. Speech: Hypophonia, dysarthria (chronic). Language is fluent with no aphasia.    Cranial Nerves  CN III, IV, VI: Extraocular movements intact bilaterally. Pupils equal round and reactive to light bilaterally.  CN V:  Right: Facial sensation is normal.  Left: Facial sensation is normal on the left.  CN VII:  Right: There is no facial weakness.  Left: There is no facial weakness.  CN VIII:  Right: Hearing is normal.  Left: Hearing is normal.  CN IX, X: Palate elevates symmetrically  CN XI:  Right: Trapezius strength is normal.  Left: Trapezius strength is normal.  CN XII: " Tongue midline without atrophy or fasciculations.    Motor   Strength is 5/5 throughout all four extremities.    Sensory  Light touch is normal in upper and lower extremities.     Coordination  Right: Finger-to-nose normal. Rapid alternating movement normal.Left: Finger-to-nose normal. Rapid alternating movement abnormality:  See UPDRS III    .    Gait  Casual gait: Unable to rise from chair without using arms.  Ambulated with walker, shortened stride L>R, enbloc turn, no freezing, mildly stooped posture.    UPDRSIII                  3/13/24 9/17/24   Speech  1 2   Facial Expression  1 1   Postural Tremor (Right) 0 0   Postural Tremor (Left) 0 0   Kinetic Tremor (Right)  0 0   Kinetic Tremor (Left)  0 0   Rest tremor amplitude RUE 0 0   Rest tremor amplitude LUE 0 1   Rest tremor amplitude RLE 0 0   Rest tremor amplitude LLE 0 0   Lip/Jaw Tremor  0 0   Consistency of tremor 0 1   Finger Taps (Right)   2 2   Finger Taps (Left)  1 1   Hand Movement (Right)  2 2   Hand Movement (Left)   1 1   Pronation/Supination (Right)  2 1   Pronation/Supination (Left)   1 2   Toe Tapping (Right) 2 2   Toe Tapping (Left) 2 3   Leg Agility (Right)  1 1   Leg Agility (Left)   1 2   Rigidity - Neck       Rigidity - Upper Extremity (Right)  2 1   Rigidity - Upper Extremity (Left)   1 2   Rigidity - Lower Extremity (Right)  0 0   Rigidity - Lower Extremity (Left)   0 0   Arising from Chair   1 1    Gait   3 3-walker   Freezing of Gait 0 0   Postural Stability       Posture 1 2   Global spontaneity of movement 1 1       Administrative Statements

## 2024-09-17 NOTE — PATIENT INSTRUCTIONS
Can melatonin at bedtime, start with 3 mg and increase up to 10 mg if needed to see if helps with dream reenactment.

## 2024-09-17 NOTE — PROGRESS NOTES
Daily Note     Today's date: 2024  Patient name: Aldo Rodriguez  : 1940  MRN: [id number]  Referring provider: Callie Caro CRNP  Dx:   Encounter Diagnosis     ICD-10-CM    1. Ambulatory dysfunction  R26.2       2. Muscle weakness (generalized)  M62.81       3. Physical deconditioning  R53.81       4. Muscular deconditioning  R29.898       5. Difficulty walking  R26.2       6. Parkinsonism, unspecified Parkinsonism type  G20.C                      Subjective: Saw neurologist today who gave him a pill to help him sleep better. Was sore the day after last session when he got up in his knees and elbows.       Objective: See treatment diary below      Assessment: Tolerated treatment well; initiated POC with STS with two foam pads without onset of R knee pain. Vcs provided on proper sequencing with exercises; modified program due to R knee pain and elbow pain. He was able to tolerate exercises without increased reports of sxs. Vcs provided for increasing upright posture during standing marches.   Patient would benefit from continued PT      Plan: Continue per plan of care.      Precautions: fall risk, dementia, hx of CVA/TIA    HEP: TBA    Specialty Daily Treatment Diary     Manual 9/3 9/5 9/10 9/12 9/17   STM; TPR B UT, LS        STM/TPR B knees                Knee flexion stretch        Knee Ext stretch                Exercise Diary         Pt education        UBE (retro)        RB        Laps: 6MWT                HR 30x 30x 30 30x 30   TR 30x 30x 30 30x 30   Mini Squats        Chair squats 1 foam no UE 2x5  1 foam no UE 2x5  1 foam no UE: 2x5 1 foam x5   2 foam x3  2 foam x8   Sit<>stand        Slant board gastroc stretch        MERLINE (over high counter top with pillow behind)                 Marching Standing at RW: 2 mins  Standing at RW: 2 mins  Standing at RW: 2 mins  Standing at RW: 2 mins   Standing H' ABD        Standing H' Ext                Floor -> Airex NBOS Floor: 30 sec x 1 (without UE  support)       Floor  1/2 Tandem        Floor -> Airex SLS        Lateral steps        1 cone tap        2 cone tap                Stepping over hurdles forward        Laterally stepping over hurdles                Stepping on random pads        Horizontal abduction - b/l - seated         Bicep Curls 5# 3x10 6# 2x10  6# 2x10  6# 2x10   OH Press  5# 3x10  6# 2x10  6# 1x10  5# 1x10  Hold due to pain   TB Scap Retraction        TB B S' Ext                Low pec stretch        Mid pec stretch                Seated Heel slides using slider (knee flexion and extension stretch) :05x20 ea :05x20 ea 5 sec x 20 ea :05x20 ea 5 sec x 30 ea   Seated HS stretch     30 sec x 3 ea c stool           Seated 2 way piriformis stretch        Chin tucks 5 sec 2x10 5 sec 2x10  5 sec; 2x10     SNAGs Ext 5 sec 2x10  5 sec 2x10  5 sec; 2x10 2x10  2x10   Seated Thoracic Extension over half foam 1/2 foam: 2x10  1/2 foam: 2x10  1/2 foam: 2x10 1/2 foam: 2x10 1/2 foam: 2x10                           LAQ                SLR (Flex)                Ball Squeezes Sitting at edge of chair: Soccer: 5 sec x 30 Sitting at edge of chair: Soccer: 5 sec x 30 Sitting at edge of chair: 5 sec x 30  Sitting at edge of chair: 5 sec x 30  Sitting at edge of chair: 5 sec x 30   Ball c Bridge                                Hooklying TB B Clamshells Seated: BTB 5 sec x 30 Seated: BTB 5 sec x 30 Seated: BTB 5 sec x 30 Seated: BTB 5 sec x 30 Seated: MTB 5 sec x 30   TB Bridges                                                HEP/EDU        Modalities        MH NV 5 mins pre  10 mins pre 5 mins pre  Def

## 2024-09-17 NOTE — PROGRESS NOTES
Review of Systems   Constitutional:  Negative for appetite change, fatigue and fever.   HENT: Negative.  Negative for hearing loss, tinnitus, trouble swallowing and voice change.    Eyes: Negative.  Negative for photophobia, pain and visual disturbance.   Respiratory: Negative.  Negative for shortness of breath.    Cardiovascular: Negative.  Negative for palpitations.   Gastrointestinal: Negative.  Negative for nausea and vomiting.   Endocrine: Negative.  Negative for cold intolerance.   Genitourinary: Negative.  Negative for dysuria, frequency and urgency.   Musculoskeletal:  Negative for back pain, gait problem, myalgias, neck pain and neck stiffness.   Skin: Negative.  Negative for rash.   Allergic/Immunologic: Negative.    Neurological:  Negative for dizziness, tremors, seizures, syncope, facial asymmetry, speech difficulty, weakness, light-headedness, numbness and headaches.        B/l knee pain   Hematological: Negative.  Does not bruise/bleed easily.   Psychiatric/Behavioral: Negative.  Negative for confusion, hallucinations and sleep disturbance.    All other systems reviewed and are negative.

## 2024-09-18 NOTE — ASSESSMENT & PLAN NOTE
Left sided bradykinesia, rigidity, changes in gait and left hand resting tremor consistent with parkinsonism. He does have a diagnosis of dementia-follows with senior care. 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   There has been improvement in his symptoms and exam since starting Sinemet.  He is currently in PT for knee pain.  He is currently functioning well, denies any freezing, minimal tremor.  Given his stability he will continue his current dose of Sinemet 25/100 mg 1 tab 3 times daily.  If further increases are recommended would need to watch for hallucinations.  He is currently on Seroquel, hallucinations are very occasional.  He does have some symptoms of dream reenactment in which she was advised to start low-dose melatonin at bedtime.

## 2024-09-19 ENCOUNTER — OFFICE VISIT (OUTPATIENT)
Dept: PHYSICAL THERAPY | Facility: CLINIC | Age: 84
End: 2024-09-19
Payer: MEDICARE

## 2024-09-19 DIAGNOSIS — M62.81 MUSCLE WEAKNESS (GENERALIZED): ICD-10-CM

## 2024-09-19 DIAGNOSIS — R29.898 MUSCULAR DECONDITIONING: ICD-10-CM

## 2024-09-19 DIAGNOSIS — R53.81 PHYSICAL DECONDITIONING: ICD-10-CM

## 2024-09-19 DIAGNOSIS — R26.2 DIFFICULTY WALKING: Primary | ICD-10-CM

## 2024-09-19 DIAGNOSIS — R26.2 AMBULATORY DYSFUNCTION: ICD-10-CM

## 2024-09-19 DIAGNOSIS — G20.C PARKINSONISM, UNSPECIFIED PARKINSONISM TYPE (HCC): ICD-10-CM

## 2024-09-19 PROCEDURE — 97112 NEUROMUSCULAR REEDUCATION: CPT | Performed by: PHYSICAL THERAPIST

## 2024-09-19 PROCEDURE — 97530 THERAPEUTIC ACTIVITIES: CPT | Performed by: PHYSICAL THERAPIST

## 2024-09-19 PROCEDURE — 97110 THERAPEUTIC EXERCISES: CPT | Performed by: PHYSICAL THERAPIST

## 2024-09-19 NOTE — PROGRESS NOTES
Daily Note     Today's date: 2024  Patient name: Aldo Rodriguez  : 1940  MRN: [id number]  Referring provider: Callie Caro CRNP  Dx:   Encounter Diagnosis     ICD-10-CM    1. Difficulty walking  R26.2       2. Parkinsonism, unspecified Parkinsonism type  G20.C       3. Muscle weakness (generalized)  M62.81       4. Ambulatory dysfunction  R26.2       5. Physical deconditioning  R53.81       6. Muscular deconditioning  R29.898                      Subjective: He reports that he was sore after last visit.       Objective: See treatment diary below      Assessment: Tolerated treatment well; initiated POC with MH to B knees while performing there ex. Vcs provided on proper sequencing with exercises; added standing scap retraction to increase mid back strengthening in order to improve posture. He is scheduled for ortho appt for his B knee pain. Discussed with patient and caregiver importance of PT for rehab for B knees. He denies having increased pain post session.   Patient demonstrated fatigue post treatment and would benefit from continued PT      Plan: Continue per plan of care.      Precautions: fall risk, dementia, hx of CVA/TIA    HEP: TBA    Specialty Daily Treatment Diary     Manual 9/19  9/10 9/12 9/17   STM; TPR B UT, LS        STM/TPR B knees                Knee flexion stretch        Knee Ext stretch                Exercise Diary         Pt education        UBE (retro)        RB        Laps: 6MWT NV?               HR 30  30 30x 30   TR 30  30 30x 30   Mini Squats        Chair squats 2 foam: x10  1 foam no UE: 2x5 1 foam x5   2 foam x3  2 foam x8   Sit<>stand        Slant board gastroc stretch        MERLINE (over high counter top with pillow behind)                 Marching Standing at RW: 2 mins   Standing at RW: 2 mins  Standing at RW: 2 mins   Standing H' ABD        Standing H' Ext                Floor -> Airex NBOS        Floor  1/2 Tandem        Floor -> Airex SLS        Lateral steps         1 cone tap        2 cone tap                Stepping over hurdles forward        Laterally stepping over hurdles                Stepping on random pads        Horizontal abduction - b/l - seated         Bicep Curls 6# 3x10   6# 2x10  6# 2x10   OH Press  Hold  6# 1x10  5# 1x10  Hold due to pain   TB Scap Retraction in standing  GTB 2 sec x 20       TB B S' Ext                Low pec stretch        Mid pec stretch                Seated Heel slides using slider (knee flexion and extension stretch) 5 sec x 20 ea  5 sec x 20 ea :05x20 ea 5 sec x 30 ea   Seated HS stretch 30 sec x 3 ea c stool    30 sec x 3 ea c stool           Seated 2 way piriformis stretch        Chin tucks   5 sec; 2x10     SNAGs Ext 2x10  5 sec; 2x10 2x10  2x10   Seated Thoracic Extension over half foam 1/2 foam: 2x10  1/2 foam: 2x10 1/2 foam: 2x10 1/2 foam: 2x10                           LAQ                SLR (Flex)                Ball Squeezes Sitting at edge of hair: Grey ball: 5 sec x 30  Sitting at edge of chair: 5 sec x 30  Sitting at edge of chair: 5 sec x 30  Sitting at edge of chair: 5 sec x 30   Ball c Bridge                                Hooklying TB B Clamshells NV?  Seated: BTB 5 sec x 30 Seated: BTB 5 sec x 30 Seated: MTB 5 sec x 30   TB Bridges                                                HEP/EDU        Modalities        MH 10 mins c there ex  10 mins pre 5 mins pre  Def

## 2024-09-24 ENCOUNTER — OFFICE VISIT (OUTPATIENT)
Dept: PHYSICAL THERAPY | Facility: CLINIC | Age: 84
End: 2024-09-24
Payer: MEDICARE

## 2024-09-24 DIAGNOSIS — G20.C PARKINSONISM, UNSPECIFIED PARKINSONISM TYPE (HCC): ICD-10-CM

## 2024-09-24 DIAGNOSIS — M62.81 MUSCLE WEAKNESS (GENERALIZED): ICD-10-CM

## 2024-09-24 DIAGNOSIS — R26.2 AMBULATORY DYSFUNCTION: ICD-10-CM

## 2024-09-24 DIAGNOSIS — R29.898 MUSCULAR DECONDITIONING: ICD-10-CM

## 2024-09-24 DIAGNOSIS — R26.2 DIFFICULTY WALKING: Primary | ICD-10-CM

## 2024-09-24 DIAGNOSIS — R53.81 PHYSICAL DECONDITIONING: ICD-10-CM

## 2024-09-24 PROCEDURE — 97530 THERAPEUTIC ACTIVITIES: CPT | Performed by: PHYSICAL THERAPIST

## 2024-09-24 PROCEDURE — 97112 NEUROMUSCULAR REEDUCATION: CPT | Performed by: PHYSICAL THERAPIST

## 2024-09-24 NOTE — PROGRESS NOTES
PT Evaluation     Today's date: 2024  Patient name: Aldo Rodriguez  : 1940  MRN: [id number]  Referring provider: Callie Caro CRNP  Dx:   Encounter Diagnosis     ICD-10-CM    1. Muscle weakness (generalized)  M62.81       2. Parkinsonism, unspecified Parkinsonism type  G20.C Ambulatory Referral to Physical Therapy      3. Muscular deconditioning  R29.898       4. Difficulty walking  R26.2       5. Physical deconditioning  R53.81 Ambulatory Referral to Physical Therapy      6. Ambulatory dysfunction  R26.2 Ambulatory Referral to Physical Therapy          Assessment  Assessment details: Aldo Rodriguez is a 83 y.o. male presenting to outpatient physical therapy at [facility] with complaints of chronic bilateral knee pain and onset of falls. Within the past year, caregiver reports being diagnosed with Parkinsonism. He is gradually improving with his exercise tolerance; however, he continues to present with decreased range of motion, decreased strength, limited flexibility, poor postural awareness, poor body mechanics, altered gait pattern, poor balance, decreased tolerance to activity and decreased functional mobility due to Neck pain  (primary encounter diagnosis), Poor posture, Chronic pain of both knees, and Difficulty walking. Therapist discussed diagnosis, prognosis, plan of care, proper responses to exercises, HEP, and modalities to use at home.  He would benefit from skilled PT services in order to address these deficits and reach maximum level of function. The plan is to progress patient from restorative therapy in to maintenance physical therapy after the next four weeks. He is seeing ortho on Friday to discuss options for his bilateral knee pain (R worse than L).  Thank you for the referral!  Impairments: abnormal coordination, abnormal gait, abnormal muscle firing, abnormal muscle tone, abnormal or restricted ROM, abnormal movement, activity intolerance, impaired balance, impaired  physical strength, lacks appropriate home exercise program, pain with function, scapular dyskinesis, poor posture  and poor body mechanics    Symptom irritability: moderateUnderstanding of Dx/Px/POC: good   Prognosis: good    Goals  STGs (in 4 weeks):  1. Pt will report having at least a 50% improvement since I.E. - Goal met  2. Pt will report having less than a 5/10 pain level with B knees. - Progressing towards  3. Pt will demonstrate at least 4/5 MMT throughout BLE. - Progressing towards     LTGs (in 12 weeks):  1. Pt will report having at least a 75% improvement since I.E. - Progressing towards   2. Pt will be able to perform at least 10 sit<>stands in 30 seconds to demonstrate improved BLE strength and balance. - Progressing towards   3. Pt will be able to perform TUG in less than 18 seconds. - Progressing towards   4. Pt will be able to amb at least 140 meters during 2 MWT with good posture in order to increase endurance to normative age related values. - Progressing towards   5. Pt will be independent with HEP. - Progressing towards     Plan  Patient would benefit from: skilled physical therapy  Planned modality interventions: TENS and unattended electrical stimulation  Planned therapy interventions: joint mobilization, manual therapy, neuromuscular re-education, patient education, self care, strengthening, stretching, therapeutic activities, therapeutic exercise, balance, flexibility and home exercise program  Frequency: 2x week  Plan of Care beginning date: 9/24/2024  Plan of Care expiration date: 12/17/2024  Treatment plan discussed with: patient, caregiver and family        Subjective Evaluation    History of Present Illness    RE: 9/24/2024: Patient reports that he is approximately 50% improve since I.E.     RE: 8/20/2024: Patient reports that his neck has been hurting a little more today but feels better with neck stretching.     RE: 6/18/24: Patient reports that his pain has been okay.     Mechanism of  injury: Pt is a 83 year old male who presents with chief complaint of chronic progressive neck pain and bilateral knee pain. Caregiver reports that reveals OA with bilateral knees R worse than L. He reports that patient has been having bilateral LE swelling which he has tried to elevate his legs throughout the day. Caregiver reports that he has not been seen by ortho for his knee but has bought him a knee brace for support. Now referred to skilled OP PT for ambulatory dysfunction.   Quality of life: good    Pain  Current pain ratin  At best pain ratin  At worst pain ratin  Pain location: bilateral knee pain (R worse than L)  Quality: discomfort, dull ache, pressure and tight  Relieving factors: relaxation and rest  Aggravating factors: walking, lifting, stair climbing and standing (sit to stand transfer)  Progression: gradually improving    Hand dominance: right    Treatments  Previous treatment: injection treatment  Patient Goals  Patient goals for therapy: decreased pain, improved balance, increased motion, increased strength, independence with ADLs/IADLs and return to sport/leisure activities          Objective    Objective:       Posture: Pt's sitting posture is FHP and rounded shoulders.       AROM     R   L  Knee Flex  NT   NT  Knee Extension NT   NT      MMT      R   L  S' Flex   4/5   4/5  S' ABD   4-/5   4-/5  S' ER   4-/5   4-/5    Static K' Ext  4-/5   4-/5  Static K' Flex  3+/5   3+/5  Dynamic K' Ext 3/5   3/5  Dynamic K' Ext 3-/5   3-/5    Hip Flex  4-/5   4-/5  Hip ABD  3+/5   3+/5    Ankle DF  3-/5   3-/5  Ankle PF  3/5   3/5        Special Tests: opposition: (R) decreased speed (L) decreased speed  Heel to shin: (R) decreased speed (L) decreased speed  Nose to finger: (R) decreased speed (L) decreased speed    Functional mobility:  Ambulation: Pt ambs using RW with forward trunk flexion with FHP and rounded shoulders    Functional testin second repeated sit<>stand: 3 times with  definite use of BUE  TU seconds with definite use of BUEs using RW  2 MWT: 160 ft (48.8 meters) using RW         Precautions: fall risk, dementia, hx of CVA/TIA      HEP: HR, TR, SNAGs extension, chin tucks, scap retraction, LAQ      Specialty Daily Treatment Diary     Manual    STM; TPR B UT, LS        STM/TPR B knees                Knee flexion stretch        Knee Ext stretch                Exercise Diary         Pt education        UBE (retro)        RB        Laps: 6MWT NV? NV?              HR 30 30  30x 30   TR 30 30  30x 30   Mini Squats        Chair squats 2 foam: x10 2 foam: 1x10  1 foam x5   2 foam x3  2 foam x8   Sit<>stand        Slant board gastroc stretch        MERLINE (over high counter top with pillow behind)                 Marching Standing at RW: 2 mins  Standing at RW: 2 mins   Standing at RW: 2 mins   Standing H' ABD        Standing H' Ext                Floor -> Airex NBOS        Floor  1/2 Tandem        Floor -> Airex SLS        Lateral steps        1 cone tap        2 cone tap                Stepping over hurdles forward        Laterally stepping over hurdles                Stepping on random pads        Horizontal abduction - b/l - seated         Bicep Curls 6# 3x10  6# 3x10   6# 2x10   OH Press  Hold    Hold due to pain   TB Scap Retraction in standing  GTB 2 sec x 20 BTB 2 sec x 20      TB B S' Ext                Low pec stretch        Mid pec stretch                Seated Heel slides using slider (knee flexion and extension stretch) 5 sec x 20 ea NV  :05x20 ea 5 sec x 30 ea   Seated HS stretch 30 sec x 3 ea c stool 30 sec x 3 ea c stool   30 sec x 3 ea c stool           Seated 2 way piriformis stretch        Chin tucks        SNAGs Ext 2x10 2x10  2x10  2x10   Seated Thoracic Extension over half foam 1/2 foam: 2x10 1/2 foam: 2x10  1/2 foam: 2x10 1/2 foam: 2x10                           LAQ                SLR (Flex)                Ball Squeezes Sitting at edge of hair:  Grey ball: 5 sec x 30 Sitting at edge of chair: grey ball: 5 sec x 30  Sitting at edge of chair: 5 sec x 30  Sitting at edge of chair: 5 sec x 30   Ball c Bridge                                Hooklying TB B Clamshells NV? Seated: BTB 5 sec x 30  Seated: BTB 5 sec x 30 Seated: MTB 5 sec x 30   TB Bridges                                                HEP/EDU        Modalities        MH 10 mins c there ex NV?  5 mins pre  Def

## 2024-09-26 ENCOUNTER — APPOINTMENT (OUTPATIENT)
Dept: PHYSICAL THERAPY | Facility: CLINIC | Age: 84
End: 2024-09-26
Payer: MEDICARE

## 2024-09-27 ENCOUNTER — APPOINTMENT (OUTPATIENT)
Dept: RADIOLOGY | Facility: CLINIC | Age: 84
End: 2024-09-27
Payer: MEDICARE

## 2024-09-27 ENCOUNTER — OFFICE VISIT (OUTPATIENT)
Dept: OBGYN CLINIC | Facility: CLINIC | Age: 84
End: 2024-09-27
Payer: MEDICARE

## 2024-09-27 VITALS
BODY MASS INDEX: 25.76 KG/M2 | HEART RATE: 61 BPM | DIASTOLIC BLOOD PRESSURE: 80 MMHG | SYSTOLIC BLOOD PRESSURE: 156 MMHG | HEIGHT: 68 IN | WEIGHT: 170 LBS

## 2024-09-27 DIAGNOSIS — Z01.89 ENCOUNTER FOR LOWER EXTREMITY COMPARISON IMAGING STUDY: ICD-10-CM

## 2024-09-27 DIAGNOSIS — M25.561 RIGHT KNEE PAIN, UNSPECIFIED CHRONICITY: ICD-10-CM

## 2024-09-27 DIAGNOSIS — M17.11 PRIMARY OSTEOARTHRITIS OF RIGHT KNEE: Primary | ICD-10-CM

## 2024-09-27 DIAGNOSIS — M17.12 PRIMARY OSTEOARTHRITIS OF LEFT KNEE: ICD-10-CM

## 2024-09-27 PROCEDURE — 73564 X-RAY EXAM KNEE 4 OR MORE: CPT

## 2024-09-27 PROCEDURE — 20610 DRAIN/INJ JOINT/BURSA W/O US: CPT | Performed by: PHYSICIAN ASSISTANT

## 2024-09-27 PROCEDURE — 73560 X-RAY EXAM OF KNEE 1 OR 2: CPT

## 2024-09-27 PROCEDURE — 99214 OFFICE O/P EST MOD 30 MIN: CPT | Performed by: STUDENT IN AN ORGANIZED HEALTH CARE EDUCATION/TRAINING PROGRAM

## 2024-09-27 PROCEDURE — 77073 BONE LENGTH STUDIES: CPT

## 2024-09-27 RX ADMIN — TRIAMCINOLONE ACETONIDE 40 MG: 40 INJECTION, SUSPENSION INTRA-ARTICULAR; INTRAMUSCULAR at 14:00

## 2024-09-27 RX ADMIN — LIDOCAINE HYDROCHLORIDE 4 ML: 10 INJECTION, SOLUTION INFILTRATION; PERINEURAL at 14:00
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
## 2024-09-28 RX ORDER — TRIAMCINOLONE ACETONIDE 40 MG/ML
40 INJECTION, SUSPENSION INTRA-ARTICULAR; INTRAMUSCULAR
Status: COMPLETED | OUTPATIENT
Start: 2024-09-27 | End: 2024-09-27

## 2024-09-28 RX ORDER — LIDOCAINE HYDROCHLORIDE 10 MG/ML
4 INJECTION, SOLUTION INFILTRATION; PERINEURAL
Status: COMPLETED | OUTPATIENT
Start: 2024-09-27 | End: 2024-09-27

## 2024-10-01 ENCOUNTER — OFFICE VISIT (OUTPATIENT)
Dept: PHYSICAL THERAPY | Facility: CLINIC | Age: 84
End: 2024-10-01
Payer: MEDICARE

## 2024-10-01 DIAGNOSIS — G20.C PARKINSONISM, UNSPECIFIED PARKINSONISM TYPE (HCC): ICD-10-CM

## 2024-10-01 DIAGNOSIS — R53.81 PHYSICAL DECONDITIONING: ICD-10-CM

## 2024-10-01 DIAGNOSIS — R26.2 DIFFICULTY WALKING: ICD-10-CM

## 2024-10-01 DIAGNOSIS — M62.81 MUSCLE WEAKNESS (GENERALIZED): Primary | ICD-10-CM

## 2024-10-01 DIAGNOSIS — R26.2 AMBULATORY DYSFUNCTION: ICD-10-CM

## 2024-10-01 DIAGNOSIS — R29.898 MUSCULAR DECONDITIONING: ICD-10-CM

## 2024-10-01 PROCEDURE — 97110 THERAPEUTIC EXERCISES: CPT | Performed by: PHYSICAL THERAPIST

## 2024-10-01 PROCEDURE — 97112 NEUROMUSCULAR REEDUCATION: CPT | Performed by: PHYSICAL THERAPIST

## 2024-10-01 NOTE — PROGRESS NOTES
Daily Note     Today's date: 10/1/2024  Patient name: Aldo Rodriguez  : 1940  MRN: [id number]  Referring provider: Callie Caro CRNP  Dx:   Encounter Diagnosis     ICD-10-CM    1. Muscle weakness (generalized)  M62.81       2. Difficulty walking  R26.2       3. Physical deconditioning  R53.81       4. Ambulatory dysfunction  R26.2       5. Muscular deconditioning  R29.898       6. Parkinsonism, unspecified Parkinsonism type (HCC)  G20.C                      Subjective: He repots that he had injections last week and thinks it might have helped a little.       Objective: See treatment diary below      Assessment: Tolerated treatment well; initiated POC with MH to B knees while performing seated there ex. Vcs provided on proper sequencing with exercises; he reports having increased right knee pain with STS and thus held exercise tonight. Updated and reviewed HEP. Patient demonstrated fatigue post treatment and would benefit from continued PT      Plan: Continue per plan of care.        Precautions: fall risk, dementia, hx of CVA/TIA      HEP: HR, TR, SNAGs extension, chin tucks, scap retraction, LAQ      Specialty Daily Treatment Diary     Manual 9/19 9/24 10/1  9/17   STM; TPR B UT, LS        STM/TPR B knees                Knee flexion stretch        Knee Ext stretch                Exercise Diary         Pt education        UBE (retro)        RB        Laps: 6MWT NV? NV? NV?             HR 30 30 30  30   TR 30 30 30  30   Mini Squats        Chair squats 2 foam: x10 2 foam: 1x10 2 foam: 1x3 (pain in R knee)  2 foam x8   Sit<>stand        Slant board gastroc stretch        MERLINE (over high counter top with pillow behind)                 Marching Standing at RW: 2 mins  Standing at RW: 2 mins Standing at RW: 2 mins  Standing at RW: 2 mins   Standing H' ABD        Standing H' Ext                Floor -> Airex NBOS        Floor  1/2 Tandem        Floor -> Airex SLS        Lateral steps        1 cone tap         2 cone tap                Stepping over hurdles forward        Laterally stepping over hurdles                Stepping on random pads        Horizontal abduction - b/l - seated         Bicep Curls 6# 3x10  6# 3x10 6# 3x10  6# 2x10   OH Press  Hold    Hold due to pain   TB Scap Retraction in standing  GTB 2 sec x 20 BTB 2 sec x 20 BTB 2 sec x 20     TB B S' Ext                Low pec stretch        Mid pec stretch                Seated Heel slides using slider (knee flexion and extension stretch) 5 sec x 20 ea NV 5 sec x 20 ea  5 sec x 30 ea   Seated HS stretch 30 sec x 3 ea c stool 30 sec x 3 ea c stool 30 sec x 3 ea c stool  30 sec x 3 ea c stool           Seated 2 way piriformis stretch        Chin tucks        SNAGs Ext 2x10 2x10 2x10  2x10   Seated Thoracic Extension over half foam 1/2 foam: 2x10 1/2 foam: 2x10 1/2 foam: 2x10  1/2 foam: 2x10                           LAQ                SLR (Flex)                Ball Squeezes Sitting at edge of hair: Grey ball: 5 sec x 30 Sitting at edge of chair: grey ball: 5 sec x 30 Sitting at edge of chair: soccer ball: 5 sec x 30  Sitting at edge of chair: 5 sec x 30   Ball c Bridge                                Hooklying TB B Clamshells NV? Seated: BTB 5 sec x 30 NV?  Seated: MTB 5 sec x 30   TB Bridges                                                HEP/EDU        Modalities        MH 10 mins c there ex NV? 10 mins with there ex  Def

## 2024-10-03 ENCOUNTER — OFFICE VISIT (OUTPATIENT)
Dept: PHYSICAL THERAPY | Facility: CLINIC | Age: 84
End: 2024-10-03
Payer: MEDICARE

## 2024-10-03 DIAGNOSIS — R29.898 MUSCULAR DECONDITIONING: ICD-10-CM

## 2024-10-03 DIAGNOSIS — M62.81 MUSCLE WEAKNESS (GENERALIZED): Primary | ICD-10-CM

## 2024-10-03 DIAGNOSIS — R26.2 AMBULATORY DYSFUNCTION: ICD-10-CM

## 2024-10-03 DIAGNOSIS — R26.2 DIFFICULTY WALKING: ICD-10-CM

## 2024-10-03 DIAGNOSIS — G20.C PARKINSONISM, UNSPECIFIED PARKINSONISM TYPE (HCC): ICD-10-CM

## 2024-10-03 DIAGNOSIS — R53.81 PHYSICAL DECONDITIONING: ICD-10-CM

## 2024-10-03 PROCEDURE — 97112 NEUROMUSCULAR REEDUCATION: CPT

## 2024-10-03 PROCEDURE — 97110 THERAPEUTIC EXERCISES: CPT

## 2024-10-03 NOTE — PROGRESS NOTES
Daily Note     Today's date: 10/3/2024  Patient name: Aldo Rodriguez  : 1940  MRN: [id number]  Referring provider: Callie Caro CRNP  Dx:   Encounter Diagnosis     ICD-10-CM    1. Muscle weakness (generalized)  M62.81       2. Difficulty walking  R26.2       3. Physical deconditioning  R53.81       4. Ambulatory dysfunction  R26.2       5. Muscular deconditioning  R29.898       6. Parkinsonism, unspecified Parkinsonism type (HCC)  G20.C                      Subjective: Aldo reports low back soreness upon arrival.       Objective: See treatment diary below      Assessment: Aldo tolerated PT treatment well. Initiated session with MH to B/L knee. Was able to complete additional repetitions with STS from chair, 2 foam support required. Pt would benefit from additional skilled maintenance therapy in effort to prevent decline in function with resultant decrease in independent self care and transfers, and to prevent an increased risk for fall, and loss of functional strength.      Plan: Continue per plan of care.        Precautions: fall risk, dementia, hx of CVA/TIA      HEP: HR, TR, SNAGs extension, chin tucks, scap retraction, LAQ      Specialty Daily Treatment Diary     Manual 9/19 9/24 10/1 10/3    STM; TPR B UT, LS        STM/TPR B knees                Knee flexion stretch        Knee Ext stretch                Exercise Diary         Pt education        UBE (retro)        RB        Laps: 6MWT NV? NV? NV?             HR 30 30 30 30x    TR 30 30 30 30x    Mini Squats        Chair squats 2 foam: x10 2 foam: 1x10 2 foam: 1x3 (pain in R knee) 2 foam x6     Sit<>stand        Slant board gastroc stretch        MERLINE (over high counter top with pillow behind)                 Marching Standing at RW: 2 mins  Standing at RW: 2 mins Standing at RW: 2 mins Standing at RW: 2 mins    Standing H' ABD        Standing H' Ext                Floor -> Airex NBOS        Floor  1/2 Tandem        Floor -> Airex SLS         Lateral steps        1 cone tap        2 cone tap                Stepping over hurdles forward        Laterally stepping over hurdles                Stepping on random pads        Horizontal abduction - b/l - seated         Bicep Curls 6# 3x10  6# 3x10 6# 3x10 6# 3x10     OH Press  Hold       TB Scap Retraction in standing  GTB 2 sec x 20 BTB 2 sec x 20 BTB 2 sec x 20 BTB 3x10     TB B S' Ext                Low pec stretch        Mid pec stretch                Seated Heel slides using slider (knee flexion and extension stretch) 5 sec x 20 ea NV 5 sec x 20 ea :05 20x     Seated HS stretch 30 sec x 3 ea c stool 30 sec x 3 ea c stool 30 sec x 3 ea c stool :30x3 ea             Seated 2 way piriformis stretch        Chin tucks        SNAGs Ext 2x10 2x10 2x10 2x10     Seated Thoracic Extension over half foam 1/2 foam: 2x10 1/2 foam: 2x10 1/2 foam: 2x10 1/2 foam: 2x10                            LAQ                SLR (Flex)                Ball Squeezes Sitting at edge of hair: Grey ball: 5 sec x 30 Sitting at edge of chair: grey ball: 5 sec x 30 Sitting at edge of chair: soccer ball: 5 sec x 30     Ball c Bridge                                Hooklying TB B Clamshells NV? Seated: BTB 5 sec x 30 NV?     TB Bridges                                                HEP/EDU        Modalities        MH 10 mins c there ex NV? 10 mins with there ex

## 2024-10-09 ENCOUNTER — APPOINTMENT (OUTPATIENT)
Dept: LAB | Facility: HOSPITAL | Age: 84
End: 2024-10-09
Payer: MEDICARE

## 2024-10-09 DIAGNOSIS — I10 ESSENTIAL HYPERTENSION: ICD-10-CM

## 2024-10-09 DIAGNOSIS — N64.4 BREAST PAIN: ICD-10-CM

## 2024-10-09 LAB
ALBUMIN SERPL BCG-MCNC: 4.2 G/DL (ref 3.5–5)
ALP SERPL-CCNC: 81 U/L (ref 34–104)
ALT SERPL W P-5'-P-CCNC: 17 U/L (ref 7–52)
ANION GAP SERPL CALCULATED.3IONS-SCNC: 4 MMOL/L (ref 4–13)
AST SERPL W P-5'-P-CCNC: 14 U/L (ref 13–39)
BASOPHILS # BLD AUTO: 0.04 THOUSANDS/ΜL (ref 0–0.1)
BASOPHILS NFR BLD AUTO: 0 % (ref 0–1)
BILIRUB SERPL-MCNC: 0.83 MG/DL (ref 0.2–1)
BUN SERPL-MCNC: 17 MG/DL (ref 5–25)
CALCIUM SERPL-MCNC: 9.1 MG/DL (ref 8.4–10.2)
CHLORIDE SERPL-SCNC: 102 MMOL/L (ref 96–108)
CO2 SERPL-SCNC: 31 MMOL/L (ref 21–32)
CREAT SERPL-MCNC: 0.96 MG/DL (ref 0.6–1.3)
EOSINOPHIL # BLD AUTO: 0.15 THOUSAND/ΜL (ref 0–0.61)
EOSINOPHIL NFR BLD AUTO: 1 % (ref 0–6)
ERYTHROCYTE [DISTWIDTH] IN BLOOD BY AUTOMATED COUNT: 13.3 % (ref 11.6–15.1)
GFR SERPL CREATININE-BSD FRML MDRD: 72 ML/MIN/1.73SQ M
GLUCOSE P FAST SERPL-MCNC: 96 MG/DL (ref 65–99)
HCT VFR BLD AUTO: 46.1 % (ref 36.5–49.3)
HGB BLD-MCNC: 14.8 G/DL (ref 12–17)
IMM GRANULOCYTES # BLD AUTO: 0.05 THOUSAND/UL (ref 0–0.2)
IMM GRANULOCYTES NFR BLD AUTO: 1 % (ref 0–2)
LYMPHOCYTES # BLD AUTO: 1.91 THOUSANDS/ΜL (ref 0.6–4.47)
LYMPHOCYTES NFR BLD AUTO: 18 % (ref 14–44)
MCH RBC QN AUTO: 28.8 PG (ref 26.8–34.3)
MCHC RBC AUTO-ENTMCNC: 32.1 G/DL (ref 31.4–37.4)
MCV RBC AUTO: 90 FL (ref 82–98)
MONOCYTES # BLD AUTO: 0.77 THOUSAND/ΜL (ref 0.17–1.22)
MONOCYTES NFR BLD AUTO: 7 % (ref 4–12)
NEUTROPHILS # BLD AUTO: 7.94 THOUSANDS/ΜL (ref 1.85–7.62)
NEUTS SEG NFR BLD AUTO: 73 % (ref 43–75)
NRBC BLD AUTO-RTO: 0 /100 WBCS
PLATELET # BLD AUTO: 229 THOUSANDS/UL (ref 149–390)
PMV BLD AUTO: 8.9 FL (ref 8.9–12.7)
POTASSIUM SERPL-SCNC: 5.1 MMOL/L (ref 3.5–5.3)
PROLACTIN SERPL-MCNC: 12.72 NG/ML
PROT SERPL-MCNC: 7.2 G/DL (ref 6.4–8.4)
RBC # BLD AUTO: 5.14 MILLION/UL (ref 3.88–5.62)
SODIUM SERPL-SCNC: 137 MMOL/L (ref 135–147)
TSH SERPL DL<=0.05 MIU/L-ACNC: 1.85 UIU/ML (ref 0.45–4.5)
WBC # BLD AUTO: 10.86 THOUSAND/UL (ref 4.31–10.16)

## 2024-10-09 PROCEDURE — 80053 COMPREHEN METABOLIC PANEL: CPT

## 2024-10-09 PROCEDURE — 84443 ASSAY THYROID STIM HORMONE: CPT

## 2024-10-09 PROCEDURE — 85025 COMPLETE CBC W/AUTO DIFF WBC: CPT

## 2024-10-09 PROCEDURE — 36415 COLL VENOUS BLD VENIPUNCTURE: CPT

## 2024-10-09 PROCEDURE — 84146 ASSAY OF PROLACTIN: CPT

## 2024-10-10 ENCOUNTER — OFFICE VISIT (OUTPATIENT)
Dept: PHYSICAL THERAPY | Facility: CLINIC | Age: 84
End: 2024-10-10
Payer: MEDICARE

## 2024-10-10 DIAGNOSIS — R53.81 PHYSICAL DECONDITIONING: ICD-10-CM

## 2024-10-10 DIAGNOSIS — R29.898 MUSCULAR DECONDITIONING: ICD-10-CM

## 2024-10-10 DIAGNOSIS — R26.2 DIFFICULTY WALKING: ICD-10-CM

## 2024-10-10 DIAGNOSIS — R26.2 AMBULATORY DYSFUNCTION: ICD-10-CM

## 2024-10-10 DIAGNOSIS — G20.C PARKINSONISM, UNSPECIFIED PARKINSONISM TYPE (HCC): ICD-10-CM

## 2024-10-10 DIAGNOSIS — M62.81 MUSCLE WEAKNESS (GENERALIZED): Primary | ICD-10-CM

## 2024-10-10 PROCEDURE — 97530 THERAPEUTIC ACTIVITIES: CPT | Performed by: PHYSICAL THERAPIST

## 2024-10-10 PROCEDURE — 97110 THERAPEUTIC EXERCISES: CPT | Performed by: PHYSICAL THERAPIST

## 2024-10-10 NOTE — PROGRESS NOTES
Daily Note     Today's date: 10/10/2024  Patient name: Aldo Rodriguez  : 1940  MRN: [id number]  Referring provider: Callie Caro CRNP  Dx:   Encounter Diagnosis     ICD-10-CM    1. Muscle weakness (generalized)  M62.81       2. Difficulty walking  R26.2       3. Physical deconditioning  R53.81       4. Muscular deconditioning  R29.898       5. Parkinsonism, unspecified Parkinsonism type (HCC)  G20.C       6. Ambulatory dysfunction  R26.2             Subjective: Injections seemed to help a bit. He gets gel injection on .       Objective: See treatment diary below      Assessment: Tolerated treatment well. Patient would benefit from continued PT. Tolerated session well. Some c/o R knee cracking and discomfort periodically t/o session      Plan: Continue per plan of care.      Precautions: fall risk, dementia, hx of CVA/TIA      HEP: HR, TR, SNAGs extension, chin tucks, scap retraction, LAQ      Specialty Daily Treatment Diary     Manual 9/19 9/24 10/1 10/3 10/10   STM; TPR B UT, LS        STM/TPR B knees                Knee flexion stretch        Knee Ext stretch                Exercise Diary         Pt education        UBE (retro)        RB        Laps: 6MWT NV? NV? NV?             HR 30 30 30 30x 30x   TR 30 30 30 30x 30x   Mini Squats        Chair squats 2 foam: x10 2 foam: 1x10 2 foam: 1x3 (pain in R knee) 2 foam x6  2 foam: 1x10   Sit<>stand        Slant board gastroc stretch        MERLINE (over high counter top with pillow behind)                 Marching Standing at RW: 2 mins  Standing at RW: 2 mins Standing at RW: 2 mins Standing at RW: 2 mins Standing at RW: 2 mins   Standing H' ABD        Standing H' Ext                Floor -> Airex NBOS        Floor  1/2 Tandem        Floor -> Airex SLS        Lateral steps        1 cone tap        2 cone tap                Stepping over hurdles forward        Laterally stepping over hurdles                Stepping on random pads        Horizontal  abduction - b/l - seated         Bicep Curls 6# 3x10  6# 3x10 6# 3x10 6# 3x10  6# 3x10    OH Press  Hold       TB Scap Retraction in standing  GTB 2 sec x 20 BTB 2 sec x 20 BTB 2 sec x 20 BTB 3x10  BTB 3x10    TB B S' Ext                Low pec stretch        Mid pec stretch                Seated Heel slides using slider (knee flexion and extension stretch) 5 sec x 20 ea NV 5 sec x 20 ea :05 20x  5 sec x 20 ea   Seated HS stretch 30 sec x 3 ea c stool 30 sec x 3 ea c stool 30 sec x 3 ea c stool :30x3 ea  30 sec x 2 ea w/ stool           Seated 2 way piriformis stretch        Chin tucks        SNAGs Ext 2x10 2x10 2x10 2x10  2x10    Seated Thoracic Extension over half foam 1/2 foam: 2x10 1/2 foam: 2x10 1/2 foam: 2x10 1/2 foam: 2x10                            LAQ                SLR (Flex)                Ball Squeezes Sitting at edge of hair: Grey ball: 5 sec x 30 Sitting at edge of chair: grey ball: 5 sec x 30 Sitting at edge of chair: soccer ball: 5 sec x 30  Sitting at edge of chair: soccer ball: 5 sec x 30   Ball c Bridge                                Hooklying TB B Clamshells NV? Seated: BTB 5 sec x 30 NV?     TB Bridges                                                HEP/EDU        Modalities        MH 10 mins c there ex NV? 10 mins with there ex

## 2024-10-11 ENCOUNTER — APPOINTMENT (OUTPATIENT)
Dept: PHYSICAL THERAPY | Facility: CLINIC | Age: 84
End: 2024-10-11
Payer: MEDICARE

## 2024-10-15 ENCOUNTER — OFFICE VISIT (OUTPATIENT)
Dept: PHYSICAL THERAPY | Facility: CLINIC | Age: 84
End: 2024-10-15
Payer: MEDICARE

## 2024-10-15 DIAGNOSIS — R26.2 DIFFICULTY WALKING: ICD-10-CM

## 2024-10-15 DIAGNOSIS — R29.898 MUSCULAR DECONDITIONING: ICD-10-CM

## 2024-10-15 DIAGNOSIS — G20.C PARKINSONISM, UNSPECIFIED PARKINSONISM TYPE (HCC): ICD-10-CM

## 2024-10-15 DIAGNOSIS — M62.81 MUSCLE WEAKNESS (GENERALIZED): Primary | ICD-10-CM

## 2024-10-15 DIAGNOSIS — R26.2 AMBULATORY DYSFUNCTION: ICD-10-CM

## 2024-10-15 DIAGNOSIS — R53.81 PHYSICAL DECONDITIONING: ICD-10-CM

## 2024-10-15 PROCEDURE — 97530 THERAPEUTIC ACTIVITIES: CPT

## 2024-10-15 PROCEDURE — 97110 THERAPEUTIC EXERCISES: CPT

## 2024-10-15 NOTE — PROGRESS NOTES
Daily Note     Today's date: 10/15/2024  Patient name: Aldo Rodriguez  : 1940  MRN: [id number]  Referring provider: Callie Caro CRNP  Dx:   Encounter Diagnosis     ICD-10-CM    1. Muscle weakness (generalized)  M62.81       2. Parkinsonism, unspecified Parkinsonism type (HCC)  G20.C       3. Difficulty walking  R26.2       4. Ambulatory dysfunction  R26.2       5. Physical deconditioning  R53.81       6. Muscular deconditioning  R29.898               Subjective: Patient states that he has been walking more in the last few day    Objective: See treatment diary below      Assessment: Patient responded well to treatment. Good endurance noted this session. Some crepitus present in lowerback, but no pain with extensions. Patient would benefit from continued PT to prevent further decline of balance, strength and endurance.       Plan: Continue per plan of care.      Precautions: fall risk, dementia, hx of CVA/TIA      HEP: HR, TR, SNAGs extension, chin tucks, scap retraction, LAQ      Specialty Daily Treatment Diary     Manual 9/24 10/1 10/3 10/10 10/15   STM; TPR B UT, LS        STM/TPR B knees                Knee flexion stretch        Knee Ext stretch                Exercise Diary         Pt education        UBE (retro)        RB        Laps: 6MWT NV? NV?              HR 30 30 30x 30x 30x   TR 30 30 30x 30x 30x   Mini Squats        Chair squats 2 foam: 1x10 2 foam: 1x3 (pain in R knee) 2 foam x6  2 foam: 1x10 2 foam: 1x10   Sit<>stand        Slant board gastroc stretch        MERLINE (over high counter top with pillow behind)                 Marching Standing at RW: 2 mins Standing at RW: 2 mins Standing at RW: 2 mins Standing at RW: 2 mins Standing at RW: 2 mins   Standing H' ABD        Standing H' Ext                Floor -> Airex NBOS        Floor  1/2 Tandem        Floor -> Airex SLS        Lateral steps        1 cone tap        2 cone tap                Stepping over hurdles forward        Laterally  stepping over hurdles                Stepping on random pads        Horizontal abduction - b/l - seated         Bicep Curls 6# 3x10 6# 3x10 6# 3x10  6# 3x10  6# 3x10    OH Press         TB Scap Retraction in standing  BTB 2 sec x 20 BTB 2 sec x 20 BTB 3x10  BTB 3x10  BTB 3x10    TB B S' Ext                Low pec stretch        Mid pec stretch                Seated Heel slides using slider (knee flexion and extension stretch) NV 5 sec x 20 ea :05 20x  5 sec x 20 ea 5 sec x 20 ea   Seated HS stretch 30 sec x 3 ea c stool 30 sec x 3 ea c stool :30x3 ea  30 sec x 2 ea w/ stool 30 sec x 2 ea w/ stool           Seated 2 way piriformis stretch        Chin tucks        SNAGs Ext 2x10 2x10 2x10  2x10  Into wall 2x10   Seated Thoracic Extension over half foam 1/2 foam: 2x10 1/2 foam: 2x10 1/2 foam: 2x10  1/2 2x10                           LAQ                SLR (Flex)                Ball Squeezes Sitting at edge of chair: grey ball: 5 sec x 30 Sitting at edge of chair: soccer ball: 5 sec x 30  Sitting at edge of chair: soccer ball: 5 sec x 30 Sitting at edge of chair: soccer ball: 5 sec x 30   Ball c Bridge                                Hooklying TB B Clamshells Seated: BTB 5 sec x 30 NV?   Seated: BTB 5 sec x 30   TB Bridges                                                HEP/EDU        Modalities        MH NV? 10 mins with there ex

## 2024-10-17 ENCOUNTER — OFFICE VISIT (OUTPATIENT)
Dept: PHYSICAL THERAPY | Facility: CLINIC | Age: 84
End: 2024-10-17
Payer: MEDICARE

## 2024-10-17 DIAGNOSIS — R29.898 MUSCULAR DECONDITIONING: ICD-10-CM

## 2024-10-17 DIAGNOSIS — R26.2 AMBULATORY DYSFUNCTION: ICD-10-CM

## 2024-10-17 DIAGNOSIS — R53.81 PHYSICAL DECONDITIONING: ICD-10-CM

## 2024-10-17 DIAGNOSIS — M62.81 MUSCLE WEAKNESS (GENERALIZED): Primary | ICD-10-CM

## 2024-10-17 DIAGNOSIS — R26.2 DIFFICULTY WALKING: ICD-10-CM

## 2024-10-17 DIAGNOSIS — G20.C PARKINSONISM, UNSPECIFIED PARKINSONISM TYPE (HCC): ICD-10-CM

## 2024-10-17 PROCEDURE — 97110 THERAPEUTIC EXERCISES: CPT

## 2024-10-17 PROCEDURE — 97530 THERAPEUTIC ACTIVITIES: CPT

## 2024-10-17 NOTE — PROGRESS NOTES
Daily Note     Today's date: 10/17/2024  Patient name: Aldo Rodriguez  : 1940  MRN: [id number]  Referring provider: Callie Caro CRNP  Dx:   Encounter Diagnosis     ICD-10-CM    1. Muscle weakness (generalized)  M62.81       2. Parkinsonism, unspecified Parkinsonism type (HCC)  G20.C       3. Difficulty walking  R26.2       4. Ambulatory dysfunction  R26.2       5. Physical deconditioning  R53.81       6. Muscular deconditioning  R29.898                 Subjective: Aldo offers no new complaints upon arrival.     Objective: See treatment diary below      Assessment: Aldo tolerated PT treatment well. Was able to complete additional set of STS from chair, foam A 2x. Cueing required to address form with heel slides to achieve greater flexion ROM. Intermittent seated rest breaks required secondary to fatigue. Pt would benefit from continued PT to further address impairments and maximize functional level.      Plan: Continue per plan of care.      Precautions: fall risk, dementia, hx of CVA/TIA      HEP: HR, TR, SNAGs extension, chin tucks, scap retraction, LAQ      Specialty Daily Treatment Diary     Manual 10/17  10/3 10/10 10/15   STM; TPR B UT, LS        STM/TPR B knees                Knee flexion stretch        Knee Ext stretch                Exercise Diary         Pt education        UBE (retro)        RB        Laps: 6MWT                HR 30x  30x 30x 30x   TR 30x  30x 30x 30x   Mini Squats        Chair squats 2 foam 2x10   2 foam x6  2 foam: 1x10 2 foam: 1x10   Sit<>stand        Slant board gastroc stretch        MERLINE (over high counter top with pillow behind)                 Marching Standing at RW: 2 mins  Standing at RW: 2 mins Standing at RW: 2 mins Standing at RW: 2 mins   Standing H' ABD        Standing H' Ext                Floor -> Airex NBOS        Floor  1/2 Tandem        Floor -> Airex SLS        Lateral steps        1 cone tap        2 cone tap                Stepping over hurdles  forward        Laterally stepping over hurdles                Stepping on random pads        Horizontal abduction - b/l - seated         Bicep Curls 6# 3x10   6# 3x10  6# 3x10  6# 3x10    OH Press         TB Scap Retraction in standing  BTB 3x10   BTB 3x10  BTB 3x10  BTB 3x10    TB B S' Ext                Low pec stretch        Mid pec stretch                Seated Heel slides using slider (knee flexion and extension stretch) 5 sec x 20 ea  :05 20x  5 sec x 20 ea 5 sec x 20 ea   Seated HS stretch 30 sec x 2 ea w/ stool  :30x3 ea  30 sec x 2 ea w/ stool 30 sec x 2 ea w/ stool           Seated 2 way piriformis stretch        Chin tucks        SNAGs Ext   2x10  2x10  Into wall 2x10   Seated Thoracic Extension over half foam 1/2 2x10  1/2 foam: 2x10  1/2 2x10                           LAQ                SLR (Flex)                Ball Squeezes Sitting at edge of chair: soccer ball: 5 sec x 30   Sitting at edge of chair: soccer ball: 5 sec x 30 Sitting at edge of chair: soccer ball: 5 sec x 30   Ball c Bridge                                Hooklying TB B Clamshells Seated: BTB 5 sec x 30    Seated: BTB 5 sec x 30   TB Bridges                                                HEP/EDU        Modalities        MH

## 2024-10-22 ENCOUNTER — TELEPHONE (OUTPATIENT)
Age: 84
End: 2024-10-22

## 2024-10-22 ENCOUNTER — OFFICE VISIT (OUTPATIENT)
Dept: PHYSICAL THERAPY | Facility: CLINIC | Age: 84
End: 2024-10-22
Payer: MEDICARE

## 2024-10-22 DIAGNOSIS — R26.2 AMBULATORY DYSFUNCTION: ICD-10-CM

## 2024-10-22 DIAGNOSIS — G20.C PARKINSONISM, UNSPECIFIED PARKINSONISM TYPE (HCC): ICD-10-CM

## 2024-10-22 DIAGNOSIS — R29.898 MUSCULAR DECONDITIONING: ICD-10-CM

## 2024-10-22 DIAGNOSIS — R26.2 DIFFICULTY WALKING: ICD-10-CM

## 2024-10-22 DIAGNOSIS — M62.81 MUSCLE WEAKNESS (GENERALIZED): ICD-10-CM

## 2024-10-22 DIAGNOSIS — R53.81 PHYSICAL DECONDITIONING: Primary | ICD-10-CM

## 2024-10-22 PROCEDURE — 97110 THERAPEUTIC EXERCISES: CPT

## 2024-10-22 PROCEDURE — 97530 THERAPEUTIC ACTIVITIES: CPT

## 2024-10-22 NOTE — PROGRESS NOTES
Daily Note     Today's date: 10/22/2024  Patient name: Aldo Rodriguez  : 1940  MRN: [id number]  Referring provider: Callie Caro CRNP  Dx:   Encounter Diagnosis     ICD-10-CM    1. Physical deconditioning  R53.81       2. Muscle weakness (generalized)  M62.81       3. Parkinsonism, unspecified Parkinsonism type (HCC)  G20.C       4. Muscular deconditioning  R29.898       5. Difficulty walking  R26.2       6. Ambulatory dysfunction  R26.2                      Subjective: Pt reports he is pretty sore all over today, his shins are bothering him a bit      Objective: See treatment diary below      Assessment: Tolerated treatment well. Pt performed all exercises without issue, continue to progress to tolerance. Pt showed good form with exercises throughout session, minimal cueing required. Pt would benefit from continued focus on stretching, strengthening, and functional stability exercises to improve overall function. Patient demonstrated fatigue post treatment, exhibited good technique with therapeutic exercises, and would benefit from continued PT      Plan: Continue per plan of care.      Precautions: fall risk, dementia, hx of CVA/TIA      HEP: HR, TR, SNAGs extension, chin tucks, scap retraction, LAQ      Specialty Daily Treatment Diary     Manual 10/17 10/22 10/3 10/10 10/15   STM; TPR B UT, LS        STM/TPR B knees                Knee flexion stretch        Knee Ext stretch                Exercise Diary         Pt education        UBE (retro)        RB        Laps: 6MWT                HR 30x 30x 30x 30x 30x   TR 30x 30x 30x 30x 30x   Mini Squats        Chair squats 2 foam 2x10  2 foam 2x10 2 foam x6  2 foam: 1x10 2 foam: 1x10   Sit<>stand        Slant board gastroc stretch        MERLINE (over high counter top with pillow behind)                 Marching Standing at RW: 2 mins Standing at RW: 2 mins Standing at RW: 2 mins Standing at RW: 2 mins Standing at RW: 2 mins   Standing H' ABD        Standing  "H' Ext                Floor -> Airex NBOS        Floor  1/2 Tandem        Floor -> Airex SLS        Lateral steps        1 cone tap        2 cone tap                Stepping over hurdles forward        Laterally stepping over hurdles                Stepping on random pads        Horizontal abduction - b/l - seated         Bicep Curls 6# 3x10  6# 3x10 6# 3x10  6# 3x10  6# 3x10    OH Press         TB Scap Retraction in standing  BTB 3x10  BTB 3x10 BTB 3x10  BTB 3x10  BTB 3x10    TB B S' Ext                Low pec stretch        Mid pec stretch                Seated Heel slides using slider (knee flexion and extension stretch) 5 sec x 20 ea 5\"x20 ea :05 20x  5 sec x 20 ea 5 sec x 20 ea   Seated HS stretch 30 sec x 2 ea w/ stool 30\"x2 ea w/stool :30x3 ea  30 sec x 2 ea w/ stool 30 sec x 2 ea w/ stool           Seated 2 way piriformis stretch        Chin tucks        SNAGs Ext   2x10  2x10  Into wall 2x10   Seated Thoracic Extension over half foam 1/2 2x10 1/2 foam 2x10 1/2 foam: 2x10  1/2 2x10                           LAQ                SLR (Flex)                Ball Squeezes Sitting at edge of chair: soccer ball: 5 sec x 30 Sitting at edge of chair soccer ball 5\"x30  Sitting at edge of chair: soccer ball: 5 sec x 30 Sitting at edge of chair: soccer ball: 5 sec x 30   Ball c Bridge                                Hooklying TB B Clamshells Seated: BTB 5 sec x 30 Seated BTB 5\"x30   Seated: BTB 5 sec x 30   TB Bridges                                                HEP/EDU        Modalities        MH                                        "

## 2024-10-22 NOTE — TELEPHONE ENCOUNTER
Patient's caregiver (Magen) called to see if chest XRAY can be ordered.  He said cough is not better since last appointment,  He has try OTC cough syrup but has not helped.    Thank you

## 2024-10-22 NOTE — TELEPHONE ENCOUNTER
caregiver called in regarding concerns with patient having some psychiatric issues. hes looking to know what steps he can go through to get a psych evaluation. he reports this has been an ongoing issue. hes concerned and states that if hes not able to have a clear picture of what is going on that he can continue to care for patient. please advise.

## 2024-10-24 ENCOUNTER — HOSPITAL ENCOUNTER (OUTPATIENT)
Dept: RADIOLOGY | Facility: HOSPITAL | Age: 84
End: 2024-10-24
Payer: MEDICARE

## 2024-10-24 ENCOUNTER — OFFICE VISIT (OUTPATIENT)
Dept: PHYSICAL THERAPY | Facility: CLINIC | Age: 84
End: 2024-10-24
Payer: MEDICARE

## 2024-10-24 DIAGNOSIS — F02.A2 MILD EARLY ONSET ALZHEIMER'S DEMENTIA WITH PSYCHOTIC DISTURBANCE (HCC): Primary | ICD-10-CM

## 2024-10-24 DIAGNOSIS — R29.898 MUSCULAR DECONDITIONING: ICD-10-CM

## 2024-10-24 DIAGNOSIS — R26.2 AMBULATORY DYSFUNCTION: ICD-10-CM

## 2024-10-24 DIAGNOSIS — G30.0 MILD EARLY ONSET ALZHEIMER'S DEMENTIA WITH PSYCHOTIC DISTURBANCE (HCC): Primary | ICD-10-CM

## 2024-10-24 DIAGNOSIS — M62.81 MUSCLE WEAKNESS (GENERALIZED): ICD-10-CM

## 2024-10-24 DIAGNOSIS — R26.2 DIFFICULTY WALKING: ICD-10-CM

## 2024-10-24 DIAGNOSIS — R05.3 CHRONIC COUGH: Primary | ICD-10-CM

## 2024-10-24 DIAGNOSIS — G20.C PARKINSONISM, UNSPECIFIED PARKINSONISM TYPE (HCC): ICD-10-CM

## 2024-10-24 DIAGNOSIS — R05.3 CHRONIC COUGH: ICD-10-CM

## 2024-10-24 DIAGNOSIS — R53.81 PHYSICAL DECONDITIONING: Primary | ICD-10-CM

## 2024-10-24 PROCEDURE — 97530 THERAPEUTIC ACTIVITIES: CPT

## 2024-10-24 PROCEDURE — 71046 X-RAY EXAM CHEST 2 VIEWS: CPT

## 2024-10-24 PROCEDURE — 97110 THERAPEUTIC EXERCISES: CPT

## 2024-10-24 PROCEDURE — 97112 NEUROMUSCULAR REEDUCATION: CPT

## 2024-10-24 NOTE — TELEPHONE ENCOUNTER
I looked back in his chart and do not see that he's ever been evaluated by senior care - this would be a quicker way to have him evaluated instead of psychiatry which can take awhile to get scheduled. Would caretaker like me to order senior care consult?

## 2024-10-24 NOTE — PROGRESS NOTES
Daily Note     Today's date: 10/24/2024  Patient name: Aldo Rodriguez  : 1940  MRN: [id number]  Referring provider: Callie Caro CRNP  Dx:   Encounter Diagnosis     ICD-10-CM    1. Physical deconditioning  R53.81       2. Muscle weakness (generalized)  M62.81       3. Parkinsonism, unspecified Parkinsonism type (HCC)  G20.C       4. Muscular deconditioning  R29.898       5. Difficulty walking  R26.2       6. Ambulatory dysfunction  R26.2             Start Time: 1615  Stop Time: 1700  Total time in clinic (min): 45 minutes    Subjective: Aldo reports B/L knee soreness upon arrival R>L.       Objective: See treatment diary below      Assessment: Aldo tolerated PT treatment well, pt had an excellent session today. Was able to complete additional repetitions with STS from chair, foam A required. Overall less fatigue throughout today's program. Pt would benefit from additional skilled maintenance therapy in effort to prevent decline in function with resultant decrease in independent self care and transfers, and to prevent an increased risk for fall, and loss of functional strength.        Plan: Continue per plan of care.      Precautions: fall risk, dementia, hx of CVA/TIA      HEP: HR, TR, SNAGs extension, chin tucks, scap retraction, LAQ      Specialty Daily Treatment Diary     Manual 10/17 10/22 10/24  10/15   STM; TPR B UT, LS        STM/TPR B knees                Knee flexion stretch        Knee Ext stretch                Exercise Diary         Pt education        UBE (retro)        RB        Laps: 6MWT                HR 30x 30x 30x  30x   TR 30x 30x 30x  30x   Mini Squats        Chair squats 2 foam 2x10  2 foam 2x10 2 foam 2x15   2 foam: 1x10   Sit<>stand        Slant board gastroc stretch        MERLINE (over high counter top with pillow behind)                 Marching Standing at RW: 2 mins Standing at RW: 2 mins Standing 3x10 ea   Standing at RW: 2 mins   Standing H' ABD        Standing H' Ext   "              Floor -> Airex NBOS        Floor  1/2 Tandem        Floor -> Airex SLS        Lateral steps        1 cone tap        2 cone tap                Stepping over hurdles forward        Laterally stepping over hurdles                Stepping on random pads        Horizontal abduction - b/l - seated         Bicep Curls 6# 3x10  6# 3x10 6# 3x10  6# 3x10    OH Press         TB Scap Retraction in standing  BTB 3x10  BTB 3x10 BTB 3x10  BTB 3x10    TB B S' Ext                Low pec stretch        Mid pec stretch                Seated Heel slides using slider (knee flexion and extension stretch) 5 sec x 20 ea 5\"x20 ea 5\"x20 ea  5 sec x 20 ea   Seated HS stretch 30 sec x 2 ea w/ stool 30\"x2 ea w/stool 30\"x2 ea w/stool  30 sec x 2 ea w/ stool           Seated 2 way piriformis stretch        Chin tucks        SNAGs Ext     Into wall 2x10   Seated Thoracic Extension over half foam 1/2 2x10 1/2 foam 2x10 1/2 foam 2x10  1/2 2x10                           LAQ                SLR (Flex)                Ball Squeezes Sitting at edge of chair: soccer ball: 5 sec x 30 Sitting at edge of chair soccer ball 5\"x30 Sitting at edge of chair soccer ball 5\"x30  Sitting at edge of chair: soccer ball: 5 sec x 30   Ball c Bridge                                Hooklying TB B Clamshells Seated: BTB 5 sec x 30 Seated BTB 5\"x30 Seated BTB 5\"x30  Seated: BTB 5 sec x 30   TB Bridges                                                HEP/EDU        Modalities        MH                                        "

## 2024-10-25 ENCOUNTER — TELEPHONE (OUTPATIENT)
Age: 84
End: 2024-10-25

## 2024-10-25 NOTE — TELEPHONE ENCOUNTER
Contacted Pt. Care Giver SHWETA, in regards to ROUTINE Referral, Care giver stated that Pt. does need Psych help but refuses treatment.

## 2024-10-29 ENCOUNTER — APPOINTMENT (OUTPATIENT)
Dept: PHYSICAL THERAPY | Facility: CLINIC | Age: 84
End: 2024-10-29
Payer: MEDICARE

## 2024-11-03 ENCOUNTER — APPOINTMENT (EMERGENCY)
Dept: CT IMAGING | Facility: HOSPITAL | Age: 84
End: 2024-11-03
Payer: MEDICARE

## 2024-11-03 ENCOUNTER — HOSPITAL ENCOUNTER (EMERGENCY)
Facility: HOSPITAL | Age: 84
Discharge: HOME | End: 2024-11-04
Attending: EMERGENCY MEDICINE
Payer: MEDICARE

## 2024-11-03 DIAGNOSIS — Z86.59 HISTORY OF DEPRESSION: ICD-10-CM

## 2024-11-03 DIAGNOSIS — H53.19 DISTORTED VISION: ICD-10-CM

## 2024-11-03 DIAGNOSIS — Z86.59 HISTORY OF DEMENTIA: ICD-10-CM

## 2024-11-03 DIAGNOSIS — Z00.8 ENCOUNTER FOR PSYCHOLOGICAL EVALUATION: Primary | ICD-10-CM

## 2024-11-03 DIAGNOSIS — N39.0 UTI (URINARY TRACT INFECTION): ICD-10-CM

## 2024-11-03 DIAGNOSIS — E83.51 HYPOCALCEMIA: ICD-10-CM

## 2024-11-03 LAB
2HR DELTA HS TROPONIN: 0 NG/L
ALBUMIN SERPL BCG-MCNC: 3.5 G/DL (ref 3.5–5)
ALP SERPL-CCNC: 60 U/L (ref 34–104)
ALT SERPL W P-5'-P-CCNC: 6 U/L (ref 7–52)
AMPHETAMINES SERPL QL SCN: NEGATIVE
ANION GAP SERPL CALCULATED.3IONS-SCNC: 5 MMOL/L (ref 4–13)
APAP SERPL-MCNC: <2 UG/ML (ref 10–20)
AST SERPL W P-5'-P-CCNC: 12 U/L (ref 13–39)
ATRIAL RATE: 75 BPM
BACTERIA UR QL AUTO: ABNORMAL /HPF
BARBITURATES UR QL: NEGATIVE
BASOPHILS # BLD AUTO: 0.04 THOUSANDS/ΜL (ref 0–0.1)
BASOPHILS NFR BLD AUTO: 0 % (ref 0–1)
BENZODIAZ UR QL: NEGATIVE
BILIRUB SERPL-MCNC: 1.28 MG/DL (ref 0.2–1)
BILIRUB UR QL STRIP: NEGATIVE
BNP SERPL-MCNC: 84 PG/ML (ref 0–100)
BUN SERPL-MCNC: 17 MG/DL (ref 5–25)
CA-I BLD-SCNC: 1.09 MMOL/L (ref 1.12–1.32)
CALCIUM SERPL-MCNC: 7.2 MG/DL (ref 8.4–10.2)
CARDIAC TROPONIN I PNL SERPL HS: 10 NG/L
CARDIAC TROPONIN I PNL SERPL HS: 10 NG/L
CHLORIDE SERPL-SCNC: 107 MMOL/L (ref 96–108)
CLARITY UR: ABNORMAL
CO2 SERPL-SCNC: 24 MMOL/L (ref 21–32)
COCAINE UR QL: NEGATIVE
COLOR UR: ABNORMAL
CREAT SERPL-MCNC: 0.92 MG/DL (ref 0.6–1.3)
EOSINOPHIL # BLD AUTO: 0.03 THOUSAND/ΜL (ref 0–0.61)
EOSINOPHIL NFR BLD AUTO: 0 % (ref 0–6)
ERYTHROCYTE [DISTWIDTH] IN BLOOD BY AUTOMATED COUNT: 13.1 % (ref 11.6–15.1)
ETHANOL SERPL-MCNC: <10 MG/DL
FENTANYL UR QL SCN: NEGATIVE
FLUAV AG UPPER RESP QL IA.RAPID: NEGATIVE
FLUBV AG UPPER RESP QL IA.RAPID: NEGATIVE
GFR SERPL CREATININE-BSD FRML MDRD: 76 ML/MIN/1.73SQ M
GLUCOSE SERPL-MCNC: 90 MG/DL (ref 65–140)
GLUCOSE UR STRIP-MCNC: NEGATIVE MG/DL
HCT VFR BLD AUTO: 45.6 % (ref 36.5–49.3)
HGB BLD-MCNC: 15 G/DL (ref 12–17)
HGB UR QL STRIP.AUTO: NEGATIVE
HYDROCODONE UR QL SCN: NEGATIVE
IMM GRANULOCYTES # BLD AUTO: 0.03 THOUSAND/UL (ref 0–0.2)
IMM GRANULOCYTES NFR BLD AUTO: 0 % (ref 0–2)
KETONES UR STRIP-MCNC: NEGATIVE MG/DL
LEUKOCYTE ESTERASE UR QL STRIP: ABNORMAL
LYMPHOCYTES # BLD AUTO: 1.36 THOUSANDS/ΜL (ref 0.6–4.47)
LYMPHOCYTES NFR BLD AUTO: 14 % (ref 14–44)
MCH RBC QN AUTO: 28.9 PG (ref 26.8–34.3)
MCHC RBC AUTO-ENTMCNC: 32.9 G/DL (ref 31.4–37.4)
MCV RBC AUTO: 88 FL (ref 82–98)
METHADONE UR QL: NEGATIVE
MONOCYTES # BLD AUTO: 0.77 THOUSAND/ΜL (ref 0.17–1.22)
MONOCYTES NFR BLD AUTO: 8 % (ref 4–12)
MUCOUS THREADS UR QL AUTO: ABNORMAL
NEUTROPHILS # BLD AUTO: 7.66 THOUSANDS/ΜL (ref 1.85–7.62)
NEUTS SEG NFR BLD AUTO: 78 % (ref 43–75)
NITRITE UR QL STRIP: NEGATIVE
NON-SQ EPI CELLS URNS QL MICRO: ABNORMAL /HPF
NRBC BLD AUTO-RTO: 0 /100 WBCS
OPIATES UR QL SCN: NEGATIVE
OXYCODONE+OXYMORPHONE UR QL SCN: NEGATIVE
P AXIS: 27 DEGREES
PCP UR QL: NEGATIVE
PH UR STRIP.AUTO: 5.5 [PH]
PLATELET # BLD AUTO: 267 THOUSANDS/UL (ref 149–390)
PMV BLD AUTO: 8.4 FL (ref 8.9–12.7)
POTASSIUM SERPL-SCNC: 3.8 MMOL/L (ref 3.5–5.3)
PR INTERVAL: 164 MS
PROT SERPL-MCNC: 5.6 G/DL (ref 6.4–8.4)
PROT UR STRIP-MCNC: ABNORMAL MG/DL
QRS AXIS: 67 DEGREES
QRSD INTERVAL: 86 MS
QT INTERVAL: 394 MS
QTC INTERVAL: 439 MS
RBC # BLD AUTO: 5.19 MILLION/UL (ref 3.88–5.62)
RBC #/AREA URNS AUTO: ABNORMAL /HPF
SALICYLATES SERPL-MCNC: <5 MG/DL (ref 3–20)
SARS-COV+SARS-COV-2 AG RESP QL IA.RAPID: NEGATIVE
SODIUM SERPL-SCNC: 136 MMOL/L (ref 135–147)
SP GR UR STRIP.AUTO: <1.005 (ref 1–1.03)
T WAVE AXIS: 45 DEGREES
THC UR QL: NEGATIVE
TSH SERPL DL<=0.05 MIU/L-ACNC: 1.02 UIU/ML (ref 0.45–4.5)
UROBILINOGEN UR STRIP-ACNC: <2 MG/DL
VENTRICULAR RATE: 75 BPM
WBC # BLD AUTO: 9.89 THOUSAND/UL (ref 4.31–10.16)
WBC #/AREA URNS AUTO: ABNORMAL /HPF

## 2024-11-03 PROCEDURE — 82077 ASSAY SPEC XCP UR&BREATH IA: CPT | Performed by: EMERGENCY MEDICINE

## 2024-11-03 PROCEDURE — 84443 ASSAY THYROID STIM HORMONE: CPT | Performed by: EMERGENCY MEDICINE

## 2024-11-03 PROCEDURE — 99285 EMERGENCY DEPT VISIT HI MDM: CPT | Performed by: EMERGENCY MEDICINE

## 2024-11-03 PROCEDURE — 87811 SARS-COV-2 COVID19 W/OPTIC: CPT | Performed by: EMERGENCY MEDICINE

## 2024-11-03 PROCEDURE — 85025 COMPLETE CBC W/AUTO DIFF WBC: CPT | Performed by: EMERGENCY MEDICINE

## 2024-11-03 PROCEDURE — 36415 COLL VENOUS BLD VENIPUNCTURE: CPT | Performed by: EMERGENCY MEDICINE

## 2024-11-03 PROCEDURE — 82330 ASSAY OF CALCIUM: CPT | Performed by: EMERGENCY MEDICINE

## 2024-11-03 PROCEDURE — 99284 EMERGENCY DEPT VISIT MOD MDM: CPT

## 2024-11-03 PROCEDURE — 80179 DRUG ASSAY SALICYLATE: CPT | Performed by: EMERGENCY MEDICINE

## 2024-11-03 PROCEDURE — 96361 HYDRATE IV INFUSION ADD-ON: CPT

## 2024-11-03 PROCEDURE — 80053 COMPREHEN METABOLIC PANEL: CPT | Performed by: EMERGENCY MEDICINE

## 2024-11-03 PROCEDURE — 83880 ASSAY OF NATRIURETIC PEPTIDE: CPT | Performed by: EMERGENCY MEDICINE

## 2024-11-03 PROCEDURE — 99203 OFFICE O/P NEW LOW 30 MIN: CPT | Performed by: PSYCHIATRY & NEUROLOGY

## 2024-11-03 PROCEDURE — 87077 CULTURE AEROBIC IDENTIFY: CPT | Performed by: EMERGENCY MEDICINE

## 2024-11-03 PROCEDURE — 87804 INFLUENZA ASSAY W/OPTIC: CPT | Performed by: EMERGENCY MEDICINE

## 2024-11-03 PROCEDURE — 84484 ASSAY OF TROPONIN QUANT: CPT | Performed by: EMERGENCY MEDICINE

## 2024-11-03 PROCEDURE — 70496 CT ANGIOGRAPHY HEAD: CPT

## 2024-11-03 PROCEDURE — 93010 ELECTROCARDIOGRAM REPORT: CPT | Performed by: INTERNAL MEDICINE

## 2024-11-03 PROCEDURE — 80143 DRUG ASSAY ACETAMINOPHEN: CPT | Performed by: EMERGENCY MEDICINE

## 2024-11-03 PROCEDURE — 70498 CT ANGIOGRAPHY NECK: CPT

## 2024-11-03 PROCEDURE — 93005 ELECTROCARDIOGRAM TRACING: CPT

## 2024-11-03 PROCEDURE — 96365 THER/PROPH/DIAG IV INF INIT: CPT

## 2024-11-03 PROCEDURE — 87086 URINE CULTURE/COLONY COUNT: CPT | Performed by: EMERGENCY MEDICINE

## 2024-11-03 PROCEDURE — 87186 SC STD MICRODIL/AGAR DIL: CPT | Performed by: EMERGENCY MEDICINE

## 2024-11-03 PROCEDURE — 81001 URINALYSIS AUTO W/SCOPE: CPT | Performed by: EMERGENCY MEDICINE

## 2024-11-03 PROCEDURE — 80307 DRUG TEST PRSMV CHEM ANLYZR: CPT | Performed by: EMERGENCY MEDICINE

## 2024-11-03 RX ORDER — PANTOPRAZOLE SODIUM 40 MG/1
40 TABLET, DELAYED RELEASE ORAL
Status: DISCONTINUED | OUTPATIENT
Start: 2024-11-03 | End: 2024-11-04 | Stop reason: HOSPADM

## 2024-11-03 RX ORDER — QUETIAPINE FUMARATE 25 MG/1
12.5 TABLET, FILM COATED ORAL DAILY
Status: DISCONTINUED | OUTPATIENT
Start: 2024-11-04 | End: 2024-11-04 | Stop reason: HOSPADM

## 2024-11-03 RX ORDER — ALBUTEROL SULFATE 0.83 MG/ML
2.5 SOLUTION RESPIRATORY (INHALATION) EVERY 6 HOURS PRN
Status: DISCONTINUED | OUTPATIENT
Start: 2024-11-03 | End: 2024-11-04 | Stop reason: HOSPADM

## 2024-11-03 RX ORDER — CEFUROXIME AXETIL 250 MG/1
500 TABLET ORAL ONCE
Status: COMPLETED | OUTPATIENT
Start: 2024-11-03 | End: 2024-11-03

## 2024-11-03 RX ORDER — AMLODIPINE BESYLATE 5 MG/1
5 TABLET ORAL DAILY
Status: DISCONTINUED | OUTPATIENT
Start: 2024-11-04 | End: 2024-11-04 | Stop reason: HOSPADM

## 2024-11-03 RX ORDER — CARBIDOPA AND LEVODOPA 25; 100 MG/1; MG/1
1 TABLET ORAL 3 TIMES DAILY
Status: DISCONTINUED | OUTPATIENT
Start: 2024-11-03 | End: 2024-11-04 | Stop reason: HOSPADM

## 2024-11-03 RX ORDER — QUETIAPINE FUMARATE 25 MG/1
25 TABLET, FILM COATED ORAL
Status: DISCONTINUED | OUTPATIENT
Start: 2024-11-03 | End: 2024-11-04 | Stop reason: HOSPADM

## 2024-11-03 RX ORDER — CARVEDILOL 12.5 MG/1
12.5 TABLET ORAL 2 TIMES DAILY WITH MEALS
Status: DISCONTINUED | OUTPATIENT
Start: 2024-11-03 | End: 2024-11-04 | Stop reason: HOSPADM

## 2024-11-03 RX ORDER — CALCIUM GLUCONATE 20 MG/ML
1 INJECTION, SOLUTION INTRAVENOUS ONCE
Status: COMPLETED | OUTPATIENT
Start: 2024-11-03 | End: 2024-11-03

## 2024-11-03 RX ORDER — CEFUROXIME AXETIL 500 MG/1
500 TABLET ORAL EVERY 12 HOURS SCHEDULED
Qty: 14 TABLET | Refills: 0 | Status: SHIPPED | OUTPATIENT
Start: 2024-11-03 | End: 2024-11-10

## 2024-11-03 RX ADMIN — QUETIAPINE FUMARATE 25 MG: 25 TABLET ORAL at 23:41

## 2024-11-03 RX ADMIN — SODIUM CHLORIDE 1000 ML: 0.9 INJECTION, SOLUTION INTRAVENOUS at 13:02

## 2024-11-03 RX ADMIN — PANTOPRAZOLE SODIUM 40 MG: 40 TABLET, DELAYED RELEASE ORAL at 23:41

## 2024-11-03 RX ADMIN — CARVEDILOL 12.5 MG: 12.5 TABLET, FILM COATED ORAL at 23:42

## 2024-11-03 RX ADMIN — CARBIDOPA AND LEVODOPA 1 TABLET: 25; 100 TABLET ORAL at 23:41

## 2024-11-03 RX ADMIN — IOHEXOL 85 ML: 350 INJECTION, SOLUTION INTRAVENOUS at 14:23

## 2024-11-03 RX ADMIN — CEFUROXIME AXETIL 500 MG: 250 TABLET, FILM COATED ORAL at 16:46

## 2024-11-03 RX ADMIN — CALCIUM GLUCONATE 1 G: 20 INJECTION, SOLUTION INTRAVENOUS at 16:31

## 2024-11-03 NOTE — DISCHARGE INSTRUCTIONS
Contact [facility] to schedule intake appointment at their [employer] Location: [phone]  Address: [address] [place] [address]    Please take a list of all of your medications and discharge paperwork with you to all of your follow-up medical visits. Please take all of your medications as directed. Please call your family doctor or return to the ER if you have increased shortness of breath, chest pain, fevers, chills, nausea, vomiting, diarrhea, or any other worsening symptoms.

## 2024-11-03 NOTE — ED PROVIDER NOTES
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 1 TABLET (25 MG TOTAL) BY MOUTH DAILY.      Facility-Administered Medications: None     Patient's Medications   Discharge Prescriptions    CEFUROXIME (CEFTIN) 500 MG TABLET    Take 1 tablet (500 mg total) by mouth every 12 (twelve) hours for 7 days       Start Date: 11/3/2024 End Date: 11/10/2024       Order Dose: 500 mg       Quantity: 14 tablet    Refills: 0     No discharge procedures on file.  ED SEPSIS DOCUMENTATION   Time reflects when diagnosis was documented in both MDM as applicable and the Disposition within this note       Time User Action Codes Description Comment    11/3/2024  1:29 PM Neal Schmidt Add [Z00.8] Encounter for psychological evaluation     11/3/2024  4:42 PM Neal Schmidt Add [N39.0] UTI (urinary tract infection)     11/3/2024  4:43 PM Neal Schmidt Add [E83.51] Hypocalcemia     11/3/2024  4:43 PM Neal Schmidt Add [Z86.59] History of dementia     11/3/2024  4:48 PM Neal Schmidt Add [Z86.59] History of depression     11/3/2024  4:48 PM Neal Schmidt Add [H53.19] Distorted vision                  Neal Schmidt DO  11/03/24 6092

## 2024-11-03 NOTE — TELEMEDICINE
"Tele-Consultation - Behavioral Health   Name: Aldo Rodriguez 84 y.o. male I MRN: [id number]  Unit/Bed#: ED 05 I Date of Admission: 11/3/2024   Date of Service: 11/3/2024 I Hospital Day: 0   Consult to Psychiatry  Consult performed by: Sal Sousa MD  Consult ordered by: Neal Schmidt DO        Physician Requesting Evaluation: Neal Schmidt DO   Reason for Evaluation / Principal Problem: Encounter for psychological evaluation    Assessment & Plan      This 84-year-old male reports occasionally experiencing visual distortions that he does not find disturbing.  This may be Sinemet induced.    Treatment Plan:    As the patient denies that he finds the visual distortions disturbing and reports no other concerns, recommend no medication changes at this time.  Would not risk exacerbation of any underlying parkinsonian features by reducing Sinemet or increasing Seroquel since the patient denies any distress secondary to the occasional visual distortions.  He does not meet criteria for inpatient psychiatric treatment.  Recommend outpatient psychiatric follow-up for further evaluation and medication management with a psychotherapy/counseling component to assist the patient in further optimizing his coping skills for dealing with current life stressors.  Agree with the reporting to protective services so that they may further investigate the home situation.    Risks / Benefits of Treatment:  Risks, benefits, and possible side effects of medications explained to patient and patient verbalizes understanding.      History of Present Illness    Reason for Consult:  Psychological evaluation requested  Patient is a 84 y.o. male brought in by his housemate/caretaker (son of ex-girlfriend) who has requested psychological evaluation for visual hallucinations and agitation.  Emergency department physician has shared the following: \"84-year-old male with past history of hypertension, hyperlipidemia, degenerative joint " "disease, GERD, cataracts to bilateral eyes, peripheral artery disease, prostate cancer, TIA with some baseline slurred speech, alcohol dependence, depression, encephalopathy, dementia with behavioral disturbance, CAD, CHF, presents to the ED by his housemate for psychiatric evaluation. Patient is alert and oriented x 3. Patient states that his ex-girlfriend's son lives with him. Ex-sukumariend's son used to take care of him when he got paid through a waiver from the government for elderly care. When the waiver stopped, after a friend son stopped taking care of patient. Ex-sukumariend's son continues to live with patient. Ex-sukumariend son is currently stating that patient needs a psychiatric evaluation as he is having some visual hallucination and has become more agitated lately. Ex-sukumariend son was able to convince patient to sign a 201 and was taken to [facility] emergency department last night however patient changed his mind and was discharged back home. Ex-sukumarienmehdi son brought patient to the ED today for psychiatric evaluation. Patient currently has no complaints. Patient states that ex-sukumariend's son was taking good care of him until he stopped receiving payment from the government. Now patient has some difficulty taking care of himself. Patient does not have any suicidal or homicidal ideations. Patient states that sometimes when he stares at an object that object will move downwards in his vision. If he looks away and looks back at the object and the object goes back in its place. Patient also thinks he has some hallucinations at night when he sleeps. Patient has some baseline slurred speech from a previous TIA.\"    In meeting with the patient, he confirms the information he shared with the attending physician.  He denies any psychiatric concerns at this time.  He reports that the visual distortions that he experiences at times are not distressful to him.  He denies any episodes of " "agitation.    Psychiatric review of systems: Unremarkable per patient.  He states he \"sleeps like a baby\".  He reports good appetite.  He denies any disturbance of mood.  He denies any episodes of agitation, mood lability or nakita.    Past psychiatric history: The patient reports he has not seen psychiatry previously.  He is very pleased with his current psychiatric medication regimen which apparently is prescribed by his primary care provider.    Social history: As above.    Family history: No pertinent family psychiatric history reported.    Substance use history: The patient notes that he drink alcohol in the past but has not had any alcohol recently.  He denies other substance use.    [place] suicide severity risk scale: The patient denies history of death wishes or suicidal ideation scoring low risk for suicide.    Mental status examination: The patient is alert and resting on the hospital gurney.  He made good eye contact.  He was pleasant cooperative with the assessment.  He demonstrates adequate attention to grooming hygiene.  Speech was slightly slurred as noted to be his baseline above.  Sensorium is clear.  Thought process was logical and linear.  Thought content was reality based.  Associations were tight.  Memory was grossly intact in all spheres.  He appears to be of average intelligence by his use of vocabulary, general fund of knowledge, send structure and syntax.  He denies suicidal homicidal ideation.  He was to the occasional visual distortions as described above.  He denies auditory hallucinations.  He denies other psychotic features.  Insight and judgment appear to be intact and at baseline at this time.    Substance Abuse History:  E-Cigarette/Vaping    E-Cigarette Use Never User     Cartridges/Day n/a     Comments n/a       E-Cigarette/Vaping Substances    Nicotine No     THC No     CBD No     Flavoring No     Other No     Unknown No        Social History       Tobacco History       Smoking " Status  Former Smoking Tobacco Type  Cigarettes   Pack Year History     Packs/Day From To Years       25.0      Smokeless Tobacco Use  Former      Tobacco Comments  n/a              Alcohol History       Alcohol Use Status  Not Currently Drinks/Week  0 Glasses of wine, 3 Cans of beer, 0 Shots of liquor, 0 Standard drinks or equivalent per week Amount  3.0 standard drinks of alcohol/wk Comment  5-6 beers a day              Drug Use       Drug Use Status  Never Comment  n/a              Sexual Activity       Sexually Active  Not Currently              Activities of Daily Living    Not Asked                   I have assessed this patient for substance use within the past 12 months    Objective   Temp:  [98 °F (36.7 °C)] 98 °F (36.7 °C)  HR:  [68-80] 68  BP: (149-182)/(70-88) 149/70  Resp:  [18] 18  SpO2:  [95 %-98 %] 95 %  O2 Device: None (Room air)    Intake/Output Summary (Last 24 hours) at 11/3/2024 1522  Last data filed at 11/3/2024 1404  Gross per 24 hour   Intake 1000 ml   Output --   Net 1000 ml             Patient Strengths/Assets: ability for insight, average or above intelligence, cooperative, communication skills, general fund of knowledge, good insight, interpersonal skills, reasoning ability  Patient Barriers/Limitations: limited support system      Lab Results: I have reviewed the following results:Most Recent Labs:   Lab Results   Component Value Date    WBC 9.89 11/03/2024    RBC 5.19 11/03/2024    HGB 15.0 11/03/2024    HCT 45.6 11/03/2024     11/03/2024    RDW 13.1 11/03/2024    NEUTROABS 7.66 (H) 11/03/2024    SODIUM 136 11/03/2024    K 3.8 11/03/2024     11/03/2024    CO2 24 11/03/2024    BUN 17 11/03/2024    CREATININE 0.92 11/03/2024    GLUC 90 11/03/2024    GLUF 96 10/09/2024    CALCIUM 7.2 (L) 11/03/2024    AST 12 (L) 11/03/2024    ALT 6 (L) 11/03/2024    ALKPHOS 60 11/03/2024    TP 5.6 (L) 11/03/2024    ALB 3.5 11/03/2024    TBILI 1.28 (H) 11/03/2024    CHOLESTEROL 105 03/19/2024     HDL 51 03/19/2024    TRIG 62 03/19/2024    LDLCALC 42 03/19/2024    NONHDLC 37 06/14/2022    AMMONIA 15 05/15/2020    GDB9XJKJHUMX 1.022 11/03/2024    FREET4 1.23 09/14/2018    HGBA1C 5.5 08/25/2022     08/25/2022            Administrative Statements   I have spent a total time of 30 minutes in caring for this patient on the day of the visit/encounter including Diagnostic results, Prognosis, Risks and benefits of tx options, Instructions for management, Patient and family education, Importance of tx compliance, Risk factor reductions, Impressions, Counseling / Coordination of care, Documenting in the medical record, Reviewing / ordering tests, medicine, procedures  , Obtaining or reviewing history  , and Communicating with other healthcare professionals .  VIRTUAL CARE DOCUMENTATION:     1. This service was provided via Telemedicine using Teams Virtual Rounding      2. Parties in the room with patient during teleconsult Patient only    3. Confidentiality My office door was closed     4. Participants No one else was in the room    5. Patient acknowledged consent and understanding of privacy and security of the  Telemedicine consult. I informed the patient that I have reviewed their record in Epic and presented the opportunity for them to ask any questions regarding the visit today.  The patient agreed to participate.    6. I have spent a total time of 30 minutes in caring for this patient on the day of the visit/encounter including Diagnostic results, Prognosis, Risks and benefits of tx options, Instructions for management, Patient and family education, Importance of tx compliance, Risk factor reductions, Impressions, Counseling / Coordination of care, Documenting in the medical record, Reviewing / ordering tests, medicine, procedures  , Obtaining or reviewing history  , and Communicating with other healthcare professionals .

## 2024-11-03 NOTE — ED NOTES
Crisis met with patient and discussed that the psychiatrist did not recommend inpatient mental health treatment at this time and stated that patient did not meet criteria for inpatient treatment. Discussed with patient that he was recommending that patient follow up with outpatient mental health treatment and counseling. Discussed [facility] which has an office in [facility] not far from patient's home. Patient stated that it would depend on if his care giver would be willing to take him there. Discussed that Crisis had contacted that Aging Office and someone should be by to meet with him and they may be able to offer services to assist patient with attending his appointments. Will put [facility]'s contact information in patient's discharge instructions.    Patient presents to ER for complaints of left shoulder pain that started a few days ago. Patient states that she fell asleep on her couch and feels that she might have slept on it wrong. Patient states she has a cyst on that shoulder. Denies any injury. Patient was unable to use the arm at work

## 2024-11-04 ENCOUNTER — PATIENT OUTREACH (OUTPATIENT)
Dept: CASE MANAGEMENT | Facility: OTHER | Age: 84
End: 2024-11-04

## 2024-11-04 VITALS
SYSTOLIC BLOOD PRESSURE: 125 MMHG | TEMPERATURE: 98 F | RESPIRATION RATE: 18 BRPM | HEART RATE: 68 BPM | DIASTOLIC BLOOD PRESSURE: 72 MMHG | OXYGEN SATURATION: 95 %

## 2024-11-04 DIAGNOSIS — Z63.6 CAREGIVER BURDEN: Primary | ICD-10-CM

## 2024-11-04 RX ADMIN — AMLODIPINE BESYLATE 5 MG: 5 TABLET ORAL at 08:16

## 2024-11-04 RX ADMIN — CARVEDILOL 12.5 MG: 12.5 TABLET, FILM COATED ORAL at 08:15

## 2024-11-04 RX ADMIN — CARBIDOPA AND LEVODOPA 1 TABLET: 25; 100 TABLET ORAL at 08:16

## 2024-11-04 RX ADMIN — QUETIAPINE FUMARATE 12.5 MG: 25 TABLET ORAL at 08:14

## 2024-11-04 SDOH — SOCIAL STABILITY - SOCIAL INSECURITY: DEPENDENT RELATIVE NEEDING CARE AT HOME: Z63.6

## 2024-11-04 NOTE — PROGRESS NOTES
OP CM rcvd consult that pts caregiver can no longer take care of pt.  Called to caregiver Magen [phone] and LM.  Pt was in ED yesterday for psych eval.  Pt also has dx of mild early onset alzheimer dementia and MDD.  Pt has Medicare and Medicaid.  Will assess once call back is received.

## 2024-11-05 ENCOUNTER — TELEPHONE (OUTPATIENT)
Age: 84
End: 2024-11-05

## 2024-11-05 ENCOUNTER — TELEPHONE (OUTPATIENT)
Dept: FAMILY MEDICINE CLINIC | Facility: HOSPITAL | Age: 84
End: 2024-11-05

## 2024-11-05 ENCOUNTER — HOSPITAL ENCOUNTER (OUTPATIENT)
Dept: HOSPITAL 99 - 4 WEST ACU | Age: 84
Setting detail: OBSERVATION
LOS: 13 days | Discharge: NURSING, REHAB OR LONG-TERM CARE | End: 2024-11-18
Payer: MEDICARE

## 2024-11-05 ENCOUNTER — PATIENT OUTREACH (OUTPATIENT)
Dept: CASE MANAGEMENT | Facility: OTHER | Age: 84
End: 2024-11-05

## 2024-11-05 VITALS — RESPIRATION RATE: 20 BRPM

## 2024-11-05 VITALS — OXYGEN SATURATION: 97 % | RESPIRATION RATE: 18 BRPM | HEART RATE: 77 BPM

## 2024-11-05 VITALS — RESPIRATION RATE: 19 BRPM

## 2024-11-05 VITALS — RESPIRATION RATE: 17 BRPM

## 2024-11-05 VITALS — DIASTOLIC BLOOD PRESSURE: 73 MMHG | RESPIRATION RATE: 15 BRPM | SYSTOLIC BLOOD PRESSURE: 118 MMHG

## 2024-11-05 VITALS — DIASTOLIC BLOOD PRESSURE: 96 MMHG | RESPIRATION RATE: 20 BRPM | SYSTOLIC BLOOD PRESSURE: 180 MMHG

## 2024-11-05 VITALS — RESPIRATION RATE: 13 BRPM

## 2024-11-05 VITALS — RESPIRATION RATE: 16 BRPM

## 2024-11-05 VITALS — DIASTOLIC BLOOD PRESSURE: 78 MMHG | SYSTOLIC BLOOD PRESSURE: 173 MMHG | RESPIRATION RATE: 15 BRPM

## 2024-11-05 VITALS — DIASTOLIC BLOOD PRESSURE: 95 MMHG | SYSTOLIC BLOOD PRESSURE: 175 MMHG | RESPIRATION RATE: 18 BRPM

## 2024-11-05 VITALS — SYSTOLIC BLOOD PRESSURE: 135 MMHG | RESPIRATION RATE: 18 BRPM | DIASTOLIC BLOOD PRESSURE: 72 MMHG

## 2024-11-05 VITALS — RESPIRATION RATE: 17 BRPM | SYSTOLIC BLOOD PRESSURE: 161 MMHG | DIASTOLIC BLOOD PRESSURE: 89 MMHG

## 2024-11-05 VITALS — RESPIRATION RATE: 17 BRPM | SYSTOLIC BLOOD PRESSURE: 147 MMHG | DIASTOLIC BLOOD PRESSURE: 80 MMHG

## 2024-11-05 VITALS — RESPIRATION RATE: 16 BRPM | SYSTOLIC BLOOD PRESSURE: 164 MMHG | DIASTOLIC BLOOD PRESSURE: 98 MMHG

## 2024-11-05 VITALS — DIASTOLIC BLOOD PRESSURE: 76 MMHG | SYSTOLIC BLOOD PRESSURE: 137 MMHG

## 2024-11-05 VITALS — RESPIRATION RATE: 24 BRPM

## 2024-11-05 VITALS — RESPIRATION RATE: 15 BRPM

## 2024-11-05 VITALS — DIASTOLIC BLOOD PRESSURE: 72 MMHG | RESPIRATION RATE: 18 BRPM | SYSTOLIC BLOOD PRESSURE: 145 MMHG

## 2024-11-05 VITALS — RESPIRATION RATE: 18 BRPM

## 2024-11-05 VITALS — RESPIRATION RATE: 10 BRPM

## 2024-11-05 DIAGNOSIS — H21.89: ICD-10-CM

## 2024-11-05 DIAGNOSIS — Z75.1: ICD-10-CM

## 2024-11-05 DIAGNOSIS — Z95.1: ICD-10-CM

## 2024-11-05 DIAGNOSIS — B96.1: ICD-10-CM

## 2024-11-05 DIAGNOSIS — N39.0: Primary | ICD-10-CM

## 2024-11-05 DIAGNOSIS — Z63.8: ICD-10-CM

## 2024-11-05 DIAGNOSIS — Z86.73: ICD-10-CM

## 2024-11-05 DIAGNOSIS — N40.0: ICD-10-CM

## 2024-11-05 DIAGNOSIS — G31.9: ICD-10-CM

## 2024-11-05 DIAGNOSIS — Z79.82: ICD-10-CM

## 2024-11-05 DIAGNOSIS — Z63.79: ICD-10-CM

## 2024-11-05 DIAGNOSIS — F32.A: ICD-10-CM

## 2024-11-05 DIAGNOSIS — R56.9: ICD-10-CM

## 2024-11-05 DIAGNOSIS — I10: ICD-10-CM

## 2024-11-05 DIAGNOSIS — F41.9: ICD-10-CM

## 2024-11-05 DIAGNOSIS — R00.1: ICD-10-CM

## 2024-11-05 DIAGNOSIS — K21.9: ICD-10-CM

## 2024-11-05 DIAGNOSIS — E78.00: ICD-10-CM

## 2024-11-05 DIAGNOSIS — I25.10: ICD-10-CM

## 2024-11-05 DIAGNOSIS — R44.1: ICD-10-CM

## 2024-11-05 DIAGNOSIS — Z87.440: ICD-10-CM

## 2024-11-05 LAB
BUN SERPL-MCNC: 19 MG/DL (ref 9–20)
CALCIUM SERPL-MCNC: 9.5 MG/DL (ref 8.4–10.2)
CHLORIDE SERPL-SCNC: 100 MMOL/L (ref 98–107)
CO2 SERPL-SCNC: 26 MMOL/L (ref 22–30)
EGFR: > 60
ERYTHROCYTE [DISTWIDTH] IN BLOOD BY AUTOMATED COUNT: 13.4 % (ref 11.5–14.5)
GLUCOSE SERPL-MCNC: 101 MG/DL (ref 70–99)
HCT VFR BLD AUTO: 41.4 % (ref 39–52)
HGB BLD-MCNC: 14.2 G/DL (ref 13–18)
MCHC RBC AUTO-ENTMCNC: 34.3 G/DL (ref 33–37)
MCV RBC AUTO: 83.5 FL (ref 80–94)
NRBC BLD AUTO-RTO: 0 %
PLATELET # BLD AUTO: 223 10^3/UL (ref 130–400)
SODIUM SERPL-SCNC: 137 MMOL/L (ref 135–145)
SQUAMOUS URNS QL MICRO: (no result) /LPF
URINE RED BLOOD CELL: (no result) /HPF (ref 0–2)
URINE WHITE CELL: (no result) /HPF (ref 0–5)

## 2024-11-05 PROCEDURE — G0378 HOSPITAL OBSERVATION PER HR: HCPCS

## 2024-11-05 RX ADMIN — CEFUROXIME AXETIL 500 MG: 500 TABLET ORAL at 20:41

## 2024-11-05 RX ADMIN — QUETIAPINE 25 MG: 25 TABLET, FILM COATED ORAL at 20:40

## 2024-11-05 RX ADMIN — HEPARIN SODIUM 5000 UNITS: 5000 INJECTION, SOLUTION INTRAVENOUS; SUBCUTANEOUS at 20:42

## 2024-11-05 RX ADMIN — FERROUS SULFATE TAB 325 MG (65 MG ELEMENTAL FE) 325 MG: 325 (65 FE) TAB at 20:41

## 2024-11-05 RX ADMIN — CARBIDOPA AND LEVODOPA 1 TABLET: 25; 100 TABLET ORAL at 20:41

## 2024-11-05 RX ADMIN — CARVEDILOL 12.5 MG: 12.5 TABLET, FILM COATED ORAL at 20:41

## 2024-11-05 RX ADMIN — CEPHALEXIN 500 MG: 500 CAPSULE ORAL at 16:25

## 2024-11-05 RX ADMIN — Medication 50 MCG: at 20:41

## 2024-11-05 RX ADMIN — ATORVASTATIN CALCIUM 40 MG: 40 TABLET, FILM COATED ORAL at 20:41

## 2024-11-05 SDOH — SOCIAL STABILITY - SOCIAL INSECURITY: OTHER SPECIFIED PROBLEMS RELATED TO PRIMARY SUPPORT GROUP: Z63.8

## 2024-11-05 NOTE — TELEPHONE ENCOUNTER
"Pt's friend Magen (on SL consent form) calling because he was told by [facility] that pt was being d/c and coming back to his home and Magen would like that to be stopped/postponed. Magen has told pt numerous times that he can no longer give him the care he needs, he has tried his best but recently, pt's behavior has been a bit difficult and a challenge and Magen was explaining his behavior \"is like an 8 year old boy playing games and I can't play that anymore.\" He was told pt refused to sign the 201 and is not sure what to do at this point relating to legal action if necessary and he knows Leisa Ram spoke with Sabino but has not had any updates since.    Please advise and see if able to communicate with Any regarding the process and what can be done to ultimately reach the objective; tried reaching out to office during call but unable to reach.  "

## 2024-11-05 NOTE — PROGRESS NOTES
[facility] Magen [phone] or home [phone].  Called back to Magen and he states that pt is now willing to sign a 201.  Magen states pt was his mothers ex boyfriend and he is living with a pt.  Pt uses walker at home.  Pt is able to bathe and dress himself.  Per Magen pt was living alone for ten years after his wife  and was drinking excessively and Magen stepped in to assist pt at home.      Magen states pt is still having hallucinations.  Pt is and yelling and thinks there are people in the room and he is talking to people who are not there.  Magen states that pt is kicking and screaming in his sleep.      Pt was in in psych rehab two years ago in [place], [place] at [facility].  Magen used to be pts home health attendant through the waiver but never completed the medicaid re-certification so pt lost his waiver.  Explained we can assist with getting waiver back in place once pt returns home.  Magen would like an agency worker to the aide.      Magen states he did call 911 and wants pt to re-evaluated and states pt needs help.  Pt was added to [facility] psych wait list in 2024.  Pt will be taken to [facility] for evaluation.

## 2024-11-06 ENCOUNTER — PATIENT OUTREACH (OUTPATIENT)
Dept: CASE MANAGEMENT | Facility: OTHER | Age: 84
End: 2024-11-06

## 2024-11-06 VITALS — DIASTOLIC BLOOD PRESSURE: 70 MMHG | RESPIRATION RATE: 18 BRPM | SYSTOLIC BLOOD PRESSURE: 154 MMHG

## 2024-11-06 VITALS — DIASTOLIC BLOOD PRESSURE: 80 MMHG | SYSTOLIC BLOOD PRESSURE: 160 MMHG | RESPIRATION RATE: 16 BRPM

## 2024-11-06 VITALS — DIASTOLIC BLOOD PRESSURE: 77 MMHG | RESPIRATION RATE: 20 BRPM | SYSTOLIC BLOOD PRESSURE: 165 MMHG

## 2024-11-06 LAB
ALBUMIN SERPL-MCNC: 3.6 G/DL (ref 3.5–5)
ALP SERPL-CCNC: 61 U/L (ref 38–126)
ALT SERPL-CCNC: 15 U/L (ref 0–50)
AST SERPL-CCNC: 24 U/L (ref 17–59)
BACTERIA UR CULT: ABNORMAL
BUN SERPL-MCNC: 13 MG/DL (ref 9–20)
CALCIUM SERPL-MCNC: 9 MG/DL (ref 8.4–10.2)
CHLORIDE SERPL-SCNC: 103 MMOL/L (ref 98–107)
CO2 SERPL-SCNC: 25 MMOL/L (ref 22–30)
EGFR: > 60
ERYTHROCYTE [DISTWIDTH] IN BLOOD BY AUTOMATED COUNT: 13.3 % (ref 11.5–14.5)
GLUCOSE SERPL-MCNC: 81 MG/DL (ref 70–99)
HCT VFR BLD AUTO: 39.8 % (ref 39–52)
HGB BLD-MCNC: 13.3 G/DL (ref 13–18)
MCHC RBC AUTO-ENTMCNC: 33.4 G/DL (ref 33–37)
MCV RBC AUTO: 84.5 FL (ref 80–94)
NRBC BLD AUTO-RTO: 0 %
PLATELET # BLD AUTO: 201 10^3/UL (ref 130–400)
POTASSIUM SERPL-SCNC: 3.9 MMOL/L (ref 3.5–5.1)
PROT SERPL-MCNC: 6.2 G/DL (ref 6.3–8.2)
SODIUM SERPL-SCNC: 141 MMOL/L (ref 135–145)

## 2024-11-06 RX ADMIN — CARBIDOPA AND LEVODOPA 1 TABLET: 25; 100 TABLET ORAL at 08:39

## 2024-11-06 RX ADMIN — PANTOPRAZOLE SODIUM 40 MG: 40 TABLET, DELAYED RELEASE ORAL at 08:30

## 2024-11-06 RX ADMIN — SPIRONOLACTONE 25 MG: 25 TABLET, FILM COATED ORAL at 08:30

## 2024-11-06 RX ADMIN — FERROUS SULFATE TAB 325 MG (65 MG ELEMENTAL FE) 325 MG: 325 (65 FE) TAB at 17:08

## 2024-11-06 RX ADMIN — CARBIDOPA AND LEVODOPA 1 TABLET: 25; 100 TABLET ORAL at 15:44

## 2024-11-06 RX ADMIN — CARBIDOPA AND LEVODOPA 1 TABLET: 25; 100 TABLET ORAL at 21:20

## 2024-11-06 RX ADMIN — AMLODIPINE BESYLATE 5 MG: 5 TABLET ORAL at 08:31

## 2024-11-06 RX ADMIN — HEPARIN SODIUM 5000 UNITS: 5000 INJECTION, SOLUTION INTRAVENOUS; SUBCUTANEOUS at 08:31

## 2024-11-06 RX ADMIN — HEPARIN SODIUM 5000 UNITS: 5000 INJECTION, SOLUTION INTRAVENOUS; SUBCUTANEOUS at 19:34

## 2024-11-06 RX ADMIN — ASPIRIN 81 MG: 81 TABLET, CHEWABLE ORAL at 08:30

## 2024-11-06 RX ADMIN — CARVEDILOL 12.5 MG: 12.5 TABLET, FILM COATED ORAL at 08:30

## 2024-11-06 RX ADMIN — ATORVASTATIN CALCIUM 40 MG: 40 TABLET, FILM COATED ORAL at 17:07

## 2024-11-06 RX ADMIN — QUETIAPINE 12.5 MG: 25 TABLET, FILM COATED ORAL at 08:32

## 2024-11-06 RX ADMIN — Medication 50 MCG: at 17:08

## 2024-11-06 RX ADMIN — ESCITALOPRAM OXALATE 10 MG: 10 TABLET, FILM COATED ORAL at 08:30

## 2024-11-06 RX ADMIN — CEFUROXIME AXETIL 500 MG: 500 TABLET ORAL at 19:34

## 2024-11-06 RX ADMIN — QUETIAPINE 25 MG: 25 TABLET, FILM COATED ORAL at 21:20

## 2024-11-06 RX ADMIN — CARVEDILOL 12.5 MG: 12.5 TABLET, FILM COATED ORAL at 19:35

## 2024-11-06 RX ADMIN — CEFUROXIME AXETIL 500 MG: 500 TABLET ORAL at 08:31

## 2024-11-06 NOTE — PROGRESS NOTES
OP CM rcvd call from pts caretaker Magen.  Per Magen [facility]  called yesterday and they were planning on discharging patient.  Magen did speak to ED physician there and pt is now still in the hospital.   Magen states he does not want to get anyone in trouble but feels he needs to take legal action in regards to pt not getting inpt psych care.  Explained again that there is criteria for an inpt stay and also when pt was seen by psych on 11/3 pt was deemed alert and that his thought process was clear.      Magen states this OP CM was a witness that pt said he would sign 201 but then got to hospital and would not sign. Magen states since pt lied he is not competent and feels OP CM should have to testify that pt said he would sign a 201.  When pt was in ED 11/3 they cleared him to be dc home and follow up with [facility] for OP MH and APS report was filed Nov 3.      Pt resides in his own home.  Magen lives there with pt to assist pt with needs.  Pt is indep with ADLs at home.  Magen made aware pt cannot be forced to do anything unless he is deemed incompetent to make medical decisions.  Magen made aware that this OP CM is not able to deem someone incompetent and that when pt was seen earlier this week he was said to be alert.   Magen states he will do what he needs to do and thanked this OP CM for the time to discuss.      Called to [facility] [phone] and asked to speak to the CM assigned to pts case and spoke to Param who states she is not allowed to give this information.

## 2024-11-07 VITALS — SYSTOLIC BLOOD PRESSURE: 153 MMHG | RESPIRATION RATE: 20 BRPM | DIASTOLIC BLOOD PRESSURE: 81 MMHG

## 2024-11-07 VITALS — RESPIRATION RATE: 18 BRPM | SYSTOLIC BLOOD PRESSURE: 101 MMHG | DIASTOLIC BLOOD PRESSURE: 51 MMHG

## 2024-11-07 VITALS — SYSTOLIC BLOOD PRESSURE: 175 MMHG | DIASTOLIC BLOOD PRESSURE: 101 MMHG | HEART RATE: 66 BPM

## 2024-11-07 VITALS — DIASTOLIC BLOOD PRESSURE: 66 MMHG | SYSTOLIC BLOOD PRESSURE: 132 MMHG | RESPIRATION RATE: 18 BRPM

## 2024-11-07 RX ADMIN — ASPIRIN 81 MG: 81 TABLET, CHEWABLE ORAL at 08:15

## 2024-11-07 RX ADMIN — AMLODIPINE BESYLATE 5 MG: 5 TABLET ORAL at 08:15

## 2024-11-07 RX ADMIN — CARVEDILOL 12.5 MG: 12.5 TABLET, FILM COATED ORAL at 19:53

## 2024-11-07 RX ADMIN — CARBIDOPA AND LEVODOPA 1 TABLET: 25; 100 TABLET ORAL at 15:09

## 2024-11-07 RX ADMIN — CARBIDOPA AND LEVODOPA 1 TABLET: 25; 100 TABLET ORAL at 08:16

## 2024-11-07 RX ADMIN — ESCITALOPRAM OXALATE 10 MG: 10 TABLET, FILM COATED ORAL at 08:15

## 2024-11-07 RX ADMIN — Medication 50 MCG: at 18:01

## 2024-11-07 RX ADMIN — QUETIAPINE 12.5 MG: 25 TABLET, FILM COATED ORAL at 08:14

## 2024-11-07 RX ADMIN — ENOXAPARIN SODIUM 40 MG: 40 INJECTION SUBCUTANEOUS at 18:00

## 2024-11-07 RX ADMIN — CARBIDOPA AND LEVODOPA 1 TABLET: 25; 100 TABLET ORAL at 19:53

## 2024-11-07 RX ADMIN — FERROUS SULFATE TAB 325 MG (65 MG ELEMENTAL FE) 325 MG: 325 (65 FE) TAB at 18:01

## 2024-11-07 RX ADMIN — QUETIAPINE 25 MG: 25 TABLET, FILM COATED ORAL at 21:53

## 2024-11-07 RX ADMIN — CEFDINIR 300 MG: 300 CAPSULE ORAL at 19:53

## 2024-11-07 RX ADMIN — SPIRONOLACTONE 25 MG: 25 TABLET, FILM COATED ORAL at 08:15

## 2024-11-07 RX ADMIN — CEFUROXIME AXETIL 500 MG: 500 TABLET ORAL at 08:14

## 2024-11-07 RX ADMIN — ATORVASTATIN CALCIUM 40 MG: 40 TABLET, FILM COATED ORAL at 18:01

## 2024-11-07 RX ADMIN — CARVEDILOL 12.5 MG: 12.5 TABLET, FILM COATED ORAL at 08:14

## 2024-11-07 RX ADMIN — PANTOPRAZOLE SODIUM 40 MG: 40 TABLET, DELAYED RELEASE ORAL at 08:15

## 2024-11-08 VITALS — SYSTOLIC BLOOD PRESSURE: 111 MMHG | DIASTOLIC BLOOD PRESSURE: 54 MMHG | RESPIRATION RATE: 18 BRPM

## 2024-11-08 VITALS — DIASTOLIC BLOOD PRESSURE: 65 MMHG | SYSTOLIC BLOOD PRESSURE: 148 MMHG | RESPIRATION RATE: 18 BRPM

## 2024-11-08 VITALS — DIASTOLIC BLOOD PRESSURE: 78 MMHG | SYSTOLIC BLOOD PRESSURE: 148 MMHG | RESPIRATION RATE: 20 BRPM

## 2024-11-08 VITALS — SYSTOLIC BLOOD PRESSURE: 137 MMHG | RESPIRATION RATE: 18 BRPM | DIASTOLIC BLOOD PRESSURE: 74 MMHG

## 2024-11-08 RX ADMIN — ESCITALOPRAM OXALATE 10 MG: 10 TABLET, FILM COATED ORAL at 08:36

## 2024-11-08 RX ADMIN — CARVEDILOL: 12.5 TABLET, FILM COATED ORAL at 08:55

## 2024-11-08 RX ADMIN — CEFDINIR 300 MG: 300 CAPSULE ORAL at 20:18

## 2024-11-08 RX ADMIN — ENOXAPARIN SODIUM 40 MG: 40 INJECTION SUBCUTANEOUS at 17:45

## 2024-11-08 RX ADMIN — CARVEDILOL 6.25 MG: 6.25 TABLET, FILM COATED ORAL at 12:57

## 2024-11-08 RX ADMIN — FERROUS SULFATE TAB 325 MG (65 MG ELEMENTAL FE) 325 MG: 325 (65 FE) TAB at 17:46

## 2024-11-08 RX ADMIN — ATORVASTATIN CALCIUM 40 MG: 40 TABLET, FILM COATED ORAL at 17:46

## 2024-11-08 RX ADMIN — ASPIRIN 81 MG: 81 TABLET, CHEWABLE ORAL at 08:34

## 2024-11-08 RX ADMIN — CARVEDILOL 6.25 MG: 6.25 TABLET, FILM COATED ORAL at 20:17

## 2024-11-08 RX ADMIN — CARBIDOPA AND LEVODOPA 1 TABLET: 25; 100 TABLET ORAL at 16:34

## 2024-11-08 RX ADMIN — SPIRONOLACTONE 25 MG: 25 TABLET, FILM COATED ORAL at 08:34

## 2024-11-08 RX ADMIN — CARBIDOPA AND LEVODOPA 1 TABLET: 25; 100 TABLET ORAL at 20:18

## 2024-11-08 RX ADMIN — Medication 50 MCG: at 17:46

## 2024-11-08 RX ADMIN — CARBIDOPA AND LEVODOPA 1 TABLET: 25; 100 TABLET ORAL at 09:01

## 2024-11-08 RX ADMIN — CEFDINIR 300 MG: 300 CAPSULE ORAL at 08:34

## 2024-11-08 RX ADMIN — QUETIAPINE 12.5 MG: 25 TABLET, FILM COATED ORAL at 08:36

## 2024-11-08 RX ADMIN — QUETIAPINE 25 MG: 25 TABLET, FILM COATED ORAL at 22:43

## 2024-11-08 RX ADMIN — AMLODIPINE BESYLATE 5 MG: 5 TABLET ORAL at 08:35

## 2024-11-08 RX ADMIN — PANTOPRAZOLE SODIUM 40 MG: 40 TABLET, DELAYED RELEASE ORAL at 08:34

## 2024-11-09 VITALS — RESPIRATION RATE: 18 BRPM | SYSTOLIC BLOOD PRESSURE: 134 MMHG | DIASTOLIC BLOOD PRESSURE: 65 MMHG

## 2024-11-09 VITALS — DIASTOLIC BLOOD PRESSURE: 59 MMHG | SYSTOLIC BLOOD PRESSURE: 119 MMHG | RESPIRATION RATE: 20 BRPM

## 2024-11-09 VITALS — DIASTOLIC BLOOD PRESSURE: 72 MMHG | SYSTOLIC BLOOD PRESSURE: 137 MMHG | RESPIRATION RATE: 18 BRPM

## 2024-11-09 VITALS — DIASTOLIC BLOOD PRESSURE: 60 MMHG | SYSTOLIC BLOOD PRESSURE: 116 MMHG | RESPIRATION RATE: 18 BRPM

## 2024-11-09 VITALS — RESPIRATION RATE: 18 BRPM | DIASTOLIC BLOOD PRESSURE: 78 MMHG | SYSTOLIC BLOOD PRESSURE: 141 MMHG

## 2024-11-09 VITALS — SYSTOLIC BLOOD PRESSURE: 155 MMHG | RESPIRATION RATE: 18 BRPM | DIASTOLIC BLOOD PRESSURE: 77 MMHG

## 2024-11-09 RX ADMIN — SPIRONOLACTONE 25 MG: 25 TABLET, FILM COATED ORAL at 08:17

## 2024-11-09 RX ADMIN — CEFDINIR 300 MG: 300 CAPSULE ORAL at 19:56

## 2024-11-09 RX ADMIN — Medication 50 MCG: at 17:36

## 2024-11-09 RX ADMIN — PANTOPRAZOLE SODIUM 40 MG: 40 TABLET, DELAYED RELEASE ORAL at 08:18

## 2024-11-09 RX ADMIN — QUETIAPINE 12.5 MG: 25 TABLET, FILM COATED ORAL at 08:18

## 2024-11-09 RX ADMIN — CEFDINIR 300 MG: 300 CAPSULE ORAL at 08:18

## 2024-11-09 RX ADMIN — ASPIRIN 81 MG: 81 TABLET, CHEWABLE ORAL at 08:18

## 2024-11-09 RX ADMIN — ESCITALOPRAM OXALATE 10 MG: 10 TABLET, FILM COATED ORAL at 08:18

## 2024-11-09 RX ADMIN — CARBIDOPA AND LEVODOPA 1 TABLET: 25; 100 TABLET ORAL at 20:00

## 2024-11-09 RX ADMIN — AMLODIPINE BESYLATE 5 MG: 5 TABLET ORAL at 08:18

## 2024-11-09 RX ADMIN — CARVEDILOL 6.25 MG: 6.25 TABLET, FILM COATED ORAL at 19:56

## 2024-11-09 RX ADMIN — QUETIAPINE 25 MG: 25 TABLET, FILM COATED ORAL at 21:55

## 2024-11-09 RX ADMIN — ENOXAPARIN SODIUM 40 MG: 40 INJECTION SUBCUTANEOUS at 17:36

## 2024-11-09 RX ADMIN — ATORVASTATIN CALCIUM 40 MG: 40 TABLET, FILM COATED ORAL at 17:35

## 2024-11-09 RX ADMIN — CARBIDOPA AND LEVODOPA 1 TABLET: 25; 100 TABLET ORAL at 09:40

## 2024-11-09 RX ADMIN — FERROUS SULFATE TAB 325 MG (65 MG ELEMENTAL FE) 325 MG: 325 (65 FE) TAB at 17:35

## 2024-11-09 RX ADMIN — CARBIDOPA AND LEVODOPA 1 TABLET: 25; 100 TABLET ORAL at 16:18

## 2024-11-09 RX ADMIN — CARVEDILOL 6.25 MG: 6.25 TABLET, FILM COATED ORAL at 08:17

## 2024-11-10 VITALS — SYSTOLIC BLOOD PRESSURE: 122 MMHG | RESPIRATION RATE: 16 BRPM | DIASTOLIC BLOOD PRESSURE: 62 MMHG

## 2024-11-10 VITALS — SYSTOLIC BLOOD PRESSURE: 138 MMHG | DIASTOLIC BLOOD PRESSURE: 71 MMHG | RESPIRATION RATE: 16 BRPM

## 2024-11-10 VITALS — SYSTOLIC BLOOD PRESSURE: 131 MMHG | DIASTOLIC BLOOD PRESSURE: 70 MMHG | RESPIRATION RATE: 18 BRPM

## 2024-11-10 VITALS — RESPIRATION RATE: 16 BRPM | SYSTOLIC BLOOD PRESSURE: 141 MMHG | DIASTOLIC BLOOD PRESSURE: 71 MMHG

## 2024-11-10 RX ADMIN — Medication 50 MCG: at 17:14

## 2024-11-10 RX ADMIN — QUETIAPINE 12.5 MG: 25 TABLET, FILM COATED ORAL at 08:34

## 2024-11-10 RX ADMIN — AMLODIPINE BESYLATE 5 MG: 5 TABLET ORAL at 08:35

## 2024-11-10 RX ADMIN — FERROUS SULFATE TAB 325 MG (65 MG ELEMENTAL FE) 325 MG: 325 (65 FE) TAB at 17:14

## 2024-11-10 RX ADMIN — ASPIRIN 81 MG: 81 TABLET, CHEWABLE ORAL at 08:34

## 2024-11-10 RX ADMIN — SPIRONOLACTONE 25 MG: 25 TABLET, FILM COATED ORAL at 08:36

## 2024-11-10 RX ADMIN — QUETIAPINE 25 MG: 25 TABLET, FILM COATED ORAL at 20:56

## 2024-11-10 RX ADMIN — CARVEDILOL 6.25 MG: 6.25 TABLET, FILM COATED ORAL at 20:55

## 2024-11-10 RX ADMIN — CARVEDILOL 6.25 MG: 6.25 TABLET, FILM COATED ORAL at 08:35

## 2024-11-10 RX ADMIN — CEFDINIR 300 MG: 300 CAPSULE ORAL at 20:55

## 2024-11-10 RX ADMIN — CARBIDOPA AND LEVODOPA 1 TABLET: 25; 100 TABLET ORAL at 08:37

## 2024-11-10 RX ADMIN — ATORVASTATIN CALCIUM 40 MG: 40 TABLET, FILM COATED ORAL at 17:14

## 2024-11-10 RX ADMIN — ENOXAPARIN SODIUM 40 MG: 40 INJECTION SUBCUTANEOUS at 17:14

## 2024-11-10 RX ADMIN — ACETAMINOPHEN 1000 MG: 500 TABLET ORAL at 04:31

## 2024-11-10 RX ADMIN — CARBIDOPA AND LEVODOPA 1 TABLET: 25; 100 TABLET ORAL at 15:25

## 2024-11-10 RX ADMIN — PANTOPRAZOLE SODIUM 40 MG: 40 TABLET, DELAYED RELEASE ORAL at 08:35

## 2024-11-10 RX ADMIN — CEFDINIR 300 MG: 300 CAPSULE ORAL at 08:35

## 2024-11-10 RX ADMIN — CARBIDOPA AND LEVODOPA 1 TABLET: 25; 100 TABLET ORAL at 20:57

## 2024-11-10 RX ADMIN — ESCITALOPRAM OXALATE 10 MG: 10 TABLET, FILM COATED ORAL at 08:34

## 2024-11-11 VITALS — RESPIRATION RATE: 18 BRPM | DIASTOLIC BLOOD PRESSURE: 64 MMHG | SYSTOLIC BLOOD PRESSURE: 138 MMHG

## 2024-11-11 VITALS — SYSTOLIC BLOOD PRESSURE: 141 MMHG | DIASTOLIC BLOOD PRESSURE: 69 MMHG | RESPIRATION RATE: 18 BRPM

## 2024-11-11 VITALS — SYSTOLIC BLOOD PRESSURE: 117 MMHG | OXYGEN SATURATION: 95 % | DIASTOLIC BLOOD PRESSURE: 65 MMHG | HEART RATE: 60 BPM

## 2024-11-11 VITALS — RESPIRATION RATE: 18 BRPM | DIASTOLIC BLOOD PRESSURE: 68 MMHG | SYSTOLIC BLOOD PRESSURE: 126 MMHG

## 2024-11-11 LAB
BUN SERPL-MCNC: 16 MG/DL (ref 9–20)
CALCIUM SERPL-MCNC: 9.1 MG/DL (ref 8.4–10.2)
CHLORIDE SERPL-SCNC: 100 MMOL/L (ref 98–107)
CO2 SERPL-SCNC: 28 MMOL/L (ref 22–30)
EGFR: > 60
ERYTHROCYTE [DISTWIDTH] IN BLOOD BY AUTOMATED COUNT: 13.6 % (ref 11.5–14.5)
ESTIMATED CREATININE CLEARANCE: 68 ML/MIN
GLUCOSE SERPL-MCNC: 95 MG/DL (ref 70–99)
HCT VFR BLD AUTO: 41.4 % (ref 39–52)
HGB BLD-MCNC: 13.2 G/DL (ref 13–18)
MAGNESIUM SERPL-MCNC: 2.1 MG/DL (ref 1.6–2.3)
MCHC RBC AUTO-ENTMCNC: 31.9 G/DL (ref 33–37)
MCV RBC AUTO: 89.2 FL (ref 80–94)
NRBC BLD AUTO-RTO: 0 %
PLATELET # BLD AUTO: 215 10^3/UL (ref 130–400)
POTASSIUM SERPL-SCNC: 4.6 MMOL/L (ref 3.5–5.1)
SODIUM SERPL-SCNC: 141 MMOL/L (ref 135–145)

## 2024-11-11 RX ADMIN — CARVEDILOL 6.25 MG: 6.25 TABLET, FILM COATED ORAL at 10:08

## 2024-11-11 RX ADMIN — Medication 50 MCG: at 16:14

## 2024-11-11 RX ADMIN — CARBIDOPA AND LEVODOPA 1 TABLET: 25; 100 TABLET ORAL at 10:11

## 2024-11-11 RX ADMIN — ASPIRIN 81 MG: 81 TABLET, CHEWABLE ORAL at 10:09

## 2024-11-11 RX ADMIN — CEFDINIR 300 MG: 300 CAPSULE ORAL at 10:09

## 2024-11-11 RX ADMIN — FERROUS SULFATE TAB 325 MG (65 MG ELEMENTAL FE) 325 MG: 325 (65 FE) TAB at 16:05

## 2024-11-11 RX ADMIN — ESCITALOPRAM OXALATE 10 MG: 10 TABLET, FILM COATED ORAL at 10:07

## 2024-11-11 RX ADMIN — CEFDINIR 300 MG: 300 CAPSULE ORAL at 20:55

## 2024-11-11 RX ADMIN — CARBIDOPA AND LEVODOPA 1 TABLET: 25; 100 TABLET ORAL at 20:55

## 2024-11-11 RX ADMIN — QUETIAPINE 12.5 MG: 25 TABLET, FILM COATED ORAL at 10:08

## 2024-11-11 RX ADMIN — CARVEDILOL 6.25 MG: 6.25 TABLET, FILM COATED ORAL at 20:55

## 2024-11-11 RX ADMIN — ACETAMINOPHEN 650 MG: 325 TABLET ORAL at 03:24

## 2024-11-11 RX ADMIN — ENOXAPARIN SODIUM 40 MG: 40 INJECTION SUBCUTANEOUS at 16:04

## 2024-11-11 RX ADMIN — ATORVASTATIN CALCIUM 40 MG: 40 TABLET, FILM COATED ORAL at 16:04

## 2024-11-11 RX ADMIN — AMLODIPINE BESYLATE 5 MG: 5 TABLET ORAL at 10:08

## 2024-11-11 RX ADMIN — QUETIAPINE 25 MG: 25 TABLET, FILM COATED ORAL at 20:55

## 2024-11-11 RX ADMIN — CARBIDOPA AND LEVODOPA 1 TABLET: 25; 100 TABLET ORAL at 13:44

## 2024-11-11 RX ADMIN — SPIRONOLACTONE 25 MG: 25 TABLET, FILM COATED ORAL at 10:07

## 2024-11-11 RX ADMIN — PANTOPRAZOLE SODIUM 40 MG: 40 TABLET, DELAYED RELEASE ORAL at 10:07

## 2024-11-12 ENCOUNTER — TELEPHONE (OUTPATIENT)
Age: 84
End: 2024-11-12

## 2024-11-12 VITALS — DIASTOLIC BLOOD PRESSURE: 68 MMHG | SYSTOLIC BLOOD PRESSURE: 117 MMHG | OXYGEN SATURATION: 98 % | HEART RATE: 62 BPM

## 2024-11-12 VITALS — DIASTOLIC BLOOD PRESSURE: 79 MMHG | SYSTOLIC BLOOD PRESSURE: 147 MMHG | RESPIRATION RATE: 16 BRPM

## 2024-11-12 VITALS — RESPIRATION RATE: 17 BRPM | DIASTOLIC BLOOD PRESSURE: 59 MMHG | SYSTOLIC BLOOD PRESSURE: 108 MMHG

## 2024-11-12 VITALS — RESPIRATION RATE: 18 BRPM | DIASTOLIC BLOOD PRESSURE: 68 MMHG | SYSTOLIC BLOOD PRESSURE: 134 MMHG

## 2024-11-12 RX ADMIN — CARVEDILOL 6.25 MG: 6.25 TABLET, FILM COATED ORAL at 20:31

## 2024-11-12 RX ADMIN — PANTOPRAZOLE SODIUM 40 MG: 40 TABLET, DELAYED RELEASE ORAL at 08:16

## 2024-11-12 RX ADMIN — ATORVASTATIN CALCIUM 40 MG: 40 TABLET, FILM COATED ORAL at 16:21

## 2024-11-12 RX ADMIN — ASPIRIN 81 MG: 81 TABLET, CHEWABLE ORAL at 08:16

## 2024-11-12 RX ADMIN — ESCITALOPRAM OXALATE 10 MG: 10 TABLET, FILM COATED ORAL at 08:16

## 2024-11-12 RX ADMIN — CARVEDILOL 6.25 MG: 6.25 TABLET, FILM COATED ORAL at 08:16

## 2024-11-12 RX ADMIN — SPIRONOLACTONE 25 MG: 25 TABLET, FILM COATED ORAL at 08:16

## 2024-11-12 RX ADMIN — ENOXAPARIN SODIUM 40 MG: 40 INJECTION SUBCUTANEOUS at 16:20

## 2024-11-12 RX ADMIN — CARBIDOPA AND LEVODOPA 1 TABLET: 25; 100 TABLET ORAL at 14:24

## 2024-11-12 RX ADMIN — Medication 50 MCG: at 16:21

## 2024-11-12 RX ADMIN — CARBIDOPA AND LEVODOPA 1 TABLET: 25; 100 TABLET ORAL at 20:31

## 2024-11-12 RX ADMIN — AMLODIPINE BESYLATE 5 MG: 5 TABLET ORAL at 08:16

## 2024-11-12 RX ADMIN — CEFDINIR 300 MG: 300 CAPSULE ORAL at 20:31

## 2024-11-12 RX ADMIN — CARBIDOPA AND LEVODOPA 1 TABLET: 25; 100 TABLET ORAL at 08:18

## 2024-11-12 RX ADMIN — QUETIAPINE 12.5 MG: 25 TABLET, FILM COATED ORAL at 08:17

## 2024-11-12 RX ADMIN — FERROUS SULFATE TAB 325 MG (65 MG ELEMENTAL FE) 325 MG: 325 (65 FE) TAB at 16:21

## 2024-11-12 RX ADMIN — CEFDINIR 300 MG: 300 CAPSULE ORAL at 08:16

## 2024-11-12 RX ADMIN — QUETIAPINE 25 MG: 25 TABLET, FILM COATED ORAL at 20:31

## 2024-11-12 NOTE — TELEPHONE ENCOUNTER
Caller: Magen     Doctor: Petr    Reason for call: Patient has to put the VISCO injection on hold until further notice. Patient is currently hospitalized and may be placed in a facility afterwards. They are unsure at this time   Thank you    Call back#: [phone]

## 2024-11-13 VITALS — DIASTOLIC BLOOD PRESSURE: 77 MMHG | SYSTOLIC BLOOD PRESSURE: 154 MMHG | RESPIRATION RATE: 16 BRPM

## 2024-11-13 VITALS — SYSTOLIC BLOOD PRESSURE: 111 MMHG | DIASTOLIC BLOOD PRESSURE: 55 MMHG | RESPIRATION RATE: 20 BRPM

## 2024-11-13 VITALS — HEART RATE: 59 BPM | DIASTOLIC BLOOD PRESSURE: 60 MMHG | OXYGEN SATURATION: 96 % | SYSTOLIC BLOOD PRESSURE: 115 MMHG

## 2024-11-13 VITALS — RESPIRATION RATE: 14 BRPM | SYSTOLIC BLOOD PRESSURE: 149 MMHG | DIASTOLIC BLOOD PRESSURE: 74 MMHG

## 2024-11-13 VITALS — DIASTOLIC BLOOD PRESSURE: 64 MMHG | SYSTOLIC BLOOD PRESSURE: 136 MMHG

## 2024-11-13 RX ADMIN — ACETAMINOPHEN 650 MG: 325 TABLET ORAL at 20:01

## 2024-11-13 RX ADMIN — QUETIAPINE 25 MG: 25 TABLET, FILM COATED ORAL at 21:05

## 2024-11-13 RX ADMIN — FERROUS SULFATE TAB 325 MG (65 MG ELEMENTAL FE) 325 MG: 325 (65 FE) TAB at 17:26

## 2024-11-13 RX ADMIN — SPIRONOLACTONE 25 MG: 25 TABLET, FILM COATED ORAL at 08:05

## 2024-11-13 RX ADMIN — CARVEDILOL 6.25 MG: 6.25 TABLET, FILM COATED ORAL at 20:00

## 2024-11-13 RX ADMIN — ASPIRIN 81 MG: 81 TABLET, CHEWABLE ORAL at 08:06

## 2024-11-13 RX ADMIN — AMLODIPINE BESYLATE 5 MG: 5 TABLET ORAL at 08:06

## 2024-11-13 RX ADMIN — ATORVASTATIN CALCIUM 40 MG: 40 TABLET, FILM COATED ORAL at 17:26

## 2024-11-13 RX ADMIN — ESCITALOPRAM OXALATE 10 MG: 10 TABLET, FILM COATED ORAL at 08:06

## 2024-11-13 RX ADMIN — PANTOPRAZOLE SODIUM 40 MG: 40 TABLET, DELAYED RELEASE ORAL at 08:08

## 2024-11-13 RX ADMIN — CEFDINIR 300 MG: 300 CAPSULE ORAL at 08:05

## 2024-11-13 RX ADMIN — Medication 50 MCG: at 17:26

## 2024-11-13 RX ADMIN — CEFDINIR 300 MG: 300 CAPSULE ORAL at 20:00

## 2024-11-13 RX ADMIN — CARBIDOPA AND LEVODOPA 1 TABLET: 25; 100 TABLET ORAL at 14:57

## 2024-11-13 RX ADMIN — QUETIAPINE 12.5 MG: 25 TABLET, FILM COATED ORAL at 08:06

## 2024-11-13 RX ADMIN — CARVEDILOL 6.25 MG: 6.25 TABLET, FILM COATED ORAL at 08:06

## 2024-11-13 RX ADMIN — ENOXAPARIN SODIUM 40 MG: 40 INJECTION SUBCUTANEOUS at 17:26

## 2024-11-13 RX ADMIN — CARBIDOPA AND LEVODOPA 1 TABLET: 25; 100 TABLET ORAL at 21:05

## 2024-11-13 RX ADMIN — CARBIDOPA AND LEVODOPA 1 TABLET: 25; 100 TABLET ORAL at 09:33

## 2024-11-14 VITALS — SYSTOLIC BLOOD PRESSURE: 132 MMHG | DIASTOLIC BLOOD PRESSURE: 60 MMHG | RESPIRATION RATE: 16 BRPM

## 2024-11-14 VITALS — RESPIRATION RATE: 18 BRPM | SYSTOLIC BLOOD PRESSURE: 139 MMHG | DIASTOLIC BLOOD PRESSURE: 70 MMHG

## 2024-11-14 VITALS — SYSTOLIC BLOOD PRESSURE: 147 MMHG | DIASTOLIC BLOOD PRESSURE: 77 MMHG | RESPIRATION RATE: 16 BRPM

## 2024-11-14 VITALS — SYSTOLIC BLOOD PRESSURE: 122 MMHG | DIASTOLIC BLOOD PRESSURE: 64 MMHG

## 2024-11-14 RX ADMIN — AMLODIPINE BESYLATE 5 MG: 5 TABLET ORAL at 07:46

## 2024-11-14 RX ADMIN — ESCITALOPRAM OXALATE 10 MG: 10 TABLET, FILM COATED ORAL at 09:22

## 2024-11-14 RX ADMIN — PANTOPRAZOLE SODIUM 40 MG: 40 TABLET, DELAYED RELEASE ORAL at 07:46

## 2024-11-14 RX ADMIN — POLYVINYL ALCOHOL, POVIDONE 1 DROPS: 14; 6 SOLUTION/ DROPS OPHTHALMIC at 17:27

## 2024-11-14 RX ADMIN — QUETIAPINE 25 MG: 25 TABLET, FILM COATED ORAL at 21:17

## 2024-11-14 RX ADMIN — CARBIDOPA AND LEVODOPA 1 TABLET: 25; 100 TABLET ORAL at 09:22

## 2024-11-14 RX ADMIN — ASPIRIN 81 MG: 81 TABLET, CHEWABLE ORAL at 07:46

## 2024-11-14 RX ADMIN — POLYVINYL ALCOHOL, POVIDONE 1 DROPS: 14; 6 SOLUTION/ DROPS OPHTHALMIC at 13:31

## 2024-11-14 RX ADMIN — CEFDINIR 300 MG: 300 CAPSULE ORAL at 07:46

## 2024-11-14 RX ADMIN — ATORVASTATIN CALCIUM 40 MG: 40 TABLET, FILM COATED ORAL at 17:27

## 2024-11-14 RX ADMIN — CARBIDOPA AND LEVODOPA 1 TABLET: 25; 100 TABLET ORAL at 21:17

## 2024-11-14 RX ADMIN — ENOXAPARIN SODIUM 40 MG: 40 INJECTION SUBCUTANEOUS at 17:28

## 2024-11-14 RX ADMIN — CEFDINIR 300 MG: 300 CAPSULE ORAL at 20:14

## 2024-11-14 RX ADMIN — POLYVINYL ALCOHOL, POVIDONE 1 DROPS: 14; 6 SOLUTION/ DROPS OPHTHALMIC at 21:17

## 2024-11-14 RX ADMIN — CARVEDILOL 6.25 MG: 6.25 TABLET, FILM COATED ORAL at 07:48

## 2024-11-14 RX ADMIN — CARVEDILOL 6.25 MG: 6.25 TABLET, FILM COATED ORAL at 20:14

## 2024-11-14 RX ADMIN — QUETIAPINE 12.5 MG: 25 TABLET, FILM COATED ORAL at 07:47

## 2024-11-14 RX ADMIN — FERROUS SULFATE TAB 325 MG (65 MG ELEMENTAL FE) 325 MG: 325 (65 FE) TAB at 17:27

## 2024-11-14 RX ADMIN — CARBIDOPA AND LEVODOPA 1 TABLET: 25; 100 TABLET ORAL at 14:42

## 2024-11-14 RX ADMIN — SPIRONOLACTONE 25 MG: 25 TABLET, FILM COATED ORAL at 07:47

## 2024-11-14 RX ADMIN — Medication 50 MCG: at 17:27

## 2024-11-15 VITALS — SYSTOLIC BLOOD PRESSURE: 139 MMHG | DIASTOLIC BLOOD PRESSURE: 73 MMHG | RESPIRATION RATE: 18 BRPM

## 2024-11-15 VITALS — SYSTOLIC BLOOD PRESSURE: 114 MMHG | RESPIRATION RATE: 18 BRPM | DIASTOLIC BLOOD PRESSURE: 61 MMHG

## 2024-11-15 VITALS — RESPIRATION RATE: 18 BRPM | SYSTOLIC BLOOD PRESSURE: 163 MMHG | DIASTOLIC BLOOD PRESSURE: 78 MMHG

## 2024-11-15 VITALS — OXYGEN SATURATION: 97 % | SYSTOLIC BLOOD PRESSURE: 131 MMHG | DIASTOLIC BLOOD PRESSURE: 62 MMHG | HEART RATE: 60 BPM

## 2024-11-15 RX ADMIN — CARBIDOPA AND LEVODOPA 1 TABLET: 25; 100 TABLET ORAL at 15:06

## 2024-11-15 RX ADMIN — PANTOPRAZOLE SODIUM 40 MG: 40 TABLET, DELAYED RELEASE ORAL at 08:15

## 2024-11-15 RX ADMIN — ENOXAPARIN SODIUM 40 MG: 40 INJECTION SUBCUTANEOUS at 17:39

## 2024-11-15 RX ADMIN — ASPIRIN 81 MG: 81 TABLET, CHEWABLE ORAL at 08:15

## 2024-11-15 RX ADMIN — AMLODIPINE BESYLATE 5 MG: 5 TABLET ORAL at 08:16

## 2024-11-15 RX ADMIN — POLYVINYL ALCOHOL, POVIDONE 1 DROPS: 14; 6 SOLUTION/ DROPS OPHTHALMIC at 13:38

## 2024-11-15 RX ADMIN — Medication 50 MCG: at 17:38

## 2024-11-15 RX ADMIN — CARBIDOPA AND LEVODOPA 1 TABLET: 25; 100 TABLET ORAL at 10:15

## 2024-11-15 RX ADMIN — SPIRONOLACTONE 25 MG: 25 TABLET, FILM COATED ORAL at 08:15

## 2024-11-15 RX ADMIN — QUETIAPINE 25 MG: 25 TABLET, FILM COATED ORAL at 20:16

## 2024-11-15 RX ADMIN — ESCITALOPRAM OXALATE 10 MG: 10 TABLET, FILM COATED ORAL at 08:15

## 2024-11-15 RX ADMIN — POLYVINYL ALCOHOL, POVIDONE 1 DROPS: 14; 6 SOLUTION/ DROPS OPHTHALMIC at 08:16

## 2024-11-15 RX ADMIN — QUETIAPINE 12.5 MG: 25 TABLET, FILM COATED ORAL at 08:16

## 2024-11-15 RX ADMIN — POLYVINYL ALCOHOL, POVIDONE 1 DROPS: 14; 6 SOLUTION/ DROPS OPHTHALMIC at 17:38

## 2024-11-15 RX ADMIN — CEFDINIR 300 MG: 300 CAPSULE ORAL at 20:15

## 2024-11-15 RX ADMIN — CARVEDILOL 6.25 MG: 6.25 TABLET, FILM COATED ORAL at 08:15

## 2024-11-15 RX ADMIN — POLYVINYL ALCOHOL, POVIDONE 1 DROPS: 14; 6 SOLUTION/ DROPS OPHTHALMIC at 20:15

## 2024-11-15 RX ADMIN — CARVEDILOL 6.25 MG: 6.25 TABLET, FILM COATED ORAL at 20:28

## 2024-11-15 RX ADMIN — CARBIDOPA AND LEVODOPA 1 TABLET: 25; 100 TABLET ORAL at 20:29

## 2024-11-15 RX ADMIN — ATORVASTATIN CALCIUM 40 MG: 40 TABLET, FILM COATED ORAL at 17:38

## 2024-11-15 RX ADMIN — CEFDINIR 300 MG: 300 CAPSULE ORAL at 08:15

## 2024-11-15 RX ADMIN — FERROUS SULFATE TAB 325 MG (65 MG ELEMENTAL FE) 325 MG: 325 (65 FE) TAB at 17:38

## 2024-11-16 VITALS — SYSTOLIC BLOOD PRESSURE: 135 MMHG | DIASTOLIC BLOOD PRESSURE: 75 MMHG | RESPIRATION RATE: 20 BRPM

## 2024-11-16 VITALS — RESPIRATION RATE: 18 BRPM | SYSTOLIC BLOOD PRESSURE: 156 MMHG | DIASTOLIC BLOOD PRESSURE: 80 MMHG

## 2024-11-16 VITALS — DIASTOLIC BLOOD PRESSURE: 53 MMHG | RESPIRATION RATE: 20 BRPM | SYSTOLIC BLOOD PRESSURE: 114 MMHG

## 2024-11-16 RX ADMIN — CARVEDILOL 6.25 MG: 6.25 TABLET, FILM COATED ORAL at 07:26

## 2024-11-16 RX ADMIN — ASPIRIN 81 MG: 81 TABLET, CHEWABLE ORAL at 07:26

## 2024-11-16 RX ADMIN — CEFDINIR 300 MG: 300 CAPSULE ORAL at 07:26

## 2024-11-16 RX ADMIN — ATORVASTATIN CALCIUM 40 MG: 40 TABLET, FILM COATED ORAL at 17:34

## 2024-11-16 RX ADMIN — POLYVINYL ALCOHOL, POVIDONE 1 DROPS: 14; 6 SOLUTION/ DROPS OPHTHALMIC at 07:27

## 2024-11-16 RX ADMIN — POLYVINYL ALCOHOL, POVIDONE 1 DROPS: 14; 6 SOLUTION/ DROPS OPHTHALMIC at 20:19

## 2024-11-16 RX ADMIN — QUETIAPINE 12.5 MG: 25 TABLET, FILM COATED ORAL at 07:26

## 2024-11-16 RX ADMIN — SPIRONOLACTONE 25 MG: 25 TABLET, FILM COATED ORAL at 07:26

## 2024-11-16 RX ADMIN — AMLODIPINE BESYLATE 5 MG: 5 TABLET ORAL at 07:26

## 2024-11-16 RX ADMIN — ESCITALOPRAM OXALATE 10 MG: 10 TABLET, FILM COATED ORAL at 07:26

## 2024-11-16 RX ADMIN — CARBIDOPA AND LEVODOPA 1 TABLET: 25; 100 TABLET ORAL at 09:26

## 2024-11-16 RX ADMIN — QUETIAPINE 25 MG: 25 TABLET, FILM COATED ORAL at 20:19

## 2024-11-16 RX ADMIN — CARBIDOPA AND LEVODOPA 1 TABLET: 25; 100 TABLET ORAL at 15:42

## 2024-11-16 RX ADMIN — POLYVINYL ALCOHOL, POVIDONE 1 DROPS: 14; 6 SOLUTION/ DROPS OPHTHALMIC at 13:10

## 2024-11-16 RX ADMIN — FERROUS SULFATE TAB 325 MG (65 MG ELEMENTAL FE) 325 MG: 325 (65 FE) TAB at 17:34

## 2024-11-16 RX ADMIN — PANTOPRAZOLE SODIUM 40 MG: 40 TABLET, DELAYED RELEASE ORAL at 07:26

## 2024-11-16 RX ADMIN — ACETAMINOPHEN 650 MG: 325 TABLET ORAL at 07:27

## 2024-11-16 RX ADMIN — ENOXAPARIN SODIUM 40 MG: 40 INJECTION SUBCUTANEOUS at 17:34

## 2024-11-16 RX ADMIN — POLYVINYL ALCOHOL, POVIDONE 1 DROPS: 14; 6 SOLUTION/ DROPS OPHTHALMIC at 17:34

## 2024-11-16 RX ADMIN — CARVEDILOL 6.25 MG: 6.25 TABLET, FILM COATED ORAL at 20:17

## 2024-11-16 RX ADMIN — Medication 50 MCG: at 17:34

## 2024-11-16 RX ADMIN — CEFDINIR 300 MG: 300 CAPSULE ORAL at 20:17

## 2024-11-16 RX ADMIN — CARBIDOPA AND LEVODOPA 1 TABLET: 25; 100 TABLET ORAL at 20:21

## 2024-11-17 VITALS — SYSTOLIC BLOOD PRESSURE: 112 MMHG | DIASTOLIC BLOOD PRESSURE: 63 MMHG | RESPIRATION RATE: 16 BRPM

## 2024-11-17 VITALS — SYSTOLIC BLOOD PRESSURE: 160 MMHG | DIASTOLIC BLOOD PRESSURE: 79 MMHG | RESPIRATION RATE: 20 BRPM

## 2024-11-17 VITALS — SYSTOLIC BLOOD PRESSURE: 144 MMHG | RESPIRATION RATE: 20 BRPM | DIASTOLIC BLOOD PRESSURE: 67 MMHG

## 2024-11-17 RX ADMIN — ENOXAPARIN SODIUM 40 MG: 40 INJECTION SUBCUTANEOUS at 17:23

## 2024-11-17 RX ADMIN — QUETIAPINE 12.5 MG: 25 TABLET, FILM COATED ORAL at 08:41

## 2024-11-17 RX ADMIN — QUETIAPINE 25 MG: 25 TABLET, FILM COATED ORAL at 20:05

## 2024-11-17 RX ADMIN — CARBIDOPA AND LEVODOPA 1 TABLET: 25; 100 TABLET ORAL at 08:41

## 2024-11-17 RX ADMIN — FERROUS SULFATE TAB 325 MG (65 MG ELEMENTAL FE) 325 MG: 325 (65 FE) TAB at 17:23

## 2024-11-17 RX ADMIN — CARVEDILOL 6.25 MG: 6.25 TABLET, FILM COATED ORAL at 20:05

## 2024-11-17 RX ADMIN — CARBIDOPA AND LEVODOPA 1 TABLET: 25; 100 TABLET ORAL at 20:06

## 2024-11-17 RX ADMIN — ASPIRIN 81 MG: 81 TABLET, CHEWABLE ORAL at 08:41

## 2024-11-17 RX ADMIN — SPIRONOLACTONE 25 MG: 25 TABLET, FILM COATED ORAL at 08:41

## 2024-11-17 RX ADMIN — ATORVASTATIN CALCIUM 40 MG: 40 TABLET, FILM COATED ORAL at 17:23

## 2024-11-17 RX ADMIN — POLYVINYL ALCOHOL, POVIDONE 1 DROPS: 14; 6 SOLUTION/ DROPS OPHTHALMIC at 20:05

## 2024-11-17 RX ADMIN — Medication 50 MCG: at 17:23

## 2024-11-17 RX ADMIN — POLYVINYL ALCOHOL, POVIDONE 1 DROPS: 14; 6 SOLUTION/ DROPS OPHTHALMIC at 15:24

## 2024-11-17 RX ADMIN — POLYVINYL ALCOHOL, POVIDONE 1 DROPS: 14; 6 SOLUTION/ DROPS OPHTHALMIC at 17:24

## 2024-11-17 RX ADMIN — CARVEDILOL 6.25 MG: 6.25 TABLET, FILM COATED ORAL at 08:41

## 2024-11-17 RX ADMIN — CARBIDOPA AND LEVODOPA 1 TABLET: 25; 100 TABLET ORAL at 15:25

## 2024-11-17 RX ADMIN — AMLODIPINE BESYLATE 5 MG: 5 TABLET ORAL at 08:41

## 2024-11-17 RX ADMIN — PANTOPRAZOLE SODIUM 40 MG: 40 TABLET, DELAYED RELEASE ORAL at 08:41

## 2024-11-17 RX ADMIN — ESCITALOPRAM OXALATE 10 MG: 10 TABLET, FILM COATED ORAL at 08:41

## 2024-11-17 RX ADMIN — POLYVINYL ALCOHOL, POVIDONE 1 DROPS: 14; 6 SOLUTION/ DROPS OPHTHALMIC at 08:41

## 2024-11-18 VITALS — DIASTOLIC BLOOD PRESSURE: 76 MMHG | RESPIRATION RATE: 17 BRPM | SYSTOLIC BLOOD PRESSURE: 156 MMHG

## 2024-11-18 VITALS — DIASTOLIC BLOOD PRESSURE: 69 MMHG | SYSTOLIC BLOOD PRESSURE: 139 MMHG | RESPIRATION RATE: 17 BRPM

## 2024-11-18 LAB — GLUCOSE - POINT OF CARE: 86 MG/DL (ref 70–99)

## 2024-11-18 RX ADMIN — CARBIDOPA AND LEVODOPA 1 TABLET: 25; 100 TABLET ORAL at 15:06

## 2024-11-18 RX ADMIN — QUETIAPINE 12.5 MG: 25 TABLET, FILM COATED ORAL at 08:04

## 2024-11-18 RX ADMIN — PANTOPRAZOLE SODIUM 40 MG: 40 TABLET, DELAYED RELEASE ORAL at 08:02

## 2024-11-18 RX ADMIN — ESCITALOPRAM OXALATE 10 MG: 10 TABLET, FILM COATED ORAL at 08:04

## 2024-11-18 RX ADMIN — POLYVINYL ALCOHOL, POVIDONE 1 DROPS: 14; 6 SOLUTION/ DROPS OPHTHALMIC at 08:04

## 2024-11-18 RX ADMIN — CARVEDILOL 6.25 MG: 6.25 TABLET, FILM COATED ORAL at 08:06

## 2024-11-18 RX ADMIN — CARBIDOPA AND LEVODOPA 1 TABLET: 25; 100 TABLET ORAL at 08:10

## 2024-11-18 RX ADMIN — AMLODIPINE BESYLATE 5 MG: 5 TABLET ORAL at 08:05

## 2024-11-18 RX ADMIN — POLYVINYL ALCOHOL, POVIDONE 1 DROPS: 14; 6 SOLUTION/ DROPS OPHTHALMIC at 12:59

## 2024-11-18 RX ADMIN — SPIRONOLACTONE 25 MG: 25 TABLET, FILM COATED ORAL at 08:06

## 2024-11-18 RX ADMIN — ASPIRIN 81 MG: 81 TABLET, CHEWABLE ORAL at 08:04

## 2024-11-18 RX ADMIN — ACETAMINOPHEN 1000 MG: 500 TABLET ORAL at 00:44

## 2024-11-20 ENCOUNTER — TELEPHONE (OUTPATIENT)
Dept: VASCULAR SURGERY | Facility: CLINIC | Age: 84
End: 2024-11-20

## 2024-11-20 ENCOUNTER — TELEPHONE (OUTPATIENT)
Age: 84
End: 2024-11-20

## 2024-11-20 NOTE — TELEPHONE ENCOUNTER
Amira from [facility] called in. She is assisting patient with current living environment. Patient Identifiers correct. She was provided last patient visit date and Patient's Orientation status at time of visit. List of patient diagnosis reviewed in part in regards to Mental health diagnosis.

## 2024-11-20 NOTE — TELEPHONE ENCOUNTER
11/18/24 appt cancelled due to patient being hospitalized.  Called patient to r/s - left message    Advised pt per Dr. Edwards's note.

## 2024-12-19 DIAGNOSIS — I10 ESSENTIAL HYPERTENSION: ICD-10-CM

## 2024-12-19 RX ORDER — AMLODIPINE BESYLATE 5 MG/1
5 TABLET ORAL DAILY
Qty: 90 TABLET | Refills: 1 | Status: SHIPPED | OUTPATIENT
Start: 2024-12-19

## 2025-04-16 ENCOUNTER — OFFICE VISIT (OUTPATIENT)
Dept: CARDIOLOGY CLINIC | Facility: CLINIC | Age: 85
End: 2025-04-16
Payer: MEDICARE

## 2025-04-16 VITALS
HEART RATE: 62 BPM | DIASTOLIC BLOOD PRESSURE: 68 MMHG | BODY MASS INDEX: 24.55 KG/M2 | HEIGHT: 68 IN | SYSTOLIC BLOOD PRESSURE: 118 MMHG | OXYGEN SATURATION: 96 % | WEIGHT: 162 LBS

## 2025-04-16 DIAGNOSIS — I65.02 STENOSIS OF LEFT VERTEBRAL ARTERY: ICD-10-CM

## 2025-04-16 DIAGNOSIS — I50.32 HYPERTENSIVE HEART DISEASE WITH CHRONIC DIASTOLIC CONGESTIVE HEART FAILURE (HCC): ICD-10-CM

## 2025-04-16 DIAGNOSIS — I10 ESSENTIAL HYPERTENSION: Primary | ICD-10-CM

## 2025-04-16 DIAGNOSIS — E78.5 HYPERLIPIDEMIA, UNSPECIFIED HYPERLIPIDEMIA TYPE: ICD-10-CM

## 2025-04-16 DIAGNOSIS — I25.810 CORONARY ARTERY DISEASE INVOLVING AUTOLOGOUS VEIN CORONARY BYPASS GRAFT WITHOUT ANGINA PECTORIS: ICD-10-CM

## 2025-04-16 DIAGNOSIS — I25.10 ASCVD (ARTERIOSCLEROTIC CARDIOVASCULAR DISEASE): ICD-10-CM

## 2025-04-16 DIAGNOSIS — I11.0 HYPERTENSIVE HEART DISEASE WITH CHRONIC DIASTOLIC CONGESTIVE HEART FAILURE (HCC): ICD-10-CM

## 2025-04-16 PROCEDURE — 99213 OFFICE O/P EST LOW 20 MIN: CPT | Performed by: INTERNAL MEDICINE

## 2025-04-16 RX ORDER — CARVEDILOL 6.25 MG/1
6.25 TABLET ORAL 2 TIMES DAILY WITH MEALS
COMMUNITY
Start: 2025-03-17

## 2025-04-16 RX ORDER — FUROSEMIDE 20 MG/1
20 TABLET ORAL DAILY
COMMUNITY
Start: 2025-04-01
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
## 2025-06-25 ENCOUNTER — APPOINTMENT (EMERGENCY)
Dept: RADIOLOGY | Facility: HOSPITAL | Age: 85
DRG: 853 | End: 2025-06-25
Payer: MEDICARE

## 2025-06-25 ENCOUNTER — APPOINTMENT (EMERGENCY)
Dept: CT IMAGING | Facility: HOSPITAL | Age: 85
DRG: 853 | End: 2025-06-25
Payer: MEDICARE

## 2025-06-25 ENCOUNTER — HOSPITAL ENCOUNTER (INPATIENT)
Facility: HOSPITAL | Age: 85
LOS: 8 days | Discharge: NURSING, REHAB OR LONG-TERM CARE | DRG: 853 | End: 2025-07-03
Attending: EMERGENCY MEDICINE | Admitting: STUDENT IN AN ORGANIZED HEALTH CARE EDUCATION/TRAINING PROGRAM
Payer: MEDICARE

## 2025-06-25 DIAGNOSIS — S72.001A CLOSED DISPLACED FRACTURE OF RIGHT FEMORAL NECK (HCC): Primary | ICD-10-CM

## 2025-06-25 DIAGNOSIS — J44.1 COPD EXACERBATION (HCC): ICD-10-CM

## 2025-06-25 DIAGNOSIS — S02.92XA CLOSED EXTENSIVE FACIAL FRACTURES (HCC): ICD-10-CM

## 2025-06-25 DIAGNOSIS — J18.9 PNEUMONIA: ICD-10-CM

## 2025-06-25 PROBLEM — J96.00 ACUTE RESPIRATORY FAILURE (HCC): Status: ACTIVE | Noted: 2025-06-25

## 2025-06-25 PROBLEM — S72.009A CLOSED FRACTURE OF NECK OF FEMUR (HCC): Status: ACTIVE | Noted: 2025-06-25

## 2025-06-25 PROBLEM — A41.9 SEPSIS (HCC): Status: ACTIVE | Noted: 2025-06-25

## 2025-06-25 LAB
2HR DELTA HS TROPONIN: 0 NG/L
ABO GROUP BLD: NORMAL
ALBUMIN SERPL BCG-MCNC: 3.9 G/DL (ref 3.5–5)
ALP SERPL-CCNC: 74 U/L (ref 34–104)
ALT SERPL W P-5'-P-CCNC: 17 U/L (ref 7–52)
ANION GAP SERPL CALCULATED.3IONS-SCNC: 8 MMOL/L (ref 4–13)
AST SERPL W P-5'-P-CCNC: 22 U/L (ref 13–39)
BASOPHILS # BLD AUTO: 0.05 THOUSANDS/ÂΜL (ref 0–0.1)
BASOPHILS NFR BLD AUTO: 0 % (ref 0–1)
BILIRUB SERPL-MCNC: 1.71 MG/DL (ref 0.2–1)
BLD GP AB SCN SERPL QL: NEGATIVE
BNP SERPL-MCNC: 130 PG/ML (ref 0–100)
BUN SERPL-MCNC: 19 MG/DL (ref 5–25)
CALCIUM SERPL-MCNC: 9 MG/DL (ref 8.4–10.2)
CARDIAC TROPONIN I PNL SERPL HS: 8 NG/L (ref ?–50)
CARDIAC TROPONIN I PNL SERPL HS: 8 NG/L (ref ?–50)
CHLORIDE SERPL-SCNC: 98 MMOL/L (ref 96–108)
CO2 SERPL-SCNC: 28 MMOL/L (ref 21–32)
CREAT SERPL-MCNC: 0.85 MG/DL (ref 0.6–1.3)
EOSINOPHIL # BLD AUTO: 0.02 THOUSAND/ÂΜL (ref 0–0.61)
EOSINOPHIL NFR BLD AUTO: 0 % (ref 0–6)
ERYTHROCYTE [DISTWIDTH] IN BLOOD BY AUTOMATED COUNT: 14.4 % (ref 11.6–15.1)
GFR SERPL CREATININE-BSD FRML MDRD: 80 ML/MIN/1.73SQ M
GLUCOSE SERPL-MCNC: 115 MG/DL (ref 65–140)
HCT VFR BLD AUTO: 49.6 % (ref 36.5–49.3)
HGB BLD-MCNC: 15.7 G/DL (ref 12–17)
IMM GRANULOCYTES # BLD AUTO: 0.21 THOUSAND/UL (ref 0–0.2)
IMM GRANULOCYTES NFR BLD AUTO: 1 % (ref 0–2)
LACTATE SERPL-SCNC: 0.9 MMOL/L (ref 0.5–2)
LYMPHOCYTES # BLD AUTO: 0.94 THOUSANDS/ÂΜL (ref 0.6–4.47)
LYMPHOCYTES NFR BLD AUTO: 5 % (ref 14–44)
MCH RBC QN AUTO: 27.3 PG (ref 26.8–34.3)
MCHC RBC AUTO-ENTMCNC: 31.7 G/DL (ref 31.4–37.4)
MCV RBC AUTO: 86 FL (ref 82–98)
MONOCYTES # BLD AUTO: 1.51 THOUSAND/ÂΜL (ref 0.17–1.22)
MONOCYTES NFR BLD AUTO: 7 % (ref 4–12)
NEUTROPHILS # BLD AUTO: 18.15 THOUSANDS/ÂΜL (ref 1.85–7.62)
NEUTS SEG NFR BLD AUTO: 87 % (ref 43–75)
NRBC BLD AUTO-RTO: 0 /100 WBCS
PLATELET # BLD AUTO: 243 THOUSANDS/UL (ref 149–390)
PMV BLD AUTO: 8.7 FL (ref 8.9–12.7)
POTASSIUM SERPL-SCNC: 4.2 MMOL/L (ref 3.5–5.3)
PROCALCITONIN SERPL-MCNC: 0.35 NG/ML
PROT SERPL-MCNC: 7.2 G/DL (ref 6.4–8.4)
RBC # BLD AUTO: 5.76 MILLION/UL (ref 3.88–5.62)
RH BLD: POSITIVE
SODIUM SERPL-SCNC: 134 MMOL/L (ref 135–147)
SPECIMEN EXPIRATION DATE: NORMAL
WBC # BLD AUTO: 20.88 THOUSAND/UL (ref 4.31–10.16)

## 2025-06-25 PROCEDURE — 99285 EMERGENCY DEPT VISIT HI MDM: CPT

## 2025-06-25 PROCEDURE — 86901 BLOOD TYPING SEROLOGIC RH(D): CPT | Performed by: EMERGENCY MEDICINE

## 2025-06-25 PROCEDURE — 71045 X-RAY EXAM CHEST 1 VIEW: CPT

## 2025-06-25 PROCEDURE — 96374 THER/PROPH/DIAG INJ IV PUSH: CPT

## 2025-06-25 PROCEDURE — 93005 ELECTROCARDIOGRAM TRACING: CPT

## 2025-06-25 PROCEDURE — 96375 TX/PRO/DX INJ NEW DRUG ADDON: CPT

## 2025-06-25 PROCEDURE — 80053 COMPREHEN METABOLIC PANEL: CPT | Performed by: EMERGENCY MEDICINE

## 2025-06-25 PROCEDURE — 74177 CT ABD & PELVIS W/CONTRAST: CPT

## 2025-06-25 PROCEDURE — 86900 BLOOD TYPING SEROLOGIC ABO: CPT | Performed by: EMERGENCY MEDICINE

## 2025-06-25 PROCEDURE — 73564 X-RAY EXAM KNEE 4 OR MORE: CPT

## 2025-06-25 PROCEDURE — 86850 RBC ANTIBODY SCREEN: CPT | Performed by: EMERGENCY MEDICINE

## 2025-06-25 PROCEDURE — 73552 X-RAY EXAM OF FEMUR 2/>: CPT

## 2025-06-25 PROCEDURE — 72125 CT NECK SPINE W/O DYE: CPT

## 2025-06-25 PROCEDURE — 99285 EMERGENCY DEPT VISIT HI MDM: CPT | Performed by: EMERGENCY MEDICINE

## 2025-06-25 PROCEDURE — 70450 CT HEAD/BRAIN W/O DYE: CPT

## 2025-06-25 PROCEDURE — 94640 AIRWAY INHALATION TREATMENT: CPT

## 2025-06-25 PROCEDURE — 83605 ASSAY OF LACTIC ACID: CPT | Performed by: EMERGENCY MEDICINE

## 2025-06-25 PROCEDURE — 94760 N-INVAS EAR/PLS OXIMETRY 1: CPT

## 2025-06-25 PROCEDURE — 84145 PROCALCITONIN (PCT): CPT | Performed by: EMERGENCY MEDICINE

## 2025-06-25 PROCEDURE — 71260 CT THORAX DX C+: CPT

## 2025-06-25 PROCEDURE — 84484 ASSAY OF TROPONIN QUANT: CPT | Performed by: EMERGENCY MEDICINE

## 2025-06-25 PROCEDURE — 72170 X-RAY EXAM OF PELVIS: CPT

## 2025-06-25 PROCEDURE — 87040 BLOOD CULTURE FOR BACTERIA: CPT | Performed by: EMERGENCY MEDICINE

## 2025-06-25 PROCEDURE — 83880 ASSAY OF NATRIURETIC PEPTIDE: CPT | Performed by: EMERGENCY MEDICINE

## 2025-06-25 PROCEDURE — 99223 1ST HOSP IP/OBS HIGH 75: CPT | Performed by: PHYSICIAN ASSISTANT

## 2025-06-25 PROCEDURE — 85025 COMPLETE CBC W/AUTO DIFF WBC: CPT | Performed by: EMERGENCY MEDICINE

## 2025-06-25 PROCEDURE — 36415 COLL VENOUS BLD VENIPUNCTURE: CPT | Performed by: EMERGENCY MEDICINE

## 2025-06-25 RX ORDER — ALBUTEROL SULFATE 5 MG/ML
5 SOLUTION RESPIRATORY (INHALATION) ONCE
Status: COMPLETED | OUTPATIENT
Start: 2025-06-25 | End: 2025-06-25

## 2025-06-25 RX ORDER — ONDANSETRON 2 MG/ML
4 INJECTION INTRAMUSCULAR; INTRAVENOUS EVERY 6 HOURS PRN
Status: DISCONTINUED | OUTPATIENT
Start: 2025-06-25 | End: 2025-07-03 | Stop reason: HOSPADM

## 2025-06-25 RX ORDER — CEFTRIAXONE 1 G/50ML
1000 INJECTION, SOLUTION INTRAVENOUS ONCE
Status: COMPLETED | OUTPATIENT
Start: 2025-06-25 | End: 2025-06-25

## 2025-06-25 RX ORDER — LIDOCAINE 50 MG/G
1 PATCH TOPICAL DAILY
Status: DISCONTINUED | OUTPATIENT
Start: 2025-06-26 | End: 2025-07-03 | Stop reason: HOSPADM

## 2025-06-25 RX ORDER — QUETIAPINE FUMARATE 25 MG/1
12.5 TABLET, FILM COATED ORAL DAILY
Status: DISCONTINUED | OUTPATIENT
Start: 2025-06-26 | End: 2025-07-03 | Stop reason: HOSPADM

## 2025-06-25 RX ORDER — ASPIRIN 81 MG/1
81 TABLET ORAL DAILY
Status: DISCONTINUED | OUTPATIENT
Start: 2025-06-26 | End: 2025-07-03 | Stop reason: HOSPADM

## 2025-06-25 RX ORDER — AMLODIPINE BESYLATE 5 MG/1
5 TABLET ORAL DAILY
Status: DISCONTINUED | OUTPATIENT
Start: 2025-06-26 | End: 2025-06-29

## 2025-06-25 RX ORDER — PANTOPRAZOLE SODIUM 40 MG/1
40 TABLET, DELAYED RELEASE ORAL DAILY
Status: DISCONTINUED | OUTPATIENT
Start: 2025-06-26 | End: 2025-06-28

## 2025-06-25 RX ORDER — GUAIFENESIN 600 MG/1
600 TABLET, EXTENDED RELEASE ORAL 2 TIMES DAILY
Status: DISCONTINUED | OUTPATIENT
Start: 2025-06-25 | End: 2025-06-27

## 2025-06-25 RX ORDER — CARBIDOPA AND LEVODOPA 25; 100 MG/1; MG/1
1 TABLET ORAL 3 TIMES DAILY
Status: DISCONTINUED | OUTPATIENT
Start: 2025-06-25 | End: 2025-07-03 | Stop reason: HOSPADM

## 2025-06-25 RX ORDER — SPIRONOLACTONE 25 MG/1
25 TABLET ORAL DAILY
Status: DISCONTINUED | OUTPATIENT
Start: 2025-06-26 | End: 2025-06-29

## 2025-06-25 RX ORDER — FERROUS SULFATE 325(65) MG
325 TABLET ORAL
Status: DISCONTINUED | OUTPATIENT
Start: 2025-06-26 | End: 2025-06-28

## 2025-06-25 RX ORDER — CEFTRIAXONE 1 G/50ML
1000 INJECTION, SOLUTION INTRAVENOUS EVERY 24 HOURS
Status: DISCONTINUED | OUTPATIENT
Start: 2025-06-26 | End: 2025-07-03 | Stop reason: HOSPADM

## 2025-06-25 RX ORDER — BENZONATATE 100 MG/1
100 CAPSULE ORAL 3 TIMES DAILY PRN
Status: DISCONTINUED | OUTPATIENT
Start: 2025-06-25 | End: 2025-07-03 | Stop reason: HOSPADM

## 2025-06-25 RX ORDER — ALBUTEROL SULFATE 0.83 MG/ML
2.5 SOLUTION RESPIRATORY (INHALATION) EVERY 6 HOURS PRN
Status: DISCONTINUED | OUTPATIENT
Start: 2025-06-25 | End: 2025-07-03 | Stop reason: HOSPADM

## 2025-06-25 RX ORDER — CARVEDILOL 3.12 MG/1
6.25 TABLET ORAL 2 TIMES DAILY WITH MEALS
Status: DISCONTINUED | OUTPATIENT
Start: 2025-06-26 | End: 2025-07-03 | Stop reason: HOSPADM

## 2025-06-25 RX ORDER — METHYLPREDNISOLONE SODIUM SUCCINATE 125 MG/2ML
80 INJECTION, POWDER, LYOPHILIZED, FOR SOLUTION INTRAMUSCULAR; INTRAVENOUS ONCE
Status: COMPLETED | OUTPATIENT
Start: 2025-06-25 | End: 2025-06-25

## 2025-06-25 RX ORDER — ESCITALOPRAM OXALATE 10 MG/1
10 TABLET ORAL DAILY
Status: DISCONTINUED | OUTPATIENT
Start: 2025-06-26 | End: 2025-07-03 | Stop reason: HOSPADM

## 2025-06-25 RX ORDER — ACETAMINOPHEN 325 MG/1
650 TABLET ORAL EVERY 6 HOURS PRN
Status: DISCONTINUED | OUTPATIENT
Start: 2025-06-25 | End: 2025-06-28

## 2025-06-25 RX ORDER — METHYLPREDNISOLONE SODIUM SUCCINATE 40 MG/ML
40 INJECTION, POWDER, LYOPHILIZED, FOR SOLUTION INTRAMUSCULAR; INTRAVENOUS EVERY 8 HOURS
Status: DISCONTINUED | OUTPATIENT
Start: 2025-06-26 | End: 2025-06-26

## 2025-06-25 RX ORDER — SODIUM CHLORIDE FOR INHALATION 0.9 %
3 VIAL, NEBULIZER (ML) INHALATION
Status: DISCONTINUED | OUTPATIENT
Start: 2025-06-25 | End: 2025-06-25

## 2025-06-25 RX ORDER — ATORVASTATIN CALCIUM 40 MG/1
40 TABLET, FILM COATED ORAL DAILY
Status: DISCONTINUED | OUTPATIENT
Start: 2025-06-26 | End: 2025-07-03 | Stop reason: HOSPADM

## 2025-06-25 RX ORDER — QUETIAPINE FUMARATE 25 MG/1
25 TABLET, FILM COATED ORAL
Status: DISCONTINUED | OUTPATIENT
Start: 2025-06-25 | End: 2025-07-03 | Stop reason: HOSPADM

## 2025-06-25 RX ORDER — FUROSEMIDE 20 MG/1
20 TABLET ORAL DAILY
Status: DISCONTINUED | OUTPATIENT
Start: 2025-06-26 | End: 2025-07-03 | Stop reason: HOSPADM

## 2025-06-25 RX ORDER — LEVALBUTEROL INHALATION SOLUTION 1.25 MG/3ML
1.25 SOLUTION RESPIRATORY (INHALATION)
Status: DISCONTINUED | OUTPATIENT
Start: 2025-06-25 | End: 2025-07-01

## 2025-06-25 RX ORDER — HEPARIN SODIUM 5000 [USP'U]/ML
5000 INJECTION, SOLUTION INTRAVENOUS; SUBCUTANEOUS EVERY 8 HOURS SCHEDULED
Status: DISCONTINUED | OUTPATIENT
Start: 2025-06-25 | End: 2025-07-03 | Stop reason: HOSPADM

## 2025-06-25 RX ORDER — TRANEXAMIC ACID 10 MG/ML
1000 INJECTION, SOLUTION INTRAVENOUS ONCE
Status: COMPLETED | OUTPATIENT
Start: 2025-06-25 | End: 2025-06-25

## 2025-06-25 RX ADMIN — QUETIAPINE FUMARATE 25 MG: 25 TABLET ORAL at 21:34

## 2025-06-25 RX ADMIN — MORPHINE SULFATE 2 MG: 2 INJECTION, SOLUTION INTRAMUSCULAR; INTRAVENOUS at 18:01

## 2025-06-25 RX ADMIN — ALBUTEROL SULFATE 5 MG: 2.5 SOLUTION RESPIRATORY (INHALATION) at 16:39

## 2025-06-25 RX ADMIN — LEVALBUTEROL HYDROCHLORIDE 1.25 MG: 1.25 SOLUTION RESPIRATORY (INHALATION) at 21:47

## 2025-06-25 RX ADMIN — AZITHROMYCIN DIHYDRATE 500 MG: 500 INJECTION, POWDER, LYOPHILIZED, FOR SOLUTION INTRAVENOUS at 18:57

## 2025-06-25 RX ADMIN — IPRATROPIUM BROMIDE 0.5 MG: 0.5 SOLUTION RESPIRATORY (INHALATION) at 21:47

## 2025-06-25 RX ADMIN — CEFTRIAXONE 1000 MG: 1 INJECTION, SOLUTION INTRAVENOUS at 18:27

## 2025-06-25 RX ADMIN — HEPARIN SODIUM 5000 UNITS: 5000 INJECTION, SOLUTION INTRAVENOUS; SUBCUTANEOUS at 21:34

## 2025-06-25 RX ADMIN — METHYLPREDNISOLONE SODIUM SUCCINATE 80 MG: 125 INJECTION, POWDER, FOR SOLUTION INTRAMUSCULAR; INTRAVENOUS at 16:44

## 2025-06-25 RX ADMIN — IPRATROPIUM BROMIDE 0.5 MG: 0.5 SOLUTION RESPIRATORY (INHALATION) at 16:39

## 2025-06-25 RX ADMIN — IOHEXOL 100 ML: 350 INJECTION, SOLUTION INTRAVENOUS at 17:23

## 2025-06-25 RX ADMIN — TRANEXAMIC ACID 1000 MG: 10 INJECTION, SOLUTION INTRAVENOUS at 18:36

## 2025-06-25 RX ADMIN — CARBIDOPA AND LEVODOPA 1 TABLET: 25; 100 TABLET ORAL at 21:34
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
## 2025-06-25 NOTE — SEPSIS NOTE
Sepsis Note   Aldo Rodriguez 84 y.o. male MRN: [id number]  Unit/Bed#: -01 Encounter: [id number]       Initial Sepsis Screening       Row Name 06/1940 06/25/25 1822             Is the patient's history suggestive of a new or worsening infection? Yes (Proceed)  -DM Yes (Proceed)  -EE       Suspected source of infection pneumonia  -DM pneumonia  -EE       Indicate SIRS criteria Leukocytosis (WBC > 44344 IJL) OR Leukopenia (WBC <4000 IJL) OR Bandemia (WBC >10% bands);Tachypnea > 20 resp per min  -DM Tachypnea > 20 resp per min;Leukocytosis (WBC > 75490 IJL) OR Leukopenia (WBC <4000 IJL) OR Bandemia (WBC >10% bands)  -EE       Are two or more of the above signs & symptoms of infection both present and new to the patient? Yes (Proceed)  -DM Yes (Proceed)  -EE       Assess for evidence of organ dysfunction: Are any of the below criteria present within 6 hours of suspected infection and SIRS criteria that are NOT considered to be chronic conditions? -- --                 User Key  (r) = Recorded By, (t) = Taken By, (c) = Cosigned By      Initials Name Provider Type    DM Fred Carrasco PA-C Physician Assistant     Teresa Banks MD Physician                   Initial Sepsis Screening       Row Name 06/1940 06/25/25 1822                Initial Sepsis Assessment    Is the patient's history suggestive of a new or worsening infection? Yes (Proceed)  -DM Yes (Proceed)  -EE       Suspected source of infection pneumonia  -DM pneumonia  -EE       Indicate SIRS criteria Leukocytosis (WBC > 87436 IJL) OR Leukopenia (WBC <4000 IJL) OR Bandemia (WBC >10% bands);Tachypnea > 20 resp per min  -DM Tachypnea > 20 resp per min;Leukocytosis (WBC > 65135 IJL) OR Leukopenia (WBC <4000 IJL) OR Bandemia (WBC >10% bands)  -EE       Are two or more of the above signs & symptoms of infection both present and new to the patient? Yes (Proceed)  -DM Yes (Proceed)  -EE                 User Key  (r) = Recorded By, (t) =  Taken By, (c) = Cosigned By      Initials Name Provider Type    DM Fred Carrasco PA-C Physician Assistant    ROSMERY Banks MD Physician                         There is no height or weight on file to calculate BMI.  Wt Readings from Last 1 Encounters:   04/16/25 73.5 kg (162 lb)        Ideal body weight: 68.4 kg (150 lb 12.7 oz)  Adjusted ideal body weight: 70.4 kg (155 lb 4.4 oz)

## 2025-06-26 ENCOUNTER — APPOINTMENT (INPATIENT)
Dept: RADIOLOGY | Facility: HOSPITAL | Age: 85
DRG: 853 | End: 2025-06-26
Payer: MEDICARE

## 2025-06-26 LAB
ALBUMIN SERPL BCG-MCNC: 3.3 G/DL (ref 3.5–5)
ALP SERPL-CCNC: 60 U/L (ref 34–104)
ALT SERPL W P-5'-P-CCNC: 7 U/L (ref 7–52)
ANION GAP SERPL CALCULATED.3IONS-SCNC: 7 MMOL/L (ref 4–13)
AST SERPL W P-5'-P-CCNC: 16 U/L (ref 13–39)
ATRIAL RATE: 76 BPM
BILIRUB SERPL-MCNC: 1.21 MG/DL (ref 0.2–1)
BUN SERPL-MCNC: 15 MG/DL (ref 5–25)
CALCIUM ALBUM COR SERPL-MCNC: 9.4 MG/DL (ref 8.3–10.1)
CALCIUM SERPL-MCNC: 8.8 MG/DL (ref 8.4–10.2)
CHLORIDE SERPL-SCNC: 100 MMOL/L (ref 96–108)
CO2 SERPL-SCNC: 28 MMOL/L (ref 21–32)
CREAT SERPL-MCNC: 0.66 MG/DL (ref 0.6–1.3)
ERYTHROCYTE [DISTWIDTH] IN BLOOD BY AUTOMATED COUNT: 14.3 % (ref 11.6–15.1)
GFR SERPL CREATININE-BSD FRML MDRD: 89 ML/MIN/1.73SQ M
GLUCOSE SERPL-MCNC: 159 MG/DL (ref 65–140)
HCT VFR BLD AUTO: 43.8 % (ref 36.5–49.3)
HGB BLD-MCNC: 14.4 G/DL (ref 12–17)
MCH RBC QN AUTO: 27.7 PG (ref 26.8–34.3)
MCHC RBC AUTO-ENTMCNC: 32.9 G/DL (ref 31.4–37.4)
MCV RBC AUTO: 84 FL (ref 82–98)
P AXIS: 36 DEGREES
PLATELET # BLD AUTO: 201 THOUSANDS/UL (ref 149–390)
PMV BLD AUTO: 8.6 FL (ref 8.9–12.7)
POTASSIUM SERPL-SCNC: 4.2 MMOL/L (ref 3.5–5.3)
PR INTERVAL: 160 MS
PROCALCITONIN SERPL-MCNC: 0.9 NG/ML
PROT SERPL-MCNC: 6.4 G/DL (ref 6.4–8.4)
QRS AXIS: 92 DEGREES
QRSD INTERVAL: 90 MS
QT INTERVAL: 384 MS
QTC INTERVAL: 432 MS
RBC # BLD AUTO: 5.19 MILLION/UL (ref 3.88–5.62)
SODIUM SERPL-SCNC: 135 MMOL/L (ref 135–147)
T WAVE AXIS: 74 DEGREES
VENTRICULAR RATE: 76 BPM
WBC # BLD AUTO: 14.72 THOUSAND/UL (ref 4.31–10.16)

## 2025-06-26 PROCEDURE — 99223 1ST HOSP IP/OBS HIGH 75: CPT | Performed by: INTERNAL MEDICINE

## 2025-06-26 PROCEDURE — 84145 PROCALCITONIN (PCT): CPT | Performed by: PHYSICIAN ASSISTANT

## 2025-06-26 PROCEDURE — 87449 NOS EACH ORGANISM AG IA: CPT | Performed by: PHYSICIAN ASSISTANT

## 2025-06-26 PROCEDURE — 74230 X-RAY XM SWLNG FUNCJ C+: CPT

## 2025-06-26 PROCEDURE — 99223 1ST HOSP IP/OBS HIGH 75: CPT | Performed by: ORTHOPAEDIC SURGERY

## 2025-06-26 PROCEDURE — 94640 AIRWAY INHALATION TREATMENT: CPT

## 2025-06-26 PROCEDURE — 93010 ELECTROCARDIOGRAM REPORT: CPT | Performed by: INTERNAL MEDICINE

## 2025-06-26 PROCEDURE — 85027 COMPLETE CBC AUTOMATED: CPT | Performed by: STUDENT IN AN ORGANIZED HEALTH CARE EDUCATION/TRAINING PROGRAM

## 2025-06-26 PROCEDURE — 87081 CULTURE SCREEN ONLY: CPT

## 2025-06-26 PROCEDURE — 94760 N-INVAS EAR/PLS OXIMETRY 1: CPT

## 2025-06-26 PROCEDURE — 92611 MOTION FLUOROSCOPY/SWALLOW: CPT

## 2025-06-26 PROCEDURE — 92610 EVALUATE SWALLOWING FUNCTION: CPT

## 2025-06-26 PROCEDURE — 80053 COMPREHEN METABOLIC PANEL: CPT | Performed by: STUDENT IN AN ORGANIZED HEALTH CARE EDUCATION/TRAINING PROGRAM

## 2025-06-26 PROCEDURE — 99233 SBSQ HOSP IP/OBS HIGH 50: CPT | Performed by: STUDENT IN AN ORGANIZED HEALTH CARE EDUCATION/TRAINING PROGRAM

## 2025-06-26 RX ORDER — TRAMADOL HYDROCHLORIDE 50 MG/1
50 TABLET ORAL EVERY 6 HOURS PRN
Status: DISCONTINUED | OUTPATIENT
Start: 2025-06-26 | End: 2025-06-26

## 2025-06-26 RX ORDER — GABAPENTIN 300 MG/1
300 CAPSULE ORAL 3 TIMES DAILY
Status: DISCONTINUED | OUTPATIENT
Start: 2025-06-26 | End: 2025-07-03 | Stop reason: HOSPADM

## 2025-06-26 RX ORDER — ACETAMINOPHEN 325 MG/1
975 TABLET ORAL EVERY 8 HOURS
Status: DISCONTINUED | OUTPATIENT
Start: 2025-06-26 | End: 2025-06-28

## 2025-06-26 RX ORDER — METHYLPREDNISOLONE SODIUM SUCCINATE 40 MG/ML
40 INJECTION, POWDER, LYOPHILIZED, FOR SOLUTION INTRAMUSCULAR; INTRAVENOUS EVERY 12 HOURS SCHEDULED
Status: COMPLETED | OUTPATIENT
Start: 2025-06-26 | End: 2025-06-27

## 2025-06-26 RX ORDER — SODIUM CHLORIDE, SODIUM GLUCONATE, SODIUM ACETATE, POTASSIUM CHLORIDE, MAGNESIUM CHLORIDE, SODIUM PHOSPHATE, DIBASIC, AND POTASSIUM PHOSPHATE .53; .5; .37; .037; .03; .012; .00082 G/100ML; G/100ML; G/100ML; G/100ML; G/100ML; G/100ML; G/100ML
50 INJECTION, SOLUTION INTRAVENOUS CONTINUOUS
Status: DISCONTINUED | OUTPATIENT
Start: 2025-06-26 | End: 2025-06-27

## 2025-06-26 RX ORDER — TRAMADOL HYDROCHLORIDE 50 MG/1
50 TABLET ORAL EVERY 6 HOURS PRN
Status: DISCONTINUED | OUTPATIENT
Start: 2025-06-26 | End: 2025-07-03 | Stop reason: HOSPADM

## 2025-06-26 RX ADMIN — LIDOCAINE 1 PATCH: 50 PATCH CUTANEOUS at 10:26

## 2025-06-26 RX ADMIN — DICLOFENAC SODIUM 2 G: 10 GEL TOPICAL at 17:32

## 2025-06-26 RX ADMIN — CARVEDILOL 6.25 MG: 3.12 TABLET, FILM COATED ORAL at 15:30

## 2025-06-26 RX ADMIN — IPRATROPIUM BROMIDE 0.5 MG: 0.5 SOLUTION RESPIRATORY (INHALATION) at 19:52

## 2025-06-26 RX ADMIN — PANTOPRAZOLE SODIUM 40 MG: 40 TABLET, DELAYED RELEASE ORAL at 10:24

## 2025-06-26 RX ADMIN — CARBIDOPA AND LEVODOPA 1 TABLET: 25; 100 TABLET ORAL at 20:36

## 2025-06-26 RX ADMIN — AZITHROMYCIN MONOHYDRATE 500 MG: 500 INJECTION, POWDER, LYOPHILIZED, FOR SOLUTION INTRAVENOUS at 17:46

## 2025-06-26 RX ADMIN — FERROUS SULFATE TAB 325 MG (65 MG ELEMENTAL FE) 325 MG: 325 (65 FE) TAB at 15:30

## 2025-06-26 RX ADMIN — QUETIAPINE FUMARATE 12.5 MG: 25 TABLET ORAL at 10:24

## 2025-06-26 RX ADMIN — IPRATROPIUM BROMIDE 0.5 MG: 0.5 SOLUTION RESPIRATORY (INHALATION) at 07:39

## 2025-06-26 RX ADMIN — LEVALBUTEROL HYDROCHLORIDE 1.25 MG: 1.25 SOLUTION RESPIRATORY (INHALATION) at 07:39

## 2025-06-26 RX ADMIN — LEVALBUTEROL HYDROCHLORIDE 1.25 MG: 1.25 SOLUTION RESPIRATORY (INHALATION) at 19:52

## 2025-06-26 RX ADMIN — ESCITALOPRAM OXALATE 10 MG: 10 TABLET ORAL at 10:25

## 2025-06-26 RX ADMIN — CARVEDILOL 6.25 MG: 3.12 TABLET, FILM COATED ORAL at 10:34

## 2025-06-26 RX ADMIN — DICLOFENAC SODIUM 2 G: 10 GEL TOPICAL at 20:44

## 2025-06-26 RX ADMIN — FUROSEMIDE 20 MG: 20 TABLET ORAL at 10:24

## 2025-06-26 RX ADMIN — GABAPENTIN 300 MG: 300 CAPSULE ORAL at 15:31

## 2025-06-26 RX ADMIN — CEFTRIAXONE 1000 MG: 1 INJECTION, SOLUTION INTRAVENOUS at 17:33

## 2025-06-26 RX ADMIN — METHYLPREDNISOLONE SODIUM SUCCINATE 40 MG: 40 INJECTION, POWDER, FOR SOLUTION INTRAMUSCULAR; INTRAVENOUS at 20:40

## 2025-06-26 RX ADMIN — GUAIFENESIN 600 MG: 600 TABLET ORAL at 10:26

## 2025-06-26 RX ADMIN — SPIRONOLACTONE 25 MG: 25 TABLET ORAL at 10:25

## 2025-06-26 RX ADMIN — ACETAMINOPHEN 975 MG: 325 TABLET ORAL at 20:36

## 2025-06-26 RX ADMIN — METHYLPREDNISOLONE SODIUM SUCCINATE 40 MG: 40 INJECTION, POWDER, FOR SOLUTION INTRAMUSCULAR; INTRAVENOUS at 10:34

## 2025-06-26 RX ADMIN — QUETIAPINE FUMARATE 25 MG: 25 TABLET ORAL at 20:36

## 2025-06-26 RX ADMIN — GABAPENTIN 300 MG: 300 CAPSULE ORAL at 20:36

## 2025-06-26 RX ADMIN — AMLODIPINE BESYLATE 5 MG: 5 TABLET ORAL at 10:24

## 2025-06-26 RX ADMIN — CARBIDOPA AND LEVODOPA 1 TABLET: 25; 100 TABLET ORAL at 15:30

## 2025-06-26 RX ADMIN — Medication 2.5 MG: at 15:30

## 2025-06-26 RX ADMIN — METHYLPREDNISOLONE SODIUM SUCCINATE 40 MG: 40 INJECTION, POWDER, FOR SOLUTION INTRAMUSCULAR; INTRAVENOUS at 01:04

## 2025-06-26 RX ADMIN — CARBIDOPA AND LEVODOPA 1 TABLET: 25; 100 TABLET ORAL at 10:26

## 2025-06-26 RX ADMIN — SODIUM CHLORIDE, SODIUM GLUCONATE, SODIUM ACETATE, POTASSIUM CHLORIDE, MAGNESIUM CHLORIDE, SODIUM PHOSPHATE, DIBASIC, AND POTASSIUM PHOSPHATE 50 ML/HR: .53; .5; .37; .037; .03; .012; .00082 INJECTION, SOLUTION INTRAVENOUS at 15:47

## 2025-06-26 RX ADMIN — ATORVASTATIN CALCIUM 40 MG: 40 TABLET, FILM COATED ORAL at 10:24

## 2025-06-26 RX ADMIN — ACETAMINOPHEN 650 MG: 325 TABLET ORAL at 12:25

## 2025-06-26 RX ADMIN — GUAIFENESIN 600 MG: 600 TABLET ORAL at 17:17

## 2025-06-26 RX ADMIN — HEPARIN SODIUM 5000 UNITS: 5000 INJECTION, SOLUTION INTRAVENOUS; SUBCUTANEOUS at 15:31
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
## 2025-06-26 NOTE — ASSESSMENT & PLAN NOTE
CT CAP with mild interstitial edema and small patchy GGO in bilateral lower lobes ddx pulmonary edema vs multifocal PNA. Suspect secondary to aspiration  Serial procal elevated (0.90, 0.35), noted leukocytosis (14.72, 20.88)  Send sputum culture, urinary antigens   Follow up cultures  Continue IV azithromycin, ceftriaxone  Continue atrovent/xopenex nebs TID  Decrease IV solumedrol 40mg q12h  Pulmonary hygiene: cough and deep breath, OOB as tolerated, IS, scheduled nebs, mucinex 600mg BID  VBS scheduled tomorrow

## 2025-06-26 NOTE — OCCUPATIONAL THERAPY NOTE
Occupational Therapy Cancel Note        Patient Name: Aldo Rodriguez  Today's Date: 6/26/2025 06/26/25 0742   OT Last Visit   OT Visit Date 06/26/25   Note Type   Note type Cancelled Session   Cancel Reasons Patient to operating room   Additional Comments OT orders received and chart review completed. Per chart, pt with R femur fx s/p fall. Offical orthopedic consult pending. Pending knee Xrays. Per chart, plan for OR for surgical repair once pt's respiratory status is stable. Will hold OT evaluation at this time and f/u as appropriate and able.       Rsoalinda Delatorre, OTR/L

## 2025-06-26 NOTE — ASSESSMENT & PLAN NOTE
Acute right femoral neck fracture sustained after mechanical fall at nursing facility  Discussed with on-call orthopedics, plan for OR once medically stabilized from a respiratory standpoint  Pain control, DVT ppx

## 2025-06-26 NOTE — ASSESSMENT & PLAN NOTE
Blood pressure 154/80  Likely exacerbated by pain  Resume home Coreg, Lasix, Aldactone  Monitor BP

## 2025-06-26 NOTE — ASSESSMENT & PLAN NOTE
Acutely requiring 4 L/min on admission, likely secondary to pneumonia and reactive airway disease flare  With wheezing on exam, bilateral lower lobe pneumonia on imaging  Continue Xopenex/Atrovent  IV Solu-Medrol weaned to bid  Respiratory protocol  Discussed with pulm steroids weaned to bid  Currently weaned to 2L NC
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
## 2025-06-26 NOTE — ASSESSMENT & PLAN NOTE
With leukocytosis, tachypnea.  Suspected source bilateral lower lobe pneumonia  Continue IV ceftriaxone and azithromycin day 1  Procalcitonin upward trending-> continue to trend  Follow-up on blood cultures x 2  Possibly d/t aspiration  Speech consulted-> dysphagia 3 NTL-> vbs tomorrow

## 2025-06-26 NOTE — PHYSICAL THERAPY NOTE
Physical Therapy Cancellation Note       06/26/25 0739   Note Type   Note type Cancelled Session   Cancel Reasons Patient to operating room   Additional Comments PT consult received and chart reviewed. Per EMR: pt admitted with R femur fracture. Offical Ortho consult pending, however EMR reports plan is for OR for surgical repair once respiratory status is stabilized. Will hold PT evaluation at this time and f/u as able and appropriate. Updated WB and activity orders appreciated post op.       Caryn Purcell, PT, DPT   Available via [employer]  NPI # [id number]  [place] License - [id number]  6/26/2025

## 2025-06-26 NOTE — ASSESSMENT & PLAN NOTE
Acutely requiring 4 L/min on admission, likely secondary to pneumonia and reactive airway disease flare  With wheezing on exam, bilateral lower lobe pneumonia on imaging  Start Xopenex/Atrovent  Was given IV Solu-Medrol in the ER, continue with 40 mg every 8  Pulmonary consult  Respiratory protocol

## 2025-06-26 NOTE — ASSESSMENT & PLAN NOTE
Likely secondary to acute pneumonia  Currently requiring 4L NC  Not oxygen dependent at baseline  Titrate oxygen to maintain saturation >89%

## 2025-06-26 NOTE — PROGRESS NOTES
Progress Note - Hospitalist   Name: Aldo Rodriguez 84 y.o. male I MRN: [id number]  Unit/Bed#: -01 I Date of Admission: 6/25/2025   Date of Service: 6/26/2025 I Hospital Day: 1    Assessment & Plan  Sepsis due to pneumonia (HCC)  With leukocytosis, tachypnea.  Suspected source bilateral lower lobe pneumonia  Continue IV ceftriaxone and azithromycin day 1  Procalcitonin upward trending-> continue to trend  Follow-up on blood cultures x 2  Possibly d/t aspiration  Speech consulted-> dysphagia 3 NTL-> vbs tomorrow  Closed fracture of neck of right femur (HCC)  Acute right femoral neck fracture sustained after mechanical fall at nursing facility  Discussed with on-call orthopedics, plan for OR once medically stabilized from a respiratory standpoint  Pain control, DVT ppx  Discussed with ortho pt is NOT medically cleared at this time for general anesthesia d/t PNA and acute resp failure  Acute respiratory failure (HCC)  Acutely requiring 4 L/min on admission, likely secondary to pneumonia and reactive airway disease flare  With wheezing on exam, bilateral lower lobe pneumonia on imaging  Continue Xopenex/Atrovent  IV Solu-Medrol weaned to bid  Respiratory protocol  Discussed with pulm steroids weaned to bid  Currently weaned to 2L NC  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)  Appearing confused on admission, unclear baseline mental status  Monitor mental status  Delirium precautions  Parkinsonism (HCC)  Resume home Sinemet  Essential hypertension  Blood pressure 154/80  Likely exacerbated by pain  Resume home Coreg, Lasix, Aldactone  Monitor BP  History of CVA (cerebrovascular accident)  Hold aspirin preoperatively  Monitor mental status  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  Denies chest pain  Continue beta-blocker  Aspirin on hold as above  Hypertensive heart disease with congestive heart failure (HCC)  Wt Readings from Last 3 Encounters:   04/16/25 73.5 kg  (162 lb)   09/27/24 77.1 kg (170 lb)   09/17/24 77.1 kg (170 lb)   EF 60%, grade 1 diastolic dysfunction  Compensated presently  Resuming Lasix and Aldactone, would recommending holding preoperatively once OR time is set  Monitor    VTE Pharmacologic Prophylaxis: VTE Score: 8 High Risk (Score >/= 5) - Pharmacological DVT Prophylaxis Ordered: heparin. Sequential Compression Devices Ordered.    Mobility:   Basic Mobility Inpatient Raw Score: 12  JH-HLM Goal: 4: Move to chair/commode  JH-HLM Achieved: 4: Move to chair/commode  JH-HLM Goal achieved. Continue to encourage appropriate mobility.    Patient Centered Rounds: I performed bedside rounds with nursing staff today.   Discussions with Specialists or Other Care Team Provider: Pulm, ortho, CM    Education and Discussions with Family / Patient: Updated  (friend) via phone.    Current Length of Stay: 1 day(s)  Current Patient Status: Inpatient   Certification Statement: The patient will continue to require additional inpatient hospital stay due to sepsis pna  Discharge Plan: Anticipate discharge in >72 hrs to discharge location to be determined pending rehab evaluations.    Code Status: Level 1 - Full Code    Yesenia Carlson was seen and examined at bedside.  No acute events overnight.  Patient is a poor historian however has no acute complaints at this time.  Patient seems to be clinically improved.  Discussed with pulmonology continue nebulizer treatments and steroids that were decreased to twice daily.  Discussed with Ortho patient currently not medically cleared to undergo general anesthesia.    Objective :  Temp:  [98.1 °F (36.7 °C)-98.2 °F (36.8 °C)] 98.1 °F (36.7 °C)  HR:  [72-93] 93  BP: (127-172)/(71-85) 165/84  Resp:  [18-24] 18  SpO2:  [83 %-96 %] 93 %  O2 Device: Nasal cannula  Nasal Cannula O2 Flow Rate (L/min):  [4 L/min] 4 L/min    There is no height or weight on file to calculate BMI.     Input and Output Summary (last 24 hours):      Intake/Output Summary (Last 24 hours) at 6/26/2025 0711  Last data filed at 6/26/2025 0401  Gross per 24 hour   Intake 410 ml   Output 350 ml   Net 60 ml       Physical Exam  Vitals and nursing note reviewed.   Constitutional:       General: He is not in acute distress.     Appearance: He is ill-appearing (chronically).   HENT:      Head: Normocephalic and atraumatic.     Cardiovascular:      Rate and Rhythm: Normal rate and regular rhythm.      Pulses: Normal pulses.      Heart sounds: Normal heart sounds.   Pulmonary:      Effort: Pulmonary effort is normal.      Breath sounds: Wheezing and rhonchi present.      Comments: 2L NC  Abdominal:      General: Abdomen is flat. Bowel sounds are normal.      Palpations: Abdomen is soft.     Musculoskeletal:      Right lower leg: No edema.      Left lower leg: No edema.     Skin:     General: Skin is warm.     Neurological:      General: No focal deficit present.      Mental Status: He is alert. He is disoriented.           Lines/Drains:              Lab Results: I have reviewed the following results:   Results from last 7 days   Lab Units 06/25/25  1647   WBC Thousand/uL 20.88*   HEMOGLOBIN g/dL 15.7   HEMATOCRIT % 49.6*   PLATELETS Thousands/uL 243   SEGS PCT % 87*   LYMPHO PCT % 5*   MONO PCT % 7   EOS PCT % 0     Results from last 7 days   Lab Units 06/25/25  1647   SODIUM mmol/L 134*   POTASSIUM mmol/L 4.2   CHLORIDE mmol/L 98   CO2 mmol/L 28   BUN mg/dL 19   CREATININE mg/dL 0.85   ANION GAP mmol/L 8   CALCIUM mg/dL 9.0   ALBUMIN g/dL 3.9   TOTAL BILIRUBIN mg/dL 1.71*   ALK PHOS U/L 74   ALT U/L 17   AST U/L 22   GLUCOSE RANDOM mg/dL 115                 Results from last 7 days   Lab Units 06/26/25  0534 06/25/25  1755   LACTIC ACID mmol/L  --  0.9   PROCALCITONIN ng/ml 0.90* 0.35*       Recent Cultures (last 7 days):   Results from last 7 days   Lab Units 06/25/25  1755 06/25/25  1645   BLOOD CULTURE  Received in Microbiology Lab. Culture in Progress. Received in  Microbiology Lab. Culture in Progress.       Imaging Results Review: No pertinent imaging studies reviewed.  Other Study Results Review: No additional pertinent studies reviewed.    Last 24 Hours Medication List:     Current Facility-Administered Medications:     acetaminophen (TYLENOL) tablet 650 mg, Q6H PRN    albuterol inhalation solution 2.5 mg, Q6H PRN    amLODIPine (NORVASC) tablet 5 mg, Daily    [Held by provider] aspirin (ECOTRIN LOW STRENGTH) EC tablet 81 mg, Daily    atorvastatin (LIPITOR) tablet 40 mg, Daily    azithromycin (ZITHROMAX) 500 mg in sodium chloride 0.9% 250mL IVPB 500 mg, Q24H    benzonatate (TESSALON PERLES) capsule 100 mg, TID PRN    carbidopa-levodopa (SINEMET)  mg per tablet 1 tablet, TID    carvedilol (COREG) tablet 6.25 mg, BID With Meals    cefTRIAXone (ROCEPHIN) IVPB (premix in dextrose) 1,000 mg 50 mL, Q24H    escitalopram (LEXAPRO) tablet 10 mg, Daily    ferrous sulfate tablet 325 mg, Daily With Dinner    furosemide (LASIX) tablet 20 mg, Daily    guaiFENesin (MUCINEX) 12 hr tablet 600 mg, BID    heparin (porcine) subcutaneous injection 5,000 Units, Q8H MAXI    ipratropium (ATROVENT) 0.02 % inhalation solution 0.5 mg, TID    levalbuterol (XOPENEX) inhalation solution 1.25 mg, TID **AND** [DISCONTINUED] sodium chloride 0.9 % inhalation solution 3 mL, TID    lidocaine (LIDODERM) 5 % patch 1 patch, Daily    methylPREDNISolone sodium succinate (Solu-MEDROL) injection 40 mg, Q8H    ondansetron (ZOFRAN) injection 4 mg, Q6H PRN    pantoprazole (PROTONIX) EC tablet 40 mg, Daily    QUEtiapine (SEROquel) tablet 25 mg, HS **AND** QUEtiapine (SEROquel) tablet 12.5 mg, Daily    spironolactone (ALDACTONE) tablet 25 mg, Daily    Administrative Statements   Today, Patient Was Seen By: Jennifer Ken MD  I have spent a total time of 60 minutes in caring for this patient on the day of the visit/encounter including Diagnostic results, Prognosis, Risks and benefits of tx options, Instructions for  management, Patient and family education, Importance of tx compliance, Risk factor reductions, Impressions, Counseling / Coordination of care, Documenting in the medical record, Reviewing/placing orders in the medical record (including tests, medications, and/or procedures), Obtaining or reviewing history  , and Communicating with other healthcare professionals .    **Please Note: This note may have been constructed using a voice recognition system.**

## 2025-06-26 NOTE — ASSESSMENT & PLAN NOTE
Wt Readings from Last 3 Encounters:   04/16/25 73.5 kg (162 lb)   09/27/24 77.1 kg (170 lb)   09/17/24 77.1 kg (170 lb)   Continue diuresis per primary team

## 2025-06-26 NOTE — ASSESSMENT & PLAN NOTE
Acute right femoral neck fracture sustained after mechanical fall at nursing facility  Discussed with on-call orthopedics, plan for OR once medically stabilized from a respiratory standpoint  Pain control, DVT ppx  Discussed with ortho pt is NOT medically cleared at this time for general anesthesia d/t PNA and acute resp failure

## 2025-06-26 NOTE — ASSESSMENT & PLAN NOTE
Appearing confused on admission, unclear baseline mental status  Monitor mental status  Delirium precautions
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
## 2025-06-26 NOTE — PLAN OF CARE
Problem: PAIN - ADULT  Goal: Verbalizes/displays adequate comfort level or baseline comfort level  Description: Interventions:  - Encourage patient to monitor pain and request assistance  - Assess pain using appropriate pain scale  - Administer analgesics as ordered based on type and severity of pain and evaluate response  - Implement non-pharmacological measures as appropriate and evaluate response  - Consider cultural and social influences on pain and pain management  - Notify physician/advanced practitioner if interventions unsuccessful or patient reports new pain  - Educate patient/family on pain management process including their role and importance of  reporting pain   - Provide non-pharmacologic/complimentary pain relief interventions  6/26/2025 1936 by Elli Ruiz RN  Outcome: Progressing  6/26/2025 1936 by Elli Ruiz RN  Outcome: Progressing     Problem: INFECTION - ADULT  Goal: Absence or prevention of progression during hospitalization  Description: INTERVENTIONS:  - Assess and monitor for signs and symptoms of infection  - Monitor lab/diagnostic results  - Monitor all insertion sites, i.e. indwelling lines, tubes, and drains  - Monitor endotracheal if appropriate and nasal secretions for changes in amount and color  - [place] appropriate cooling/warming therapies per order  - Administer medications as ordered  - Instruct and encourage patient and family to use good hand hygiene technique  - Identify and instruct in appropriate isolation precautions for identified infection/condition  6/26/2025 1936 by Elli Ruiz RN  Outcome: Progressing  6/26/2025 1936 by Elli Ruiz RN  Outcome: Progressing     Problem: SAFETY ADULT  Goal: Patient will remain free of falls  Description: INTERVENTIONS:  - Educate patient/family on patient safety including physical limitations  - Instruct patient to call for assistance with activity   - Consider consulting OT/PT to assist with strengthening/mobility  based on AM PAC & -M score  - Consult OT/PT to assist with strengthening/mobility   - Keep Call bell within reach  - Keep bed low and locked with side rails adjusted as appropriate  - Keep care items and personal belongings within reach  - Initiate and maintain comfort rounds  - Make Fall Risk Sign visible to staff  - Offer Toileting every 2 Hours, in advance of need  - Initiate/Maintain bed/chair alarm  - Obtain necessary fall risk management equipment: yellow sock, yellow bracelet  - Apply yellow socks and bracelet for high fall risk patients  - Consider moving patient to room near nurses station  6/26/2025 1936 by Elli Ruiz, RN  Outcome: Progressing  6/26/2025 1936 by Elli Ruiz, RN  Outcome: Progressing     Problem: Potential for Falls  Goal: Patient will remain free of falls  Description: INTERVENTIONS:  - Educate patient/family on patient safety including physical limitations  - Instruct patient to call for assistance with activity   - Consider consulting OT/PT to assist with strengthening/mobility based on AM PAC & -M score  - Consult OT/PT to assist with strengthening/mobility   - Keep Call bell within reach  - Keep bed low and locked with side rails adjusted as appropriate  - Keep care items and personal belongings within reach  - Initiate and maintain comfort rounds  - Make Fall Risk Sign visible to staff  - Offer Toileting every 2 Hours, in advance of need  - Initiate/Maintain bed/chair alarm  - Obtain necessary fall risk management equipment: yellow socks, yellow bracelet  - Apply yellow socks and bracelet for high fall risk patients  - Consider moving patient to room near nurses station  6/26/2025 1936 by Elli Ruiz, RN  Outcome: Progressing  6/26/2025 1936 by Elli Ruiz, RN  Outcome: Progressing     Problem: Nutrition/Hydration-ADULT  Goal: Nutrient/Hydration intake appropriate for improving, restoring or maintaining nutritional needs  Description: Monitor and assess patient's  nutrition/hydration status for malnutrition. Collaborate with interdisciplinary team and initiate plan and interventions as ordered.  Monitor patient's weight and dietary intake as ordered or per policy. Utilize nutrition screening tool and intervene as necessary. Determine patient's food preferences and provide high-protein, high-caloric foods as appropriate.     INTERVENTIONS:  - Monitor oral intake, urinary output, labs, and treatment plans  - Assess nutrition and hydration status and recommend course of action  - Evaluate amount of meals eaten  - Assist patient with eating if necessary   - Allow adequate time for meals  - Recommend/ encourage appropriate diets, oral nutritional supplements, and vitamin/mineral supplements  - Order, calculate, and assess calorie counts as needed  - Recommend, monitor, and adjust tube feedings and TPN/PPN based on assessed needs  - Assess need for intravenous fluids  - Provide specific nutrition/hydration education as appropriate  - Include patient/family/caregiver in decisions related to nutrition  6/26/2025 1936 by Elli Ruiz, RN  Outcome: Progressing  6/26/2025 1936 by Elli Ruiz, RN  Outcome: Progressing

## 2025-06-26 NOTE — ASSESSMENT & PLAN NOTE
Wt Readings from Last 3 Encounters:   04/16/25 73.5 kg (162 lb)   09/27/24 77.1 kg (170 lb)   09/17/24 77.1 kg (170 lb)   EF 60%, grade 1 diastolic dysfunction  Compensated presently  Resuming Lasix and Aldactone, would recommending holding preoperatively once OR time is set  Monitor
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
## 2025-06-26 NOTE — ASSESSMENT & PLAN NOTE
X-ray of the right femur and CT abdomen and pelvis shows femoral neck fracture  Case was reviewed and discussed with attending   Pain control per primary  NPO at midnight tonight  NWB RLE  Discussed with SLIM, patient not cleared for surgical intervention today and will continue to evaluate  Case request placed for right hip hemiarthroplasty with Dr. Mike tomorrow- PENDING clearance  Patient not consentable due to AMS  WBC 14.72 this AM  TXA and Ancef ordered  Ortho will follow

## 2025-06-26 NOTE — ASSESSMENT & PLAN NOTE
Wt Readings from Last 3 Encounters:   04/16/25 73.5 kg (162 lb)   09/27/24 77.1 kg (170 lb)   09/17/24 77.1 kg (170 lb)

## 2025-06-26 NOTE — ASSESSMENT & PLAN NOTE
With leukocytosis, tachypnea.  Suspected source bilateral lower lobe pneumonia  Start IV ceftriaxone and azithromycin  Procalcitonin is elevated, repeat tomorrow  Follow-up on blood cultures x 2

## 2025-06-26 NOTE — PLAN OF CARE
Problem: PAIN - ADULT  Goal: Verbalizes/displays adequate comfort level or baseline comfort level  Description: Interventions:  - Encourage patient to monitor pain and request assistance  - Assess pain using appropriate pain scale  - Administer analgesics as ordered based on type and severity of pain and evaluate response  - Implement non-pharmacological measures as appropriate and evaluate response  - Consider cultural and social influences on pain and pain management  - Notify physician/advanced practitioner if interventions unsuccessful or patient reports new pain  - Educate patient/family on pain management process including their role and importance of  reporting pain   - Provide non-pharmacologic/complimentary pain relief interventions  Outcome: Progressing     Problem: INFECTION - ADULT  Goal: Absence or prevention of progression during hospitalization  Description: INTERVENTIONS:  - Assess and monitor for signs and symptoms of infection  - Monitor lab/diagnostic results  - Monitor all insertion sites, i.e. indwelling lines, tubes, and drains  - Monitor endotracheal if appropriate and nasal secretions for changes in amount and color  - [place] appropriate cooling/warming therapies per order  - Administer medications as ordered  - Instruct and encourage patient and family to use good hand hygiene technique  - Identify and instruct in appropriate isolation precautions for identified infection/condition  Outcome: Progressing  Goal: Absence of fever/infection during neutropenic period  Description: INTERVENTIONS:  - Monitor WBC  - Perform strict hand hygiene  - Limit to healthy visitors only  - No plants, dried, fresh or silk flowers with montejo in patient room  Outcome: Progressing     Problem: DISCHARGE PLANNING  Goal: Discharge to home or other facility with appropriate resources  Description: INTERVENTIONS:  - Identify barriers to discharge w/patient and caregiver  - Arrange for needed discharge resources  and transportation as appropriate  - Identify discharge learning needs (meds, wound care, etc.)  - Arrange for interpretive services to assist at discharge as needed  - Refer to Case Management Department for coordinating discharge planning if the patient needs post-hospital services based on physician/advanced practitioner order or complex needs related to functional status, cognitive ability, or social support system  Outcome: Progressing     Problem: Knowledge Deficit  Goal: Patient/family/caregiver demonstrates understanding of disease process, treatment plan, medications, and discharge instructions  Description: Complete learning assessment and assess knowledge base.  Interventions:  - Provide teaching at level of understanding  - Provide teaching via preferred learning methods  Outcome: Progressing
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
## 2025-06-27 LAB
ALBUMIN SERPL BCG-MCNC: 3.3 G/DL (ref 3.5–5)
ALP SERPL-CCNC: 49 U/L (ref 34–104)
ALT SERPL W P-5'-P-CCNC: 5 U/L (ref 7–52)
ANION GAP SERPL CALCULATED.3IONS-SCNC: 4 MMOL/L (ref 4–13)
AST SERPL W P-5'-P-CCNC: 13 U/L (ref 13–39)
BASOPHILS # BLD AUTO: 0.02 THOUSANDS/ÂΜL (ref 0–0.1)
BASOPHILS NFR BLD AUTO: 0 % (ref 0–1)
BILIRUB SERPL-MCNC: 0.71 MG/DL (ref 0.2–1)
BUN SERPL-MCNC: 15 MG/DL (ref 5–25)
CALCIUM ALBUM COR SERPL-MCNC: 9 MG/DL (ref 8.3–10.1)
CALCIUM SERPL-MCNC: 8.4 MG/DL (ref 8.4–10.2)
CHLORIDE SERPL-SCNC: 103 MMOL/L (ref 96–108)
CO2 SERPL-SCNC: 31 MMOL/L (ref 21–32)
CREAT SERPL-MCNC: 0.64 MG/DL (ref 0.6–1.3)
EOSINOPHIL # BLD AUTO: 0 THOUSAND/ÂΜL (ref 0–0.61)
EOSINOPHIL NFR BLD AUTO: 0 % (ref 0–6)
ERYTHROCYTE [DISTWIDTH] IN BLOOD BY AUTOMATED COUNT: 14.5 % (ref 11.6–15.1)
GFR SERPL CREATININE-BSD FRML MDRD: 90 ML/MIN/1.73SQ M
GLUCOSE SERPL-MCNC: 138 MG/DL (ref 65–140)
HCT VFR BLD AUTO: 42.1 % (ref 36.5–49.3)
HGB BLD-MCNC: 13.5 G/DL (ref 12–17)
IMM GRANULOCYTES # BLD AUTO: 0.09 THOUSAND/UL (ref 0–0.2)
IMM GRANULOCYTES NFR BLD AUTO: 1 % (ref 0–2)
L PNEUMO1 AG UR QL IA.RAPID: NEGATIVE
LYMPHOCYTES # BLD AUTO: 0.62 THOUSANDS/ÂΜL (ref 0.6–4.47)
LYMPHOCYTES NFR BLD AUTO: 4 % (ref 14–44)
MAGNESIUM SERPL-MCNC: 2.4 MG/DL (ref 1.9–2.7)
MCH RBC QN AUTO: 27.8 PG (ref 26.8–34.3)
MCHC RBC AUTO-ENTMCNC: 32.1 G/DL (ref 31.4–37.4)
MCV RBC AUTO: 87 FL (ref 82–98)
MONOCYTES # BLD AUTO: 0.41 THOUSAND/ÂΜL (ref 0.17–1.22)
MONOCYTES NFR BLD AUTO: 3 % (ref 4–12)
MRSA NOSE QL CULT: NORMAL
NEUTROPHILS # BLD AUTO: 14.52 THOUSANDS/ÂΜL (ref 1.85–7.62)
NEUTS SEG NFR BLD AUTO: 92 % (ref 43–75)
NRBC BLD AUTO-RTO: 0 /100 WBCS
PLATELET # BLD AUTO: 203 THOUSANDS/UL (ref 149–390)
PLATELET BLD QL SMEAR: ADEQUATE
PMV BLD AUTO: 8.9 FL (ref 8.9–12.7)
POTASSIUM SERPL-SCNC: 4.3 MMOL/L (ref 3.5–5.3)
PROCALCITONIN SERPL-MCNC: 0.62 NG/ML
PROT SERPL-MCNC: 6.1 G/DL (ref 6.4–8.4)
RBC # BLD AUTO: 4.86 MILLION/UL (ref 3.88–5.62)
RBC MORPH BLD: NORMAL
S PNEUM AG UR QL: NEGATIVE
SODIUM SERPL-SCNC: 138 MMOL/L (ref 135–147)
WBC # BLD AUTO: 15.66 THOUSAND/UL (ref 4.31–10.16)

## 2025-06-27 PROCEDURE — 99232 SBSQ HOSP IP/OBS MODERATE 35: CPT

## 2025-06-27 PROCEDURE — 94640 AIRWAY INHALATION TREATMENT: CPT

## 2025-06-27 PROCEDURE — 80053 COMPREHEN METABOLIC PANEL: CPT | Performed by: STUDENT IN AN ORGANIZED HEALTH CARE EDUCATION/TRAINING PROGRAM

## 2025-06-27 PROCEDURE — 99232 SBSQ HOSP IP/OBS MODERATE 35: CPT | Performed by: INTERNAL MEDICINE

## 2025-06-27 PROCEDURE — 83735 ASSAY OF MAGNESIUM: CPT | Performed by: STUDENT IN AN ORGANIZED HEALTH CARE EDUCATION/TRAINING PROGRAM

## 2025-06-27 PROCEDURE — 94760 N-INVAS EAR/PLS OXIMETRY 1: CPT

## 2025-06-27 PROCEDURE — 85025 COMPLETE CBC W/AUTO DIFF WBC: CPT | Performed by: STUDENT IN AN ORGANIZED HEALTH CARE EDUCATION/TRAINING PROGRAM

## 2025-06-27 PROCEDURE — 94668 MNPJ CHEST WALL SBSQ: CPT

## 2025-06-27 PROCEDURE — 84145 PROCALCITONIN (PCT): CPT | Performed by: STUDENT IN AN ORGANIZED HEALTH CARE EDUCATION/TRAINING PROGRAM

## 2025-06-27 PROCEDURE — NC001 PR NO CHARGE: Performed by: PHYSICIAN ASSISTANT

## 2025-06-27 RX ORDER — GUAIFENESIN 600 MG/1
1200 TABLET, EXTENDED RELEASE ORAL 2 TIMES DAILY
Status: DISCONTINUED | OUTPATIENT
Start: 2025-06-27 | End: 2025-06-28

## 2025-06-27 RX ORDER — PREDNISONE 20 MG/1
40 TABLET ORAL DAILY
Status: DISCONTINUED | OUTPATIENT
Start: 2025-06-28 | End: 2025-06-28

## 2025-06-27 RX ADMIN — DICLOFENAC SODIUM 2 G: 10 GEL TOPICAL at 21:18

## 2025-06-27 RX ADMIN — PANTOPRAZOLE SODIUM 40 MG: 40 TABLET, DELAYED RELEASE ORAL at 10:24

## 2025-06-27 RX ADMIN — GABAPENTIN 300 MG: 300 CAPSULE ORAL at 10:26

## 2025-06-27 RX ADMIN — LEVALBUTEROL HYDROCHLORIDE 1.25 MG: 1.25 SOLUTION RESPIRATORY (INHALATION) at 07:59

## 2025-06-27 RX ADMIN — CARBIDOPA AND LEVODOPA 1 TABLET: 25; 100 TABLET ORAL at 10:24

## 2025-06-27 RX ADMIN — ESCITALOPRAM OXALATE 10 MG: 10 TABLET ORAL at 12:58

## 2025-06-27 RX ADMIN — TRAMADOL HYDROCHLORIDE 50 MG: 50 TABLET, COATED ORAL at 21:06

## 2025-06-27 RX ADMIN — ATORVASTATIN CALCIUM 40 MG: 40 TABLET, FILM COATED ORAL at 10:26

## 2025-06-27 RX ADMIN — IPRATROPIUM BROMIDE 0.5 MG: 0.5 SOLUTION RESPIRATORY (INHALATION) at 20:09

## 2025-06-27 RX ADMIN — DICLOFENAC SODIUM 2 G: 10 GEL TOPICAL at 10:27

## 2025-06-27 RX ADMIN — GABAPENTIN 300 MG: 300 CAPSULE ORAL at 21:06

## 2025-06-27 RX ADMIN — LIDOCAINE 1 PATCH: 50 PATCH CUTANEOUS at 10:27

## 2025-06-27 RX ADMIN — QUETIAPINE FUMARATE 12.5 MG: 25 TABLET ORAL at 10:24

## 2025-06-27 RX ADMIN — CARBIDOPA AND LEVODOPA 1 TABLET: 25; 100 TABLET ORAL at 18:08

## 2025-06-27 RX ADMIN — BENZONATATE 100 MG: 100 CAPSULE ORAL at 10:26

## 2025-06-27 RX ADMIN — ACETAMINOPHEN 975 MG: 325 TABLET ORAL at 12:59

## 2025-06-27 RX ADMIN — ACETAMINOPHEN 975 MG: 325 TABLET ORAL at 21:06

## 2025-06-27 RX ADMIN — FERROUS SULFATE TAB 325 MG (65 MG ELEMENTAL FE) 325 MG: 325 (65 FE) TAB at 18:08

## 2025-06-27 RX ADMIN — QUETIAPINE FUMARATE 25 MG: 25 TABLET ORAL at 21:06

## 2025-06-27 RX ADMIN — METHYLPREDNISOLONE SODIUM SUCCINATE 40 MG: 40 INJECTION, POWDER, FOR SOLUTION INTRAMUSCULAR; INTRAVENOUS at 21:12

## 2025-06-27 RX ADMIN — GUAIFENESIN 1200 MG: 600 TABLET, EXTENDED RELEASE ORAL at 18:07

## 2025-06-27 RX ADMIN — CARBIDOPA AND LEVODOPA 1 TABLET: 25; 100 TABLET ORAL at 21:06

## 2025-06-27 RX ADMIN — ALBUTEROL SULFATE 2.5 MG: 2.5 SOLUTION RESPIRATORY (INHALATION) at 02:36

## 2025-06-27 RX ADMIN — ACETAMINOPHEN 975 MG: 325 TABLET ORAL at 04:22

## 2025-06-27 RX ADMIN — IPRATROPIUM BROMIDE 0.5 MG: 0.5 SOLUTION RESPIRATORY (INHALATION) at 14:26

## 2025-06-27 RX ADMIN — LEVALBUTEROL HYDROCHLORIDE 1.25 MG: 1.25 SOLUTION RESPIRATORY (INHALATION) at 14:26

## 2025-06-27 RX ADMIN — CARVEDILOL 6.25 MG: 3.12 TABLET, FILM COATED ORAL at 10:32

## 2025-06-27 RX ADMIN — AMLODIPINE BESYLATE 5 MG: 5 TABLET ORAL at 10:31

## 2025-06-27 RX ADMIN — IPRATROPIUM BROMIDE 0.5 MG: 0.5 SOLUTION RESPIRATORY (INHALATION) at 07:59

## 2025-06-27 RX ADMIN — LEVALBUTEROL HYDROCHLORIDE 1.25 MG: 1.25 SOLUTION RESPIRATORY (INHALATION) at 20:09

## 2025-06-27 RX ADMIN — AZITHROMYCIN MONOHYDRATE 500 MG: 500 INJECTION, POWDER, LYOPHILIZED, FOR SOLUTION INTRAVENOUS at 19:26

## 2025-06-27 RX ADMIN — GABAPENTIN 300 MG: 300 CAPSULE ORAL at 18:08

## 2025-06-27 RX ADMIN — HEPARIN SODIUM 5000 UNITS: 5000 INJECTION, SOLUTION INTRAVENOUS; SUBCUTANEOUS at 21:12

## 2025-06-27 RX ADMIN — DICLOFENAC SODIUM 2 G: 10 GEL TOPICAL at 12:59

## 2025-06-27 RX ADMIN — HEPARIN SODIUM 5000 UNITS: 5000 INJECTION, SOLUTION INTRAVENOUS; SUBCUTANEOUS at 13:14

## 2025-06-27 RX ADMIN — CEFTRIAXONE 1000 MG: 1 INJECTION, SOLUTION INTRAVENOUS at 18:07

## 2025-06-27 RX ADMIN — METHYLPREDNISOLONE SODIUM SUCCINATE 40 MG: 40 INJECTION, POWDER, FOR SOLUTION INTRAMUSCULAR; INTRAVENOUS at 10:26

## 2025-06-27 RX ADMIN — CARVEDILOL 6.25 MG: 3.12 TABLET, FILM COATED ORAL at 18:07

## 2025-06-27 RX ADMIN — HEPARIN SODIUM 5000 UNITS: 5000 INJECTION, SOLUTION INTRAVENOUS; SUBCUTANEOUS at 05:26

## 2025-06-27 NOTE — ASSESSMENT & PLAN NOTE
Appearing confused on admission, unclear baseline mental status  Monitor mental status  Delirium precautions  Alert and oriented to person, and situation. Unable to tell me year or month

## 2025-06-27 NOTE — ASSESSMENT & PLAN NOTE
Acutely requiring 4 L/min on admission, likely secondary to pneumonia and reactive airway disease flare  With wheezing on exam, bilateral lower lobe pneumonia on imaging  Continue Xopenex/Atrovent  IV Solu-Medrol weaned to bid, continue   Respiratory protocol  Airway clearance protocol

## 2025-06-27 NOTE — PHYSICAL THERAPY NOTE
PHYSICAL THERAPY CANCELLATION NOTE      Patient Name: Aldo Rodriguez  Today's Date: 6/27/2025 06/27/25 1019   Note Type   Note type Cancelled Session   Cancel Reasons Patient to operating room   Additional Comments PT orders received, chart review performed. Patient planned for OR today w/ ortho for R hip hemiarthroplasty, will f/u POD1 as appropriate         Shea Kendrick, PT, DPT

## 2025-06-27 NOTE — PROGRESS NOTES
Progress Note - Orthopedics   Name: Aldo Rodriguez 84 y.o. male I MRN: [id number]  Unit/Bed#: -01 I Date of Admission: 6/25/2025   Date of Service: 6/27/2025 I Hospital Day: 2     Assessment & Plan  Closed fracture of neck of right femur (HCC)  84 year old male with right femoral neck fracture  Pain control per primary  NPO   NWB RLE  Plan for right hip hemiarthroplasty by Dr. Mike today if cleared by primary and pulmonology.  Patient not consentable due to dementia- consent obtained from POA yesterday.  Preop TXA and Ancef ordered    Sepsis due to pneumonia (HCC)  Continue IV abx per primary  Acute respiratory failure (HCC)  Management per primary  Cultures ordered  Currently on 4 L oxygen    I have discussed the above management plan in detail with the primary service.   Orthopedics service will follow.  Please contact the SecureChat role for the Orthopedics service with any questions/concerns.    Subjective   84 y.o.male seen and examined this morning.  Sitting up in bed sleeping.  Arousable from sleep.  Patient continues to be confused.  No acute events, no new complaints. Pain well controlled.     Objective :  Temp:  [97.8 °F (36.6 °C)-99.9 °F (37.7 °C)] 97.8 °F (36.6 °C)  HR:  [74-85] 74  BP: (117-141)/(66-82) 117/66  Resp:  [18] 18  SpO2:  [92 %-98 %] 96 %  O2 Device: Nasal cannula  Nasal Cannula O2 Flow Rate (L/min):  [1 L/min-5 L/min] 5 L/min    Physical Exam  Musculoskeletal: Right hip  Skin intact. No erythema or ecchymosis.  Pain with log roll  Motor intact to +FHL/EHL, +ankle dorsi/plantar flexion  Sensation intact to saphenous, sural, tibial, superficial peroneal nerve, and deep peroneal  2+ DP pulse  No calf swelling or tenderness to palpation      Lab Results: I have reviewed the following results:  Recent Labs     06/25/25  1647 06/26/25  0818 06/27/25  0347   WBC 20.88* 14.72* 15.66*   HGB 15.7 14.4 13.5   HCT 49.6* 43.8 42.1    201 203   BUN 19 15 15   CREATININE 0.85 0.66 0.64  "    Blood Culture:    Lab Results   Component Value Date    BLOODCX No Growth at 24 hrs. 06/25/2025     Wound Culture: No results found for: \"WOUNDCULT\"    Imaging Results Review: No pertinent imaging studies reviewed.  Other Study Results Review: No additional pertinent studies reviewed.  "

## 2025-06-27 NOTE — ASSESSMENT & PLAN NOTE
Acute right femoral neck fracture sustained after mechanical fall at nursing facility  Discussed with on-call orthopedics, plan for OR once medically stabilized from a respiratory standpoint  Pain control, DVT ppx  Tentative plans for OR tomorrow 6/28 -- NPO at midnight

## 2025-06-27 NOTE — ASSESSMENT & PLAN NOTE
Met on admission with leukocytosis, tachypnea.  Suspected source bilateral lower lobe pneumonia  Continue IV ceftriaxone and azithromycin day 3  Procalcitonin upward trending-> continue to trend  Blood cultures no growth at 24 hours  Urinary antigens negative   Possibly d/t aspiration  Speech to reeval today

## 2025-06-27 NOTE — ASSESSMENT & PLAN NOTE
Likely secondary to acute pneumonia  Currently requiring 4-5L NC  Not oxygen dependent at baseline  Titrate oxygen to maintain saturation >89%

## 2025-06-27 NOTE — PROGRESS NOTES
Progress Note - Hospitalist   Name: Aldo Rodriguez 84 y.o. male I MRN: [id number]  Unit/Bed#: -01 I Date of Admission: 6/25/2025   Date of Service: 6/27/2025 I Hospital Day: 2    Assessment & Plan  Sepsis due to pneumonia (HCC)  Met on admission with leukocytosis, tachypnea.  Suspected source bilateral lower lobe pneumonia  Continue IV ceftriaxone and azithromycin day 3  Procalcitonin upward trending-> continue to trend  Blood cultures no growth at 24 hours  Urinary antigens negative   Possibly d/t aspiration  Speech to reeval today  Closed fracture of neck of right femur (HCC)  Acute right femoral neck fracture sustained after mechanical fall at nursing facility  Discussed with on-call orthopedics, plan for OR once medically stabilized from a respiratory standpoint  Pain control, DVT ppx  Tentative plans for OR tomorrow 6/28 -- NPO at midnight   Acute respiratory failure (HCC)  Acutely requiring 4 L/min on admission, likely secondary to pneumonia and reactive airway disease flare  With wheezing on exam, bilateral lower lobe pneumonia on imaging  Continue Xopenex/Atrovent  IV Solu-Medrol weaned to bid, continue   Respiratory protocol  Airway clearance protocol   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 Unable to tell me year or month  Parkinsonism (HCC)  Resume home Sinemet  Essential hypertension  Blood pressure 154/80  Likely exacerbated by pain  Resume home Coreg, Lasix, Aldactone  Monitor BP  History of CVA (cerebrovascular accident)  Hold aspirin preoperatively, resume when able   Monitor mental status  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  Denies chest pain  Continue beta-blocker  Aspirin on hold as above  Hypertensive heart disease with congestive heart failure (HCC)  Wt Readings from Last 3  Encounters:   04/16/25 73.5 kg (162 lb)   09/27/24 77.1 kg (170 lb)   09/17/24 77.1 kg (170 lb)   EF 60%, grade 1 diastolic dysfunction  Lasix and aldactone on hold preoperatively w/ tentative plans for OR tomorrow, resume as able  Continue to monitor bp per protocol     VTE Pharmacologic Prophylaxis: VTE Score: 8 High Risk (Score >/= 5) - Pharmacological DVT Prophylaxis Ordered: heparin. Sequential Compression Devices Ordered.    Mobility:   Basic Mobility Inpatient Raw Score: 6  JH-HLM Goal: 2: Bed activities/Dependent transfer  JH-HLM Achieved: 2: Bed activities/Dependent transfer  JH-HLM Goal achieved. Continue to encourage appropriate mobility.    Patient Centered Rounds: I performed bedside rounds with nursing staff today.   Discussions with Specialists or Other Care Team Provider: ortho    Education and Discussions with Family / Patient: Attempted to update  (sister) via phone. Unable to contact.    Current Length of Stay: 2 day(s)  Current Patient Status: Inpatient   Certification Statement: The patient will continue to require additional inpatient hospital stay due to continued IV abx, pending OR tomorrow with ortho, PT/OT eval, pending safe dispo  Discharge Plan: Anticipate discharge in 48-72 hrs to rehab facility.    Code Status: Level 1 - Full Code    Subjective   Seen and examined. No acute events overnight. Alert and oriented to person and situation. Unable to recall month/year. Reports feeling okay. Denies chest pain, SOB, N/V/D. Reports pain is controlled     Objective :  Temp:  [97.8 °F (36.6 °C)-99.9 °F (37.7 °C)] 97.8 °F (36.6 °C)  HR:  [74-85] 74  BP: (117-141)/(66-82) 117/66  Resp:  [18] 18  SpO2:  [92 %-98 %] 96 %  O2 Device: Nasal cannula  Nasal Cannula O2 Flow Rate (L/min):  [1 L/min-5 L/min] 5 L/min    There is no height or weight on file to calculate BMI.     Input and Output Summary (last 24 hours):     Intake/Output Summary (Last 24 hours) at 6/27/2025 1778  Last data filed  at 6/27/2025 0501  Gross per 24 hour   Intake 1214.17 ml   Output 447 ml   Net 767.17 ml       Physical Exam  Constitutional:       General: He is not in acute distress.     Appearance: He is ill-appearing (chronically).   HENT:      Head: Normocephalic and atraumatic.      Comments: Baseline R eye droop     Nose: Nose normal.      Mouth/Throat:      Mouth: Mucous membranes are moist.     Eyes:      Conjunctiva/sclera: Conjunctivae normal.       Cardiovascular:      Rate and Rhythm: Normal rate.      Heart sounds: No murmur heard.     No friction rub. No gallop.   Pulmonary:      Breath sounds: Wheezing and rhonchi present.      Comments: 2L NC  Abdominal:      General: There is no distension.      Palpations: Abdomen is soft.     Musculoskeletal:         General: Normal range of motion.      Cervical back: Normal range of motion.      Right lower leg: No edema.      Left lower leg: No edema.     Skin:     General: Skin is warm and dry.     Neurological:      Mental Status: He is alert. Mental status is at baseline.     Psychiatric:         Mood and Affect: Mood normal.       Lines/Drains:  Lines/Drains/Airways       Active Status       Name Placement date Placement time Site Days    External Urinary Catheter Other (Comment) 06/26/25  1209  -- less than 1                            Lab Results: I have reviewed the following results:   Results from last 7 days   Lab Units 06/27/25  0347   WBC Thousand/uL 15.66*   HEMOGLOBIN g/dL 13.5   HEMATOCRIT % 42.1   PLATELETS Thousands/uL 203   SEGS PCT % 92*   LYMPHO PCT % 4*   MONO PCT % 3*   EOS PCT % 0     Results from last 7 days   Lab Units 06/27/25  0347   SODIUM mmol/L 138   POTASSIUM mmol/L 4.3   CHLORIDE mmol/L 103   CO2 mmol/L 31   BUN mg/dL 15   CREATININE mg/dL 0.64   ANION GAP mmol/L 4   CALCIUM mg/dL 8.4   ALBUMIN g/dL 3.3*   TOTAL BILIRUBIN mg/dL 0.71   ALK PHOS U/L 49   ALT U/L 5*   AST U/L 13   GLUCOSE RANDOM mg/dL 138                 Results from last 7 days    Lab Units 06/27/25  0347 06/26/25  0534 06/25/25  1755   LACTIC ACID mmol/L  --   --  0.9   PROCALCITONIN ng/ml 0.62* 0.90* 0.35*       Recent Cultures (last 7 days):   Results from last 7 days   Lab Units 06/25/25  1755 06/25/25  1645   BLOOD CULTURE  No Growth at 24 hrs. No Growth at 24 hrs.       Imaging Results Review: I reviewed radiology reports from this admission including: chest xray, CT head, and xray(s).  Other Study Results Review: No additional pertinent studies reviewed.    Last 24 Hours Medication List:     Current Facility-Administered Medications:     acetaminophen (TYLENOL) tablet 650 mg, Q6H PRN    acetaminophen (TYLENOL) tablet 975 mg, Q8H    albuterol inhalation solution 2.5 mg, Q6H PRN    amLODIPine (NORVASC) tablet 5 mg, Daily    [Held by provider] aspirin (ECOTRIN LOW STRENGTH) EC tablet 81 mg, Daily    atorvastatin (LIPITOR) tablet 40 mg, Daily    azithromycin (ZITHROMAX) 500 mg in sodium chloride 0.9% 250mL IVPB 500 mg, Q24H    benzonatate (TESSALON PERLES) capsule 100 mg, TID PRN    carbidopa-levodopa (SINEMET)  mg per tablet 1 tablet, TID    carvedilol (COREG) tablet 6.25 mg, BID With Meals    cefTRIAXone (ROCEPHIN) IVPB (premix in dextrose) 1,000 mg 50 mL, Q24H, Last Rate: 100 mL/hr at 06/27/25 0501    Diclofenac Sodium (VOLTAREN) 1 % topical gel 2 g, 4x Daily    escitalopram (LEXAPRO) tablet 10 mg, Daily    ferrous sulfate tablet 325 mg, Daily With Dinner    [Held by provider] furosemide (LASIX) tablet 20 mg, Daily    gabapentin (NEURONTIN) capsule 300 mg, TID    guaiFENesin (MUCINEX) 12 hr tablet 600 mg, BID    heparin (porcine) subcutaneous injection 5,000 Units, Q8H MAXI    ipratropium (ATROVENT) 0.02 % inhalation solution 0.5 mg, TID    levalbuterol (XOPENEX) inhalation solution 1.25 mg, TID **AND** [DISCONTINUED] sodium chloride 0.9 % inhalation solution 3 mL, TID    lidocaine (LIDODERM) 5 % patch 1 patch, Daily    methylPREDNISolone sodium succinate (Solu-MEDROL) injection  40 mg, Q12H MAXI    multi-electrolyte (Plasmalyte-A/Isolyte-S PH 7.4/Normosol-R) IV solution, Continuous, Last Rate: 50 mL/hr (06/27/25 0501)    ondansetron (ZOFRAN) injection 4 mg, Q6H PRN    traMADol (ULTRAM) tablet 50 mg, Q6H PRN **OR** oxyCODONE (ROXICODONE) split tablet 2.5 mg, Q6H PRN    pantoprazole (PROTONIX) EC tablet 40 mg, Daily    QUEtiapine (SEROquel) tablet 25 mg, HS **AND** QUEtiapine (SEROquel) tablet 12.5 mg, Daily    [Held by provider] spironolactone (ALDACTONE) tablet 25 mg, Daily    Administrative Statements   Today, Patient Was Seen By: Scarlet Muñoz PA-C    **Please Note: This note may have been constructed using a voice recognition system.**

## 2025-06-27 NOTE — PROGRESS NOTES
Patient:    MRN:  [id number]    Hugo Request ID:  [id number]    Level of care reserved:  Skilled Nursing Facility    Partner Reserved:  [facility], [place] [address] [phone]    Clinical needs requested:    Geography searched:  10 miles around [phone]    Start of Service:    Request sent:  3:34pm EDT on 6/26/2025 by Erika Dove    Partner reserved:  4:05pm EDT on 6/27/2025 by Erika Dove    Choice list shared:  4:05pm EDT on 6/27/2025 by Erika Dove

## 2025-06-27 NOTE — ASSESSMENT & PLAN NOTE
84 year old male with right femoral neck fracture  Pain control per primary  NPO   NWB RLE  Plan for right hip hemiarthroplasty by Dr. Mike today if cleared by primary and pulmonology.  Patient not consentable due to dementia- consent obtained from POA yesterday.  Preop TXA and Ancef ordered

## 2025-06-27 NOTE — PLAN OF CARE
Problem: PAIN - ADULT  Goal: Verbalizes/displays adequate comfort level or baseline comfort level  Description: Interventions:  - Encourage patient to monitor pain and request assistance  - Assess pain using appropriate pain scale  - Administer analgesics as ordered based on type and severity of pain and evaluate response  - Implement non-pharmacological measures as appropriate and evaluate response  - Consider cultural and social influences on pain and pain management  - Notify physician/advanced practitioner if interventions unsuccessful or patient reports new pain  - Educate patient/family on pain management process including their role and importance of  reporting pain   - Provide non-pharmacologic/complimentary pain relief interventions  Outcome: Progressing     Problem: INFECTION - ADULT  Goal: Absence or prevention of progression during hospitalization  Description: INTERVENTIONS:  - Assess and monitor for signs and symptoms of infection  - Monitor lab/diagnostic results  - Monitor all insertion sites, i.e. indwelling lines, tubes, and drains  - Monitor endotracheal if appropriate and nasal secretions for changes in amount and color  - [place] appropriate cooling/warming therapies per order  - Administer medications as ordered  - Instruct and encourage patient and family to use good hand hygiene technique  - Identify and instruct in appropriate isolation precautions for identified infection/condition  Outcome: Progressing  Goal: Absence of fever/infection during neutropenic period  Description: INTERVENTIONS:  - Monitor WBC  - Perform strict hand hygiene  - Limit to healthy visitors only  - No plants, dried, fresh or silk flowers with [place] in patient room  Outcome: Progressing     Problem: SAFETY ADULT  Goal: Patient will remain free of falls  Description: INTERVENTIONS:  - Educate patient/family on patient safety including physical limitations  - Instruct patient to call for assistance with activity   -  Consider consulting OT/PT to assist with strengthening/mobility based on AM PAC & JH-HLM score  - Consult OT/PT to assist with strengthening/mobility   - Keep Call bell within reach  - Keep bed low and locked with side rails adjusted as appropriate  - Keep care items and personal belongings within reach  - Initiate and maintain comfort rounds  - Make Fall Risk Sign visible to staff  - Offer Toileting every 2 Hours, in advance of need  - Initiate/Maintain bed alarm  - Obtain necessary fall risk management equipment: yellow socks/yellow bracelet   - Apply yellow socks and bracelet for high fall risk patients  - Consider moving patient to room near nurses station  Outcome: Progressing  Goal: Maintain or return to baseline ADL function  Description: INTERVENTIONS:  -  Assess patient's ability to carry out ADLs; assess patient's baseline for ADL function and identify physical deficits which impact ability to perform ADLs (bathing, care of mouth/teeth, toileting, grooming, dressing, etc.)  - Assess/evaluate cause of self-care deficits   - Assess range of motion  - Assess patient's mobility; develop plan if impaired  - Assess patient's need for assistive devices and provide as appropriate  - Encourage maximum independence but intervene and supervise when necessary  - Involve family in performance of ADLs  - Assess for home care needs following discharge   - Consider OT consult to assist with ADL evaluation and planning for discharge  - Provide patient education as appropriate  - Monitor functional capacity and physical performance, use of AM PAC & JH-HLM   - Monitor gait, balance and fatigue with ambulation    Outcome: Progressing  Goal: Maintains/Returns to pre admission functional level  Description: INTERVENTIONS:  - Perform AM-PAC 6 Click Basic Mobility/ Daily Activity assessment daily.  - Set and communicate daily mobility goal to care team and patient/family/caregiver.   - Collaborate with rehabilitation services on  mobility goals if consulted  - Perform Range of Motion 3 times a day.  - Reposition patient every 3 hours.  - Dangle patient 3 times a day  - Stand patient 3 times a day  - Ambulate patient 3 times a day  - Out of bed to chair 3 times a day   - Out of bed for meals 3 times a day  - Out of bed for toileting  - Record patient progress and toleration of activity level   Outcome: Progressing     Problem: DISCHARGE PLANNING  Goal: Discharge to home or other facility with appropriate resources  Description: INTERVENTIONS:  - Identify barriers to discharge w/patient and caregiver  - Arrange for needed discharge resources and transportation as appropriate  - Identify discharge learning needs (meds, wound care, etc.)  - Arrange for interpretive services to assist at discharge as needed  - Refer to Case Management Department for coordinating discharge planning if the patient needs post-hospital services based on physician/advanced practitioner order or complex needs related to functional status, cognitive ability, or social support system  Outcome: Progressing     Problem: Knowledge Deficit  Goal: Patient/family/caregiver demonstrates understanding of disease process, treatment plan, medications, and discharge instructions  Description: Complete learning assessment and assess knowledge base.  Interventions:  - Provide teaching at level of understanding  - Provide teaching via preferred learning methods  Outcome: Progressing     Problem: Prexisting or High Potential for Compromised Skin Integrity  Goal: Skin integrity is maintained or improved  Description: INTERVENTIONS:  - Identify patients at risk for skin breakdown  - Assess and monitor skin integrity including under and around medical devices   - Assess and monitor nutrition and hydration status  - Monitor labs  - Assess for incontinence   - Turn and reposition patient  - Assist with mobility/ambulation  - Relieve pressure over evelyn prominences   - Avoid friction and  shearing  - Provide appropriate hygiene as needed including keeping skin clean and dry  - Evaluate need for skin moisturizer/barrier cream  - Collaborate with interdisciplinary team  - Patient/family teaching  - Consider wound care consult    Assess:  - Review Juan Francisco scale daily  - Clean and moisturize skin every   - Inspect skin when repositioning, toileting, and assisting with ADLS  - Assess under medical devices such as  every   - Assess extremities for adequate circulation and sensation     Bed Management:  - Have minimal linens on bed & keep smooth, unwrinkled  - Change linens as needed when moist or perspiring  - Avoid sitting or lying in one position for more than  hours while in bed?Keep HOB at degrees   - Toileting:  - Offer bedside commode  - Assess for incontinence every   - Use incontinent care products after each incontinent episode such as     Activity:  - Mobilize patient 2 times a day  - Encourage activity and walks on unit  - Encourage or provide ROM exercises   - Turn and reposition patient every 2 Hours  - Use appropriate equipment to lift or move patient in bed  - Instruct/ Assist with weight shifting every  when out of bed in chair  - Consider limitation of chair time 2 hour intervals    Skin Care:  - Avoid use of baby powder, tape, friction and shearing, hot water or constrictive clothing  - Relieve pressure over bony prominences using   - Do not massage red bony areas    Next Steps:  - Teach patient strategies to minimize risks such as   - Consider consults to  interdisciplinary teams such as   Outcome: Progressing     Problem: Potential for Falls  Goal: Patient will remain free of falls  Description: INTERVENTIONS:  - Educate patient/family on patient safety including physical limitations  - Instruct patient to call for assistance with activity   - Consider consulting OT/PT to assist with strengthening/mobility based on AM PAC & -HLM score  - Consult OT/PT to assist with  strengthening/mobility   - Keep Call bell within reach  - Keep bed low and locked with side rails adjusted as appropriate  - Keep care items and personal belongings within reach  - Initiate and maintain comfort rounds  - Make Fall Risk Sign visible to staff  - Offer Toileting every 2 Hours, in advance of need  - Initiate/Maintain bed alarm  - Obtain necessary fall risk management equipment: yellow socks/yellow bracelet   - Apply yellow socks and bracelet for high fall risk patients  - Consider moving patient to room near nurses station  Outcome: Progressing     Problem: Nutrition/Hydration-ADULT  Goal: Nutrient/Hydration intake appropriate for improving, restoring or maintaining nutritional needs  Description: Monitor and assess patient's nutrition/hydration status for malnutrition. Collaborate with interdisciplinary team and initiate plan and interventions as ordered.  Monitor patient's weight and dietary intake as ordered or per policy. Utilize nutrition screening tool and intervene as necessary. Determine patient's food preferences and provide high-protein, high-caloric foods as appropriate.     INTERVENTIONS:  - Monitor oral intake, urinary output, labs, and treatment plans  - Assess nutrition and hydration status and recommend course of action  - Evaluate amount of meals eaten  - Assist patient with eating if necessary   - Allow adequate time for meals  - Recommend/ encourage appropriate diets, oral nutritional supplements, and vitamin/mineral supplements  - Order, calculate, and assess calorie counts as needed  - Recommend, monitor, and adjust tube feedings and TPN/PPN based on assessed needs  - Assess need for intravenous fluids  - Provide specific nutrition/hydration education as appropriate  - Include patient/family/caregiver in decisions related to nutrition  Outcome: Progressing

## 2025-06-27 NOTE — ASSESSMENT & PLAN NOTE
Evaluation of LBP  · Started in June 2022 after painting her house  · Initially pain was across low back and into right hip, then changed to left buttock/hip  Intermittently has pain down the left lateral leg, laying down in bed is the worst and she can't lay on her left side  Pain is also worse with standing for long periods of time  Rarely has pain in the right leg  Urge incontinence  Ambulates independently  · Hx of peripheral neuropathy from chemo after breast CA and RA  · PT and the chiropractor are helping  The pain is now intermittent instead of constant in the back and left hip/buttock since starting these  · Exam: no midline spinal TTP, BLE - HF/KF/KE 4+/5, DF/PF 5/5, 2+ DTRs, no clonus    Imaging:  · MRI lumbar spine 2/7/23: Multilevel spondylotic changes of the lumbar spine including multifactorial severe L4-5 central canal encroachment  Additionally, there is an intracanalicular synovial cyst identified at the L3-4 level resulting in moderate central canal encroachment at this level  Recommend surgical evaluation  Plan:  · Reviewed imaging with patient and Dr Rebekah Mcelroy  She has spinal stenosis at L4-5 as well as a synovial cyst at L3-4  · However, she states that since starting conservative measures her pain has improved  She has not tried injections yet, but has done well with PT and chiropractic manipulation  · Can consider gabapentin or another neuropathic medication as well as injections if she would like  · Recommend continuing conservative measures at this time given the relief she has sustained with that  Advised to let us know if her symptoms change or worsen  · Reviewed red flag signs and symptoms  · Follow-up as needed  Call with any questions or concerns  Wt Readings from Last 3 Encounters:   04/16/25 73.5 kg (162 lb)   09/27/24 77.1 kg (170 lb)   09/17/24 77.1 kg (170 lb)   Continue diuresis per primary team

## 2025-06-27 NOTE — ASSESSMENT & PLAN NOTE
CT CAP with mild interstitial edema and small patchy GGO in bilateral lower lobes ddx pulmonary edema vs multifocal PNA. Suspect secondary to aspiration  Serial procal elevated (0.90, 0.35), noted leukocytosis (14.72, 20.88)  Send sputum culture, urinary antigens   Follow up cultures  Continue IV ceftriaxone  Continue atrovent/xopenex nebs TID   IV solumedrol 40mg q12h-can lower to prednisone oral tomorrow  Pulmonary hygiene: cough and deep breath, OOB as tolerated, IS, scheduled nebs, mucinex 600mg BID  VBS scheduled tomorrow   Still high risk for perioperative respiratory complications due to active pulmonary infection

## 2025-06-27 NOTE — CASE MANAGEMENT
Case Management Assessment & Discharge Planning Note    Patient name Aldo Rodriguez  Location /-01 MRN [id number]  : 1940 Date 2025       Current Admission Date: 2025  Current Admission Diagnosis:Sepsis due to pneumonia ([facility])   Patient Active Problem List    Diagnosis Date Noted    Sepsis due to pneumonia ([facility]) 2025    Closed fracture of neck of right femur ([facility]) 2025    Acute respiratory failure ([facility]) 2025    Hypertensive heart disease with congestive heart failure ([facility]) 2024    Periodic limb movements of sleep 2023    Parkinsonism ([facility]) 10/18/2023    Urinary incontinence 2023    Obstructive sleep apnea (adult) (pediatric)     Other parasomnia 2023    Snoring 2023    Suspected sleep apnea 2023    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[facility])     Frailty 2023    Ambulatory dysfunction 2023    Primary osteoarthritis of one hip, right     Right hip pain     Recurrent major depressive disorder, in full remission ([facility]) 10/13/2022    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 ([facility]) 10/13/2022    Knee pain 2022    Chronic neck pain 2022    BMI 26.0-26.9,adult 2022    History of seizure 2021    Right epiphora 09/10/2020    Depression, recurrent ([facility]) 2020    History of CVA (cerebrovascular accident) 11/15/2019    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    Carotid stenosis, asymptomatic, bilateral 10/16/2019    Diverticulosis of intestine 10/07/2019    Foraminal stenosis of cervical region 10/07/2019    Personal history of transient ischemic attack 2019    Left inguinal hernia 2018    Osteoporosis 2017    Peripheral neuropathy 2017    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 2017    Essential hypertension 2017    PAD (peripheral artery disease) ([facility]) 2016    DJD (degenerative joint disease) 2015    GERD (gastroesophageal  reflux disease) 01/28/2015    Vitamin D deficiency 05/06/2013    Personal history of colonic polyps 04/16/2013    ASCVD (arteriosclerotic cardiovascular disease) 10/15/2012    Hyperlipidemia 10/15/2012      LOS (days): 2  Geometric Mean LOS (GMLOS) (days): 4.9  Days to GMLOS:3     OBJECTIVE:    Risk of Unplanned Readmission Score: 16.39         Current admission status: Inpatient       Preferred Pharmacy:   [facility]/pharmacy #[id number] - [place] - [address]  [address] [address]  Phone: [phone] Fax: [phone]    Primary Care Provider: ELE Ruiz    Primary Insurance: MEDICARE  Secondary Insurance:     ASSESSMENT:  Active Health Care Proxies    There are no active Health Care Proxies on file.                 Readmission Root Cause  30 Day Readmission: No    Patient Information  Admitted from:: Home  Mental Status: Confused  During Assessment patient was accompanied by: Not accompanied during assessment  Assessment information provided by:: Patient, Other - please comment (message left for sister)  Support Systems: Family members, Friends/neighbors  County of Residence: [place]  [facility] do you live [facility]?: [facility]  Type of Current Residence: Facility  Living Arrangements: Lives in Facility    Activities of Daily Living Prior to Admission  Functional Status: Independent  Completes ADLs independently?: Yes  Ambulates independently?: No  Level of ambulatory dependence: Assistance  Does patient use assisted devices?: Yes  Assisted Devices (DME) used: Wheelchair  Does patient currently own DME?: Yes  What DME does the patient currently own?: Wheelchair  Does patient have a history of Outpatient Therapy (PT/OT)?: No  Does the patient have a history of Short-Term Rehab?: Yes ([facility])  Does patient have a history of HHC?: No  Does patient currently have HHC?: No         Patient Information Continued  Does patient have prescription coverage?: Yes  Can the patient afford their  medications and any related supplies (such as glucometers or test strips)?: Yes  Does patient receive dialysis treatments?: No  Does patient have a history of substance abuse?: No  Does patient have a history of Mental Health Diagnosis?: No        DISCHARGE DETAILS:    Discharge planning discussed with:: patient and message for Cecelia (sister)  Freedom of Choice: Yes  Comments - Freedom of Choice: patient states plan is to return to [facility]; message left for pt's sister to confirm  CM contacted family/caregiver?: Yes  Were Treatment Team discharge recommendations reviewed with patient/caregiver?: Yes  Did patient/caregiver verbalize understanding of patient care needs?: Yes  Were patient/caregiver advised of the risks associated with not following Treatment Team discharge recommendations?: Yes    Contacts  Patient Contacts: Cecelia ()  Relationship to Patient:: Family  Contact Method: Phone  Reason/Outcome: Discharge Planning    Requested Home Health Care         Is the patient interested in HHC at discharge?: No    DME Referral Provided  Referral made for DME?: No    Other Referral/Resources/Interventions Provided:  Interventions: Short Term Rehab  Referral Comments: Referral to [facility]         Treatment Team Recommendation: Facility Return  Expected Discharge Disposition: Facility Return  Additional Discharge Dispositions: Skilled Nursing Facility  Transport at Discharge : Wheelchair van       Additional Comments: CM was informed that pt was admitted from [facility]. Pt states his plan is to return to [facility]. Message left for pt's sister Cecelia to confirm same. CM attempted to call pt's friend Magen; unable to leave a message. [facility] referral sent to [facility]; pt is a bedhold. Pt is indpendent with use of a WC; pt uses a gait belt. [facility] accepted; reserved in [facility].

## 2025-06-27 NOTE — SPEECH THERAPY NOTE
Speech Language/Pathology  Chart reviewed. Per Orthopedics KIMBER Siegel pt NPO d/t plan for possible R hip hemiarthroplasty this date. SLP to monitor chart/pt's status and f/u as able and appropriate.

## 2025-06-27 NOTE — OCCUPATIONAL THERAPY NOTE
Occupational Therapy Cancel Note        Patient Name: Aldo Rodriguez  Today's Date: 6/27/2025 06/27/25 0753   OT Last Visit   OT Visit Date 06/27/25   Note Type   Note type Cancelled Session   Cancel Reasons Patient to operating room   Additional Comments Continut to have active OT evaluaiton orders. Per chart review, plan for patient to go to OR today for R hip hemiarthroplasty by Dr. Mike today if cleared by primary and pulmonology. Will OT evaluation. Please provide WBS and precuations post-op.       Rosalinda Delatorre, OTR/L

## 2025-06-27 NOTE — ASSESSMENT & PLAN NOTE
Wt Readings from Last 3 Encounters:   04/16/25 73.5 kg (162 lb)   09/27/24 77.1 kg (170 lb)   09/17/24 77.1 kg (170 lb)   EF 60%, grade 1 diastolic dysfunction  Lasix and aldactone on hold preoperatively w/ tentative plans for OR tomorrow, resume as able  Continue to monitor bp per protocol

## 2025-06-27 NOTE — PROGRESS NOTES
Progress Note - Pulmonology   Name: Aldo Rodriguez 84 y.o. male I MRN: [id number]  Unit/Bed#: -01 I Date of Admission: 6/25/2025   Date of Service: 6/27/2025 I Hospital Day: 2    Assessment & Plan  Acute respiratory failure (HCC)  Likely secondary to acute pneumonia  Currently requiring 4-5L NC  Not oxygen dependent at baseline  Titrate oxygen to maintain saturation >89%  Sepsis due to pneumonia (HCC)  CT CAP with mild interstitial edema and small patchy GGO in bilateral lower lobes ddx pulmonary edema vs multifocal PNA. Suspect secondary to aspiration  Serial procal elevated (0.90, 0.35), noted leukocytosis (14.72, 20.88)  Send sputum culture, urinary antigens   Follow up cultures  Continue IV ceftriaxone  Continue atrovent/xopenex nebs TID   IV solumedrol 40mg q12h-can lower to prednisone oral tomorrow  Pulmonary hygiene: cough and deep breath, OOB as tolerated, IS, scheduled nebs, mucinex 600mg BID  VBS scheduled tomorrow   Still high risk for perioperative respiratory complications due to active pulmonary infection    Closed fracture of neck of right femur (HCC)  Ortho is following  Awaiting pulmonary stability for surgery  Hypertensive heart disease with congestive heart failure (HCC)  Wt Readings from Last 3 Encounters:   04/16/25 73.5 kg (162 lb)   09/27/24 77.1 kg (170 lb)   09/17/24 77.1 kg (170 lb)   Continue diuresis per primary team    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    Essential hypertension    History of CVA (cerebrovascular accident)    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   Constitutional:       General: He is not in acute distress.     Appearance: He is well-developed. He is not ill-appearing, toxic-appearing or diaphoretic.   HENT:      Head: Normocephalic and atraumatic.     Eyes:      Conjunctiva/sclera: Conjunctivae normal.       Cardiovascular:      Rate and Rhythm: Normal rate and regular rhythm.      Heart sounds: Normal heart sounds. No murmur heard.  Pulmonary:      Effort: Pulmonary effort is normal. No respiratory distress.      Breath sounds: Rales present. No wheezing.   Abdominal:      Palpations: Abdomen is soft.     Musculoskeletal:         General: No swelling.      Cervical back: Neck supple.      Right lower leg: No edema.      Left lower leg: No edema.     Skin:     General: Skin is warm and dry.      Capillary Refill: Capillary refill takes less than 2 seconds.      Coloration: Skin is not jaundiced or pale.     Neurological:      Mental Status: He is alert and oriented to person, place, and time.     Psychiatric:         Mood and Affect: Mood normal.         Behavior: Behavior normal.         Thought Content: Thought content normal.         Judgment: Judgment normal.           Lab Results: I have reviewed the following results:   .     06/27/25  0347   WBC 15.66*   HGB 13.5   HCT 42.1      SODIUM 138   K 4.3      CO2 31   BUN 15   CREATININE 0.64   GLUC 138   MG 2.4   AST 13   ALT 5*   ALB 3.3*   TBILI 0.71   ALKPHOS 49     ABG: No new results in last 24 hours.    Imaging Results Review: I reviewed radiology reports from this admission including: CT chest.

## 2025-06-28 ENCOUNTER — APPOINTMENT (INPATIENT)
Dept: RADIOLOGY | Facility: HOSPITAL | Age: 85
DRG: 853 | End: 2025-06-28
Payer: MEDICARE

## 2025-06-28 ENCOUNTER — ANESTHESIA EVENT (INPATIENT)
Dept: PERIOP | Facility: HOSPITAL | Age: 85
DRG: 853 | End: 2025-06-28
Payer: MEDICARE

## 2025-06-28 ENCOUNTER — ANESTHESIA (INPATIENT)
Dept: PERIOP | Facility: HOSPITAL | Age: 85
DRG: 853 | End: 2025-06-28
Payer: MEDICARE

## 2025-06-28 LAB
ANION GAP SERPL CALCULATED.3IONS-SCNC: 4 MMOL/L (ref 4–13)
BUN SERPL-MCNC: 16 MG/DL (ref 5–25)
CALCIUM SERPL-MCNC: 8.2 MG/DL (ref 8.4–10.2)
CHLORIDE SERPL-SCNC: 104 MMOL/L (ref 96–108)
CO2 SERPL-SCNC: 30 MMOL/L (ref 21–32)
CREAT SERPL-MCNC: 0.62 MG/DL (ref 0.6–1.3)
ERYTHROCYTE [DISTWIDTH] IN BLOOD BY AUTOMATED COUNT: 14.4 % (ref 11.6–15.1)
GFR SERPL CREATININE-BSD FRML MDRD: 91 ML/MIN/1.73SQ M
GLUCOSE SERPL-MCNC: 133 MG/DL (ref 65–140)
HCT VFR BLD AUTO: 42.8 % (ref 36.5–49.3)
HGB BLD-MCNC: 13.8 G/DL (ref 12–17)
MAGNESIUM SERPL-MCNC: 2.4 MG/DL (ref 1.9–2.7)
MCH RBC QN AUTO: 27.5 PG (ref 26.8–34.3)
MCHC RBC AUTO-ENTMCNC: 32.2 G/DL (ref 31.4–37.4)
MCV RBC AUTO: 85 FL (ref 82–98)
PLATELET # BLD AUTO: 208 THOUSANDS/UL (ref 149–390)
PMV BLD AUTO: 8.6 FL (ref 8.9–12.7)
POTASSIUM SERPL-SCNC: 4.2 MMOL/L (ref 3.5–5.3)
RBC # BLD AUTO: 5.01 MILLION/UL (ref 3.88–5.62)
SODIUM SERPL-SCNC: 138 MMOL/L (ref 135–147)
WBC # BLD AUTO: 12.76 THOUSAND/UL (ref 4.31–10.16)

## 2025-06-28 PROCEDURE — 27236 TREAT THIGH FRACTURE: CPT | Performed by: ORTHOPAEDIC SURGERY

## 2025-06-28 PROCEDURE — C1776 JOINT DEVICE (IMPLANTABLE): HCPCS | Performed by: ORTHOPAEDIC SURGERY

## 2025-06-28 PROCEDURE — 0SRR01Z REPLACEMENT OF RIGHT HIP JOINT, FEMORAL SURFACE WITH METAL SYNTHETIC SUBSTITUTE, OPEN APPROACH: ICD-10-PCS | Performed by: ORTHOPAEDIC SURGERY

## 2025-06-28 PROCEDURE — 94760 N-INVAS EAR/PLS OXIMETRY 1: CPT

## 2025-06-28 PROCEDURE — 94668 MNPJ CHEST WALL SBSQ: CPT

## 2025-06-28 PROCEDURE — 99232 SBSQ HOSP IP/OBS MODERATE 35: CPT | Performed by: ORTHOPAEDIC SURGERY

## 2025-06-28 PROCEDURE — 80048 BASIC METABOLIC PNL TOTAL CA: CPT | Performed by: INTERNAL MEDICINE

## 2025-06-28 PROCEDURE — 85027 COMPLETE CBC AUTOMATED: CPT | Performed by: INTERNAL MEDICINE

## 2025-06-28 PROCEDURE — 27236 TREAT THIGH FRACTURE: CPT

## 2025-06-28 PROCEDURE — 94640 AIRWAY INHALATION TREATMENT: CPT

## 2025-06-28 PROCEDURE — 99233 SBSQ HOSP IP/OBS HIGH 50: CPT | Performed by: STUDENT IN AN ORGANIZED HEALTH CARE EDUCATION/TRAINING PROGRAM

## 2025-06-28 PROCEDURE — 83735 ASSAY OF MAGNESIUM: CPT | Performed by: INTERNAL MEDICINE

## 2025-06-28 PROCEDURE — C1713 ANCHOR/SCREW BN/BN,TIS/BN: HCPCS | Performed by: ORTHOPAEDIC SURGERY

## 2025-06-28 PROCEDURE — 72170 X-RAY EXAM OF PELVIS: CPT

## 2025-06-28 DEVICE — SMARTSET HIGH PERFORMANCE MV MEDIUM VISCOSITY BONE CEMENT 40G
Type: IMPLANTABLE DEVICE | Site: HIP | Status: FUNCTIONAL
Brand: SMARTSET

## 2025-06-28 DEVICE — SELF CENTERING BI-POLAR HEAD 28MM ID 50MM OD
Type: IMPLANTABLE DEVICE | Site: HIP | Status: FUNCTIONAL
Brand: SELF CENTERING

## 2025-06-28 DEVICE — BONE PREPARATION KIT
Type: IMPLANTABLE DEVICE | Site: HIP | Status: FUNCTIONAL
Brand: BIOPREP

## 2025-06-28 DEVICE — CEMENTRALIZER STEM CENTRALIZER 8.5MM CEMENTED
Type: IMPLANTABLE DEVICE | Site: HIP | Status: FUNCTIONAL
Brand: CEMENTRALIZER

## 2025-06-28 DEVICE — SUMMIT FEMORAL STEM 12/14 TAPER CEMENTED SIZE 3 STD 108MM
Type: IMPLANTABLE DEVICE | Site: HIP | Status: FUNCTIONAL
Brand: SUMMIT

## 2025-06-28 DEVICE — ARTICUL/EZE FEMORAL HEAD DIAMETER 28MM +5 12/14 TAPER
Type: IMPLANTABLE DEVICE | Site: HIP | Status: FUNCTIONAL
Brand: ARTICUL/EZE

## 2025-06-28 RX ORDER — SUCCINYLCHOLINE/SOD CL,ISO/PF 100 MG/5ML
SYRINGE (ML) INTRAVENOUS AS NEEDED
Status: DISCONTINUED | OUTPATIENT
Start: 2025-06-28 | End: 2025-06-28

## 2025-06-28 RX ORDER — FERROUS SULFATE 300 MG/5ML
300 LIQUID (ML) ORAL
Status: DISCONTINUED | OUTPATIENT
Start: 2025-06-28 | End: 2025-07-03 | Stop reason: HOSPADM

## 2025-06-28 RX ORDER — ACETAMINOPHEN 10 MG/ML
1000 INJECTION, SOLUTION INTRAVENOUS EVERY 6 HOURS PRN
Status: ACTIVE | OUTPATIENT
Start: 2025-06-28 | End: 2025-06-30

## 2025-06-28 RX ORDER — CEFAZOLIN SODIUM 2 G/50ML
2000 SOLUTION INTRAVENOUS
Status: COMPLETED | OUTPATIENT
Start: 2025-06-28 | End: 2025-06-28

## 2025-06-28 RX ORDER — METHYLPREDNISOLONE SODIUM SUCCINATE 40 MG/ML
40 INJECTION, POWDER, LYOPHILIZED, FOR SOLUTION INTRAMUSCULAR; INTRAVENOUS DAILY
Status: DISCONTINUED | OUTPATIENT
Start: 2025-06-29 | End: 2025-06-29

## 2025-06-28 RX ORDER — HYDRALAZINE HYDROCHLORIDE 20 MG/ML
5 INJECTION INTRAMUSCULAR; INTRAVENOUS EVERY 6 HOURS PRN
Status: DISCONTINUED | OUTPATIENT
Start: 2025-06-28 | End: 2025-07-03 | Stop reason: HOSPADM

## 2025-06-28 RX ORDER — FENTANYL CITRATE/PF 50 MCG/ML
50 SYRINGE (ML) INJECTION
Status: DISCONTINUED | OUTPATIENT
Start: 2025-06-28 | End: 2025-06-28 | Stop reason: HOSPADM

## 2025-06-28 RX ORDER — DEXAMETHASONE SODIUM PHOSPHATE 10 MG/ML
INJECTION, SOLUTION INTRAMUSCULAR; INTRAVENOUS AS NEEDED
Status: DISCONTINUED | OUTPATIENT
Start: 2025-06-28 | End: 2025-06-28

## 2025-06-28 RX ORDER — HYDROMORPHONE HCL/PF 1 MG/ML
SYRINGE (ML) INJECTION AS NEEDED
Status: DISCONTINUED | OUTPATIENT
Start: 2025-06-28 | End: 2025-06-28

## 2025-06-28 RX ORDER — CEFAZOLIN SODIUM 2 G/50ML
2000 SOLUTION INTRAVENOUS EVERY 8 HOURS
Status: COMPLETED | OUTPATIENT
Start: 2025-06-28 | End: 2025-06-29

## 2025-06-28 RX ORDER — ONDANSETRON 2 MG/ML
INJECTION INTRAMUSCULAR; INTRAVENOUS AS NEEDED
Status: DISCONTINUED | OUTPATIENT
Start: 2025-06-28 | End: 2025-06-28

## 2025-06-28 RX ORDER — ROCURONIUM BROMIDE 10 MG/ML
INJECTION, SOLUTION INTRAVENOUS AS NEEDED
Status: DISCONTINUED | OUTPATIENT
Start: 2025-06-28 | End: 2025-06-28

## 2025-06-28 RX ORDER — HYDROMORPHONE HCL/PF 1 MG/ML
0.5 SYRINGE (ML) INJECTION
Status: DISCONTINUED | OUTPATIENT
Start: 2025-06-28 | End: 2025-06-28 | Stop reason: HOSPADM

## 2025-06-28 RX ORDER — TRANEXAMIC ACID 10 MG/ML
1000 INJECTION, SOLUTION INTRAVENOUS
Status: DISCONTINUED | OUTPATIENT
Start: 2025-06-28 | End: 2025-06-28

## 2025-06-28 RX ORDER — PROPOFOL 10 MG/ML
INJECTION, EMULSION INTRAVENOUS AS NEEDED
Status: DISCONTINUED | OUTPATIENT
Start: 2025-06-28 | End: 2025-06-28

## 2025-06-28 RX ORDER — GUAIFENESIN 100 MG/5ML
400 SOLUTION ORAL EVERY 6 HOURS
Status: DISCONTINUED | OUTPATIENT
Start: 2025-06-28 | End: 2025-07-03 | Stop reason: HOSPADM

## 2025-06-28 RX ORDER — SODIUM CHLORIDE, SODIUM LACTATE, POTASSIUM CHLORIDE, CALCIUM CHLORIDE 600; 310; 30; 20 MG/100ML; MG/100ML; MG/100ML; MG/100ML
20 INJECTION, SOLUTION INTRAVENOUS CONTINUOUS
Status: DISCONTINUED | OUTPATIENT
Start: 2025-06-28 | End: 2025-06-29

## 2025-06-28 RX ORDER — FENTANYL CITRATE 50 UG/ML
INJECTION, SOLUTION INTRAMUSCULAR; INTRAVENOUS AS NEEDED
Status: DISCONTINUED | OUTPATIENT
Start: 2025-06-28 | End: 2025-06-28

## 2025-06-28 RX ORDER — TRANEXAMIC ACID 100 MG/ML
INJECTION, SOLUTION INTRAVENOUS AS NEEDED
Status: DISCONTINUED | OUTPATIENT
Start: 2025-06-28 | End: 2025-06-28

## 2025-06-28 RX ORDER — SODIUM CHLORIDE, SODIUM LACTATE, POTASSIUM CHLORIDE, CALCIUM CHLORIDE 600; 310; 30; 20 MG/100ML; MG/100ML; MG/100ML; MG/100ML
INJECTION, SOLUTION INTRAVENOUS CONTINUOUS PRN
Status: DISCONTINUED | OUTPATIENT
Start: 2025-06-28 | End: 2025-06-28

## 2025-06-28 RX ORDER — PANTOPRAZOLE SODIUM 40 MG/10ML
40 INJECTION, POWDER, LYOPHILIZED, FOR SOLUTION INTRAVENOUS
Status: DISCONTINUED | OUTPATIENT
Start: 2025-06-29 | End: 2025-07-03 | Stop reason: HOSPADM

## 2025-06-28 RX ORDER — ONDANSETRON 2 MG/ML
4 INJECTION INTRAMUSCULAR; INTRAVENOUS ONCE AS NEEDED
Status: DISCONTINUED | OUTPATIENT
Start: 2025-06-28 | End: 2025-06-28 | Stop reason: HOSPADM

## 2025-06-28 RX ADMIN — CARBIDOPA AND LEVODOPA 1 TABLET: 25; 100 TABLET ORAL at 20:42

## 2025-06-28 RX ADMIN — SODIUM CHLORIDE, SODIUM LACTATE, POTASSIUM CHLORIDE, AND CALCIUM CHLORIDE: .6; .31; .03; .02 INJECTION, SOLUTION INTRAVENOUS at 11:21

## 2025-06-28 RX ADMIN — SUGAMMADEX 200 MG: 100 INJECTION, SOLUTION INTRAVENOUS at 14:00

## 2025-06-28 RX ADMIN — DEXAMETHASONE SODIUM PHOSPHATE 10 MG: 10 INJECTION, SOLUTION INTRAMUSCULAR; INTRAVENOUS at 12:30

## 2025-06-28 RX ADMIN — FENTANYL CITRATE 100 MCG: 50 INJECTION, SOLUTION INTRAMUSCULAR; INTRAVENOUS at 11:34

## 2025-06-28 RX ADMIN — PROPOFOL 80 MCG/KG/MIN: 10 INJECTION, EMULSION INTRAVENOUS at 11:36

## 2025-06-28 RX ADMIN — DICLOFENAC SODIUM 2 G: 10 GEL TOPICAL at 17:44

## 2025-06-28 RX ADMIN — IPRATROPIUM BROMIDE 0.5 MG: 0.5 SOLUTION RESPIRATORY (INHALATION) at 18:26

## 2025-06-28 RX ADMIN — CEFTRIAXONE 1000 MG: 1 INJECTION, SOLUTION INTRAVENOUS at 17:44

## 2025-06-28 RX ADMIN — GABAPENTIN 300 MG: 300 CAPSULE ORAL at 20:43

## 2025-06-28 RX ADMIN — CEFAZOLIN SODIUM 2000 MG: 2 SOLUTION INTRAVENOUS at 21:10

## 2025-06-28 RX ADMIN — DICLOFENAC SODIUM 2 G: 10 GEL TOPICAL at 20:46

## 2025-06-28 RX ADMIN — PROPOFOL 100 MG: 10 INJECTION, EMULSION INTRAVENOUS at 11:34

## 2025-06-28 RX ADMIN — ONDANSETRON 4 MG: 2 INJECTION INTRAMUSCULAR; INTRAVENOUS at 12:30

## 2025-06-28 RX ADMIN — GABAPENTIN 300 MG: 300 CAPSULE ORAL at 17:42

## 2025-06-28 RX ADMIN — GUAIFENESIN 400 MG: 200 SOLUTION ORAL at 17:43

## 2025-06-28 RX ADMIN — CEFAZOLIN SODIUM 2000 MG: 2 SOLUTION INTRAVENOUS at 11:45

## 2025-06-28 RX ADMIN — LEVALBUTEROL HYDROCHLORIDE 1.25 MG: 1.25 SOLUTION RESPIRATORY (INHALATION) at 18:26

## 2025-06-28 RX ADMIN — SODIUM CHLORIDE, SODIUM LACTATE, POTASSIUM CHLORIDE, AND CALCIUM CHLORIDE 20 ML/HR: .6; .31; .03; .02 INJECTION, SOLUTION INTRAVENOUS at 14:53

## 2025-06-28 RX ADMIN — Medication 300 MG: at 17:43

## 2025-06-28 RX ADMIN — HYDROMORPHONE HYDROCHLORIDE 0.5 MG: 1 INJECTION, SOLUTION INTRAMUSCULAR; INTRAVENOUS; SUBCUTANEOUS at 12:17

## 2025-06-28 RX ADMIN — LEVALBUTEROL HYDROCHLORIDE 1.25 MG: 1.25 SOLUTION RESPIRATORY (INHALATION) at 07:30

## 2025-06-28 RX ADMIN — CARBIDOPA AND LEVODOPA 1 TABLET: 25; 100 TABLET ORAL at 17:42

## 2025-06-28 RX ADMIN — ROCURONIUM BROMIDE 50 MG: 10 INJECTION, SOLUTION INTRAVENOUS at 11:45

## 2025-06-28 RX ADMIN — QUETIAPINE FUMARATE 25 MG: 25 TABLET ORAL at 20:43

## 2025-06-28 RX ADMIN — Medication 100 MG: at 11:34

## 2025-06-28 RX ADMIN — IPRATROPIUM BROMIDE 0.5 MG: 0.5 SOLUTION RESPIRATORY (INHALATION) at 07:30

## 2025-06-28 RX ADMIN — CARVEDILOL 6.25 MG: 3.12 TABLET, FILM COATED ORAL at 17:42

## 2025-06-28 RX ADMIN — TRANEXAMIC ACID 1 G: 100 INJECTION, SOLUTION INTRAVENOUS at 11:55

## 2025-06-28 NOTE — ANESTHESIA POSTPROCEDURE EVALUATION
Post-Op Assessment Note    CV Status:  Stable    Pain management: adequate       Mental Status:  Sleepy   Hydration Status:  Euvolemic   PONV Controlled:  Controlled   Airway Patency:  Patent  Airway: intubated     Post Op Vitals Reviewed: Yes    No anethesia notable event occurred.    Staff: CRNA           Last Filed PACU Vitals:  Vitals Value Taken Time   Temp     Pulse 78 06/28/25 14:11   /81 06/28/25 14:09   Resp 20 06/28/25 14:11   SpO2 98 % 06/28/25 14:11   Vitals shown include unfiled device data.

## 2025-06-28 NOTE — PLAN OF CARE
Problem: PAIN - ADULT  Goal: Verbalizes/displays adequate comfort level or baseline comfort level  Description: Interventions:  - Encourage patient to monitor pain and request assistance  - Assess pain using appropriate pain scale  - Administer analgesics as ordered based on type and severity of pain and evaluate response  - Implement non-pharmacological measures as appropriate and evaluate response  - Consider cultural and social influences on pain and pain management  - Notify physician/advanced practitioner if interventions unsuccessful or patient reports new pain  - Educate patient/family on pain management process including their role and importance of  reporting pain   - Provide non-pharmacologic/complimentary pain relief interventions  Outcome: Progressing     Problem: INFECTION - ADULT  Goal: Absence or prevention of progression during hospitalization  Description: INTERVENTIONS:  - Assess and monitor for signs and symptoms of infection  - Monitor lab/diagnostic results  - Monitor all insertion sites, i.e. indwelling lines, tubes, and drains  - Monitor endotracheal if appropriate and nasal secretions for changes in amount and color  - [place] appropriate cooling/warming therapies per order  - Administer medications as ordered  - Instruct and encourage patient and family to use good hand hygiene technique  - Identify and instruct in appropriate isolation precautions for identified infection/condition  Outcome: Progressing  Goal: Absence of fever/infection during neutropenic period  Description: INTERVENTIONS:  - Monitor WBC  - Perform strict hand hygiene  - Limit to healthy visitors only  - No plants, dried, fresh or silk flowers with [place] in patient room  Outcome: Progressing     Problem: SAFETY ADULT  Goal: Patient will remain free of falls  Description: INTERVENTIONS:  - Educate patient/family on patient safety including physical limitations  - Instruct patient to call for assistance with activity   -  Consider consulting OT/PT to assist with strengthening/mobility based on AM PAC & JH-HLM score  - Consult OT/PT to assist with strengthening/mobility   - Keep Call bell within reach  - Keep bed low and locked with side rails adjusted as appropriate  - Keep care items and personal belongings within reach  - Initiate and maintain comfort rounds  - Make Fall Risk Sign visible to staff  - Offer Toileting every 2 Hours, in advance of need  - Initiate/Maintain alarm  - Obtain necessary fall risk management equipment  - Apply yellow socks and bracelet for high fall risk patients  - Consider moving patient to room near nurses station  Outcome: Progressing  Goal: Maintain or return to baseline ADL function  Description: INTERVENTIONS:  -  Assess patient's ability to carry out ADLs; assess patient's baseline for ADL function and identify physical deficits which impact ability to perform ADLs (bathing, care of mouth/teeth, toileting, grooming, dressing, etc.)  - Assess/evaluate cause of self-care deficits   - Assess range of motion  - Assess patient's mobility; develop plan if impaired  - Assess patient's need for assistive devices and provide as appropriate  - Encourage maximum independence but intervene and supervise when necessary  - Involve family in performance of ADLs  - Assess for home care needs following discharge   - Consider OT consult to assist with ADL evaluation and planning for discharge  - Provide patient education as appropriate  - Monitor functional capacity and physical performance, use of AM PAC & JH-HLM   - Monitor gait, balance and fatigue with ambulation    Outcome: Progressing  Goal: Maintains/Returns to pre admission functional level  Description: INTERVENTIONS:  - Perform AM-PAC 6 Click Basic Mobility/ Daily Activity assessment daily.  - Set and communicate daily mobility goal to care team and patient/family/caregiver.   - Collaborate with rehabilitation services on mobility goals if consulted  -  Perform Range of Motion 3 times a day.  - Reposition patient every 2 hours.  - Dangle patient 3 times a day  - Stand patient 3 times a day  - Ambulate patient 3 times a day  - Out of bed to chair 3 times a day   - Out of bed for meals 3 times a day  - Out of bed for toileting  - Record patient progress and toleration of activity level   Outcome: Progressing     Problem: DISCHARGE PLANNING  Goal: Discharge to home or other facility with appropriate resources  Description: INTERVENTIONS:  - Identify barriers to discharge w/patient and caregiver  - Arrange for needed discharge resources and transportation as appropriate  - Identify discharge learning needs (meds, wound care, etc.)  - Arrange for interpretive services to assist at discharge as needed  - Refer to Case Management Department for coordinating discharge planning if the patient needs post-hospital services based on physician/advanced practitioner order or complex needs related to functional status, cognitive ability, or social support system  Outcome: Progressing     Problem: Knowledge Deficit  Goal: Patient/family/caregiver demonstrates understanding of disease process, treatment plan, medications, and discharge instructions  Description: Complete learning assessment and assess knowledge base.  Interventions:  - Provide teaching at level of understanding  - Provide teaching via preferred learning methods  Outcome: Progressing     Problem: Prexisting or High Potential for Compromised Skin Integrity  Goal: Skin integrity is maintained or improved  Description: INTERVENTIONS:  - Identify patients at risk for skin breakdown  - Assess and monitor skin integrity including under and around medical devices   - Assess and monitor nutrition and hydration status  - Monitor labs  - Assess for incontinence   - Turn and reposition patient  - Assist with mobility/ambulation  - Relieve pressure over evelyn prominences   - Avoid friction and shearing  - Provide appropriate  hygiene as needed including keeping skin clean and dry  - Evaluate need for skin moisturizer/barrier cream  - Collaborate with interdisciplinary team  - Patient/family teaching  - Consider wound care consult    Assess:  - Review [place] scale daily  - Clean and moisturize skin   - Inspect skin when repositioning, toileting, and assisting with ADLS  - Assess under medical devices   - Assess extremities for adequate circulation and sensation     Bed Management:  - Have minimal linens on bed & keep smooth, unwrinkled  - Change linens as needed when moist or perspiring  - Avoid sitting or lying in one position for more than 2 hours while in bed?Keep HOB at 30 degrees   - Toileting:  - Offer bedside commode  - Assess for incontinence   - Use incontinent care products after each incontinent episode     Activity:  - Mobilize patient 3 times a day  - Encourage activity and walks on unit  - Encourage or provide ROM exercises   - Turn and reposition patient every 2 Hours  - Use appropriate equipment to lift or move patient in bed  - Instruct/ Assist with weight shifting when out of bed in chair  - Consider limitation of chair time 2 hour intervals    Skin Care:  - Avoid use of baby powder, tape, friction and shearing, hot water or constrictive clothing  - Relieve pressure over bony prominences  - Do not massage red bony areas    Next Steps:  - Teach patient strategies to minimize risks   - Consider consults to  interdisciplinary teams   Outcome: Progressing     Problem: Potential for Falls  Goal: Patient will remain free of falls  Description: INTERVENTIONS:  - Educate patient/family on patient safety including physical limitations  - Instruct patient to call for assistance with activity   - Consider consulting OT/PT to assist with strengthening/mobility based on AM PAC & JH-HLM score  - Consult OT/PT to assist with strengthening/mobility   - Keep Call bell within reach  - Keep bed low and locked with side rails adjusted as  appropriate  - Keep care items and personal belongings within reach  - Initiate and maintain comfort rounds  - Make Fall Risk Sign visible to staff  - Offer Toileting every 2 Hours, in advance of need  - Initiate/Maintain alarm  - Obtain necessary fall risk management equipment  - Apply yellow socks and bracelet for high fall risk patients  - Consider moving patient to room near nurses station  Outcome: Progressing     Problem: Nutrition/Hydration-ADULT  Goal: Nutrient/Hydration intake appropriate for improving, restoring or maintaining nutritional needs  Description: Monitor and assess patient's nutrition/hydration status for malnutrition. Collaborate with interdisciplinary team and initiate plan and interventions as ordered.  Monitor patient's weight and dietary intake as ordered or per policy. Utilize nutrition screening tool and intervene as necessary. Determine patient's food preferences and provide high-protein, high-caloric foods as appropriate.     INTERVENTIONS:  - Monitor oral intake, urinary output, labs, and treatment plans  - Assess nutrition and hydration status and recommend course of action  - Evaluate amount of meals eaten  - Assist patient with eating if necessary   - Allow adequate time for meals  - Recommend/ encourage appropriate diets, oral nutritional supplements, and vitamin/mineral supplements  - Order, calculate, and assess calorie counts as needed  - Recommend, monitor, and adjust tube feedings and TPN/PPN based on assessed needs  - Assess need for intravenous fluids  - Provide specific nutrition/hydration education as appropriate  - Include patient/family/caregiver in decisions related to nutrition  Outcome: Progressing

## 2025-06-28 NOTE — ASSESSMENT & PLAN NOTE
Acutely requiring 4 L/min on admission, likely secondary to pneumonia and reactive airway disease flare  With wheezing on exam, bilateral lower lobe pneumonia on imaging  Continue Xopenex/Atrovent  IV Solu-Medrol transitioned to po today 6/28  Respiratory protocol  Airway clearance protocol   Discussed with Pulm weaned to po prednisone,

## 2025-06-28 NOTE — PHYSICAL THERAPY NOTE
PHYSICAL THERAPY CX NOTE      Patient Name: Aldo Rodriguez  Today's Date: 6/28/2025 06/28/25 0749   Note Type   Note type Cancelled Session   Cancel Reasons Patient to operating room   Additional Comments Patient still pending OR, will f/u post-op as appropriate       Shea Kendrick, PT, DPT    yesterday

## 2025-06-28 NOTE — ASSESSMENT & PLAN NOTE
Wt Readings from Last 3 Encounters:   04/16/25 73.5 kg (162 lb)   09/27/24 77.1 kg (170 lb)   09/17/24 77.1 kg (170 lb)   EF 60%, grade 1 diastolic dysfunction  Lasix and aldactone on hold preoperatively for OR today  Continue to monitor bp per protocol

## 2025-06-28 NOTE — ASSESSMENT & PLAN NOTE
Met on admission with leukocytosis, tachypnea.  Suspected source bilateral lower lobe pneumonia  Continue IV ceftriaxone and azithromycin day 3/5  Procalcitonin downward trending  Blood cultures no growth at 48 hours  Urinary antigens negative   Possibly d/t aspiration  Speech to reevaluate when appropriate

## 2025-06-28 NOTE — PROGRESS NOTES
Progress Note - Hospitalist   Name: Aldo Rodriguez 84 y.o. male I MRN: [id number]  Unit/Bed#: -01 I Date of Admission: 6/25/2025   Date of Service: 6/28/2025 I Hospital Day: 3    Assessment & Plan  Sepsis due to pneumonia (HCC)  Met on admission with leukocytosis, tachypnea.  Suspected source bilateral lower lobe pneumonia  Continue IV ceftriaxone and azithromycin day 3/5  Procalcitonin downward trending  Blood cultures no growth at 48 hours  Urinary antigens negative   Possibly d/t aspiration  Speech to reevaluate when appropriate   Closed fracture of neck of right femur (HCC)  Acute right femoral neck fracture sustained after mechanical fall at nursing facility  Discussed with orthopedics, plan for OR today 6/28as pt respiratory status is back to baseline. 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 Unable to tell me year or month  Parkinsonism (HCC)  Resume home Sinemet  Essential hypertension  Blood pressure 154/80  Likely exacerbated by pain  Resume home Coreg, Lasix, Aldactone  Monitor BP  History of CVA (cerebrovascular accident)  Hold aspirin preoperatively, resume when able   Monitor mental status  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  Denies chest pain  Continue  beta-blocker  Aspirin on hold as above  Hypertensive heart disease with congestive heart failure (HCC)  Wt Readings from Last 3 Encounters:   04/16/25 73.5 kg (162 lb)   09/27/24 77.1 kg (170 lb)   09/17/24 77.1 kg (170 lb)   EF 60%, grade 1 diastolic dysfunction  Lasix and aldactone on hold preoperatively for OR today  Continue to monitor bp per protocol     VTE Pharmacologic Prophylaxis: VTE Score: 8 High Risk (Score >/= 5) - Pharmacological DVT Prophylaxis Ordered: heparin. Sequential Compression Devices Ordered.    Mobility:   Basic Mobility Inpatient Raw Score: 6  JH-HLM Goal: 2: Bed activities/Dependent transfer  JH-HLM Achieved: 3: Sit at edge of bed  JH-HLM Goal achieved. Continue to encourage appropriate mobility.    Patient Centered Rounds: I performed bedside rounds with nursing staff today.   Discussions with Specialists or Other Care Team Provider: pulm, ortho, CM, Pt, Ot    Education and Discussions with Family / Patient: Attempted to update  (sister) via phone. Unable to contact.    Current Length of Stay: 3 day(s)  Current Patient Status: Inpatient   Certification Statement: The patient will continue to require additional inpatient hospital stay due to pna and hip fx  Discharge Plan: Anticipate discharge in 48-72 hrs to discharge location to be determined pending rehab evaluations.    Code Status: Level 1 - Full Code    Yesenia Carlson was seen and examined at bedside.  No acute events overnight.  Patient is a poor historian however has no acute complaints at this time.  States that he continues to fall asleep every so often.  Discussed with Ortho patient back to what seems to be baseline respiratory status.  Discussed that benefits outweigh risks however patient has a significant respiratory risk due to continued pneumonia.  Speech to evaluate once no longer NPO.  Discussed with pulmonology patient transition to p.o. prednisone.    Objective :  Temp:  [97.5 °F (36.4 °C)-98.8 °F  (37.1 °C)] 98.8 °F (37.1 °C)  HR:  [63-86] 63  BP: (119-160)/(63-88) 160/77  SpO2:  [91 %-95 %] 95 %  O2 Device: Nasal cannula  Nasal Cannula O2 Flow Rate (L/min):  [2 L/min] 2 L/min    There is no height or weight on file to calculate BMI.     Input and Output Summary (last 24 hours):     Intake/Output Summary (Last 24 hours) at 6/28/2025 0723  Last data filed at 6/28/2025 0629  Gross per 24 hour   Intake 330 ml   Output 3 ml   Net 327 ml       Physical Exam  Vitals and nursing note reviewed.   Constitutional:       General: He is not in acute distress.     Appearance: He is ill-appearing (chronically).   HENT:      Head: Normocephalic and atraumatic.     Cardiovascular:      Rate and Rhythm: Normal rate and regular rhythm.      Pulses: Normal pulses.      Heart sounds: Normal heart sounds.   Pulmonary:      Effort: Pulmonary effort is normal.      Breath sounds: Wheezing and rhonchi present.      Comments: On 1L NC while sleeping  Abdominal:      General: Abdomen is flat. Bowel sounds are normal.      Palpations: Abdomen is soft.     Musculoskeletal:      Right lower leg: No edema.      Left lower leg: No edema.     Skin:     General: Skin is warm.     Neurological:      General: No focal deficit present.      Mental Status: He is alert. Mental status is at baseline. He is disoriented.      Comments: Oriented to self and place, right eye droop chronic         Lines/Drains:  Lines/Drains/Airways       Active Status       Name Placement date Placement time Site Days    External Urinary Catheter Other (Comment) 06/26/25  1209  -- 1                            Lab Results: I have reviewed the following results:   Results from last 7 days   Lab Units 06/28/25  0140 06/27/25  0347   WBC Thousand/uL 12.76* 15.66*   HEMOGLOBIN g/dL 13.8 13.5   HEMATOCRIT % 42.8 42.1   PLATELETS Thousands/uL 208 203   SEGS PCT %  --  92*   LYMPHO PCT %  --  4*   MONO PCT %  --  3*   EOS PCT %  --  0     Results from last 7 days   Lab Units  06/28/25  0140 06/27/25  0347   SODIUM mmol/L 138 138   POTASSIUM mmol/L 4.2 4.3   CHLORIDE mmol/L 104 103   CO2 mmol/L 30 31   BUN mg/dL 16 15   CREATININE mg/dL 0.62 0.64   ANION GAP mmol/L 4 4   CALCIUM mg/dL 8.2* 8.4   ALBUMIN g/dL  --  3.3*   TOTAL BILIRUBIN mg/dL  --  0.71   ALK PHOS U/L  --  49   ALT U/L  --  5*   AST U/L  --  13   GLUCOSE RANDOM mg/dL 133 138                 Results from last 7 days   Lab Units 06/27/25  0347 06/26/25  0534 06/25/25  1755   LACTIC ACID mmol/L  --   --  0.9   PROCALCITONIN ng/ml 0.62* 0.90* 0.35*       Recent Cultures (last 7 days):   Results from last 7 days   Lab Units 06/26/25  2237 06/25/25  1755 06/25/25  1645   BLOOD CULTURE   --  No Growth at 48 hrs. No Growth at 48 hrs.   LEGIONELLA URINARY ANTIGEN  Negative  --   --        Imaging Results Review: No pertinent imaging studies reviewed.  Other Study Results Review: No additional pertinent studies reviewed.    Last 24 Hours Medication List:     Current Facility-Administered Medications:     acetaminophen (TYLENOL) tablet 650 mg, Q6H PRN    acetaminophen (TYLENOL) tablet 975 mg, Q8H    albuterol inhalation solution 2.5 mg, Q6H PRN    amLODIPine (NORVASC) tablet 5 mg, Daily    [Held by provider] aspirin (ECOTRIN LOW STRENGTH) EC tablet 81 mg, Daily    atorvastatin (LIPITOR) tablet 40 mg, Daily    benzonatate (TESSALON PERLES) capsule 100 mg, TID PRN    carbidopa-levodopa (SINEMET)  mg per tablet 1 tablet, TID    carvedilol (COREG) tablet 6.25 mg, BID With Meals    cefTRIAXone (ROCEPHIN) IVPB (premix in dextrose) 1,000 mg 50 mL, Q24H, Last Rate: 1,000 mg (06/27/25 1807)    Diclofenac Sodium (VOLTAREN) 1 % topical gel 2 g, 4x Daily    escitalopram (LEXAPRO) tablet 10 mg, Daily    ferrous sulfate tablet 325 mg, Daily With Dinner    [Held by provider] furosemide (LASIX) tablet 20 mg, Daily    gabapentin (NEURONTIN) capsule 300 mg, TID    guaiFENesin (MUCINEX) 12 hr tablet 1,200 mg, BID    [Held by provider] heparin  (porcine) subcutaneous injection 5,000 Units, Q8H MAXI    ipratropium (ATROVENT) 0.02 % inhalation solution 0.5 mg, TID    levalbuterol (XOPENEX) inhalation solution 1.25 mg, TID **AND** [DISCONTINUED] sodium chloride 0.9 % inhalation solution 3 mL, TID    lidocaine (LIDODERM) 5 % patch 1 patch, Daily    ondansetron (ZOFRAN) injection 4 mg, Q6H PRN    traMADol (ULTRAM) tablet 50 mg, Q6H PRN **OR** oxyCODONE (ROXICODONE) split tablet 2.5 mg, Q6H PRN    pantoprazole (PROTONIX) EC tablet 40 mg, Daily    predniSONE tablet 40 mg, Daily    QUEtiapine (SEROquel) tablet 25 mg, HS **AND** QUEtiapine (SEROquel) tablet 12.5 mg, Daily    [Held by provider] spironolactone (ALDACTONE) tablet 25 mg, Daily    Administrative Statements   Today, Patient Was Seen By: Jennifer Ken MD  I have spent a total time of 59 minutes in caring for this patient on the day of the visit/encounter including Diagnostic results, Prognosis, Risks and benefits of tx options, Instructions for management, Patient and family education, Importance of tx compliance, Risk factor reductions, Impressions, Counseling / Coordination of care, Documenting in the medical record, Reviewing/placing orders in the medical record (including tests, medications, and/or procedures), Obtaining or reviewing history  , and Communicating with other healthcare professionals .    **Please Note: This note may have been constructed using a voice recognition system.**

## 2025-06-28 NOTE — PROGRESS NOTES
Progress Note - Orthopedics   Name: Aldo Rodriguez 84 y.o. male I MRN: [id number]  Unit/Bed#: -01 I Date of Admission: 6/25/2025   Date of Service: 6/28/2025 I Hospital Day: 3    Assessment & Plan  Closed fracture of neck of right femur (HCC)  84 year old male with right femoral neck fracture  Pain control per primary  NPO   NWB RLE  Plan for right hip hemiarthroplasty   Preop TXA and Ancef ordered    Sepsis due to pneumonia (HCC)  Continue IV abx per primary  Acute respiratory failure (HCC)  Management per primary  Currently on RA    84 year old male, walker Ambulator at baseline, with history of fall, right hip pain/fracture.      Physical exam showing no open wounds, right hip tenderness, inability to bear weight, no upper extremity injuries, no left lower extremity injuries, no right knee/ankle injuries, RLE NVI w/ intact EHL/FHL, GS/TA, palp DP 1+ WWP. Patient is AOx3      Imaging reviewed as above including right hip & femur radiographs and CT scan.  Findings most notable for right hip/femoral neck fracture.     Impression of Right hip fracture      Plan: The above clinical, physical and imaging findings were reviewed with the patient and his sister Vanessa via telephone.  Aldo has a constellation of findings consistent with a right hip fracture after a fall.  Fortunately he did not sustain any other traumatic injuries.  In my opinion, no further diagnostic work-up is warranted at this time.  He has pain and inability to bear weight.  We discussed the pathology and underlying disease processes with hip fractures including osteoporosis, underlying metabolic abnormalities, etc.  Discussed treatment options.  Reviewed the role of non operative treatment.  Given Aldo's functional status pre-injury, recommend surgical intervention.  Aldo  would like to proceed with fracture stabilization surgery along with sister Vanessa.     We discussed surgical options.  In my opinion, would be right femur/hip  ronen-arthroplasty. Explained at length the rationale for surgical invention and postoperative expectations including pain, mobility and ambulatory function.       I reviewed with the patient possible risks of surgery which include bleeding, infection, tendon/artery/nerve injury, persistent pain, stiffness, weakness, incomplete relief of symptoms, worsening of symptoms, hardware complications, dislocation, instability, subluxation, posttraumatic arthritis, scarring, pain, contracture, heterotopic ossification, wound healing difficulties, need for repeat operation, RSD, DVT, PE, anesthesia complications, amputation, etc.  We also reviewed patient specific risks and mitigation, including his medical co-morbidities including active issues (sepsis due to pneumonia / improving) and cardiac history.          All questions were answered. Aldo verbalized understanding and desire to proceed with surgery.  Informed consent was previously obtained. I again reviewed the risks/benefits of the surgery.  Aldo's sister was present via telephone and explained reasoning for surgical intervention, post op expectations and implications of hip fracture on overall/general health function.  Aldo & Vanessa had no further questions.  Aldo has a reported history of dementia however was Aox3 this morning and appropriately answering questions and verbalized understanding.       Discussed pre-operative clearances, medical optimization and risk stratification.  Patient seen/evaluated by Medicine, Pulmonology.   Discussed with Anesthesia team. Plan to proceed with surgical intervention.    I have discussed the above management plan in detail with the primary service.     Subjective   84 y.o.male seen and examined No acute events, no new complaints.  Pain controlled. Denies fevers, chills, CP, SOB, N/V, numbness or tingling.     Objective :  Temp:  [97.4 °F (36.3 °C)-98.8 °F (37.1 °C)] 97.4 °F (36.3 °C)  HR:  [63-78] 63  BP:  "(119-160)/(63-80) 160/77  Resp:  [18] 18  SpO2:  [93 %-95 %] 95 %  O2 Device: Nasal cannula  Nasal Cannula O2 Flow Rate (L/min):  [2 L/min] 2 L/min    Physical Exam  Musculoskeletal: Right hip  Skin intact. No erythema or ecchymosis.  Pain with log roll  Motor intact to +FHL/EHL, +ankle dorsi/plantar flexion  Sensation intact to saphenous, sural, tibial, superficial peroneal nerve, and deep peroneal  2+ DP pulse  No calf swelling or tenderness to palpation      Lab Results: I have reviewed the following results:  Recent Labs     06/26/25  0818 06/27/25  0347 06/28/25  0140   WBC 14.72* 15.66* 12.76*   HGB 14.4 13.5 13.8   HCT 43.8 42.1 42.8    203 208   BUN 15 15 16   CREATININE 0.66 0.64 0.62     Blood Culture:    Lab Results   Component Value Date    BLOODCX No Growth at 48 hrs. 06/25/2025     Wound Culture: No results found for: \"WOUNDCULT\"    Imaging Results Review: I personally reviewed the following image studies/reports in PACS and discussed pertinent findings with Radiology: xray(s). My interpretation of the radiology images/reports is: displaced right femoral neck fracture .  "

## 2025-06-28 NOTE — ANESTHESIA PREPROCEDURE EVALUATION
Procedure:  HEMIARTHROPLASTY HIP (BIPOLAR) and all associated procedures (Right: Hip)    Relevant Problems   CARDIO   (+) ASCVD (arteriosclerotic cardiovascular disease)   (+) Carotid stenosis, asymptomatic, bilateral   (+) 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   (+) Essential hypertension   (+) Hyperlipidemia   (+) Hypertensive heart disease with congestive heart failure (HCC)   (+) PAD (peripheral artery disease) (HCC)   (+) Stenosis of left vertebral artery      GI/HEPATIC   (+) GERD (gastroesophageal reflux disease)      MUSCULOSKELETAL   (+) DJD (degenerative joint disease)   (+) Primary osteoarthritis of one hip, right      NEURO/PSYCH   (+) Chronic neck pain   (+) Depression, recurrent (HCC)   (+) 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)   (+) Recurrent major depressive disorder, in full remission (HCC)   (+) 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HCC)      PULMONARY   (+) Acute respiratory failure (HCC)   (+) Obstructive sleep apnea (adult) (pediatric)      Lab Results   Component Value Date     06/30/2017    SODIUM 138 06/28/2025    K 4.2 06/28/2025     06/28/2025    CO2 30 06/28/2025    AGAP 4 06/28/2025    BUN 16 06/28/2025    CREATININE 0.62 06/28/2025    GLUC 133 06/28/2025    GLUF 96 10/09/2024    CALCIUM 8.2 (L) 06/28/2025    AST 13 06/27/2025    ALT 5 (L) 06/27/2025    ALKPHOS 49 06/27/2025    PROT 6.8 06/30/2017    TP 6.1 (L) 06/27/2025    BILITOT 0.8 06/30/2017    TBILI 0.71 06/27/2025    EGFR 91 06/28/2025     Lab Results   Component Value Date    WBC 12.76 (H) 06/28/2025    HGB 13.8 06/28/2025    HCT 42.8 06/28/2025    MCV 85 06/28/2025     06/28/2025 1/26/24 TTE  Left Ventricle Left ventricular cavity size is normal. Wall thickness is mildly increased. The left ventricular ejection fraction is 60%. Systolic function is normal. Although no diagnostic regional wall motion abnormality was identified, this possibility cannot be completely  excluded on the basis of this study. Diastolic function is mildly abnormal, consistent with grade I (abnormal) relaxation.   Right Ventricle Right ventricular cavity size is mildly dilated. Systolic function is normal.   Left Atrium The atrium is normal in size.   Aortic Valve The aortic valve is trileaflet. The leaflets are mildly thickened. The leaflets are mildly calcified. There is no evidence of regurgitation. There is aortic valve sclerosis.   Mitral Valve There is trace regurgitation. There is no evidence of stenosis.   Tricuspid Valve There is mild to moderate regurgitation. There is no evidence of stenosis. The right ventricular systolic pressure is moderately elevated. The estimated right ventricular systolic pressure is 55.00 mmHg.   Pulmonic Valve There is no evidence of regurgitation. There is no evidence of stenosis.   Ascending Aorta The aortic root is normal in size. The ascending aorta is normal in size.   IVC/SVC The right atrial pressure is estimated at 8.0 mmHg. The inferior vena cava is not well visualized.   Pericardium There is no pericardial effusion.       Physical Exam    Airway     Mallampati score: II  TM Distance: >3 FB  Neck ROM: full      Cardiovascular      Dental       Pulmonary      Neurological      Other Findings        Anesthesia Plan  ASA Score- 3 Emergent    Anesthesia Type-          Additional Monitors:     Airway Plan: Oral ETT.           Plan Factors-Exercise tolerance (METS): >4 METS.    Chart reviewed. EKG reviewed. Imaging results reviewed. Existing labs reviewed. Patient summary reviewed.                  Induction-     Postoperative Plan- .   Monitoring Plan - Monitoring plan - standard ASA monitoring  Post Operative Pain Plan - non-opiod analgesics    Perioperative Resuscitation Plan - Level 1 - Full Code.       Informed Consent- Anesthetic plan and risks discussed with patient.  I personally reviewed this patient with the CRNA. Discussed and agreed on the Anesthesia  Plan with the CRNA..      NPO Status:  No vitals data found for the desired time range.

## 2025-06-28 NOTE — ASSESSMENT & PLAN NOTE
Acute right femoral neck fracture sustained after mechanical fall at nursing facility  Discussed with orthopedics, plan for OR today 6/28as pt respiratory status is back to baseline. Pt is still at an increased risk of resp complications d/t pna however at this point benefits outweigh risk  Pain control, DVT ppx  Continue npo status

## 2025-06-28 NOTE — OP NOTE
OPERATIVE REPORT  PATIENT NAME: Aldo Rodriguez  : 1940  MRN: [id number]  Pt Location:   OR ROOM 02    Surgery Date: 2025    Surgeons and Role:     * Slade Carrillo MD - Primary     * Sofia Fair PA-C - Assisting     Preop Diagnosis:  Closed displaced fracture of right femoral neck (HCC) [S72.001A]    Post-Op Diagnosis Codes:     * Closed displaced fracture of right femoral neck (HCC) [S72.001A]    Procedure(s):  Right - HEMIARTHROPLASTY HIP (BIPOLAR) and all associated procedures    Specimens:  * No specimens in log *    Estimated Blood Loss:   Minimal    Drains:  External Urinary Catheter Other (Comment) (Active)   Collection Container Standard drainage bag 25   Interventions Device changed 25   Number of days: 2       Anesthesia Type:   Choice     Operative Indications:  Closed displaced fracture of right femoral neck (HCC) [S72.001A]    Operative Findings:  Displaced right femoral neck fracture    Complications:   None    Hip Approach: Posterior    Procedure and Technique:    The patient was identified in the preoperative holding area where the right lower extremity was identified as the correct operative extremity. Patient's name and consent were also verified.    The patient was then taken back to the operating room where the patient was placed under general anesthesia and then successfully transferred supine onto the operating room table. The patient was then placed into the lateral decubitus position with all bony prominences well-padded. Care was taken to offload and cushion the peroneal nerve of the well lower extremity. Upper arm was placed in an arm angeles, with an axillary roll placed between the patient and the operating room table. At this point the operative lower extremity was prepped and draped in standard sterile fashion. A timeout was performed where the surgical, anesthesia, and nursing staff were all in agreement of the patient's name, operative  extremity, procedure be performed, in addition to antibiotics and beta-blockers being addressed. Preoperative radiographs were available and were on display on the monitor within the operating room.    We then turned our attention to the operative hip. Incision was made from the vastus ridge to approximately 3 finger breadths proximal to the greater trochanter. This was carried down through adipose tissue, through fascia, and onto bone. Gluteus georgi was dissected bluntly with finger dissection.  We then visualized the short external rotators, which were placed on stretch by internally rotating the lower extremity. These were then released using Bovie cautery. These were tagged, as was the capsule. Piriformis was incised and this allowed us to visualize the base of the femoral neck and the capsule was then released in order to visualize the femoral head as well as the fracture site. At that point we used an oscillating saw to make our cut approximately a finger-breadth above the lesser trochanter and the femur and angled away from the tip of the greater troch. Femoral head was removed and measured.    At that point we turned our attention back to the proximal aspect of the femur, once again protecting the gluteus medius with a Hohmann retractor and elevating the femur. We used a box osteotome to obtain our starting point including approximately 10-15° of anteversion then used a canal finder to find the center of the femoral canal. At this point we began broaching. We started with the smallest size and progressed up to a 3. This was found to be stable and well seated at that point removed the broach handle placed a standard trial femoral neck and a standard bipolar head. We reduced the hip carefully by pulling traction and externally rotating the hip. At this point the hip was trialed and tested in different positions including adduction, position of sleep, flexion up to 90°, and in flexion and internal rotation  plus adduction patient was found to be stable approximately 50-60°. Leg lengths were also noted to be equal. At this point we were satisfied with the implants. The trial components were extracted, and the surgical site was copiously irrigatated.  The femoral canal was then prepped for cement.  Cement was prepared on the back table in the standard fashion.  Anesthesia was made aware and the femoral canal was cemented and pressurized.  A size 3 stem was implanted carefully, once again taking care to control the patient's anteversion to approximately 15°. Once the cement was set, the metal head (28mm + 5) was tamped into place, bipolar head (50mm) was added and noted to be stable and then once again reduced the hip. The aforementioned positions were once again trialed and again noted to be stable. At that point we irrigated copiously and closed the capsule and short external rotators. The fascia was closed with #1 stratafix and supplemented with #1 vicryl, then closed subcutaneous tissue with 2-0 Vicryl and skin with staples. The incision was then covered with a Mepilex dressing. Abduction pillow was placed immediately after surgery patient was transferred without incident onto the hospital bed and transferred to PACU.    I was present for the entire procedure.     A qualified resident physician was not available., and a physician assistant was required during the procedure for retraction, tissue handling, dissection and suturing.     Patient Disposition:  PACU       SIGNATURE: Slade Carrillo MD  DATE: June 28, 2025  TIME: 2:10 PM

## 2025-06-28 NOTE — ASSESSMENT & PLAN NOTE
84 year old male with right femoral neck fracture  Pain control per primary  NPO   NWB RLE  Plan for right hip hemiarthroplasty   Preop TXA and Ancef ordered

## 2025-06-29 LAB
ALBUMIN SERPL BCG-MCNC: 2.9 G/DL (ref 3.5–5)
ALP SERPL-CCNC: 46 U/L (ref 34–104)
ALT SERPL W P-5'-P-CCNC: 4 U/L (ref 7–52)
ANION GAP SERPL CALCULATED.3IONS-SCNC: 4 MMOL/L (ref 4–13)
AST SERPL W P-5'-P-CCNC: 29 U/L (ref 13–39)
BASOPHILS # BLD AUTO: 0.02 THOUSANDS/ÂΜL (ref 0–0.1)
BASOPHILS NFR BLD AUTO: 0 % (ref 0–1)
BILIRUB SERPL-MCNC: 0.73 MG/DL (ref 0.2–1)
BUN SERPL-MCNC: 16 MG/DL (ref 5–25)
CALCIUM ALBUM COR SERPL-MCNC: 9 MG/DL (ref 8.3–10.1)
CALCIUM SERPL-MCNC: 8.1 MG/DL (ref 8.4–10.2)
CHLORIDE SERPL-SCNC: 102 MMOL/L (ref 96–108)
CO2 SERPL-SCNC: 32 MMOL/L (ref 21–32)
CREAT SERPL-MCNC: 0.67 MG/DL (ref 0.6–1.3)
EOSINOPHIL # BLD AUTO: 0 THOUSAND/ÂΜL (ref 0–0.61)
EOSINOPHIL NFR BLD AUTO: 0 % (ref 0–6)
ERYTHROCYTE [DISTWIDTH] IN BLOOD BY AUTOMATED COUNT: 14.3 % (ref 11.6–15.1)
GFR SERPL CREATININE-BSD FRML MDRD: 88 ML/MIN/1.73SQ M
GLUCOSE SERPL-MCNC: 118 MG/DL (ref 65–140)
HCT VFR BLD AUTO: 42.8 % (ref 36.5–49.3)
HGB BLD-MCNC: 13.7 G/DL (ref 12–17)
IMM GRANULOCYTES # BLD AUTO: 0.08 THOUSAND/UL (ref 0–0.2)
IMM GRANULOCYTES NFR BLD AUTO: 0 % (ref 0–2)
LYMPHOCYTES # BLD AUTO: 0.88 THOUSANDS/ÂΜL (ref 0.6–4.47)
LYMPHOCYTES NFR BLD AUTO: 5 % (ref 14–44)
MCH RBC QN AUTO: 28 PG (ref 26.8–34.3)
MCHC RBC AUTO-ENTMCNC: 32 G/DL (ref 31.4–37.4)
MCV RBC AUTO: 87 FL (ref 82–98)
MONOCYTES # BLD AUTO: 1.37 THOUSAND/ÂΜL (ref 0.17–1.22)
MONOCYTES NFR BLD AUTO: 7 % (ref 4–12)
NEUTROPHILS # BLD AUTO: 16.67 THOUSANDS/ÂΜL (ref 1.85–7.62)
NEUTS SEG NFR BLD AUTO: 88 % (ref 43–75)
NRBC BLD AUTO-RTO: 0 /100 WBCS
PLATELET # BLD AUTO: 205 THOUSANDS/UL (ref 149–390)
PMV BLD AUTO: 9 FL (ref 8.9–12.7)
POTASSIUM SERPL-SCNC: 4.4 MMOL/L (ref 3.5–5.3)
PROT SERPL-MCNC: 5.6 G/DL (ref 6.4–8.4)
RBC # BLD AUTO: 4.9 MILLION/UL (ref 3.88–5.62)
SODIUM SERPL-SCNC: 138 MMOL/L (ref 135–147)
WBC # BLD AUTO: 19.02 THOUSAND/UL (ref 4.31–10.16)

## 2025-06-29 PROCEDURE — 94760 N-INVAS EAR/PLS OXIMETRY 1: CPT

## 2025-06-29 PROCEDURE — 94640 AIRWAY INHALATION TREATMENT: CPT

## 2025-06-29 PROCEDURE — 99024 POSTOP FOLLOW-UP VISIT: CPT

## 2025-06-29 PROCEDURE — 99233 SBSQ HOSP IP/OBS HIGH 50: CPT | Performed by: STUDENT IN AN ORGANIZED HEALTH CARE EDUCATION/TRAINING PROGRAM

## 2025-06-29 PROCEDURE — 85025 COMPLETE CBC W/AUTO DIFF WBC: CPT | Performed by: STUDENT IN AN ORGANIZED HEALTH CARE EDUCATION/TRAINING PROGRAM

## 2025-06-29 PROCEDURE — 80053 COMPREHEN METABOLIC PANEL: CPT | Performed by: STUDENT IN AN ORGANIZED HEALTH CARE EDUCATION/TRAINING PROGRAM

## 2025-06-29 PROCEDURE — 94668 MNPJ CHEST WALL SBSQ: CPT

## 2025-06-29 RX ORDER — SPIRONOLACTONE 25 MG/1
25 TABLET ORAL DAILY
Status: DISCONTINUED | OUTPATIENT
Start: 2025-06-30 | End: 2025-07-03 | Stop reason: HOSPADM

## 2025-06-29 RX ORDER — PREDNISONE 20 MG/1
40 TABLET ORAL DAILY
Status: DISCONTINUED | OUTPATIENT
Start: 2025-06-30 | End: 2025-06-30

## 2025-06-29 RX ORDER — AMLODIPINE BESYLATE 5 MG/1
5 TABLET ORAL DAILY
Status: DISCONTINUED | OUTPATIENT
Start: 2025-06-30 | End: 2025-07-03 | Stop reason: HOSPADM

## 2025-06-29 RX ORDER — ECHINACEA PURPUREA EXTRACT 125 MG
1 TABLET ORAL
Status: DISCONTINUED | OUTPATIENT
Start: 2025-06-29 | End: 2025-07-03 | Stop reason: HOSPADM

## 2025-06-29 RX ADMIN — IPRATROPIUM BROMIDE 0.5 MG: 0.5 SOLUTION RESPIRATORY (INHALATION) at 19:28

## 2025-06-29 RX ADMIN — CARVEDILOL 6.25 MG: 3.12 TABLET, FILM COATED ORAL at 10:53

## 2025-06-29 RX ADMIN — IPRATROPIUM BROMIDE 0.5 MG: 0.5 SOLUTION RESPIRATORY (INHALATION) at 14:10

## 2025-06-29 RX ADMIN — CEFTRIAXONE 1000 MG: 1 INJECTION, SOLUTION INTRAVENOUS at 18:06

## 2025-06-29 RX ADMIN — CARBIDOPA AND LEVODOPA 1 TABLET: 25; 100 TABLET ORAL at 10:53

## 2025-06-29 RX ADMIN — CARVEDILOL 6.25 MG: 3.12 TABLET, FILM COATED ORAL at 18:07

## 2025-06-29 RX ADMIN — DICLOFENAC SODIUM 2 G: 10 GEL TOPICAL at 18:06

## 2025-06-29 RX ADMIN — GABAPENTIN 300 MG: 300 CAPSULE ORAL at 10:54

## 2025-06-29 RX ADMIN — GABAPENTIN 300 MG: 300 CAPSULE ORAL at 22:35

## 2025-06-29 RX ADMIN — GUAIFENESIN 400 MG: 200 SOLUTION ORAL at 04:13

## 2025-06-29 RX ADMIN — CARBIDOPA AND LEVODOPA 1 TABLET: 25; 100 TABLET ORAL at 18:07

## 2025-06-29 RX ADMIN — ATORVASTATIN CALCIUM 40 MG: 40 TABLET, FILM COATED ORAL at 10:54

## 2025-06-29 RX ADMIN — ESCITALOPRAM OXALATE 10 MG: 10 TABLET ORAL at 10:53

## 2025-06-29 RX ADMIN — GUAIFENESIN 400 MG: 200 SOLUTION ORAL at 22:37

## 2025-06-29 RX ADMIN — LEVALBUTEROL HYDROCHLORIDE 1.25 MG: 1.25 SOLUTION RESPIRATORY (INHALATION) at 19:28

## 2025-06-29 RX ADMIN — PANTOPRAZOLE SODIUM 40 MG: 40 INJECTION, POWDER, FOR SOLUTION INTRAVENOUS at 10:30

## 2025-06-29 RX ADMIN — AMLODIPINE BESYLATE 5 MG: 5 TABLET ORAL at 10:53

## 2025-06-29 RX ADMIN — DICLOFENAC SODIUM 2 G: 10 GEL TOPICAL at 10:54

## 2025-06-29 RX ADMIN — LEVALBUTEROL HYDROCHLORIDE 1.25 MG: 1.25 SOLUTION RESPIRATORY (INHALATION) at 14:10

## 2025-06-29 RX ADMIN — IPRATROPIUM BROMIDE 0.5 MG: 0.5 SOLUTION RESPIRATORY (INHALATION) at 07:29

## 2025-06-29 RX ADMIN — DICLOFENAC SODIUM 2 G: 10 GEL TOPICAL at 22:37

## 2025-06-29 RX ADMIN — METHYLPREDNISOLONE SODIUM SUCCINATE 40 MG: 40 INJECTION, POWDER, FOR SOLUTION INTRAMUSCULAR; INTRAVENOUS at 10:30

## 2025-06-29 RX ADMIN — CARBIDOPA AND LEVODOPA 1 TABLET: 25; 100 TABLET ORAL at 22:36

## 2025-06-29 RX ADMIN — Medication 300 MG: at 18:07

## 2025-06-29 RX ADMIN — QUETIAPINE FUMARATE 25 MG: 25 TABLET ORAL at 22:35

## 2025-06-29 RX ADMIN — GABAPENTIN 300 MG: 300 CAPSULE ORAL at 18:07

## 2025-06-29 RX ADMIN — QUETIAPINE FUMARATE 12.5 MG: 25 TABLET ORAL at 10:53

## 2025-06-29 RX ADMIN — Medication 2.5 MG: at 14:54

## 2025-06-29 RX ADMIN — LEVALBUTEROL HYDROCHLORIDE 1.25 MG: 1.25 SOLUTION RESPIRATORY (INHALATION) at 07:29

## 2025-06-29 RX ADMIN — LIDOCAINE 1 PATCH: 50 PATCH CUTANEOUS at 10:54

## 2025-06-29 RX ADMIN — GUAIFENESIN 400 MG: 200 SOLUTION ORAL at 14:54

## 2025-06-29 RX ADMIN — CEFAZOLIN SODIUM 2000 MG: 2 SOLUTION INTRAVENOUS at 04:16

## 2025-06-29 NOTE — PLAN OF CARE
Problem: PAIN - ADULT  Goal: Verbalizes/displays adequate comfort level or baseline comfort level  Description: Interventions:  - Encourage patient to monitor pain and request assistance  - Assess pain using appropriate pain scale  - Administer analgesics as ordered based on type and severity of pain and evaluate response  - Implement non-pharmacological measures as appropriate and evaluate response  - Consider cultural and social influences on pain and pain management  - Notify physician/advanced practitioner if interventions unsuccessful or patient reports new pain  - Educate patient/family on pain management process including their role and importance of  reporting pain   - Provide non-pharmacologic/complimentary pain relief interventions  Outcome: Progressing     Problem: INFECTION - ADULT  Goal: Absence or prevention of progression during hospitalization  Description: INTERVENTIONS:  - Assess and monitor for signs and symptoms of infection  - Monitor lab/diagnostic results  - Monitor all insertion sites, i.e. indwelling lines, tubes, and drains  - Monitor endotracheal if appropriate and nasal secretions for changes in amount and color  - [place] appropriate cooling/warming therapies per order  - Administer medications as ordered  - Instruct and encourage patient and family to use good hand hygiene technique  - Identify and instruct in appropriate isolation precautions for identified infection/condition  Outcome: Progressing  Goal: Absence of fever/infection during neutropenic period  Description: INTERVENTIONS:  - Monitor WBC  - Perform strict hand hygiene  - Limit to healthy visitors only  - No plants, dried, fresh or silk flowers with [place] in patient room  Outcome: Progressing     Problem: SAFETY ADULT  Goal: Patient will remain free of falls  Description: INTERVENTIONS:  - Educate patient/family on patient safety including physical limitations  - Instruct patient to call for assistance with activity   -  Consider consulting OT/PT to assist with strengthening/mobility based on AM PAC & JH-HLM score  - Consult OT/PT to assist with strengthening/mobility   - Keep Call bell within reach  - Keep bed low and locked with side rails adjusted as appropriate  - Keep care items and personal belongings within reach  - Initiate and maintain comfort rounds  - Make Fall Risk Sign visible to staff  - Offer Toileting every 2 Hours, in advance of need  - Initiate/Maintain alarm  - Obtain necessary fall risk management equipment  - Apply yellow socks and bracelet for high fall risk patients  - Consider moving patient to room near nurses station  Outcome: Progressing  Goal: Maintain or return to baseline ADL function  Description: INTERVENTIONS:  -  Assess patient's ability to carry out ADLs; assess patient's baseline for ADL function and identify physical deficits which impact ability to perform ADLs (bathing, care of mouth/teeth, toileting, grooming, dressing, etc.)  - Assess/evaluate cause of self-care deficits   - Assess range of motion  - Assess patient's mobility; develop plan if impaired  - Assess patient's need for assistive devices and provide as appropriate  - Encourage maximum independence but intervene and supervise when necessary  - Involve family in performance of ADLs  - Assess for home care needs following discharge   - Consider OT consult to assist with ADL evaluation and planning for discharge  - Provide patient education as appropriate  - Monitor functional capacity and physical performance, use of AM PAC & JH-HLM   - Monitor gait, balance and fatigue with ambulation    Outcome: Progressing  Goal: Maintains/Returns to pre admission functional level  Description: INTERVENTIONS:  - Perform AM-PAC 6 Click Basic Mobility/ Daily Activity assessment daily.  - Set and communicate daily mobility goal to care team and patient/family/caregiver.   - Collaborate with rehabilitation services on mobility goals if consulted  -  Perform Range of Motion 3 times a day.  - Reposition patient every 2 hours.  - Dangle patient 3 times a day  - Stand patient 3 times a day  - Ambulate patient 3 times a day  - Out of bed to chair 3 times a day   - Out of bed for meals 3 times a day  - Out of bed for toileting  - Record patient progress and toleration of activity level   Outcome: Progressing     Problem: DISCHARGE PLANNING  Goal: Discharge to home or other facility with appropriate resources  Description: INTERVENTIONS:  - Identify barriers to discharge w/patient and caregiver  - Arrange for needed discharge resources and transportation as appropriate  - Identify discharge learning needs (meds, wound care, etc.)  - Arrange for interpretive services to assist at discharge as needed  - Refer to Case Management Department for coordinating discharge planning if the patient needs post-hospital services based on physician/advanced practitioner order or complex needs related to functional status, cognitive ability, or social support system  Outcome: Progressing     Problem: Knowledge Deficit  Goal: Patient/family/caregiver demonstrates understanding of disease process, treatment plan, medications, and discharge instructions  Description: Complete learning assessment and assess knowledge base.  Interventions:  - Provide teaching at level of understanding  - Provide teaching via preferred learning methods  Outcome: Progressing     Problem: Prexisting or High Potential for Compromised Skin Integrity  Goal: Skin integrity is maintained or improved  Description: INTERVENTIONS:  - Identify patients at risk for skin breakdown  - Assess and monitor skin integrity including under and around medical devices   - Assess and monitor nutrition and hydration status  - Monitor labs  - Assess for incontinence   - Turn and reposition patient  - Assist with mobility/ambulation  - Relieve pressure over evelyn prominences   - Avoid friction and shearing  - Provide appropriate  hygiene as needed including keeping skin clean and dry  - Evaluate need for skin moisturizer/barrier cream  - Collaborate with interdisciplinary team  - Patient/family teaching  - Consider wound care consult    Assess:  - Review [place] scale daily  - Clean and moisturize skin   - Inspect skin when repositioning, toileting, and assisting with ADLS  - Assess under medical devices   - Assess extremities for adequate circulation and sensation     Bed Management:  - Have minimal linens on bed & keep smooth, unwrinkled  - Change linens as needed when moist or perspiring  - Avoid sitting or lying in one position for more than 2 hours while in bed?Keep HOB at 30 degrees   - Toileting:  - Offer bedside commode  - Assess for incontinence   - Use incontinent care products after each incontinent episode   Activity:  - Mobilize patient 3 times a day  - Encourage activity and walks on unit  - Encourage or provide ROM exercises   - Turn and reposition patient every 2 Hours  - Use appropriate equipment to lift or move patient in bed  - Instruct/ Assist with weight shifting when out of bed in chair  - Consider limitation of chair time 2 hour intervals    Skin Care:  - Avoid use of baby powder, tape, friction and shearing, hot water or constrictive clothing  - Relieve pressure over bony prominences  - Do not massage red bony areas    Next Steps:  - Teach patient strategies to minimize risks   - Consider consults to  interdisciplinary teams   Outcome: Progressing     Problem: Potential for Falls  Goal: Patient will remain free of falls  Description: INTERVENTIONS:  - Educate patient/family on patient safety including physical limitations  - Instruct patient to call for assistance with activity   - Consider consulting OT/PT to assist with strengthening/mobility based on AM PAC & JH-HLM score  - Consult OT/PT to assist with strengthening/mobility   - Keep Call bell within reach  - Keep bed low and locked with side rails adjusted as  appropriate  - Keep care items and personal belongings within reach  - Initiate and maintain comfort rounds  - Make Fall Risk Sign visible to staff  - Offer Toileting every 2 Hours, in advance of need  - Initiate/Maintain alarm  - Obtain necessary fall risk management equipment  - Apply yellow socks and bracelet for high fall risk patients  - Consider moving patient to room near nurses station  Outcome: Progressing     Problem: Nutrition/Hydration-ADULT  Goal: Nutrient/Hydration intake appropriate for improving, restoring or maintaining nutritional needs  Description: Monitor and assess patient's nutrition/hydration status for malnutrition. Collaborate with interdisciplinary team and initiate plan and interventions as ordered.  Monitor patient's weight and dietary intake as ordered or per policy. Utilize nutrition screening tool and intervene as necessary. Determine patient's food preferences and provide high-protein, high-caloric foods as appropriate.     INTERVENTIONS:  - Monitor oral intake, urinary output, labs, and treatment plans  - Assess nutrition and hydration status and recommend course of action  - Evaluate amount of meals eaten  - Assist patient with eating if necessary   - Allow adequate time for meals  - Recommend/ encourage appropriate diets, oral nutritional supplements, and vitamin/mineral supplements  - Order, calculate, and assess calorie counts as needed  - Recommend, monitor, and adjust tube feedings and TPN/PPN based on assessed needs  - Assess need for intravenous fluids  - Provide specific nutrition/hydration education as appropriate  - Include patient/family/caregiver in decisions related to nutrition  Outcome: Progressing

## 2025-06-29 NOTE — ASSESSMENT & PLAN NOTE
Met on admission with leukocytosis, tachypnea.  Suspected source bilateral lower lobe pneumonia  Continue IV ceftriaxone and azithromycin day 4/7  Procalcitonin downward trending  Blood cultures no growth  MRSA negative  Urinary antigens negative   Possibly d/t aspiration  Speech to reevaluate when appropriate

## 2025-06-29 NOTE — PROGRESS NOTES
Progress Note - Orthopedics   Name: Aldo Rodriguez 84 y.o. male I MRN: [id number]  Unit/Bed#: -01 I Date of Admission: 6/25/2025   Date of Service: 6/29/2025 I Hospital Day: 4    Assessment & Plan  Closed fracture of neck of right femur (HCC)  POD1 s/p right hip hemiarthroplasty with Dr. Carrillo  Pain control per primary  Ancef x24 hrs  WBAT RLE  PT/OT  Will monitor for ABLA and administer IVF/prbc as indicated for Greater than 2 gram drop or Hgb < 7   Hgb 13.7 this AM, VSS  PT/OT  DVT ppx per primary  Medical co-morbidities include acute respiratory failure, HTN, which are being managed per primary team  Dispo: Ortho will follow  Sepsis due to pneumonia (HCC)  Continue IV abx per primary  Acute respiratory failure (HCC)  Management per primary  Currently on RA    Subjective   84 y.o.male seen and examined at bedside. Patient reports doing well this morning and his pain is much better than pre-operatively. No acute events, no new complaints. Denies fevers, chills, CP, SOB, N/V, numbness or tingling.     Objective :  Temp:  [98 °F (36.7 °C)-98.2 °F (36.8 °C)] 98 °F (36.7 °C)  HR:  [65-86] 74  BP: (130-182)/(65-91) 137/74  Resp:  [16-23] 16  SpO2:  [86 %-98 %] 91 %  O2 Device: Nasal cannula  Nasal Cannula O2 Flow Rate (L/min):  [1 L/min-4 L/min] 3 L/min    Physical Exam  Musculoskeletal: Right hip  Skin intact. No erythema or ecchymosis. Dressing CDI without strikethrough  Abduction pillow in place  Motor intact to +FHL/EHL, +ankle dorsi/plantar flexion  Sensation intact to saphenous, sural, tibial, superficial peroneal nerve, and deep peroneal  2+ DP pulse  No calf swelling or tenderness to palpation      Lab Results: I have reviewed the following results:  Recent Labs     06/27/25  0347 06/28/25  0140 06/29/25  0159   WBC 15.66* 12.76* 19.02*   HGB 13.5 13.8 13.7   HCT 42.1 42.8 42.8    208 205   BUN 15 16 16   CREATININE 0.64 0.62 0.67     Blood Culture:    Lab Results   Component Value Date     "BLOODCX No Growth at 72 hrs. 06/25/2025     Wound Culture: No results found for: \"WOUNDCULT\"    Sofia Fair PA-C    "

## 2025-06-29 NOTE — ASSESSMENT & PLAN NOTE
Acute right femoral neck fracture sustained after mechanical fall at nursing facility  POD 1 on 6/28 of right hip hemiarthroplasty  Pain control, DVT ppx  Follow-up with Ortho outpatient aspirin 81 mg twice daily

## 2025-06-29 NOTE — DISCHARGE INSTR - AVS FIRST PAGE
Discharge Instructions - Orthopedics  Aldo Rodriguez 84 y.o. male MRN: [id number]  Unit/Bed#: -01    Weight Bearing Status:                                           Weight Bearing as tolerated to right leg.    Be sure to maintain Hip Precautions (no bending at waist, no crossing legs, or internally rotating surgical leg)  Advise use of abduction pillow (pink pillow) for 6 weeks after surgery when sleeping.      DVT prophylaxis:  Complete course of Lovenox as directed    Pain:  Continue analgesics as directed    Dressing Instructions:   Keep dressing clean, dry and intact until follow up appointment.    Driving Instructions:  No driving until cleared by Orthopaedic Surgery.    PT/OT:  Continue PT/OT on outpatient basis as directed    Appt Instructions:   If you do not have your appointment, please call the clinic at [phone]  Otherwise followup as scheduled.    Contact the office sooner if you experience any increased numbness/tingling in the extremities.      Follow up with PCP and Ortho   Continue aspirin 81 mg twice daily for 30 days and then switch to aspirin 81 mg daily  Wean oxygen as tolerated at the nursing facility

## 2025-06-29 NOTE — PROGRESS NOTES
Progress Note - Hospitalist   Name: Aldo Rodriguez 84 y.o. male I MRN: [id number]  Unit/Bed#: -01 I Date of Admission: 6/25/2025   Date of Service: 6/29/2025 I Hospital Day: 4    Assessment & Plan  Sepsis due to pneumonia (HCC)  Met on admission with leukocytosis, tachypnea.  Suspected source bilateral lower lobe pneumonia  Continue IV ceftriaxone and azithromycin day 4/7  Procalcitonin downward trending  Blood cultures no growth  MRSA negative  Urinary antigens negative   Possibly d/t aspiration  Speech to reevaluate when appropriate   Closed fracture of neck of right femur (HCC)  Acute right femoral neck fracture sustained after mechanical fall at nursing facility  POD 1 on 6/28 of right hip hemiarthroplasty  Pain control, DVT ppx  Follow-up with Ortho outpatient aspirin 81 mg twice daily  Acute respiratory failure (HCC)  Acutely requiring 4 L/min on admission, likely secondary to pneumonia and reactive airway disease flare  With wheezing on exam, bilateral lower lobe pneumonia on imaging  Continue Xopenex/Atrovent  IV Solu-Medrol transitioned to po today 6/29  Respiratory protocol  Airway clearance protocol   Discussed with Pulm weaned to po prednisone,   CXR to be done tomorrow 6/30 to monitor for volume overload, IVF dc'd  Weaned to 3L   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 Unable to tell me year or month  Parkinsonism (HCC)  Resume home Sinemet  Essential hypertension  Blood pressure 154/80  Likely exacerbated by pain  Resume home Coreg, Lasix, Aldactone  Monitor BP  History of CVA (cerebrovascular accident)  Hold aspirin preoperatively, resume when able   Monitor mental status  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  Denies chest pain  Continue beta-blocker  Aspirin on hold as  above  Hypertensive heart disease with congestive heart failure (HCC)  Wt Readings from Last 3 Encounters:   04/16/25 73.5 kg (162 lb)   09/27/24 77.1 kg (170 lb)   09/17/24 77.1 kg (170 lb)   EF 60%, grade 1 diastolic dysfunction  Lasix and aldactone to be resumed tomorrow 6/30  Continue to monitor bp per protocol     VTE Pharmacologic Prophylaxis: VTE Score: 8 High Risk (Score >/= 5) - Pharmacological DVT Prophylaxis Ordered: heparin. Sequential Compression Devices Ordered.    Mobility:   Basic Mobility Inpatient Raw Score: 6  JH-HLM Goal: 2: Bed activities/Dependent transfer  JH-HLM Achieved: 2: Bed activities/Dependent transfer  JH-HLM Goal achieved. Continue to encourage appropriate mobility.    Patient Centered Rounds: I performed bedside rounds with nursing staff today.   Discussions with Specialists or Other Care Team Provider: pulm, ortho, CM, Pt, Ot    Education and Discussions with Family / Patient: Updated  (niece) via phone.    Current Length of Stay: 4 day(s)  Current Patient Status: Inpatient   Certification Statement: The patient will continue to require additional inpatient hospital stay due to pna and hip fx  Discharge Plan: Anticipate discharge in 48-72 hrs to discharge location to be determined pending rehab evaluations.    Code Status: Level 1 - Full Code    Yesenia Carlson was seen and examined at bedside.  No acute events overnight.  Patient is a poor historian however has no acute complaints at this time.  States that he is lonely.  Otherwise feeling fine.  Discussed with Ortho continue to monitor for postop fevers and ABLA.  Encourage incentive spirometry wean oxygen as tolerated chest x-ray to be done tomorrow to see if patient is volume overloaded.  Discontinued IV fluids.  Will resume blood pressure medications..    Objective :  Temp:  [97.4 °F (36.3 °C)-98.2 °F (36.8 °C)] 98 °F (36.7 °C)  HR:  [63-86] 66  BP: (130-182)/(65-91) 149/76  Resp:  [16-23] 16  SpO2:  [86 %-98  %] 94 %  O2 Device: Nasal cannula  Nasal Cannula O2 Flow Rate (L/min):  [1 L/min-4 L/min] 4 L/min    There is no height or weight on file to calculate BMI.     Input and Output Summary (last 24 hours):     Intake/Output Summary (Last 24 hours) at 6/29/2025 0702  Last data filed at 6/29/2025 0501  Gross per 24 hour   Intake 482.67 ml   Output 500 ml   Net -17.33 ml       Physical Exam  Vitals and nursing note reviewed.   Constitutional:       General: He is not in acute distress.     Appearance: He is ill-appearing (chronically).   HENT:      Head: Normocephalic and atraumatic.     Cardiovascular:      Rate and Rhythm: Normal rate and regular rhythm.      Pulses: Normal pulses.      Heart sounds: Normal heart sounds.   Pulmonary:      Effort: Pulmonary effort is normal.      Breath sounds: Wheezing and rhonchi present.      Comments: On 3L NC  Abdominal:      General: Abdomen is flat. Bowel sounds are normal.      Palpations: Abdomen is soft.     Musculoskeletal:      Right lower leg: No edema.      Left lower leg: No edema.     Skin:     General: Skin is warm.     Neurological:      General: No focal deficit present.      Mental Status: He is alert. Mental status is at baseline. He is disoriented.      Comments: Oriented to self and place, right eye droop chronic         Lines/Drains:                Lab Results: I have reviewed the following results:   Results from last 7 days   Lab Units 06/29/25  0159   WBC Thousand/uL 19.02*   HEMOGLOBIN g/dL 13.7   HEMATOCRIT % 42.8   PLATELETS Thousands/uL 205   SEGS PCT % 88*   LYMPHO PCT % 5*   MONO PCT % 7   EOS PCT % 0     Results from last 7 days   Lab Units 06/29/25  0159   SODIUM mmol/L 138   POTASSIUM mmol/L 4.4   CHLORIDE mmol/L 102   CO2 mmol/L 32   BUN mg/dL 16   CREATININE mg/dL 0.67   ANION GAP mmol/L 4   CALCIUM mg/dL 8.1*   ALBUMIN g/dL 2.9*   TOTAL BILIRUBIN mg/dL 0.73   ALK PHOS U/L 46   ALT U/L 4*   AST U/L 29   GLUCOSE RANDOM mg/dL 118                 Results  from last 7 days   Lab Units 06/27/25  0347 06/26/25  0534 06/25/25  1755   LACTIC ACID mmol/L  --   --  0.9   PROCALCITONIN ng/ml 0.62* 0.90* 0.35*       Recent Cultures (last 7 days):   Results from last 7 days   Lab Units 06/26/25  2237 06/25/25  1755 06/25/25  1645   BLOOD CULTURE   --  No Growth at 72 hrs. No Growth at 72 hrs.   LEGIONELLA URINARY ANTIGEN  Negative  --   --        Imaging Results Review: No pertinent imaging studies reviewed.  Other Study Results Review: No additional pertinent studies reviewed.    Last 24 Hours Medication List:     Current Facility-Administered Medications:     acetaminophen (Ofirmev) injection 1,000 mg, Q6H PRN    albuterol inhalation solution 2.5 mg, Q6H PRN    amLODIPine (NORVASC) tablet 5 mg, Daily    [Held by provider] aspirin (ECOTRIN LOW STRENGTH) EC tablet 81 mg, Daily    atorvastatin (LIPITOR) tablet 40 mg, Daily    benzonatate (TESSALON PERLES) capsule 100 mg, TID PRN    carbidopa-levodopa (SINEMET)  mg per tablet 1 tablet, TID    carvedilol (COREG) tablet 6.25 mg, BID With Meals    cefTRIAXone (ROCEPHIN) IVPB (premix in dextrose) 1,000 mg 50 mL, Q24H, Last Rate: 100 mL/hr at 06/29/25 0501    Diclofenac Sodium (VOLTAREN) 1 % topical gel 2 g, 4x Daily    escitalopram (LEXAPRO) tablet 10 mg, Daily    Ferrous Sulfate oral syrup 300 mg, Daily With Dinner    [Held by provider] furosemide (LASIX) tablet 20 mg, Daily    gabapentin (NEURONTIN) capsule 300 mg, TID    guaiFENesin (ROBITUSSIN) oral liquid 400 mg, Q6H    [Held by provider] heparin (porcine) subcutaneous injection 5,000 Units, Q8H MAXI    hydrALAZINE (APRESOLINE) injection 5 mg, Q6H PRN    ipratropium (ATROVENT) 0.02 % inhalation solution 0.5 mg, TID    lactated ringers infusion, Continuous, Last Rate: 20 mL/hr (06/29/25 0501)    levalbuterol (XOPENEX) inhalation solution 1.25 mg, TID **AND** [DISCONTINUED] sodium chloride 0.9 % inhalation solution 3 mL, TID    lidocaine (LIDODERM) 5 % patch 1 patch, Daily     methylPREDNISolone sodium succinate (Solu-MEDROL) injection 40 mg, Daily    ondansetron (ZOFRAN) injection 4 mg, Q6H PRN    traMADol (ULTRAM) tablet 50 mg, Q6H PRN **OR** oxyCODONE (ROXICODONE) split tablet 2.5 mg, Q6H PRN    pantoprazole (PROTONIX) injection 40 mg, Q24H MAXI    QUEtiapine (SEROquel) tablet 25 mg, HS **AND** QUEtiapine (SEROquel) tablet 12.5 mg, Daily    [Held by provider] spironolactone (ALDACTONE) tablet 25 mg, Daily    Administrative Statements   Today, Patient Was Seen By: Jennifer Ken MD  I have spent a total time of 59 minutes in caring for this patient on the day of the visit/encounter including Diagnostic results, Prognosis, Risks and benefits of tx options, Instructions for management, Patient and family education, Importance of tx compliance, Risk factor reductions, Impressions, Counseling / Coordination of care, Documenting in the medical record, Reviewing/placing orders in the medical record (including tests, medications, and/or procedures), Obtaining or reviewing history  , and Communicating with other healthcare professionals .    **Please Note: This note may have been constructed using a voice recognition system.**

## 2025-06-29 NOTE — ASSESSMENT & PLAN NOTE
Acutely requiring 4 L/min on admission, likely secondary to pneumonia and reactive airway disease flare  With wheezing on exam, bilateral lower lobe pneumonia on imaging  Continue Xopenex/Atrovent  IV Solu-Medrol transitioned to po today 6/29  Respiratory protocol  Airway clearance protocol   Discussed with Pulm weaned to po prednisone,   CXR to be done tomorrow 6/30 to monitor for volume overload, IVF dc'd  Weaned to 3L

## 2025-06-29 NOTE — ASSESSMENT & PLAN NOTE
POD1 s/p right hip hemiarthroplasty with Dr. Carrillo  Pain control per primary  Ancef x24 hrs  WBAT RLE  PT/OT  Will monitor for ABLA and administer IVF/prbc as indicated for Greater than 2 gram drop or Hgb < 7   Hgb 13.7 this AM, VSS  PT/OT  DVT ppx per primary  Medical co-morbidities include acute respiratory failure, HTN, which are being managed per primary team  Dispo: Ortho will follow

## 2025-06-30 ENCOUNTER — APPOINTMENT (INPATIENT)
Dept: RADIOLOGY | Facility: HOSPITAL | Age: 85
DRG: 853 | End: 2025-06-30
Payer: MEDICARE

## 2025-06-30 LAB
ANION GAP SERPL CALCULATED.3IONS-SCNC: 3 MMOL/L (ref 4–13)
BACTERIA BLD CULT: NORMAL
BACTERIA BLD CULT: NORMAL
BUN SERPL-MCNC: 16 MG/DL (ref 5–25)
CALCIUM SERPL-MCNC: 8.1 MG/DL (ref 8.4–10.2)
CHLORIDE SERPL-SCNC: 103 MMOL/L (ref 96–108)
CO2 SERPL-SCNC: 32 MMOL/L (ref 21–32)
CREAT SERPL-MCNC: 0.68 MG/DL (ref 0.6–1.3)
ERYTHROCYTE [DISTWIDTH] IN BLOOD BY AUTOMATED COUNT: 14 % (ref 11.6–15.1)
GFR SERPL CREATININE-BSD FRML MDRD: 88 ML/MIN/1.73SQ M
GLUCOSE SERPL-MCNC: 119 MG/DL (ref 65–140)
HCT VFR BLD AUTO: 41.5 % (ref 36.5–49.3)
HGB BLD-MCNC: 13 G/DL (ref 12–17)
MCH RBC QN AUTO: 27.1 PG (ref 26.8–34.3)
MCHC RBC AUTO-ENTMCNC: 31.3 G/DL (ref 31.4–37.4)
MCV RBC AUTO: 87 FL (ref 82–98)
PLATELET # BLD AUTO: 160 THOUSANDS/UL (ref 149–390)
PMV BLD AUTO: 8.8 FL (ref 8.9–12.7)
POTASSIUM SERPL-SCNC: 4.3 MMOL/L (ref 3.5–5.3)
PROCALCITONIN SERPL-MCNC: 0.16 NG/ML
RBC # BLD AUTO: 4.79 MILLION/UL (ref 3.88–5.62)
SODIUM SERPL-SCNC: 138 MMOL/L (ref 135–147)
WBC # BLD AUTO: 13.7 THOUSAND/UL (ref 4.31–10.16)

## 2025-06-30 PROCEDURE — 97163 PT EVAL HIGH COMPLEX 45 MIN: CPT

## 2025-06-30 PROCEDURE — 71045 X-RAY EXAM CHEST 1 VIEW: CPT

## 2025-06-30 PROCEDURE — 99232 SBSQ HOSP IP/OBS MODERATE 35: CPT | Performed by: INTERNAL MEDICINE

## 2025-06-30 PROCEDURE — 94660 CPAP INITIATION&MGMT: CPT

## 2025-06-30 PROCEDURE — 97167 OT EVAL HIGH COMPLEX 60 MIN: CPT

## 2025-06-30 PROCEDURE — 85027 COMPLETE CBC AUTOMATED: CPT | Performed by: STUDENT IN AN ORGANIZED HEALTH CARE EDUCATION/TRAINING PROGRAM

## 2025-06-30 PROCEDURE — 80048 BASIC METABOLIC PNL TOTAL CA: CPT | Performed by: STUDENT IN AN ORGANIZED HEALTH CARE EDUCATION/TRAINING PROGRAM

## 2025-06-30 PROCEDURE — 84145 PROCALCITONIN (PCT): CPT | Performed by: STUDENT IN AN ORGANIZED HEALTH CARE EDUCATION/TRAINING PROGRAM

## 2025-06-30 PROCEDURE — 99024 POSTOP FOLLOW-UP VISIT: CPT | Performed by: PHYSICIAN ASSISTANT

## 2025-06-30 PROCEDURE — 94640 AIRWAY INHALATION TREATMENT: CPT

## 2025-06-30 PROCEDURE — 92526 ORAL FUNCTION THERAPY: CPT

## 2025-06-30 PROCEDURE — 94760 N-INVAS EAR/PLS OXIMETRY 1: CPT

## 2025-06-30 RX ADMIN — DICLOFENAC SODIUM 2 G: 10 GEL TOPICAL at 18:30

## 2025-06-30 RX ADMIN — GABAPENTIN 300 MG: 300 CAPSULE ORAL at 08:50

## 2025-06-30 RX ADMIN — IPRATROPIUM BROMIDE 0.5 MG: 0.5 SOLUTION RESPIRATORY (INHALATION) at 19:34

## 2025-06-30 RX ADMIN — QUETIAPINE FUMARATE 25 MG: 25 TABLET ORAL at 21:15

## 2025-06-30 RX ADMIN — GUAIFENESIN 400 MG: 200 SOLUTION ORAL at 18:29

## 2025-06-30 RX ADMIN — PREDNISONE 40 MG: 20 TABLET ORAL at 08:45

## 2025-06-30 RX ADMIN — ESCITALOPRAM OXALATE 10 MG: 10 TABLET ORAL at 08:48

## 2025-06-30 RX ADMIN — IPRATROPIUM BROMIDE 0.5 MG: 0.5 SOLUTION RESPIRATORY (INHALATION) at 07:14

## 2025-06-30 RX ADMIN — LEVALBUTEROL HYDROCHLORIDE 1.25 MG: 1.25 SOLUTION RESPIRATORY (INHALATION) at 13:55

## 2025-06-30 RX ADMIN — SPIRONOLACTONE 25 MG: 25 TABLET ORAL at 08:50

## 2025-06-30 RX ADMIN — PANTOPRAZOLE SODIUM 40 MG: 40 INJECTION, POWDER, FOR SOLUTION INTRAVENOUS at 09:07

## 2025-06-30 RX ADMIN — IPRATROPIUM BROMIDE 0.5 MG: 0.5 SOLUTION RESPIRATORY (INHALATION) at 13:54

## 2025-06-30 RX ADMIN — CARVEDILOL 6.25 MG: 3.12 TABLET, FILM COATED ORAL at 18:12

## 2025-06-30 RX ADMIN — CARBIDOPA AND LEVODOPA 1 TABLET: 25; 100 TABLET ORAL at 18:12

## 2025-06-30 RX ADMIN — CEFTRIAXONE 1000 MG: 1 INJECTION, SOLUTION INTRAVENOUS at 18:13

## 2025-06-30 RX ADMIN — GABAPENTIN 300 MG: 300 CAPSULE ORAL at 21:15

## 2025-06-30 RX ADMIN — GUAIFENESIN 400 MG: 200 SOLUTION ORAL at 12:15

## 2025-06-30 RX ADMIN — LEVALBUTEROL HYDROCHLORIDE 1.25 MG: 1.25 SOLUTION RESPIRATORY (INHALATION) at 07:14

## 2025-06-30 RX ADMIN — CARBIDOPA AND LEVODOPA 1 TABLET: 25; 100 TABLET ORAL at 08:50

## 2025-06-30 RX ADMIN — Medication 300 MG: at 18:12

## 2025-06-30 RX ADMIN — ATORVASTATIN CALCIUM 40 MG: 40 TABLET, FILM COATED ORAL at 08:51

## 2025-06-30 RX ADMIN — QUETIAPINE FUMARATE 12.5 MG: 25 TABLET ORAL at 08:46

## 2025-06-30 RX ADMIN — DICLOFENAC SODIUM 2 G: 10 GEL TOPICAL at 08:51

## 2025-06-30 RX ADMIN — GUAIFENESIN 400 MG: 200 SOLUTION ORAL at 21:15

## 2025-06-30 RX ADMIN — FUROSEMIDE 20 MG: 20 TABLET ORAL at 08:50

## 2025-06-30 RX ADMIN — LIDOCAINE 1 PATCH: 50 PATCH CUTANEOUS at 08:51

## 2025-06-30 RX ADMIN — LEVALBUTEROL HYDROCHLORIDE 1.25 MG: 1.25 SOLUTION RESPIRATORY (INHALATION) at 19:34

## 2025-06-30 RX ADMIN — HEPARIN SODIUM 5000 UNITS: 5000 INJECTION, SOLUTION INTRAVENOUS; SUBCUTANEOUS at 21:16

## 2025-06-30 RX ADMIN — GABAPENTIN 300 MG: 300 CAPSULE ORAL at 18:12

## 2025-06-30 RX ADMIN — AMLODIPINE BESYLATE 5 MG: 5 TABLET ORAL at 08:48

## 2025-06-30 RX ADMIN — GUAIFENESIN 400 MG: 200 SOLUTION ORAL at 03:32

## 2025-06-30 RX ADMIN — CARVEDILOL 6.25 MG: 3.12 TABLET, FILM COATED ORAL at 08:44

## 2025-06-30 RX ADMIN — CARBIDOPA AND LEVODOPA 1 TABLET: 25; 100 TABLET ORAL at 21:15

## 2025-06-30 NOTE — PLAN OF CARE
Problem: PAIN - ADULT  Goal: Verbalizes/displays adequate comfort level or baseline comfort level  Description: Interventions:  - Encourage patient to monitor pain and request assistance  - Assess pain using appropriate pain scale  - Administer analgesics as ordered based on type and severity of pain and evaluate response  - Implement non-pharmacological measures as appropriate and evaluate response  - Consider cultural and social influences on pain and pain management  - Notify physician/advanced practitioner if interventions unsuccessful or patient reports new pain  - Educate patient/family on pain management process including their role and importance of  reporting pain   - Provide non-pharmacologic/complimentary pain relief interventions  Outcome: Progressing     Problem: INFECTION - ADULT  Goal: Absence or prevention of progression during hospitalization  Description: INTERVENTIONS:  - Assess and monitor for signs and symptoms of infection  - Monitor lab/diagnostic results  - Monitor all insertion sites, i.e. indwelling lines, tubes, and drains  - Monitor endotracheal if appropriate and nasal secretions for changes in amount and color  - [place] appropriate cooling/warming therapies per order  - Administer medications as ordered  - Instruct and encourage patient and family to use good hand hygiene technique  - Identify and instruct in appropriate isolation precautions for identified infection/condition  Outcome: Progressing  Goal: Absence of fever/infection during neutropenic period  Description: INTERVENTIONS:  - Monitor WBC  - Perform strict hand hygiene  - Limit to healthy visitors only  - No plants, dried, fresh or silk flowers with [place] in patient room  Outcome: Progressing     Problem: SAFETY ADULT  Goal: Patient will remain free of falls  Description: INTERVENTIONS:  - Educate patient/family on patient safety including physical limitations  - Instruct patient to call for assistance with activity   -  Consider consulting OT/PT to assist with strengthening/mobility based on AM PAC & JH-HLM score  - Consult OT/PT to assist with strengthening/mobility   - Keep Call bell within reach  - Keep bed low and locked with side rails adjusted as appropriate  - Keep care items and personal belongings within reach  - Initiate and maintain comfort rounds  - Make Fall Risk Sign visible to staff  - Offer Toileting every 2 Hours, in advance of need  - Initiate/Maintain bed/chair alarm  - Obtain necessary fall risk management equipment: yellow bracelet & socks   - Apply yellow socks and bracelet for high fall risk patients  - Consider moving patient to room near nurses station  Outcome: Progressing  Goal: Maintain or return to baseline ADL function  Description: INTERVENTIONS:  -  Assess patient's ability to carry out ADLs; assess patient's baseline for ADL function and identify physical deficits which impact ability to perform ADLs (bathing, care of mouth/teeth, toileting, grooming, dressing, etc.)  - Assess/evaluate cause of self-care deficits   - Assess range of motion  - Assess patient's mobility; develop plan if impaired  - Assess patient's need for assistive devices and provide as appropriate  - Encourage maximum independence but intervene and supervise when necessary  - Involve family in performance of ADLs  - Assess for home care needs following discharge   - Consider OT consult to assist with ADL evaluation and planning for discharge  - Provide patient education as appropriate  - Monitor functional capacity and physical performance, use of AM PAC & JH-HLM   - Monitor gait, balance and fatigue with ambulation    Outcome: Progressing  Goal: Maintains/Returns to pre admission functional level  Description: INTERVENTIONS:  - Perform AM-PAC 6 Click Basic Mobility/ Daily Activity assessment daily.  - Set and communicate daily mobility goal to care team and patient/family/caregiver.   - Collaborate with rehabilitation services on  mobility goals if consulted  - Perform Range of Motion 3 times a day.  - Reposition patient every 2 hours.  - Dangle patient 3 times a day  - Stand patient 3 times a day  - Ambulate patient 3 times a day  - Out of bed to chair 3 times a day   - Out of bed for meals 3 times a day  - Out of bed for toileting  - Record patient progress and toleration of activity level   Outcome: Progressing     Problem: DISCHARGE PLANNING  Goal: Discharge to home or other facility with appropriate resources  Description: INTERVENTIONS:  - Identify barriers to discharge w/patient and caregiver  - Arrange for needed discharge resources and transportation as appropriate  - Identify discharge learning needs (meds, wound care, etc.)  - Arrange for interpretive services to assist at discharge as needed  - Refer to Case Management Department for coordinating discharge planning if the patient needs post-hospital services based on physician/advanced practitioner order or complex needs related to functional status, cognitive ability, or social support system  Outcome: Progressing     Problem: Knowledge Deficit  Goal: Patient/family/caregiver demonstrates understanding of disease process, treatment plan, medications, and discharge instructions  Description: Complete learning assessment and assess knowledge base.  Interventions:  - Provide teaching at level of understanding  - Provide teaching via preferred learning methods  Outcome: Progressing     Problem: Prexisting or High Potential for Compromised Skin Integrity  Goal: Skin integrity is maintained or improved  Description: INTERVENTIONS:  - Identify patients at risk for skin breakdown  - Assess and monitor skin integrity including under and around medical devices   - Assess and monitor nutrition and hydration status  - Monitor labs  - Assess for incontinence   - Turn and reposition patient  - Assist with mobility/ambulation  - Relieve pressure over evelyn prominences   - Avoid friction and  shearing  - Provide appropriate hygiene as needed including keeping skin clean and dry  - Evaluate need for skin moisturizer/barrier cream  - Collaborate with interdisciplinary team  - Patient/family teaching  - Consider wound care consult    Assess:  - Review Juan Francisco scale daily  - Clean and moisturize skin every day  - Inspect skin when repositioning, toileting, and assisting with ADLS  - Assess under medical devices   - Assess extremities for adequate circulation and sensation     Bed Management:  - Have minimal linens on bed & keep smooth, unwrinkled  - Change linens as needed when moist or perspiring  - Avoid sitting or lying in one position for more than 2 hours while in bed?Keep HOB at 30 degrees   - Toileting:  - Offer bedside commode  - Assess for incontinence every 2 hr  - Use incontinent care products after each incontinent episode such as incontinence wipes     Activity:  - Mobilize patient 3 times a day  - Encourage activity and walks on unit  - Encourage or provide ROM exercises   - Turn and reposition patient every 2 Hours  - Use appropriate equipment to lift or move patient in bed  - Instruct/ Assist with weight shifting every 2 hr when out of bed in chair  - Consider limitation of chair time 2 hour intervals    Skin Care:  - Avoid use of baby powder, tape, friction and shearing, hot water or constrictive clothing  - Relieve pressure over bony prominences using pillows & wedges   - Do not massage red bony areas    Next Steps:  - Teach patient strategies to minimize risks such as weight shifting & repositioning   - Consider consults to  interdisciplinary teams such as PT/OT   Outcome: Progressing     Problem: Potential for Falls  Goal: Patient will remain free of falls  Description: INTERVENTIONS:  - Educate patient/family on patient safety including physical limitations  - Instruct patient to call for assistance with activity   - Consider consulting OT/PT to assist with strengthening/mobility based on  AM PAC & -HLM score  - Consult OT/PT to assist with strengthening/mobility   - Keep Call bell within reach  - Keep bed low and locked with side rails adjusted as appropriate  - Keep care items and personal belongings within reach  - Initiate and maintain comfort rounds  - Make Fall Risk Sign visible to staff  - Offer Toileting every 2 Hours, in advance of need  - Initiate/Maintain bed/chair alarm  - Obtain necessary fall risk management equipment: yellow bracelet & socks   - Apply yellow socks and bracelet for high fall risk patients  - Consider moving patient to room near nurses station  Outcome: Progressing     Problem: Nutrition/Hydration-ADULT  Goal: Nutrient/Hydration intake appropriate for improving, restoring or maintaining nutritional needs  Description: Monitor and assess patient's nutrition/hydration status for malnutrition. Collaborate with interdisciplinary team and initiate plan and interventions as ordered.  Monitor patient's weight and dietary intake as ordered or per policy. Utilize nutrition screening tool and intervene as necessary. Determine patient's food preferences and provide high-protein, high-caloric foods as appropriate.     INTERVENTIONS:  - Monitor oral intake, urinary output, labs, and treatment plans  - Assess nutrition and hydration status and recommend course of action  - Evaluate amount of meals eaten  - Assist patient with eating if necessary   - Allow adequate time for meals  - Recommend/ encourage appropriate diets, oral nutritional supplements, and vitamin/mineral supplements  - Order, calculate, and assess calorie counts as needed  - Recommend, monitor, and adjust tube feedings and TPN/PPN based on assessed needs  - Assess need for intravenous fluids  - Provide specific nutrition/hydration education as appropriate  - Include patient/family/caregiver in decisions related to nutrition  Outcome: Progressing     Problem: NEUROSENSORY - ADULT  Goal: Achieves stable or improved  neurological status  Description: INTERVENTIONS  - Monitor and report changes in neurological status  - Monitor vital signs such as temperature, blood pressure, glucose, and any other labs ordered   - Initiate measures to prevent increased intracranial pressure  - Monitor for seizure activity and implement precautions if appropriate      Outcome: Progressing  Goal: Achieves maximal functionality and self care  Description: INTERVENTIONS  - Monitor swallowing and airway patency with patient fatigue and changes in neurological status  - Encourage and assist patient to increase activity and self care.   - Encourage visually impaired, hearing impaired and aphasic patients to use assistive/communication devices  Outcome: Progressing     Problem: RESPIRATORY - ADULT  Goal: Achieves optimal ventilation and oxygenation  Description: INTERVENTIONS:  - Assess for changes in respiratory status  - Assess for changes in mentation and behavior  - Position to facilitate oxygenation and minimize respiratory effort  - Oxygen administered by appropriate delivery if ordered  - Initiate smoking cessation education as indicated  - Encourage broncho-pulmonary hygiene including cough, deep breathe, Incentive Spirometry  - Assess the need for suctioning and aspirate as needed  - Assess and instruct to report SOB or any respiratory difficulty  - Respiratory Therapy support as indicated  Outcome: Progressing     Problem: GENITOURINARY - ADULT  Goal: Maintains or returns to baseline urinary function  Description: INTERVENTIONS:  - Assess urinary function  - Encourage oral fluids to ensure adequate hydration if ordered  - Administer IV fluids as ordered to ensure adequate hydration  - Administer ordered medications as needed  - Offer frequent toileting  - Follow urinary retention protocol if ordered  Outcome: Progressing  Goal: Absence of urinary retention  Description: INTERVENTIONS:  - Assess patient’s ability to void and empty bladder  -  Monitor I/O  - Bladder scan as needed  - Discuss with physician/AP medications to alleviate retention as needed  - Discuss catheterization for long term situations as appropriate  Outcome: Progressing     Problem: SKIN/TISSUE INTEGRITY - ADULT  Goal: Skin Integrity remains intact(Skin Breakdown Prevention)  Description: Assess:  -Perform Juan Francisco assessment every shift   -Clean and moisturize skin every day  -Inspect skin when repositioning, toileting, and assisting with ADLS  -Assess under medical devices   -Assess extremities for adequate circulation and sensation     Bed Management:  -Have minimal linens on bed & keep smooth, unwrinkled  -Change linens as needed when moist or perspiring  -Avoid sitting or lying in one position for more than 2 hours while in bed  -Keep HOB at 30 degrees     Toileting:  -Offer bedside commode  -Assess for incontinence every 2 hr  -Use incontinent care products after each incontinent episode such as incontinence wipes     Activity:  -Mobilize patient 3 times a day  -Encourage activity and walks on unit  -Encourage or provide ROM exercises   -Turn and reposition patient every 2 Hours  -Use appropriate equipment to lift or move patient in bed  -Instruct/ Assist with weight shifting every 2 hr when out of bed in chair  -Consider limitation of chair time 2 hour intervals    Skin Care:  -Avoid use of baby powder, tape, friction and shearing, hot water or constrictive clothing  -Relieve pressure over bony prominences using pillows & wedges   -Do not massage red bony areas    Next Steps:  -Teach patient strategies to minimize risks such as weight shifting & repositioning    -Consider consults to  interdisciplinary teams such as PT/OT   Outcome: Progressing     Problem: MUSCULOSKELETAL - ADULT  Goal: Maintain or return mobility to safest level of function  Description: INTERVENTIONS:  - Assess patient's ability to carry out ADLs; assess patient's baseline for ADL function and identify  physical deficits which impact ability to perform ADLs (bathing, care of mouth/teeth, toileting, grooming, dressing, etc.)  - Assess/evaluate cause of self-care deficits   - Assess range of motion  - Assess patient's mobility  - Assess patient's need for assistive devices and provide as appropriate  - Encourage maximum independence but intervene and supervise when necessary  - Involve family in performance of ADLs  - Assess for home care needs following discharge   - Consider OT consult to assist with ADL evaluation and planning for discharge  - Provide patient education as appropriate  Outcome: Progressing  Goal: Maintain proper alignment of affected body part  Description: INTERVENTIONS:  - Support, maintain and protect limb and body alignment  - Provide patient/ family with appropriate education  Outcome: Progressing     Problem: MOBILITY - ADULT  Goal: Maintain or return to baseline ADL function  Description: INTERVENTIONS:  -  Assess patient's ability to carry out ADLs; assess patient's baseline for ADL function and identify physical deficits which impact ability to perform ADLs (bathing, care of mouth/teeth, toileting, grooming, dressing, etc.)  - Assess/evaluate cause of self-care deficits   - Assess range of motion  - Assess patient's mobility; develop plan if impaired  - Assess patient's need for assistive devices and provide as appropriate  - Encourage maximum independence but intervene and supervise when necessary  - Involve family in performance of ADLs  - Assess for home care needs following discharge   - Consider OT consult to assist with ADL evaluation and planning for discharge  - Provide patient education as appropriate  - Monitor functional capacity and physical performance, use of AM PAC & JH-HLM   - Monitor gait, balance and fatigue with ambulation    Outcome: Progressing  Goal: Maintains/Returns to pre admission functional level  Description: INTERVENTIONS:  - Perform AM-PAC 6 Click Basic  Mobility/ Daily Activity assessment daily.  - Set and communicate daily mobility goal to care team and patient/family/caregiver.   - Collaborate with rehabilitation services on mobility goals if consulted  - Perform Range of Motion 3 times a day.  - Reposition patient every 2 hours.  - Dangle patient 3 times a day  - Stand patient 3 times a day  - Ambulate patient 3 times a day  - Out of bed to chair 3 times a day   - Out of bed for meals 3 times a day  - Out of bed for toileting  - Record patient progress and toleration of activity level   Outcome: Progressing  Goal: [facility] 3 TO 6 MOBILITY  Description: INTERVENTIONS:  - Assess patient's ability to carry out ADLs; assess patient's baseline for ADL function and identify physical deficits which impact ability to perform ADLs (bathing, care of mouth/teeth, toileting, grooming, dressing, ect.)  - Assess/evaluate cause of self-care deficits  - Assess range of motion  - Assess patient's mobility; develop plan if impaired  - Assess patient's mobility develop plan if impaired   - Assess patient's need for assistive devices and provide as appropriate  - Encourage maximum independence but intervene and supervise when necessary   - Involve family in performance of ADLs  - Assess for home care needs following discharge  · Consider PT/OT consult to assist with ADL   evaluation and planning for discharge  · Provide patient and family education as   appropriate   - Monitor functional capacity and physical performance, use of AM PAC & [facility]     Outcome: Progressing  Goal: [facility] 1 TO 2 MOBILITY  Description: INTERVENTIONS:  - Assess/evaluate cause of self-care deficits   - Assess range of motion  - Assess patient's mobility; develop plan if impaired  - Involve family in performance of ADLs  - Assess for home care needs following discharge   - Consider OT consult to assist with ADL evaluation and planning for discharge   - Provide patient and family education as appropriate   -  Monitor functional capacity and physical performance, use of AM PAC & -HLM    Outcome: Progressing

## 2025-06-30 NOTE — PLAN OF CARE
Problem: PAIN - ADULT  Goal: Verbalizes/displays adequate comfort level or baseline comfort level  Description: Interventions:  - Encourage patient to monitor pain and request assistance  - Assess pain using appropriate pain scale  - Administer analgesics as ordered based on type and severity of pain and evaluate response  - Implement non-pharmacological measures as appropriate and evaluate response  - Consider cultural and social influences on pain and pain management  - Notify physician/advanced practitioner if interventions unsuccessful or patient reports new pain  - Educate patient/family on pain management process including their role and importance of  reporting pain   - Provide non-pharmacologic/complimentary pain relief interventions  6/30/2025 1122 by Juana Gutierrez LPN  Outcome: Progressing  6/30/2025 1119 by Juana Gutierrez LPN  Outcome: Progressing
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
## 2025-06-30 NOTE — ASSESSMENT & PLAN NOTE
Wt Readings from Last 3 Encounters:   04/16/25 73.5 kg (162 lb)   09/27/24 77.1 kg (170 lb)   09/17/24 77.1 kg (170 lb)   EF 60%, grade 1 diastolic dysfunction  Restarted on Lasix and Aldactone  Continue to monitor bp per protocol

## 2025-06-30 NOTE — PLAN OF CARE
Problem: OCCUPATIONAL THERAPY ADULT  Goal: Performs self-care activities at highest level of function for planned discharge setting.  See evaluation for individualized goals.  Description: Treatment Interventions: ADL retraining, Functional transfer training, UE strengthening/ROM, Endurance training, Cognitive reorientation, Patient/family training, Equipment evaluation/education, Compensatory technique education, Continued evaluation          See flowsheet documentation for full assessment, interventions and recommendations.   Note: Limitation: Decreased ADL status, Decreased cognition, Decreased Safe judgement during ADL, Decreased endurance, Decreased self-care trans, Decreased high-level ADLs  Prognosis: Fair  Assessment: Pt is a 84 y.o. male seen for OT evaluation at [facility], admitted 6/25/2025 w/ Sepsis ([facility]). Extensive chart review completed by OT. Pt is POD2 s/p R hip hemiarthroplasty; per ortho, pt is WBAT to RLE with formal posterior hip precautions in place. Comorbidities affecting the pt's functional performance include a significant PMH of: CAD, dementia, HTN, CVA, parkinsonism, CHF, ASCVD, DJD, HLD, PAD, L inguinal hernia, osteoporosis, peripheral neuropathy, foraminal stenosis of cervical region, depression, stenosis of L vertebral artery, knee pain, chronic neck pain, Russell's paralysis, frailty, ambulatory dysfunction, seizure. Personal factors affecting pt at time of IE include: behavioral pattern, difficulty performing ADLS, difficulty performing IADLS , limited insight into deficits, flat affect, health management , and environment. Pt admit from [facility]. Pt is a poor historian, per chart pt is IND with ADLs and is MATT with use of w/c.   Upon OT IE pt demonstrated  mod/maxA for bed mobility, JESENIA for functional transfers, modA for [facility] ADLs and totalA for LB ADLS 2* the following deficits impacting occupational performance: weakness, decreased strength, decreased balance, decreased tolerance, impaired  memory, impaired sequencing, impaired problem solving, decreased safety awareness, increased pain, orthopedic restrictions, and impaired interpersonal skills. Full objective findings from OT assessment regarding body systems outlined above. Pt to benefit from continued skilled OT tx while in the hospital to address deficits as defined above and maximize level of functional independence w/ ADL's and functional mobility. Occupational Performance areas to address include: grooming, bathing/shower, toilet hygiene, dressing, functional mobility, and clothing management. Based on findings, pt is of high complexity. The patient's raw score on the AM-PAC Daily Activity inpatient short form is 10 , standardized score is 24.79 , less than 39.4. Patients at this level are likely to benefit from DC to post-acute rehabilitation services. However, please refer to therapist recommendation for discharge planning given other factors that may influence destination. At this time, OT recommendations at time of discharge are DC with Level II - Moderate Rehab Resource Intensity resources.     Rehab Resource Intensity Level, OT: II (Moderate Resource Intensity)

## 2025-06-30 NOTE — ANESTHESIA POSTPROCEDURE EVALUATION
Post-Op Assessment Note    CV Status:  Stable    Pain management: adequate       Mental Status:  Alert and awake   Hydration Status:  Euvolemic   PONV Controlled:  Controlled   Airway Patency:  Patent     Post Op Vitals Reviewed: Yes    No anethesia notable event occurred.    Staff: Anesthesiologist           Last Filed PACU Vitals:  Vitals Value Taken Time   Temp 98.2 °F (36.8 °C) 06/28/25 14:10   Pulse 70 06/28/25 14:36   /79 06/28/25 14:35   Resp 17 06/28/25 14:36   SpO2 92 % 06/28/25 14:36   Vitals shown include unfiled device data.    Modified [place]:     Vitals Value Taken Time   Activity 2 06/28/25 14:35   Respiration 2 06/28/25 14:35   Circulation 2 06/28/25 14:35   Consciousness 1 06/28/25 14:35   Oxygen Saturation 1 06/28/25 14:35     Modified [place] Score: 8

## 2025-06-30 NOTE — ASSESSMENT & PLAN NOTE
POD 2 s/p right hip hemiarthroplasty with Dr. Carrillo  WBAT RLE  Posterior hip precautions   Abduction pillow while lying in bed/sleeping- discussed with nursing this AM as patient was without abduction pillow upon assessment. Nursing reports patient had abduction pillow on for about 5 hrs overnight but was causing agitation so briefly removed. Encouraged to have pillow in place as much as possible when patient in bed.  Abduction pillow put in place  Will monitor for ABLA and administer IVF/prbc as indicated for Greater than 2 gram drop or Hgb < 7. Management per primary   Hgb 13.0 this AM, VSS  Post-op abx completed, remains on ceftriaxone for pneumonia  PT/OT  DVT ppx per primary  Medical co-morbidities include acute respiratory failure, HTN, which are being managed per primary team  Recommend outpatient f/u Dr. Carrillo 2 weeks post-op

## 2025-06-30 NOTE — PROGRESS NOTES
Progress Note - Hospitalist   Name: Aldo Rodriguez 84 y.o. male I MRN: [id number]  Unit/Bed#: -01 I Date of Admission: 6/25/2025   Date of Service: 6/30/2025 I Hospital Day: 5    Assessment & Plan  Sepsis due to pneumonia (HCC)  Met on admission with leukocytosis, tachypnea.  Suspected source bilateral lower lobe pneumonia  Continue IV ceftriaxone and azithromycin  Procalcitonin downward trending  Blood cultures no growth  MRSA negative  Urinary antigens negative   Possibly d/t aspiration  Speech/swallow on board  Closed fracture of neck of right femur (HCC)  Acute right femoral neck fracture sustained after mechanical fall at nursing facility  Patient underwent right hip hemiarthroplasty on 6/28  Pain control, DVT ppx  Follow-up with Ortho outpatient aspirin 81 mg twice daily  Acute respiratory failure (HCC)  Acutely requiring 4 L/min on admission, likely secondary to pneumonia and reactive airway disease flare  With wheezing on exam, bilateral lower lobe pneumonia on imaging  Continue Xopenex/Atrovent  IV Solu-Medrol transitioned to prednsione  Respiratory protocol  Airway clearance protocol   Discussed with Pulm weaned to po prednisone,   CXR done today. 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 Unable to tell me year or month  Parkinsonism (HCC)  Resume home Sinemet  Essential hypertension  Blood pressure 154/80  Likely exacerbated by pain  Resume home Coreg, Lasix, Aldactone  Monitor BP  History of CVA (cerebrovascular accident)  Restart on aspirin  Monitor mental status  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  Denies chest pain  Continue beta-blocker  Aspirin on hold as above  Hypertensive heart disease with congestive heart failure (HCC)  Wt Readings from Last 3  Encounters:   04/16/25 73.5 kg (162 lb)   09/27/24 77.1 kg (170 lb)   09/17/24 77.1 kg (170 lb)   EF 60%, grade 1 diastolic dysfunction  Restarted on Lasix and Aldactone  Continue to monitor bp per protocol     Labs & Imaging: Results Review Statement: No pertinent imaging studies reviewed.    VTE Prophylaxis: in place.    Code Status:   Level 1 - Full Code    Patient Centered Rounds: I have performed bedside rounds with nursing staff today.    Mobility:   Basic Mobility Inpatient Raw Score: 6  JH-HLM Goal: 2: Bed activities/Dependent transfer  JH-HLM Achieved: 2: Bed activities/Dependent transfer  JH-HLM Goal NOT achieved. Continue with multidisciplinary rounding and encourage appropriate mobility to improve upon JH-HLM goals.    Discussions with Specialists or Other Care Team Provider: CM    Education and Discussions with Family / Patient: Unable to get hold    Total Time Spent on Date of Encounter in care of patient: 35 mins. This time was spent on one or more of the following: performing physical exam; counseling and coordination of care; obtaining or reviewing history; documenting in the medical record; reviewing/ordering tests, medications or procedures; communicating with other healthcare professionals and discussing with patient's family/caregivers.    Current Length of Stay: 5 day(s)    Current Patient Status: Inpatient   Certification Statement: The patient will continue to require additional inpatient hospital stay due to see my assessment and plan.     Subjective:   Patient is seen and examined at bedside.  No new complains. Afebrile  All other ROS are negative.    Objective:    Vitals: Blood pressure 162/84, pulse 69, temperature 98.5 °F (36.9 °C), temperature source Axillary, resp. rate 18, SpO2 99%.,There is no height or weight on file to calculate BMI.  SPO2 RA Rest      Flowsheet Row ED to Hosp-Admission (Current) from 6/25/2025 in [facility] Med Surg Unit   SpO2 99 %   SpO2  Activity At Rest   O2 Device Nasal cannula   O2 Flow Rate 5 L/min          I&O:   Intake/Output Summary (Last 24 hours) at 6/30/2025 1058  Last data filed at 6/30/2025 0727  Gross per 24 hour   Intake 505 ml   Output 355 ml   Net 150 ml       Physical Exam:    General- lying comfortably in bed. Not in any acute distress.  Neck- Supple, No JVD  CVS- regular, S1 and S2 normal  Chest- Bilateral Air entry, decreased at bases  Abdomen- soft, nontender, not distended, no guarding or rigidity, BS+  Extremities-  No pedal edema, No calf tenderness  CNS-   Alert, awake. No focal deficits present.    Invasive Devices       Peripheral Intravenous Line  Duration             Peripheral IV 06/28/25 Dorsal (posterior);Left Forearm 2 days                          Social History  reviewed  Family History[1] reviewed    Meds:  Current Medications[2]     Medications Prior to Admission:     albuterol (2.5 mg/3 mL) 0.083 % nebulizer solution    albuterol (Ventolin HFA) 90 mcg/act inhaler    amLODIPine (NORVASC) 5 mg tablet    aspirin (ECOTRIN LOW STRENGTH) 81 mg EC tablet    atorvastatin (LIPITOR) 40 mg tablet    carbidopa-levodopa (SINEMET)  mg per tablet    carvedilol (COREG) 6.25 mg tablet    Cholecalciferol (Vitamin D3) 50 MCG (2000 UT) capsule    escitalopram (LEXAPRO) 10 mg tablet    ferrous sulfate 325 (65 Fe) mg tablet    fluticasone (FLONASE) 50 mcg/act nasal spray    furosemide (LASIX) 20 mg tablet    pantoprazole (PROTONIX) 40 mg tablet    QUEtiapine (SEROquel) 25 mg tablet    spironolactone (ALDACTONE) 25 mg tablet    Labs:  Results from last 7 days   Lab Units 06/30/25  0321 06/29/25  0159 06/28/25  0140 06/27/25  0347 06/26/25  0818 06/25/25  1647   WBC Thousand/uL 13.70* 19.02* 12.76* 15.66*   < > 20.88*   HEMOGLOBIN g/dL 13.0 13.7 13.8 13.5   < > 15.7   HEMATOCRIT % 41.5 42.8 42.8 42.1   < > 49.6*   PLATELETS Thousands/uL 160 205 208 203   < > 243   SEGS PCT %  --  88*  --  92*  --  87*   LYMPHO PCT %  --  5*  --   4*  --  5*   MONO PCT %  --  7  --  3*  --  7   EOS PCT %  --  0  --  0  --  0    < > = values in this interval not displayed.     Results from last 7 days   Lab Units 06/30/25  0321 06/29/25  0159 06/28/25  0140 06/27/25  0347 06/26/25  0818   POTASSIUM mmol/L 4.3 4.4 4.2 4.3 4.2   CHLORIDE mmol/L 103 102 104 103 100   CO2 mmol/L 32 32 30 31 28   BUN mg/dL 16 16 16 15 15   CREATININE mg/dL 0.68 0.67 0.62 0.64 0.66   CALCIUM mg/dL 8.1* 8.1* 8.2* 8.4 8.8   ALK PHOS U/L  --  46  --  49 60   ALT U/L  --  4*  --  5* 7   AST U/L  --  29  --  13 16     Lab Results   Component Value Date    TROPONINI 0.05 (H) 05/15/2020    TROPONINI 0.07 (H) 05/15/2020    TROPONINI <0.02 09/18/2019    CKMB 4.2 04/14/2017    CKMB 10.6 (H) 04/13/2017    CKTOTAL 186 01/25/2024    CKTOTAL 290 04/14/2017    CKTOTAL 628 (H) 04/13/2017    CKTOTAL 56 04/13/2016         Lab Results   Component Value Date    BLOODCX No Growth After 4 Days. 06/25/2025    BLOODCX No Growth After 4 Days. 06/25/2025    BLOODCX No Growth After 5 Days. 05/15/2020    URINECX >100,000 cfu/ml Klebsiella aerogenes (A) 11/03/2024    URINECX >100,000 cfu/ml Klebsiella aerogenes (A) 07/20/2024    URINECX >100,000 cfu/ml Klebsiella aerogenes (A) 05/22/2024         Imaging:  Results for orders placed during the hospital encounter of 06/25/25    XR Trauma chest portable    Narrative  XR CHEST PORTABLE    INDICATION: TRAUMA.    COMPARISON: 10/24/2024    FINDINGS:    There is pulmonary vascular congestion. There is left basilar opacity, likely a combination of atelectasis and pleural fluid.    Enlarged cardiac silhouette.    There is a fracture of the left lateral sixth rib. Additional left rib fractures may be old.    Normal upper abdomen.    Impression  1.  Acute appearing left lateral sixth rib fracture. Additional left rib fractures may be old. Given the history of trauma, consider chest CT.    2.  Left basilar opacity, likely a combination of atelectasis and pleural fluid.  Hemothorax not excluded.    3.  Pulmonary vascular congestion.    Given the discrepancy with the preliminary report, the study was marked in EPIC for immediate notification.    Workstation performed: [id number]    Results for orders placed during the hospital encounter of 10/24/24    XR chest pa and lateral    Narrative  CHEST    INDICATION:   Chronic cough.    COMPARISON:  None.    EXAM PERFORMED/VIEWS:  XR CHEST PA AND LATERAL    FINDINGS:    Cardiomediastinal silhouette appears mildly enlarged post CABG. Small eventration right hemidiaphragm anteriorly.    The lungs are clear.  No pneumothorax or pleural effusion.    Osseous structures appear within normal limits for patient age. Poststernotomy.    Impression  No acute cardiopulmonary disease.  No acute infiltrate or effusion  Post-CABG.  Small eventration anterior right hemidiaphragm.  Improvement in previous right pleural effusion and right lower lobe atelectasis or consolidation noted on prior study of 1/24/2024.          Electronically signed: 10/24/2024 06:51 PM Jose De Jesus Caban MD      Last 24 Hours Medication List:   Current Facility-Administered Medications   Medication Dose Route Frequency Provider Last Rate    acetaminophen  1,000 mg Intravenous Q6H PRN Jennifer Ken MD      albuterol  2.5 mg Nebulization Q6H PRN CORAL Spann-C      amLODIPine  5 mg Oral Daily Jennifer Ken MD      [Held by provider] aspirin  81 mg Oral Daily Fred Carrasco PA-C      atorvastatin  40 mg Oral Daily CORAL Spann-DAO      benzonatate  100 mg Oral TID PRN SofiaCORAL Barba-C      carbidopa-levodopa  1 tablet Oral TID CORAL Spann-C      carvedilol  6.25 mg Oral BID With Meals CORAL Spann-DAO      cefTRIAXone  1,000 mg Intravenous Q24H Sofiahaile Fair, PA-C 100 mL/hr at 06/30/25 0549    Diclofenac Sodium  2 g Topical 4x Daily SofiaCORAL Barba-C      escitalopram  10 mg Oral Daily CORAL Spann-C      Ferrous Sulfate  300 mg Oral Daily With Dinner  Jennifer Ken MD      furosemide  20 mg Oral Daily Jennifer Ken MD      gabapentin  300 mg Oral TID Sofia Fair PA-C      guaiFENesin  400 mg Oral Q6H Jennifer Ken MD      [Held by provider] heparin (porcine)  5,000 Units Subcutaneous Q8H [facility] Fred Carrasco PA-C      hydrALAZINE  5 mg Intravenous Q6H PRN Jennifer Ken MD      ipratropium  0.5 mg Nebulization TID Sofia Fair PA-C      levalbuterol  1.25 mg Nebulization TID Sofia Fair PA-C      lidocaine  1 patch Topical Daily Sofia Fair PA-C      ondansetron  4 mg Intravenous Q6H PRN Sofia Fair PA-C      traMADol  50 mg Oral Q6H PRN Sofia Fair PA-C      Or    oxyCODONE  2.5 mg Oral Q6H PRN Sofia Fair PA-C      pantoprazole  40 mg Intravenous Q24H [facility] Jennifer Ken MD      predniSONE  40 mg Oral Daily Jennifer Ken MD      QUEtiapine  25 mg Oral HS Sofia Fair PA-C      And    QUEtiapine  12.5 mg Oral Daily Sofia Fair PA-C      sodium chloride  1 spray Each Nare Q1H PRN Jennifer Ken MD      spironolactone  25 mg Oral Daily Jennifer Ken MD          Today, Patient Was Seen By: Kashmir Patel MD    ** Please Note: Dictation voice to text software may have been used in the creation of this document. **             [1]   Family History  Problem Relation Name Age of Onset    Heart disease Mother Jennifer         Premature Coronary    Heart disease Father Jennifer         Premature Coronary    Psychiatric Illness Sister Cecelia Lemon    [2]   Current Facility-Administered Medications   Medication Dose Route Frequency Provider Last Rate Last Admin    acetaminophen (Ofirmev) injection 1,000 mg  1,000 mg Intravenous Q6H PRN Jennifer Kne MD        albuterol inhalation solution 2.5 mg  2.5 mg Nebulization Q6H PRN CORAL Spann-C   2.5 mg at 06/27/25 0236    amLODIPine (NORVASC) tablet 5 mg  5 mg Oral Daily Jennifer Ken MD   5 mg at 06/30/25 0848    [Held by provider] aspirin (ECOTRIN LOW STRENGTH) EC tablet 81 mg   81 mg Oral Daily Fred Carrasco PA-C        atorvastatin (LIPITOR) tablet 40 mg  40 mg Oral Daily Sofia Fair PA-C   40 mg at 06/30/25 0851    benzonatate (TESSALON PERLES) capsule 100 mg  100 mg Oral TID PRN Sofia Fair PA-C   100 mg at 06/27/25 1026    carbidopa-levodopa (SINEMET)  mg per tablet 1 tablet  1 tablet Oral TID Sofia Fair PA-C   1 tablet at 06/30/25 0850    carvedilol (COREG) tablet 6.25 mg  6.25 mg Oral BID With Meals Sofia Fair PA-C   6.25 mg at 06/30/25 0844    cefTRIAXone (ROCEPHIN) IVPB (premix in dextrose) 1,000 mg 50 mL  1,000 mg Intravenous Q24H Sofia Fair PA-C 100 mL/hr at 06/30/25 0549 Rate Verify at 06/30/25 0549    Diclofenac Sodium (VOLTAREN) 1 % topical gel 2 g  2 g Topical 4x Daily Sofia Fair PA-C   2 g at 06/30/25 0851    escitalopram (LEXAPRO) tablet 10 mg  10 mg Oral Daily Sofia Fair PA-C   10 mg at 06/30/25 0848    Ferrous Sulfate oral syrup 300 mg  300 mg Oral Daily With Dinner Jennifer Ken MD   300 mg at 06/29/25 1807    furosemide (LASIX) tablet 20 mg  20 mg Oral Daily Jennifer Ken MD   20 mg at 06/30/25 0850    gabapentin (NEURONTIN) capsule 300 mg  300 mg Oral TID Sofia Fair PA-C   300 mg at 06/30/25 0850    guaiFENesin (ROBITUSSIN) oral liquid 400 mg  400 mg Oral Q6H Jennifer Ken MD   400 mg at 06/30/25 0332    [Held by provider] heparin (porcine) subcutaneous injection 5,000 Units  5,000 Units Subcutaneous Q8H [facility] Fred Carrasco PA-C   5,000 Units at 06/27/25 2112    hydrALAZINE (APRESOLINE) injection 5 mg  5 mg Intravenous Q6H PRN Jennifer Ken MD        ipratropium (ATROVENT) 0.02 % inhalation solution 0.5 mg  0.5 mg Nebulization TID Sofia Olejar, PA-C   0.5 mg at 06/30/25 0714    levalbuterol (XOPENEX) inhalation solution 1.25 mg  1.25 mg Nebulization TID Sofia Olejar, PA-C   1.25 mg at 06/30/25 0714    lidocaine (LIDODERM) 5 % patch 1 patch  1 patch Topical Daily Sofia Olejar, PA-C   1 patch at 06/30/25 0851     ondansetron (ZOFRAN) injection 4 mg  4 mg Intravenous Q6H PRN Sofia Fair PA-C        traMADol (ULTRAM) tablet 50 mg  50 mg Oral Q6H PRN Sofia Fair PA-C   50 mg at 06/27/25 2106    Or    oxyCODONE (ROXICODONE) split tablet 2.5 mg  2.5 mg Oral Q6H PRN Sofia Fair PA-C   2.5 mg at 06/29/25 1454    pantoprazole (PROTONIX) injection 40 mg  40 mg Intravenous Q24H MAXI Jennifer Ken MD   40 mg at 06/30/25 0907    predniSONE tablet 40 mg  40 mg Oral Daily Jennifer Ken MD   40 mg at 06/30/25 0845    QUEtiapine (SEROquel) tablet 25 mg  25 mg Oral HS Sofia Fair PA-C   25 mg at 06/29/25 2235    And    QUEtiapine (SEROquel) tablet 12.5 mg  12.5 mg Oral Daily Sofia Fair PA-C   12.5 mg at 06/30/25 0846    sodium chloride (OCEAN) 0.65 % nasal spray 1 spray  1 spray Each Nare Q1H PRN Jennifer Ken MD        spironolactone (ALDACTONE) tablet 25 mg  25 mg Oral Daily Jennifer Ken MD   25 mg at 06/30/25 0850

## 2025-06-30 NOTE — ASSESSMENT & PLAN NOTE
Acutely requiring 4 L/min on admission, likely secondary to pneumonia and reactive airway disease flare  With wheezing on exam, bilateral lower lobe pneumonia on imaging  Continue Xopenex/Atrovent  IV Solu-Medrol transitioned to prednsione  Respiratory protocol  Airway clearance protocol   Discussed with Pulm weaned to po prednisone,   CXR done today. Follow up results  Weaned to 3L

## 2025-06-30 NOTE — PROGRESS NOTES
Progress Note - Pulmonary   Aldo Rodriguez 84 y.o. male MRN: [id number]  Unit/Bed#: -01 Encounter: [id number]      Interval History:   Patient unable to answer questions appropriately.  Denies shortness of breath or cough.    Review of Systems:  Full 12 point review of systems negative other than previously mentioned    Objective:   Vitals: Blood pressure 162/84, pulse 69, temperature 98.5 °F (36.9 °C), temperature source Axillary, resp. rate 18, SpO2 93%., There is no height or weight on file to calculate BMI.  No acute distress  S1-S2  Fine bilateral rales, no wheeze, no rhonchi  Soft, nontender, nondistended  Awake, not oriented      Labs: I have personally reviewed pertinent lab results.  Results Reviewed       Procedure Component Value Units Date/Time    Blood culture #2 [[id number]] Collected: 06/25/25 1755    Lab Status: Preliminary result Specimen: Blood from Arm, Left Updated: 06/29/25 2301     Blood Culture No Growth After 4 Days.    Blood culture #1 [[id number]] Collected: 06/25/25 1645    Lab Status: Preliminary result Specimen: Blood from Arm, Left Updated: 06/29/25 2301     Blood Culture No Growth After 4 Days.    Procalcitonin [[id number]]  (Abnormal) Collected: 06/25/25 1755    Lab Status: Final result Specimen: Blood from Arm, Left Updated: 06/25/25 1831     Procalcitonin 0.35 ng/ml     HS Troponin I 2hr [[id number]]  (Normal) Collected: 06/25/25 1755    Lab Status: Final result Specimen: Blood from Arm, Left Updated: 06/25/25 1829     hs TnI 2hr 8 ng/L      Delta 2hr hsTnI 0 ng/L     Lactic acid, plasma (w/reflex if result > 2.0) [[id number]]  (Normal) Collected: 06/25/25 1755    Lab Status: Final result Specimen: Blood from Arm, Left Updated: 06/25/25 1820     LACTIC ACID 0.9 mmol/L     Narrative:      Result may be elevated if tourniquet was used during collection.    HS Troponin 0hr (reflex protocol) [[id number]]  (Normal) Collected: 06/25/25 1647    Lab Status: Final result Specimen: Blood  from Arm, Left Updated: 06/25/25 1753     hs TnI 0hr 8 ng/L     B-Type Natriuretic Peptide(BNP) [[id number]]  (Abnormal) Collected: 06/25/25 1647    Lab Status: Final result Specimen: Blood from Arm, Left Updated: 06/25/25 1725      pg/mL     Comprehensive metabolic panel [[id number]]  (Abnormal) Collected: 06/25/25 1647    Lab Status: Final result Specimen: Blood from Arm, Left Updated: 06/25/25 1717     Sodium 134 mmol/L      Potassium 4.2 mmol/L      Chloride 98 mmol/L      CO2 28 mmol/L      ANION GAP 8 mmol/L      BUN 19 mg/dL      Creatinine 0.85 mg/dL      Glucose 115 mg/dL      Calcium 9.0 mg/dL      AST 22 U/L      ALT 17 U/L      Alkaline Phosphatase 74 U/L      Total Protein 7.2 g/dL      Albumin 3.9 g/dL      Total Bilirubin 1.71 mg/dL      eGFR 80 ml/min/1.73sq m     Narrative:      [employer] Kid[employer] guidelines for Chronic Kidney Disease (CKD):     Stage 1 with normal or high GFR (GFR > 90 mL/min/1.73 square meters)    Stage 2 Mild CKD (GFR = 60-89 mL/min/1.73 square meters)    Stage 3A Moderate CKD (GFR = 45-59 mL/min/1.73 square meters)    Stage 3B Moderate CKD (GFR = 30-44 mL/min/1.73 square meters)    Stage 4 Severe CKD (GFR = 15-29 mL/min/1.73 square meters)    Stage 5 End Stage CKD (GFR <15 mL/min/1.73 square meters)  Note: GFR calculation is accurate only with a steady state creatinine    CBC and differential [[id number]]  (Abnormal) Collected: 06/25/25 1647    Lab Status: Final result Specimen: Blood from Arm, Left Updated: 06/25/25 1658     WBC 20.88 Thousand/uL      RBC 5.76 Million/uL      Hemoglobin 15.7 g/dL      Hematocrit 49.6 %      MCV 86 fL      MCH 27.3 pg      MCHC 31.7 g/dL      RDW 14.4 %      MPV 8.7 fL      Platelets 243 Thousands/uL      nRBC 0 /100 WBCs      Segmented % 87 %      Immature Grans % 1 %      Lymphocytes % 5 %      Monocytes % 7 %      Eosinophils Relative 0 %      Basophils Relative 0 %      Absolute Neutrophils 18.15 Thousands/µL       Absolute Immature Grans 0.21 Thousand/uL      Absolute Lymphocytes 0.94 Thousands/µL      Absolute Monocytes 1.51 Thousand/µL      Eosinophils Absolute 0.02 Thousand/µL      Basophils Absolute 0.05 Thousands/µL              Current Medications:    Current Facility-Administered Medications:     acetaminophen (Ofirmev) injection 1,000 mg, 1,000 mg, Intravenous, Q6H PRN, Jennifer Ken MD    albuterol inhalation solution 2.5 mg, 2.5 mg, Nebulization, Q6H PRN, Sofia Olecallier, PA-C, 2.5 mg at 06/27/25 0236    amLODIPine (NORVASC) tablet 5 mg, 5 mg, Oral, Daily, Jennifer Ken MD, 5 mg at 06/30/25 0848    aspirin (ECOTRIN LOW STRENGTH) EC tablet 81 mg, 81 mg, Oral, Daily, Kashmir Patel MD    atorvastatin (LIPITOR) tablet 40 mg, 40 mg, Oral, Daily, Sofia Olecallier, PA-C, 40 mg at 06/30/25 0851    benzonatate (TESSALON PERLES) capsule 100 mg, 100 mg, Oral, TID PRN, Sofia Olejar, PA-C, 100 mg at 06/27/25 1026    carbidopa-levodopa (SINEMET)  mg per tablet 1 tablet, 1 tablet, Oral, TID, Sofia Olecallier, PA-C, 1 tablet at 06/30/25 0850    carvedilol (COREG) tablet 6.25 mg, 6.25 mg, Oral, BID With Meals, Sofia Olejar, PA-C, 6.25 mg at 06/30/25 0844    cefTRIAXone (ROCEPHIN) IVPB (premix in dextrose) 1,000 mg 50 mL, 1,000 mg, Intravenous, Q24H, Sofia Olecallier, PA-C, Last Rate: 100 mL/hr at 06/30/25 0549, Rate Verify at 06/30/25 0549    Diclofenac Sodium (VOLTAREN) 1 % topical gel 2 g, 2 g, Topical, 4x Daily, Sofia Olejar, PA-C, 2 g at 06/30/25 0851    escitalopram (LEXAPRO) tablet 10 mg, 10 mg, Oral, Daily, Sofia Olejar, PA-C, 10 mg at 06/30/25 0848    Ferrous Sulfate oral syrup 300 mg, 300 mg, Oral, Daily With Dinner, Jennifer Ken MD, 300 mg at 06/29/25 1807    furosemide (LASIX) tablet 20 mg, 20 mg, Oral, Daily, Jennifer Ken MD, 20 mg at 06/30/25 0850    gabapentin (NEURONTIN) capsule 300 mg, 300 mg, Oral, TID, Sofia Fair PA-C, 300 mg at 06/30/25 0850    guaiFENesin (ROBITUSSIN) oral liquid 400 mg, 400 mg,  Oral, Q6H, Jennifer Ken MD, 400 mg at 06/30/25 1215    heparin (porcine) subcutaneous injection 5,000 Units, 5,000 Units, Subcutaneous, Q8H [facility], Kashmir Patel MD, 5,000 Units at 06/27/25 2112    hydrALAZINE (APRESOLINE) injection 5 mg, 5 mg, Intravenous, Q6H PRN, Jennifer Ken MD    ipratropium (ATROVENT) 0.02 % inhalation solution 0.5 mg, 0.5 mg, Nebulization, TID, Sofia Fair PA-C, 0.5 mg at 06/30/25 1354    levalbuterol (XOPENEX) inhalation solution 1.25 mg, 1.25 mg, Nebulization, TID, 1.25 mg at 06/30/25 1355 **AND** [DISCONTINUED] sodium chloride 0.9 % inhalation solution 3 mL, 3 mL, Nebulization, TID, Fred Carrasco PA-C    lidocaine (LIDODERM) 5 % patch 1 patch, 1 patch, Topical, Daily, Sofia Fair PA-C, 1 patch at 06/30/25 0851    ondansetron (ZOFRAN) injection 4 mg, 4 mg, Intravenous, Q6H PRN, Sofia Fair PA-C    traMADol (ULTRAM) tablet 50 mg, 50 mg, Oral, Q6H PRN, 50 mg at 06/27/25 2106 **OR** oxyCODONE (ROXICODONE) split tablet 2.5 mg, 2.5 mg, Oral, Q6H PRN, Sofia Fair PA-C, 2.5 mg at 06/29/25 1454    pantoprazole (PROTONIX) injection 40 mg, 40 mg, Intravenous, Q24H [facility], Jennifer Ken MD, 40 mg at 06/30/25 0907    predniSONE tablet 40 mg, 40 mg, Oral, Daily, Jennifer Ken MD, 40 mg at 06/30/25 0845    QUEtiapine (SEROquel) tablet 25 mg, 25 mg, Oral, HS, 25 mg at 06/29/25 2235 **AND** QUEtiapine (SEROquel) tablet 12.5 mg, 12.5 mg, Oral, Daily, Sofia Fair PA-C, 12.5 mg at 06/30/25 0846    sodium chloride (OCEAN) 0.65 % nasal spray 1 spray, 1 spray, Each Nare, Q1H PRN, Jennifer Ken MD    spironolactone (ALDACTONE) tablet 25 mg, 25 mg, Oral, Daily, Jennifer Ken MD, 25 mg at 06/30/25 0850       Imaging and other studies:   I personally viewed and interpreted the following imaging studies:  Chest x-ray 6/30/2025 shows bilateral hilar fullness with mild interstitial pattern opacification of both bases    Assessment:  Acute hypoxic respiratory failure  Chest x-ray  "abnormality  Suspected pulmonary edema  Possible pneumonia  Recent fracture of the right femur    Plan:  Continue to titrate supplemental oxygen for goal SpO2 of greater than 92% the setting of acute hypoxic respiratory failure.  Patient currently requiring 3 L of supplemental oxygen to achieve this goal.  Unclear if chest x-ray abnormality secondary to pulmonary edema or pneumonia.  Diuresis as tolerated.  This can be management patient's primary service.  Antibiotics per primary service.  I have discontinued systemic corticosteroids.  Continue Xopenex and Atrovent nebs 3 times daily for now.  Pulmonary medicine will continue to follow.  I personally discussed this patient in depth with the primary service    Note: Portions of the record may have been created with voice recognition software. Occasional wrong word or \"sound a like\" substitutions may have occurred due to the inherent limitations of voice recognition software. Read the chart carefully and recognize, using context, where substitutions have occurred.     Chriss Bergeron M.D.  [facility]    "

## 2025-06-30 NOTE — ASSESSMENT & PLAN NOTE
Acute right femoral neck fracture sustained after mechanical fall at nursing facility  Patient underwent right hip hemiarthroplasty on 6/28  Pain control, DVT ppx  Follow-up with Ortho outpatient aspirin 81 mg twice daily

## 2025-06-30 NOTE — CASE MANAGEMENT
Case Management Discharge Planning Note    Patient name Aldo Rodriguez  Location /-01 MRN [id number]  : 1940 Date 2025       Current Admission Date: 2025  Current Admission Diagnosis:Sepsis due to pneumonia ([facility])   Patient Active Problem List    Diagnosis Date Noted    Sepsis due to pneumonia ([facility]) 2025    Closed fracture of neck of right femur ([facility]) 2025    Acute respiratory failure ([facility]) 2025    Hypertensive heart disease with congestive heart failure ([facility]) 2024    Periodic limb movements of sleep 2023    Parkinsonism ([facility]) 10/18/2023    Urinary incontinence 2023    Obstructive sleep apnea (adult) (pediatric)     Other parasomnia 2023    Snoring 2023    Suspected sleep apnea 2023    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[facility])     Frailty 2023    Ambulatory dysfunction 2023    Primary osteoarthritis of one hip, right     Right hip pain     Recurrent major depressive disorder, in full remission ([facility]) 10/13/2022    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 ([facility]) 10/13/2022    Knee pain 2022    Chronic neck pain 2022    BMI 26.0-26.9,adult 2022    History of seizure 2021    Right epiphora 09/10/2020    Depression, recurrent ([facility]) 2020    History of CVA (cerebrovascular accident) 11/15/2019    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    Carotid stenosis, asymptomatic, bilateral 10/16/2019    Diverticulosis of intestine 10/07/2019    Foraminal stenosis of cervical region 10/07/2019    Personal history of transient ischemic attack 2019    Left inguinal hernia 2018    Osteoporosis 2017    Peripheral neuropathy 2017    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 2017    Essential hypertension 2017    PAD (peripheral artery disease) ([facility]) 2016    DJD (degenerative joint disease) 2015    GERD (gastroesophageal reflux  disease) 01/28/2015    Vitamin D deficiency 05/06/2013    Personal history of colonic polyps 04/16/2013    ASCVD (arteriosclerotic cardiovascular disease) 10/15/2012    Hyperlipidemia 10/15/2012      LOS (days): 5  Geometric Mean LOS (GMLOS) (days): 4.9  Days to GMLOS:0.1     OBJECTIVE:  Risk of Unplanned Readmission Score: 19.37         Current admission status: Inpatient   Preferred Pharmacy:   [id number] - [place] - [address]  [address]  [place] [id number]  Phone: [phone] Fax: [phone]    Primary Care Provider: ELE Ruiz    Primary Insurance: MEDICARE  Secondary Insurance:     DISCHARGE DETAILS:  Additional Comments: Per SLIM rounding, pt not yet medically stable for discharge at this time. Discharge plan continues to be return to [facility] where patient [facility] a bedhold. [facility] aware of tentative discharge timeframe of 48-72hrs. CM department to remain available for discharge planning needs throughout this admission.

## 2025-06-30 NOTE — ASSESSMENT & PLAN NOTE
Met on admission with leukocytosis, tachypnea.  Suspected source bilateral lower lobe pneumonia  Continue IV ceftriaxone and azithromycin  Procalcitonin downward trending  Blood cultures no growth  MRSA negative  Urinary antigens negative   Possibly d/t aspiration  Speech/swallow on board

## 2025-06-30 NOTE — PLAN OF CARE
Problem: PHYSICAL THERAPY ADULT  Goal: Performs mobility at highest level of function for planned discharge setting.  See evaluation for individualized goals.  Description: Treatment/Interventions: Functional transfer training, LE strengthening/ROM, Therapeutic exercise, Endurance training, Cognitive reorientation, Patient/family training, Equipment eval/education, Bed mobility          See flowsheet documentation for full assessment, interventions and recommendations.  6/30/2025 1157 by Shea Kendrick, PT  Note: Prognosis: Guarded  Problem List: Decreased strength, Decreased endurance, Decreased range of motion, Impaired balance, Decreased mobility, Decreased cognition, Impaired judgement, Decreased safety awareness, Pain, Orthopedic restrictions  Assessment: Aldo Rodriguez is a 84 y.o. Male who presents to [facility]25 from [facility] w/ c/o fall and diagnosis of sepsis d/t pneumonia, also closed fracture of neck of R femur, POD2 s/p R hip hemiarthroplasty. Orders for PT eval and treat received, w/ activity orders of WBAT R LE and posterior hip precautions. Pt presents w/ comorbidities of HTN, hx of CVA, Alzheimer's, Parkinsonism, ASCVD, PAD, depression, . At baseline, pt mobilizes WC-level. Upon evaluation, pt presents w/ the following deficits: weakness, decreased ROM, impaired balance, decreased endurance, pain limiting functional mobility, and impaired cognition . Upon eval, pt requires mod-max A x 2 for bed mobility Based on this PT evaluation today, patient's discharge recommendation is for Level II - Moderate Rehab Resource Intensity. During this admission, pt would benefit from continued skilled inpatient PT in the acute care setting in order to address the abovementioned deficits to maximize function and mobility before DC from acute care.        Rehab Resource Intensity Level, PT: II (Moderate Resource Intensity)    See flowsheet documentation for full assessment.
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
## 2025-06-30 NOTE — PROGRESS NOTES
Progress Note - Orthopedics   Name: Aldo Rodriguez 84 y.o. male I MRN: [id number]  Unit/Bed#: -01 I Date of Admission: 6/25/2025   Date of Service: 6/30/2025 I Hospital Day: 5    Assessment & Plan  Closed fracture of neck of right femur (HCC)  POD 2 s/p right hip hemiarthroplasty with Dr. Carrillo  WBAT RLE  Posterior hip precautions   Abduction pillow while lying in bed/sleeping- discussed with nursing this AM as patient was without abduction pillow upon assessment. Nursing reports patient had abduction pillow on for about 5 hrs overnight but was causing agitation so briefly removed. Encouraged to have pillow in place as much as possible when patient in bed.  Abduction pillow put in place  Will monitor for ABLA and administer IVF/prbc as indicated for Greater than 2 gram drop or Hgb < 7. Management per primary   Hgb 13.0 this AM, VSS  Post-op abx completed, remains on ceftriaxone for pneumonia  PT/OT  DVT ppx per primary  Medical co-morbidities include acute respiratory failure, HTN, which are being managed per primary team  Recommend outpatient f/u Dr. Carrillo 2 weeks post-op  Sepsis due to pneumonia (HCC)  Continue IV abx per primary  Acute respiratory failure (HCC)  Management per primary  Currently on RA    Medical co-morbidities include sepsis 2/2 pneumonia, respiratory failure, Alzheimer's, HTN, CHF, CAD, h/o CVA, parkinsonism, which are being managed per primary team. Orthopedics service will follow.  Please contact the SecureChat role for the Orthopedics service with any questions/concerns.    Subjective   84 y.o.male seen and examined bedside this morning. No acute events, no new complaints. Pain well controlled, currently 8/10. Denies numbness/tingling. Patient observed to be without abduction pillow lying in bed. Patient states it was uncomfortable so it was removed. Discussed with nursing who states he had pillow in place for a few hours over night but was getting agitated and pushing it down  "around his ankles so it was temporarily removed.     Objective :  Temp:  [96.3 °F (35.7 °C)-98.5 °F (36.9 °C)] 97.9 °F (36.6 °C)  HR:  [71-78] 72  BP: (125-136)/(66-74) 135/74  Resp:  [16-20] 20  SpO2:  [89 %-98 %] 97 %  O2 Device: Nasal cannula  Nasal Cannula O2 Flow Rate (L/min):  [2 L/min-5 L/min] 5 L/min    Physical Exam  Constitutional:       General: He is not in acute distress.  Pulmonary:      Effort: Pulmonary effort is normal. No respiratory distress.      Comments: Supplemental O2 via NC    Skin:     Capillary Refill: Capillary refill takes less than 2 seconds.     Neurological:      General: No focal deficit present.      Mental Status: He is alert.      Comments: A&Ox2, disoriented to time      Musculoskeletal: Right lower extremity  Skin intact . No erythema or ecchymosis.  Abduction pillow initially off but put on during encounter  Dressing C/D/I without strikethrough   Muscles of thigh and calf soft and compressible   Motor intact to +FHL/EHL, +ankle dorsi/plantar flexion  Sensation intact to saphenous, sural, tibial, superficial peroneal nerve, and deep peroneal  2+ DP pulse, cap refill <2 sec  No calf swelling or tenderness to palpation      Lab Results: I have reviewed the following results:  Recent Labs     06/28/25  0140 06/29/25  0159 06/30/25  0321   WBC 12.76* 19.02* 13.70*   HGB 13.8 13.7 13.0   HCT 42.8 42.8 41.5    205 160   BUN 16 16 16   CREATININE 0.62 0.67 0.68     Blood Culture:    Lab Results   Component Value Date    BLOODCX No Growth After 4 Days. 06/25/2025     Wound Culture: No results found for: \"WOUNDCULT\"    Imaging Results Review: No pertinent imaging studies reviewed.  Other Study Results Review: No additional pertinent studies reviewed.    Bre Tsai PA-C  "

## 2025-07-01 LAB
ANION GAP SERPL CALCULATED.3IONS-SCNC: 5 MMOL/L (ref 4–13)
BASOPHILS # BLD AUTO: 0.02 THOUSANDS/ÂΜL (ref 0–0.1)
BASOPHILS NFR BLD AUTO: 0 % (ref 0–1)
BUN SERPL-MCNC: 15 MG/DL (ref 5–25)
CALCIUM SERPL-MCNC: 8.2 MG/DL (ref 8.4–10.2)
CHLORIDE SERPL-SCNC: 102 MMOL/L (ref 96–108)
CO2 SERPL-SCNC: 30 MMOL/L (ref 21–32)
CREAT SERPL-MCNC: 0.57 MG/DL (ref 0.6–1.3)
EOSINOPHIL # BLD AUTO: 0.16 THOUSAND/ÂΜL (ref 0–0.61)
EOSINOPHIL NFR BLD AUTO: 1 % (ref 0–6)
ERYTHROCYTE [DISTWIDTH] IN BLOOD BY AUTOMATED COUNT: 14.1 % (ref 11.6–15.1)
GFR SERPL CREATININE-BSD FRML MDRD: 94 ML/MIN/1.73SQ M
GLUCOSE SERPL-MCNC: 111 MG/DL (ref 65–140)
HCT VFR BLD AUTO: 42.3 % (ref 36.5–49.3)
HGB BLD-MCNC: 13.2 G/DL (ref 12–17)
IMM GRANULOCYTES # BLD AUTO: 0.13 THOUSAND/UL (ref 0–0.2)
IMM GRANULOCYTES NFR BLD AUTO: 1 % (ref 0–2)
LYMPHOCYTES # BLD AUTO: 1.75 THOUSANDS/ÂΜL (ref 0.6–4.47)
LYMPHOCYTES NFR BLD AUTO: 12 % (ref 14–44)
MCH RBC QN AUTO: 27.4 PG (ref 26.8–34.3)
MCHC RBC AUTO-ENTMCNC: 31.2 G/DL (ref 31.4–37.4)
MCV RBC AUTO: 88 FL (ref 82–98)
MONOCYTES # BLD AUTO: 1.27 THOUSAND/ÂΜL (ref 0.17–1.22)
MONOCYTES NFR BLD AUTO: 9 % (ref 4–12)
NEUTROPHILS # BLD AUTO: 11.55 THOUSANDS/ÂΜL (ref 1.85–7.62)
NEUTS SEG NFR BLD AUTO: 77 % (ref 43–75)
NRBC BLD AUTO-RTO: 0 /100 WBCS
PLATELET # BLD AUTO: 156 THOUSANDS/UL (ref 149–390)
PMV BLD AUTO: 9.4 FL (ref 8.9–12.7)
POTASSIUM SERPL-SCNC: 4.4 MMOL/L (ref 3.5–5.3)
PROCALCITONIN SERPL-MCNC: 0.12 NG/ML
RBC # BLD AUTO: 4.82 MILLION/UL (ref 3.88–5.62)
SODIUM SERPL-SCNC: 137 MMOL/L (ref 135–147)
WBC # BLD AUTO: 14.88 THOUSAND/UL (ref 4.31–10.16)

## 2025-07-01 PROCEDURE — 80048 BASIC METABOLIC PNL TOTAL CA: CPT | Performed by: INTERNAL MEDICINE

## 2025-07-01 PROCEDURE — 94760 N-INVAS EAR/PLS OXIMETRY 1: CPT

## 2025-07-01 PROCEDURE — 99232 SBSQ HOSP IP/OBS MODERATE 35: CPT | Performed by: INTERNAL MEDICINE

## 2025-07-01 PROCEDURE — 84145 PROCALCITONIN (PCT): CPT | Performed by: INTERNAL MEDICINE

## 2025-07-01 PROCEDURE — 94640 AIRWAY INHALATION TREATMENT: CPT

## 2025-07-01 PROCEDURE — 94660 CPAP INITIATION&MGMT: CPT

## 2025-07-01 PROCEDURE — 85025 COMPLETE CBC W/AUTO DIFF WBC: CPT | Performed by: INTERNAL MEDICINE

## 2025-07-01 PROCEDURE — 99024 POSTOP FOLLOW-UP VISIT: CPT | Performed by: STUDENT IN AN ORGANIZED HEALTH CARE EDUCATION/TRAINING PROGRAM

## 2025-07-01 RX ORDER — LEVALBUTEROL INHALATION SOLUTION 1.25 MG/3ML
1.25 SOLUTION RESPIRATORY (INHALATION) EVERY 6 HOURS PRN
Status: DISCONTINUED | OUTPATIENT
Start: 2025-07-01 | End: 2025-07-03 | Stop reason: HOSPADM

## 2025-07-01 RX ADMIN — GABAPENTIN 300 MG: 300 CAPSULE ORAL at 22:29

## 2025-07-01 RX ADMIN — HEPARIN SODIUM 5000 UNITS: 5000 INJECTION, SOLUTION INTRAVENOUS; SUBCUTANEOUS at 13:28

## 2025-07-01 RX ADMIN — CARVEDILOL 6.25 MG: 3.12 TABLET, FILM COATED ORAL at 18:43

## 2025-07-01 RX ADMIN — ATORVASTATIN CALCIUM 40 MG: 40 TABLET, FILM COATED ORAL at 08:48

## 2025-07-01 RX ADMIN — HEPARIN SODIUM 5000 UNITS: 5000 INJECTION, SOLUTION INTRAVENOUS; SUBCUTANEOUS at 22:13

## 2025-07-01 RX ADMIN — AMLODIPINE BESYLATE 5 MG: 5 TABLET ORAL at 08:48

## 2025-07-01 RX ADMIN — LEVALBUTEROL HYDROCHLORIDE 1.25 MG: 1.25 SOLUTION RESPIRATORY (INHALATION) at 13:22

## 2025-07-01 RX ADMIN — GABAPENTIN 300 MG: 300 CAPSULE ORAL at 08:48

## 2025-07-01 RX ADMIN — CARBIDOPA AND LEVODOPA 1 TABLET: 25; 100 TABLET ORAL at 22:28

## 2025-07-01 RX ADMIN — DICLOFENAC SODIUM 2 G: 10 GEL TOPICAL at 13:33

## 2025-07-01 RX ADMIN — GABAPENTIN 300 MG: 300 CAPSULE ORAL at 18:43

## 2025-07-01 RX ADMIN — SPIRONOLACTONE 25 MG: 25 TABLET ORAL at 08:47

## 2025-07-01 RX ADMIN — CEFTRIAXONE 1000 MG: 1 INJECTION, SOLUTION INTRAVENOUS at 18:43

## 2025-07-01 RX ADMIN — ASPIRIN 81 MG: 81 TABLET, COATED ORAL at 08:48

## 2025-07-01 RX ADMIN — PANTOPRAZOLE SODIUM 40 MG: 40 INJECTION, POWDER, FOR SOLUTION INTRAVENOUS at 08:48

## 2025-07-01 RX ADMIN — ESCITALOPRAM OXALATE 10 MG: 10 TABLET ORAL at 08:47

## 2025-07-01 RX ADMIN — IPRATROPIUM BROMIDE 0.5 MG: 0.5 SOLUTION RESPIRATORY (INHALATION) at 13:22

## 2025-07-01 RX ADMIN — LIDOCAINE 1 PATCH: 50 PATCH CUTANEOUS at 08:54

## 2025-07-01 RX ADMIN — CARBIDOPA AND LEVODOPA 1 TABLET: 25; 100 TABLET ORAL at 08:47

## 2025-07-01 RX ADMIN — FUROSEMIDE 20 MG: 20 TABLET ORAL at 08:48

## 2025-07-01 RX ADMIN — Medication 300 MG: at 18:43

## 2025-07-01 RX ADMIN — QUETIAPINE FUMARATE 25 MG: 25 TABLET ORAL at 22:12

## 2025-07-01 RX ADMIN — CARBIDOPA AND LEVODOPA 1 TABLET: 25; 100 TABLET ORAL at 18:43

## 2025-07-01 RX ADMIN — GUAIFENESIN 400 MG: 200 SOLUTION ORAL at 18:43

## 2025-07-01 RX ADMIN — QUETIAPINE FUMARATE 12.5 MG: 25 TABLET ORAL at 08:47

## 2025-07-01 RX ADMIN — GUAIFENESIN 400 MG: 200 SOLUTION ORAL at 05:33

## 2025-07-01 RX ADMIN — LEVALBUTEROL HYDROCHLORIDE 1.25 MG: 1.25 SOLUTION RESPIRATORY (INHALATION) at 07:24

## 2025-07-01 RX ADMIN — BENZONATATE 100 MG: 100 CAPSULE ORAL at 18:43

## 2025-07-01 RX ADMIN — IPRATROPIUM BROMIDE 0.5 MG: 0.5 SOLUTION RESPIRATORY (INHALATION) at 07:24

## 2025-07-01 RX ADMIN — HEPARIN SODIUM 5000 UNITS: 5000 INJECTION, SOLUTION INTRAVENOUS; SUBCUTANEOUS at 05:29

## 2025-07-01 NOTE — PROGRESS NOTES
"Progress Note - Pulmonology   Name: Aldo Rodriguez 84 y.o. male I MRN: [id number]  Unit/Bed#: -01 I Date of Admission: 6/25/2025   Date of Service: 7/1/2025 I Hospital Day: 6    Assessment & Plan  Acute respiratory failure (HCC)  Likely secondary to acute pneumonia  Currently requiring 3L NC  Not oxygen dependent at baseline  Titrate oxygen to maintain saturation >89%  Likely needs home o2 eval prior to discharge   Sepsis due to pneumonia (HCC)  CT CAP with mild interstitial edema and small patchy GGO in bilateral lower lobes ddx pulmonary edema vs multifocal PNA. Suspect secondary to aspiration  Serial procal now normalized (0.12, 0.16, 0.62, 0.90, 0.35), noted leukocytosis (14.88, 13.70, 19.02)  Urinary antigens and MRSA culture negative  BCNG5D  Continue IV ceftriaxone, on day 7. Can likely d/c ABX after today.  Continue atrovent/xopenex nebs TID  He completed course of steroids   Pulmonary hygiene: cough and deep breath, OOB as tolerated, IS, scheduled nebs, mucinex 600mg BID  VBS showing mild to moderate oropharyngeal dysphagia     Closed fracture of neck of right femur (HCC)  Ortho is following  POD 3 for R hip hemiarthroplasty   Hypertensive heart disease with congestive heart failure (HCC)  Wt Readings from Last 3 Encounters:   04/16/25 73.5 kg (162 lb)   09/27/24 77.1 kg (170 lb)   09/17/24 77.1 kg (170 lb)   Continue diuresis per primary team    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    Essential hypertension    History of CVA (cerebrovascular accident)    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 States his breathing is fine. He denies any significant cough \"no more than usual\". Wants to go home.    Objective :  Temp:  [98 °F (36.7 °C)-98.8 °F (37.1 °C)] 98 °F (36.7 °C)  HR:  [63-80] 66  BP: (119-146)/(61-67) 146/67  Resp:  [24] 24  SpO2:  [93 %-97 %] " 97 %  O2 Device: Nasal cannula  Nasal Cannula O2 Flow Rate (L/min):  [3 L/min-4 L/min] 3 L/min    Physical Exam  General appearance:   Alert and awake, in no acute distress  Head:   Normocephalic, without obvious abnormality, atraumatic  Eyes:   No scleral icterus   Lungs:  Pulmonary effort non labored with rest.  Breath sounds: Decreased bibasilar breath sounds. No rhonchi, no wheezes.  Cardiovascular:   Regular rate and rhythm. No murmurs. Capillary refill < 3 seconds.  Abdomen:    No appreciable distension or tenderness  Extremities:   No deformity. No clubbing present. No edema to extremities.   Skin:   Warm and dry. Intact. Color appropriate for ethnicity.  Neurologic:   Somewhat disoriented to situation.  Psychiatric:   Normal mood. Answers questions appropriately.       Lab Results: I have reviewed the following results:   .     07/01/25  0420   WBC 14.88*   HGB 13.2   HCT 42.3      SODIUM 137   K 4.4      CO2 30   BUN 15   CREATININE 0.57*   GLUC 111     ABG: No new results in last 24 hours.    Imaging Results Review: I reviewed radiology reports from this admission including: chest xray and CT chest.  Other Study Results Review: No additional pertinent studies reviewed.  PFT Results Reviewed: N/A      Yessenia Aguayo, KATLYN RN FNP-BC  Nurse Practitioner  [facility]

## 2025-07-01 NOTE — ASSESSMENT & PLAN NOTE
CT CAP with mild interstitial edema and small patchy GGO in bilateral lower lobes ddx pulmonary edema vs multifocal PNA. Suspect secondary to aspiration  Serial procal now normalized (0.12, 0.16, 0.62, 0.90, 0.35), noted leukocytosis (14.88, 13.70, 19.02)  Urinary antigens and MRSA culture negative  BCNG5D  Continue IV ceftriaxone, on day 7. Can likely d/c ABX after today.  Continue atrovent/xopenex nebs TID  He completed course of steroids   Pulmonary hygiene: cough and deep breath, OOB as tolerated, IS, scheduled nebs, mucinex 600mg BID  VBS showing mild to moderate oropharyngeal dysphagia

## 2025-07-01 NOTE — ASSESSMENT & PLAN NOTE
Wt Readings from Last 3 Encounters:   04/16/25 73.5 kg (162 lb)   09/27/24 77.1 kg (170 lb)   09/17/24 77.1 kg (170 lb)   EF 60%, grade 1 diastolic dysfunction  Continue on Lasix and Aldactone  Continue to monitor bp per protocol

## 2025-07-01 NOTE — ASSESSMENT & PLAN NOTE
POD 3 s/p right hip hemiarthroplasty with Dr. Carrillo  WBAT RLE  Posterior hip precautions   Abduction pillow while lying in bed/sleeping  Will monitor for ABLA and administer IVF/prbc as indicated for Greater than 2 gram drop or Hgb < 7. Management per primary   Hgb 13.2 this AM, VSS  Post-op abx completed, remains on ceftriaxone for pneumonia  PT/OT  DVT ppx per primary  Medical co-morbidities include acute respiratory failure, HTN, which are being managed per primary team  Recommend outpatient f/u Dr. Carrillo 2 weeks post-op  Stable from orthopedics perspective, orthopedics signing off    Orthopedics signing off, if new concerns please reach out to on call KIMBER

## 2025-07-01 NOTE — ASSESSMENT & PLAN NOTE
Likely secondary to acute pneumonia  Currently requiring 3L NC  Not oxygen dependent at baseline  Titrate oxygen to maintain saturation >89%  Likely needs home o2 eval prior to discharge

## 2025-07-01 NOTE — PLAN OF CARE
Problem: PAIN - ADULT  Goal: Verbalizes/displays adequate comfort level or baseline comfort level  Description: Interventions:  - Encourage patient to monitor pain and request assistance  - Assess pain using appropriate pain scale  - Administer analgesics as ordered based on type and severity of pain and evaluate response  - Implement non-pharmacological measures as appropriate and evaluate response  - Consider cultural and social influences on pain and pain management  - Notify physician/advanced practitioner if interventions unsuccessful or patient reports new pain  - Educate patient/family on pain management process including their role and importance of  reporting pain   - Provide non-pharmacologic/complimentary pain relief interventions  Outcome: Progressing     Problem: INFECTION - ADULT  Goal: Absence or prevention of progression during hospitalization  Description: INTERVENTIONS:  - Assess and monitor for signs and symptoms of infection  - Monitor lab/diagnostic results  - Monitor all insertion sites, i.e. indwelling lines, tubes, and drains  - Monitor endotracheal if appropriate and nasal secretions for changes in amount and color  - [place] appropriate cooling/warming therapies per order  - Administer medications as ordered  - Instruct and encourage patient and family to use good hand hygiene technique  - Identify and instruct in appropriate isolation precautions for identified infection/condition  Outcome: Progressing  Goal: Absence of fever/infection during neutropenic period  Description: INTERVENTIONS:  - Monitor WBC  - Perform strict hand hygiene  - Limit to healthy visitors only  - No plants, dried, fresh or silk flowers with [place] in patient room  Outcome: Progressing     Problem: SAFETY ADULT  Goal: Patient will remain free of falls  Description: INTERVENTIONS:  - Educate patient/family on patient safety including physical limitations  - Instruct patient to call for assistance with activity   -  Consider consulting OT/PT to assist with strengthening/mobility based on AM PAC & JH-HLM score  - Consult OT/PT to assist with strengthening/mobility   - Keep Call bell within reach  - Keep bed low and locked with side rails adjusted as appropriate  - Keep care items and personal belongings within reach  - Initiate and maintain comfort rounds  - Make Fall Risk Sign visible to staff  - Offer Toileting every 4 Hours, in advance of need  - Initiate/Maintain bed alarm  - Obtain necessary fall risk management equipment:   - Apply yellow socks and bracelet for high fall risk patients  - Consider moving patient to room near nurses station  Outcome: Progressing  Goal: Maintain or return to baseline ADL function  Description: INTERVENTIONS:  -  Assess patient's ability to carry out ADLs; assess patient's baseline for ADL function and identify physical deficits which impact ability to perform ADLs (bathing, care of mouth/teeth, toileting, grooming, dressing, etc.)  - Assess/evaluate cause of self-care deficits   - Assess range of motion  - Assess patient's mobility; develop plan if impaired  - Assess patient's need for assistive devices and provide as appropriate  - Encourage maximum independence but intervene and supervise when necessary  - Involve family in performance of ADLs  - Assess for home care needs following discharge   - Consider OT consult to assist with ADL evaluation and planning for discharge  - Provide patient education as appropriate  - Monitor functional capacity and physical performance, use of AM PAC & JH-HLM   - Monitor gait, balance and fatigue with ambulation    Outcome: Progressing  Goal: Maintains/Returns to pre admission functional level  Description: INTERVENTIONS:  - Perform AM-PAC 6 Click Basic Mobility/ Daily Activity assessment daily.  - Set and communicate daily mobility goal to care team and patient/family/caregiver.   - Collaborate with rehabilitation services on mobility goals if  consulted  - Perform Range of Motion 4 times a day.  - Reposition patient every 2 hours.  - Dangle patient 3 times a day  - Stand patient 3 times a day  - Ambulate patient 3 times a day  - Out of bed to chair 3 times a day   - Out of bed for meals 3 times a day  - Out of bed for toileting  - Record patient progress and toleration of activity level   Outcome: Progressing     Problem: DISCHARGE PLANNING  Goal: Discharge to home or other facility with appropriate resources  Description: INTERVENTIONS:  - Identify barriers to discharge w/patient and caregiver  - Arrange for needed discharge resources and transportation as appropriate  - Identify discharge learning needs (meds, wound care, etc.)  - Arrange for interpretive services to assist at discharge as needed  - Refer to Case Management Department for coordinating discharge planning if the patient needs post-hospital services based on physician/advanced practitioner order or complex needs related to functional status, cognitive ability, or social support system  Outcome: Progressing     Problem: Knowledge Deficit  Goal: Patient/family/caregiver demonstrates understanding of disease process, treatment plan, medications, and discharge instructions  Description: Complete learning assessment and assess knowledge base.  Interventions:  - Provide teaching at level of understanding  - Provide teaching via preferred learning methods  Outcome: Progressing     Problem: Prexisting or High Potential for Compromised Skin Integrity  Goal: Skin integrity is maintained or improved  Description: INTERVENTIONS:  - Identify patients at risk for skin breakdown  - Assess and monitor skin integrity including under and around medical devices   - Assess and monitor nutrition and hydration status  - Monitor labs  - Assess for incontinence   - Turn and reposition patient  - Assist with mobility/ambulation  - Relieve pressure over evelyn prominences   - Avoid friction and shearing  - Provide  appropriate hygiene as needed including keeping skin clean and dry  - Evaluate need for skin moisturizer/barrier cream  - Collaborate with interdisciplinary team  - Patient/family teaching  - Consider wound care consult    Assess:  - Review Juan [place] scale daily  - Inspect skin when repositioning, toileting, and assisting with ADLS  - Assess extremities for adequate circulation and sensation     Bed Management:  - Have minimal linens on bed & keep smooth, unwrinkled  - Change linens as needed when moist or perspiring  - Avoid sitting or lying in one position for more than 2 hours while in bed?Keep HOB at 30 degrees   - Toileting:  - Offer bedside commode  - Assess for incontinence every 2 hours  - Use incontinent care products after each incontinent episode such as barrier cream    Activity:  - Mobilize patient 3 times a day  - Encourage activity and walks on unit  - Encourage or provide ROM exercises   - Turn and reposition patient every 2 Hours  - Use appropriate equipment to lift or move patient in bed  - Instruct/ Assist with weight shifting when out of bed in chair      Skin Care:  - Avoid use of baby powder, tape, friction and shearing, hot water or constrictive clothing  - Relieve pressure over bony prominences using silicon pressure relieving foam  - Do not massage red bony areas    Problem: Potential for Falls  Goal: Patient will remain free of falls  Description: INTERVENTIONS:  - Educate patient/family on patient safety including physical limitations  - Instruct patient to call for assistance with activity   - Consider consulting OT/PT to assist with strengthening/mobility based on AM PAC & -[facility] score  - Consult OT/PT to assist with strengthening/mobility   - Keep Call bell within reach  - Keep bed low and locked with side rails adjusted as appropriate  - Keep care items and personal belongings within reach  - Initiate and maintain comfort rounds  - Make Fall Risk Sign visible to staff  - Offer Toileting  every 4 Hours, in advance of need  - Initiate/Maintain bed alarm  - Apply yellow socks and bracelet for high fall risk patients  - Consider moving patient to room near nurses station  Outcome: Progressing     Problem: Nutrition/Hydration-ADULT  Goal: Nutrient/Hydration intake appropriate for improving, restoring or maintaining nutritional needs  Description: Monitor and assess patient's nutrition/hydration status for malnutrition. Collaborate with interdisciplinary team and initiate plan and interventions as ordered.  Monitor patient's weight and dietary intake as ordered or per policy. Utilize nutrition screening tool and intervene as necessary. Determine patient's food preferences and provide high-protein, high-caloric foods as appropriate.     INTERVENTIONS:  - Monitor oral intake, urinary output, labs, and treatment plans  - Assess nutrition and hydration status and recommend course of action  - Evaluate amount of meals eaten  - Assist patient with eating if necessary   - Allow adequate time for meals  - Recommend/ encourage appropriate diets, oral nutritional supplements, and vitamin/mineral supplements  - Order, calculate, and assess calorie counts as needed  - Recommend, monitor, and adjust tube feedings and TPN/PPN based on assessed needs  - Assess need for intravenous fluids  - Provide specific nutrition/hydration education as appropriate  - Include patient/family/caregiver in decisions related to nutrition  Outcome: Progressing     Problem: NEUROSENSORY - ADULT  Goal: Achieves stable or improved neurological status  Description: INTERVENTIONS  - Monitor and report changes in neurological status  - Monitor vital signs such as temperature, blood pressure, glucose, and any other labs ordered   - Initiate measures to prevent increased intracranial pressure  - Monitor for seizure activity and implement precautions if appropriate      Outcome: Progressing  Goal: Achieves maximal functionality and self  care  Description: INTERVENTIONS  - Monitor swallowing and airway patency with patient fatigue and changes in neurological status  - Encourage and assist patient to increase activity and self care.   - Encourage visually impaired, hearing impaired and aphasic patients to use assistive/communication devices  Outcome: Progressing     Problem: RESPIRATORY - ADULT  Goal: Achieves optimal ventilation and oxygenation  Description: INTERVENTIONS:  - Assess for changes in respiratory status  - Assess for changes in mentation and behavior  - Position to facilitate oxygenation and minimize respiratory effort  - Oxygen administered by appropriate delivery if ordered  - Initiate smoking cessation education as indicated  - Encourage broncho-pulmonary hygiene including cough, deep breathe, Incentive Spirometry  - Assess the need for suctioning and aspirate as needed  - Assess and instruct to report SOB or any respiratory difficulty  - Respiratory Therapy support as indicated  Outcome: Progressing     Problem: GENITOURINARY - ADULT  Goal: Maintains or returns to baseline urinary function  Description: INTERVENTIONS:  - Assess urinary function  - Encourage oral fluids to ensure adequate hydration if ordered  - Administer IV fluids as ordered to ensure adequate hydration  - Administer ordered medications as needed  - Offer frequent toileting  - Follow urinary retention protocol if ordered  Outcome: Progressing  Goal: Absence of urinary retention  Description: INTERVENTIONS:  - Assess patient’s ability to void and empty bladder  - Monitor I/O  - Bladder scan as needed  - Discuss with physician/AP medications to alleviate retention as needed  - Discuss catheterization for long term situations as appropriate  Outcome: Progressing     Problem: SKIN/TISSUE INTEGRITY - ADULT  Goal: Skin Integrity remains intact(Skin Breakdown Prevention)  Description: Assess:  -Perform Juan Francisco assessment every shift and as needed  -Clean and moisturize  skin as needed  -Inspect skin when repositioning, toileting, and assisting with ADLS  -Assess extremities for adequate circulation and sensation     Bed Management:  -Have minimal linens on bed & keep smooth, unwrinkled  -Change linens as needed when moist or perspiring  -Avoid sitting or lying in one position for more than 2 hours while in bed  -Keep HOB at 30 degrees     Toileting:  -Offer bedside commode  -Assess for incontinence every 2 hours

## 2025-07-01 NOTE — PLAN OF CARE
Problem: PAIN - ADULT  Goal: Verbalizes/displays adequate comfort level or baseline comfort level  Description: Interventions:  - Encourage patient to monitor pain and request assistance  - Assess pain using appropriate pain scale  - Administer analgesics as ordered based on type and severity of pain and evaluate response  - Implement non-pharmacological measures as appropriate and evaluate response  - Consider cultural and social influences on pain and pain management  - Notify physician/advanced practitioner if interventions unsuccessful or patient reports new pain  - Educate patient/family on pain management process including their role and importance of  reporting pain   - Provide non-pharmacologic/complimentary pain relief interventions  Outcome: Progressing     Problem: INFECTION - ADULT  Goal: Absence or prevention of progression during hospitalization  Description: INTERVENTIONS:  - Assess and monitor for signs and symptoms of infection  - Monitor lab/diagnostic results  - Monitor all insertion sites, i.e. indwelling lines, tubes, and drains  - Monitor endotracheal if appropriate and nasal secretions for changes in amount and color  - [place] appropriate cooling/warming therapies per order  - Administer medications as ordered  - Instruct and encourage patient and family to use good hand hygiene technique  - Identify and instruct in appropriate isolation precautions for identified infection/condition  Outcome: Progressing  Goal: Absence of fever/infection during neutropenic period  Description: INTERVENTIONS:  - Monitor WBC  - Perform strict hand hygiene  - Limit to healthy visitors only  - No plants, dried, fresh or silk flowers with [place] in patient room  Outcome: Progressing     Problem: SAFETY ADULT  Goal: Patient will remain free of falls  Description: INTERVENTIONS:  - Educate patient/family on patient safety including physical limitations  - Instruct patient to call for assistance with activity   -  Consider consulting OT/PT to assist with strengthening/mobility based on AM PAC & JH-HLM score  - Consult OT/PT to assist with strengthening/mobility   - Keep Call bell within reach  - Keep bed low and locked with side rails adjusted as appropriate  - Keep care items and personal belongings within reach  - Initiate and maintain comfort rounds  - Make Fall Risk Sign visible to staff  - Offer Toileting every 2 Hours, in advance of need  - Initiate/Maintain bed alarm  - Obtain necessary fall risk management equipment:   - Apply yellow socks and bracelet for high fall risk patients  - Consider moving patient to room near nurses station  Outcome: Progressing  Goal: Maintain or return to baseline ADL function  Description: INTERVENTIONS:  -  Assess patient's ability to carry out ADLs; assess patient's baseline for ADL function and identify physical deficits which impact ability to perform ADLs (bathing, care of mouth/teeth, toileting, grooming, dressing, etc.)  - Assess/evaluate cause of self-care deficits   - Assess range of motion  - Assess patient's mobility; develop plan if impaired  - Assess patient's need for assistive devices and provide as appropriate  - Encourage maximum independence but intervene and supervise when necessary  - Involve family in performance of ADLs  - Assess for home care needs following discharge   - Consider OT consult to assist with ADL evaluation and planning for discharge  - Provide patient education as appropriate  - Monitor functional capacity and physical performance, use of AM PAC & JH-HLM   - Monitor gait, balance and fatigue with ambulation    Outcome: Progressing  Goal: Maintains/Returns to pre admission functional level  Description: INTERVENTIONS:  - Perform AM-PAC 6 Click Basic Mobility/ Daily Activity assessment daily.  - Set and communicate daily mobility goal to care team and patient/family/caregiver.   - Collaborate with rehabilitation services on mobility goals if  consulted  - Perform Range of Motion 3 times a day.  - Reposition patient every 2 hours.  - Dangle patient 3 times a day  - Stand patient 3 times a day  - Ambulate patient 3 times a day  - Out of bed to chair 3 times a day   - Out of bed for meals 3 times a day  - Out of bed for toileting  - Record patient progress and toleration of activity level   Outcome: Progressing     Problem: DISCHARGE PLANNING  Goal: Discharge to home or other facility with appropriate resources  Description: INTERVENTIONS:  - Identify barriers to discharge w/patient and caregiver  - Arrange for needed discharge resources and transportation as appropriate  - Identify discharge learning needs (meds, wound care, etc.)  - Arrange for interpretive services to assist at discharge as needed  - Refer to Case Management Department for coordinating discharge planning if the patient needs post-hospital services based on physician/advanced practitioner order or complex needs related to functional status, cognitive ability, or social support system  Outcome: Progressing     Problem: Knowledge Deficit  Goal: Patient/family/caregiver demonstrates understanding of disease process, treatment plan, medications, and discharge instructions  Description: Complete learning assessment and assess knowledge base.  Interventions:  - Provide teaching at level of understanding  - Provide teaching via preferred learning methods  Outcome: Progressing     Problem: Prexisting or High Potential for Compromised Skin Integrity  Goal: Skin integrity is maintained or improved  Description: INTERVENTIONS:  - Identify patients at risk for skin breakdown  - Assess and monitor skin integrity including under and around medical devices   - Assess and monitor nutrition and hydration status  - Monitor labs  - Assess for incontinence   - Turn and reposition patient  - Assist with mobility/ambulation  - Relieve pressure over evelyn prominences   - Avoid friction and shearing  - Provide  appropriate hygiene as needed including keeping skin clean and dry  - Evaluate need for skin moisturizer/barrier cream  - Collaborate with interdisciplinary team  - Patient/family teaching  - Consider wound care consult    Assess:  - Review [place] scale daily  - Clean and moisturize skin every   - Inspect skin when repositioning, toileting, and assisting with ADLS  - Assess under medical devices such as  every   - Assess extremities for adequate circulation and sensation     Bed Management:  - Have minimal linens on bed & keep smooth, unwrinkled  - Change linens as needed when moist or perspiring  - Avoid sitting or lying in one position for more than  hours while in bed?Keep HOB at degrees   - Toileting:  - Offer bedside commode  - Assess for incontinence every   - Use incontinent care products after each incontinent episode such as     Activity:  - Mobilize patient  times a day  - Encourage activity and walks on unit  - Encourage or provide ROM exercises   - Turn and reposition patient every  Hours  - Use appropriate equipment to lift or move patient in bed  - Instruct/ Assist with weight shifting every  when out of bed in chair  - Consider limitation of chair time  hour intervals    Skin Care:  - Avoid use of baby powder, tape, friction and shearing, hot water or constrictive clothing  - Relieve pressure over bony prominences using   - Do not massage red bony areas    Next Steps:  - Teach patient strategies to minimize risks such as   - Consider consults to  interdisciplinary teams such as   Outcome: Progressing     Problem: Nutrition/Hydration-ADULT  Goal: Nutrient/Hydration intake appropriate for improving, restoring or maintaining nutritional needs  Description: Monitor and assess patient's nutrition/hydration status for malnutrition. Collaborate with interdisciplinary team and initiate plan and interventions as ordered.  Monitor patient's weight and dietary intake as ordered or per policy. Utilize nutrition  screening tool and intervene as necessary. Determine patient's food preferences and provide high-protein, high-caloric foods as appropriate.     INTERVENTIONS:  - Monitor oral intake, urinary output, labs, and treatment plans  - Assess nutrition and hydration status and recommend course of action  - Evaluate amount of meals eaten  - Assist patient with eating if necessary   - Allow adequate time for meals  - Recommend/ encourage appropriate diets, oral nutritional supplements, and vitamin/mineral supplements  - Order, calculate, and assess calorie counts as needed  - Recommend, monitor, and adjust tube feedings and TPN/PPN based on assessed needs  - Assess need for intravenous fluids  - Provide specific nutrition/hydration education as appropriate  - Include patient/family/caregiver in decisions related to nutrition  Outcome: Progressing     Problem: RESPIRATORY - ADULT  Goal: Achieves optimal ventilation and oxygenation  Description: INTERVENTIONS:  - Assess for changes in respiratory status  - Assess for changes in mentation and behavior  - Position to facilitate oxygenation and minimize respiratory effort  - Oxygen administered by appropriate delivery if ordered  - Initiate smoking cessation education as indicated  - Encourage broncho-pulmonary hygiene including cough, deep breathe, Incentive Spirometry  - Assess the need for suctioning and aspirate as needed  - Assess and instruct to report SOB or any respiratory difficulty  - Respiratory Therapy support as indicated  Outcome: Progressing

## 2025-07-01 NOTE — ASSESSMENT & PLAN NOTE
Acutely requiring 4 L/min on admission, likely secondary to pneumonia and reactive airway disease flare  With wheezing on exam, bilateral lower lobe pneumonia on imaging  Continue Xopenex/Atrovent  IV Solu-Medrol transitioned to prednsione  Respiratory protocol  Airway clearance protocol   Discussed with Pulm weaned to po prednisone,   CXR done decreased mild vascular congestion  Weaned to 3L

## 2025-07-01 NOTE — PROGRESS NOTES
Progress Note - Orthopedics   Name: Aldo Rodriguez 84 y.o. male I MRN: [id number]  Unit/Bed#: -01 I Date of Admission: 6/25/2025   Date of Service: 7/1/2025 I Hospital Day: 6    Assessment & Plan  Closed fracture of neck of right femur (HCC)  POD 3 s/p right hip hemiarthroplasty with Dr. Carrillo  WBAT RLE  Posterior hip precautions   Abduction pillow while lying in bed/sleeping  Will monitor for ABLA and administer IVF/prbc as indicated for Greater than 2 gram drop or Hgb < 7. Management per primary   Hgb 13.2 this AM, VSS  Post-op abx completed, remains on ceftriaxone for pneumonia  PT/OT  DVT ppx per primary  Medical co-morbidities include acute respiratory failure, HTN, which are being managed per primary team  Recommend outpatient f/u Dr. Carrillo 2 weeks post-op  Stable from orthopedics perspective, orthopedics signing off    Orthopedics signing off, if new concerns please reach out to on call PATYLOR  Sepsis due to pneumonia (HCC)  Continue IV abx per primary  Acute respiratory failure (HCC)  Management per primary  Currently on RA        Subjective   84 y.o.male seen and examined at bedside, patient was able to communicate he was comfortable with a thumbs up. No acute events, no new complaints. Pain well controlled, currently 7/10.   Objective :  Temp:  [98.3 °F (36.8 °C)-98.8 °F (37.1 °C)] 98.8 °F (37.1 °C)  HR:  [63-80] 63  BP: (119-162)/(61-84) 119/64  Resp:  [18-24] 24  SpO2:  [93 %-99 %] 95 %  O2 Device: CPAP  Nasal Cannula O2 Flow Rate (L/min):  [3 L/min-5 L/min] 4 L/min    Physical Exam    Constitutional:       General: He is not in acute distress.  Pulmonary:      Effort: Pulmonary effort is normal. No respiratory distress.      Comments: Supplemental O2 via CPAP     Skin:     Capillary Refill: Capillary refill takes less than 2 seconds.      Neurological:      General: No focal deficit present.      Mental Status: He is alert.      Comments: A&Ox2, disoriented to time     Musculoskeletal: Right  "lower extremity  Skin intact . No erythema or ecchymosis.  Abduction pillow in place  Dressing C/D/I without strikethrough   Muscles of thigh and calf soft and compressible   Motor intact to +FHL/EHL, +ankle dorsi/plantar flexion  Sensation intact to saphenous, sural, tibial, superficial peroneal nerve, and deep peroneal  2+ DP pulse, cap refill <2 sec  No calf swelling or tenderness to palpation      Lab Results: I have reviewed the following results:  Recent Labs     06/29/25  0159 06/30/25  0321 07/01/25  0420   WBC 19.02* 13.70* 14.88*   HGB 13.7 13.0 13.2   HCT 42.8 41.5 42.3    160 156   BUN 16 16 15   CREATININE 0.67 0.68 0.57*     Blood Culture:    Lab Results   Component Value Date    BLOODCX No Growth After 5 Days. 06/25/2025     Wound Culture: No results found for: \"WOUNDCULT\"      "

## 2025-07-01 NOTE — RESPIRATORY THERAPY NOTE
RT reach out to provider via epic chat about PTS consistent HS CPAP refusal. Provider would still like PT to attempt CPAP tonight, 1:1 ordered by nurse to ensure PT safety with self rescuing/ensuring CPAP stays on.

## 2025-07-01 NOTE — PROGRESS NOTES
Progress Note - Hospitalist   Name: Aldo Rodriguez 84 y.o. male I MRN: [id number]  Unit/Bed#: -01 I Date of Admission: 6/25/2025   Date of Service: 7/1/2025 I Hospital Day: 6    Assessment & Plan  Sepsis due to pneumonia (HCC)  Met on admission with leukocytosis, tachypnea.  Suspected source bilateral lower lobe pneumonia  Continue IV ceftriaxone and azithromycin  Procalcitonin downward trending  Blood cultures no growth  MRSA negative  Urinary antigens negative   Possibly d/t aspiration  Speech/swallow on board  Closed fracture of neck of right femur (HCC)  Acute right femoral neck fracture sustained after mechanical fall at nursing facility  Patient underwent right hip hemiarthroplasty on 6/28  Pain control, DVT ppx  Follow-up with Ortho outpatient aspirin 81 mg twice daily  Acute respiratory failure (HCC)  Acutely requiring 4 L/min on admission, likely secondary to pneumonia and reactive airway disease flare  With wheezing on exam, bilateral lower lobe pneumonia on imaging  Continue Xopenex/Atrovent  IV Solu-Medrol transitioned to prednsione  Respiratory protocol  Airway clearance protocol   Discussed with Pulm weaned to po prednisone,   CXR done decreased mild vascular congestion  Weaned to 3L   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 Unable to tell me year or month  Parkinsonism (HCC)  Resume home Sinemet  Essential hypertension  Blood pressure 154/80  Likely exacerbated by pain  Resume home Coreg, Lasix, Aldactone  Monitor BP  History of CVA (cerebrovascular accident)  Restart on aspirin  Monitor mental status  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  Denies chest pain  Continue beta-blocker  Aspirin on hold as above  Hypertensive heart disease with congestive heart failure (HCC)  Wt Readings  from Last 3 Encounters:   04/16/25 73.5 kg (162 lb)   09/27/24 77.1 kg (170 lb)   09/17/24 77.1 kg (170 lb)   EF 60%, grade 1 diastolic dysfunction  Continue on Lasix and Aldactone  Continue to monitor bp per protocol     Labs & Imaging: Results Review Statement: No pertinent imaging studies reviewed.    VTE Prophylaxis: in place.    Code Status:   Level 1 - Full Code    Patient Centered Rounds: I have performed bedside rounds with nursing staff today.    Mobility:   Basic Mobility Inpatient Raw Score: 6  JH-HLM Goal: 2: Bed activities/Dependent transfer  JH-HLM Achieved: 2: Bed activities/Dependent transfer  JH-HLM Goal NOT achieved. Continue with multidisciplinary rounding and encourage appropriate mobility to improve upon JH-HLM goals.    Discussions with Specialists or Other Care Team Provider: RN    Education and Discussions with Family / Patient: Sister    Total Time Spent on Date of Encounter in care of patient: 35 mins. This time was spent on one or more of the following: performing physical exam; counseling and coordination of care; obtaining or reviewing history; documenting in the medical record; reviewing/ordering tests, medications or procedures; communicating with other healthcare professionals and discussing with patient's family/caregivers.    Current Length of Stay: 6 day(s)    Current Patient Status: Inpatient   Certification Statement: The patient will continue to require additional inpatient hospital stay due to see my assessment and plan.     Subjective:   Patient is seen and examined at bedside.  No new complaints.  Afebrile  All other ROS are negative.    Objective:    Vitals: Blood pressure 118/60, pulse 73, temperature 97.9 °F (36.6 °C), temperature source Oral, resp. rate (!) 24, SpO2 97%.,There is no height or weight on file to calculate BMI.  SPO2 RA Rest      Flowsheet Row ED to Hosp-Admission (Current) from 6/25/2025 in [facility]   SpO2 97 %   SpO2  Activity At Rest   O2 Device Nasal cannula   O2 Flow Rate 4 L/min          I&O:   Intake/Output Summary (Last 24 hours) at 7/1/2025 1209  Last data filed at 7/1/2025 0900  Gross per 24 hour   Intake 280 ml   Output --   Net 280 ml       Physical Exam:    General- Alert, lying comfortably in bed. Not in any acute distress.  Neck- Supple, No JVD  CVS- regular, S1 and S2 normal  Chest- Bilateral Air entry, No rhochi, crackles or wheezing present.  Abdomen- soft, nontender, not distended, no guarding or rigidity, BS+  Extremities-  No pedal edema, No calf tenderness  CNS-   Alert, awake and at baseline. No focal deficits present.    Invasive Devices       Peripheral Intravenous Line  Duration             Peripheral IV 06/28/25 Dorsal (posterior);Left Forearm 3 days                          Social History  reviewed  Family History[1] reviewed    Meds:  Current Medications[2]     Medications Prior to Admission:     albuterol (2.5 mg/3 mL) 0.083 % nebulizer solution    albuterol (Ventolin HFA) 90 mcg/act inhaler    amLODIPine (NORVASC) 5 mg tablet    aspirin (ECOTRIN LOW STRENGTH) 81 mg EC tablet    atorvastatin (LIPITOR) 40 mg tablet    carbidopa-levodopa (SINEMET)  mg per tablet    carvedilol (COREG) 6.25 mg tablet    Cholecalciferol (Vitamin D3) 50 MCG (2000 UT) capsule    escitalopram (LEXAPRO) 10 mg tablet    ferrous sulfate 325 (65 Fe) mg tablet    fluticasone (FLONASE) 50 mcg/act nasal spray    furosemide (LASIX) 20 mg tablet    pantoprazole (PROTONIX) 40 mg tablet    QUEtiapine (SEROquel) 25 mg tablet    spironolactone (ALDACTONE) 25 mg tablet    Labs:  Results from last 7 days   Lab Units 07/01/25  0420 06/30/25  0321 06/29/25  0159 06/28/25  0140 06/27/25  0347   WBC Thousand/uL 14.88* 13.70* 19.02*   < > 15.66*   HEMOGLOBIN g/dL 13.2 13.0 13.7   < > 13.5   HEMATOCRIT % 42.3 41.5 42.8   < > 42.1   PLATELETS Thousands/uL 156 160 205   < > 203   SEGS PCT % 77*  --  88*  --  92*   LYMPHO PCT % 12*  --  5*  --   4*   MONO PCT % 9  --  7  --  3*   EOS PCT % 1  --  0  --  0    < > = values in this interval not displayed.     Results from last 7 days   Lab Units 07/01/25  0420 06/30/25  0321 06/29/25  0159 06/28/25  0140 06/27/25  0347 06/26/25  0818   POTASSIUM mmol/L 4.4 4.3 4.4   < > 4.3 4.2   CHLORIDE mmol/L 102 103 102   < > 103 100   CO2 mmol/L 30 32 32   < > 31 28   BUN mg/dL 15 16 16   < > 15 15   CREATININE mg/dL 0.57* 0.68 0.67   < > 0.64 0.66   CALCIUM mg/dL 8.2* 8.1* 8.1*   < > 8.4 8.8   ALK PHOS U/L  --   --  46  --  49 60   ALT U/L  --   --  4*  --  5* 7   AST U/L  --   --  29  --  13 16    < > = values in this interval not displayed.     Lab Results   Component Value Date    TROPONINI 0.05 (H) 05/15/2020    TROPONINI 0.07 (H) 05/15/2020    TROPONINI <0.02 09/18/2019    CKMB 4.2 04/14/2017    CKMB 10.6 (H) 04/13/2017    CKTOTAL 186 01/25/2024    CKTOTAL 290 04/14/2017    CKTOTAL 628 (H) 04/13/2017    CKTOTAL 56 04/13/2016         Lab Results   Component Value Date    BLOODCX No Growth After 5 Days. 06/25/2025    BLOODCX No Growth After 5 Days. 06/25/2025    BLOODCX No Growth After 5 Days. 05/15/2020    URINECX >100,000 cfu/ml Klebsiella aerogenes (A) 11/03/2024    URINECX >100,000 cfu/ml Klebsiella aerogenes (A) 07/20/2024    URINECX >100,000 cfu/ml Klebsiella aerogenes (A) 05/22/2024         Imaging:  Results for orders placed during the hospital encounter of 06/25/25    XR Trauma chest portable    Narrative  XR CHEST PORTABLE    INDICATION: TRAUMA.    COMPARISON: 10/24/2024    FINDINGS:    There is pulmonary vascular congestion. There is left basilar opacity, likely a combination of atelectasis and pleural fluid.    Enlarged cardiac silhouette.    There is a fracture of the left lateral sixth rib. Additional left rib fractures may be old.    Normal upper abdomen.    Impression  1.  Acute appearing left lateral sixth rib fracture. Additional left rib fractures may be old. Given the history of trauma, consider  chest CT.    2.  Left basilar opacity, likely a combination of atelectasis and pleural fluid. Hemothorax not excluded.    3.  Pulmonary vascular congestion.    Given the discrepancy with the preliminary report, the study was marked in EPIC for immediate notification.    Workstation performed: [id number]    Results for orders placed during the hospital encounter of 10/24/24    XR chest pa and lateral    Narrative  CHEST    INDICATION:   Chronic cough.    COMPARISON:  None.    EXAM PERFORMED/VIEWS:  XR CHEST PA AND LATERAL    FINDINGS:    Cardiomediastinal silhouette appears mildly enlarged post CABG. Small eventration right hemidiaphragm anteriorly.    The lungs are clear.  No pneumothorax or pleural effusion.    Osseous structures appear within normal limits for patient age. Poststernotomy.    Impression  No acute cardiopulmonary disease.  No acute infiltrate or effusion  Post-CABG.  Small eventration anterior right hemidiaphragm.  Improvement in previous right pleural effusion and right lower lobe atelectasis or consolidation noted on prior study of 1/24/2024.          Electronically signed: 10/24/2024 06:51 PM Jose De Jesus Caban MD      Last 24 Hours Medication List:   Current Facility-Administered Medications   Medication Dose Route Frequency Provider Last Rate    albuterol  2.5 mg Nebulization Q6H PRN Sofia Olecallier, PA-C      amLODIPine  5 mg Oral Daily Jennifer Ken MD      aspirin  81 mg Oral Daily Kashmir Patel MD      atorvastatin  40 mg Oral Daily Sofia Olejar, PA-C      benzonatate  100 mg Oral TID PRN Sofia Olejar, PA-C      carbidopa-levodopa  1 tablet Oral TID Sofia Olecallier, PA-C      carvedilol  6.25 mg Oral BID With Meals Sofia Olecallier, PA-C      cefTRIAXone  1,000 mg Intravenous Q24H Sofia Olejar, PA-C 1,000 mg (06/30/25 1813)    Diclofenac Sodium  2 g Topical 4x Daily Sofia Olejar, PA-C      escitalopram  10 mg Oral Daily Sofia Olejar, PA-C      Ferrous Sulfate  300 mg Oral Daily With  Dinner Jennifer Ken MD      furosemide  20 mg Oral Daily Jennifer Ken MD      gabapentin  300 mg Oral TID Sofia Fair PA-C      guaiFENesin  400 mg Oral Q6H Jennifer Ken MD      heparin (porcine)  5,000 Units Subcutaneous Q8H [facility] Kashmir Patel MD      hydrALAZINE  5 mg Intravenous Q6H PRN Jennifer Ken MD      ipratropium  0.5 mg Nebulization TID Sofia Fair PA-C      levalbuterol  1.25 mg Nebulization TID Sofia Fair PA-C      lidocaine  1 patch Topical Daily Sofia Fair PA-C      ondansetron  4 mg Intravenous Q6H PRN Sofia Fair PA-C      traMADol  50 mg Oral Q6H PRN Sofia Fair PA-C      Or    oxyCODONE  2.5 mg Oral Q6H PRN TREMAYNE SpannC      pantoprazole  40 mg Intravenous Q24H [facility] Jennifer Ken MD      QUEtiapine  25 mg Oral HS Sofia Fair PA-C      And    QUEtiapine  12.5 mg Oral Daily Sofia Fair PA-C      sodium chloride  1 spray Each Nare Q1H PRN Jennifer Ken MD      spironolactone  25 mg Oral Daily Jennifer Ken MD          Today, Patient Was Seen By: Kashmir Patel MD    ** Please Note: Dictation voice to text software may have been used in the creation of this document. **             [1]   Family History  Problem Relation Name Age of Onset    Heart disease Mother Jennifer         Premature Coronary    Heart disease Father Jennifer         Premature Coronary    Psychiatric Illness Sister Cecelia Leomn    [2]   Current Facility-Administered Medications   Medication Dose Route Frequency Provider Last Rate Last Admin    albuterol inhalation solution 2.5 mg  2.5 mg Nebulization Q6H PRN Sofia Fair PA-C   2.5 mg at 06/27/25 0236    amLODIPine (NORVASC) tablet 5 mg  5 mg Oral Daily Jennifer Ken MD   5 mg at 07/01/25 0848    aspirin (ECOTRIN LOW STRENGTH) EC tablet 81 mg  81 mg Oral Daily Kashmir Patel MD   81 mg at 07/01/25 0848    atorvastatin (LIPITOR) tablet 40 mg  40 mg Oral Daily Sofia Fair PA-C   40 mg at 07/01/25 0848    benzonatate  (TESSALON PERLES) capsule 100 mg  100 mg Oral TID PRN TREMAYNE SpannC   100 mg at 06/27/25 1026    carbidopa-levodopa (SINEMET)  mg per tablet 1 tablet  1 tablet Oral TID TREMAYNE SpannC   1 tablet at 07/01/25 0847    carvedilol (COREG) tablet 6.25 mg  6.25 mg Oral BID With Meals TREMAYNE SpannC   6.25 mg at 06/30/25 1812    cefTRIAXone (ROCEPHIN) IVPB (premix in dextrose) 1,000 mg 50 mL  1,000 mg Intravenous Q24H Sofia Fair PA-C 100 mL/hr at 06/30/25 1813 1,000 mg at 06/30/25 1813    Diclofenac Sodium (VOLTAREN) 1 % topical gel 2 g  2 g Topical 4x Daily Sofia Fair PA-C   2 g at 06/30/25 1830    escitalopram (LEXAPRO) tablet 10 mg  10 mg Oral Daily Sofia Fair PA-C   10 mg at 07/01/25 0847    Ferrous Sulfate oral syrup 300 mg  300 mg Oral Daily With Dinner Jennifer Ken MD   300 mg at 06/30/25 1812    furosemide (LASIX) tablet 20 mg  20 mg Oral Daily Jennifer Ken MD   20 mg at 07/01/25 0848    gabapentin (NEURONTIN) capsule 300 mg  300 mg Oral TID TREMAYNE SpannC   300 mg at 07/01/25 0848    guaiFENesin (ROBITUSSIN) oral liquid 400 mg  400 mg Oral Q6H Jennifer Ken MD   400 mg at 07/01/25 0533    heparin (porcine) subcutaneous injection 5,000 Units  5,000 Units Subcutaneous Q8H [facility] Kashmir Patel MD   5,000 Units at 07/01/25 0529    hydrALAZINE (APRESOLINE) injection 5 mg  5 mg Intravenous Q6H PRN Jennifer Ken MD        ipratropium (ATROVENT) 0.02 % inhalation solution 0.5 mg  0.5 mg Nebulization TID TREMAYNE SpannC   0.5 mg at 07/01/25 0724    levalbuterol (XOPENEX) inhalation solution 1.25 mg  1.25 mg Nebulization TID Sofiahaile Fair PA-C   1.25 mg at 07/01/25 0724    lidocaine (LIDODERM) 5 % patch 1 patch  1 patch Topical Daily CORAL Spann-C   1 patch at 07/01/25 0854    ondansetron (ZOFRAN) injection 4 mg  4 mg Intravenous Q6H PRN Sofia Fair PA-C        traMADol (ULTRAM) tablet 50 mg  50 mg Oral Q6H PRN Sofiahaile Fair PA-C   50 mg at 06/27/25 4836    Or     oxyCODONE (ROXICODONE) split tablet 2.5 mg  2.5 mg Oral Q6H PRN Sofia Fair PA-C   2.5 mg at 06/29/25 1454    pantoprazole (PROTONIX) injection 40 mg  40 mg Intravenous Q24H MAXI Jennifer Ken MD   40 mg at 07/01/25 0848    QUEtiapine (SEROquel) tablet 25 mg  25 mg Oral HS Sofia Fair PA-C   25 mg at 06/30/25 2115    And    QUEtiapine (SEROquel) tablet 12.5 mg  12.5 mg Oral Daily Sofia Fair PA-C   12.5 mg at 07/01/25 0847    sodium chloride (OCEAN) 0.65 % nasal spray 1 spray  1 spray Each Nare Q1H PRN Jennifer Ken MD        spironolactone (ALDACTONE) tablet 25 mg  25 mg Oral Daily Jennifer Ken MD   25 mg at 07/01/25 0847
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
## 2025-07-01 NOTE — NURSING NOTE
During the day patient began to wean off of oxygen, was able to wean to 2L. When on RA pt would  sustain oxygen level in mid80s. Desating noted when coughing and eating as well. Suctioning provided as needed, congested cough noted.

## 2025-07-02 LAB
ANION GAP SERPL CALCULATED.3IONS-SCNC: 2 MMOL/L (ref 4–13)
ANION GAP SERPL CALCULATED.3IONS-SCNC: 3 MMOL/L (ref 4–13)
BASOPHILS # BLD AUTO: 0.04 THOUSANDS/ÂΜL (ref 0–0.1)
BASOPHILS NFR BLD AUTO: 0 % (ref 0–1)
BUN SERPL-MCNC: 14 MG/DL (ref 5–25)
BUN SERPL-MCNC: 14 MG/DL (ref 5–25)
CALCIUM SERPL-MCNC: 7.9 MG/DL (ref 8.4–10.2)
CALCIUM SERPL-MCNC: 8.1 MG/DL (ref 8.4–10.2)
CHLORIDE SERPL-SCNC: 101 MMOL/L (ref 96–108)
CHLORIDE SERPL-SCNC: 102 MMOL/L (ref 96–108)
CO2 SERPL-SCNC: 31 MMOL/L (ref 21–32)
CO2 SERPL-SCNC: 32 MMOL/L (ref 21–32)
CREAT SERPL-MCNC: 0.59 MG/DL (ref 0.6–1.3)
CREAT SERPL-MCNC: 0.63 MG/DL (ref 0.6–1.3)
EOSINOPHIL # BLD AUTO: 0.79 THOUSAND/ÂΜL (ref 0–0.61)
EOSINOPHIL NFR BLD AUTO: 6 % (ref 0–6)
ERYTHROCYTE [DISTWIDTH] IN BLOOD BY AUTOMATED COUNT: 14.6 % (ref 11.6–15.1)
GFR SERPL CREATININE-BSD FRML MDRD: 91 ML/MIN/1.73SQ M
GFR SERPL CREATININE-BSD FRML MDRD: 93 ML/MIN/1.73SQ M
GLUCOSE SERPL-MCNC: 116 MG/DL (ref 65–140)
GLUCOSE SERPL-MCNC: 88 MG/DL (ref 65–140)
HCT VFR BLD AUTO: 42.4 % (ref 36.5–49.3)
HGB BLD-MCNC: 13.4 G/DL (ref 12–17)
IMM GRANULOCYTES # BLD AUTO: 0.19 THOUSAND/UL (ref 0–0.2)
IMM GRANULOCYTES NFR BLD AUTO: 1 % (ref 0–2)
LYMPHOCYTES # BLD AUTO: 1.94 THOUSANDS/ÂΜL (ref 0.6–4.47)
LYMPHOCYTES NFR BLD AUTO: 14 % (ref 14–44)
MCH RBC QN AUTO: 27.7 PG (ref 26.8–34.3)
MCHC RBC AUTO-ENTMCNC: 31.6 G/DL (ref 31.4–37.4)
MCV RBC AUTO: 88 FL (ref 82–98)
MONOCYTES # BLD AUTO: 1.48 THOUSAND/ÂΜL (ref 0.17–1.22)
MONOCYTES NFR BLD AUTO: 10 % (ref 4–12)
NEUTROPHILS # BLD AUTO: 9.96 THOUSANDS/ÂΜL (ref 1.85–7.62)
NEUTS SEG NFR BLD AUTO: 69 % (ref 43–75)
NRBC BLD AUTO-RTO: 0 /100 WBCS
PLATELET # BLD AUTO: 159 THOUSANDS/UL (ref 149–390)
PMV BLD AUTO: 9.1 FL (ref 8.9–12.7)
POTASSIUM SERPL-SCNC: 4.2 MMOL/L (ref 3.5–5.3)
POTASSIUM SERPL-SCNC: 5.4 MMOL/L (ref 3.5–5.3)
RBC # BLD AUTO: 4.84 MILLION/UL (ref 3.88–5.62)
SODIUM SERPL-SCNC: 135 MMOL/L (ref 135–147)
SODIUM SERPL-SCNC: 136 MMOL/L (ref 135–147)
WBC # BLD AUTO: 14.4 THOUSAND/UL (ref 4.31–10.16)

## 2025-07-02 PROCEDURE — 85025 COMPLETE CBC W/AUTO DIFF WBC: CPT | Performed by: INTERNAL MEDICINE

## 2025-07-02 PROCEDURE — 94760 N-INVAS EAR/PLS OXIMETRY 1: CPT

## 2025-07-02 PROCEDURE — 99232 SBSQ HOSP IP/OBS MODERATE 35: CPT | Performed by: INTERNAL MEDICINE

## 2025-07-02 PROCEDURE — 94660 CPAP INITIATION&MGMT: CPT

## 2025-07-02 PROCEDURE — 80048 BASIC METABOLIC PNL TOTAL CA: CPT | Performed by: INTERNAL MEDICINE

## 2025-07-02 RX ORDER — FUROSEMIDE 10 MG/ML
40 INJECTION INTRAMUSCULAR; INTRAVENOUS ONCE
Status: COMPLETED | OUTPATIENT
Start: 2025-07-02 | End: 2025-07-02

## 2025-07-02 RX ADMIN — CARBIDOPA AND LEVODOPA 1 TABLET: 25; 100 TABLET ORAL at 09:28

## 2025-07-02 RX ADMIN — ATORVASTATIN CALCIUM 40 MG: 40 TABLET, FILM COATED ORAL at 09:28

## 2025-07-02 RX ADMIN — PANTOPRAZOLE SODIUM 40 MG: 40 INJECTION, POWDER, FOR SOLUTION INTRAVENOUS at 09:28

## 2025-07-02 RX ADMIN — CARVEDILOL 6.25 MG: 3.12 TABLET, FILM COATED ORAL at 09:27

## 2025-07-02 RX ADMIN — CARVEDILOL 6.25 MG: 3.12 TABLET, FILM COATED ORAL at 16:43

## 2025-07-02 RX ADMIN — ESCITALOPRAM OXALATE 10 MG: 10 TABLET ORAL at 09:28

## 2025-07-02 RX ADMIN — DICLOFENAC SODIUM 2 G: 10 GEL TOPICAL at 09:47

## 2025-07-02 RX ADMIN — HEPARIN SODIUM 5000 UNITS: 5000 INJECTION, SOLUTION INTRAVENOUS; SUBCUTANEOUS at 21:48

## 2025-07-02 RX ADMIN — SPIRONOLACTONE 25 MG: 25 TABLET ORAL at 09:28

## 2025-07-02 RX ADMIN — GUAIFENESIN 400 MG: 200 SOLUTION ORAL at 21:39

## 2025-07-02 RX ADMIN — QUETIAPINE FUMARATE 25 MG: 25 TABLET ORAL at 21:37

## 2025-07-02 RX ADMIN — ASPIRIN 81 MG: 81 TABLET, COATED ORAL at 09:29

## 2025-07-02 RX ADMIN — HEPARIN SODIUM 5000 UNITS: 5000 INJECTION, SOLUTION INTRAVENOUS; SUBCUTANEOUS at 05:56

## 2025-07-02 RX ADMIN — FUROSEMIDE 20 MG: 20 TABLET ORAL at 09:29

## 2025-07-02 RX ADMIN — CARBIDOPA AND LEVODOPA 1 TABLET: 25; 100 TABLET ORAL at 21:37

## 2025-07-02 RX ADMIN — GUAIFENESIN 400 MG: 200 SOLUTION ORAL at 05:55

## 2025-07-02 RX ADMIN — FUROSEMIDE 40 MG: 10 INJECTION, SOLUTION INTRAVENOUS at 16:45

## 2025-07-02 RX ADMIN — AMLODIPINE BESYLATE 5 MG: 5 TABLET ORAL at 09:28

## 2025-07-02 RX ADMIN — LIDOCAINE 1 PATCH: 50 PATCH CUTANEOUS at 15:07

## 2025-07-02 RX ADMIN — GABAPENTIN 300 MG: 300 CAPSULE ORAL at 21:37

## 2025-07-02 RX ADMIN — GABAPENTIN 300 MG: 300 CAPSULE ORAL at 16:43

## 2025-07-02 RX ADMIN — GUAIFENESIN 400 MG: 200 SOLUTION ORAL at 16:43

## 2025-07-02 RX ADMIN — Medication 2.5 MG: at 11:33

## 2025-07-02 RX ADMIN — QUETIAPINE FUMARATE 12.5 MG: 25 TABLET ORAL at 09:28

## 2025-07-02 RX ADMIN — GUAIFENESIN 400 MG: 200 SOLUTION ORAL at 09:28

## 2025-07-02 RX ADMIN — CEFTRIAXONE 1000 MG: 1 INJECTION, SOLUTION INTRAVENOUS at 21:42

## 2025-07-02 RX ADMIN — CARBIDOPA AND LEVODOPA 1 TABLET: 25; 100 TABLET ORAL at 16:43

## 2025-07-02 RX ADMIN — Medication 300 MG: at 16:43

## 2025-07-02 RX ADMIN — GABAPENTIN 300 MG: 300 CAPSULE ORAL at 09:28

## 2025-07-02 NOTE — PROGRESS NOTES
Progress Note - Hospitalist   Name: Aldo Rodriguez 84 y.o. male I MRN: [id number]  Unit/Bed#: -01 I Date of Admission: 6/25/2025   Date of Service: 7/2/2025 I Hospital Day: 7    Assessment & Plan  Sepsis due to pneumonia (HCC)  Met on admission with leukocytosis, tachypnea.  Suspected source bilateral lower lobe pneumonia  Continue IV ceftriaxone and azithromycin  Procalcitonin downward trending  Blood cultures no growth  MRSA negative  Urinary antigens negative   Possibly d/t aspiration  Speech/swallow on board  Closed fracture of neck of right femur (HCC)  Acute right femoral neck fracture sustained after mechanical fall at nursing facility  Patient underwent right hip hemiarthroplasty on 6/28  Pain control, DVT ppx  Follow-up with Ortho outpatient aspirin 81 mg twice daily  Acute respiratory failure (HCC)  Acutely requiring 4 L/min on admission, likely secondary to pneumonia and reactive airway disease flare  With wheezing on exam, bilateral lower lobe pneumonia on imaging  Continue Xopenex/Atrovent  IV Solu-Medrol transitioned to prednsione  Respiratory protocol  Airway clearance protocol   Discussed with Pulm weaned to po prednisone,   CXR done decreased mild vascular congestion  Weaned to 2L supplemental oxygen  Encourage incentive spirometer  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 Unable to tell me year or month  Parkinsonism (HCC)  Resume home Sinemet  Essential hypertension  Blood pressure 154/80  Likely exacerbated by pain  Resume home Coreg, Lasix, Aldactone  Monitor BP  History of CVA (cerebrovascular accident)  Restart on aspirin  Monitor mental status  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  Denies chest pain  Continue beta-blocker  Aspirin on hold as above  Hypertensive heart disease  with congestive heart failure (HCC)  Wt Readings from Last 3 Encounters:   04/16/25 73.5 kg (162 lb)   09/27/24 77.1 kg (170 lb)   09/17/24 77.1 kg (170 lb)   EF 60%, grade 1 diastolic dysfunction  Continue on Lasix and Aldactone  Continue to monitor bp per protocol     Labs & Imaging: Results Review Statement: No pertinent imaging studies reviewed.    VTE Prophylaxis: in place.    Code Status:   Level 1 - Full Code    Patient Centered Rounds: I have performed bedside rounds with nursing staff today.    Mobility:   Basic Mobility Inpatient Raw Score: 6  JH-HLM Goal: 2: Bed activities/Dependent transfer  JH-HLM Achieved: 2: Bed activities/Dependent transfer  JH-HLM Goal NOT achieved. Continue with multidisciplinary rounding and encourage appropriate mobility to improve upon JH-HLM goals.    Discussions with Specialists or Other Care Team Provider: RN    Education and Discussions with Family / Patient:     Total Time Spent on Date of Encounter in care of patient: 35 mins. This time was spent on one or more of the following: performing physical exam; counseling and coordination of care; obtaining or reviewing history; documenting in the medical record; reviewing/ordering tests, medications or procedures; communicating with other healthcare professionals and discussing with patient's family/caregivers.    Current Length of Stay: 7 day(s)    Current Patient Status: Inpatient   Certification Statement: The patient will continue to require additional inpatient hospital stay due to see my assessment and plan.     Subjective:   Patient is seen and examined at bedside.  No complaints.  Afebrile  All other ROS are negative.    Objective:    Vitals: Blood pressure 133/70, pulse 70, temperature 98.4 °F (36.9 °C), temperature source Oral, resp. rate 16, SpO2 94%.,There is no height or weight on file to calculate BMI.  SPO2 RA Rest      Flowsheet Row ED to Hosp-Admission (Current) from 6/25/2025 in [facility]   SpO2 94 %   SpO2 Activity At Rest   O2 Device Nasal cannula   O2 Flow Rate 4 L/min          I&O:   Intake/Output Summary (Last 24 hours) at 7/2/2025 0826  Last data filed at 7/2/2025 0638  Gross per 24 hour   Intake 120 ml   Output 777 ml   Net -657 ml       Physical Exam:    General- Alert, lying comfortably in bed. Not in any acute distress.  Neck- Supple, No JVD  CVS- regular, S1 and S2 normal  Chest- Bilateral Air entry, decreased at bases  Abdomen- soft, nontender, not distended, no guarding or rigidity, BS+  Extremities-  No pedal edema, No calf tenderness  CNS-   Alert, awake and at baseline. No focal deficits present.    Invasive Devices       Peripheral Intravenous Line  Duration             Peripheral IV 06/28/25 Dorsal (posterior);Left Forearm 4 days                          Social History  reviewed  Family History[1] reviewed    Meds:  Current Medications[2]     Medications Prior to Admission:     albuterol (2.5 mg/3 mL) 0.083 % nebulizer solution    albuterol (Ventolin HFA) 90 mcg/act inhaler    amLODIPine (NORVASC) 5 mg tablet    aspirin (ECOTRIN LOW STRENGTH) 81 mg EC tablet    atorvastatin (LIPITOR) 40 mg tablet    carbidopa-levodopa (SINEMET)  mg per tablet    carvedilol (COREG) 6.25 mg tablet    Cholecalciferol (Vitamin D3) 50 MCG (2000 UT) capsule    escitalopram (LEXAPRO) 10 mg tablet    ferrous sulfate 325 (65 Fe) mg tablet    fluticasone (FLONASE) 50 mcg/act nasal spray    furosemide (LASIX) 20 mg tablet    pantoprazole (PROTONIX) 40 mg tablet    QUEtiapine (SEROquel) 25 mg tablet    spironolactone (ALDACTONE) 25 mg tablet    Labs:  Results from last 7 days   Lab Units 07/02/25  0613 07/01/25  0420 06/30/25  0321 06/29/25  0159   WBC Thousand/uL 14.40* 14.88* 13.70* 19.02*   HEMOGLOBIN g/dL 13.4 13.2 13.0 13.7   HEMATOCRIT % 42.4 42.3 41.5 42.8   PLATELETS Thousands/uL 159 156 160 205   SEGS PCT % 69 77*  --  88*   LYMPHO PCT % 14 12*  --  5*   MONO PCT % 10 9  --   7   EOS PCT % 6 1  --  0     Results from last 7 days   Lab Units 07/02/25  0613 07/01/25  0420 06/30/25  0321 06/29/25  0159 06/28/25  0140 06/27/25  0347 06/26/25  0818   POTASSIUM mmol/L 5.4* 4.4 4.3 4.4   < > 4.3 4.2   CHLORIDE mmol/L 101 102 103 102   < > 103 100   CO2 mmol/L 31 30 32 32   < > 31 28   BUN mg/dL 14 15 16 16   < > 15 15   CREATININE mg/dL 0.63 0.57* 0.68 0.67   < > 0.64 0.66   CALCIUM mg/dL 7.9* 8.2* 8.1* 8.1*   < > 8.4 8.8   ALK PHOS U/L  --   --   --  46  --  49 60   ALT U/L  --   --   --  4*  --  5* 7   AST U/L  --   --   --  29  --  13 16    < > = values in this interval not displayed.     Lab Results   Component Value Date    TROPONINI 0.05 (H) 05/15/2020    TROPONINI 0.07 (H) 05/15/2020    TROPONINI <0.02 09/18/2019    CKMB 4.2 04/14/2017    CKMB 10.6 (H) 04/13/2017    CKTOTAL 186 01/25/2024    CKTOTAL 290 04/14/2017    CKTOTAL 628 (H) 04/13/2017    CKTOTAL 56 04/13/2016         Lab Results   Component Value Date    BLOODCX No Growth After 5 Days. 06/25/2025    BLOODCX No Growth After 5 Days. 06/25/2025    BLOODCX No Growth After 5 Days. 05/15/2020    URINECX >100,000 cfu/ml Klebsiella aerogenes (A) 11/03/2024    URINECX >100,000 cfu/ml Klebsiella aerogenes (A) 07/20/2024    URINECX >100,000 cfu/ml Klebsiella aerogenes (A) 05/22/2024         Imaging:  Results for orders placed during the hospital encounter of 06/25/25    XR chest portable    Narrative  XR CHEST PORTABLE    INDICATION: sob post op.    COMPARISON: CT and CXR 6/25/2025. CXR 10/24/2024.    FINDINGS:    Decreased mild vascular congestion. Small left pleural effusion. No pneumothorax.    Enlarged cardiac silhouette. CABG.    Bilateral ribs old trauma.    Colonic retained enteric contrast.    Impression  Decreased mild vascular congestion.    Small left pleural effusion.        Workstation performed: [id number]    Results for orders placed during the hospital encounter of 10/24/24    XR chest pa and  lateral    Narrative  CHEST    INDICATION:   Chronic cough.    COMPARISON:  None.    EXAM PERFORMED/VIEWS:  XR CHEST PA AND LATERAL    FINDINGS:    Cardiomediastinal silhouette appears mildly enlarged post CABG. Small eventration right hemidiaphragm anteriorly.    The lungs are clear.  No pneumothorax or pleural effusion.    Osseous structures appear within normal limits for patient age. Poststernotomy.    Impression  No acute cardiopulmonary disease.  No acute infiltrate or effusion  Post-CABG.  Small eventration anterior right hemidiaphragm.  Improvement in previous right pleural effusion and right lower lobe atelectasis or consolidation noted on prior study of 1/24/2024.          Electronically signed: 10/24/2024 06:51 PM Jose De Jesus Caban MD      Last 24 Hours Medication List:   Current Facility-Administered Medications   Medication Dose Route Frequency Provider Last Rate    albuterol  2.5 mg Nebulization Q6H PRN CORAL Spann-C      amLODIPine  5 mg Oral Daily Jennifer Ken MD      aspirin  81 mg Oral Daily Kashmir Patel MD      atorvastatin  40 mg Oral Daily Sofia Fair PA-C      benzonatate  100 mg Oral TID PRN SofiaCORAL Barba-C      carbidopa-levodopa  1 tablet Oral TID Sofia Fair PA-C      carvedilol  6.25 mg Oral BID With Meals CORAL Spann-C      cefTRIAXone  1,000 mg Intravenous Q24H Sofiahaile Fair, PA-C 1,000 mg (07/01/25 1843)    Diclofenac Sodium  2 g Topical 4x Daily Sofia Olecallier, PA-C      escitalopram  10 mg Oral Daily Sofia Leighar, PA-C      Ferrous Sulfate  300 mg Oral Daily With Dinner Jennifer Ken MD      furosemide  20 mg Oral Daily Jennifer Kne MD      gabapentin  300 mg Oral TID CORAL Spann-C      guaiFENesin  400 mg Oral Q6H Jennifer Ken MD      heparin (porcine)  5,000 Units Subcutaneous Q8H [facility] Kashmir Patel MD      hydrALAZINE  5 mg Intravenous Q6H PRN Jennifer Ken MD      ipratropium  0.5 mg Nebulization Q6H PRN Kashmir Patel MD       levalbuterol  1.25 mg Nebulization Q6H PRN Kashmir Patel MD      lidocaine  1 patch Topical Daily CORAL Spann-C      ondansetron  4 mg Intravenous Q6H PRN Sofia Fair, PA-C      traMADol  50 mg Oral Q6H PRN Sofia Manjula, PA-C      Or    oxyCODONE  2.5 mg Oral Q6H PRN Sofia Fair, PA-C      pantoprazole  40 mg Intravenous Q24H MAXI Jennifer Ken MD      QUEtiapine  25 mg Oral HS CORAL Spann-C      And    QUEtiapine  12.5 mg Oral Daily CORAL Spann-C      sodium chloride  1 spray Each Nare Q1H PRN Jennifer Ken MD      spironolactone  25 mg Oral Daily Jennifer Ken MD          Today, Patient Was Seen By: Kashmir Patel MD    ** Please Note: Dictation voice to text software may have been used in the creation of this document. **             [1]   Family History  Problem Relation Name Age of Onset    Heart disease Mother Jennifer         Premature Coronary    Heart disease Father Jennifer         Premature Coronary    Psychiatric Illness Sister Cecelia Lemon    [2]   Current Facility-Administered Medications   Medication Dose Route Frequency Provider Last Rate Last Admin    albuterol inhalation solution 2.5 mg  2.5 mg Nebulization Q6H PRN Sofia Fair PA-C   2.5 mg at 06/27/25 0236    amLODIPine (NORVASC) tablet 5 mg  5 mg Oral Daily Jennifer Ken MD   5 mg at 07/01/25 0848    aspirin (ECOTRIN LOW STRENGTH) EC tablet 81 mg  81 mg Oral Daily Kashmir Patel MD   81 mg at 07/01/25 0848    atorvastatin (LIPITOR) tablet 40 mg  40 mg Oral Daily CORAL Spann-C   40 mg at 07/01/25 0848    benzonatate (TESSALON PERLES) capsule 100 mg  100 mg Oral TID PRN Sofiahaile Fair PA-C   100 mg at 07/01/25 1843    carbidopa-levodopa (SINEMET)  mg per tablet 1 tablet  1 tablet Oral TID CORAL Spann-C   1 tablet at 07/01/25 2228    carvedilol (COREG) tablet 6.25 mg  6.25 mg Oral BID With Meals Sofia Fair PA-C   6.25 mg at 07/01/25 1842    cefTRIAXone (ROCEPHIN) IVPB (premix in  dextrose) 1,000 mg 50 mL  1,000 mg Intravenous Q24H Sofia Fair PA-C 100 mL/hr at 07/01/25 1843 1,000 mg at 07/01/25 1843    Diclofenac Sodium (VOLTAREN) 1 % topical gel 2 g  2 g Topical 4x Daily Sofia Fair PA-C   2 g at 07/01/25 1333    escitalopram (LEXAPRO) tablet 10 mg  10 mg Oral Daily Sofia Fiar PA-C   10 mg at 07/01/25 0847    Ferrous Sulfate oral syrup 300 mg  300 mg Oral Daily With Dinner Jennifer Ken MD   300 mg at 07/01/25 1843    furosemide (LASIX) tablet 20 mg  20 mg Oral Daily Jennifer Ken MD   20 mg at 07/01/25 0848    gabapentin (NEURONTIN) capsule 300 mg  300 mg Oral TID Sofia Fair PA-C   300 mg at 07/01/25 2229    guaiFENesin (ROBITUSSIN) oral liquid 400 mg  400 mg Oral Q6H Jennifer Ken MD   400 mg at 07/02/25 0555    heparin (porcine) subcutaneous injection 5,000 Units  5,000 Units Subcutaneous Q8H [facility] Kashmir Patel MD   5,000 Units at 07/02/25 0556    hydrALAZINE (APRESOLINE) injection 5 mg  5 mg Intravenous Q6H PRN Jennifer Ken MD        ipratropium (ATROVENT) 0.02 % inhalation solution 0.5 mg  0.5 mg Nebulization Q6H PRN Kashmir Patel MD        levalbuterol (XOPENEX) inhalation solution 1.25 mg  1.25 mg Nebulization Q6H PRN Kashmir Patel MD        lidocaine (LIDODERM) 5 % patch 1 patch  1 patch Topical Daily Sofia Fair PA-C   1 patch at 07/01/25 0854    ondansetron (ZOFRAN) injection 4 mg  4 mg Intravenous Q6H PRN Sofia Fair PA-C        traMADol (ULTRAM) tablet 50 mg  50 mg Oral Q6H PRN Sofia Fair PA-C   50 mg at 06/27/25 2106    Or    oxyCODONE (ROXICODONE) split tablet 2.5 mg  2.5 mg Oral Q6H PRN Sofia Fair PA-C   2.5 mg at 06/29/25 1454    pantoprazole (PROTONIX) injection 40 mg  40 mg Intravenous Q24H [facility] Jennifer Ken MD   40 mg at 07/01/25 0848    QUEtiapine (SEROquel) tablet 25 mg  25 mg Oral HS Sofia Fair PA-C   25 mg at 07/01/25 2212    And    QUEtiapine (SEROquel) tablet 12.5 mg  12.5 mg Oral Daily Sofia Fair PA-C   12.5  mg at 07/01/25 0847    sodium chloride (OCEAN) 0.65 % nasal spray 1 spray  1 spray Each Nare Q1H PRN Jennifer Ken MD        spironolactone (ALDACTONE) tablet 25 mg  25 mg Oral Daily Jennifer Ken MD   25 mg at 07/01/25 0847

## 2025-07-02 NOTE — PLAN OF CARE
Problem: PAIN - ADULT  Goal: Verbalizes/displays adequate comfort level or baseline comfort level  Description: Interventions:  - Encourage patient to monitor pain and request assistance  - Assess pain using appropriate pain scale  - Administer analgesics as ordered based on type and severity of pain and evaluate response  - Implement non-pharmacological measures as appropriate and evaluate response  - Consider cultural and social influences on pain and pain management  - Notify physician/advanced practitioner if interventions unsuccessful or patient reports new pain  - Educate patient/family on pain management process including their role and importance of  reporting pain   - Provide non-pharmacologic/complimentary pain relief interventions  Outcome: Progressing     Problem: INFECTION - ADULT  Goal: Absence or prevention of progression during hospitalization  Description: INTERVENTIONS:  - Assess and monitor for signs and symptoms of infection  - Monitor lab/diagnostic results  - Monitor all insertion sites, i.e. indwelling lines, tubes, and drains  - Monitor endotracheal if appropriate and nasal secretions for changes in amount and color  - [place] appropriate cooling/warming therapies per order  - Administer medications as ordered  - Instruct and encourage patient and family to use good hand hygiene technique  - Identify and instruct in appropriate isolation precautions for identified infection/condition  Outcome: Progressing  Goal: Absence of fever/infection during neutropenic period  Description: INTERVENTIONS:  - Monitor WBC  - Perform strict hand hygiene  - Limit to healthy visitors only  - No plants, dried, fresh or silk flowers with [place] in patient room  Outcome: Progressing     Problem: SAFETY ADULT  Goal: Patient will remain free of falls  Description: INTERVENTIONS:  - Educate patient/family on patient safety including physical limitations  - Instruct patient to call for assistance with activity   -  Consider consulting OT/PT to assist with strengthening/mobility based on AM PAC & JH-HLM score  - Consult OT/PT to assist with strengthening/mobility   - Keep Call bell within reach  - Keep bed low and locked with side rails adjusted as appropriate  - Keep care items and personal belongings within reach  - Initiate and maintain comfort rounds  - Make Fall Risk Sign visible to staff  - Offer Toileting every xxxx Hours, in advance of need  - Initiate/Maintain xalarm  - Obtain necessary fall risk management equipment: x  - Apply yellow socks and bracelet for high fall risk patients  - Consider moving patient to room near nurses station  Outcome: Progressing  Goal: Maintain or return to baseline ADL function  Description: INTERVENTIONS:  -  Assess patient's ability to carry out ADLs; assess patient's baseline for ADL function and identify physical deficits which impact ability to perform ADLs (bathing, care of mouth/teeth, toileting, grooming, dressing, etc.)  - Assess/evaluate cause of self-care deficits   - Assess range of motion  - Assess patient's mobility; develop plan if impaired  - Assess patient's need for assistive devices and provide as appropriate  - Encourage maximum independence but intervene and supervise when necessary  - Involve family in performance of ADLs  - Assess for home care needs following discharge   - Consider OT consult to assist with ADL evaluation and planning for discharge  - Provide patient education as appropriate  - Monitor functional capacity and physical performance, use of AM PAC & JH-HLM   - Monitor gait, balance and fatigue with ambulation    Outcome: Progressing  Goal: Maintains/Returns to pre admission functional level  Description: INTERVENTIONS:  - Perform AM-PAC 6 Click Basic Mobility/ Daily Activity assessment daily.  - Set and communicate daily mobility goal to care team and patient/family/caregiver.   - Collaborate with rehabilitation services on mobility goals if  consulted  - Perform Range of Motion x times a day.  - Reposition patient every x hours.  - Dangle patient x times a day  - Stand patient x times a day  - Ambulate patient xx times a day  - Out of bed to chair x times a day   - Out of bed for meals xx times a day  - Out of bed for toileting  - Record patient progress and toleration of activity level   Outcome: Progressing     Problem: DISCHARGE PLANNING  Goal: Discharge to home or other facility with appropriate resources  Description: INTERVENTIONS:  - Identify barriers to discharge w/patient and caregiver  - Arrange for needed discharge resources and transportation as appropriate  - Identify discharge learning needs (meds, wound care, etc.)  - Arrange for interpretive services to assist at discharge as needed  - Refer to Case Management Department for coordinating discharge planning if the patient needs post-hospital services based on physician/advanced practitioner order or complex needs related to functional status, cognitive ability, or social support system  Outcome: Progressing     Problem: Knowledge Deficit  Goal: Patient/family/caregiver demonstrates understanding of disease process, treatment plan, medications, and discharge instructions  Description: Complete learning assessment and assess knowledge base.  Interventions:  - Provide teaching at level of understanding  - Provide teaching via preferred learning methods  Outcome: Progressing     Problem: Prexisting or High Potential for Compromised Skin Integrity  Goal: Skin integrity is maintained or improved  Description: INTERVENTIONS:  - Identify patients at risk for skin breakdown  - Assess and monitor skin integrity including under and around medical devices   - Assess and monitor nutrition and hydration status  - Monitor labs  - Assess for incontinence   - Turn and reposition patient  - Assist with mobility/ambulation  - Relieve pressure over evelyn prominences   - Avoid friction and shearing  - Provide  appropriate hygiene as needed including keeping skin clean and dry  - Evaluate need for skin moisturizer/barrier cream  - Collaborate with interdisciplinary team  - Patient/family teaching  - Consider wound care consult    Assess:  - Review Juan Francisco scale daily  - Clean and moisturize skin every x  - Inspect skin when repositioning, toileting, and assisting with ADLS  - Assess under medical devices such as xx every x  - Assess extremities for adequate circulation and sensation     Bed Management:  - Have minimal linens on bed & keep smooth, unwrinkled  - Change linens as needed when moist or perspiring  - Avoid sitting or lying in one position for more than x hours while in bed?Keep HOB at xdegrees   - Toileting:  - Offer bedside commode  - Assess for incontinence every x  - Use incontinent care products after each incontinent episode such as x    Activity:  - Mobilize patient x times a day  - Encourage activity and walks on unit  - Encourage or provide ROM exercises   - Turn and reposition patient every xx Hours  - Use appropriate equipment to lift or move patient in bed  - Instruct/ Assist with weight shifting every x when out of bed in chair  - Consider limitation of chair time xx hour intervals    Skin Care:  - Avoid use of baby powder, tape, friction and shearing, hot water or constrictive clothing  - Relieve pressure over bony prominences using x  - Do not massage red bony areas    Next Steps:  - Teach patient strategies to minimize risks such as x  - Consider consults to  interdisciplinary teams such as xx  Outcome: Progressing     Problem: Potential for Falls  Goal: Patient will remain free of falls  Description: INTERVENTIONS:  - Educate patient/family on patient safety including physical limitations  - Instruct patient to call for assistance with activity   - Consider consulting OT/PT to assist with strengthening/mobility based on AM PAC & -HL score  - Consult OT/PT to assist with strengthening/mobility    - Keep Call bell within reach  - Keep bed low and locked with side rails adjusted as appropriate  - Keep care items and personal belongings within reach  - Initiate and maintain comfort rounds  - Make Fall Risk Sign visible to staff  - Offer Toileting every x Hours, in advance of need  - Initiate/Maintain xalarm  - Obtain necessary fall risk management equipment: xx  - Apply yellow socks and bracelet for high fall risk patients  - Consider moving patient to room near nurses station  Outcome: Progressing     Problem: Nutrition/Hydration-ADULT  Goal: Nutrient/Hydration intake appropriate for improving, restoring or maintaining nutritional needs  Description: Monitor and assess patient's nutrition/hydration status for malnutrition. Collaborate with interdisciplinary team and initiate plan and interventions as ordered.  Monitor patient's weight and dietary intake as ordered or per policy. Utilize nutrition screening tool and intervene as necessary. Determine patient's food preferences and provide high-protein, high-caloric foods as appropriate.     INTERVENTIONS:  - Monitor oral intake, urinary output, labs, and treatment plans  - Assess nutrition and hydration status and recommend course of action  - Evaluate amount of meals eaten  - Assist patient with eating if necessary   - Allow adequate time for meals  - Recommend/ encourage appropriate diets, oral nutritional supplements, and vitamin/mineral supplements  - Order, calculate, and assess calorie counts as needed  - Recommend, monitor, and adjust tube feedings and TPN/PPN based on assessed needs  - Assess need for intravenous fluids  - Provide specific nutrition/hydration education as appropriate  - Include patient/family/caregiver in decisions related to nutrition  Outcome: Progressing     Problem: NEUROSENSORY - ADULT  Goal: Achieves stable or improved neurological status  Description: INTERVENTIONS  - Monitor and report changes in neurological status  - Monitor  vital signs such as temperature, blood pressure, glucose, and any other labs ordered   - Initiate measures to prevent increased intracranial pressure  - Monitor for seizure activity and implement precautions if appropriate      Outcome: Progressing  Goal: Achieves maximal functionality and self care  Description: INTERVENTIONS  - Monitor swallowing and airway patency with patient fatigue and changes in neurological status  - Encourage and assist patient to increase activity and self care.   - Encourage visually impaired, hearing impaired and aphasic patients to use assistive/communication devices  Outcome: Progressing     Problem: RESPIRATORY - ADULT  Goal: Achieves optimal ventilation and oxygenation  Description: INTERVENTIONS:  - Assess for changes in respiratory status  - Assess for changes in mentation and behavior  - Position to facilitate oxygenation and minimize respiratory effort  - Oxygen administered by appropriate delivery if ordered  - Initiate smoking cessation education as indicated  - Encourage broncho-pulmonary hygiene including cough, deep breathe, Incentive Spirometry  - Assess the need for suctioning and aspirate as needed  - Assess and instruct to report SOB or any respiratory difficulty  - Respiratory Therapy support as indicated  Outcome: Progressing     Problem: GENITOURINARY - ADULT  Goal: Maintains or returns to baseline urinary function  Description: INTERVENTIONS:  - Assess urinary function  - Encourage oral fluids to ensure adequate hydration if ordered  - Administer IV fluids as ordered to ensure adequate hydration  - Administer ordered medications as needed  - Offer frequent toileting  - Follow urinary retention protocol if ordered  Outcome: Progressing  Goal: Absence of urinary retention  Description: INTERVENTIONS:  - Assess patient’s ability to void and empty bladder  - Monitor I/O  - Bladder scan as needed  - Discuss with physician/AP medications to alleviate retention as needed  -  Discuss catheterization for long term situations as appropriate  Outcome: Progressing     Problem: SKIN/TISSUE INTEGRITY - ADULT  Goal: Skin Integrity remains intact(Skin Breakdown Prevention)  Description: Assess:  -Perform Juan Francisco assessment every x  -Clean and moisturize skin every x  -Inspect skin when repositioning, toileting, and assisting with ADLS  -Assess under medical devices such as x every x  -Assess extremities for adequate circulation and sensation     Bed Management:  -Have minimal linens on bed & keep smooth, unwrinkled  -Change linens as needed when moist or perspiring  -Avoid sitting or lying in one position for more than x hours while in bed  -Keep HOB at xdegrees     Toileting:  -Offer bedside commode  -Assess for incontinence every xx  -Use incontinent care products after each incontinent episode such as xx    Activity:  -Mobilize patient xx times a day  -Encourage activity and walks on unit  -Encourage or provide ROM exercises   -Turn and reposition patient every x Hours  -Use appropriate equipment to lift or move patient in bed  -Instruct/ Assist with weight shifting every x when out of bed in chair  -Consider limitation of chair time x hour intervals    Skin Care:  -Avoid use of baby powder, tape, friction and shearing, hot water or constrictive clothing  -Relieve pressure over bony prominences using x  -Do not massage red bony areas    Next Steps:  -Teach patient strategies to minimize risks such as x   -Consider consults to  interdisciplinary teams such as xx  Outcome: Progressing     Problem: MUSCULOSKELETAL - ADULT  Goal: Maintain or return mobility to safest level of function  Description: INTERVENTIONS:  - Assess patient's ability to carry out ADLs; assess patient's baseline for ADL function and identify physical deficits which impact ability to perform ADLs (bathing, care of mouth/teeth, toileting, grooming, dressing, etc.)  - Assess/evaluate cause of self-care deficits   - Assess range  of motion  - Assess patient's mobility  - Assess patient's need for assistive devices and provide as appropriate  - Encourage maximum independence but intervene and supervise when necessary  - Involve family in performance of ADLs  - Assess for home care needs following discharge   - Consider OT consult to assist with ADL evaluation and planning for discharge  - Provide patient education as appropriate  Outcome: Progressing  Goal: Maintain proper alignment of affected body part  Description: INTERVENTIONS:  - Support, maintain and protect limb and body alignment  - Provide patient/ family with appropriate education  Outcome: Progressing     Problem: MOBILITY - ADULT  Goal: Maintain or return to baseline ADL function  Description: INTERVENTIONS:  -  Assess patient's ability to carry out ADLs; assess patient's baseline for ADL function and identify physical deficits which impact ability to perform ADLs (bathing, care of mouth/teeth, toileting, grooming, dressing, etc.)  - Assess/evaluate cause of self-care deficits   - Assess range of motion  - Assess patient's mobility; develop plan if impaired  - Assess patient's need for assistive devices and provide as appropriate  - Encourage maximum independence but intervene and supervise when necessary  - Involve family in performance of ADLs  - Assess for home care needs following discharge   - Consider OT consult to assist with ADL evaluation and planning for discharge  - Provide patient education as appropriate  - Monitor functional capacity and physical performance, use of AM PAC & JH-HLM   - Monitor gait, balance and fatigue with ambulation    Outcome: Progressing  Goal: Maintains/Returns to pre admission functional level  Description: INTERVENTIONS:  - Perform AM-PAC 6 Click Basic Mobility/ Daily Activity assessment daily.  - Set and communicate daily mobility goal to care team and patient/family/caregiver.   - Collaborate with rehabilitation services on mobility goals  if consulted  - Perform Range of Motion xx times a day.  - Reposition patient every x hours.  - Dangle patient xx times a day  - Stand patient x times a day  - Ambulate patient x times a day  - Out of bed to chair x times a day   - Out of bed for meals xx times a day  - Out of bed for toileting  - Record patient progress and toleration of activity level   Outcome: Progressing  Goal: [facility] 3 TO 6 MOBILITY  Description: INTERVENTIONS:  - Assess patient's ability to carry out ADLs; assess patient's baseline for ADL function and identify physical deficits which impact ability to perform ADLs (bathing, care of mouth/teeth, toileting, grooming, dressing, ect.)  - Assess/evaluate cause of self-care deficits  - Assess range of motion  - Assess patient's mobility; develop plan if impaired  - Assess patient's mobility develop plan if impaired   - Assess patient's need for assistive devices and provide as appropriate  - Encourage maximum independence but intervene and supervise when necessary   - Involve family in performance of ADLs  - Assess for home care needs following discharge  · Consider PT/OT consult to assist with ADL   evaluation and planning for discharge  · Provide patient and family education as   appropriate   - Monitor functional capacity and physical performance, use of AM PAC & -[facility]     Outcome: Progressing  Goal: [facility] 1 TO 2 MOBILITY  Description: INTERVENTIONS:  - Assess/evaluate cause of self-care deficits   - Assess range of motion  - Assess patient's mobility; develop plan if impaired  - Involve family in performance of ADLs  - Assess for home care needs following discharge   - Consider OT consult to assist with ADL evaluation and planning for discharge   - Provide patient and family education as appropriate   - Monitor functional capacity and physical performance, use of AM PAC & -[facility]    Outcome: Progressing

## 2025-07-02 NOTE — CASE MANAGEMENT
Case Management Discharge Planning Note    Patient name Aldo Rodriguez  Location /-01 MRN [id number]  : 1940 Date 2025       Current Admission Date: 2025  Current Admission Diagnosis:Sepsis due to pneumonia ([facility])   Patient Active Problem List    Diagnosis Date Noted    Sepsis due to pneumonia ([facility]) 2025    Closed fracture of neck of right femur ([facility]) 2025    Acute respiratory failure ([facility]) 2025    Hypertensive heart disease with congestive heart failure ([facility]) 2024    Periodic limb movements of sleep 2023    Parkinsonism ([facility]) 10/18/2023    Urinary incontinence 2023    Obstructive sleep apnea (adult) (pediatric)     Other parasomnia 2023    Snoring 2023    Suspected sleep apnea 2023    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[facility])     Frailty 2023    Ambulatory dysfunction 2023    Primary osteoarthritis of one hip, right     Right hip pain     Recurrent major depressive disorder, in full remission ([facility]) 10/13/2022    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 ([facility]) 10/13/2022    Knee pain 2022    Chronic neck pain 2022    BMI 26.0-26.9,adult 2022    History of seizure 2021    Right epiphora 09/10/2020    Depression, recurrent ([facility]) 2020    History of CVA (cerebrovascular accident) 11/15/2019    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    Carotid stenosis, asymptomatic, bilateral 10/16/2019    Diverticulosis of intestine 10/07/2019    Foraminal stenosis of cervical region 10/07/2019    Personal history of transient ischemic attack 2019    Left inguinal hernia 2018    Osteoporosis 2017    Peripheral neuropathy 2017    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 2017    Essential hypertension 2017    PAD (peripheral artery disease) ([facility]) 2016    DJD (degenerative joint disease) 2015    GERD (gastroesophageal reflux disease)  01/28/2015    Vitamin D deficiency 05/06/2013    Personal history of colonic polyps 04/16/2013    ASCVD (arteriosclerotic cardiovascular disease) 10/15/2012    Hyperlipidemia 10/15/2012      LOS (days): 7  Geometric Mean LOS (GMLOS) (days): 4.9  Days to GMLOS:-1.9     OBJECTIVE:  Risk of Unplanned Readmission Score: 18.32         Current admission status: Inpatient   Preferred Pharmacy:   [id number] - [place] - [address]  [address]  [facility] [address]  Phone: [phone] Fax: [phone]    Primary Care Provider: ELE Ruiz    Primary Insurance: MEDICARE  Secondary Insurance:     DISCHARGE DETAILS:      Treatment Team Recommendation: Facility Return  Expected Discharge Disposition: Facility Return     Transport at Discharge : [facility]     Number/Name of Dispatcher: [facility]  Transported by (Company and Unit #): [id number]  ETA of Transport (Date): 07/03/25  ETA of Transport (Time): 1100     IMM Given (Date):: 07/02/25  IMM Given to:: Patient  IMM reviewed with patient, patient agrees with discharge determination.     Additional Comments: CM was informed that pt will be stable for dc tomorrow, 7/3. [facility] 11[facility]  [facility]. CM notified Dr. Patel, pt's bedside RN Sherri Tellez at [facility], and pt of dc time. Facility transfer form and CMN completed. CMN placed in chart and medical record bin.

## 2025-07-02 NOTE — PLAN OF CARE
Problem: PAIN - ADULT  Goal: Verbalizes/displays adequate comfort level or baseline comfort level  Description: Interventions:  - Encourage patient to monitor pain and request assistance  - Assess pain using appropriate pain scale  - Administer analgesics as ordered based on type and severity of pain and evaluate response  - Implement non-pharmacological measures as appropriate and evaluate response  - Consider cultural and social influences on pain and pain management  - Notify physician/advanced practitioner if interventions unsuccessful or patient reports new pain  - Educate patient/family on pain management process including their role and importance of  reporting pain   - Provide non-pharmacologic/complimentary pain relief interventions  Outcome: Progressing     Problem: INFECTION - ADULT  Goal: Absence or prevention of progression during hospitalization  Description: INTERVENTIONS:  - Assess and monitor for signs and symptoms of infection  - Monitor lab/diagnostic results  - Monitor all insertion sites, i.e. indwelling lines, tubes, and drains  - Monitor endotracheal if appropriate and nasal secretions for changes in amount and color  - [place] appropriate cooling/warming therapies per order  - Administer medications as ordered  - Instruct and encourage patient and family to use good hand hygiene technique  - Identify and instruct in appropriate isolation precautions for identified infection/condition  Outcome: Progressing  Goal: Absence of fever/infection during neutropenic period  Description: INTERVENTIONS:  - Monitor WBC  - Perform strict hand hygiene  - Limit to healthy visitors only  - No plants, dried, fresh or silk flowers with [place] in patient room  Outcome: Progressing     Problem: SAFETY ADULT  Goal: Patient will remain free of falls  Description: INTERVENTIONS:  - Educate patient/family on patient safety including physical limitations  - Instruct patient to call for assistance with activity   -  Consider consulting OT/PT to assist with strengthening/mobility based on AM PAC & JH-HLM score  - Consult OT/PT to assist with strengthening/mobility   - Keep Call bell within reach  - Keep bed low and locked with side rails adjusted as appropriate  - Keep care items and personal belongings within reach  - Initiate and maintain comfort rounds  - Make Fall Risk Sign visible to staff  - Offer Toileting every 2 Hours, in advance of need  - Initiate/Maintain bed alarm  - Obtain necessary fall risk management equipment:   - Apply yellow socks and bracelet for high fall risk patients  - Consider moving patient to room near nurses station  Outcome: Progressing  Goal: Maintain or return to baseline ADL function  Description: INTERVENTIONS:  -  Assess patient's ability to carry out ADLs; assess patient's baseline for ADL function and identify physical deficits which impact ability to perform ADLs (bathing, care of mouth/teeth, toileting, grooming, dressing, etc.)  - Assess/evaluate cause of self-care deficits   - Assess range of motion  - Assess patient's mobility; develop plan if impaired  - Assess patient's need for assistive devices and provide as appropriate  - Encourage maximum independence but intervene and supervise when necessary  - Involve family in performance of ADLs  - Assess for home care needs following discharge   - Consider OT consult to assist with ADL evaluation and planning for discharge  - Provide patient education as appropriate  - Monitor functional capacity and physical performance, use of AM PAC & JH-HLM   - Monitor gait, balance and fatigue with ambulation    Outcome: Progressing  Goal: Maintains/Returns to pre admission functional level  Description: INTERVENTIONS:  - Perform AM-PAC 6 Click Basic Mobility/ Daily Activity assessment daily.  - Set and communicate daily mobility goal to care team and patient/family/caregiver.   - Collaborate with rehabilitation services on mobility goals if  consulted  - Perform Range of Motion 3 times a day.  - Reposition patient every 2 hours.  - Dangle patient 3 times a day  - Stand patient 3 times a day  - Ambulate patient 3 times a day  - Out of bed to chair 3 times a day   - Out of bed for meals 3 times a day  - Out of bed for toileting  - Record patient progress and toleration of activity level   Outcome: Progressing     Problem: DISCHARGE PLANNING  Goal: Discharge to home or other facility with appropriate resources  Description: INTERVENTIONS:  - Identify barriers to discharge w/patient and caregiver  - Arrange for needed discharge resources and transportation as appropriate  - Identify discharge learning needs (meds, wound care, etc.)  - Arrange for interpretive services to assist at discharge as needed  - Refer to Case Management Department for coordinating discharge planning if the patient needs post-hospital services based on physician/advanced practitioner order or complex needs related to functional status, cognitive ability, or social support system  Outcome: Progressing     Problem: Knowledge Deficit  Goal: Patient/family/caregiver demonstrates understanding of disease process, treatment plan, medications, and discharge instructions  Description: Complete learning assessment and assess knowledge base.  Interventions:  - Provide teaching at level of understanding  - Provide teaching via preferred learning methods  Outcome: Progressing     Problem: Prexisting or High Potential for Compromised Skin Integrity  Goal: Skin integrity is maintained or improved  Description: INTERVENTIONS:  - Identify patients at risk for skin breakdown  - Assess and monitor skin integrity including under and around medical devices   - Assess and monitor nutrition and hydration status  - Monitor labs  - Assess for incontinence   - Turn and reposition patient  - Assist with mobility/ambulation  - Relieve pressure over evelyn prominences   - Avoid friction and shearing  - Provide  appropriate hygiene as needed including keeping skin clean and dry  - Evaluate need for skin moisturizer/barrier cream  - Collaborate with interdisciplinary team  - Patient/family teaching  - Consider wound care consult    Assess:  - Review [place] scale daily  - Clean and moisturize skin every x  - Inspect skin when repositioning, toileting, and assisting with ADLS  - Assess under medical devices such as x every x  - Assess extremities for adequate circulation and sensation     Bed Management:  - Have minimal linens on bed & keep smooth, unwrinkled  - Change linens as needed when moist or perspiring  - Avoid sitting or lying in one position for more than x hours while in bed?Keep HOB at xdegrees   - Toileting:  - Offer bedside commode  - Assess for incontinence every x  - Use incontinent care products after each incontinent episode such as x    Activity:  - Mobilize patient x times a day  - Encourage activity and walks on unit  - Encourage or provide ROM exercises   - Turn and reposition patient every x Hours  - Use appropriate equipment to lift or move patient in bed  - Instruct/ Assist with weight shifting every x when out of bed in chair  - Consider limitation of chair time x hour intervals    Skin Care:  - Avoid use of baby powder, tape, friction and shearing, hot water or constrictive clothing  - Relieve pressure over bony prominences using x  - Do not massage red bony areas    Next Steps:  - Teach patient strategies to minimize risks such as x  - Consider consults to  interdisciplinary teams such as x  Outcome: Progressing     Problem: Potential for Falls  Goal: Patient will remain free of falls  Description: INTERVENTIONS:  - Educate patient/family on patient safety including physical limitations  - Instruct patient to call for assistance with activity   - Consider consulting OT/PT to assist with strengthening/mobility based on AM PAC & -HL score  - Consult OT/PT to assist with strengthening/mobility   -  Keep Call bell within reach  - Keep bed low and locked with side rails adjusted as appropriate  - Keep care items and personal belongings within reach  - Initiate and maintain comfort rounds  - Make Fall Risk Sign visible to staff  - Offer Toileting every 2 Hours, in advance of need  - Initiate/Maintain bed alarm  - Obtain necessary fall risk management equipment:  - Apply yellow socks and bracelet for high fall risk patients  - Consider moving patient to room near nurses station  Outcome: Progressing     Problem: Nutrition/Hydration-ADULT  Goal: Nutrient/Hydration intake appropriate for improving, restoring or maintaining nutritional needs  Description: Monitor and assess patient's nutrition/hydration status for malnutrition. Collaborate with interdisciplinary team and initiate plan and interventions as ordered.  Monitor patient's weight and dietary intake as ordered or per policy. Utilize nutrition screening tool and intervene as necessary. Determine patient's food preferences and provide high-protein, high-caloric foods as appropriate.     INTERVENTIONS:  - Monitor oral intake, urinary output, labs, and treatment plans  - Assess nutrition and hydration status and recommend course of action  - Evaluate amount of meals eaten  - Assist patient with eating if necessary   - Allow adequate time for meals  - Recommend/ encourage appropriate diets, oral nutritional supplements, and vitamin/mineral supplements  - Order, calculate, and assess calorie counts as needed  - Recommend, monitor, and adjust tube feedings and TPN/PPN based on assessed needs  - Assess need for intravenous fluids  - Provide specific nutrition/hydration education as appropriate  - Include patient/family/caregiver in decisions related to nutrition  Outcome: Progressing     Problem: NEUROSENSORY - ADULT  Goal: Achieves stable or improved neurological status  Description: INTERVENTIONS  - Monitor and report changes in neurological status  - Monitor  vital signs such as temperature, blood pressure, glucose, and any other labs ordered   - Initiate measures to prevent increased intracranial pressure  - Monitor for seizure activity and implement precautions if appropriate      Outcome: Progressing  Goal: Achieves maximal functionality and self care  Description: INTERVENTIONS  - Monitor swallowing and airway patency with patient fatigue and changes in neurological status  - Encourage and assist patient to increase activity and self care.   - Encourage visually impaired, hearing impaired and aphasic patients to use assistive/communication devices  Outcome: Progressing     Problem: RESPIRATORY - ADULT  Goal: Achieves optimal ventilation and oxygenation  Description: INTERVENTIONS:  - Assess for changes in respiratory status  - Assess for changes in mentation and behavior  - Position to facilitate oxygenation and minimize respiratory effort  - Oxygen administered by appropriate delivery if ordered  - Initiate smoking cessation education as indicated  - Encourage broncho-pulmonary hygiene including cough, deep breathe, Incentive Spirometry  - Assess the need for suctioning and aspirate as needed  - Assess and instruct to report SOB or any respiratory difficulty  - Respiratory Therapy support as indicated  Outcome: Progressing     Problem: MUSCULOSKELETAL - ADULT  Goal: Maintain or return mobility to safest level of function  Description: INTERVENTIONS:  - Assess patient's ability to carry out ADLs; assess patient's baseline for ADL function and identify physical deficits which impact ability to perform ADLs (bathing, care of mouth/teeth, toileting, grooming, dressing, etc.)  - Assess/evaluate cause of self-care deficits   - Assess range of motion  - Assess patient's mobility  - Assess patient's need for assistive devices and provide as appropriate  - Encourage maximum independence but intervene and supervise when necessary  - Involve family in performance of ADLs  -  Assess for home care needs following discharge   - Consider OT consult to assist with ADL evaluation and planning for discharge  - Provide patient education as appropriate  Outcome: Progressing  Goal: Maintain proper alignment of affected body part  Description: INTERVENTIONS:  - Support, maintain and protect limb and body alignment  - Provide patient/ family with appropriate education  Outcome: Progressing

## 2025-07-02 NOTE — ASSESSMENT & PLAN NOTE
Acutely requiring 4 L/min on admission, likely secondary to pneumonia and reactive airway disease flare  With wheezing on exam, bilateral lower lobe pneumonia on imaging  Continue Xopenex/Atrovent  IV Solu-Medrol transitioned to prednsione  Respiratory protocol  Airway clearance protocol   Discussed with Pulm weaned to po prednisone,   CXR done decreased mild vascular congestion  Weaned to 2L supplemental oxygen  Encourage incentive spirometer

## 2025-07-03 VITALS
HEART RATE: 61 BPM | DIASTOLIC BLOOD PRESSURE: 62 MMHG | TEMPERATURE: 97.5 F | RESPIRATION RATE: 18 BRPM | SYSTOLIC BLOOD PRESSURE: 123 MMHG | OXYGEN SATURATION: 92 %

## 2025-07-03 LAB
ANION GAP SERPL CALCULATED.3IONS-SCNC: 6 MMOL/L (ref 4–13)
BASOPHILS # BLD MANUAL: 0 THOUSAND/UL (ref 0–0.1)
BASOPHILS NFR MAR MANUAL: 0 % (ref 0–1)
BUN SERPL-MCNC: 18 MG/DL (ref 5–25)
CALCIUM SERPL-MCNC: 8.2 MG/DL (ref 8.4–10.2)
CHLORIDE SERPL-SCNC: 99 MMOL/L (ref 96–108)
CO2 SERPL-SCNC: 33 MMOL/L (ref 21–32)
CREAT SERPL-MCNC: 0.64 MG/DL (ref 0.6–1.3)
EOSINOPHIL # BLD MANUAL: 0.29 THOUSAND/UL (ref 0–0.4)
EOSINOPHIL NFR BLD MANUAL: 2 % (ref 0–6)
ERYTHROCYTE [DISTWIDTH] IN BLOOD BY AUTOMATED COUNT: 14.5 % (ref 11.6–15.1)
GFR SERPL CREATININE-BSD FRML MDRD: 90 ML/MIN/1.73SQ M
GLUCOSE SERPL-MCNC: 113 MG/DL (ref 65–140)
HCT VFR BLD AUTO: 41.9 % (ref 36.5–49.3)
HGB BLD-MCNC: 13.2 G/DL (ref 12–17)
LYMPHOCYTES # BLD AUTO: 17 % (ref 14–44)
LYMPHOCYTES # BLD AUTO: 2.44 THOUSAND/UL (ref 0.6–4.47)
MCH RBC QN AUTO: 27.6 PG (ref 26.8–34.3)
MCHC RBC AUTO-ENTMCNC: 31.5 G/DL (ref 31.4–37.4)
MCV RBC AUTO: 88 FL (ref 82–98)
MONOCYTES # BLD AUTO: 0.57 THOUSAND/UL (ref 0–1.22)
MONOCYTES NFR BLD: 4 % (ref 4–12)
NEUTROPHILS # BLD MANUAL: 11.06 THOUSAND/UL (ref 1.85–7.62)
NEUTS BAND NFR BLD MANUAL: 1 % (ref 0–8)
NEUTS SEG NFR BLD AUTO: 76 % (ref 43–75)
PLATELET # BLD AUTO: 170 THOUSANDS/UL (ref 149–390)
PLATELET BLD QL SMEAR: ABNORMAL
PMV BLD AUTO: 9.2 FL (ref 8.9–12.7)
POTASSIUM SERPL-SCNC: 4.3 MMOL/L (ref 3.5–5.3)
RBC # BLD AUTO: 4.78 MILLION/UL (ref 3.88–5.62)
RBC MORPH BLD: NORMAL
SODIUM SERPL-SCNC: 138 MMOL/L (ref 135–147)
WBC # BLD AUTO: 14.36 THOUSAND/UL (ref 4.31–10.16)

## 2025-07-03 PROCEDURE — 85007 BL SMEAR W/DIFF WBC COUNT: CPT | Performed by: INTERNAL MEDICINE

## 2025-07-03 PROCEDURE — 99239 HOSP IP/OBS DSCHRG MGMT >30: CPT | Performed by: INTERNAL MEDICINE

## 2025-07-03 PROCEDURE — 80048 BASIC METABOLIC PNL TOTAL CA: CPT | Performed by: INTERNAL MEDICINE

## 2025-07-03 PROCEDURE — 85027 COMPLETE CBC AUTOMATED: CPT | Performed by: INTERNAL MEDICINE

## 2025-07-03 RX ORDER — ASPIRIN 81 MG/1
81 TABLET ORAL 2 TIMES DAILY
Start: 2025-07-03 | End: 2025-08-02

## 2025-07-03 RX ORDER — OXYCODONE AND ACETAMINOPHEN 5; 325 MG/1; MG/1
1 TABLET ORAL EVERY 8 HOURS PRN
Qty: 8 TABLET | Refills: 0 | Status: SHIPPED | OUTPATIENT
Start: 2025-07-03 | End: 2025-07-06

## 2025-07-03 RX ORDER — CEFADROXIL 500 MG/1
500 CAPSULE ORAL EVERY 12 HOURS SCHEDULED
Start: 2025-07-03 | End: 2025-07-08

## 2025-07-03 RX ADMIN — ASPIRIN 81 MG: 81 TABLET, COATED ORAL at 09:55

## 2025-07-03 RX ADMIN — ESCITALOPRAM OXALATE 10 MG: 10 TABLET ORAL at 09:51

## 2025-07-03 RX ADMIN — CARBIDOPA AND LEVODOPA 1 TABLET: 25; 100 TABLET ORAL at 09:51

## 2025-07-03 RX ADMIN — AMLODIPINE BESYLATE 5 MG: 5 TABLET ORAL at 09:51

## 2025-07-03 RX ADMIN — GABAPENTIN 300 MG: 300 CAPSULE ORAL at 09:51

## 2025-07-03 RX ADMIN — SPIRONOLACTONE 25 MG: 25 TABLET ORAL at 09:51

## 2025-07-03 RX ADMIN — LIDOCAINE 1 PATCH: 50 PATCH CUTANEOUS at 09:52

## 2025-07-03 RX ADMIN — QUETIAPINE FUMARATE 12.5 MG: 25 TABLET ORAL at 09:50

## 2025-07-03 RX ADMIN — ATORVASTATIN CALCIUM 40 MG: 40 TABLET, FILM COATED ORAL at 09:51

## 2025-07-03 RX ADMIN — PANTOPRAZOLE SODIUM 40 MG: 40 INJECTION, POWDER, FOR SOLUTION INTRAVENOUS at 10:00

## 2025-07-03 RX ADMIN — GUAIFENESIN 400 MG: 200 SOLUTION ORAL at 09:50

## 2025-07-03 RX ADMIN — Medication 2.5 MG: at 05:47

## 2025-07-03 RX ADMIN — FUROSEMIDE 20 MG: 20 TABLET ORAL at 09:50

## 2025-07-03 RX ADMIN — HEPARIN SODIUM 5000 UNITS: 5000 INJECTION, SOLUTION INTRAVENOUS; SUBCUTANEOUS at 05:17

## 2025-07-03 NOTE — ASSESSMENT & PLAN NOTE
Acutely requiring 4 L/min on admission, likely secondary to pneumonia and reactive airway disease flare  With wheezing on exam, bilateral lower lobe pneumonia on imaging  Continue Xopenex/Atrovent  IV Solu-Medrol transitioned to prednsione patient completed the course of prednisone  Respiratory protocol  Airway clearance protocol   Discussed with Pulm weaned to po prednisone,   CXR done decreased mild vascular congestion.  Received another dose of IV Lasix on 7/2  Weaned to 2L supplemental oxygen  Encourage incentive spirometer  Will be discharged on 2 L supplemental oxygen.

## 2025-07-03 NOTE — NURSING NOTE
Attempted to call report to [facility], transferred to nurses station by , no answer.

## 2025-07-03 NOTE — ASSESSMENT & PLAN NOTE
Met on admission with leukocytosis, tachypnea.  Suspected source bilateral lower lobe pneumonia  Continue IV ceftriaxone and azithromycin  Procalcitonin downward trending  Blood cultures no growth  MRSA negative  Urinary antigens negative   Possibly d/t aspiration  Speech/swallow on board  Patient will be discharged on dysphagia diet  Patient will be discharged to [facility] nursing facility on [address] to complete the course

## 2025-07-03 NOTE — DISCHARGE SUMMARY
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 EPIC for immediate notification. Workstation performed: [id number]     XR Trauma pelvis ap only 1 or 2 vw  Result Date: 6/25/2025  Narrative: XR PELVIS AP ONLY 1 OR 2 VW INDICATION: TRAUMA. COMPARISON: 11/18/2022 FINDINGS: There is a displaced fracture of the right femoral neck. Mild bilateral hip osteoarthritis. No lytic or blastic osseous lesion. Unremarkable soft tissues. Degenerative changes in the visualized lower lumbar spine.     Impression: Displaced fracture of the right femoral neck. This report is in agreement with the preliminary interpretation. Computerized Assisted Algorithm (CAA) may have been used to analyze all applicable images. Workstation performed: [id number]       LABS  Results from last 7 days   Lab Units 07/03/25  0250 07/02/25  0613 07/01/25  0420 06/30/25  0321 06/29/25  0159 06/28/25  0140 06/27/25  0347   WBC Thousand/uL 14.36* 14.40* 14.88* 13.70* 19.02* 12.76* 15.66*   HEMOGLOBIN g/dL 13.2 13.4 13.2 13.0 13.7 13.8 13.5   HEMATOCRIT % 41.9 42.4 42.3 41.5 42.8 42.8 42.1   MCV fL 88 88 88 87 87 85 87   TOTAL NEUT ABS Thousand/uL 11.06*  --   --   --   --   --   --    BANDS PCT % 1  --   --   --   --   --   --    PLATELETS Thousands/uL 170 159 156 160 205 208 203     Results from last 7 days   Lab Units 07/03/25  0250 07/02/25  1114 07/02/25  0613 07/01/25  0420  "06/30/25  0321 06/29/25  0159 06/28/25  0140 06/27/25  0347   SODIUM mmol/L 138 136 135 137 138 138 138 138   POTASSIUM mmol/L 4.3 4.2 5.4* 4.4 4.3 4.4 4.2 4.3   CHLORIDE mmol/L 99 102 101 102 103 102 104 103   CO2 mmol/L 33* 32 31 30 32 32 30 31   BUN mg/dL 18 14 14 15 16 16 16 15   CREATININE mg/dL 0.64 0.59* 0.63 0.57* 0.68 0.67 0.62 0.64   CALCIUM mg/dL 8.2* 8.1* 7.9* 8.2* 8.1* 8.1* 8.2* 8.4   ALBUMIN g/dL  --   --   --   --   --  2.9*  --  3.3*   TOTAL BILIRUBIN mg/dL  --   --   --   --   --  0.73  --  0.71   ALK PHOS U/L  --   --   --   --   --  46  --  49   ALT U/L  --   --   --   --   --  4*  --  5*   AST U/L  --   --   --   --   --  29  --  13   EGFR ml/min/1.73sq m 90 93 91 94 88 88 91 90   GLUCOSE RANDOM mg/dL 113 116 88 111 119 118 133 138                              Results from last 7 days   Lab Units 07/01/25  0420   PROCALCITONIN ng/ml 0.12           Cultures:         Invalid input(s): \"URIBILINOGEN\"        Results from last 7 days   Lab Units 06/26/25  2237   LEGIONELLA URINARY ANTIGEN  Negative   STREP PNEUMONIAE ANTIGEN, URINE  Negative       Condition at Discharge:  good      Discharge instructions/Information to patient and family:   See after visit summary for information provided to patient and family.      Provisions for Follow-Up Care:  See after visit summary for information related to follow-up care and any pertinent home health orders.      Disposition:     Other Skilled Nursing Facility at [facility]       Discharge Statement:  I spent 40 minutes discharging the patient. This time was spent on the day of discharge. I had direct contact with the patient on the day of discharge. Greater than 50% of the total time was spent examining patient, answering all patient questions, arranging and discussing plan of care with patient as well as directly providing post-discharge instructions.  Additional time then spent on discharge activities.    Discharge Medications:  See " after visit summary for reconciled discharge medications provided to patient and family.      ** Please Note: This note has been constructed using a voice recognition system **

## 2025-07-03 NOTE — PLAN OF CARE
Problem: PAIN - ADULT  Goal: Verbalizes/displays adequate comfort level or baseline comfort level  Description: Interventions:  - Encourage patient to monitor pain and request assistance  - Assess pain using appropriate pain scale  - Administer analgesics as ordered based on type and severity of pain and evaluate response  - Implement non-pharmacological measures as appropriate and evaluate response  - Consider cultural and social influences on pain and pain management  - Notify physician/advanced practitioner if interventions unsuccessful or patient reports new pain  - Educate patient/family on pain management process including their role and importance of  reporting pain   - Provide non-pharmacologic/complimentary pain relief interventions  Outcome: Progressing     Problem: INFECTION - ADULT  Goal: Absence or prevention of progression during hospitalization  Description: INTERVENTIONS:  - Assess and monitor for signs and symptoms of infection  - Monitor lab/diagnostic results  - Monitor all insertion sites, i.e. indwelling lines, tubes, and drains  - Monitor endotracheal if appropriate and nasal secretions for changes in amount and color  - [place] appropriate cooling/warming therapies per order  - Administer medications as ordered  - Instruct and encourage patient and family to use good hand hygiene technique  - Identify and instruct in appropriate isolation precautions for identified infection/condition  Outcome: Progressing  Goal: Absence of fever/infection during neutropenic period  Description: INTERVENTIONS:  - Monitor WBC  - Perform strict hand hygiene  - Limit to healthy visitors only  - No plants, dried, fresh or silk flowers with [place] in patient room  Outcome: Progressing     Problem: SAFETY ADULT  Goal: Patient will remain free of falls  Description: INTERVENTIONS:  - Educate patient/family on patient safety including physical limitations  - Instruct patient to call for assistance with activity   -  Consider consulting OT/PT to assist with strengthening/mobility based on AM PAC & JH-HLM score  - Consult OT/PT to assist with strengthening/mobility   - Keep Call bell within reach  - Keep bed low and locked with side rails adjusted as appropriate  - Keep care items and personal belongings within reach  - Initiate and maintain comfort rounds  - Make Fall Risk Sign visible to staff  - Offer Toileting every 2 Hours, in advance of need  - Initiate/Maintain alarm  - Obtain necessary fall risk management equipment  - Apply yellow socks and bracelet for high fall risk patients  - Consider moving patient to room near nurses station  Outcome: Progressing  Goal: Maintain or return to baseline ADL function  Description: INTERVENTIONS:  -  Assess patient's ability to carry out ADLs; assess patient's baseline for ADL function and identify physical deficits which impact ability to perform ADLs (bathing, care of mouth/teeth, toileting, grooming, dressing, etc.)  - Assess/evaluate cause of self-care deficits   - Assess range of motion  - Assess patient's mobility; develop plan if impaired  - Assess patient's need for assistive devices and provide as appropriate  - Encourage maximum independence but intervene and supervise when necessary  - Involve family in performance of ADLs  - Assess for home care needs following discharge   - Consider OT consult to assist with ADL evaluation and planning for discharge  - Provide patient education as appropriate  - Monitor functional capacity and physical performance, use of AM PAC & JH-HLM   - Monitor gait, balance and fatigue with ambulation    Outcome: Progressing  Goal: Maintains/Returns to pre admission functional level  Description: INTERVENTIONS:  - Perform AM-PAC 6 Click Basic Mobility/ Daily Activity assessment daily.  - Set and communicate daily mobility goal to care team and patient/family/caregiver.   - Collaborate with rehabilitation services on mobility goals if consulted  -  Perform Range of Motion 3 times a day.  - Reposition patient every 2 hours.  - Dangle patient 3 times a day  - Stand patient 3 times a day  - Ambulate patient 3 times a day  - Out of bed to chair 3 times a day   - Out of bed for meals 3 times a day  - Out of bed for toileting  - Record patient progress and toleration of activity level   Outcome: Progressing     Problem: DISCHARGE PLANNING  Goal: Discharge to home or other facility with appropriate resources  Description: INTERVENTIONS:  - Identify barriers to discharge w/patient and caregiver  - Arrange for needed discharge resources and transportation as appropriate  - Identify discharge learning needs (meds, wound care, etc.)  - Arrange for interpretive services to assist at discharge as needed  - Refer to Case Management Department for coordinating discharge planning if the patient needs post-hospital services based on physician/advanced practitioner order or complex needs related to functional status, cognitive ability, or social support system  Outcome: Progressing     Problem: Knowledge Deficit  Goal: Patient/family/caregiver demonstrates understanding of disease process, treatment plan, medications, and discharge instructions  Description: Complete learning assessment and assess knowledge base.  Interventions:  - Provide teaching at level of understanding  - Provide teaching via preferred learning methods  Outcome: Progressing     Problem: Prexisting or High Potential for Compromised Skin Integrity  Goal: Skin integrity is maintained or improved  Description: INTERVENTIONS:  - Identify patients at risk for skin breakdown  - Assess and monitor skin integrity including under and around medical devices   - Assess and monitor nutrition and hydration status  - Monitor labs  - Assess for incontinence   - Turn and reposition patient  - Assist with mobility/ambulation  - Relieve pressure over evelyn prominences   - Avoid friction and shearing  - Provide appropriate  hygiene as needed including keeping skin clean and dry  - Evaluate need for skin moisturizer/barrier cream  - Collaborate with interdisciplinary team  - Patient/family teaching  - Consider wound care consult    Assess:  - Review Juan Francisco scale daily  - Clean and moisturize skin  - Inspect skin when repositioning, toileting, and assisting with ADLS  - Assess under medical devices   - Assess extremities for adequate circulation and sensation     Bed Management:  - Have minimal linens on bed & keep smooth, unwrinkled  - Change linens as needed when moist or perspiring  - Avoid sitting or lying in one position for more than 2 hours while in bed?Keep HOB at 30 degrees   - Toileting:  - Offer bedside commode  - Assess for incontinence  - Use incontinent care products after each incontinent episode  Activity:  - Mobilize patient 3 times a day  - Encourage activity and walks on unit  - Encourage or provide ROM exercises   - Turn and reposition patient every 2 Hours  - Use appropriate equipment to lift or move patient in bed  - Instruct/ Assist with weight shifting   - Consider limitation of chair time 2 hour intervals    Skin Care:  - Avoid use of baby powder, tape, friction and shearing, hot water or constrictive clothing  - Relieve pressure over bony prominences   - Do not massage red bony areas    Next Steps:  - Teach patient strategies to minimize risks   - Consider consults to  interdisciplinary teams   Outcome: Progressing     Problem: Potential for Falls  Goal: Patient will remain free of falls  Description: INTERVENTIONS:  - Educate patient/family on patient safety including physical limitations  - Instruct patient to call for assistance with activity   - Consider consulting OT/PT to assist with strengthening/mobility based on AM PAC & JH-HLM score  - Consult OT/PT to assist with strengthening/mobility   - Keep Call bell within reach  - Keep bed low and locked with side rails adjusted as appropriate  - Keep care items  and personal belongings within reach  - Initiate and maintain comfort rounds  - Make Fall Risk Sign visible to staff  - Offer Toileting every 2 Hours, in advance of need  - Initiate/Maintain alarm  - Obtain necessary fall risk management equipment  - Apply yellow socks and bracelet for high fall risk patients  - Consider moving patient to room near nurses station  Outcome: Progressing     Problem: Nutrition/Hydration-ADULT  Goal: Nutrient/Hydration intake appropriate for improving, restoring or maintaining nutritional needs  Description: Monitor and assess patient's nutrition/hydration status for malnutrition. Collaborate with interdisciplinary team and initiate plan and interventions as ordered.  Monitor patient's weight and dietary intake as ordered or per policy. Utilize nutrition screening tool and intervene as necessary. Determine patient's food preferences and provide high-protein, high-caloric foods as appropriate.     INTERVENTIONS:  - Monitor oral intake, urinary output, labs, and treatment plans  - Assess nutrition and hydration status and recommend course of action  - Evaluate amount of meals eaten  - Assist patient with eating if necessary   - Allow adequate time for meals  - Recommend/ encourage appropriate diets, oral nutritional supplements, and vitamin/mineral supplements  - Order, calculate, and assess calorie counts as needed  - Recommend, monitor, and adjust tube feedings and TPN/PPN based on assessed needs  - Assess need for intravenous fluids  - Provide specific nutrition/hydration education as appropriate  - Include patient/family/caregiver in decisions related to nutrition  Outcome: Progressing     Problem: NEUROSENSORY - ADULT  Goal: Achieves stable or improved neurological status  Description: INTERVENTIONS  - Monitor and report changes in neurological status  - Monitor vital signs such as temperature, blood pressure, glucose, and any other labs ordered   - Initiate measures to prevent  increased intracranial pressure  - Monitor for seizure activity and implement precautions if appropriate      Outcome: Progressing  Goal: Achieves maximal functionality and self care  Description: INTERVENTIONS  - Monitor swallowing and airway patency with patient fatigue and changes in neurological status  - Encourage and assist patient to increase activity and self care.   - Encourage visually impaired, hearing impaired and aphasic patients to use assistive/communication devices  Outcome: Progressing     Problem: RESPIRATORY - ADULT  Goal: Achieves optimal ventilation and oxygenation  Description: INTERVENTIONS:  - Assess for changes in respiratory status  - Assess for changes in mentation and behavior  - Position to facilitate oxygenation and minimize respiratory effort  - Oxygen administered by appropriate delivery if ordered  - Initiate smoking cessation education as indicated  - Encourage broncho-pulmonary hygiene including cough, deep breathe, Incentive Spirometry  - Assess the need for suctioning and aspirate as needed  - Assess and instruct to report SOB or any respiratory difficulty  - Respiratory Therapy support as indicated  Outcome: Progressing     Problem: GENITOURINARY - ADULT  Goal: Maintains or returns to baseline urinary function  Description: INTERVENTIONS:  - Assess urinary function  - Encourage oral fluids to ensure adequate hydration if ordered  - Administer IV fluids as ordered to ensure adequate hydration  - Administer ordered medications as needed  - Offer frequent toileting  - Follow urinary retention protocol if ordered  Outcome: Progressing  Goal: Absence of urinary retention  Description: INTERVENTIONS:  - Assess patient’s ability to void and empty bladder  - Monitor I/O  - Bladder scan as needed  - Discuss with physician/AP medications to alleviate retention as needed  - Discuss catheterization for long term situations as appropriate  Outcome: Progressing     Problem: SKIN/TISSUE  INTEGRITY - ADULT  Goal: Skin Integrity remains intact(Skin Breakdown Prevention)  Description: Assess:  -Perform [place] assessment   -Clean and moisturize skin   -Inspect skin when repositioning, toileting, and assisting with ADLS  -Assess under medical devices   -Assess extremities for adequate circulation and sensation     Bed Management:  -Have minimal linens on bed & keep smooth, unwrinkled  -Change linens as needed when moist or perspiring  -Avoid sitting or lying in one position for more than 2 hours while in bed  -Keep HOB at 30 degrees     Toileting:  -Offer bedside commode  -Assess for incontinence   -Use incontinent care products after each incontinent episode     Activity:  -Mobilize patient 3 times a day  -Encourage activity and walks on unit  -Encourage or provide ROM exercises   -Turn and reposition patient every 2 Hours  -Use appropriate equipment to lift or move patient in bed  -Instruct/ Assist with weight shifting every  when out of bed in chair  -Consider limitation of chair time 2 hour intervals    Skin Care:  -Avoid use of baby powder, tape, friction and shearing, hot water or constrictive clothing  -Relieve pressure over bony prominences using  -Do not massage red bony areas    Next Steps:  -Teach patient strategies to minimize risks   -Consider consults to  interdisciplinary teams   Outcome: Progressing     Problem: MUSCULOSKELETAL - ADULT  Goal: Maintain or return mobility to safest level of function  Description: INTERVENTIONS:  - Assess patient's ability to carry out ADLs; assess patient's baseline for ADL function and identify physical deficits which impact ability to perform ADLs (bathing, care of mouth/teeth, toileting, grooming, dressing, etc.)  - Assess/evaluate cause of self-care deficits   - Assess range of motion  - Assess patient's mobility  - Assess patient's need for assistive devices and provide as appropriate  - Encourage maximum independence but intervene and supervise when  necessary  - Involve family in performance of ADLs  - Assess for home care needs following discharge   - Consider OT consult to assist with ADL evaluation and planning for discharge  - Provide patient education as appropriate  Outcome: Progressing  Goal: Maintain proper alignment of affected body part  Description: INTERVENTIONS:  - Support, maintain and protect limb and body alignment  - Provide patient/ family with appropriate education  Outcome: Progressing     Problem: MOBILITY - ADULT  Goal: Maintain or return to baseline ADL function  Description: INTERVENTIONS:  -  Assess patient's ability to carry out ADLs; assess patient's baseline for ADL function and identify physical deficits which impact ability to perform ADLs (bathing, care of mouth/teeth, toileting, grooming, dressing, etc.)  - Assess/evaluate cause of self-care deficits   - Assess range of motion  - Assess patient's mobility; develop plan if impaired  - Assess patient's need for assistive devices and provide as appropriate  - Encourage maximum independence but intervene and supervise when necessary  - Involve family in performance of ADLs  - Assess for home care needs following discharge   - Consider OT consult to assist with ADL evaluation and planning for discharge  - Provide patient education as appropriate  - Monitor functional capacity and physical performance, use of AM PAC & JH-HLM   - Monitor gait, balance and fatigue with ambulation    Outcome: Progressing  Goal: Maintains/Returns to pre admission functional level  Description: INTERVENTIONS:  - Perform AM-PAC 6 Click Basic Mobility/ Daily Activity assessment daily.  - Set and communicate daily mobility goal to care team and patient/family/caregiver.   - Collaborate with rehabilitation services on mobility goals if consulted  - Perform Range of Motion 3 times a day.  - Reposition patient every 2 hours.  - Dangle patient 3 times a day  - Stand patient 3 times a day  - Ambulate patient 3  times a day  - Out of bed to chair 3 times a day   - Out of bed for meals 3 times a day  - Out of bed for toileting  - Record patient progress and toleration of activity level   Outcome: Progressing  Goal: [facility] 3 TO 6 MOBILITY  Description: INTERVENTIONS:  - Assess patient's ability to carry out ADLs; assess patient's baseline for ADL function and identify physical deficits which impact ability to perform ADLs (bathing, care of mouth/teeth, toileting, grooming, dressing, ect.)  - Assess/evaluate cause of self-care deficits  - Assess range of motion  - Assess patient's mobility; develop plan if impaired  - Assess patient's mobility develop plan if impaired   - Assess patient's need for assistive devices and provide as appropriate  - Encourage maximum independence but intervene and supervise when necessary   - Involve family in performance of ADLs  - Assess for home care needs following discharge  · Consider PT/OT consult to assist with ADL   evaluation and planning for discharge  · Provide patient and family education as   appropriate   - Monitor functional capacity and physical performance, use of AM PAC & -[facility]     Outcome: Progressing  Goal: [facility] 1 TO 2 MOBILITY  Description: INTERVENTIONS:  - Assess/evaluate cause of self-care deficits   - Assess range of motion  - Assess patient's mobility; develop plan if impaired  - Involve family in performance of ADLs  - Assess for home care needs following discharge   - Consider OT consult to assist with ADL evaluation and planning for discharge   - Provide patient and family education as appropriate   - Monitor functional capacity and physical performance, use of AM PAC & -[facility]    Outcome: Progressing

## 2025-07-05 ENCOUNTER — APPOINTMENT (EMERGENCY)
Dept: RADIOLOGY | Facility: HOSPITAL | Age: 85
End: 2025-07-05
Payer: MEDICARE

## 2025-07-05 ENCOUNTER — HOSPITAL ENCOUNTER (EMERGENCY)
Facility: HOSPITAL | Age: 85
Discharge: HOME | End: 2025-07-05
Attending: EMERGENCY MEDICINE | Admitting: INTERNAL MEDICINE
Payer: MEDICARE

## 2025-07-05 ENCOUNTER — APPOINTMENT (EMERGENCY)
Dept: CT IMAGING | Facility: HOSPITAL | Age: 85
End: 2025-07-05
Payer: MEDICARE

## 2025-07-05 VITALS
TEMPERATURE: 97.7 F | RESPIRATION RATE: 18 BRPM | HEART RATE: 68 BPM | DIASTOLIC BLOOD PRESSURE: 71 MMHG | BODY MASS INDEX: 26.28 KG/M2 | OXYGEN SATURATION: 96 % | SYSTOLIC BLOOD PRESSURE: 146 MMHG | WEIGHT: 172.84 LBS

## 2025-07-05 DIAGNOSIS — I26.99 PULMONARY EMBOLISM (HCC): Primary | ICD-10-CM

## 2025-07-05 LAB
2HR DELTA HS TROPONIN: -1 NG/L
ANION GAP SERPL CALCULATED.3IONS-SCNC: 4 MMOL/L (ref 4–13)
ANISOCYTOSIS BLD QL SMEAR: PRESENT
APTT PPP: 25 SECONDS (ref 23–34)
BASOPHILS # BLD MANUAL: 0 THOUSAND/UL (ref 0–0.1)
BASOPHILS NFR MAR MANUAL: 0 % (ref 0–1)
BNP SERPL-MCNC: 127 PG/ML (ref 0–100)
BUN SERPL-MCNC: 13 MG/DL (ref 5–25)
CALCIUM SERPL-MCNC: 8.7 MG/DL (ref 8.4–10.2)
CARDIAC TROPONIN I PNL SERPL HS: 5 NG/L (ref ?–50)
CARDIAC TROPONIN I PNL SERPL HS: 6 NG/L (ref ?–50)
CHLORIDE SERPL-SCNC: 101 MMOL/L (ref 96–108)
CO2 SERPL-SCNC: 31 MMOL/L (ref 21–32)
CREAT SERPL-MCNC: 0.59 MG/DL (ref 0.6–1.3)
D DIMER PPP FEU-MCNC: 3.29 UG/ML FEU
EOSINOPHIL # BLD MANUAL: 0.12 THOUSAND/UL (ref 0–0.4)
EOSINOPHIL NFR BLD MANUAL: 1 % (ref 0–6)
ERYTHROCYTE [DISTWIDTH] IN BLOOD BY AUTOMATED COUNT: 14 % (ref 11.6–15.1)
GFR SERPL CREATININE-BSD FRML MDRD: 93 ML/MIN/1.73SQ M
GLUCOSE SERPL-MCNC: 101 MG/DL (ref 65–140)
HCT VFR BLD AUTO: 44.7 % (ref 36.5–49.3)
HGB BLD-MCNC: 14.5 G/DL (ref 12–17)
INR PPP: 1.01 (ref 0.85–1.19)
LYMPHOCYTES # BLD AUTO: 0.97 THOUSAND/UL (ref 0.6–4.47)
LYMPHOCYTES # BLD AUTO: 6 % (ref 14–44)
MCH RBC QN AUTO: 27.8 PG (ref 26.8–34.3)
MCHC RBC AUTO-ENTMCNC: 32.4 G/DL (ref 31.4–37.4)
MCV RBC AUTO: 86 FL (ref 82–98)
MONOCYTES # BLD AUTO: 0.73 THOUSAND/UL (ref 0–1.22)
MONOCYTES NFR BLD: 6 % (ref 4–12)
MYELOCYTE ABSOLUTE CT: 0.36 THOUSAND/UL (ref 0–0.1)
MYELOCYTES NFR BLD MANUAL: 3 % (ref 0–1)
NEUTROPHILS # BLD MANUAL: 9.95 THOUSAND/UL (ref 1.85–7.62)
NEUTS SEG NFR BLD AUTO: 82 % (ref 43–75)
PLATELET # BLD AUTO: 212 THOUSANDS/UL (ref 149–390)
PLATELET BLD QL SMEAR: ADEQUATE
PMV BLD AUTO: 9.2 FL (ref 8.9–12.7)
POIKILOCYTOSIS BLD QL SMEAR: PRESENT
POTASSIUM SERPL-SCNC: 4.4 MMOL/L (ref 3.5–5.3)
PROTHROMBIN TIME: 13.8 SECONDS (ref 12.3–15)
RBC # BLD AUTO: 5.21 MILLION/UL (ref 3.88–5.62)
RBC MORPH BLD: PRESENT
SODIUM SERPL-SCNC: 136 MMOL/L (ref 135–147)
VARIANT LYMPHS # BLD AUTO: 2 %
WBC # BLD AUTO: 12.14 THOUSAND/UL (ref 4.31–10.16)

## 2025-07-05 PROCEDURE — 96365 THER/PROPH/DIAG IV INF INIT: CPT

## 2025-07-05 PROCEDURE — 36415 COLL VENOUS BLD VENIPUNCTURE: CPT | Performed by: EMERGENCY MEDICINE

## 2025-07-05 PROCEDURE — 83880 ASSAY OF NATRIURETIC PEPTIDE: CPT | Performed by: EMERGENCY MEDICINE

## 2025-07-05 PROCEDURE — 93005 ELECTROCARDIOGRAM TRACING: CPT

## 2025-07-05 PROCEDURE — 96375 TX/PRO/DX INJ NEW DRUG ADDON: CPT

## 2025-07-05 PROCEDURE — 85730 THROMBOPLASTIN TIME PARTIAL: CPT | Performed by: EMERGENCY MEDICINE

## 2025-07-05 PROCEDURE — 74177 CT ABD & PELVIS W/CONTRAST: CPT

## 2025-07-05 PROCEDURE — 99283 EMERGENCY DEPT VISIT LOW MDM: CPT | Performed by: INTERNAL MEDICINE

## 2025-07-05 PROCEDURE — 80048 BASIC METABOLIC PNL TOTAL CA: CPT | Performed by: EMERGENCY MEDICINE

## 2025-07-05 PROCEDURE — 85610 PROTHROMBIN TIME: CPT | Performed by: EMERGENCY MEDICINE

## 2025-07-05 PROCEDURE — 84484 ASSAY OF TROPONIN QUANT: CPT | Performed by: EMERGENCY MEDICINE

## 2025-07-05 PROCEDURE — 99285 EMERGENCY DEPT VISIT HI MDM: CPT

## 2025-07-05 PROCEDURE — 85379 FIBRIN DEGRADATION QUANT: CPT | Performed by: EMERGENCY MEDICINE

## 2025-07-05 PROCEDURE — 85027 COMPLETE CBC AUTOMATED: CPT | Performed by: EMERGENCY MEDICINE

## 2025-07-05 PROCEDURE — 71275 CT ANGIOGRAPHY CHEST: CPT

## 2025-07-05 PROCEDURE — 71045 X-RAY EXAM CHEST 1 VIEW: CPT

## 2025-07-05 PROCEDURE — 99285 EMERGENCY DEPT VISIT HI MDM: CPT | Performed by: EMERGENCY MEDICINE

## 2025-07-05 PROCEDURE — 85007 BL SMEAR W/DIFF WBC COUNT: CPT | Performed by: EMERGENCY MEDICINE

## 2025-07-05 RX ORDER — HEPARIN SODIUM 1000 [USP'U]/ML
3000 INJECTION, SOLUTION INTRAVENOUS; SUBCUTANEOUS EVERY 6 HOURS PRN
Status: DISCONTINUED | OUTPATIENT
Start: 2025-07-05 | End: 2025-07-05

## 2025-07-05 RX ORDER — SODIUM CHLORIDE 9 MG/ML
3 INJECTION INTRAVENOUS
Status: DISCONTINUED | OUTPATIENT
Start: 2025-07-05 | End: 2025-07-05 | Stop reason: HOSPADM

## 2025-07-05 RX ORDER — HEPARIN SODIUM 1000 [USP'U]/ML
6000 INJECTION, SOLUTION INTRAVENOUS; SUBCUTANEOUS EVERY 6 HOURS PRN
Status: DISCONTINUED | OUTPATIENT
Start: 2025-07-05 | End: 2025-07-05

## 2025-07-05 RX ORDER — CEFTRIAXONE 1 G/50ML
1000 INJECTION, SOLUTION INTRAVENOUS ONCE
Status: COMPLETED | OUTPATIENT
Start: 2025-07-05 | End: 2025-07-05

## 2025-07-05 RX ORDER — HEPARIN SODIUM 10000 [USP'U]/100ML
3-30 INJECTION, SOLUTION INTRAVENOUS
Status: DISCONTINUED | OUTPATIENT
Start: 2025-07-05 | End: 2025-07-05

## 2025-07-05 RX ORDER — ACETAMINOPHEN 325 MG/1
650 TABLET ORAL ONCE
Status: COMPLETED | OUTPATIENT
Start: 2025-07-05 | End: 2025-07-05

## 2025-07-05 RX ORDER — HEPARIN SODIUM 1000 [USP'U]/ML
6000 INJECTION, SOLUTION INTRAVENOUS; SUBCUTANEOUS ONCE
Status: COMPLETED | OUTPATIENT
Start: 2025-07-05 | End: 2025-07-05

## 2025-07-05 RX ADMIN — ACETAMINOPHEN 650 MG: 325 TABLET ORAL at 12:44

## 2025-07-05 RX ADMIN — APIXABAN 10 MG: 5 TABLET, FILM COATED ORAL at 12:44

## 2025-07-05 RX ADMIN — IOHEXOL 90 ML: 350 INJECTION, SOLUTION INTRAVENOUS at 10:23

## 2025-07-05 RX ADMIN — HEPARIN SODIUM 18 UNITS/KG/HR: 10000 INJECTION, SOLUTION INTRAVENOUS at 12:12

## 2025-07-05 RX ADMIN — CEFTRIAXONE 1000 MG: 1 INJECTION, SOLUTION INTRAVENOUS at 12:14

## 2025-07-05 RX ADMIN — HEPARIN SODIUM 6000 UNITS: 1000 INJECTION INTRAVENOUS; SUBCUTANEOUS at 12:12

## 2025-07-05 NOTE — ASSESSMENT & PLAN NOTE
"Presented with chest pain.  CT PE study-\"Multiple segmental and subsegmental pulmonary emboli to the right lung. The calculated ratio of right ventricular to left ventricular diameter (RV/LV ratio) is less than 0.9. \"  Patient is saturating well at baseline oxygen requirements of 2 L.  And is hemodynamically stable.   and troponins are negative  Patient is recommended to be started on Eliquis 10 mg twice daily for 7 days and then continue Eliquis 5 mg twice daily with outpatient follow-up with hematology.  "

## 2025-07-05 NOTE — ASSESSMENT & PLAN NOTE
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
## 2025-07-06 LAB
ATRIAL RATE: 70 BPM
P AXIS: 49 DEGREES
PR INTERVAL: 152 MS
QRS AXIS: 71 DEGREES
QRSD INTERVAL: 86 MS
QT INTERVAL: 388 MS
QTC INTERVAL: 419 MS
T WAVE AXIS: 68 DEGREES
VENTRICULAR RATE: 70 BPM

## 2025-07-06 PROCEDURE — 93010 ELECTROCARDIOGRAM REPORT: CPT | Performed by: INTERNAL MEDICINE

## 2025-07-08 ENCOUNTER — TELEPHONE (OUTPATIENT)
Dept: FAMILY MEDICINE CLINIC | Facility: HOSPITAL | Age: 85
End: 2025-07-08

## 2025-07-08 ENCOUNTER — TELEPHONE (OUTPATIENT)
Age: 85
End: 2025-07-08

## 2025-07-08 NOTE — TELEPHONE ENCOUNTER
Hello,    Please advise if a forced appointment can be accommodated for the patient:    Call back #: [place]- [phone] (Ask for Kathreyn or [place])    Insurance: Medicare    Reason for appointment: Patient needs 2 weeks fu from surgery with Dr. Carrillo which would be next week.    Surgery was 06/28/25, PO HEMIARTHROPLASTY HIP (BIPOLAR) and all associated procedures (Right: Hip)     Requested doctor and/or location: Dr Carrillo      Thank you.

## 2025-07-08 NOTE — TELEPHONE ENCOUNTER
Patient is a long-term resident at [facility] and is seen by house doc there.    Please remove Sabino Rand as pcp.

## 2025-07-09 ENCOUNTER — DOCUMENTATION (OUTPATIENT)
Dept: ADMINISTRATIVE | Facility: OTHER | Age: 85
End: 2025-07-09

## 2025-07-09 NOTE — TELEPHONE ENCOUNTER
07/09/25 3:17 PM     The office's request has been received and reviewed.    The PCP has been successfully removed with a patient attribution note.     This message will now be completed.    Thank you  Bonita Jj MA

## 2025-07-15 ENCOUNTER — TELEPHONE (OUTPATIENT)
Age: 85
End: 2025-07-15

## 2025-07-17 ENCOUNTER — HOSPITAL ENCOUNTER (OUTPATIENT)
Dept: RADIOLOGY | Facility: HOSPITAL | Age: 85
Discharge: HOME | End: 2025-07-17
Payer: MEDICARE

## 2025-07-17 VITALS — BODY MASS INDEX: 26.2 KG/M2 | WEIGHT: 172.84 LBS | HEIGHT: 68 IN

## 2025-07-17 DIAGNOSIS — Z48.89 AFTERCARE FOLLOWING SURGERY: ICD-10-CM

## 2025-07-17 DIAGNOSIS — Z48.89 AFTERCARE FOLLOWING SURGERY: Primary | ICD-10-CM

## 2025-07-17 PROCEDURE — 99024 POSTOP FOLLOW-UP VISIT: CPT

## 2025-07-17 PROCEDURE — 73502 X-RAY EXAM HIP UNI 2-3 VIEWS: CPT

## 2025-07-17 NOTE — PROGRESS NOTES
"Name: Aldo Rodriguez      : 1940       MRN: [id number]   Encounter Provider: Juana Rowan PA-C   Encounter Date: 25  Encounter department: [facility] ORTHOPEDIC SPINE SURGERY  POST OP NOTE     Aldo Rodriguez  here today s/p right hip hemiarthroplasty         Surgery date: 2025    Assessment & Plan  Aftercare following surgery  Se plan below  Orders:  •  XR hip/pelv 2-3 vws right if performed; Future       Plan:  Staples were removed.  Steri strips applied  Patient was instructed to do the following   -Able to walk as much as tolerated. Weight bearing as tolerated right lower extremity.  -Continue to maintain posterior hip precautions. Maintain hip abduction pillow  -Patient did not have hip abduction pillow at office visit, however spoke via phone with nurse at [facility] who confirmed abduction pillow is at facility and is being utilized as directed  -Continue with rehab  -Take pain medication as prescribed if needed. No refills required at today's visit  -Continue with DVT ppx as prescribed - on eliquis and aspirin  -Follow up in 4 weeks for 6 week post-op visit. Will obtain X-rays  Aldo Rodriguez was instructed to call the office with any concerns or questions.    History of Present Illness    Subjective: Preoperative symptoms were right hip pain. Today, he reports improvement in right hip pain. He reports he has been putting weight on his leg and working with therapy at his rehab. Does report some right hip soreness, however overall feels well. Offered no additional complaints. Patient admits to compliance with activity restrictions.  Denies any fevers, chills, and night sweats.  No cough, no shortness of breath.  No chest pain, no palpitations.  No dysphagia.    Post-Op Medications:  Percocet - taking as needed  Duricef 500mg for 5 days - completed      Physical Exam    Objective:  Ht 5' 8\" (1.727 m)   Wt 78.4 kg " (172 lb 13.5 oz)   BMI 26.28 kg/m²     Strength: 5/5  Sensation: intact  Gait: in a stretcher    Lateral right hip incision healing extremely well. Staples intact.  No signs of erythema, discharge, or purulence.  There is no appreciable effusion.    Calf: soft, non tender, no swelling or erythema     Imaging:  I have reviewed and independently visualized the imaging studies (07/17/25)  myself and my interpretation is the following: Instrumentation in place and in good alignment.

## 2025-07-25 PROBLEM — A41.9 SEPSIS (HCC): Status: RESOLVED | Noted: 2025-06-25 | Resolved: 2025-07-25

## 2025-08-19 ENCOUNTER — HOSPITAL ENCOUNTER (OUTPATIENT)
Dept: RADIOLOGY | Facility: HOSPITAL | Age: 85
Discharge: HOME | End: 2025-08-19
Attending: NURSE PRACTITIONER
Payer: MEDICARE

## 2025-08-19 DIAGNOSIS — R13.12 DYSPHAGIA, OROPHARYNGEAL PHASE: ICD-10-CM

## 2025-08-19 DIAGNOSIS — Z86.73 PERSONAL HISTORY OF TRANSIENT CEREBRAL ISCHEMIA: ICD-10-CM

## 2025-08-19 DIAGNOSIS — J96.21 ACUTE AND CHRONIC RESPIRATORY FAILURE WITH HYPOXIA (HCC): ICD-10-CM

## 2025-08-19 DIAGNOSIS — R13.12 OROPHARYNGEAL DYSPHAGIA: ICD-10-CM

## 2025-08-19 DIAGNOSIS — Z86.73 PERSONAL HISTORY OF TRANSIENT ISCHEMIC ATTACK (TIA), AND CEREBRAL INFARCTION WITHOUT RESIDUAL DEFICITS: ICD-10-CM

## 2025-08-19 PROCEDURE — 92611 MOTION FLUOROSCOPY/SWALLOW: CPT

## 2025-08-19 PROCEDURE — 74230 X-RAY XM SWLNG FUNCJ C+: CPT

## (undated) DEVICE — DRAPE SHEET THREE QUARTER

## (undated) DEVICE — GLOVE SRG BIOGEL ECLIPSE 8

## (undated) DEVICE — STRL PENROSE DRAIN 18" X 1/4": Brand: CARDINAL HEALTH

## (undated) DEVICE — CAPIT HIP BIPOLAR HEAD POR PRIMARY

## (undated) DEVICE — MEDI-VAC YANKAUER SUCTION HANDLE W/BULBOUS TIP: Brand: CARDINAL HEALTH

## (undated) DEVICE — NEEDLE 25G X 1 1/2

## (undated) DEVICE — CORNEAL PROTECTOR ADULT

## (undated) DEVICE — TI MATRIXMIDFACE SCREW SELF-DRILLING 4MM
Type: IMPLANTABLE DEVICE | Status: NON-FUNCTIONAL
Brand: MATRIXMIDFACE
Removed: 2020-09-04

## (undated) DEVICE — SUT PROLENE 2-0 CT-2 30 IN 8411H

## (undated) DEVICE — SUT VICRYL PLUS 3-0 RB-1 CR/8 18 IN VCP713D

## (undated) DEVICE — 2000CC GUARDIAN II: Brand: GUARDIAN

## (undated) DEVICE — FEMORAL CANAL PRESSURIZER WITHOUT HUB, MEDIUM

## (undated) DEVICE — ELECTRODE BLADE MOD E-Z CLEAN 2.5IN 6.4CM -0012M

## (undated) DEVICE — 1840 FOAM BLOCK NEEDLE COUNTER: Brand: DEVON

## (undated) DEVICE — SYRINGE 10ML LL

## (undated) DEVICE — SUT ETHIBOND 1 OS-4 R/C 30 IN X518H

## (undated) DEVICE — GLOVE SRG BIOGEL 8

## (undated) DEVICE — TOWEL SET X-RAY

## (undated) DEVICE — CONMED ACCESSORY ELECTRODE, FLAT BLADE WITH EXTENDED INSULATION: Brand: CONMED

## (undated) DEVICE — SYRINGE 20ML LL

## (undated) DEVICE — STERILE MANDIBLE PACK: Brand: CARDINAL HEALTH

## (undated) DEVICE — GLOVE SRG BIOGEL 6.5

## (undated) DEVICE — VIAL DECANTER

## (undated) DEVICE — GLOVE INDICATOR PI UNDERGLOVE SZ 7.5 BLUE

## (undated) DEVICE — SUT MONOCRYL 4-0 PS-2 27 IN Y426H

## (undated) DEVICE — GLOVE INDICATOR PI UNDERGLOVE SZ 6.5 BLUE

## (undated) DEVICE — SUT PDS II 2-0 SH 27 IN Z317H

## (undated) DEVICE — SUT VICRYL 3-0 SH 27 IN J416H

## (undated) DEVICE — PLUMEPEN PRO 10FT

## (undated) DEVICE — GLOVE SRG BIOGEL ECLIPSE 6.5

## (undated) DEVICE — PROXIMATE SKIN STAPLERS (35 WIDE) CONTAINS 35 STAINLESS STEEL STAPLES (FIXED HEAD): Brand: PROXIMATE

## (undated) DEVICE — CHLORAPREP HI-LITE 26ML ORANGE

## (undated) DEVICE — SUT VICRYL 0 REEL 54 IN J287G

## (undated) DEVICE — STRL UNIVERSAL MINOR GENERAL: Brand: CARDINAL HEALTH

## (undated) DEVICE — BAG WOUND LAVAGE 1L BIASURGE ADV

## (undated) DEVICE — 3M™ STERI-STRIP™ REINFORCED ADHESIVE SKIN CLOSURES, R1547, 1/2 IN X 4 IN (12 MM X 100 MM), 6 STRIPS/ENVELOPE: Brand: 3M™ STERI-STRIP™

## (undated) DEVICE — CAPIT HIP STEM CEMENTED

## (undated) DEVICE — GLOVE INDICATOR PI UNDERGLOVE SZ 8.5 BLUE

## (undated) DEVICE — TUBING SUCTION 5MM X 12 FT

## (undated) DEVICE — BULB SYRINGE,IRRIGATION WITH PROTECTIVE CAP: Brand: DOVER

## (undated) DEVICE — SCD SEQUENTIAL COMPRESSION COMFORT SLEEVE MEDIUM KNEE LENGTH: Brand: KENDALL SCD

## (undated) DEVICE — BATTERY PACK-STERILE FOR BATTERY POWERED DRIVER

## (undated) DEVICE — MAYO STAND COVER: Brand: CONVERTORS

## (undated) DEVICE — UTILITY MARKER,BLACK WITH LABELS: Brand: DEVON

## (undated) DEVICE — REM POLYHESIVE ADULT PATIENT RETURN ELECTRODE: Brand: VALLEYLAB

## (undated) DEVICE — GLOVE SRG BIOGEL 7

## (undated) DEVICE — PEANUT 5 PK

## (undated) DEVICE — ELECTRODE NEEDLE MEGAFINE 2IN E-Z CLEAN MEGADYNE -0118

## (undated) DEVICE — PENCIL ELECTROSURG E-Z CLEAN -0035H

## (undated) DEVICE — INTENDED FOR TISSUE SEPARATION, AND OTHER PROCEDURES THAT REQUIRE A SHARP SURGICAL BLADE TO PUNCTURE OR CUT.: Brand: BARD-PARKER SAFETY BLADES SIZE 15, STERILE

## (undated) DEVICE — IV CATH 14 G SAFETY

## (undated) DEVICE — TRAY FOLEY 16FR SURESTEP TEMP SENS URIMETER STAT LOK

## (undated) DEVICE — NEURO PATTIES 1/2 X 3

## (undated) DEVICE — SUT CHROMIC 3-0 RB-1 27 IN U204H

## (undated) DEVICE — INTENDED FOR TISSUE SEPARATION, AND OTHER PROCEDURES THAT REQUIRE A SHARP SURGICAL BLADE TO PUNCTURE OR CUT.: Brand: BARD-PARKER ® CARBON RIB-BACK BLADES

## (undated) DEVICE — SUT ETHILON 5-0 P-3 18 IN 698H

## (undated) DEVICE — ADHESIVE SKN CLSR HISTOACRYL FLEX 0.5ML LF

## (undated) DEVICE — GLOVE INDICATOR PI UNDERGLOVE SZ 8 BLUE

## (undated) DEVICE — SPONGE STICK WITH PVP-I: Brand: KENDALL